# Patient Record
Sex: FEMALE | Race: WHITE | Employment: FULL TIME | URBAN - METROPOLITAN AREA
[De-identification: names, ages, dates, MRNs, and addresses within clinical notes are randomized per-mention and may not be internally consistent; named-entity substitution may affect disease eponyms.]

---

## 2017-01-01 ENCOUNTER — TRANSFERRED RECORDS (OUTPATIENT)
Dept: HEALTH INFORMATION MANAGEMENT | Facility: CLINIC | Age: 57
End: 2017-01-01

## 2017-01-09 ENCOUNTER — OFFICE VISIT (OUTPATIENT)
Dept: FAMILY MEDICINE | Facility: CLINIC | Age: 57
End: 2017-01-09
Payer: OTHER GOVERNMENT

## 2017-01-09 VITALS
TEMPERATURE: 98.2 F | HEART RATE: 86 BPM | DIASTOLIC BLOOD PRESSURE: 76 MMHG | WEIGHT: 204 LBS | SYSTOLIC BLOOD PRESSURE: 110 MMHG | OXYGEN SATURATION: 95 % | HEIGHT: 68 IN | BODY MASS INDEX: 30.92 KG/M2

## 2017-01-09 DIAGNOSIS — R52 BODY ACHES: ICD-10-CM

## 2017-01-09 DIAGNOSIS — Z00.00 ROUTINE GENERAL MEDICAL EXAMINATION AT A HEALTH CARE FACILITY: ICD-10-CM

## 2017-01-09 DIAGNOSIS — Z13.6 CARDIOVASCULAR SCREENING; LDL GOAL LESS THAN 100: ICD-10-CM

## 2017-01-09 DIAGNOSIS — M25.50 MULTIPLE JOINT PAIN: ICD-10-CM

## 2017-01-09 DIAGNOSIS — Z82.49 FAMILY HISTORY OF ISCHEMIC HEART DISEASE: ICD-10-CM

## 2017-01-09 DIAGNOSIS — Z85.528 HISTORY OF KIDNEY CANCER: Primary | ICD-10-CM

## 2017-01-09 DIAGNOSIS — K90.41 GLUTEN INTOLERANCE: ICD-10-CM

## 2017-01-09 LAB
BASOPHILS # BLD AUTO: 0.1 10E9/L (ref 0–0.2)
BASOPHILS NFR BLD AUTO: 0.8 %
CRP SERPL-MCNC: <2.9 MG/L (ref 0–8)
DIFFERENTIAL METHOD BLD: NORMAL
EOSINOPHIL # BLD AUTO: 0.2 10E9/L (ref 0–0.7)
EOSINOPHIL NFR BLD AUTO: 2.5 %
ERYTHROCYTE [DISTWIDTH] IN BLOOD BY AUTOMATED COUNT: 12.7 % (ref 10–15)
ERYTHROCYTE [SEDIMENTATION RATE] IN BLOOD BY WESTERGREN METHOD: 16 MM/H (ref 0–30)
HCT VFR BLD AUTO: 40 % (ref 35–47)
HGB BLD-MCNC: 13.5 G/DL (ref 11.7–15.7)
LYMPHOCYTES # BLD AUTO: 2.1 10E9/L (ref 0.8–5.3)
LYMPHOCYTES NFR BLD AUTO: 32.3 %
MCH RBC QN AUTO: 30.4 PG (ref 26.5–33)
MCHC RBC AUTO-ENTMCNC: 33.8 G/DL (ref 31.5–36.5)
MCV RBC AUTO: 90 FL (ref 78–100)
MONOCYTES # BLD AUTO: 0.5 10E9/L (ref 0–1.3)
MONOCYTES NFR BLD AUTO: 8.2 %
NEUTROPHILS # BLD AUTO: 3.6 10E9/L (ref 1.6–8.3)
NEUTROPHILS NFR BLD AUTO: 56.2 %
PLATELET # BLD AUTO: 258 10E9/L (ref 150–450)
RBC # BLD AUTO: 4.44 10E12/L (ref 3.8–5.2)
TSH SERPL DL<=0.005 MIU/L-ACNC: 3.79 MU/L (ref 0.4–4)
WBC # BLD AUTO: 6.5 10E9/L (ref 4–11)

## 2017-01-09 PROCEDURE — 99000 SPECIMEN HANDLING OFFICE-LAB: CPT | Performed by: NURSE PRACTITIONER

## 2017-01-09 PROCEDURE — 86038 ANTINUCLEAR ANTIBODIES: CPT | Mod: 90 | Performed by: NURSE PRACTITIONER

## 2017-01-09 PROCEDURE — 84443 ASSAY THYROID STIM HORMONE: CPT | Mod: 90 | Performed by: NURSE PRACTITIONER

## 2017-01-09 PROCEDURE — 85025 COMPLETE CBC W/AUTO DIFF WBC: CPT | Performed by: NURSE PRACTITIONER

## 2017-01-09 PROCEDURE — 99213 OFFICE O/P EST LOW 20 MIN: CPT | Mod: 25 | Performed by: NURSE PRACTITIONER

## 2017-01-09 PROCEDURE — 36415 COLL VENOUS BLD VENIPUNCTURE: CPT | Performed by: NURSE PRACTITIONER

## 2017-01-09 PROCEDURE — 99396 PREV VISIT EST AGE 40-64: CPT | Performed by: NURSE PRACTITIONER

## 2017-01-09 PROCEDURE — 85652 RBC SED RATE AUTOMATED: CPT | Performed by: NURSE PRACTITIONER

## 2017-01-09 PROCEDURE — 86431 RHEUMATOID FACTOR QUANT: CPT | Mod: 90 | Performed by: NURSE PRACTITIONER

## 2017-01-09 PROCEDURE — 86140 C-REACTIVE PROTEIN: CPT | Mod: 90 | Performed by: NURSE PRACTITIONER

## 2017-01-09 NOTE — NURSING NOTE
"Initial /76 mmHg  Pulse 86  Temp(Src) 98.2  F (36.8  C) (Tympanic)  Ht 5' 7.5\" (1.715 m)  Wt 204 lb (92.534 kg)  BMI 31.46 kg/m2  SpO2 95% Estimated body mass index is 31.46 kg/(m^2) as calculated from the following:    Height as of this encounter: 5' 7.5\" (1.715 m).    Weight as of this encounter: 204 lb (92.534 kg). .    Ava Henderson    "

## 2017-01-09 NOTE — MR AVS SNAPSHOT
After Visit Summary   1/9/2017    Teresa Fernandez    MRN: [id number]           Patient Information     Date Of Birth          1960        Visit Information        Provider Department      1/9/2017 4:20 PM Nuzhat Burt APRN [facility]        Today's Diagnoses     History of kidney cancer    -  1     Family history of ischemic heart disease         CARDIOVASCULAR SCREENING; LDL GOAL LESS THAN 100         Body aches         Multiple joint pain         Gluten intolerance           Care Instructions      Preventive Health Recommendations  Female Ages 50 - 64    Yearly exam: See your health care provider every year in order to  o Review health changes.   o Discuss preventive care.    o Review your medicines if your doctor has prescribed any.      Get a Pap test every three years (unless you have an abnormal result and your provider advises testing more often).    If you get Pap tests with HPV test, you only need to test every 5 years, unless you have an abnormal result.     You do not need a Pap test if your uterus was removed (hysterectomy) and you have not had cancer.    You should be tested each year for STDs (sexually transmitted diseases) if you're at risk.     Have a mammogram every 1 to 2 years.    Have a colonoscopy at age 50, or have a yearly FIT test (stool test). These exams screen for colon cancer.      Have a cholesterol test every 5 years, or more often if advised.    Have a diabetes test (fasting glucose) every three years. If you are at risk for diabetes, you should have this test more often.     If you are at risk for osteoporosis (brittle bone disease), think about having a bone density scan (DEXA).    Shots: Get a flu shot each year. Get a tetanus shot every 10 years.    Nutrition:     Eat at least 5 servings of fruits and vegetables each day.    Eat whole-grain bread, whole-wheat pasta and brown rice instead of white grains and rice.    Talk to your provider  about Calcium and Vitamin D.     Lifestyle    Exercise at least 150 minutes a week (30 minutes a day, 5 days a week). This will help you control your weight and prevent disease.    Limit alcohol to one drink per day.    No smoking.     Wear sunscreen to prevent skin cancer.     See your dentist every six months for an exam and cleaning.    See your eye doctor every 1 to 2 years.                Follow-ups after your visit        Additional Services     GASTROENTEROLOGY ADULT REFERRAL +/- PROCEDURE       Your provider has referred you to Gastroenterology Services.    English    Procedure/Referral: REFERRAL ONLY - [facility]: [facility] [phone]   [email or web address]  [facility]: [facility] Clinic [facility] [phone]   [email or web address]  N: [place] [facility] [phone]   [email or web address]    Please be aware that coverage of these services is subject to the terms and limitations of your health insurance plan.  Call member services at your health plan with any benefit or coverage questions.  Any procedures must be performed at a [place] facility OR coordinated by your clinic's referral office.    Please bring the following with you to your appointment:    (1) Any X-Rays, CTs or MRIs which have been performed.  Contact the facility where they were done to arrange for  prior to your scheduled appointment.    (2) List of current medications   (3) This referral request   (4) Any documents/labs given to you for this referral            RHEUMATOLOGY REFERRAL       Your provider has referred you to: [facility]: [facility] [phone]   [email or web address]    Please be aware that coverage of these services is subject to the terms and limitations of your health insurance plan.  Call member services at your health plan with any benefit or  coverage questions.      Please bring the following with you to your appointment:    (1) Any X-Rays, CTs or MRIs which have been performed.  Contact the facility where they were done to arrange for  prior to your scheduled appointment.    (2) List of current medications   (3) This referral request   (4) Any documents/labs given to you for this referral                  Your next 10 appointments already scheduled     Jan 09, 2017  4:20 PM   PHYSICAL with HERIBERTO Delgado CNP   [facility]    [address] [address] [phone]              Future tests that were ordered for you today     Open Future Orders        Priority Expected Expires Ordered    Lipid panel reflex to direct LDL Routine 6/8/2017 10/10/2017 1/9/2017            Who to contact     If you have questions or need follow up information about today's clinic visit or your schedule please contact [facility] directly at [phone].  Normal or non-critical lab and imaging results will be communicated to you by [employer], letter or phone within 4 business days after the clinic has received the results. If you do not hear from us within 7 days, please contact the clinic through [email or web address] or phone. If you have a critical or abnormal lab result, we will notify you by phone as soon as possible.  Submit refill requests through [employer] or call your pharmacy and they will forward the refill request to us. Please allow 3 business days for your refill to be completed.          Additional Information About Your Visit        Mult[employer] Information     [employer] gives you secure access to your electronic health record. If you see a primary care provider, you can also send messages to your care team and make appointments. If you have questions, please call your primary care clinic.  If you do not have a primary care provider, please call [phone] and they will assist you.        Care  "EveryWhere ID     This is your Care EveryWhere ID. This could be used by other organizations to access your [place] medical records  [id number]        Your Vitals Were     Pulse Temperature Height BMI (Body Mass Index) Pulse Oximetry       86 98.2  F (36.8  C) (Tympanic) 5' 7.5\" (1.715 m) 31.46 kg/m2 95%        Blood Pressure from Last 3 Encounters:   01/09/17 110/76   12/12/16 134/69   12/12/16 124/82    Weight from Last 3 Encounters:   01/09/17 204 lb (92.534 kg)   12/12/16 206 lb 9.6 oz (93.713 kg)   12/12/16 203 lb 6.4 oz (92.262 kg)              We Performed the Following     Antinuclear antibody screen by EIA     CBC with platelets differential     CRP inflammation     Cyclic Citrullinated Peptide IgG (Quest)     Erythrocyte sedimentation rate auto     GASTROENTEROLOGY ADULT REFERRAL +/- PROCEDURE     Rheumatoid factor     RHEUMATOLOGY REFERRAL        Primary Care Provider Office Phone # Fax #    Jeanne HERIBERTO Ivan [facility] [phone]       [facility] [address] [address]        Thank you!     Thank you for choosing [facility]  for your care. Our goal is always to provide you with excellent care. Hearing back from our patients is one way we can continue to improve our services. Please take a few minutes to complete the written survey that you may receive in the mail after your visit with us. Thank you!             Your Updated Medication List - Protect others around you: Learn how to safely use, store and throw away your medicines at www.disposemymeds.org.          This list is accurate as of: 1/9/17  4:07 PM.  Always use your most recent med list.                   Brand Name Dispense Instructions for use    * albuterol (2.5 MG/3ML) 0.083% neb solution     25 vial    Take 1 vial (2.5 mg) by nebulization every 6 hours as needed for shortness of breath / dyspnea or wheezing       * albuterol 108 (90 BASE) MCG/ACT Inhaler    PROAIR HFA/PROVENTIL " HFA/VENTOLIN HFA    1 Inhaler    Inhale 2 puffs into the lungs every 6 hours as needed for shortness of breath / dyspnea       ALEVE 220 MG capsule   Generic drug:  naproxen sodium      220 mg. Prn.       ASPIRIN PO      Take 325 mg by mouth       fexofenadine 180 MG tablet    ALLEGRA    30 tablet    Take 1 tablet (180 mg) by mouth daily       * fluticasone-salmeterol 250-50 MCG/DOSE diskus inhaler    ADVAIR    1 Inhaler    Inhale 1 puff into the lungs 2 times daily       * fluticasone-salmeterol 500-50 MCG/DOSE diskus inhaler    ADVAIR    1 Inhaler    Inhale 1 puff into the lungs 2 times daily       fluticasone-vilanterol 200-25 MCG/INH oral inhaler    BREO ELLIPTA    1 Inhaler    Inhale 1 puff into the lungs daily       levothyroxine 125 MCG tablet    SYNTHROID/LEVOTHROID    90 tablet    Take 1 tablet (125 mcg) by mouth daily       MULTIVITAMIN ADULT PO          order for DME     1 Units    Equipment being ordered: Nebulizer       OVER-THE-COUNTER     1 Bottle    Place 1 drop into both eyes 3 times daily. Systane Ultra, Systane Balance, Blink Tears or Refresh Optive Artificial Tear       PARoxetine 20 MG tablet    PAXIL    90 tablet    Take 1 tablet (20 mg) by mouth daily       ranitidine 150 MG tablet    ZANTAC    180 tablet    Take 1 tablet (150 mg) by mouth 2 times daily       VITAMIN D3 PO      Take 2,000 Units by mouth daily       * Notice:  This list has 4 medication(s) that are the same as other medications prescribed for you. Read the directions carefully, and ask your doctor or other care provider to review them with you.

## 2017-01-09 NOTE — PROGRESS NOTES
Quick Note:    Please let the patient know that all labs are normal.hold for remaining labs  ______

## 2017-01-09 NOTE — PROGRESS NOTES
"  Answers for HPI/ROS submitted by the patient on 1/9/2017   Annual Exam:  Getting at least 3 servings of Calcium per day:: Yes  Bi-annual eye exam:: Yes  Dental care twice a year:: Yes  Sleep apnea or symptoms of sleep apnea:: Daytime drowsiness  Diet:: Gluten-free/reduced  Frequency of exercise:: 4-5 days/week  Duration of exercise:: 15-30 minutes  Taking medications regularly:: Yes  Medication side effects:: None  Additional concerns today:: YES  PHQ-2 Depressed: Not at all, Several days  PHQ-2 Score: 1       SUBJECTIVE:     CC: Teresa Fernandez is an 56 year old woman who presents for preventive health visit.     Healthy Habits:    Do you get at least three servings of calcium containing foods daily (dairy, green leafy vegetables, etc.)? yes    Amount of exercise or daily activities, outside of work: 2-3 day(s) per week    Problems taking medications regularly No    Medication side effects: No    Have you had an eye exam in the past two years? yes    Do you see a dentist twice per year? yes    Do you have sleep apnea, excessive snoring or daytime drowsiness?yes, daytime drowsiness        C/O body aching off/on for months- muscles/ shoulders/ joint pain. \"aching all over\". Ibuprofen helps but does not entirely relieve pain. Tylenol does not help. No joint swelling or redness.    Worse two weeks ago after eating gluten, follows a gluten free diet.    Unable to find colonoscopy in patient's chart- patient unsure when/where she had last colonoscopy     History of renal cell cancer in 1996- left nephrectomy- no longer followed by oncology     Today's PHQ-2 Score:   PHQ-2 ( 1999 [employer]) 1/9/2017 8/25/2016   Q1: Little interest or pleasure in doing things - 0   Q2: Feeling down, depressed or hopeless - 0   PHQ-2 Score - 0   Little interest or pleasure in doing things Not at all -   Feeling down, depressed or hopeless Several days -   PHQ-2 Score 1 -       Abuse: Current or Past(Physical, Sexual or Emotional)- No  Do you " feel safe in your environment - Yes    Social History   Substance Use Topics     Smoking status: Former Smoker     Quit date: 03/26/1996     Smokeless tobacco: Never Used     Alcohol Use: Yes      Comment: occ     The patient does not drink >3 drinks per day nor >7 drinks per week.    Recent Labs   Lab Test  04/29/16   0737  01/29/15   0656  09/08/09   1006   CHOL  220*  217*  222*   HDL  58  50*  55   LDL  138*  141*  137*   TRIG  122  131  147   CHOLHDLRATIO   --   4.3  4.0   NHDL  162*   --    --        Reviewed orders with patient.  Reviewed health maintenance and updated orders accordingly - Yes    Mammo Decision Support:  Patient over age 50, mutual decision to screen reflected in health maintenance.    Pertinent mammograms are reviewed under the imaging tab.  History of abnormal Pap smear: Status post benign hysterectomy. Health Maintenance and Surgical History updated.  All Histories reviewed and updated in Epic.      ROS:  C: NEGATIVE for fever, chills, change in weight  I: NEGATIVE for worrisome rashes, moles or lesions  E: NEGATIVE for vision changes or irritation  ENT: NEGATIVE for ear, mouth and throat problems  R: NEGATIVE for significant cough or SOB  B: NEGATIVE for masses, tenderness or discharge  CV: NEGATIVE for chest pain, palpitations or peripheral edema  GI: NEGATIVE for nausea, abdominal pain, heartburn, or change in bowel habits  : NEGATIVE for unusual urinary or vaginal symptoms. No vaginal bleeding.  M: + Muscle and joint pain in upper extremities/hands- bilateral  N: NEGATIVE for weakness, dizziness or paresthesias  P: NEGATIVE for changes in mood or affect     Problem list, Medication list, Allergies, and Medical/Social/Surgical histories reviewed in [facility] and updated as appropriate.  OBJECTIVE:     There were no vitals taken for this visit.  EXAM:  GENERAL: healthy, alert and no distress  EYES: Eyes grossly normal to inspection, PERRL and conjunctivae and sclerae normal  HENT: ear  canals and TM's normal, nose and mouth without ulcers or lesions  NECK: no adenopathy, no asymmetry, masses, or scars and thyroid normal to palpation  RESP: lungs clear to auscultation - no rales, rhonchi or wheezes  CV: regular rate and rhythm, normal S1 S2, no S3 or S4, no murmur, click or rub, no peripheral edema and peripheral pulses strong  ABDOMEN: soft, nontender, no hepatosplenomegaly, no masses and bowel sounds normal  MS: no gross musculoskeletal defects noted, no edema  SKIN: no suspicious lesions or rashes  NEURO: Normal strength and tone, mentation intact and speech normal  PSYCH: mentation appears normal, affect normal/bright    ASSESSMENT/PLAN:     1. Routine general medical examination at a health care facility      2. History of kidney cancer  Post left nephrectomy in 1996- no longer followed by oncology    3. Family history of ischemic heart disease    - Lipid panel reflex to direct LDL; Future    4. CARDIOVASCULAR SCREENING; LDL GOAL LESS THAN 100  Not currently on statin  - Lipid panel reflex to direct LDL; Future    5. Body aches  ? OA vs RHEUMATOID ARTHRITIS vs fibromyalgia (considering she has a lot of shoulder discomfort)  - Antinuclear antibody screen by EIA  - Rheumatoid factor  - Erythrocyte sedimentation rate auto  - CRP inflammation  - CBC with platelets differential  - Cyclic Citrullinated Peptide IgG ([facility])  - RHEUMATOLOGY REFERRAL    6. Multiple joint pain  OA vs RHEUMATOID ARTHRITIS   - Antinuclear antibody screen by EIA  - Rheumatoid factor  - Erythrocyte sedimentation rate auto  - CRP inflammation  - CBC with platelets differential  - Cyclic Citrullinated Peptide IgG (Quest)  - RHEUMATOLOGY REFERRAL  - TSH with free T4 reflex    7. Gluten intolerance    - GASTROENTEROLOGY ADULT REFERRAL +/- PROCEDURE    COUNSELING:   Reviewed preventive health counseling, as reflected in patient instructions       Regular exercise       Healthy diet/nutrition       Colon cancer screening          "reports that she quit smoking about 20 years ago. She has never used smokeless tobacco.    Estimated body mass index is 31.19 kg/(m^2) as calculated from the following:    Height as of 12/12/16: 5' 7.72\" (1.72 m).    Weight as of 12/12/16: 203 lb 6.4 oz (92.262 kg).   Weight management plan: encouraged to continue healthy eating and start exercising     Counseling Resources:  ATP IV Guidelines  Pooled Cohorts Equation Calculator  Breast Cancer Risk Calculator  FRAX Risk Assessment  ICSI Preventive Guidelines  Dietary Guidelines for Americans, 2010  USDA's MyPlate  ASA Prophylaxis  Lung CA Screening    HERIBERTO Delgado [facility]  "

## 2017-01-09 NOTE — PROGRESS NOTES
Answers for HPI/ROS submitted by the patient on 1/9/2017   Annual Exam:  Getting at least 3 servings of Calcium per day:: Yes  Bi-annual eye exam:: Yes  Dental care twice a year:: Yes  Sleep apnea or symptoms of sleep apnea:: Daytime drowsiness  Diet:: Gluten-free/reduced  Frequency of exercise:: 4-5 days/week  Duration of exercise:: 15-30 minutes  Taking medications regularly:: Yes  Medication side effects:: None  Additional concerns today:: YES  PHQ-2 Depressed: Not at all, Several days  PHQ-2 Score: 1

## 2017-01-09 NOTE — PATIENT INSTRUCTIONS

## 2017-01-10 LAB
ANA SER QL IA: NORMAL
RHEUMATOID FACT SER NEPH-ACNC: <20 IU/ML (ref 0–20)

## 2017-01-16 ENCOUNTER — TELEPHONE (OUTPATIENT)
Dept: FAMILY MEDICINE | Facility: CLINIC | Age: 57
End: 2017-01-16

## 2017-01-16 NOTE — TELEPHONE ENCOUNTER
Patient would like to schedule for an gastroscopy and colonoscopy.  Please call *[phone] Jane with orders    OV notes on 1/9/17:    7. Gluten intolerance    - GASTROENTEROLOGY ADULT REFERRAL +/- PROCEDURE    Did you want patient to have a consult with GI or do you want patient to have procedures upper GI and/or lower GI?      Thank you  Mag PIEDRA RN

## 2017-01-17 NOTE — TELEPHONE ENCOUNTER
Pt informed.  She will do a consultation with GI first and do any recommended testing afterward.  Pt will arrange.    Rhiannon Bowen RN

## 2017-02-05 ENCOUNTER — HOSPITAL ENCOUNTER (EMERGENCY)
Facility: CLINIC | Age: 57
Discharge: HOME | End: 2017-02-05
Attending: PHYSICIAN ASSISTANT | Admitting: PHYSICIAN ASSISTANT
Payer: OTHER GOVERNMENT

## 2017-02-05 VITALS
TEMPERATURE: 98 F | OXYGEN SATURATION: 99 % | DIASTOLIC BLOOD PRESSURE: 74 MMHG | SYSTOLIC BLOOD PRESSURE: 111 MMHG | RESPIRATION RATE: 16 BRPM

## 2017-02-05 DIAGNOSIS — J01.00 ACUTE NON-RECURRENT MAXILLARY SINUSITIS: ICD-10-CM

## 2017-02-05 PROCEDURE — 99213 OFFICE O/P EST LOW 20 MIN: CPT | Performed by: PHYSICIAN ASSISTANT

## 2017-02-05 PROCEDURE — 99213 OFFICE O/P EST LOW 20 MIN: CPT

## 2017-02-05 RX ORDER — DOXYCYCLINE 100 MG/1
100 CAPSULE ORAL 2 TIMES DAILY
Qty: 20 CAPSULE | Refills: 0 | Status: SHIPPED | OUTPATIENT
Start: 2017-02-05 | End: 2017-02-15

## 2017-02-05 NOTE — DISCHARGE INSTRUCTIONS
Acute Sinusitis    Acute sinusitis is inflammation (irritation and swelling) of the sinuses. It is usually from a bacterial infection that follows an upper respiratory viral infection. Your doctor can help you find relief. Read on to learn more.  What is acute sinusitis?  Sinuses are air-filled spaces in the skull behind the face. They are kept moist and clean by a lining of mucosa. Things such as pollen, smoke, and chemical fumes can irritate the mucosa. It can then become inflamed (swell up). As a response to irritation, the mucosa makes more mucus and other fluids. Tiny hairlike cilia cover the mucosa. Cilia help transport mucus toward the opening of the sinus. Too much mucus may cause the cilia to stop working. This blocks the sinus opening. A buildup of fluid in the sinuses then leads to symptoms such as pain and pressure. It can also encourage growth of bacteria in the sinuses.  Common symptoms of acute sinusitis  You may have:    Facial soreness pain    Headache    Fever    Postnasal drip (drainage in the back of the throat)    Congestion    Drainage that is thick and colored, instead of clear    Cough  Diagnosis of acute sinusitis  The doctor will ask about your symptoms and medical history. He or she will examine your ear, nose, and throat. X-rays are usually not needed. If your sinusitis recurs, you may have a culture to check for bacteria or imaging tests.     An evaluation will be done. A culture (sample of mucus) is sometimes taken to check for bacteria. If you have multiple bouts of sinusitis, imaging (X-rays or CAT scans) may be done to check for an anatomic cause of the infection.  Treatment of acute sinusitis  Treatment is designed to unblock the sinus opening and help the cilia work again. Antihistamine and decongestant medications may be prescribed. These can reduce inflammation and decrease fluid production. If a bacterial infection is present, it is treated with antibiotic medication for 10 to  14 days. This medication should be taken until it is gone, even if you feel better.    1179-9234 [employer]. [address] [place] [address], [place] [address]. All rights reserved. This information is not intended as a substitute for professional medical care. Always follow your healthcare professional's instructions.

## 2017-02-05 NOTE — ED PROVIDER NOTES
History     Chief Complaint   Patient presents with     Cough     Pt has had cough, chills, just not feeling well for the past week.  Pt got flu shot and has history of asthma.     HPI  Teresa Fernandez is a 56 year old female  presenting with a chief complaint of cough for the last week.  She additionally complains of nasal congestion, sinus pressure, upper dental pain and shortness of breath. She denies any fever, chills, myalgias, sore throat, wheezing or abdominal complaints.  She states that she does have a history of frequent sinus infections after breaking her nose several years ago.  She did have ENT evaluation, however did does not remember having imaging done and did not require surgery.  She does also have a history of hypothyroidism, allergies/chronic rhinitis, generalized anxiety disorder, renal cancer, GERD, low back pain, fatigue chronic history of melanoma, eczema, moderate persistent asthma, degenerative disc disease of the lumbar spine, hyperlipidemia.  She has increased use of her albuterol inhaler up to 1-2 times daily without relief.      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      Grave's disease     Malignant neoplasm of renal pelvis (H) 1995     Renal cell carcinoma     Bronchitis, not specified as acute or chronic      Depressive disorder, not elsewhere classified      Other specified acquired hypothyroidism 4/12/2005     CHR FRONTAL SINUSITIS 4/12/2005     ALLERGIC RHINITIS NOS 4/12/2005     CHR MAXILLARY SINUSITIS 4/12/2005     ALLERGY, UNSPECIFIED 4/19/2005     Unspecified asthma(493.90)      Allergies      Eczema      Skin cancer 06/2010     MIS of the chest     Anxiety      Arthritis      herniated disc     Hypertension goal BP (blood pressure) < 140/90 11/16/2011     Eczema 10/17/2012     Chronic sinusitis 2005     Malignant melanoma (H)         No current facility-administered medications on file prior to  encounter.  Current Outpatient Prescriptions on File Prior to Encounter:  albuterol (PROAIR HFA/PROVENTIL HFA/VENTOLIN HFA) 108 (90 BASE) MCG/ACT Inhaler Inhale 2 puffs into the lungs every 6 hours as needed for shortness of breath / dyspnea   PARoxetine (PAXIL) 20 MG tablet Take 1 tablet (20 mg) by mouth daily   ranitidine (ZANTAC) 150 MG tablet Take 1 tablet (150 mg) by mouth 2 times daily   levothyroxine (SYNTHROID/LEVOTHROID) 125 MCG tablet Take 1 tablet (125 mcg) by mouth daily   fluticasone-salmeterol (ADVAIR) 500-50 MCG/DOSE diskus inhaler Inhale 1 puff into the lungs 2 times daily   fluticasone - vilanterol (BREO ELLIPTA) 200-25 MCG/INH oral inhaler Inhale 1 puff into the lungs daily   fluticasone-salmeterol (ADVAIR) 250-50 MCG/DOSE diskus inhaler Inhale 1 puff into the lungs 2 times daily   order for DME Equipment being ordered: Nebulizer   albuterol (2.5 MG/3ML) 0.083% nebulizer solution Take 1 vial (2.5 mg) by nebulization every 6 hours as needed for shortness of breath / dyspnea or wheezing   Multiple Vitamins-Minerals (MULTIVITAMIN ADULT PO)    ASPIRIN PO Take 325 mg by mouth   Cholecalciferol (VITAMIN D3 PO) Take 2,000 Units by mouth daily   fexofenadine (ALLEGRA) 180 MG tablet Take 1 tablet (180 mg) by mouth daily   OVER-THE-COUNTER Place 1 drop into both eyes 3 times daily. Systane Ultra, Systane Balance, Blink Tears or Refresh Optive Artificial Tear   Naproxen Sodium (ALEVE) 220 MG capsule 220 mg. Prn.       I have reviewed the Medications, Allergies, Past Medical and Surgical History, and Social History in the Epic system.    Review of Systems  CONSTITUTIONAL:NEGATIVE for fever, chills, change in weight  INTEGUMENTARY/SKIN: NEGATIVE for worrisome rashes, moles or lesions  EYES: NEGATIVE for vision changes or irritation  ENT/MOUTH: POSITIVE for nasal congestion, sinus pressure and NEGATIVE for sore throat, ear pain   RESP:POSITIVE for cough, shortness of breath, chest tightness and NEGATIVE for  wheezing  GI: NEGATIVE for abdominal pain, diarrhea, nausea and vomiting   Physical Exam   /74 mmHg  Temp(Src) 98  F (36.7  C) (Oral)  Resp 16  SpO2 99%  Physical Exam  GENERAL APPEARANCE: healthy, alert and no distress  EYES: EOMI,  PERRL, conjunctiva clear  HENT: ear canals and TM's normal.  Nasal mucosa edematous, tenderness to palpation of bilateral maxillary sinuses.  Posterior pharynx is nonerythematous without exudate, scant amount of postnasal drainage is present  NECK: supple, nontender, no lymphadenopathy  RESP: lungs clear to auscultation - no rales, rhonchi or wheezes  CV: regular rates and rhythm, normal S1 S2, no murmur noted  SKIN: no suspicious lesions or rashes  ED Course   Procedures        Critical Care time:  none            Labs Ordered and Resulted from Time of ED Arrival Up to the Time of Departure from the ED - No data to display    Assessments & Plan (with Medical Decision Making)     I have reviewed the nursing notes.    I have reviewed the findings, diagnosis, plan and need for follow up with the patient.  Discharge Medication List as of 2/5/2017 12:28 PM      START taking these medications    Details   doxycycline Monohydrate 100 MG CAPS Take 1 capsule (100 mg) by mouth 2 times daily for 10 days, Disp-20 capsule, R-0, E-Prescribe           Final diagnoses:   Acute non-recurrent maxillary sinusitis     Physical-year-old female presents to urgent care with concerns over one week history of nasal congestion, sinus pressure and cough.  Patient had stable vital signs upon arrival.  Physical exam findings as described above.  Symptoms are consistent with sinusitis.  I discussed with patient that generally sinusitis, viral illnesses and do not require antibiotics, however given duration of her symptoms I did agree to prescribe doxycycline at this time.  Differential for his symptoms also include viral URI, allergic rhinitis, bronchitis.  I do not suspect pneumonia and will defer further  evaluation at this time.  Patient was discharged home stable with instructions for continued OTC symptomatic treatment as needed.      Disclaimer: This note consists of symbols derived from keyboarding, dictation, and/or voice recognition software. As a result, there may be errors in the script that have gone undetected.  Please consider this when interpreting information found in the chart.    2/5/2017   [facility] EMERGENCY DEPARTMENT      Dalila Aguilar PA-C  02/10/17 1052

## 2017-02-05 NOTE — ED AVS SNAPSHOT
[facility] Emergency Department    [address] [address]    Phone:  [phone]    Fax:  [phone]                                       Teresa Fernandez   MRN: [id number]    Department:  [facility]   Date of Visit:  2/5/2017           Patient Information     Date Of Birth          1960        Your diagnoses for this visit were:     Acute non-recurrent maxillary sinusitis        You were seen by Dalila Aguilar PA-C.      Follow-up Information     Follow up with Nuzhat Burt APRN CNP In 1 week.    Specialty:  Nurse Practitioner    Why:  As needed, If symptoms worsen    Contact information:    [facility]  [address] [id number] [phone]          Discharge Instructions         Acute Sinusitis    Acute sinusitis is inflammation (irritation and swelling) of the sinuses. It is usually from a bacterial infection that follows an upper respiratory viral infection. Your doctor can help you find relief. Read on to learn more.  What is acute sinusitis?  Sinuses are air-filled spaces in the skull behind the face. They are kept moist and clean by a lining of mucosa. Things such as pollen, smoke, and chemical fumes can irritate the mucosa. It can then become inflamed (swell up). As a response to irritation, the mucosa makes more mucus and other fluids. Tiny hairlike cilia cover the mucosa. Cilia help transport mucus toward the opening of the sinus. Too much mucus may cause the cilia to stop working. This blocks the sinus opening. A buildup of fluid in the sinuses then leads to symptoms such as pain and pressure. It can also encourage growth of bacteria in the sinuses.  Common symptoms of acute sinusitis  You may have:    Facial soreness pain    Headache    Fever    Postnasal drip (drainage in the back of the throat)    Congestion    Drainage that is thick and colored, instead of clear    Cough  Diagnosis of acute sinusitis  The doctor  will ask about your symptoms and medical history. He or she will examine your ear, nose, and throat. X-rays are usually not needed. If your sinusitis recurs, you may have a culture to check for bacteria or imaging tests.     An evaluation will be done. A culture (sample of mucus) is sometimes taken to check for bacteria. If you have multiple bouts of sinusitis, imaging (X-rays or CAT scans) may be done to check for an anatomic cause of the infection.  Treatment of acute sinusitis  Treatment is designed to unblock the sinus opening and help the cilia work again. Antihistamine and decongestant medications may be prescribed. These can reduce inflammation and decrease fluid production. If a bacterial infection is present, it is treated with antibiotic medication for 10 to 14 days. This medication should be taken until it is gone, even if you feel better.    1991-3261 [employer]. [address] [place] [address]. All rights reserved. This information is not intended as a substitute for professional medical care. Always follow your healthcare professional's instructions.          24 Hour Appointment Hotline       To make an appointment at any [facility], call [phone] ([phone]). If you don't have a family doctor or clinic, we will help you find one. [facility] clinics are conveniently located to serve the needs of you and your family.             Review of your medicines      START taking        Dose / Directions Last dose taken    doxycycline Monohydrate 100 MG Caps   Dose:  100 mg   Quantity:  20 capsule        Take 1 capsule (100 mg) by mouth 2 times daily for 10 days   Refills:  0          Our records show that you are taking the medicines listed below. If these are incorrect, please call your family doctor or clinic.        Dose / Directions Last dose taken    * albuterol (2.5 MG/3ML) 0.083% neb solution   Dose:  1 vial   Quantity:  25 vial        Take 1 vial (2.5 mg) by  nebulization every 6 hours as needed for shortness of breath / dyspnea or wheezing   Refills:  0        * albuterol 108 (90 BASE) MCG/ACT Inhaler   Commonly known as:  PROAIR HFA/PROVENTIL HFA/VENTOLIN HFA   Dose:  2 puff   Quantity:  1 Inhaler        Inhale 2 puffs into the lungs every 6 hours as needed for shortness of breath / dyspnea   Refills:  5        ALEVE 220 MG capsule   Dose:  220 mg   Generic drug:  naproxen sodium        220 mg. Prn.   Refills:  0        ASPIRIN PO   Dose:  325 mg        Take 325 mg by mouth   Refills:  0        fexofenadine 180 MG tablet   Commonly known as:  ALLEGRA   Dose:  180 mg   Quantity:  30 tablet        Take 1 tablet (180 mg) by mouth daily   Refills:  1        * fluticasone-salmeterol 250-50 MCG/DOSE diskus inhaler   Commonly known as:  ADVAIR   Dose:  1 puff   Quantity:  1 Inhaler        Inhale 1 puff into the lungs 2 times daily   Refills:  1        * fluticasone-salmeterol 500-50 MCG/DOSE diskus inhaler   Commonly known as:  ADVAIR   Dose:  1 puff   Quantity:  1 Inhaler        Inhale 1 puff into the lungs 2 times daily   Refills:  3        fluticasone-vilanterol 200-25 MCG/INH oral inhaler   Commonly known as:  BREO ELLIPTA   Dose:  1 puff   Quantity:  1 Inhaler        Inhale 1 puff into the lungs daily   Refills:  1        levothyroxine 125 MCG tablet   Commonly known as:  SYNTHROID/LEVOTHROID   Dose:  125 mcg   Quantity:  90 tablet        Take 1 tablet (125 mcg) by mouth daily   Refills:  3        MULTIVITAMIN ADULT PO        Refills:  0        order for DME   Quantity:  1 Units        Equipment being ordered: Nebulizer   Refills:  0        OVER-THE-COUNTER   Dose:  1 drop   Quantity:  1 Bottle        Place 1 drop into both eyes 3 times daily. Systane Ultra, Systane Balance, Blink Tears or Refresh Optive Artificial Tear   Refills:  0        PARoxetine 20 MG tablet   Commonly known as:  PAXIL   Dose:  20 mg   Quantity:  90 tablet        Take 1 tablet (20 mg) by mouth  daily   Refills:  3        ranitidine 150 MG tablet   Commonly known as:  ZANTAC   Dose:  150 mg   Quantity:  180 tablet        Take 1 tablet (150 mg) by mouth 2 times daily   Refills:  2        VITAMIN D3 PO   Dose:  2000 Units        Take 2,000 Units by mouth daily   Refills:  0        * Notice:  This list has 4 medication(s) that are the same as other medications prescribed for you. Read the directions carefully, and ask your doctor or other care provider to review them with you.            Prescriptions were sent or printed at these locations (1 Prescription)                   [facility] [place], [place] - [address]   [address] [address]    Telephone:  [phone]   Fax:  [phone]   Hours:                  E-Prescribed (1 of 1)         doxycycline Monohydrate 100 MG CAPS                Orders Needing Specimen Collection     None      Pending Results     No orders found from 2/4/2017 to 2/6/2017.            Pending Culture Results     No orders found from 2/4/2017 to 2/6/2017.             Test Results from your hospital stay            Thank you for choosing [facility]       Thank you for choosing [facility] for your care. Our goal is always to provide you with excellent care. Hearing back from our patients is one way we can continue to improve our services. Please take a few minutes to complete the written survey that you may receive in the mail after you visit with us. Thank you!        [employer]Ah[employer] Information     [employer] gives you secure access to your electronic health record. If you see a primary care provider, you can also send messages to your care team and make appointments. If you have questions, please call your primary care clinic.  If you do not have a primary care provider, please call [phone] and they will assist you.        Care EveryWhere ID     This is your Care EveryWhere ID. This could be used by other organizations to access your [facility]  records  [id number]        After Visit Summary       This is your record. Keep this with you and show to your community pharmacist(s) and doctor(s) at your next visit.

## 2017-02-05 NOTE — ED NOTES
Pt has had cough, chills, just not feeling well for the past week.  Pt got flu shot and has history of asthma.

## 2017-02-05 NOTE — ED AVS SNAPSHOT
[facility] Emergency Department    [address] [address]    Phone:  [phone]    Fax:  [phone]                                       Teresa Fernandez   MRN: [id number]    Department:  [facility] Emergency Department   Date of Visit:  2/5/2017           After Visit Summary Signature Page     I have received my discharge instructions, and my questions have been answered. I have discussed any challenges I see with this plan with the nurse or doctor.    ..........................................................................................................................................  Patient/Patient Representative Signature      ..........................................................................................................................................  Patient Representative Print Name and Relationship to Patient    ..................................................               ................................................  Date                                            Time    ..........................................................................................................................................  Reviewed by Signature/Title    ...................................................              ..............................................  Date                                                            Time

## 2017-03-09 ENCOUNTER — MYC MEDICAL ADVICE (OUTPATIENT)
Dept: FAMILY MEDICINE | Facility: CLINIC | Age: 57
End: 2017-03-09

## 2017-03-09 NOTE — LETTER
March 9, 2017      Teresa Fernandez  [address] [address]              To Whom This May Concern:    Please excuse Teresa Fernandez today from work.    Sincerely,      Nuzhat Burt CNP  [facility]  [phone]

## 2017-03-09 NOTE — TELEPHONE ENCOUNTER
[facility] [place]  Admission .  Primary/Specialty Care Clinics

## 2017-03-09 NOTE — TELEPHONE ENCOUNTER
Fax number is [phone].  Patient sent another my chart message with the fax number. Tanvi ROMERO RN

## 2017-03-09 NOTE — TELEPHONE ENCOUNTER
Clare,    Patient had colonoscopy done in [place] yesterday with Dr. Parada and had to do a double prep.  The double prep is because she is not a regular BM person.  Patient feels weak today and would like a note from us as it is closer for her. I have started for your approval. Tanvi ROMERO RN

## 2017-05-01 ENCOUNTER — OFFICE VISIT (OUTPATIENT)
Dept: RHEUMATOLOGY | Facility: CLINIC | Age: 57
End: 2017-05-01
Payer: OTHER GOVERNMENT

## 2017-05-01 VITALS
WEIGHT: 208 LBS | SYSTOLIC BLOOD PRESSURE: 125 MMHG | BODY MASS INDEX: 32.1 KG/M2 | RESPIRATION RATE: 16 BRPM | TEMPERATURE: 97.9 F | DIASTOLIC BLOOD PRESSURE: 66 MMHG | HEART RATE: 84 BPM

## 2017-05-01 DIAGNOSIS — M15.0 PRIMARY OSTEOARTHRITIS INVOLVING MULTIPLE JOINTS: Primary | ICD-10-CM

## 2017-05-01 PROCEDURE — 99243 OFF/OP CNSLTJ NEW/EST LOW 30: CPT | Performed by: INTERNAL MEDICINE

## 2017-05-01 NOTE — MR AVS SNAPSHOT
After Visit Summary   5/1/2017    Teresa Fernandez    MRN: [id number]           Patient Information     Date Of Birth          1960        Visit Information        Provider Department      5/1/2017 3:45 PM Shivam Walter MD [facility]        Today's Diagnoses     Primary osteoarthritis involving multiple joints    -  1       Follow-ups after your visit        Who to contact     If you have questions or need follow up information about today's clinic visit or your schedule please contact [facility] directly at [phone].  Normal or non-critical lab and imaging results will be communicated to you by Gucashhart, letter or phone within 4 business days after the clinic has received the results. If you do not hear from us within 7 days, please contact the clinic through Vysrt or phone. If you have a critical or abnormal lab result, we will notify you by phone as soon as possible.  Submit refill requests through Sticher or call your pharmacy and they will forward the refill request to us. Please allow 3 business days for your refill to be completed.          Additional Information About Your Visit        MyChart Information     [employer] gives you secure access to your electronic health record. If you see a primary care provider, you can also send messages to your care team and make appointments. If you have questions, please call your primary care clinic.  If you do not have a primary care provider, please call [phone] and they will assist you.        Care EveryWhere ID     This is your Care EveryWhere ID. This could be used by other organizations to access your [facility] medical records  [id number]        Your Vitals Were     Pulse Temperature Respirations BMI (Body Mass Index)          84 97.9  F (36.6  C) (Oral) 16 32.1 kg/m2         Blood Pressure from Last 3 Encounters:   05/01/17 125/66   02/05/17 111/74   01/09/17 110/76    Weight from Last 3 Encounters:    05/01/17 208 lb (94.3 kg)   01/09/17 204 lb (92.5 kg)   12/12/16 206 lb 9.6 oz (93.7 kg)              Today, you had the following     No orders found for display         Today's Medication Changes          These changes are accurate as of: 5/1/17 11:59 PM.  If you have any questions, ask your nurse or doctor.               These medicines have changed or have updated prescriptions.        Dose/Directions    fluticasone-salmeterol 250-50 MCG/DOSE diskus inhaler   Commonly known as:  ADVAIR   This may have changed:  Another medication with the same name was removed. Continue taking this medication, and follow the directions you see here.   Used for:  Chest tightness, Intermittent asthma, uncomplicated   Changed by:  Grant Cowart,         Dose:  1 puff   Inhale 1 puff into the lungs 2 times daily   Quantity:  1 Inhaler   Refills:  1                Primary Care Provider Office Phone # Fax #    Nuzhat HERIBERTO Larsen [facility] [phone]       [facility] [address] [address]        Thank you!     Thank you for choosing [facility]  for your care. Our goal is always to provide you with excellent care. Hearing back from our patients is one way we can continue to improve our services. Please take a few minutes to complete the written survey that you may receive in the mail after your visit with us. Thank you!             Your Updated Medication List - Protect others around you: Learn how to safely use, store and throw away your medicines at www.disposemymeds.org.          This list is accurate as of: 5/1/17 11:59 PM.  Always use your most recent med list.                   Brand Name Dispense Instructions for use    * albuterol (2.5 MG/3ML) 0.083% neb solution     25 vial    Take 1 vial (2.5 mg) by nebulization every 6 hours as needed for shortness of breath / dyspnea or wheezing       * albuterol 108 (90 BASE) MCG/ACT Inhaler    PROAIR HFA/PROVENTIL HFA/VENTOLIN HFA     1 Inhaler    Inhale 2 puffs into the lungs every 6 hours as needed for shortness of breath / dyspnea       ALEVE 220 MG capsule   Generic drug:  naproxen sodium      220 mg. Prn.       ASPIRIN PO      Take 325 mg by mouth       fexofenadine 180 MG tablet    ALLEGRA    30 tablet    Take 1 tablet (180 mg) by mouth daily       fluticasone-salmeterol 250-50 MCG/DOSE diskus inhaler    ADVAIR    1 Inhaler    Inhale 1 puff into the lungs 2 times daily       fluticasone-vilanterol 200-25 MCG/INH oral inhaler    BREO ELLIPTA    1 Inhaler    Inhale 1 puff into the lungs daily       levothyroxine 125 MCG tablet    SYNTHROID/LEVOTHROID    90 tablet    Take 1 tablet (125 mcg) by mouth daily       MULTIVITAMIN ADULT PO          order for DME     1 Units    Equipment being ordered: Nebulizer       OVER-THE-COUNTER     1 Bottle    Place 1 drop into both eyes 3 times daily. Systane Ultra, Systane Balance, Blink Tears or Refresh Optive Artificial Tear       PARoxetine 20 MG tablet    PAXIL    90 tablet    Take 1 tablet (20 mg) by mouth daily       ranitidine 150 MG tablet    ZANTAC    180 tablet    Take 1 tablet (150 mg) by mouth 2 times daily       VITAMIN D3 PO      Take 2,000 Units by mouth daily       * Notice:  This list has 2 medication(s) that are the same as other medications prescribed for you. Read the directions carefully, and ask your doctor or other care provider to review them with you.

## 2017-05-01 NOTE — NURSING NOTE
"Chief Complaint   Patient presents with     Consult     pain all over ref Dr. Burt       Initial /66 (BP Location: Left arm, Patient Position: Chair, Cuff Size: Adult Large)  Pulse 84  Temp 97.9  F (36.6  C) (Oral)  Resp 16  Wt 208 lb (94.3 kg)  BMI 32.1 kg/m2 Estimated body mass index is 32.1 kg/(m^2) as calculated from the following:    Height as of 1/9/17: 5' 7.5\" (1.715 m).    Weight as of this encounter: 208 lb (94.3 kg).  Medication Reconciliation: complete   Hiral Martin LPN    "

## 2017-05-02 NOTE — PROGRESS NOTES
REFERRING PHYSICIAN:  Nuzhat Burt NP.      CHIEF COMPLAINT:  Joint pain.      HISTORY:  Teresa Fernandez is a 57-year-old female who presents for evaluation of joint pain.  She is especially complaining of pain in the base of her left thumb greater than her right thumb BUT also episodically in her shoulders and her feet.  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  She does tend to  things with her left hand and thumb.  Because of these worsening symptoms which she has actually had for years, but became worse over the last year she was seen and evaluated back in 01/2017 and found to have a normal sed rate and CRP, negative rheumatoid factor, negative DIEGO.      She does take ibuprofen which provides some relief, but she is cautious about taking NSAIDs in general because she only has 1 kidney after 1 was removed because of renal cell carcinoma.      She does have a rash that is nonspecific in nature and has been seen by Dermatology and evaluated; however, she does not carry a diagnosis psoriasis.  She does not have a photosensitive rash or malar rash.  There is no history of stomatitis.  She does not have chronic diarrhea or diagnosis of inflammatory bowel disease.  She has never had serositis, nephritis or hepatitis.      PAST MEDICAL HISTORY:  Allergic rhinitis, hypothyroidism, generalized anxiety disorder, leukoplakia, gastroesophageal reflux disease, history of renal cancer, chronic fatigue, osteopenia, moderate persistent asthma, degenerative disk disease of the lumbar spine, status post surgery, hyperlipidemia.      MEDICATIONS:  Albuterol, Paroxetine 20 mg daily, Ranitidine 150 mg b.i.d., Levothyroxine 125 mg daily, Flonase, Advair, Albuterol, aspirin.      DRUG ALLERGIES:  Gluten, iodine, penicillin, Omnipaque.      SOCIAL HISTORY:  Not a smoker, drinks occasionally.      FAMILY HISTORY:  There is nobody in her family with rheumatoid  arthritis, psoriasis, lupus, Crohn's disease.      REVIEW OF SYSTEMS:  A 10-point review of systems is otherwise negative.      PHYSICAL EXAMINATION:   VITAL SIGNS:  Blood pressure is 125/66, pulse 84, temperature 97.9, and weight 208.   HEENT:  She is normocephalic and atraumatic.  Sclerae are clear and moist.  Oropharynx without lesions.   NECK:  Supple, without lymphadenopathy.   LUNGS:  Clear to auscultation bilaterally without wheeze or rales.   HEART:  Regular rate and rhythm without murmur or rub.   ABDOMEN:  Nontender with normal bowel sounds.   JOINTS:  Joint exam there is no synovitis of the wrists, MCPs, or PIPs.  There is bony hypertrophy consistent with osteoarthritis of the left greater than right first CMC joint with tenderness associated with it.  There is no synovitis of the elbows, shoulders, knees, ankles, MTP joints.  She does, however, have bilateral pes planus, significant bunion deformities developing in the bilateral first and fifth MTP joints with deformities developing of the toes in a valgus pattern.   SKIN:  There is nothing to suggest she has psoriasis.   NEUROLOGICAL:  Strength is 5/5 proximally and distally in upper and lower extremities.  DTRs 2+ symmetrically, knees, ankles, biceps.      IMPRESSION:   1.  Generalized osteoarthritis.   2.  The patient does not have any convincing evidence of an autoimmune or inflammatory arthritis, nor does she have any obvious signs of fibromyalgia.      RECOMMENDATIONS:   1.  Discussed the etiology, pathogenesis, treatment options of osteoarthritis.  Discussed that unfortunately there is no preventative or remittive treatment.  Treatment is aimed at mitigating symptoms through the use of things like Tylenol, Advil and analgesics.   2.  I think she could safely use an NSAID episodically although I would recommend regular monitoring of renal function.     3.  Also discussed the somewhat dubious benefits to things like Glucosamine, chondroitin sulfate,  MSM, or hyaluronic acid, etc.  Also, can use topical NSAIDs such as Aspercreme or Voltaren gel.     4.  She could also consider getting injections in her thumbs if things worsen at her discretion.         ERIN MCKEON MD             D: 2017 17:04   T: 2017 05:55   MT:       Name:     RILEY CANDELARIO   MRN:      [id number]        Account:      [id number]   :      1960           Visit Date:   2017      Document: [id number]       cc: Nuzhat Burt NP

## 2017-06-23 ENCOUNTER — TRANSFERRED RECORDS (OUTPATIENT)
Dept: HEALTH INFORMATION MANAGEMENT | Facility: CLINIC | Age: 57
End: 2017-06-23

## 2017-06-25 PROCEDURE — 84150 ASSAY OF PROSTAGLANDIN: CPT | Mod: 90 | Performed by: ALLERGY & IMMUNOLOGY

## 2017-06-25 PROCEDURE — 99000 SPECIMEN HANDLING OFFICE-LAB: CPT | Performed by: ALLERGY & IMMUNOLOGY

## 2017-06-25 PROCEDURE — 82542 COL CHROMOTOGRAPHY QUAL/QUAN: CPT | Mod: 90 | Performed by: ALLERGY & IMMUNOLOGY

## 2017-06-26 DIAGNOSIS — L50.3 DERMOGRAPHISM: Primary | ICD-10-CM

## 2017-06-26 DIAGNOSIS — R53.83 FATIGUE: ICD-10-CM

## 2017-06-27 ENCOUNTER — OFFICE VISIT (OUTPATIENT)
Dept: FAMILY MEDICINE | Facility: CLINIC | Age: 57
End: 2017-06-27
Payer: OTHER GOVERNMENT

## 2017-06-27 ENCOUNTER — RADIANT APPOINTMENT (OUTPATIENT)
Dept: GENERAL RADIOLOGY | Facility: CLINIC | Age: 57
End: 2017-06-27
Attending: NURSE PRACTITIONER
Payer: OTHER GOVERNMENT

## 2017-06-27 VITALS
TEMPERATURE: 97.8 F | SYSTOLIC BLOOD PRESSURE: 127 MMHG | DIASTOLIC BLOOD PRESSURE: 56 MMHG | WEIGHT: 212 LBS | HEART RATE: 75 BPM | BODY MASS INDEX: 32.71 KG/M2

## 2017-06-27 DIAGNOSIS — M25.552 HIP PAIN, LEFT: ICD-10-CM

## 2017-06-27 DIAGNOSIS — M25.552 HIP PAIN, LEFT: Primary | ICD-10-CM

## 2017-06-27 PROCEDURE — 72170 X-RAY EXAM OF PELVIS: CPT

## 2017-06-27 PROCEDURE — 99213 OFFICE O/P EST LOW 20 MIN: CPT | Performed by: NURSE PRACTITIONER

## 2017-06-27 ASSESSMENT — ANXIETY QUESTIONNAIRES
3. WORRYING TOO MUCH ABOUT DIFFERENT THINGS: NOT AT ALL
IF YOU CHECKED OFF ANY PROBLEMS ON THIS QUESTIONNAIRE, HOW DIFFICULT HAVE THESE PROBLEMS MADE IT FOR YOU TO DO YOUR WORK, TAKE CARE OF THINGS AT HOME, OR GET ALONG WITH OTHER PEOPLE: NOT DIFFICULT AT ALL
GAD7 TOTAL SCORE: 0
1. FEELING NERVOUS, ANXIOUS, OR ON EDGE: NOT AT ALL
6. BECOMING EASILY ANNOYED OR IRRITABLE: NOT AT ALL
7. FEELING AFRAID AS IF SOMETHING AWFUL MIGHT HAPPEN: NOT AT ALL
5. BEING SO RESTLESS THAT IT IS HARD TO SIT STILL: NOT AT ALL
2. NOT BEING ABLE TO STOP OR CONTROL WORRYING: NOT AT ALL

## 2017-06-27 ASSESSMENT — PATIENT HEALTH QUESTIONNAIRE - PHQ9: 5. POOR APPETITE OR OVEREATING: NOT AT ALL

## 2017-06-27 NOTE — NURSING NOTE
"Initial /56 (BP Location: Left arm, Patient Position: Chair, Cuff Size: Adult Large)  Pulse 75  Temp 97.8  F (36.6  C) (Tympanic)  Wt 212 lb (96.2 kg)  BMI 32.71 kg/m2 Estimated body mass index is 32.71 kg/(m^2) as calculated from the following:    Height as of 1/9/17: 5' 7.5\" (1.715 m).    Weight as of this encounter: 212 lb (96.2 kg). .    Ava Henderson    "

## 2017-06-27 NOTE — PATIENT INSTRUCTIONS
1. X-ray today  2. Schedule appointment with sports medicine clinic          Thank you for choosing [facility].  You may be receiving a survey in the mail from Sharon Barragan regarding your visit today.  Please take a few minutes to complete and return the survey to let us know how we are doing.      If you have questions or concerns, please contact us via [employer] or you can contact your care team at [phone].    Our Clinic hours are:  Monday 6:40 am  to 7:00 pm  Tuesday -Friday 6:40 am to 5:00 pm    The [place] outpatient lab hours are:  Monday - Friday 6:10 am to 4:45 pm  Saturdays 7:00 am to 11:00 am  Appointments are required, call [phone]    If you have clinical questions after hours or would like to schedule an appointment,  call the clinic at [phone].

## 2017-06-27 NOTE — PROGRESS NOTES
SUBJECTIVE:                                                    Teresa Fernandez is a 57 year old female who presents to clinic today for the following health issues:      Joint Pain    Onset: one month    Description: -   Location: left hip- radiating into left groin.   Sitting down she will get a sharp pain. Intensity increasing.   History of back surgery 2 yrs ago- no numbness or weakness in legs. Pain does not radiate.   History of OA.   No history of osteoporosis.   No injury.   Character: Sharp    Intensity: moderate    Progression of Symptoms: intermittent    Accompanying Signs & Symptoms:  Other symptoms: starting to radiate down her leg    History:   Previous similar pain: no       Precipitating factors:   Trauma or overuse: no     Alleviating factors:  Improved by: heat    Therapies Tried and outcome: heat, some comfort        -------------------------------------    Problem list and histories reviewed & adjusted, as indicated.  Additional history: as documented    Patient Active Problem List   Diagnosis     Acquired hypothyroidism     Allergic state     Chronic rhinitis     Allergic rhinitis due to pollen     Sinusitis, chronic     History of skin cancer     ERIC (generalized anxiety disorder)     Leukoplakia of oral mucosa, including tongue     Renal cancer (H)     GERD (gastroesophageal reflux disease)     Chronic low back pain     Dry eye syndrome     Chronic fatigue     Osteopenia     History of melanoma in situ     Eczema     Moderate persistent asthma without complication     History of kidney cancer     DDD (degenerative disc disease), lumbar     Hyperlipidemia LDL goal <160     Chest tightness or pressure     Past Surgical History:   Procedure Laterality Date     CHOLECYSTECTOMY      gall bladder     COLONOSCOPY      2007-normal     ENT SURGERY  2-15-11    Left cheek bx- Mucositis.     FUSION LUMBAR ANTERIOR TWO LEVELS  4/13/2015     GI SURGERY      kidney removal -left     GYN SURGERY       hysterectomy     HYSTERECTOMY, PAP NO LONGER INDICATED       HYSTERECTOMY, PAP NO LONGER INDICATED       SOFT TISSUE SURGERY      chest-melonoma     SURGICAL HISTORY OF -       Tubal ligation     SURGICAL HISTORY OF -   3/1996    Left nephrectomy-renal cell CA     SURGICAL HISTORY OF -   4/8/1999    [facility]       Social History   Substance Use Topics     Smoking status: Former Smoker     Quit date: 3/26/1996     Smokeless tobacco: Never Used     Alcohol use Yes      Comment: occ     Family History   Problem Relation Age of Onset     DIABETES Mother      Cardiovascular Mother      Lipids Mother      Depression Mother      Hypertension Father      Lipids Father      Obesity Father      Macular Degeneration Father      CANCER Maternal Grandmother      kind unknown had sores on legs     Eczema Maternal Grandmother      Eczema Maternal Grandfather      Breast Cancer Paternal Grandmother      CANCER Paternal Grandmother      breast     Hypertension Paternal Grandfather      Cardiovascular Paternal Grandfather      heart attack     Hypertension Brother      Thyroid Disease Sister      Hypertension Sister      Depression Sister      Allergies Sister      Allergies Son      Eczema Son      Lipids Sister      Thyroid Disease Sister      Neurologic Disorder Sister      Depression Sister      Respiratory Son      Glaucoma No family hx of      CEREBROVASCULAR DISEASE No family hx of            Reviewed and updated as needed this visit by clinical staff       Reviewed and updated as needed this visit by Provider         ROS:  Constitutional, HEENT, cardiovascular, pulmonary, GI, , musculoskeletal, neuro, skin, endocrine and psych systems are negative, except as otherwise noted.    OBJECTIVE:     /56 (BP Location: Left arm, Patient Position: Chair, Cuff Size: Adult Large)  Pulse 75  Temp 97.8  F (36.6  C) (Tympanic)  Wt 212 lb (96.2 kg)  BMI 32.71 kg/m2  Body mass index is 32.71 kg/(m^2).  GENERAL APPEARANCE: healthy,  alert and no distress  RESP: Unlabored  ORTHO: Hip Exam: Palpation: Tender Left hip:   {Non-tender:  left greater trochanter  Range of Motion:  Full ROM, both hips  Strength:  full strength  Special tests:  no crepitation/snapping over central inguinal region      Diagnostic Test Results:  none     ASSESSMENT/PLAN:       1. Hip pain, left  Unknown etiology- ? OA vs lumbar spine  - XR Pelvis 1/2 Views; Future  - ORTHO  REFERRAL  - use ICE and NSAIDS prn      HERIBERTO Delgado CNP  [facility]

## 2017-06-27 NOTE — MR AVS SNAPSHOT
After Visit Summary   6/27/2017    Teresa Fernandez    MRN: [id number]           Patient Information     Date Of Birth          1960        Visit Information        Provider Department      6/27/2017 3:00 PM Nuzhat Burt APRN CNP [facility]        Today's Diagnoses     Hip pain, left    -  1      Care Instructions    1. X-ray today  2. Schedule appointment with sports medicine clinic          Thank you for choosing [facility].  You may be receiving a survey in the mail from Sharon Barragan regarding your visit today.  Please take a few minutes to complete and return the survey to let us know how we are doing.      If you have questions or concerns, please contact us via [employer] or you can contact your care team at [phone].    Our Clinic hours are:  Monday 6:40 am  to 7:00 pm  Tuesday -Friday 6:40 am to 5:00 pm    The [place] outpatient lab hours are:  Monday - Friday 6:10 am to 4:45 pm  Saturdays 7:00 am to 11:00 am  Appointments are required, call [phone]    If you have clinical questions after hours or would like to schedule an appointment,  call the clinic at [phone].          Follow-ups after your visit        Additional Services     ORTHO  REFERRAL       [facility] is referring you to the Orthopedic  Services at [facility].       The  Representative will assist you in the coordination of your Orthopedic and Musculoskeletal Care as prescribed by your physician.    The  Representative will call you within 1 business day to help schedule your appointment, or you may contact the  Representative at:    All areas ~ [phone]     Type of Referral : Non Surgical       Timeframe requested: Routine    Coverage of these services is subject to the terms and limitations of your health insurance plan.  Please call member services at your health plan with any benefit or coverage  questions.      If X-rays, CT or MRI's have been performed, please contact the facility where they were done to arrange for , prior to your scheduled appointment.  Please bring this referral request to your appointment and present it to your specialist.                  Future tests that were ordered for you today     Open Future Orders        Priority Expected Expires Ordered    XR Pelvis 1/2 Views Routine 6/27/2017 6/27/2018 6/27/2017            Who to contact     If you have questions or need follow up information about today's clinic visit or your schedule please contact [facility] directly at [phone].  Normal or non-critical lab and imaging results will be communicated to you by [employer], letter or phone within 4 business days after the clinic has received the results. If you do not hear from us within 7 days, please contact the clinic through [employer] or phone. If you have a critical or abnormal lab result, we will notify you by phone as soon as possible.  Submit refill requests through [employer] or call your pharmacy and they will forward the refill request to us. Please allow 3 business days for your refill to be completed.          Additional Information About Your Visit        MyChart Information     [employer] gives you secure access to your electronic health record. If you see a primary care provider, you can also send messages to your care team and make appointments. If you have questions, please call your primary care clinic.  If you do not have a primary care provider, please call [phone] and they will assist you.        Care EveryWhere ID     This is your Care EveryWhere ID. This could be used by other organizations to access your [facility] medical records  [id number]        Your Vitals Were     Pulse Temperature BMI (Body Mass Index)             75 97.8  F (36.6  C) (Tympanic) 32.71 kg/m2          Blood Pressure from Last 3 Encounters:   06/27/17 127/56   05/01/17 125/66    02/05/17 111/74    Weight from Last 3 Encounters:   06/27/17 212 lb (96.2 kg)   05/01/17 208 lb (94.3 kg)   01/09/17 204 lb (92.5 kg)              We Performed the Following     ORTHO  REFERRAL          Today's Medication Changes          These changes are accurate as of: 6/27/17  3:20 PM.  If you have any questions, ask your nurse or doctor.               Stop taking these medicines if you haven't already. Please contact your care team if you have questions.     fluticasone-vilanterol 200-25 MCG/INH oral inhaler   Commonly known as:  BREO ELLIPTA                    Primary Care Provider Office Phone # Fax #    Nuzhat Burt APREYAD [facility] [phone]       [facility] [address] [address]        Equal Access to Services     MERRILL PRESTON : Hadii aad ku hadasho Soayanna, waaxda luqadaha, qaybta kaalmada alexa, lillian stratton . So [facility] [phone].    ATENCIÓN: Si habla español, tiene a anderson disposición servicios gratuitos de asistencia lingüística. Stas al [phone].    We comply with applicable federal civil rights laws and [place] laws. We do not discriminate on the basis of race, color, national origin, age, disability sex, sexual orientation or gender identity.            Thank you!     Thank you for choosing [facility]  for your care. Our goal is always to provide you with excellent care. Hearing back from our patients is one way we can continue to improve our services. Please take a few minutes to complete the written survey that you may receive in the mail after your visit with us. Thank you!             Your Updated Medication List - Protect others around you: Learn how to safely use, store and throw away your medicines at www.disposemymeds.org.          This list is accurate as of: 6/27/17  3:20 PM.  Always use your most recent med list.                   Brand Name Dispense Instructions for use Diagnosis    *  albuterol (2.5 MG/3ML) 0.083% neb solution     25 vial    Take 1 vial (2.5 mg) by nebulization every 6 hours as needed for shortness of breath / dyspnea or wheezing    Asthma exacerbation       * albuterol 108 (90 BASE) MCG/ACT Inhaler    PROAIR HFA/PROVENTIL HFA/VENTOLIN HFA    1 Inhaler    Inhale 2 puffs into the lungs every 6 hours as needed for shortness of breath / dyspnea    Moderate persistent asthma without complication       ALEVE 220 MG capsule   Generic drug:  naproxen sodium      220 mg. Prn.        ASPIRIN PO      Take 325 mg by mouth        fexofenadine 180 MG tablet    ALLEGRA    30 tablet    Take 1 tablet (180 mg) by mouth daily    Seasonal allergic rhinitis       fluticasone-salmeterol 250-50 MCG/DOSE diskus inhaler    ADVAIR    1 Inhaler    Inhale 1 puff into the lungs 2 times daily    Chest tightness, Intermittent asthma, uncomplicated       levothyroxine 125 MCG tablet    SYNTHROID/LEVOTHROID    90 tablet    Take 1 tablet (125 mcg) by mouth daily    Acquired hypothyroidism       MULTIVITAMIN ADULT PO           order for DME     1 Units    Equipment being ordered: Nebulizer    Asthma exacerbation       OVER-THE-COUNTER     1 Bottle    Place 1 drop into both eyes 3 times daily. Systane Ultra, Systane Balance, Blink Tears or Refresh Optive Artificial Tear    Dry eye syndrome       PARoxetine 20 MG tablet    PAXIL    90 tablet    Take 1 tablet (20 mg) by mouth daily    ERIC (generalized anxiety disorder)       ranitidine 150 MG tablet    ZANTAC    180 tablet    Take 1 tablet (150 mg) by mouth 2 times daily    Gastroesophageal reflux disease, esophagitis presence not specified       VITAMIN D3 PO      Take 2,000 Units by mouth daily        * Notice:  This list has 2 medication(s) that are the same as other medications prescribed for you. Read the directions carefully, and ask your doctor or other care provider to review them with you.

## 2017-06-28 ASSESSMENT — PATIENT HEALTH QUESTIONNAIRE - PHQ9: SUM OF ALL RESPONSES TO PHQ QUESTIONS 1-9: 0

## 2017-06-28 ASSESSMENT — ASTHMA QUESTIONNAIRES: ACT_TOTALSCORE: 25

## 2017-06-28 ASSESSMENT — ANXIETY QUESTIONNAIRES: GAD7 TOTAL SCORE: 0

## 2017-06-30 DIAGNOSIS — Z13.6 CARDIOVASCULAR SCREENING; LDL GOAL LESS THAN 100: ICD-10-CM

## 2017-06-30 DIAGNOSIS — Z82.49 FAMILY HISTORY OF ISCHEMIC HEART DISEASE: ICD-10-CM

## 2017-06-30 LAB
CHOLEST SERPL-MCNC: 221 MG/DL
HDLC SERPL-MCNC: 56 MG/DL
LDLC SERPL CALC-MCNC: 143 MG/DL
NONHDLC SERPL-MCNC: 165 MG/DL
TRIGL SERPL-MCNC: 111 MG/DL

## 2017-06-30 PROCEDURE — 80061 LIPID PANEL: CPT | Performed by: NURSE PRACTITIONER

## 2017-06-30 PROCEDURE — 36415 COLL VENOUS BLD VENIPUNCTURE: CPT | Performed by: NURSE PRACTITIONER

## 2017-07-01 LAB
COLLECT DURATION TIME UR: 24 H
CREAT UR-MCNC: 34 MG/DL
ME-HISTAMINE/CREAT 24H UR: 143
SPECIMEN VOL 24H UR: 2950 L

## 2017-07-03 LAB — 2,3 11B PROSTAGLANDIN F2A UR: 1611

## 2017-07-06 ENCOUNTER — TRANSFERRED RECORDS (OUTPATIENT)
Dept: HEALTH INFORMATION MANAGEMENT | Facility: CLINIC | Age: 57
End: 2017-07-06

## 2017-07-18 ENCOUNTER — TRANSFERRED RECORDS (OUTPATIENT)
Dept: HEALTH INFORMATION MANAGEMENT | Facility: CLINIC | Age: 57
End: 2017-07-18

## 2017-08-16 ENCOUNTER — APPOINTMENT (OUTPATIENT)
Dept: GENERAL RADIOLOGY | Facility: CLINIC | Age: 57
End: 2017-08-16
Attending: PHYSICIAN ASSISTANT
Payer: OTHER GOVERNMENT

## 2017-08-16 ENCOUNTER — HOSPITAL ENCOUNTER (EMERGENCY)
Facility: CLINIC | Age: 57
Discharge: HOME | End: 2017-08-16
Attending: PHYSICIAN ASSISTANT | Admitting: PHYSICIAN ASSISTANT
Payer: OTHER GOVERNMENT

## 2017-08-16 VITALS
RESPIRATION RATE: 18 BRPM | HEART RATE: 72 BPM | TEMPERATURE: 97.7 F | DIASTOLIC BLOOD PRESSURE: 80 MMHG | SYSTOLIC BLOOD PRESSURE: 128 MMHG | OXYGEN SATURATION: 98 %

## 2017-08-16 DIAGNOSIS — M79.674 PAIN OF TOE OF RIGHT FOOT: ICD-10-CM

## 2017-08-16 PROCEDURE — 99213 OFFICE O/P EST LOW 20 MIN: CPT

## 2017-08-16 PROCEDURE — 73630 X-RAY EXAM OF FOOT: CPT | Mod: LT

## 2017-08-16 PROCEDURE — 99213 OFFICE O/P EST LOW 20 MIN: CPT | Performed by: PHYSICIAN ASSISTANT

## 2017-08-16 NOTE — ED AVS SNAPSHOT
[facility] Emergency Department    [address] [facility] [address]    Phone:  [phone]    Fax:  [phone]                                       Teresa Fernandez   MRN: [id number]    Department:  [facility] Emergency Department   Date of Visit:  8/16/2017           Patient Information     Date Of Birth          1960        Your diagnoses for this visit were:     Pain of toe of right foot        You were seen by Cecilio Rendon PA-C.        Discharge Instructions         Acute Pain, Uncertain Cause  Pain can be caused by many conditions that range from very minor to very serious. In some cases, though, pain comes and goes with no apparent cause.  We were not able to find the exact cause for your pain. At this time there is no sign of any serious illness causing your pain. More tests may be needed to determine the cause. In many cases, pain like this goes away by itself.  Home care  Take any medicines as prescribed. If another medicine was not prescribed for pain, you can take an over-the-counter pain medicine such as ibuprofen or acetaminophen. Use these as directed on the label.    Follow-up care  Follow up with your healthcare provider or our staff as directed.  When to seek medical advice  Call your healthcare provider for any of the following:    Pain changes in pattern    Pain doesn't lessen or gets worse    New symptoms appear    Fever of 100.4 F (38 C) or higher, or as directed by your healthcare provider  Date Last Reviewed: 7/26/2015 2000-2017 [employer]. [address] [place] [address]. All rights reserved. This information is not intended as a substitute for professional medical care. Always follow your healthcare professional's instructions.          24 Hour Appointment Hotline       To make an appointment at any [place] clinic, call [phone] ([phone]). If you don't have a family doctor or clinic, we will help you find one.  [facility] are conveniently located to serve the needs of you and your family.             Review of your medicines      Our records show that you are taking the medicines listed below. If these are incorrect, please call your family doctor or clinic.        Dose / Directions Last dose taken    * albuterol (2.5 MG/3ML) 0.083% neb solution   Dose:  1 vial   Quantity:  25 vial        Take 1 vial (2.5 mg) by nebulization every 6 hours as needed for shortness of breath / dyspnea or wheezing   Refills:  0        * albuterol 108 (90 BASE) MCG/ACT Inhaler   Commonly known as:  PROAIR HFA/PROVENTIL HFA/VENTOLIN HFA   Dose:  2 puff   Quantity:  1 Inhaler        Inhale 2 puffs into the lungs every 6 hours as needed for shortness of breath / dyspnea   Refills:  5        ALEVE 220 MG capsule   Dose:  220 mg   Generic drug:  naproxen sodium        220 mg. Prn.   Refills:  0        ASPIRIN PO   Dose:  325 mg        Take 325 mg by mouth   Refills:  0        fexofenadine 180 MG tablet   Commonly known as:  ALLEGRA   Dose:  180 mg   Quantity:  30 tablet        Take 1 tablet (180 mg) by mouth daily   Refills:  1        fluticasone-salmeterol 250-50 MCG/DOSE diskus inhaler   Commonly known as:  ADVAIR   Dose:  1 puff   Quantity:  1 Inhaler        Inhale 1 puff into the lungs 2 times daily   Refills:  1        levothyroxine 125 MCG tablet   Commonly known as:  SYNTHROID/LEVOTHROID   Dose:  125 mcg   Quantity:  90 tablet        Take 1 tablet (125 mcg) by mouth daily   Refills:  3        MULTIVITAMIN ADULT PO        Refills:  0        order for DME   Quantity:  1 Units        Equipment being ordered: Nebulizer   Refills:  0        OVER-THE-COUNTER   Dose:  1 drop   Quantity:  1 Bottle        Place 1 drop into both eyes 3 times daily. Systane Ultra, Systane Balance, Blink Tears or Refresh Optive Artificial Tear   Refills:  0        PARoxetine 20 MG tablet   Commonly known as:  PAXIL   Dose:  20 mg   Quantity:  90 tablet        Take  1 tablet (20 mg) by mouth daily   Refills:  3        ranitidine 150 MG tablet   Commonly known as:  ZANTAC   Dose:  150 mg   Quantity:  180 tablet        Take 1 tablet (150 mg) by mouth 2 times daily   Refills:  2        VITAMIN D3 PO   Dose:  2000 Units        Take 2,000 Units by mouth daily   Refills:  0        * Notice:  This list has 2 medication(s) that are the same as other medications prescribed for you. Read the directions carefully, and ask your doctor or other care provider to review them with you.            Procedures and tests performed during your visit     Foot XR, G/E 3 views, left      Orders Needing Specimen Collection     None      Pending Results     Date and Time Order Name Status Description    8/16/2017 1513 Foot XR, G/E 3 views, left Preliminary             Pending Culture Results     No orders found from 8/14/2017 to 8/17/2017.            Pending Results Instructions     If you had any lab results that were not finalized at the time of your Discharge, you can call the ED Lab Result RN at [phone]. You will be contacted by this team for any positive Lab results or changes in treatment. The nurses are available 7 days a week from 10A to 6:30P.  You can leave a message 24 hours per day and they will return your call.        Test Results From Your Hospital Stay        8/16/2017  4:05 PM      Narrative     LEFT FOOT THREE OR MORE VIEWS 8/16/2017 3:49 PM     COMPARISON: None.    HISTORY: Pain at 2nd digit MTP joint for 3 weeks after walking 2  miles.    FINDINGS: The visualized bones and joint spaces are within normal  limits.        Impression     IMPRESSION: No evidence for fracture, dislocation or significant  degenerative change of the left foot. Specifically, no evidence for  abnormality of the left second ray.                Thank you for choosing [facility]       Thank you for choosing [facility] for your care. Our goal is always to provide you with excellent care. Hearing back from our  patients is one way we can continue to improve our services. Please take a few minutes to complete the written survey that you may receive in the mail after you visit with us. Thank you!        [employer] Information     [employer] gives you secure access to your electronic health record. If you see a primary care provider, you can also send messages to your care team and make appointments. If you have questions, please call your primary care clinic.  If you do not have a primary care provider, please call [phone] and they will assist you.        Care EveryWhere ID     This is your Care EveryWhere ID. This could be used by other organizations to access your [place] medical records  [id number]        Equal Access to Services     MERRILL PRESTON : Nic Leung, gume rae, kriss liu, lillian reese. So [facility] [phone].    ATENCIÓN: Si habla español, tiene a anderson disposición servicios gratuitos de asistencia lingüística. Llame al [phone].    We comply with applicable federal civil rights laws and [place] laws. We do not discriminate on the basis of race, color, national origin, age, disability sex, sexual orientation or gender identity.            After Visit Summary       This is your record. Keep this with you and show to your community pharmacist(s) and doctor(s) at your next visit.

## 2017-08-16 NOTE — ED PROVIDER NOTES
HPI: Teresa Fernandez is an 57 year old female who presents for evaluation and treatment of L foot pain.  This started roughly 3 weeks ago after walking 2 miles in flat sandals.  The pain is on the plantar side of the 2nd digit MTP joint area.  Pain is worse with weight bearing.  She has been icing it and elevating it.  No numbness or tingling in the foot.      ROS:  10 point review of systems was completed and is negative unless noted in the HPI above.        Surgical History:  Past Surgical History:   Procedure Laterality Date     CHOLECYSTECTOMY      gall bladder     COLONOSCOPY      2007-normal     ENT SURGERY  2-15-11    Left cheek bx- Mucositis.     FUSION LUMBAR ANTERIOR TWO LEVELS  4/13/2015     GI SURGERY      kidney removal -left     GYN SURGERY      hysterectomy     HYSTERECTOMY, PAP NO LONGER INDICATED       HYSTERECTOMY, PAP NO LONGER INDICATED       SOFT TISSUE SURGERY      chest-melonoma     SURGICAL HISTORY OF -       Tubal ligation     SURGICAL HISTORY OF -   3/1996    Left nephrectomy-renal cell CA     SURGICAL HISTORY OF -   4/8/1999    [facility]        Problem List:  Patient Active Problem List   Diagnosis     Acquired hypothyroidism     Allergic state     Chronic rhinitis     Allergic rhinitis due to pollen     Sinusitis, chronic     History of skin cancer     ERIC (generalized anxiety disorder)     Leukoplakia of oral mucosa, including tongue     Renal cancer (H)     GERD (gastroesophageal reflux disease)     Chronic low back pain     Dry eye syndrome     Chronic fatigue     Osteopenia     History of melanoma in situ     Eczema     Moderate persistent asthma without complication     History of kidney cancer     DDD (degenerative disc disease), lumbar     Hyperlipidemia LDL goal <160     Chest tightness or pressure        Allergies:  Allergies   Allergen Reactions     Omnipaque [Iohexol] Swelling and Difficulty breathing     Immediate throat swelling following around 1-2cc of omnipaque 300 for spine  procedure     Gluten      Iodine      Welts from CT contrast, surgical scrub is ok.     Penicillins Hives        Current Meds:  No current facility-administered medications for this encounter.     Current Outpatient Prescriptions:      albuterol (PROAIR HFA/PROVENTIL HFA/VENTOLIN HFA) 108 (90 BASE) MCG/ACT Inhaler, Inhale 2 puffs into the lungs every 6 hours as needed for shortness of breath / dyspnea (Patient not taking: Reported on 6/27/2017), Disp: 1 Inhaler, Rfl: 5     PARoxetine (PAXIL) 20 MG tablet, Take 1 tablet (20 mg) by mouth daily, Disp: 90 tablet, Rfl: 3     ranitidine (ZANTAC) 150 MG tablet, Take 1 tablet (150 mg) by mouth 2 times daily, Disp: 180 tablet, Rfl: 2     levothyroxine (SYNTHROID/LEVOTHROID) 125 MCG tablet, Take 1 tablet (125 mcg) by mouth daily, Disp: 90 tablet, Rfl: 3     fluticasone-salmeterol (ADVAIR) 250-50 MCG/DOSE diskus inhaler, Inhale 1 puff into the lungs 2 times daily, Disp: 1 Inhaler, Rfl: 1     order for DME, Equipment being ordered: Nebulizer (Patient not taking: Reported on 6/27/2017), Disp: 1 Units, Rfl: 0     albuterol (2.5 MG/3ML) 0.083% nebulizer solution, Take 1 vial (2.5 mg) by nebulization every 6 hours as needed for shortness of breath / dyspnea or wheezing, Disp: 25 vial, Rfl: 0     Multiple Vitamins-Minerals (MULTIVITAMIN ADULT PO), , Disp: , Rfl:      ASPIRIN PO, Take 325 mg by mouth, Disp: , Rfl:      Cholecalciferol (VITAMIN D3 PO), Take 2,000 Units by mouth daily, Disp: , Rfl:      fexofenadine (ALLEGRA) 180 MG tablet, Take 1 tablet (180 mg) by mouth daily, Disp: 30 tablet, Rfl: 1     OVER-THE-COUNTER, Place 1 drop into both eyes 3 times daily. Systane Ultra, Systane Balance, Blink Tears or Refresh Optive Artificial Tear (Patient not taking: Reported on 6/27/2017), Disp: 1 Bottle, Rfl:      Naproxen Sodium (ALEVE) 220 MG capsule, 220 mg. Prn. , Disp: , Rfl:      PHYSICAL EXAM:     Vital signs noted and reviewed by Cecilio Rendon  /80  Pulse 72  Temp 97.7   F (36.5  C) (Oral)  Resp 18  SpO2 98%     PEFR:  General appearance: healthy, alert and no distress  Lungs: normal  Heart: S1, S2 normal, no murmur, click, rub or gallop, regular rate and rhythm  Extremities: L foot - No bruising, swelling, or deformity.  Full range of motion of all digits.  Digits are neurologically intact.  Cap refill less than 2 seconds.    Results for orders placed or performed during the hospital encounter of 08/16/17   Foot XR, G/E 3 views, left    Narrative    LEFT FOOT THREE OR MORE VIEWS 8/16/2017 3:49 PM     COMPARISON: None.    HISTORY: Pain at 2nd digit MTP joint for 3 weeks after walking 2  miles.    FINDINGS: The visualized bones and joint spaces are within normal  limits.      Impression    IMPRESSION: No evidence for fracture, dislocation or significant  degenerative change of the left foot. Specifically, no evidence for  abnormality of the left second ray.         ASSESSMENT:     1. Pain of toe of right foot           PLAN:     Continue to ice the foot.  Ibuprofen for comfort.  Insoles may help.  I did advise her that I cannot see very small fractures and could not rule this out.  I would like her to follow up with podiatry in 2 weeks if symptoms are not improving.  Patient verbalized understanding and argeed with this plan.     Cecilio Rendon  8/16/2017, 3:05 PM       Cecilio Rendon PA-C  08/16/17 0278

## 2017-08-16 NOTE — ED AVS SNAPSHOT
[facility] Emergency Department    [address] [address]    Phone:  [phone]    Fax:  [phone]                                       Teresa Fernandez   MRN: [id number]    Department:  [facility] Emergency Department   Date of Visit:  8/16/2017           After Visit Summary Signature Page     I have received my discharge instructions, and my questions have been answered. I have discussed any challenges I see with this plan with the nurse or doctor.    ..........................................................................................................................................  Patient/Patient Representative Signature      ..........................................................................................................................................  Patient Representative Print Name and Relationship to Patient    ..................................................               ................................................  Date                                            Time    ..........................................................................................................................................  Reviewed by Signature/Title    ...................................................              ..............................................  Date                                                            Time

## 2017-08-16 NOTE — DISCHARGE INSTRUCTIONS
Acute Pain, Uncertain Cause  Pain can be caused by many conditions that range from very minor to very serious. In some cases, though, pain comes and goes with no apparent cause.  We were not able to find the exact cause for your pain. At this time there is no sign of any serious illness causing your pain. More tests may be needed to determine the cause. In many cases, pain like this goes away by itself.  Home care  Take any medicines as prescribed. If another medicine was not prescribed for pain, you can take an over-the-counter pain medicine such as ibuprofen or acetaminophen. Use these as directed on the label.    Follow-up care  Follow up with your healthcare provider or our staff as directed.  When to seek medical advice  Call your healthcare provider for any of the following:    Pain changes in pattern    Pain doesn't lessen or gets worse    New symptoms appear    Fever of 100.4 F (38 C) or higher, or as directed by your healthcare provider  Date Last Reviewed: 7/26/2015 2000-2017 [employer]. [address] [place] [address], [place] [address]. All rights reserved. This information is not intended as a substitute for professional medical care. Always follow your healthcare professional's instructions.

## 2017-08-23 ENCOUNTER — TELEPHONE (OUTPATIENT)
Dept: FAMILY MEDICINE | Facility: CLINIC | Age: 57
End: 2017-08-23

## 2017-08-29 ENCOUNTER — TRANSFERRED RECORDS (OUTPATIENT)
Dept: HEALTH INFORMATION MANAGEMENT | Facility: CLINIC | Age: 57
End: 2017-08-29

## 2017-09-08 ENCOUNTER — OFFICE VISIT (OUTPATIENT)
Dept: FAMILY MEDICINE | Facility: CLINIC | Age: 57
End: 2017-09-08
Payer: OTHER GOVERNMENT

## 2017-09-08 VITALS
HEIGHT: 68 IN | HEART RATE: 71 BPM | SYSTOLIC BLOOD PRESSURE: 119 MMHG | DIASTOLIC BLOOD PRESSURE: 84 MMHG | TEMPERATURE: 97.7 F | WEIGHT: 202 LBS | BODY MASS INDEX: 30.62 KG/M2

## 2017-09-08 DIAGNOSIS — R74.8 ELEVATED CK: ICD-10-CM

## 2017-09-08 DIAGNOSIS — M79.10 GENERALIZED MUSCLE ACHE: Primary | ICD-10-CM

## 2017-09-08 LAB
ALBUMIN SERPL-MCNC: 4 G/DL (ref 3.4–5)
ALP SERPL-CCNC: 78 U/L (ref 40–150)
ALT SERPL W P-5'-P-CCNC: 57 U/L (ref 0–50)
ANION GAP SERPL CALCULATED.3IONS-SCNC: 6 MMOL/L (ref 3–14)
AST SERPL W P-5'-P-CCNC: 45 U/L (ref 0–45)
BILIRUB SERPL-MCNC: 0.5 MG/DL (ref 0.2–1.3)
BUN SERPL-MCNC: 14 MG/DL (ref 7–30)
CALCIUM SERPL-MCNC: 9.4 MG/DL (ref 8.5–10.1)
CHLORIDE SERPL-SCNC: 105 MMOL/L (ref 94–109)
CK SERPL-CCNC: 650 U/L (ref 30–225)
CO2 SERPL-SCNC: 27 MMOL/L (ref 20–32)
CREAT SERPL-MCNC: 0.94 MG/DL (ref 0.52–1.04)
CRP SERPL-MCNC: <2.9 MG/L (ref 0–8)
ERYTHROCYTE [DISTWIDTH] IN BLOOD BY AUTOMATED COUNT: 12.6 % (ref 10–15)
ERYTHROCYTE [SEDIMENTATION RATE] IN BLOOD BY WESTERGREN METHOD: 8 MM/H (ref 0–30)
GFR SERPL CREATININE-BSD FRML MDRD: 62 ML/MIN/1.7M2
GLUCOSE SERPL-MCNC: 75 MG/DL (ref 70–99)
HCT VFR BLD AUTO: 40.4 % (ref 35–47)
HGB BLD-MCNC: 13.4 G/DL (ref 11.7–15.7)
MCH RBC QN AUTO: 30.5 PG (ref 26.5–33)
MCHC RBC AUTO-ENTMCNC: 33.2 G/DL (ref 31.5–36.5)
MCV RBC AUTO: 92 FL (ref 78–100)
PLATELET # BLD AUTO: 216 10E9/L (ref 150–450)
POTASSIUM SERPL-SCNC: 4.2 MMOL/L (ref 3.4–5.3)
PROT SERPL-MCNC: 7 G/DL (ref 6.8–8.8)
RBC # BLD AUTO: 4.4 10E12/L (ref 3.8–5.2)
SODIUM SERPL-SCNC: 138 MMOL/L (ref 133–144)
TSH SERPL DL<=0.005 MIU/L-ACNC: 0.68 MU/L (ref 0.4–4)
WBC # BLD AUTO: 5.6 10E9/L (ref 4–11)

## 2017-09-08 PROCEDURE — 86140 C-REACTIVE PROTEIN: CPT | Performed by: INTERNAL MEDICINE

## 2017-09-08 PROCEDURE — 86753 PROTOZOA ANTIBODY NOS: CPT | Mod: 90 | Performed by: INTERNAL MEDICINE

## 2017-09-08 PROCEDURE — 86618 LYME DISEASE ANTIBODY: CPT | Performed by: INTERNAL MEDICINE

## 2017-09-08 PROCEDURE — 36415 COLL VENOUS BLD VENIPUNCTURE: CPT | Performed by: INTERNAL MEDICINE

## 2017-09-08 PROCEDURE — 80053 COMPREHEN METABOLIC PANEL: CPT | Performed by: INTERNAL MEDICINE

## 2017-09-08 PROCEDURE — 86666 EHRLICHIA ANTIBODY: CPT | Mod: 90 | Performed by: INTERNAL MEDICINE

## 2017-09-08 PROCEDURE — 84443 ASSAY THYROID STIM HORMONE: CPT | Performed by: INTERNAL MEDICINE

## 2017-09-08 PROCEDURE — 85027 COMPLETE CBC AUTOMATED: CPT | Performed by: INTERNAL MEDICINE

## 2017-09-08 PROCEDURE — 99000 SPECIMEN HANDLING OFFICE-LAB: CPT | Performed by: INTERNAL MEDICINE

## 2017-09-08 PROCEDURE — 99214 OFFICE O/P EST MOD 30 MIN: CPT | Performed by: INTERNAL MEDICINE

## 2017-09-08 PROCEDURE — 85652 RBC SED RATE AUTOMATED: CPT | Performed by: INTERNAL MEDICINE

## 2017-09-08 PROCEDURE — 82550 ASSAY OF CK (CPK): CPT | Performed by: INTERNAL MEDICINE

## 2017-09-08 NOTE — PATIENT INSTRUCTIONS
Thank you for choosing [facility].  You may be receiving a survey in the mail from Sharon Barragan regarding your visit today.  Please take a few minutes to complete and return the survey to let us know how we are doing.      If you have questions or concerns, please contact us via [employer] or you can contact your care team at [phone].    Our Clinic hours are:  Monday 6:40 am  to 7:00 pm  Tuesday -Friday 6:40 am to 5:00 pm    The [place] outpatient lab hours are:  Monday - Friday 6:10 am to 4:45 pm  Saturdays 7:00 am to 11:00 am  Appointments are required, call [phone]    If you have clinical questions after hours or would like to schedule an appointment,  call the clinic at [phone].

## 2017-09-08 NOTE — MR AVS SNAPSHOT
After Visit Summary   9/8/2017    Teresa Fernandez    MRN: [id number]           Patient Information     Date Of Birth          1960        Visit Information        Provider Department      9/8/2017 3:40 PM Aftab Kelly MD [facility]        Today's Diagnoses     Generalized muscle ache    -  1      Care Instructions          Thank you for choosing [facility]  You may be receiving a survey in the mail from [facility] regarding your visit today.  Please take a few minutes to complete and return the survey to let us know how we are doing.      If you have questions or concerns, please contact us via [employer] or you can contact your care team at [phone].    Our Clinic hours are:  Monday 6:40 am  to 7:00 pm  Tuesday -Friday 6:40 am to 5:00 pm    The [place] outpatient lab hours are:  Monday - Friday 6:10 am to 4:45 pm  Saturdays 7:00 am to 11:00 am  Appointments are required, call [phone]    If you have clinical questions after hours or would like to schedule an appointment,  call the clinic at [phone].            Follow-ups after your visit        Who to contact     If you have questions or need follow up information about today's clinic visit or your schedule please contact [facility] directly at [phone].  Normal or non-critical lab and imaging results will be communicated to you by [employer], letter or phone within 4 business days after the clinic has received the results. If you do not hear from us within 7 days, please contact the clinic through [employer] or phone. If you have a critical or abnormal lab result, we will notify you by phone as soon as possible.  Submit refill requests through [employer] or call your pharmacy and they will forward the refill request to us. Please allow 3 business days for your refill to be completed.          Additional Information About Your Visit        [employer] Information     [employer] gives you secure access to your  "electronic health record. If you see a primary care provider, you can also send messages to your care team and make appointments. If you have questions, please call your primary care clinic.  If you do not have a primary care provider, please call [phone] and they will assist you.        Care EveryWhere ID     This is your Care EveryWhere ID. This could be used by other organizations to access your [place] medical records  [id number]        Your Vitals Were     Pulse Temperature Height BMI (Body Mass Index)          71 97.7  F (36.5  C) (Tympanic) 5' 7.5\" (1.715 m) 31.17 kg/m2         Blood Pressure from Last 3 Encounters:   09/08/17 119/84   08/16/17 128/80   06/27/17 127/56    Weight from Last 3 Encounters:   09/08/17 202 lb (91.6 kg)   06/27/17 212 lb (96.2 kg)   05/01/17 208 lb (94.3 kg)              We Performed the Following     Anaplasma phagocytoph antibody IgG IgM     Babesia antibody IgG IgM     CBC with platelets     CK total     Comprehensive metabolic panel     Erythrocyte sedimentation rate auto     Lyme Disease Yulia with reflex to WB Serum     TSH with free T4 reflex        Primary Care Provider Office Phone # Fax #    Nuzhat Burt, APRN [facility] [phone] [address] [facility] [address]        Equal Access to Services     MERRILL PRESTON : Hadii aad ku hadasho Soomaali, waaxda luqadaha, qaybta kaalmada adeegyada, lillian reese. So [facility] [phone].    ATENCIÓN: Si habla español, tiene a anderson disposición servicios gratuitos de asistencia lingüística. Stas al [phone].    We comply with applicable federal civil rights laws and [place] laws. We do not discriminate on the basis of race, color, national origin, age, disability sex, sexual orientation or gender identity.            Thank you!     Thank you for choosing [facility]  for your care. Our goal is always to provide you with excellent care. Hearing back from our patients " is one way we can continue to improve our services. Please take a few minutes to complete the written survey that you may receive in the mail after your visit with us. Thank you!             Your Updated Medication List - Protect others around you: Learn how to safely use, store and throw away your medicines at www.disposemymeds.org.          This list is accurate as of: 9/8/17  3:53 PM.  Always use your most recent med list.                   Brand Name Dispense Instructions for use Diagnosis    * albuterol (2.5 MG/3ML) 0.083% neb solution     25 vial    Take 1 vial (2.5 mg) by nebulization every 6 hours as needed for shortness of breath / dyspnea or wheezing    Asthma exacerbation       * albuterol 108 (90 BASE) MCG/ACT Inhaler    PROAIR HFA/PROVENTIL HFA/VENTOLIN HFA    1 Inhaler    Inhale 2 puffs into the lungs every 6 hours as needed for shortness of breath / dyspnea    Moderate persistent asthma without complication       ALEVE 220 MG capsule   Generic drug:  naproxen sodium      220 mg. Prn.        ASPIRIN PO      Take 325 mg by mouth        fexofenadine 180 MG tablet    ALLEGRA    30 tablet    Take 1 tablet (180 mg) by mouth daily    Seasonal allergic rhinitis       fluticasone-salmeterol 250-50 MCG/DOSE diskus inhaler    ADVAIR    1 Inhaler    Inhale 1 puff into the lungs 2 times daily    Chest tightness, Intermittent asthma, uncomplicated       levothyroxine 125 MCG tablet    SYNTHROID/LEVOTHROID    90 tablet    Take 1 tablet (125 mcg) by mouth daily    Acquired hypothyroidism       MULTIVITAMIN ADULT PO           order for DME     1 Units    Equipment being ordered: Nebulizer    Asthma exacerbation       OVER-THE-COUNTER     1 Bottle    Place 1 drop into both eyes 3 times daily. Systane Ultra, Systane Balance, Blink Tears or Refresh Optive Artificial Tear    Dry eye syndrome       PARoxetine 20 MG tablet    PAXIL    90 tablet    Take 1 tablet (20 mg) by mouth daily    ERIC (generalized anxiety disorder)        ranitidine 150 MG tablet    ZANTAC    180 tablet    Take 1 tablet (150 mg) by mouth 2 times daily    Gastroesophageal reflux disease, esophagitis presence not specified       VITAMIN D3 PO      Take 2,000 Units by mouth daily        * Notice:  This list has 2 medication(s) that are the same as other medications prescribed for you. Read the directions carefully, and ask your doctor or other care provider to review them with you.

## 2017-09-08 NOTE — NURSING NOTE
"Chief Complaint   Patient presents with     Generalized Body Aches     For one week.       Initial /84  Pulse 71  Temp 97.7  F (36.5  C) (Tympanic)  Ht 5' 7.5\" (1.715 m)  Wt 202 lb (91.6 kg)  BMI 31.17 kg/m2 Estimated body mass index is 31.17 kg/(m^2) as calculated from the following:    Height as of this encounter: 5' 7.5\" (1.715 m).    Weight as of this encounter: 202 lb (91.6 kg).  Medication Reconciliation: complete  "

## 2017-09-08 NOTE — PROGRESS NOTES
SUBJECTIVE:   Teresa Fernandez is a 57 year old female who presents to clinic today for the following health issues:      BODY ACHES      Duration: One week.    Description (location/character/radiation): Body aches and feeling very fatigue.  States her muscles all over.  She feels that consuming sugars has made her symptoms worse.    She is not sure if her symptoms are related to her thyroid.    Intensity:  Mild-moderate    Accompanying signs and symptoms: Chills today.  No fever.    History (similar episodes/previous evaluation): None    Precipitating or alleviating factors: Consuming sugar.    Therapies tried and outcome: Tylenol and Ibuprofen that will take the edge off of her symptoms.     Teresa presents with one week of malaise, chills, diffuse muscle aches (), leg cramps, nausea, loose stools.  She has not had any fevers; joint pain, swelling, or redness; SOB; palpitations; rash; numbness; tingling.  She has had an intentional weight loss of 10 pounds over the past 4 months; she has been doing the [employer] diet.  She has not had any recent medication changes.  Has not had any known tick bites.     Problem list and histories reviewed & adjusted, as indicated.  Additional history: as documented    Current Outpatient Prescriptions   Medication Sig Dispense Refill     albuterol (PROAIR HFA/PROVENTIL HFA/VENTOLIN HFA) 108 (90 BASE) MCG/ACT Inhaler Inhale 2 puffs into the lungs every 6 hours as needed for shortness of breath / dyspnea 1 Inhaler 5     PARoxetine (PAXIL) 20 MG tablet Take 1 tablet (20 mg) by mouth daily 90 tablet 3     ranitidine (ZANTAC) 150 MG tablet Take 1 tablet (150 mg) by mouth 2 times daily 180 tablet 2     levothyroxine (SYNTHROID/LEVOTHROID) 125 MCG tablet Take 1 tablet (125 mcg) by mouth daily 90 tablet 3     fluticasone-salmeterol (ADVAIR) 250-50 MCG/DOSE diskus inhaler Inhale 1 puff into the lungs 2 times daily 1 Inhaler 1     order for DME Equipment being ordered: Nebulizer 1 Units 0      "albuterol (2.5 MG/3ML) 0.083% nebulizer solution Take 1 vial (2.5 mg) by nebulization every 6 hours as needed for shortness of breath / dyspnea or wheezing 25 vial 0     Multiple Vitamins-Minerals (MULTIVITAMIN ADULT PO)        Cholecalciferol (VITAMIN D3 PO) Take 2,000 Units by mouth daily       fexofenadine (ALLEGRA) 180 MG tablet Take 1 tablet (180 mg) by mouth daily 30 tablet 1     OVER-THE-COUNTER Place 1 drop into both eyes 3 times daily. Systane Ultra, Systane Balance, Blink Tears or Refresh Optive Artificial Tear 1 Bottle      Naproxen Sodium (ALEVE) 220 MG capsule 220 mg. Prn.        ASPIRIN PO Take 325 mg by mouth       Allergies   Allergen Reactions     Omnipaque [Iohexol] Swelling and Difficulty breathing     Immediate throat swelling following around 1-2cc of omnipaque 300 for spine procedure     Gluten      Iodine      Welts from CT contrast, surgical scrub is ok.     Penicillins Hives       Reviewed and updated as needed this visit by clinical staffTobacco  Allergies  Med Hx  Surg Hx  Fam Hx  Soc Hx      Reviewed and updated as needed this visit by Provider         ROS:  Constitutional, cardiovascular, pulmonary, gi systems are negative, except as otherwise noted.      OBJECTIVE:   /84  Pulse 71  Temp 97.7  F (36.5  C) (Tympanic)  Ht 5' 7.5\" (1.715 m)  Wt 202 lb (91.6 kg)  BMI 31.17 kg/m2  Body mass index is 31.17 kg/(m^2).  GENERAL: healthy, alert and no distress  HENT:  nose and mouth without ulcers or lesions  NECK: no adenopathy, no asymmetry, masses, or scars and thyroid normal to palpation  RESP: lungs clear to auscultation - no rales, rhonchi or wheezes  CV: regular rate and rhythm, normal S1 S2, no S3 or S4, no murmur, click or rub, no peripheral edema   ABDOMEN: soft, nontender, no hepatosplenomegaly, no masses and bowel sounds normal  SKIN: no suspicious lesions or rashes    Diagnostic Test Results:  Results for orders placed or performed in visit on 09/08/17 (from the past " 24 hour(s))   CBC with platelets   Result Value Ref Range    WBC 5.6 4.0 - 11.0 10e9/L    RBC Count 4.40 3.8 - 5.2 10e12/L    Hemoglobin 13.4 11.7 - 15.7 g/dL    Hematocrit 40.4 35.0 - 47.0 %    MCV 92 78 - 100 fl    MCH 30.5 26.5 - 33.0 pg    MCHC 33.2 31.5 - 36.5 g/dL    RDW 12.6 10.0 - 15.0 %    Platelet Count 216 150 - 450 10e9/L   Erythrocyte sedimentation rate auto   Result Value Ref Range    Sed Rate 8 0 - 30 mm/h       ASSESSMENT/PLAN:       1. Generalized muscle ache    Teresa presents with malaise, muscle aches, chills, nausea, and loose stools.  Etiology is unclear.  CBC and ESR are normal.  She is concerned about her thyroid function; she is on thyroid medication.  We will check this as well as CMP and CK and tick borne illness.  She has not had any known tick bites, but is outdoors a lot, so may be at risk of an undetected bite.  She has had work up for joint pain in the past with normal ESR, CRP, RF, DIEGO.  If current work-up also normal and symptoms continue to persist, may consider diagnosis of fibromyalgia.      - Comprehensive metabolic panel  - CBC with platelets  - Erythrocyte sedimentation rate auto  - CK total  - TSH with free T4 reflex  - Anaplasma phagocytoph antibody IgG IgM  - Babesia antibody IgG IgM  - Lyme Disease Yulia with reflex to WB Serum  - CRP inflammation    ADDENDUM 9/11:  Labs results came back significant for elevated CK of 650.  Will plan to recheck this in one week and if still high, consider muscle biopsy.  Recommended good hydration for the next week.      Aftab Kelly MD  [facility]

## 2017-09-10 LAB
A PHAGOCYTOPH IGG TITR SER IF: NORMAL {TITER}
A PHAGOCYTOPH IGM TITR SER IF: NORMAL {TITER}
B MICROTI IGG TITR SER: NORMAL {TITER}
B MICROTI IGM TITR SER: NORMAL {TITER}

## 2017-09-11 ENCOUNTER — MYC MEDICAL ADVICE (OUTPATIENT)
Dept: FAMILY MEDICINE | Facility: CLINIC | Age: 57
End: 2017-09-11
Payer: OTHER GOVERNMENT

## 2017-09-11 LAB — B BURGDOR IGG+IGM SER QL: 0.14 (ref 0–0.89)

## 2017-09-13 DIAGNOSIS — K21.9 GASTROESOPHAGEAL REFLUX DISEASE, ESOPHAGITIS PRESENCE NOT SPECIFIED: ICD-10-CM

## 2017-09-13 NOTE — TELEPHONE ENCOUNTER
Ranitidine      Last Written Prescription Date: 12/16/16  Last Fill Quantity: 180,  # refills: 2   Last Office Visit with G, [facility] or [facility] prescribing provider: 09/08/17

## 2017-09-14 NOTE — TELEPHONE ENCOUNTER
Patient states it was done in [place], we sent her there and was done 2017. She states she is good for 5 years.

## 2017-09-14 NOTE — TELEPHONE ENCOUNTER
Called patient to collect some information regarding the colon cancer screening she had earlier this ear at an outside facility.    When was it completed?  Where was the location?  How long did they tell her to wait before the next colonoscopy?    By answering these questions, we will be able to update her chart.    Thank you!  Angelica Ellis CMA

## 2017-09-15 DIAGNOSIS — R74.8 ELEVATED CK: ICD-10-CM

## 2017-09-15 DIAGNOSIS — M79.10 GENERALIZED MUSCLE ACHE: Primary | ICD-10-CM

## 2017-09-15 LAB
ALBUMIN SERPL-MCNC: 3.8 G/DL (ref 3.4–5)
ALP SERPL-CCNC: 79 U/L (ref 40–150)
ALT SERPL W P-5'-P-CCNC: 48 U/L (ref 0–50)
ANION GAP SERPL CALCULATED.3IONS-SCNC: 6 MMOL/L (ref 3–14)
AST SERPL W P-5'-P-CCNC: 32 U/L (ref 0–45)
BILIRUB SERPL-MCNC: 0.3 MG/DL (ref 0.2–1.3)
BUN SERPL-MCNC: 16 MG/DL (ref 7–30)
CALCIUM SERPL-MCNC: 9.7 MG/DL (ref 8.5–10.1)
CHLORIDE SERPL-SCNC: 104 MMOL/L (ref 94–109)
CK SERPL-CCNC: 516 U/L (ref 30–225)
CO2 SERPL-SCNC: 29 MMOL/L (ref 20–32)
CREAT SERPL-MCNC: 0.84 MG/DL (ref 0.52–1.04)
GFR SERPL CREATININE-BSD FRML MDRD: 70 ML/MIN/1.7M2
GLUCOSE SERPL-MCNC: 85 MG/DL (ref 70–99)
POTASSIUM SERPL-SCNC: 4 MMOL/L (ref 3.4–5.3)
PROT SERPL-MCNC: 6.7 G/DL (ref 6.8–8.8)
SODIUM SERPL-SCNC: 139 MMOL/L (ref 133–144)

## 2017-09-15 PROCEDURE — 36415 COLL VENOUS BLD VENIPUNCTURE: CPT | Performed by: INTERNAL MEDICINE

## 2017-09-15 PROCEDURE — 80053 COMPREHEN METABOLIC PANEL: CPT | Performed by: INTERNAL MEDICINE

## 2017-09-15 PROCEDURE — 82550 ASSAY OF CK (CPK): CPT | Performed by: INTERNAL MEDICINE

## 2017-09-18 ENCOUNTER — TELEPHONE (OUTPATIENT)
Dept: FAMILY MEDICINE | Facility: CLINIC | Age: 57
End: 2017-09-18

## 2017-09-18 NOTE — TELEPHONE ENCOUNTER
Patient is returning our call and I gave her the message and transferred her to Diagnostic appointments.  Salena Onofre  [facility] [place] [facility]

## 2017-09-19 ENCOUNTER — MYC MEDICAL ADVICE (OUTPATIENT)
Dept: FAMILY MEDICINE | Facility: CLINIC | Age: 57
End: 2017-09-19

## 2017-09-19 NOTE — TELEPHONE ENCOUNTER
Routed to provider.  Pt is asking that her biopsy be moved sooner but 9/27/17 is the soonest available here at [facility].   Are you concerned that we need to be more aggressive about doing this biopsy sooner?  She is not experiencing new or worsening symptoms at this time.  Rhiannon Bowen RN

## 2017-09-20 ASSESSMENT — ENCOUNTER SYMPTOMS
BOWEL INCONTINENCE: 0
BACK PAIN: 1
NAUSEA: 1
MEMORY LOSS: 1
DIZZINESS: 1
FLANK PAIN: 0
SEIZURES: 0
PARALYSIS: 0
RECTAL BLEEDING: 0
ABDOMINAL PAIN: 1
SKIN CHANGES: 0
DISTURBANCES IN COORDINATION: 0
WHEEZING: 0
POLYPHAGIA: 1
DEPRESSION: 1
VOMITING: 0
NUMBNESS: 0
TINGLING: 0
LOSS OF CONSCIOUSNESS: 0
NIGHT SWEATS: 0
COUGH: 0
HEMATURIA: 0
PANIC: 1
TREMORS: 0
JOINT SWELLING: 1
DYSPNEA ON EXERTION: 1
DIARRHEA: 0
SHORTNESS OF BREATH: 1
MUSCLE CRAMPS: 1
NAIL CHANGES: 0
INSOMNIA: 0
RESPIRATORY PAIN: 0
CONSTIPATION: 1
CHILLS: 1
SPEECH CHANGE: 0
DIFFICULTY URINATING: 0
SPUTUM PRODUCTION: 0
POOR WOUND HEALING: 0
SNORES LOUDLY: 1
DYSURIA: 0
RECTAL PAIN: 0
DECREASED CONCENTRATION: 1
DECREASED APPETITE: 1
FATIGUE: 1
BLOOD IN STOOL: 0
HEMOPTYSIS: 0
JAUNDICE: 0
HEARTBURN: 1
WEIGHT GAIN: 1
FEVER: 0
HALLUCINATIONS: 0
WEAKNESS: 1
ARTHRALGIAS: 1
STIFFNESS: 1
NERVOUS/ANXIOUS: 1
MYALGIAS: 1
INCREASED ENERGY: 1
MUSCLE WEAKNESS: 1
BLOATING: 1
WEIGHT LOSS: 0
POLYDIPSIA: 0
POSTURAL DYSPNEA: 0
NECK PAIN: 1
COUGH DISTURBING SLEEP: 0
HEADACHES: 1
ALTERED TEMPERATURE REGULATION: 1

## 2017-09-20 NOTE — TELEPHONE ENCOUNTER
No, I think 9/27 should be fine.  Her symptoms seems like they have been stable for awhile.  Thanks.    Aftab Kelly MD

## 2017-09-25 ENCOUNTER — OFFICE VISIT (OUTPATIENT)
Dept: INTERVENTIONAL RADIOLOGY/VASCULAR | Facility: CLINIC | Age: 57
End: 2017-09-25

## 2017-09-25 ENCOUNTER — CARE COORDINATION (OUTPATIENT)
Dept: SURGERY | Facility: CLINIC | Age: 57
End: 2017-09-25

## 2017-09-25 VITALS
HEART RATE: 67 BPM | SYSTOLIC BLOOD PRESSURE: 120 MMHG | BODY MASS INDEX: 31.76 KG/M2 | DIASTOLIC BLOOD PRESSURE: 75 MMHG | OXYGEN SATURATION: 96 % | WEIGHT: 205.8 LBS

## 2017-09-25 DIAGNOSIS — R53.82 CHRONIC FATIGUE: Primary | ICD-10-CM

## 2017-09-25 NOTE — MR AVS SNAPSHOT
After Visit Summary   9/25/2017    Teresa Fernandez    MRN: [id number]           Patient Information     Date Of Birth          1960        Visit Information        Provider Department      9/25/2017 8:00 AM Sophia Galaviz APRN [facility] Interventional Radiology        Today's Diagnoses     Chronic fatigue    -  1       Follow-ups after your visit        Your next 10 appointments already scheduled     Oct 04, 2017 11:00 AM CDT   (Arrive by 10:45 AM)   New Patient Visit with Kelly Cabrera MD   [facility] ([facility])    [address] [place] [id number] [phone]              Who to contact     Please call your clinic at [phone] to:    Ask questions about your health    Make or cancel appointments    Discuss your medicines    Learn about your test results    Speak to your doctor   If you have compliments or concerns about an experience at your clinic, or if you wish to file a complaint, please contact [facility] Patient Relations at [phone] or email us at [email or web address]         Additional Information About Your Visit        MyChart Information     [employer] gives you secure access to your electronic health record. If you see a primary care provider, you can also send messages to your care team and make appointments. If you have questions, please call your primary care clinic.  If you do not have a primary care provider, please call [phone] and they will assist you.      [employer] is an electronic gateway that provides easy, online access to your medical records. With [employer], you can request a clinic appointment, read your test results, renew a prescription or communicate with your care team.     To access your existing account, please contact your [facility] or call [phone] for assistance.        Care EveryWhere ID     This is  your Care EveryWhere ID. This could be used by other organizations to access your [facility] medical records  [id number]        Your Vitals Were     Pulse Pulse Oximetry BMI (Body Mass Index)             67 96% 31.76 kg/m2          Blood Pressure from Last 3 Encounters:   09/25/17 120/75   09/08/17 119/84   08/16/17 128/80    Weight from Last 3 Encounters:   09/25/17 93.4 kg (205 lb 12.8 oz)   09/08/17 91.6 kg (202 lb)   06/27/17 96.2 kg (212 lb)              Today, you had the following     No orders found for display       Primary Care Provider Office Phone # Fax #    HERIBERTO Delgado [facility] [phone] [address] [facility] [address]        Equal Access to Services     MERRILL PRESTON : Hadii aad ku hadasho Soayanna, waaxda luqadaha, qaybta kaalmada adeheavenyabarak, lillian stratton . So [facility] [phone].    ATENCIÓN: Si habla español, tiene a anderson disposición servicios gratuitos de asistencia lingüística. Stas al [phone].    We comply with applicable federal civil rights laws and [place] laws. We do not discriminate on the basis of race, color, national origin, age, disability sex, sexual orientation or gender identity.            Thank you!     Thank you for choosing [facility] INTERVENTIONAL RADIOLOGY  for your care. Our goal is always to provide you with excellent care. Hearing back from our patients is one way we can continue to improve our services. Please take a few minutes to complete the written survey that you may receive in the mail after your visit with us. Thank you!             Your Updated Medication List - Protect others around you: Learn how to safely use, store and throw away your medicines at www.disposemymeds.org.          This list is accurate as of: 9/25/17 11:59 PM.  Always use your most recent med list.                   Brand Name Dispense Instructions for use Diagnosis    * albuterol (2.5 MG/3ML) 0.083% neb solution     25 vial    Take 1  vial (2.5 mg) by nebulization every 6 hours as needed for shortness of breath / dyspnea or wheezing    Asthma exacerbation       * albuterol 108 (90 BASE) MCG/ACT Inhaler    PROAIR HFA/PROVENTIL HFA/VENTOLIN HFA    1 Inhaler    Inhale 2 puffs into the lungs every 6 hours as needed for shortness of breath / dyspnea    Moderate persistent asthma without complication       ALEVE 220 MG capsule   Generic drug:  naproxen sodium      220 mg. Prn.        ASPIRIN PO      Take 325 mg by mouth        fexofenadine 180 MG tablet    ALLEGRA    30 tablet    Take 1 tablet (180 mg) by mouth daily    Seasonal allergic rhinitis       fluticasone-salmeterol 250-50 MCG/DOSE diskus inhaler    ADVAIR    1 Inhaler    Inhale 1 puff into the lungs 2 times daily    Chest tightness, Intermittent asthma, uncomplicated       levothyroxine 125 MCG tablet    SYNTHROID/LEVOTHROID    90 tablet    Take 1 tablet (125 mcg) by mouth daily    Acquired hypothyroidism       MULTIVITAMIN ADULT PO           order for DME     1 Units    Equipment being ordered: Nebulizer    Asthma exacerbation       OVER-THE-COUNTER     1 Bottle    Place 1 drop into both eyes 3 times daily. Systane Ultra, Systane Balance, Blink Tears or Refresh Optive Artificial Tear    Dry eye syndrome       PARoxetine 20 MG tablet    PAXIL    90 tablet    Take 1 tablet (20 mg) by mouth daily    ERIC (generalized anxiety disorder)       ranitidine 150 MG tablet    ZANTAC    180 tablet    TAKE 1 TABLET TWICE A DAY    Gastroesophageal reflux disease, esophagitis presence not specified       VITAMIN D3 PO      Take 2,000 Units by mouth daily        * Notice:  This list has 2 medication(s) that are the same as other medications prescribed for you. Read the directions carefully, and ask your doctor or other care provider to review them with you.

## 2017-09-25 NOTE — PROGRESS NOTES
Received a call from IR stating patient was incorrectly scheduled for a muscle biopsy and she should have been referred to General Surgery. Called and left a message for patient asking if she would like to be seen October 4 at 11 am for a consult with Dr. Cabrera. Asked her to call back to confirm. GS number provided.    Of note- pt on . Does she need a referral to be seen? Will insurance cover visit without one? Please discuss with patient.

## 2017-09-25 NOTE — LETTER
9/25/2017       RE: Teresa Fernandez  [address] [address] [address]     Dear Colleague,    Thank you for referring your patient, Teresa Fernandez, to the [facility] INTER[facility]IONAL RADIOLOGY at [facility]. Please see a copy of my visit note below.    Teresa is a 57 year who has been experiencing fatigue and has been undergoing workup by her PCP.  She has been found to have an elevated CK level and was referred for a muscle biopsy.  Interventional Radiology does not perform the muscle biopsies.  These are performed by general surgery.  I have called the surgery clinic and left a message on the nurse line who will call and set up an appointment with the pt.  Sophia Galaviz MS, APRN, CNS  Clinical Nurse Specialist  Interventional Radiology  [phone] (voice mail)  [phone] (pager)

## 2017-09-26 NOTE — PROGRESS NOTES
Spoke with patient and she states that a referral is not needed for consult/procedure with Dr. Cabrera (muscle bx)

## 2017-09-26 NOTE — PROGRESS NOTES
Teresa is a 57 year who has been experiencing fatigue and has been undergoing workup by her PCP.  She has been found to have an elevated CK level and was referred for a muscle biopsy.  Interventional Radiology does not perform the muscle biopsies.  These are performed by general surgery.  I have called the surgery clinic and left a message on the nurse line who will call and set up an appointment with the pt.  Sophia Galaviz MS, APRN, CNS  Clinical Nurse Specialist  Interventional Radiology  [phone] (voice mail)  [phone] (pager)

## 2017-09-28 ENCOUNTER — MYC MEDICAL ADVICE (OUTPATIENT)
Dept: FAMILY MEDICINE | Facility: CLINIC | Age: 57
End: 2017-09-28

## 2017-09-28 DIAGNOSIS — R10.9 RIGHT FLANK PAIN: Primary | ICD-10-CM

## 2017-09-28 DIAGNOSIS — R56.9 SEIZURE-LIKE ACTIVITY (H): ICD-10-CM

## 2017-10-01 ASSESSMENT — ENCOUNTER SYMPTOMS
HYPERTENSION: 0
NECK PAIN: 1
POLYPHAGIA: 1
DIFFICULTY URINATING: 0
DYSPNEA ON EXERTION: 1
RECTAL PAIN: 0
RESPIRATORY PAIN: 0
WEIGHT LOSS: 1
BLOOD IN STOOL: 0
FLANK PAIN: 1
DISTURBANCES IN COORDINATION: 1
PALPITATIONS: 0
HEMATURIA: 0
MYALGIAS: 1
DECREASED CONCENTRATION: 1
NUMBNESS: 1
CHILLS: 1
DYSURIA: 0
TACHYCARDIA: 0
SEIZURES: 0
SKIN CHANGES: 0
SPUTUM PRODUCTION: 0
ORTHOPNEA: 1
JOINT SWELLING: 1
FATIGUE: 1
POLYDIPSIA: 0
MEMORY LOSS: 1
INSOMNIA: 1
HYPOTENSION: 1
POOR WOUND HEALING: 0
SYNCOPE: 0
TREMORS: 1
BACK PAIN: 1
WEIGHT GAIN: 0
NERVOUS/ANXIOUS: 1
CONSTIPATION: 1
DECREASED APPETITE: 1
DIZZINESS: 1
STIFFNESS: 1
HEADACHES: 1
PANIC: 1
JAUNDICE: 0
ABDOMINAL PAIN: 1
ALTERED TEMPERATURE REGULATION: 1
NAIL CHANGES: 0
WHEEZING: 0
ARTHRALGIAS: 1
HEMOPTYSIS: 0
HALLUCINATIONS: 0
COUGH: 0
EXERCISE INTOLERANCE: 1
HEARTBURN: 1
SPEECH CHANGE: 0
LIGHT-HEADEDNESS: 1
BLOATING: 1
CLAUDICATION: 1
TINGLING: 1
LEG PAIN: 1
MUSCLE WEAKNESS: 1
LEG SWELLING: 1
WEAKNESS: 1
INCREASED ENERGY: 1
MUSCLE CRAMPS: 1
SHORTNESS OF BREATH: 1
NIGHT SWEATS: 0
NAUSEA: 1
FEVER: 0
RECTAL BLEEDING: 0
COUGH DISTURBING SLEEP: 0
DEPRESSION: 1
DIARRHEA: 0
SLEEP DISTURBANCES DUE TO BREATHING: 0
LOSS OF CONSCIOUSNESS: 0
POSTURAL DYSPNEA: 1
PARALYSIS: 0
BOWEL INCONTINENCE: 0
SNORES LOUDLY: 0
VOMITING: 0

## 2017-10-02 ENCOUNTER — TELEPHONE (OUTPATIENT)
Dept: FAMILY MEDICINE | Facility: CLINIC | Age: 57
End: 2017-10-02

## 2017-10-02 ENCOUNTER — HOSPITAL ENCOUNTER (OUTPATIENT)
Dept: ULTRASOUND IMAGING | Facility: CLINIC | Age: 57
Discharge: HOME | End: 2017-10-02
Attending: INTERNAL MEDICINE | Admitting: INTERNAL MEDICINE
Payer: OTHER GOVERNMENT

## 2017-10-02 DIAGNOSIS — R10.9 RIGHT FLANK PAIN: ICD-10-CM

## 2017-10-02 PROCEDURE — 76770 US EXAM ABDO BACK WALL COMP: CPT

## 2017-10-02 NOTE — LETTER
[facility]  [address] [address]  Phone: [phone]      October 2, 2017      RE: Teresa Fernandez  [address] [address] [facility] [address]        To whom it may concern:    Please excuse Teresa Fernandez from work this morning for medical reasons.    Sincerely,    Nara Kelly MD

## 2017-10-02 NOTE — TELEPHONE ENCOUNTER
Patient requesting a work excuse note for her ultrasound appointment this morning.  Last OV 9/8/17 with Dr. Kelly.  Letter pended.    Madhuri Smith RN

## 2017-10-02 NOTE — TELEPHONE ENCOUNTER
Reason for Call:  Other call back    Detailed comments: patient is calling and stating she needs a work note for today, when she had her ultrasound. Please fax it to [phone] [employer] is [employer] Attn: HR    Phone Number Patient can be reached at: Cell number on file:    Telephone Information:   Mobile [phone]       Best Time: any    Can we leave a detailed message on this number? YES   Salena Onofre  [facility]      Call taken on 10/2/2017 at 9:29 AM by Salena Onofre

## 2017-10-02 NOTE — TELEPHONE ENCOUNTER
Patient returned our call and it was just the morning for the work note.  Salena Onofre  [facility] [place] [facility]

## 2017-10-02 NOTE — TELEPHONE ENCOUNTER
Left message for patient to return call to clinic.  Does patient need the letter for work just in the morning or for the whole day?    Madhuri Smith RN

## 2017-10-03 ENCOUNTER — CARE COORDINATION (OUTPATIENT)
Dept: SURGERY | Facility: CLINIC | Age: 57
End: 2017-10-03

## 2017-10-03 ENCOUNTER — MYC MEDICAL ADVICE (OUTPATIENT)
Dept: FAMILY MEDICINE | Facility: CLINIC | Age: 57
End: 2017-10-03

## 2017-10-03 NOTE — PROGRESS NOTES
Pre Visit Call and Assessment    Date of call:  10/03/2017    Phone numbers:  Home number on file [phone] (home)    Reached patient/confirmed appointment:  No - left message:   on [employer]  Patient care team/Primary provider:  Nuzhat Burt  Preferred outpatient pharmacy:    [place], [place] - [address]  [address] [address]  Phone: [phone] Fax: [phone] Alternate Fax: [phone], [phone]    [facility] - [place] [address]  Phone: [phone] Fax: [phone]    [facility] - [place] [address]  Phone: [phone] Fax: [phone]    Referred to:  Dr. MERLY Cabrera    Reason for visit:  consult

## 2017-10-04 ENCOUNTER — HOSPITAL ENCOUNTER (OUTPATIENT)
Dept: MRI IMAGING | Facility: CLINIC | Age: 57
Discharge: HOME | End: 2017-10-04
Attending: INTERNAL MEDICINE | Admitting: INTERNAL MEDICINE
Payer: OTHER GOVERNMENT

## 2017-10-04 ENCOUNTER — OFFICE VISIT (OUTPATIENT)
Dept: SURGERY | Facility: CLINIC | Age: 57
End: 2017-10-04

## 2017-10-04 VITALS
BODY MASS INDEX: 30.21 KG/M2 | HEIGHT: 69 IN | OXYGEN SATURATION: 96 % | WEIGHT: 204 LBS | DIASTOLIC BLOOD PRESSURE: 65 MMHG | SYSTOLIC BLOOD PRESSURE: 123 MMHG | TEMPERATURE: 98.1 F | HEART RATE: 88 BPM

## 2017-10-04 DIAGNOSIS — R53.83 FATIGUE, UNSPECIFIED TYPE: Primary | ICD-10-CM

## 2017-10-04 DIAGNOSIS — R10.9 RIGHT FLANK PAIN: ICD-10-CM

## 2017-10-04 PROCEDURE — 27210995 ZZH RX 272: Performed by: INTERNAL MEDICINE

## 2017-10-04 PROCEDURE — A9585 GADOBUTROL INJECTION: HCPCS | Performed by: INTERNAL MEDICINE

## 2017-10-04 PROCEDURE — 74183 MRI ABD W/O CNTR FLWD CNTR: CPT

## 2017-10-04 PROCEDURE — 25000128 H RX IP 250 OP 636: Performed by: INTERNAL MEDICINE

## 2017-10-04 RX ORDER — ACYCLOVIR 200 MG/1
60 CAPSULE ORAL ONCE
Status: COMPLETED | OUTPATIENT
Start: 2017-10-04 | End: 2017-10-04

## 2017-10-04 RX ORDER — GADOBUTROL 604.72 MG/ML
10 INJECTION INTRAVENOUS ONCE
Status: COMPLETED | OUTPATIENT
Start: 2017-10-04 | End: 2017-10-04

## 2017-10-04 RX ADMIN — GADOBUTROL 10 ML: 604.72 INJECTION INTRAVENOUS at 16:02

## 2017-10-04 RX ADMIN — SODIUM CHLORIDE 60 ML: 9 INJECTION, SOLUTION INTRAMUSCULAR; INTRAVENOUS; SUBCUTANEOUS at 16:02

## 2017-10-04 ASSESSMENT — PAIN SCALES - GENERAL: PAINLEVEL: SEVERE PAIN (6)

## 2017-10-04 NOTE — NURSING NOTE
"Chief Complaint   Patient presents with     Consult     consult       Vitals:    10/04/17 1018   BP: 123/65   BP Location: Right arm   Patient Position: Chair   Cuff Size: Adult Regular   Pulse: 88   Temp: 98.1  F (36.7  C)   SpO2: 96%   Weight: 204 lb   Height: 5' 9\"       Body mass index is 30.13 kg/(m^2).      Loreta Henderson MA                          "

## 2017-10-04 NOTE — MR AVS SNAPSHOT
After Visit Summary   10/4/2017    Teresa Fernandez    MRN: [id number]           Patient Information     Date Of Birth          1960        Visit Information        Provider Department      10/4/2017 11:00 AM Kelly Cabrera MD [facility] Surgery        Today's Diagnoses     Fatigue, unspecified type    -  1      Care Instructions    Muscle Biopsy Teaching Sheet      1.  Wound care:  Remove the gauze dressing 24 hours after procedure but leave the medical tape in place in place.  The tape should stay on for 5-7 days.  You may remove the Steri-Strips after one week.   Please wash your hands before touching your incisions or removing dressings    2.  You may resume all of your home medications after surgery.  Please do not start Aspirin or blood thinners until the day after surgery.    3.  You may shower 24 hours after surgery.  Do not submerge yourself in water for 7 days (bath, whirlpool, hot tub, pool, lake, etc).      4.  Restrictions:  None    5.  Pain management:  Apply ice pack to incision area for 24 hours protecting the skin with a cloth.  Take prescribed pain medication as directed and only as needed (if applicable).  Please do not take any additional Acetaminophen or Tylenol products while taking narcotic pain medications.  We encourage the use of Ibuprofen, Advil, or Motrin after surgery except if you have an allergy, ulcer, kidney problems, or it is contraindicated by a provider.    6.  Our office will call you 2-4 days after your procedure to review post-op teaching, answer questions, and help arrange after surgery care.    7.  Watch for signs of infection:  Redness of the wound, drainage, increasing pain, and/or fever/chills (greater than 101 degrees F).    9.  Constipation:  Please take prescribed stool softener as directed.  You may stop taking it when you are no longer taking narcotic pain medications and your bowels have returned to normal.  If you become constipated it  is safe to take an over-the-counter laxative as directed on the bottle.    10.  Driving:  You may drive  when you are no longer taking prescription pain medications  and you feel comfortable operating a vehicle.       11.  Diet:  You may resume your regular diet after surgery.      12.  Pathology:  Pathology results are generally available after 5-7 business days.    If you have questions or concerns please contact our office Monday through Friday during regular office hours at [phone].  If you are calling during nonbusiness hours, the weekend, or holiday please call the hospital  at [phone] and ask for the on-call General Surgeon.              Follow-ups after your visit        Your next 10 appointments already scheduled     Oct 04, 2017  3:15 PM CDT   MR ABDOMEN W/O CONTRAST with [facility]   [facility] ([facility])    [address] [address] [phone]           Take your medicines as usual, unless your doctor tells you not to. Bring a list of your current medicines to your exam (including vitamins, minerals and over-the-counter drugs). Also bring the results of similar scans you may have had.  Please remove any body piercings and hair extensions before you arrive.  Follow your doctor s orders. If you do not, we may have to postpone your exam.  For liver, gallbladder, or pancreas exams: No food or drink for 6 hours before the exam. Otherwise, no food or drink restrictions.  The MRI machine uses a strong magnet. Please wear clothes without metal (snaps, zippers). A sweatsuit works well, or we may give you a hospital gown.   **IMPORTANT** THE INSTRUCTIONS BELOW ARE ONLY FOR THOSE PATIENTS WHO HAVE BEEN TOLD THEY WILL RECEIVE SEDATION OR GENERAL ANESTHESIA DURING THEIR MRI PROCEDURE:  IF YOU WILL RECEIVE SEDATION (take medicine to help you relax during your exam):   You must get the medicine from your doctor before you arrive. Bring the medicine to the  exam. Do not take it at home.   Arrive one hour early. Bring someone who can take you home after the test. Your medicine will make you sleepy. After the exam, you may not drive, take a bus or take a taxi by yourself.   No eating 8 hours before your exam. You may have clear liquids up until 4 hours before your exam. (Clear liquids include water, clear tea, black coffee and fruit juice without pulp.)  IF YOU WILL RECEIVE ANESTHESIA (be asleep for your exam):   Arrive 1 1/2 hours early. Bring someone who can take you home after the test. You may not drive, take a bus or take a taxi by yourself.   No eating 8 hours before your exam. You may have clear liquids up until 4 hours before your exam. (Clear liquids include water, clear tea, black coffee and fruit juice without pulp.)   You will spend four to five hours in the recovery room.  Please call the Imaging Department at your exam site with any questions.            Oct 06, 2017  3:30 PM CDT   (Arrive by 3:15 PM)   PAC EVALUATION with  Pac Oswald 2   [facility] ([facility])    [address] [place] [id number] [phone]            Oct 06, 2017  4:30 PM CDT   (Arrive by 4:15 PM)   PAC Anesthesia Consult with Cathleen Pac Anesthesiologist   [facility] [place] ([facility])    [address] [place] [id number] [phone]            Oct 06, 2017  5:00 PM CDT   (Arrive by 4:45 PM)   PAC RN ASSESSMENT with Cathleen Pac Rn   [facility] [place] ([facility])    [address] [place] [id number] [phone]              Who to contact     Please call your clinic at [phone] to:    Ask questions about your health    Make or cancel appointments    Discuss your medicines    Learn about your test results    Speak to your doctor   If you have compliments or  "concerns about an experience at your clinic, or if you wish to file a complaint, please contact [facility] Patient Relations at [phone] or email us at [email or web address]         Additional Information About Your Visit        MyChart Information     [employer] gives you secure access to your electronic health record. If you see a primary care provider, you can also send messages to your care team and make appointments. If you have questions, please call your primary care clinic.  If you do not have a primary care provider, please call [phone] and they will assist you.      [employer] is an electronic gateway that provides easy, online access to your medical records. With [employer], you can request a clinic appointment, read your test results, renew a prescription or communicate with your care team.     To access your existing account, please contact your [facility] or call [phone] for assistance.        Care EveryWhere ID     This is your Care EveryWhere ID. This could be used by other organizations to access your [facility] medical records  [id number]        Your Vitals Were     Pulse Temperature Height Pulse Oximetry BMI (Body Mass Index)       88 98.1  F (36.7  C) 1.753 m (5' 9\") 96% 30.13 kg/m2        Blood Pressure from Last 3 Encounters:   10/04/17 123/65   09/25/17 120/75   09/08/17 119/84    Weight from Last 3 Encounters:   10/04/17 92.5 kg (204 lb)   09/25/17 93.4 kg (205 lb 12.8 oz)   09/08/17 91.6 kg (202 lb)              Today, you had the following     No orders found for display       Primary Care Provider Office Phone # Fax #    Nuzhat HERIBERTO Larsen [facility] [phone] [address] [facility] [address]        Equal Access to Services     MERRILL PRESTON : Nic Leung, wawanda rae, qaybta kaalmada alexa, lillian reese. So [facility] [place] [phone].    ATENCIÓN: Si " sal alfonso, tiene a anderson disposición servicios gratuitos de asistencia lingüística. Stas lebron [phone].    We comply with applicable federal civil rights laws and [place] laws. We do not discriminate on the basis of race, color, national origin, age, disability, sex, sexual orientation, or gender identity.            Thank you!     Thank you for choosing [facility]  for your care. Our goal is always to provide you with excellent care. Hearing back from our patients is one way we can continue to improve our services. Please take a few minutes to complete the written survey that you may receive in the mail after your visit with us. Thank you!             Your Updated Medication List - Protect others around you: Learn how to safely use, store and throw away your medicines at www.disposemymeds.org.          This list is accurate as of: 10/4/17 11:08 AM.  Always use your most recent med list.                   Brand Name Dispense Instructions for use Diagnosis    * albuterol (2.5 MG/3ML) 0.083% neb solution     25 vial    Take 1 vial (2.5 mg) by nebulization every 6 hours as needed for shortness of breath / dyspnea or wheezing    Asthma exacerbation       * albuterol 108 (90 BASE) MCG/ACT Inhaler    PROAIR HFA/PROVENTIL HFA/VENTOLIN HFA    1 Inhaler    Inhale 2 puffs into the lungs every 6 hours as needed for shortness of breath / dyspnea    Moderate persistent asthma without complication       ALEVE 220 MG capsule   Generic drug:  naproxen sodium      220 mg. Prn.        ASPIRIN PO      Take 325 mg by mouth        fexofenadine 180 MG tablet    ALLEGRA    30 tablet    Take 1 tablet (180 mg) by mouth daily    Seasonal allergic rhinitis       fluticasone-salmeterol 250-50 MCG/DOSE diskus inhaler    ADVAIR    1 Inhaler    Inhale 1 puff into the lungs 2 times daily    Chest tightness, Intermittent asthma, uncomplicated       levothyroxine 125 MCG tablet    SYNTHROID/LEVOTHROID    90 tablet    Take 1  tablet (125 mcg) by mouth daily    Acquired hypothyroidism       MULTIVITAMIN ADULT PO           order for DME     1 Units    Equipment being ordered: Nebulizer    Asthma exacerbation       OVER-THE-COUNTER     1 Bottle    Place 1 drop into both eyes 3 times daily. Systane Ultra, Systane Balance, Blink Tears or Refresh Optive Artificial Tear    Dry eye syndrome       PARoxetine 20 MG tablet    PAXIL    90 tablet    Take 1 tablet (20 mg) by mouth daily    ERIC (generalized anxiety disorder)       ranitidine 150 MG tablet    ZANTAC    180 tablet    TAKE 1 TABLET TWICE A DAY    Gastroesophageal reflux disease, esophagitis presence not specified       VITAMIN D3 PO      Take 2,000 Units by mouth daily        * Notice:  This list has 2 medication(s) that are the same as other medications prescribed for you. Read the directions carefully, and ask your doctor or other care provider to review them with you.

## 2017-10-04 NOTE — PROGRESS NOTES
"History and Physical - General Surgery Attending    NAME: Teresa Fernandez,  57 year old female    PCP: Nuzhat Burt  MRN:  [id number]       #: [phone]    Date: October 4, 2017    History of Present Illness: Teresa Fernandez 57 year old female presenting for preoperative evaluation in preparation for muscle biopsy.  Pt. Has had multiple symptoms for past several months of fatigue, nausea, HAs & intermitttent episodes of \"Shaking\" uncontrollably.  The shaking episodes have all resolved spontaneously, but are becoming more frequent and longer.      Her PCP has tested for Lyme Disease and a number of other diseases without a definitive diagnosis being found.  Her CK levels are somewhat elevated and this has led to the request for a muscle bx.      Past Medical History:  Past Medical History:   Diagnosis Date     ALLERGIC RHINITIS NOS 4/12/2005     Allergies      ALLERGY, UNSPECIFIED 4/19/2005     Anxiety      Arthritis     herniated disc     Bronchitis, not specified as acute or chronic      CHR FRONTAL SINUSITIS 4/12/2005     CHR MAXILLARY SINUSITIS 4/12/2005     Chronic sinusitis 2005     Depressive disorder, not elsewhere classified      Eczema      Eczema 10/17/2012     Hypertension goal BP (blood pressure) < 140/90 11/16/2011     Malignant melanoma (H)      Malignant neoplasm of renal pelvis (H) 1995    Renal cell carcinoma     Other specified acquired hypothyroidism 4/12/2005     Skin cancer 06/2010    MIS of the chest     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     FUSION LUMBAR ANTERIOR TWO LEVELS  4/13/2015     GI SURGERY      kidney removal -left     GYN SURGERY      hysterectomy     HYSTERECTOMY, PAP NO LONGER INDICATED  "      HYSTERECTOMY, PAP NO LONGER INDICATED       SOFT TISSUE SURGERY      chest-melonoma     SURGICAL HISTORY OF -       Tubal ligation     SURGICAL HISTORY OF -   3/1996    Left nephrectomy-renal cell CA     SURGICAL HISTORY OF -   4/8/1999    [facility]     Family History:  Family History   Problem Relation Age of Onset     DIABETES Mother      Cardiovascular Mother      Lipids Mother      Depression Mother      Hypertension Father      Lipids Father      Obesity Father      Macular Degeneration Father      CANCER Maternal Grandmother      kind unknown had sores on legs     Eczema Maternal Grandmother      Eczema Maternal Grandfather      Breast Cancer Paternal Grandmother      CANCER Paternal Grandmother      breast     Hypertension Paternal Grandfather      Cardiovascular Paternal Grandfather      heart attack     Hypertension Brother      Thyroid Disease Sister      Hypertension Sister      Depression Sister      Allergies Sister      Allergies Son      Eczema Son      Lipids Sister      Thyroid Disease Sister      Neurologic Disorder Sister      Depression Sister      Respiratory Son      Glaucoma No family hx of      CEREBROVASCULAR DISEASE No family hx of      Social History:  Social History     Social History     Marital status:      Spouse name: N/A     Number of children: N/A     Years of education: N/A     Occupational History      [employer]      Self     Social History Main Topics     Smoking status: Former Smoker     Quit date: 3/26/1996     Smokeless tobacco: Never Used     Alcohol use Yes      Comment: occ     Drug use: No     Sexual activity: Yes     Partners: Male     Other Topics Concern     Parent/Sibling W/ Cabg, Mi Or Angioplasty Before 65f 55m? No     Social History Narrative     Allergies:  Allergies   Allergen Reactions     Omnipaque [Iohexol] Swelling and Difficulty breathing     Immediate throat swelling following around 1-2cc of omnipaque 300 for spine procedure     Gluten      Iodine       Welts from CT contrast, surgical scrub is ok.     Penicillins Hives     Outpatient Medications:    Current Outpatient Prescriptions on File Prior to Visit:  ranitidine (ZANTAC) 150 MG tablet TAKE 1 TABLET TWICE A DAY   albuterol (PROAIR HFA/PROVENTIL HFA/VENTOLIN HFA) 108 (90 BASE) MCG/ACT Inhaler Inhale 2 puffs into the lungs every 6 hours as needed for shortness of breath / dyspnea   PARoxetine (PAXIL) 20 MG tablet Take 1 tablet (20 mg) by mouth daily   levothyroxine (SYNTHROID/LEVOTHROID) 125 MCG tablet Take 1 tablet (125 mcg) by mouth daily   fluticasone-salmeterol (ADVAIR) 250-50 MCG/DOSE diskus inhaler Inhale 1 puff into the lungs 2 times daily   order for DME Equipment being ordered: Nebulizer   albuterol (2.5 MG/3ML) 0.083% nebulizer solution Take 1 vial (2.5 mg) by nebulization every 6 hours as needed for shortness of breath / dyspnea or wheezing   Multiple Vitamins-Minerals (MULTIVITAMIN ADULT PO)    ASPIRIN PO Take 325 mg by mouth   Cholecalciferol (VITAMIN D3 PO) Take 2,000 Units by mouth daily   fexofenadine (ALLEGRA) 180 MG tablet Take 1 tablet (180 mg) by mouth daily   OVER-THE-COUNTER Place 1 drop into both eyes 3 times daily. Systane Ultra, Systane Balance, Blink Tears or Refresh Optive Artificial Tear   Naproxen Sodium (ALEVE) 220 MG capsule 220 mg. Prn.      No current facility-administered medications on file prior to visit.   Current Outpatient Prescriptions   Medication Sig Dispense Refill     ranitidine (ZANTAC) 150 MG tablet TAKE 1 TABLET TWICE A  tablet 1     albuterol (PROAIR HFA/PROVENTIL HFA/VENTOLIN HFA) 108 (90 BASE) MCG/ACT Inhaler Inhale 2 puffs into the lungs every 6 hours as needed for shortness of breath / dyspnea 1 Inhaler 5     PARoxetine (PAXIL) 20 MG tablet Take 1 tablet (20 mg) by mouth daily 90 tablet 3     levothyroxine (SYNTHROID/LEVOTHROID) 125 MCG tablet Take 1 tablet (125 mcg) by mouth daily 90 tablet 3     fluticasone-salmeterol (ADVAIR) 250-50 MCG/DOSE  "diskus inhaler Inhale 1 puff into the lungs 2 times daily 1 Inhaler 1     order for DME Equipment being ordered: Nebulizer 1 Units 0     albuterol (2.5 MG/3ML) 0.083% nebulizer solution Take 1 vial (2.5 mg) by nebulization every 6 hours as needed for shortness of breath / dyspnea or wheezing 25 vial 0     Multiple Vitamins-Minerals (MULTIVITAMIN ADULT PO)        ASPIRIN PO Take 325 mg by mouth       Cholecalciferol (VITAMIN D3 PO) Take 2,000 Units by mouth daily       fexofenadine (ALLEGRA) 180 MG tablet Take 1 tablet (180 mg) by mouth daily 30 tablet 1     OVER-THE-COUNTER Place 1 drop into both eyes 3 times daily. Systane Ultra, Systane Balance, Blink Tears or Refresh Optive Artificial Tear 1 Bottle      Naproxen Sodium (ALEVE) 220 MG capsule 220 mg. Prn.        Review of Systems (14 point review):  Pos for significant fatigue and generalized muscle aches, short term memory loss, HAs    Physical Exam:  Vitals: T: 98.1, P: 88, 123/65, R: Data Unavailable  Height: 5' 9\" Weight: 204 lbs 0 oz   Body mass index is 30.13 kg/(m^2).    General: Middle-aged, uncomfortable appearing, NAD    HEENT: <Head> NC/AT  <Eyes> Sclera anicteric, PERRLA, EOMI  <Ears> Pinna symmetric, nontender auditory canal, TMs intact b/l  <Nose> Atraumatic, no nasal discharge, WNL  <Throat> Oropharynx WNL, mucous membranes moist and pink, no petechiae, edema or exudates.  Uvula and tongue midline, good dentition    Neck:  Supple, no neck masses, no cervical or posterior lymphadenopathy   Thyroid midline, no thyromegaly or palpable nodules, no acanthosis nigricans    Cardiac: S1 S2 auscultated, no MRG.  No cardiac bruits appreciated.    Pulmonary: Clear to auscultation b/l, no WRR.  Tactile fremitus WNL,  No dullness to percussion.    Chest/Back: Nontender chest, symmetrical rib cage, atraumatic.  No CVA tenderness.    Breast/Axilla: Symmetrical breast tissue, no breast lumps, no nipple discharge.        Axilla:  No supraclavicular or axillary " lymphadenopathy.    Abdominal:    Mildly obese, S/NT/ND   Bowel sounds: normal     Pelvic/Groin: No masses or hernias.  No femoral lymphadenopathy.    Genitourinary: External genitalia WNL for age.      Rectal:  Deferred    Extremity: Warm, well-perfused, no CCE    Vascular: +2 femoral, popliteal, PT/DP pulses b/l    Neurologic: Alert and oriented x3.  GCS 15.  Cranial Nerves II-XII grossly intact.    No focal deficits.  Gait WNL.    Psych: Appropriate affect     Imaging Data (I have personally reviewed the following):  None    Impression/Recommendations  56 yo F with hx of RCC s/p Left nephrectomy presenting for evaluation of muscle bx.    1. D/w pt.'s PCP re: SLE & possible szs  2. Schedule for PACS   3. Schedule for muscle bx after PACS    Details of this case discussed in detail with:  __Pt & husband__    Total time spent counselling patient and/or family >50% of visit time. __20 mins__    Total visit time: 30 mins    H. Eugenia Cabrera MD  Pager - [phone]  Office - [phone]  Critical Care & Acute Care Surgery

## 2017-10-04 NOTE — LETTER
Verification of Appointment  October 4, 2017     Seen today: yes    Patient: Teresaanita TURNER OdFernandez  Physician: JUAN RODGERS was seen in the Acute Care Surgery Clinic Date: 10/04/17.      Please contact us if there are any further questions      Electronically signed by Juan Rodgers MD

## 2017-10-04 NOTE — LETTER
"10/4/2017       RE: Teresa Fernandez  [id number] [facility] [address]     Dear Colleague,    Thank you for referring your patient, Teresa Fernandez, to the [facility] GENERAL SURGERY at [facility]. Please see a copy of my visit note below.    History and Physical - General Surgery Attending    NAME: Teresa Fernandez,  57 year old female    PCP: Nuzhat Burt  MRN:  [id number]       #: [id number]    Date: October 4, 2017    History of Present Illness: Teresa Fernandez 57 year old female presenting for preoperative evaluation in preparation for muscle biopsy.  Pt. Has had multiple symptoms for past several months of fatigue, nausea, HAs & intermitttent episodes of \"Shaking\" uncontrollably.  The shaking episodes have all resolved spontaneously, but are becoming more frequent and longer.      Her PCP has tested for Lyme Disease and a number of other diseases without a definitive diagnosis being found.  Her CK levels are somewhat elevated and this has led to the request for a muscle bx.      Past Medical History:  Past Medical History:   Diagnosis Date     ALLERGIC RHINITIS NOS 4/12/2005     Allergies      ALLERGY, UNSPECIFIED 4/19/2005     Anxiety      Arthritis     herniated disc     Bronchitis, not specified as acute or chronic      CHR FRONTAL SINUSITIS 4/12/2005     CHR MAXILLARY SINUSITIS 4/12/2005     Chronic sinusitis 2005     Depressive disorder, not elsewhere classified      Eczema      Eczema 10/17/2012     Hypertension goal BP (blood pressure) < 140/90 11/16/2011     Malignant melanoma (H)      Malignant neoplasm of renal pelvis (H) 1995    Renal cell carcinoma     Other specified acquired hypothyroidism 4/12/2005     Skin cancer 06/2010    MIS of the chest     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     FUSION LUMBAR ANTERIOR TWO LEVELS  4/13/2015     GI SURGERY      kidney removal -left     GYN SURGERY      hysterectomy     HYSTERECTOMY, PAP NO LONGER INDICATED       HYSTERECTOMY, PAP NO LONGER INDICATED       SOFT TISSUE SURGERY      chest-melonoma     SURGICAL HISTORY OF -       Tubal ligation     SURGICAL HISTORY OF -   3/1996    Left nephrectomy-renal cell CA     SURGICAL HISTORY OF -   4/8/1999    [facility]     Family History:  Family History   Problem Relation Age of Onset     DIABETES Mother      Cardiovascular Mother      Lipids Mother      Depression Mother      Hypertension Father      Lipids Father      Obesity Father      Macular Degeneration Father      CANCER Maternal Grandmother      kind unknown had sores on legs     Eczema Maternal Grandmother      Eczema Maternal Grandfather      Breast Cancer Paternal Grandmother      CANCER Paternal Grandmother      breast     Hypertension Paternal Grandfather      Cardiovascular Paternal Grandfather      heart attack     Hypertension Brother      Thyroid Disease Sister      Hypertension Sister      Depression Sister      Allergies Sister      Allergies Son      Eczema Son      Lipids Sister      Thyroid Disease Sister      Neurologic Disorder Sister      Depression Sister      Respiratory Son      Glaucoma No family hx of      CEREBROVASCULAR DISEASE No family hx of      Social History:  Social History     Social History     Marital status:      Spouse name: N/A     Number of children: N/A     Years of education: N/A     Occupational History      [employer]      Self     Social History Main Topics     Smoking status: Former Smoker     Quit date: 3/26/1996     Smokeless tobacco: Never Used     Alcohol use Yes      Comment: occ     Drug use: No     Sexual activity: Yes     Partners: Male     Other Topics Concern     Parent/Sibling W/  Cabg, Mi Or Angioplasty Before 65f 55m? No     Social History Narrative     Allergies:  Allergies   Allergen Reactions     Omnipaque [Iohexol] Swelling and Difficulty breathing     Immediate throat swelling following around 1-2cc of omnipaque 300 for spine procedure     Gluten      Iodine      Welts from CT contrast, surgical scrub is ok.     Penicillins Hives     Outpatient Medications:    Current Outpatient Prescriptions on File Prior to Visit:  ranitidine (ZANTAC) 150 MG tablet TAKE 1 TABLET TWICE A DAY   albuterol (PROAIR HFA/PROVENTIL HFA/VENTOLIN HFA) 108 (90 BASE) MCG/ACT Inhaler Inhale 2 puffs into the lungs every 6 hours as needed for shortness of breath / dyspnea   PARoxetine (PAXIL) 20 MG tablet Take 1 tablet (20 mg) by mouth daily   levothyroxine (SYNTHROID/LEVOTHROID) 125 MCG tablet Take 1 tablet (125 mcg) by mouth daily   fluticasone-salmeterol (ADVAIR) 250-50 MCG/DOSE diskus inhaler Inhale 1 puff into the lungs 2 times daily   order for DME Equipment being ordered: Nebulizer   albuterol (2.5 MG/3ML) 0.083% nebulizer solution Take 1 vial (2.5 mg) by nebulization every 6 hours as needed for shortness of breath / dyspnea or wheezing   Multiple Vitamins-Minerals (MULTIVITAMIN ADULT PO)    ASPIRIN PO Take 325 mg by mouth   Cholecalciferol (VITAMIN D3 PO) Take 2,000 Units by mouth daily   fexofenadine (ALLEGRA) 180 MG tablet Take 1 tablet (180 mg) by mouth daily   OVER-THE-COUNTER Place 1 drop into both eyes 3 times daily. Systane Ultra, Systane Balance, Blink Tears or Refresh Optive Artificial Tear   Naproxen Sodium (ALEVE) 220 MG capsule 220 mg. Prn.      No current facility-administered medications on file prior to visit.   Current Outpatient Prescriptions   Medication Sig Dispense Refill     ranitidine (ZANTAC) 150 MG tablet TAKE 1 TABLET TWICE A  tablet 1     albuterol (PROAIR HFA/PROVENTIL HFA/VENTOLIN HFA) 108 (90 BASE) MCG/ACT Inhaler Inhale 2 puffs into the lungs every 6 hours as needed for  "shortness of breath / dyspnea 1 Inhaler 5     PARoxetine (PAXIL) 20 MG tablet Take 1 tablet (20 mg) by mouth daily 90 tablet 3     levothyroxine (SYNTHROID/LEVOTHROID) 125 MCG tablet Take 1 tablet (125 mcg) by mouth daily 90 tablet 3     fluticasone-salmeterol (ADVAIR) 250-50 MCG/DOSE diskus inhaler Inhale 1 puff into the lungs 2 times daily 1 Inhaler 1     order for DME Equipment being ordered: Nebulizer 1 Units 0     albuterol (2.5 MG/3ML) 0.083% nebulizer solution Take 1 vial (2.5 mg) by nebulization every 6 hours as needed for shortness of breath / dyspnea or wheezing 25 vial 0     Multiple Vitamins-Minerals (MULTIVITAMIN ADULT PO)        ASPIRIN PO Take 325 mg by mouth       Cholecalciferol (VITAMIN D3 PO) Take 2,000 Units by mouth daily       fexofenadine (ALLEGRA) 180 MG tablet Take 1 tablet (180 mg) by mouth daily 30 tablet 1     OVER-THE-COUNTER Place 1 drop into both eyes 3 times daily. Systane Ultra, Systane Balance, Blink Tears or Refresh Optive Artificial Tear 1 Bottle      Naproxen Sodium (ALEVE) 220 MG capsule 220 mg. Prn.        Review of Systems (14 point review):  Pos for significant fatigue and generalized muscle aches, short term memory loss, HAs    Physical Exam:  Vitals: T: 98.1, P: 88, 123/65, R: Data Unavailable  Height: 5' 9\" Weight: 204 lbs 0 oz   Body mass index is 30.13 kg/(m^2).    General: Middle-aged, uncomfortable appearing, NAD    HEENT: <Head> NC/AT  <Eyes> Sclera anicteric, PERRLA, EOMI  <Ears> Pinna symmetric, nontender auditory canal, TMs intact b/l  <Nose> Atraumatic, no nasal discharge, WNL  <Throat> Oropharynx WNL, mucous membranes moist and pink, no petechiae, edema or exudates.  Uvula and tongue midline, good dentition    Neck:  Supple, no neck masses, no cervical or posterior lymphadenopathy   Thyroid midline, no thyromegaly or palpable nodules, no acanthosis nigricans    Cardiac: S1 S2 auscultated, no MRG.  No cardiac bruits appreciated.    Pulmonary: Clear to auscultation " b/l, no WRR.  Tactile fremitus WNL,  No dullness to percussion.    Chest/Back: Nontender chest, symmetrical rib cage, atraumatic.  No CVA tenderness.    Breast/Axilla: Symmetrical breast tissue, no breast lumps, no nipple discharge.        Axilla:  No supraclavicular or axillary lymphadenopathy.    Abdominal:    Mildly obese, S/NT/ND   Bowel sounds: normal     Pelvic/Groin: No masses or hernias.  No femoral lymphadenopathy.    Genitourinary: External genitalia WNL for age.      Rectal:  Deferred    Extremity: Warm, well-perfused, no CCE    Vascular: +2 femoral, popliteal, PT/DP pulses b/l    Neurologic: Alert and oriented x3.  GCS 15.  Cranial Nerves II-XII grossly intact.    No focal deficits.  Gait WNL.    Psych: Appropriate affect     Imaging Data (I have personally reviewed the following):  None    Impression/Recommendations  58 yo F with hx of RCC s/p Left nephrectomy presenting for evaluation of muscle bx.    1. D/w pt.'s PCP re: SLE & possible szs  2. Schedule for PACS   3. Schedule for muscle bx after PACS    Details of this case discussed in detail with:  __Pt & husband__    Total time spent counselling patient and/or family >50% of visit time. __20 mins__    Total visit time: 30 mins    H. Eugenia Cabrera MD  Pager - [phone]  Office - [phone]  Critical Care & Acute Care Surgery

## 2017-10-04 NOTE — PATIENT INSTRUCTIONS
Muscle Biopsy Teaching Sheet      1.  Wound care:  Remove the gauze dressing 24 hours after procedure but leave the medical tape in place in place.  The tape should stay on for 5-7 days.  You may remove the Steri-Strips after one week.   Please wash your hands before touching your incisions or removing dressings    2.  You may resume all of your home medications after surgery.  Please do not start Aspirin or blood thinners until the day after surgery.    3.  You may shower 24 hours after surgery.  Do not submerge yourself in water for 7 days (bath, whirlpool, hot tub, pool, lake, etc).      4.  Restrictions:  None    5.  Pain management:  Apply ice pack to incision area for 24 hours protecting the skin with a cloth.  Take prescribed pain medication as directed and only as needed (if applicable).  Please do not take any additional Acetaminophen or Tylenol products while taking narcotic pain medications.  We encourage the use of Ibuprofen, Advil, or Motrin after surgery except if you have an allergy, ulcer, kidney problems, or it is contraindicated by a provider.    6.  Our office will call you 2-4 days after your procedure to review post-op teaching, answer questions, and help arrange after surgery care.    7.  Watch for signs of infection:  Redness of the wound, drainage, increasing pain, and/or fever/chills (greater than 101 degrees F).    9.  Constipation:  Please take prescribed stool softener as directed.  You may stop taking it when you are no longer taking narcotic pain medications and your bowels have returned to normal.  If you become constipated it is safe to take an over-the-counter laxative as directed on the bottle.    10.  Driving:  You may drive  when you are no longer taking prescription pain medications  and you feel comfortable operating a vehicle.       11.  Diet:  You may resume your regular diet after surgery.      12.  Pathology:  Pathology results are generally available after 5-7 business  days.    If you have questions or concerns please contact our office Monday through Friday during regular office hours at [phone].  If you are calling during nonbusiness hours, the weekend, or holiday please call the hospital  at [phone] and ask for the on-call General Surgeon.

## 2017-10-05 ENCOUNTER — ANESTHESIA EVENT (OUTPATIENT)
Dept: SURGERY | Facility: CLINIC | Age: 57
End: 2017-10-05

## 2017-10-06 ENCOUNTER — OFFICE VISIT (OUTPATIENT)
Dept: SURGERY | Facility: CLINIC | Age: 57
End: 2017-10-06

## 2017-10-06 ENCOUNTER — ALLIED HEALTH/NURSE VISIT (OUTPATIENT)
Dept: SURGERY | Facility: CLINIC | Age: 57
End: 2017-10-06

## 2017-10-06 VITALS
SYSTOLIC BLOOD PRESSURE: 120 MMHG | TEMPERATURE: 97.6 F | HEART RATE: 74 BPM | OXYGEN SATURATION: 97 % | BODY MASS INDEX: 31.45 KG/M2 | RESPIRATION RATE: 18 BRPM | HEIGHT: 68 IN | WEIGHT: 207.5 LBS | DIASTOLIC BLOOD PRESSURE: 77 MMHG

## 2017-10-06 DIAGNOSIS — Z01.818 PRE-OP EVALUATION: ICD-10-CM

## 2017-10-06 DIAGNOSIS — R22.31: Primary | ICD-10-CM

## 2017-10-06 RX ORDER — ACETAMINOPHEN 325 MG/1
325-650 TABLET ORAL EVERY 6 HOURS PRN
COMMUNITY
End: 2023-12-07

## 2017-10-06 ASSESSMENT — LIFESTYLE VARIABLES: TOBACCO_USE: 0

## 2017-10-06 NOTE — ANESTHESIA PREPROCEDURE EVALUATION
Anesthesia Evaluation     . Pt has had prior anesthetic. Type: General and MAC    No history of anesthetic complications          ROS/MED HX    ENT/Pulmonary:     (+)GAYE risk factors snores loudly, daytime somnolence, observed stopped breathing allergic rhinitis, Mild Persistent asthma Last exacerbation: 2016,Treatment: Nebulizer prn, Inhaler prn and Inhaled steroids,  , . .   (-) tobacco use   Neurologic:  - neg neurologic ROS   (+)other neuro Very forgetful    Cardiovascular:     (+) Dyslipidemia, ----. : . . . :. . Previous cardiac testing date:results:date: results:ECG reviewed date:10/2016 results:NSR. date: results:         (-) taking anticoagulants/antiplatelets   METS/Exercise Tolerance: Comment: < 1 block.  Very fatigued.  1 - Eating, dressing   Hematologic:  - neg hematologic  ROS       Musculoskeletal:   (+) arthritis, , , other musculoskeletal- muscle pain      GI/Hepatic:     (+) GERD Asymptomatic on medication, Other GI/Hepatic + nausea      Renal/Genitourinary:     (+) Other Renal/ Genitourinary, hx of nephrectomy      Endo:     (+) thyroid problem hypothyroidism, .      Psychiatric:     (+) psychiatric history depression      Infectious Disease:  - neg infectious disease ROS       Malignancy:   (+) Malignancy History of Skin and Other  Skin CA Remission status post Surgery, Other CA Renal cell cancer s/p left nephrectomy status post Surgery         Other: Comment: Eczema.  Dry eyes.                     Physical Exam  Normal systems: dental    Airway   TM distance: >3 FB  Neck ROM: full    Dental     Cardiovascular   Rhythm and rate: regular and normal      Pulmonary    breath sounds clear to auscultation    Other findings:     **CK Total: 650 (H)**  CRP Inflammation: <2.9  TSH: 0.68  Glucose: 75    WBC: 5.6  Hemoglobin: 13.4  Hematocrit: 40.4  Platelet Count: 216  RBC Count: 4.40  MCV: 92    Sed Rate: 8  A Phagocytophil IgG: <1:80  A Phagocytophil IgM: < 1:16  Babesia IgG: < 1:16  Babesia IgM:  "<1:20  Lyme Disease Antibodies Serum: 0.14    9/15/2017 15:04  Sodium: 139  Potassium: 4.0  Chloride: 104  Carbon Dioxide: 29  Urea Nitrogen: 16  Creatinine: 0.84  GFR Estimate: 70  GFR Estimate If Black: 84  Calcium: 9.7  Anion Gap: 6  Albumin: 3.8  Protein Total: 6.7 (L)  Bilirubin Total: 0.3  Alkaline Phosphatase: 79  ALT: 48  AST: 32  CK Total: 516 (H)  Glucose: 85           PAC Discussion and Assessment    ASA Classification: 3  Case is suitable for: [place], [place] and ASC  Anesthetic techniques and relevant risks discussed: GA  Invasive monitoring and risk discussed: No  Types:   Possibility and Risk of blood transfusion discussed: No  NPO instructions given:   Additional anesthetic preparation and risks discussed:   Needs early admission to pre-op area:   Other:     PAC Resident/NP Anesthesia Assessment:  Scheduled for possible muscle biopsy on TBD by Dr. Cabrera in evaluation of elevated CK levels, fatigue, myalgias x 6 months.  PAC referral for risk assessment and optimization for anesthesia with comorbid conditions of:    Pre-operative considerations:  1.  Cardiac:  Functional status limited by fatigue.  Used to be more active. 0.4%  risk of major adverse cardiac event.   No further cardiac testing recommended.   2.  Pulm:  Airway feasible.  GAYE risk: high.  Recommended OP sleep study.   Mild asthma:  On advair.  Rare use of albuterol.    3.  GI:  Risk of PONV score = 3.  If 3 or > anti-emetic intervention recommended (with 2 or more meds).   GERD, controlled on H2 blocker.    4.  Right supraclavicular pulsatile mass.  US (prelim) normal.   5.  Neuro:  \"becoming forgetful\".    6.  Psych:  + depression  7.  M/S:  Elevated CK's 2-3 x normal.  + myalgias of ? Etiology.  8.  Gluten intol  9.  :  History of left nephrectomy for renal cell ca. Normal Cr.       Patient is optimized and is acceptable candidate for the proposed procedure.  No further diagnostic evaluation is needed.     Patient also " evaluated by Dr. Agarwal. See recommendations below.           Reviewed and Signed by PAC Mid-Level Provider/Resident  Mid-Level Provider/Resident: Aide Fuller PA-C  Date: 10/6/17  Time: 1644    Attending Anesthesiologist Anesthesia Assessment:        Anesthesiologist:   Date:   Time:   Pass/Fail:   Disposition:     PAC Pharmacist Assessment:        Pharmacist:   Date:   Time:                           .

## 2017-10-06 NOTE — PATIENT INSTRUCTIONS
Preparing for Your Surgery  Name:  Teresa Fernandez   MRN:  [id number]   :  1960   Today's Date:  10/6/2017     Arriving for surgery:  Surgery date:  TBD - A nurse will call you to confirm surgery date, time, and location.  Surgery time:  TBD  Arrival time:  TBD    Please come to:     TBD    What can I eat or drink?  -  You may have solid food or milk products until 8 hours prior to your surgery.  -  You may have clear liquids such as water, gatorade, tea, black coffee (no cream), apple juice or 7up/Sprite until 2 hours prior to your surgery.    Which medicines can I take?        -  Do not bring your own medications to the hospital, except for inhalers and eye drops.        -  Please avoid vitamins/supplements for 7 days before surgery.    -  Please take these medications the day of surgery:  Regular morning medications (Inhalers,  Zantac,  Paxil,  Levothyroxine,  Allegra).    How do I prepare myself?  -  Take two showers: one the night before surgery; and one the morning of surgery.         Use Scrubcare or Hibiclens to wash from neck down.  You may use your own shampoo and conditioner. No other hair products.   -  Do NOT use lotion, powder, deodorant, or antiperspirant the day of your surgery.  -  Do NOT wear any makeup, fingernail polish or jewelry.    -  Bring your ID and insurance card.    Questions or Concerns:  If you have questions or concerns, please call the  Preoperative Assessment Center, Monday-Friday 7AM-7PM:  [phone].

## 2017-10-06 NOTE — MR AVS SNAPSHOT
After Visit Summary   10/6/2017    Teresa Fernandez    MRN: [id number]           Patient Information     Date Of Birth          1960        Visit Information        Provider Department      10/6/2017 5:00 PM Rn, Cathleen [facility]        Care Instructions    Preparing for Your Surgery  Name:  Teresa Fernandez   MRN:  [id number]   :  1960   Today's Date:  10/6/2017     Arriving for surgery:  Surgery date:  TBD - A nurse will call you to confirm surgery date, time, and location.  Surgery time:  TBD  Arrival time:  TBD    Please come to:     TBD    What can I eat or drink?  -  You may have solid food or milk products until 8 hours prior to your surgery.  -  You may have clear liquids such as water, gatorade, tea, black coffee (no cream), apple juice or 7up/Sprite until 2 hours prior to your surgery.    Which medicines can I take?        -  Do not bring your own medications to the hospital, except for inhalers and eye drops.        -  Please avoid vitamins/supplements for 7 days before surgery.    -  Please take these medications the day of surgery:  Regular morning medications (Inhalers,  Zantac,  Paxil,  Levothyroxine,  Allegra).    How do I prepare myself?  -  Take two showers: one the night before surgery; and one the morning of surgery.         Use Scrubcare or Hibiclens to wash from neck down.  You may use your own shampoo and conditioner. No other hair products.   -  Do NOT use lotion, powder, deodorant, or antiperspirant the day of your surgery.  -  Do NOT wear any makeup, fingernail polish or jewelry.    -  Bring your ID and insurance card.    Questions or Concerns:  If you have questions or concerns, please call the  Preoperative Assessment Center, Monday-Friday 7AM-7PM:  [phone].            Follow-ups after your visit        Your next 10 appointments already scheduled     Oct 06, 2017  4:30 PM CDT   (Arrive by 4:15 PM)   PAC Anesthesia Consult with Cathleen  Pac Anesthesiologist   [facility] ([facility])    [address]  [address] [facility] [id number] [phone]            Oct 06, 2017  5:00 PM CDT   (Arrive by 4:45 PM)   PAC RN ASSESSMENT with Cathleen Pac Rn   [facility] ([facility])    [address]  [address] [facility] [id number] [phone]            Oct 06, 2017  5:30 PM CDT   LAB with CATHLEEN LAB   [facility] ([facility])    [address]  [address] [facility] [id number] [phone]           Patient must bring picture ID. Patient should be prepared to give a urine specimen  Please do not eat 10-12 hours before your appointment if you are coming in fasting for labs on lipids, cholesterol, or glucose (sugar). Pregnant women should follow their Care Team instructions. Water with medications is okay. Do not drink coffee or other fluids. If you have concerns about taking  your medications, please ask at office or if scheduling via [employer], send a message by clicking on Secure Messaging, Message Your Care Team.            Oct 20, 2017  4:00 PM CDT   New Visit with Bianca Segal MD   [facility] ([facility])    [address] [place] [id number] [phone]              Who to contact     Please call your clinic at [phone] to:    Ask questions about your health    Make or cancel appointments    Discuss your medicines    Learn about your test results    Speak to your doctor   If you have compliments or concerns about an experience at your clinic, or if you wish to file a complaint, please contact [facility] Patient Relations at [phone] or email us at [email or web address]         Additional Information About Your Visit        [employer] Information     [employer] gives you secure access to your  electronic health record. If you see a primary care provider, you can also send messages to your care team and make appointments. If you have questions, please call your primary care clinic.  If you do not have a primary care provider, please call [phone] and they will assist you.      [employer] is an electronic gateway that provides easy, online access to your medical records. With [employer], you can request a clinic appointment, read your test results, renew a prescription or communicate with your care team.     To access your existing account, please contact your [facility] or call [phone] for assistance.        Care EveryWhere ID     This is your Care EveryWhere ID. This could be used by other organizations to access your [facility] medical records  [id number]         Blood Pressure from Last 3 Encounters:   10/06/17 120/77   10/04/17 123/65   09/25/17 120/75    Weight from Last 3 Encounters:   10/06/17 94.1 kg (207 lb 8 oz)   10/04/17 92.5 kg (204 lb)   09/25/17 93.4 kg (205 lb 12.8 oz)              Today, you had the following     No orders found for display         Today's Medication Changes          These changes are accurate as of: 10/6/17  4:18 PM.  If you have any questions, ask your nurse or doctor.               These medicines have changed or have updated prescriptions.        Dose/Directions    fexofenadine 180 MG tablet   Commonly known as:  ALLEGRA   This may have changed:  when to take this   Used for:  Seasonal allergic rhinitis        Dose:  180 mg   Take 1 tablet (180 mg) by mouth daily   Quantity:  30 tablet   Refills:  1       PARoxetine 20 MG tablet   Commonly known as:  PAXIL   This may have changed:  when to take this   Used for:  ERIC (generalized anxiety disorder)        Dose:  20 mg   Take 1 tablet (20 mg) by mouth daily   Quantity:  90 tablet   Refills:  3                Primary Care Provider Office Phone # Fax #    HERIBERTO Delgado CNP  [phone]       [address] [facility] [address]        Equal Access to Services     MERRILL PRESTON : Hadlarry aad ku hadvianca Leung, waanikada butch, bamta kajacinto liu, lillian franz laBrianwagner reese. So [facility] [phone].    ATENCIÓN: Si habla español, tiene a anderson disposición servicios gratuitos de asistencia lingüística. Llame al [phone].    We comply with applicable federal civil rights laws and [place] laws. We do not discriminate on the basis of race, color, national origin, age, disability, sex, sexual orientation, or gender identity.            Thank you!     Thank you for choosing [facility]  for your care. Our goal is always to provide you with excellent care. Hearing back from our patients is one way we can continue to improve our services. Please take a few minutes to complete the written survey that you may receive in the mail after your visit with us. Thank you!             Your Updated Medication List - Protect others around you: Learn how to safely use, store and throw away your medicines at www.disposemymeds.org.          This list is accurate as of: 10/6/17  4:18 PM.  Always use your most recent med list.                   Brand Name Dispense Instructions for use Diagnosis    * albuterol (2.5 MG/3ML) 0.083% neb solution     25 vial    Take 1 vial (2.5 mg) by nebulization every 6 hours as needed for shortness of breath / dyspnea or wheezing    Asthma exacerbation       * albuterol 108 (90 BASE) MCG/ACT Inhaler    PROAIR HFA/PROVENTIL HFA/VENTOLIN HFA    1 Inhaler    Inhale 2 puffs into the lungs every 6 hours as needed for shortness of breath / dyspnea    Moderate persistent asthma without complication       fexofenadine 180 MG tablet    ALLEGRA    30 tablet    Take 1 tablet (180 mg) by mouth daily    Seasonal allergic rhinitis       fluticasone-salmeterol 250-50 MCG/DOSE diskus inhaler    ADVAIR    1 Inhaler    Inhale 1 puff  into the lungs 2 times daily    Chest tightness, Intermittent asthma, uncomplicated       levothyroxine 125 MCG tablet    SYNTHROID/LEVOTHROID    90 tablet    Take 1 tablet (125 mcg) by mouth daily    Acquired hypothyroidism       MULTIVITAMIN ADULT PO      Take by mouth every morning        order for DME     1 Units    Equipment being ordered: Nebulizer    Asthma exacerbation       OVER-THE-COUNTER     1 Bottle    Place 1 drop into both eyes 3 times daily. Systane Ultra, Systane Balance, Blink Tears or Refresh Optive Artificial Tear    Dry eye syndrome       PARoxetine 20 MG tablet    PAXIL    90 tablet    Take 1 tablet (20 mg) by mouth daily    ERIC (generalized anxiety disorder)       ranitidine 150 MG tablet    ZANTAC    180 tablet    TAKE 1 TABLET TWICE A DAY    Gastroesophageal reflux disease, esophagitis presence not specified       TYLENOL 325 MG tablet   Generic drug:  acetaminophen      Take 325-650 mg by mouth every 6 hours as needed for mild pain        VITAMIN D3 PO      Take 2,000 Units by mouth 2 times daily        * Notice:  This list has 2 medication(s) that are the same as other medications prescribed for you. Read the directions carefully, and ask your doctor or other care provider to review them with you.

## 2017-10-06 NOTE — H&P
Pre-Operative H & P     CC:  Preoperative exam to assess for increased cardiopulmonary risk while undergoing surgery and anesthesia.    Date of Encounter: 10/6/2017  Primary Care Physician:  Nuzhat Burt  Teresa Fernandez is a 57 year old female who presents for pre-operative H & P in preparation for muscle biopsy with Dr. Cabrera on TBD at [facility].     For the past 6 months, she has had progressive muscle aches and fatigue.  She feels exhaused after work and has to take naps.  Shoulder and legs hurt the worse.  Baths help.  4 times getting OOB she started to shake (like rigors) but no teeth chattering.  Episodes last briefly.  + nausea and headaches.  No rashes.  No visual changes.  No nuchal rigidity.  No infections.  No tick bites.  No LOC.  No post-ictal state.  No urinary symptoms.  No rashes.  No chest pain or shortness of breath.     Seen by primary care and rheumatology.  Work up unrevealing except elevated CK.  Sent to Dr. Cabrera for muscle biopsy.        History is obtained from the patient,  and electronic health record.     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      FUSION LUMBAR ANTERIOR TWO LEVELS  4/13/2015     GYN SURGERY  04/08/1999    hysterectomy.  LA[facility]     HYSTERECTOMY, PAP NO LONGER INDICATED       SOFT TISSUE SURGERY      chest-melonoma     SURGICAL HISTORY OF -   3/1996    Left nephrectomy-renal cell CA       Hx of Blood transfusions/reactions: no     Hx of abnormal bleeding or anti-platelet use: no    Menstrual history: No LMP recorded. Patient has had a hysterectomy.:     Steroid use in the last year: no    Personal or FH with difficulty with Anesthesia:  no    Prior to Admission Medications  Current Outpatient Prescriptions   Medication Sig Dispense Refill     acetaminophen (TYLENOL) 325 MG tablet Take 325-650 mg by mouth every 6 hours as needed for mild pain       ranitidine (ZANTAC) 150 MG tablet TAKE 1 TABLET TWICE A  tablet 1     PARoxetine (PAXIL) 20 MG tablet Take 1 tablet (20 mg) by mouth daily (Patient taking differently: Take 20 mg by mouth every morning ) 90 tablet 3     levothyroxine (SYNTHROID/LEVOTHROID) 125 MCG tablet Take 1 tablet (125 mcg) by mouth daily 90 tablet 3     fluticasone-salmeterol (ADVAIR) 250-50 MCG/DOSE diskus inhaler Inhale 1 puff into the lungs 2 times daily 1 Inhaler 1     Multiple Vitamins-Minerals (MULTIVITAMIN ADULT PO) Take by mouth every morning        Cholecalciferol (VITAMIN D3 PO) Take 2,000 Units by mouth 2 times daily        fexofenadine (ALLEGRA) 180 MG tablet Take 1 tablet (180 mg) by mouth daily (Patient taking differently: Take 180 mg by mouth every morning ) 30 tablet 1     OVER-THE-COUNTER Place 1 drop into both eyes 3 times daily. Systane Ultra, Systane Balance, Blink Tears or Refresh Optive Artificial Tear 1 Bottle      albuterol (PROAIR HFA/PROVENTIL HFA/VENTOLIN HFA) 108 (90 BASE) MCG/ACT Inhaler Inhale 2 puffs into the lungs every 6 hours as needed for shortness of breath / dyspnea 1 Inhaler 5     order for DME Equipment being ordered: Nebulizer 1 Units 0     albuterol (2.5 MG/3ML) 0.083% nebulizer  solution Take 1 vial (2.5 mg) by nebulization every 6 hours as needed for shortness of breath / dyspnea or wheezing 25 vial 0       Allergies  Allergies   Allergen Reactions     Omnipaque [Iohexol] Swelling and Difficulty breathing     Immediate throat swelling following around 1-2cc of omnipaque 300 for spine procedure     Gluten      Iodine      Welts from CT contrast, surgical scrub is ok.     Penicillins Hives       Social History  Social History     Social History     Marital status:      Spouse name: N/A     Number of children: N/A     Years of education: N/A     Occupational History      [employer]      Self     Social History Main Topics     Smoking status: Former Smoker     Packs/day: 2.00     Years: 15.00     Quit date: 3/26/1996     Smokeless tobacco: Never Used     Alcohol use No      Comment: None now.  Rare in past.      Drug use: No     Sexual activity: Yes     Partners: Male     Other Topics Concern     Parent/Sibling W/ Cabg, Mi Or Angioplasty Before 65f 55m? No     Social History Narrative    .  Lives with .  Makes medical supplies.     4 children - healthy    4 siblings - + FH depression, hypertension, chol, diabetes mellitus    No family of muscle problems.        Family History  Family History   Problem Relation Age of Onset     DIABETES Mother      Cardiovascular Mother      Lipids Mother      Depression Mother      Hypertension Father      Lipids Father      Obesity Father      Macular Degeneration Father      CANCER Maternal Grandmother      kind unknown had sores on legs     Eczema Maternal Grandmother      Eczema Maternal Grandfather      Breast Cancer Paternal Grandmother      CANCER Paternal Grandmother      breast     Hypertension Paternal Grandfather      Cardiovascular Paternal Grandfather      heart attack     Hypertension Brother      Thyroid Disease Sister      Hypertension Sister      Depression Sister      Allergies Sister      Allergies Son      Eczema Son   "    Lipids Sister      Thyroid Disease Sister      Neurologic Disorder Sister      Depression Sister      Respiratory Son      Glaucoma No family hx of      CEREBROVASCULAR DISEASE No family hx of        ROS/MED HX  The complete review of systems is negative other than noted in the HPI or here.     ENT/Pulmonary:     (+)GAYE risk factors snores loudly, daytime somnolence, observed stopped breathing allergic rhinitis, Mild Persistent asthma Last exacerbation: 2016,Treatment: Nebulizer prn, Inhaler prn and Inhaled steroids,  , . .   (-) tobacco use   Neurologic:  - neg neurologic ROS   (+)other neuro Very forgetful    Cardiovascular:     (+) Dyslipidemia, ----. : . . . :. . Previous cardiac testing date:results:date: results:ECG reviewed date:10/2016 results:NSR. date: results:         (-) taking anticoagulants/antiplatelets   METS/Exercise Tolerance: Comment: < 1 block.  Very fatigued.  1 - Eating, dressing   Hematologic:  - neg hematologic  ROS       Musculoskeletal:   (+) arthritis, , , other musculoskeletal- muscle pain      GI/Hepatic:     (+) GERD Asymptomatic on medication, Other GI/Hepatic + nausea      Renal/Genitourinary:     (+) Other Renal/ Genitourinary, hx of nephrectomy      Endo:     (+) thyroid problem hypothyroidism, .      Psychiatric:     (+) psychiatric history depression      Infectious Disease:  - neg infectious disease ROS       Malignancy:   (+) Malignancy History of Skin and Other  Skin CA Remission status post Surgery, Other CA Renal cell cancer s/p left nephrectomy status post Surgery         Other: Comment: Eczema.  Dry eyes.         Temp: 97.6  F (36.4  C) Temp src: Oral BP: 120/77 Pulse: 74   Resp: 18 SpO2: 97 %       207 lbs 8 oz  5' 8\"   Body mass index is 31.55 kg/(m^2).       Physical Exam  Constitutional: Awake, alert, cooperative, no apparent distress, and appears stated age.  Bronze skin (uses bronzing products)  Eyes: Pupils equal, round and reactive to light, extra ocular muscles " intact, sclera clear, conjunctiva normal.  HENT: Normocephalic, oral pharynx with moist mucus membranes, good dentition. No goiter appreciated.   Respiratory: Clear to auscultation bilaterally, no crackles or wheezing.  Cardiovascular: Regular rate and rhythm, normal S1 and S2, and no murmur noted.  Carotids +2, no bruits. Right supraclavicular dime size pulsatile mass.  No edema. Palpable pulses to  DP and PT arteries.   GI: Normal bowel sounds, soft, non-distended, non-tender, no masses palpated, no hepatosplenomegaly.  Surgical scars: well healed.   Lymph/Hematologic: No cervical lymphadenopathy and no supraclavicular lymphadenopathy.  Genitourinary:  NA  Skin: Warm and dry.    Musculoskeletal: Full ROM of neck. There is no redness, warmth, or swelling of the joints. Gross motor strength is normal.    Neurologic: Awake, alert, oriented to name, place and time. Cranial nerves II-XII are grossly intact. Gait is normal.   Neuropsychiatric: Calm, cooperative. Normal affect.     Labs: (personally reviewed)    **CK Total: 650 (H)**  CRP Inflammation: <2.9  TSH: 0.68  Glucose: 75    WBC: 5.6  Hemoglobin: 13.4  Hematocrit: 40.4  Platelet Count: 216  RBC Count: 4.40  MCV: 92    Sed Rate: 8  A Phagocytophil IgG: <1:80  A Phagocytophil IgM: < 1:16  Babesia IgG: < 1:16  Babesia IgM: <1:20  Lyme Disease Antibodies Serum: 0.14    9/15/2017 15:04  Sodium: 139  Potassium: 4.0  Chloride: 104  Carbon Dioxide: 29  Urea Nitrogen: 16  Creatinine: 0.84  GFR Estimate: 70  GFR Estimate If Black: 84  Calcium: 9.7  Anion Gap: 6  Albumin: 3.8  Protein Total: 6.7 (L)  Bilirubin Total: 0.3  Alkaline Phosphatase: 79  ALT: 48  AST: 32  CK Total: 516 (H)  Glucose: 85  EKG: Personally reviewed NSR 10/2016.     Right neck vascuclar US:  IMPRESSION: No mass or abnormal echogenicity in the right lower neck  was demonstrated on this examination. Patent Doppler examination of  the right lower neck vasculature.     Outside records reviewed from:  "NA    ASSESSMENT and PLAN  Teresa Fernandez is a 57 year old female scheduled to undergo  possible muscle biopsy on TBD by Dr. Cabrera in evaluation of elevated CK levels, fatigue, myalgias x 6 months.  PAC referral for risk assessment and optimization for anesthesia with comorbid conditions of:    Pre-operative considerations:  1.  Cardiac:  Functional status limited by fatigue.  Used to be more active. 0.4%  risk of major adverse cardiac event.   No further cardiac testing recommended.   2.  Pulm:  Airway feasible.  GAYE risk: high.  Recommended OP sleep study.   Mild asthma:  On advair.  Rare use of albuterol.    3.  GI:  Risk of PONV score = 3.  If 3 or > anti-emetic intervention recommended (with 2 or more meds).   GERD, controlled on H2 blocker.    4.  Right supraclavicular pulsatile mass.  US ordered.  Prelim normal.  Patient called with results.   5.  Neuro:  \"becoming forgetful\".    6.  Psych:  + depression  7.  M/S:  Elevated CK's 2-3 x normal.  + myalgias of ? Etiology.  Doubt seizures.   Already followed by Rheumatology.    8.  Gluten intol  9.  :  History of left nephrectomy for renal cell ca. Normal Cr.     Risk of MACE < 1%. METS>4.  No further cardiac evaluation needed per 2014 ACC/AHA guidelines for non-cardiac surgery.  Patient is optimized and is acceptable candidate for the proposed procedure.  No further diagnostic evaluation is needed.   Patient was discussed with Dr Agrawal.    Aide Fuller PA-C  Preoperative Assessment Center  [facility]  Clinic and Surgery Center  Phone: [phone]  Fax: [phone]  "

## 2017-10-10 ENCOUNTER — TELEPHONE (OUTPATIENT)
Dept: FAMILY MEDICINE | Facility: CLINIC | Age: 57
End: 2017-10-10

## 2017-10-10 NOTE — TELEPHONE ENCOUNTER
"S-(situation): Pt states she is frustrated because [facility] and they sent her to the wrong place. Pt has suffered from muscle fatigue for weeks and \"just wants to find out what is wrong with her.\" Pt requesting nurse to give her a call.    B-(background):Dr Cabrera is the surgeon doing the muscle biopsy. The pt has given me the number of [phone] for the physicians nurse. The pt reports that she has gone through the H&P, but still does not have a date for the procedure. She is expressing frustration with all of the delays.     A-(assessment): Pt is calling today to report that she has been called by Dr Cabrera's office and informed they are requiring further testing before biopsy can be done. It looks like they were requesting a PAC visit to evaluate possible seizures. The pt reports that this was discuss further and determined that she does not suffer from seizures and does not need the PAC visit.     R-(recommendations): I reviewing the notes, and was able to see that Nuzhat Burt was contacted by Dr Cabrera. I will route this message to her to see if she can help the pt understand why she is still waiting for this procedure to be scheduled. I did try to call Dr Cabrera's office with the number provided by the pt, but they were closed for the day.     Please review and advise.  Thank you,  Erica Conrad RN     "

## 2017-10-10 NOTE — TELEPHONE ENCOUNTER
"Reason for Call:  Other call back    Detailed comments: Pt states she is frustrated because she went to  for biopsy and they sent her to the wrong place. Pt has suffered from muscle fatigue for weeks and \"just wants to find out what is wrong with her.\" Pt requesting nurse to give her a call.    Phone Number Patient can be reached at: Home number on file [phone] (home)    Best Time: any    Can we leave a detailed message on this number? YES    Call taken on 10/10/2017 at 2:55 PM by Tana Lind      "

## 2017-10-11 ENCOUNTER — CARE COORDINATION (OUTPATIENT)
Dept: SURGERY | Facility: CLINIC | Age: 57
End: 2017-10-11

## 2017-10-11 NOTE — TELEPHONE ENCOUNTER
I last saw patient on 6/2017- and not for this problem. Patient saw Dr. Soheila DIA For this problem on 9/2017 this. I never had contact with Dr. Cabrera's office so I am not sure what patient is requesting or what Dr. Cabrera's office is wanting as I was not seeing patient for this issues

## 2017-10-11 NOTE — TELEPHONE ENCOUNTER
Patient calls and she is told of the plan being EEG, neurology consult and she does have an appt to see a neurologist.  We understand it takes time to see providers and get into schedules.  We need to practice good medicine and rule out everything and I apologized for it taking time to do this.  She will follow up with Dr. Cabrera if having further needs. Tanvi ROMERO RN

## 2017-10-11 NOTE — PROGRESS NOTES
Patient calling the office about her neurology appointment. She states she is currently scheduled in [place] on 10/20 but is hoping to be seen sooner. Sent a message to Neurology asking if they could help facilitate an appointment. Discussed with patient that she would be called by Neurology if they are able to help schedule her to be seen.

## 2017-10-11 NOTE — TELEPHONE ENCOUNTER
Dr. Kelly,    Any suggestions.?  I have attempted to call the patient and left a message to call us back.  Looks like she needs to discuss this with Dr. Cabrera not us.  Tanvi ROMERO RN

## 2017-10-11 NOTE — TELEPHONE ENCOUNTER
Yes, I would agree she discuss this with Dr. Cabrera.  Dr. Cabrera did call me about this and did recommend seizure work-up (and I did order EEG and neurology consult) and PAC visit in her phone call with me, so as far as I know, that is still the plan.      Dr. Kelly

## 2017-10-11 NOTE — TELEPHONE ENCOUNTER
Attempted to call the patient back.  Home number has no voicemail and the other number has a rapid busy signal. Tanvi ROMERO RN

## 2017-10-17 ENCOUNTER — ALLIED HEALTH/NURSE VISIT (OUTPATIENT)
Dept: NEUROLOGY | Facility: CLINIC | Age: 57
End: 2017-10-17

## 2017-10-17 DIAGNOSIS — R56.9 OBSERVED SEIZURE-LIKE ACTIVITY (H): Primary | ICD-10-CM

## 2017-10-17 NOTE — PROGRESS NOTES
[facility] [id number]  OP/3hr Video EEG  [facility]  Dr. Rush yin

## 2017-10-17 NOTE — MR AVS SNAPSHOT
After Visit Summary   10/17/2017    Teresa Fernandez    MRN: [id number]           Patient Information     Date Of Birth          1960        Visit Information        Provider Department      10/17/2017 8:00 AM [facility]         Follow-ups after your visit        Your next 10 appointments already scheduled     Oct 20, 2017  8:00 AM CDT   New Patient Visit with Yisel Beverly MD   [facility])    [address], [address]  [facility] [place] [id number] [phone]              Who to contact     Please call your clinic at [phone] to:    Ask questions about your health    Make or cancel appointments    Discuss your medicines    Learn about your test results    Speak to your doctor   If you have compliments or concerns about an experience at your clinic, or if you wish to file a complaint, please contact [facility] Patient Relations at [phone] or email us at [email or web address]         Additional Information About Your Visit        MyChart Information     [email or web address] gives you secure access to your electronic health record. If you see a primary care provider, you can also send messages to your care team and make appointments. If you have questions, please call your primary care clinic.  If you do not have a primary care provider, please call [phone] and they will assist you.      [email or web address] is an electronic gateway that provides easy, online access to your medical records. With [email or web address], you can request a clinic appointment, read your test results, renew a prescription or communicate with your care team.     To access your existing account, please contact your [facility] or call [phone] for assistance.        Care EveryWhere ID     This is your Care EveryWhere ID. This could be used by other organizations to access your [place] medical records  [id number]          Blood Pressure from Last 3 Encounters:   10/06/17 120/77   10/04/17 123/65   09/25/17 120/75    Weight from Last 3 Encounters:   10/06/17 207 lb 8 oz (94.1 kg)   10/04/17 204 lb (92.5 kg)   09/25/17 205 lb 12.8 oz (93.4 kg)              Today, you had the following     No orders found for display         Today's Medication Changes          These changes are accurate as of: 10/17/17 11:10 AM.  If you have any questions, ask your nurse or doctor.               These medicines have changed or have updated prescriptions.        Dose/Directions    fexofenadine 180 MG tablet   Commonly known as:  ALLEGRA   This may have changed:  when to take this   Used for:  Seasonal allergic rhinitis        Dose:  180 mg   Take 1 tablet (180 mg) by mouth daily   Quantity:  30 tablet   Refills:  1       PARoxetine 20 MG tablet   Commonly known as:  PAXIL   This may have changed:  when to take this   Used for:  ERIC (generalized anxiety disorder)        Dose:  20 mg   Take 1 tablet (20 mg) by mouth daily   Quantity:  90 tablet   Refills:  3                Primary Care Provider Office Phone # Fax #    NuzhatHERIBERTO [facility] [phone] [address] [address]        Equal Access to Services     MERRILL PRESTON AH: Nic westono Soayanna, waaxda luqadaha, qaybta kaalmada adeegyada, lillian reese. So [facility] [phone].    ATENCIÓN: Si habla español, tiene a anderson disposición servicios gratuitos de asistencia lingüística. Llame al [phone].    We comply with applicable federal civil rights laws and [place] laws. We do not discriminate on the basis of race, color, national origin, age, disability, sex, sexual orientation, or gender identity.            Thank you!     Thank you for choosing [facility]  for your care. Our goal is always to provide you with excellent care. Hearing back from our patients is one way we can continue to improve our services. Please  take a few minutes to complete the written survey that you may receive in the mail after your visit with us. Thank you!             Your Updated Medication List - Protect others around you: Learn how to safely use, store and throw away your medicines at www.disposemymeds.org.          This list is accurate as of: 10/17/17 11:10 AM.  Always use your most recent med list.                   Brand Name Dispense Instructions for use Diagnosis    * albuterol (2.5 MG/3ML) 0.083% neb solution     25 vial    Take 1 vial (2.5 mg) by nebulization every 6 hours as needed for shortness of breath / dyspnea or wheezing    Asthma exacerbation       * albuterol 108 (90 BASE) MCG/ACT Inhaler    PROAIR HFA/PROVENTIL HFA/VENTOLIN HFA    1 Inhaler    Inhale 2 puffs into the lungs every 6 hours as needed for shortness of breath / dyspnea    Moderate persistent asthma without complication       fexofenadine 180 MG tablet    ALLEGRA    30 tablet    Take 1 tablet (180 mg) by mouth daily    Seasonal allergic rhinitis       fluticasone-salmeterol 250-50 MCG/DOSE diskus inhaler    ADVAIR    1 Inhaler    Inhale 1 puff into the lungs 2 times daily    Chest tightness, Intermittent asthma, uncomplicated       levothyroxine 125 MCG tablet    SYNTHROID/LEVOTHROID    90 tablet    Take 1 tablet (125 mcg) by mouth daily    Acquired hypothyroidism       MULTIVITAMIN ADULT PO      Take by mouth every morning        order for DME     1 Units    Equipment being ordered: Nebulizer    Asthma exacerbation       OVER-THE-COUNTER     1 Bottle    Place 1 drop into both eyes 3 times daily. Systane Ultra, Systane Balance, Blink Tears or Refresh Optive Artificial Tear    Dry eye syndrome       PARoxetine 20 MG tablet    PAXIL    90 tablet    Take 1 tablet (20 mg) by mouth daily    ERIC (generalized anxiety disorder)       ranitidine 150 MG tablet    ZANTAC    180 tablet    TAKE 1 TABLET TWICE A DAY    Gastroesophageal reflux disease, esophagitis presence not  specified       TYLENOL 325 MG tablet   Generic drug:  acetaminophen      Take 325-650 mg by mouth every 6 hours as needed for mild pain        VITAMIN D3 PO      Take 2,000 Units by mouth 2 times daily        * Notice:  This list has 2 medication(s) that are the same as other medications prescribed for you. Read the directions carefully, and ask your doctor or other care provider to review them with you.

## 2017-10-20 ENCOUNTER — OFFICE VISIT (OUTPATIENT)
Dept: NEUROLOGY | Facility: CLINIC | Age: 57
End: 2017-10-20

## 2017-10-20 VITALS
HEIGHT: 69 IN | SYSTOLIC BLOOD PRESSURE: 113 MMHG | HEART RATE: 74 BPM | DIASTOLIC BLOOD PRESSURE: 71 MMHG | WEIGHT: 209 LBS | BODY MASS INDEX: 30.96 KG/M2

## 2017-10-20 DIAGNOSIS — M79.10 MUSCLE PAIN: ICD-10-CM

## 2017-10-20 NOTE — PATIENT INSTRUCTIONS
"DEWAYNE CASAREZ. Neuromusclar doctor.     Please seek out mental health care   Your shaking spells are not seizures based on EEG data. If they continue to worsen or reoccur we can order 2 day ambulatory EEG.  It would be helpful to see a talk therapist. Your spells may represent non-epileptic spells, they are not seizure and do not require treatment with antiepileptic drug. It would be helpful for you to focus on better mental health care with a psychiatrist and psychologist in your community.  Consider seeing a talk therapist 2-4 times per month. More so, you and your therapist may consider working through these books together. Patient Book: Taking control of your seizures by Eduardo. Per online book description \"The primary aim of Taking Control of Your Seizures: Workbook is to improve the lives of patients with seizures. Both epileptic seizures and nonepileptic seizures (ALBERTO) are prevalent and potentially disabling. The Workbook is designed to be used by a patient with seizures in conjunction with his or her counselor. The Workbook contains step-by-step guidelines that enable patients to take control of their seizures and their lives. The  Treating Nonepileptic Seizures: Therapist Guide enhances effectiveness by providing [id number] instructions for counselors who use the Workbook with patients with ALBERTO. The authors developed this treatment approach based on extensive clinical experience and research with epilepsy and ALBERTO. Many patients who have completed the Taking Control process experience fewer seizures, reduced symptoms, and a greater sense of well-being.\" Use this book in conjunction with the Therapist Book: Treating Nonepileptic Seizures. Therapist Guide. By PAULINO Akhtar Jr. and Demetris Hardy.         Yisel Beverly MD      "
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
## 2017-10-20 NOTE — LETTER
10/20/2017       RE: Teresa Fernandez  : 1960   MRN: [id number]      To Whom It May Concern,       Mrs. Fernandez had medical appointments on 10/17/2017 and 10/20/2017. Kindly, excuse her from work on these days.       Sincerely,         Yisel Beverly MD

## 2017-10-20 NOTE — MR AVS SNAPSHOT
"              After Visit Summary   10/20/2017    Teresa Fernandez    MRN: [id number]           Patient Information     Date Of Birth          1960        Visit Information        Provider Department      10/20/2017 8:00 AM [facility]        Today's Diagnoses     Muscle pain          Care Instructions    DEWAYNE CASAREZ. Neuromusclar doctor.     Please seek out mental health care   Your shaking spells are not seizures based on EEG data. If they continue to worsen or reoccur we can order 2 day ambulatory EEG.  It would be helpful to see a talk therapist. Your spells may represent non-epileptic spells, they are not seizure and do not require treatment with antiepileptic drug. It would be helpful for you to focus on better mental health care with a psychiatrist and psychologist in your community.  Consider seeing a talk therapist 2-4 times per month. More so, you and your therapist may consider working through these books together. Patient Book: Taking control of your seizures by Eduardo. Per online book description \"The primary aim of Taking Control of Your Seizures: Workbook is to improve the lives of patients with seizures. Both epileptic seizures and nonepileptic seizures (ALBERTO) are prevalent and potentially disabling. The Workbook is designed to be used by a patient with seizures in conjunction with his or her counselor. The Workbook contains step-by-step guidelines that enable patients to take control of their seizures and their lives. The  Treating Nonepileptic Seizures: Therapist Guide enhances effectiveness by providing [id number] instructions for counselors who use the Workbook with patients with ALBERTO. The authors developed this treatment approach based on extensive clinical experience and research with epilepsy and ALBERTO. Many patients who have completed the Taking Control process experience fewer seizures, reduced symptoms, and a greater " "sense of well-being.\" Use this book in conjunction with the Therapist Book: Treating Nonepileptic Seizures. Therapist Guide. By PAULINO Akhtar Jr. and Demetris Hardy.         Yisel Beverly MD              Follow-ups after your visit        Additional Services     NEUROLOGY ADULT REFERRAL       Your provider has referred you for the following:   Consult at [facility]: Neurology Clinic - [place] [phone]   [email or web address]  Neuromuscular. [place]     Please be aware that coverage of these services is subject to the terms and limitations of your health insurance plan.  Call member services at your health plan with any benefit or coverage questions.      Please bring the following with you to your appointment:    (1) Any X-Rays, CTs or MRIs which have been performed.  Contact the facility where they were done to arrange for  prior to your scheduled appointment.    (2) List of current medications  (3) This referral request   (4) Any documents/labs given to you for this referral                  Follow-up notes from your care team     Return in about 6 months (around 4/20/2018).      Your next 10 appointments already scheduled     Apr 20, 2018  3:00 PM CDT   Return Visit with Yisel Beverly MD   [facility] ([facility])    [address], [address]  [place] [address] [phone]              Who to contact     Please call your clinic at [phone] to:    Ask questions about your health    Make or cancel appointments    Discuss your medicines    Learn about your test results    Speak to your doctor   If you have compliments or concerns about an experience at your clinic, or if you wish to file a complaint, please contact [facility] Patient Relations at [phone] or email us at [email or web address]         Additional Information About Your Visit        MyChart Information  " "   [employer] gives you secure access to your electronic health record. If you see a primary care provider, you can also send messages to your care team and make appointments. If you have questions, please call your primary care clinic.  If you do not have a primary care provider, please call [phone] and they will assist you.      [employer] is an electronic gateway that provides easy, online access to your medical records. With [employer], you can request a clinic appointment, read your test results, renew a prescription or communicate with your care team.     To access your existing account, please contact your [facility] or call [phone] for assistance.        Care EveryWhere ID     This is your Care EveryWhere ID. This could be used by other organizations to access your [facility] medical records  [id number]        Your Vitals Were     Pulse Height BMI (Body Mass Index)             74 5' 9\" (175.3 cm) 30.86 kg/m2          Blood Pressure from Last 3 Encounters:   10/20/17 113/71   10/06/17 120/77   10/04/17 123/65    Weight from Last 3 Encounters:   10/20/17 209 lb (94.8 kg)   10/06/17 207 lb 8 oz (94.1 kg)   10/04/17 204 lb (92.5 kg)              We Performed the Following     NEUROLOGY ADULT REFERRAL          Today's Medication Changes          These changes are accurate as of: 10/20/17  9:27 AM.  If you have any questions, ask your nurse or doctor.               These medicines have changed or have updated prescriptions.        Dose/Directions    fexofenadine 180 MG tablet   Commonly known as:  ALLEGRA   This may have changed:  when to take this   Used for:  Seasonal allergic rhinitis        Dose:  180 mg   Take 1 tablet (180 mg) by mouth daily   Quantity:  30 tablet   Refills:  1       PARoxetine 20 MG tablet   Commonly known as:  PAXIL   This may have changed:  when to take this   Used for:  ERIC (generalized anxiety disorder)        Dose:  20 mg   Take 1 tablet (20 mg) by mouth " daily   Quantity:  90 tablet   Refills:  3                Primary Care Provider Office Phone # Fax #    HERIBERTO Delgado [facility] [phone] [address] [address]        Equal Access to Services     MERRILL PRESTON : Hadii gimla ku hadwillo Soomaali, waaxda luqadaha, qaybta kaalmada adeegyada, waxmary beth idiin hayrhonan shamarheaven franz chayito . So [facility] [phone].    ATENCIÓN: Si habla español, tiene a anderson disposición servicios gratuitos de asistencia lingüística. Llame al [phone].    We comply with applicable federal civil rights laws and [place] laws. We do not discriminate on the basis of race, color, national origin, age, disability, sex, sexual orientation, or gender identity.            Thank you!     Thank you for choosing [facility]  for your care. Our goal is always to provide you with excellent care. Hearing back from our patients is one way we can continue to improve our services. Please take a few minutes to complete the written survey that you may receive in the mail after your visit with us. Thank you!             Your Updated Medication List - Protect others around you: Learn how to safely use, store and throw away your medicines at www.disposemymeds.org.          This list is accurate as of: 10/20/17  9:27 AM.  Always use your most recent med list.                   Brand Name Dispense Instructions for use Diagnosis    * albuterol (2.5 MG/3ML) 0.083% neb solution     25 vial    Take 1 vial (2.5 mg) by nebulization every 6 hours as needed for shortness of breath / dyspnea or wheezing    Asthma exacerbation       * albuterol 108 (90 BASE) MCG/ACT Inhaler    PROAIR HFA/PROVENTIL HFA/VENTOLIN HFA    1 Inhaler    Inhale 2 puffs into the lungs every 6 hours as needed for shortness of breath / dyspnea    Moderate persistent asthma without complication       fexofenadine 180 MG tablet    ALLEGRA    30 tablet    Take 1 tablet (180 mg) by mouth daily    Seasonal allergic  rhinitis       fluticasone-salmeterol 250-50 MCG/DOSE diskus inhaler    ADVAIR    1 Inhaler    Inhale 1 puff into the lungs 2 times daily    Chest tightness, Intermittent asthma, uncomplicated       levothyroxine 125 MCG tablet    SYNTHROID/LEVOTHROID    90 tablet    Take 1 tablet (125 mcg) by mouth daily    Acquired hypothyroidism       MULTIVITAMIN ADULT PO      Take by mouth every morning        order for DME     1 Units    Equipment being ordered: Nebulizer    Asthma exacerbation       OVER-THE-COUNTER     1 Bottle    Place 1 drop into both eyes 3 times daily. Systane Ultra, Systane Balance, Blink Tears or Refresh Optive Artificial Tear    Dry eye syndrome       PARoxetine 20 MG tablet    PAXIL    90 tablet    Take 1 tablet (20 mg) by mouth daily    ERIC (generalized anxiety disorder)       ranitidine 150 MG tablet    ZANTAC    180 tablet    TAKE 1 TABLET TWICE A DAY    Gastroesophageal reflux disease, esophagitis presence not specified       TYLENOL 325 MG tablet   Generic drug:  acetaminophen      Take 325-650 mg by mouth every 6 hours as needed for mild pain        VITAMIN D3 PO      Take 2,000 Units by mouth 2 times daily        * Notice:  This list has 2 medication(s) that are the same as other medications prescribed for you. Read the directions carefully, and ask your doctor or other care provider to review them with you.
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
## 2017-10-24 ENCOUNTER — TELEPHONE (OUTPATIENT)
Dept: NEUROLOGY | Facility: CLINIC | Age: 57
End: 2017-10-24

## 2017-10-24 DIAGNOSIS — G90.529 CRPS (COMPLEX REGIONAL PAIN SYNDROME), LOWER LIMB: Primary | ICD-10-CM

## 2017-10-24 NOTE — TELEPHONE ENCOUNTER
Nurse recieved In-Basket message as follows:      ----- Message -----      From: Yisel Beverly MD      Sent: 10/24/2017   1:17 PM        To: Renee Tatum Rn [place]   Subject: FW: Please order EMG if not done                 Please call patient and check if she get an EMG for her clinic visit.   If so, we can order it. EMG order. Thanks. Yisel     Nurse called patient to inquire if she had gotten an EMG, and patient indicated she had not.  Told patient it would be ordered and that someone would be calling to schedule.

## 2017-11-06 NOTE — PROCEDURES
VIDEO EEG #:  [id number]     A 3 hour video EEG on 10/17/2017.        SOURCE FILE DURATION:  2 hours 56 minutes      PATIENT INFORMATION:  A 57-year-old female who presents with intermittent episodes of involuntary shaking.  Video EEG is being done to evaluate for seizures.      MEDICATIONS:  Paxil, albuterol, Advair, Allegra.     TECHNICAL SUMMARY: This continuous video- EEG monitoring procedure was performed with 23 scalp electrodes in 10-20 electrode system placements, and additional scalp, precordial and other surface electrodes used for electrical referencing and artifact detection.  Video monitoring was utilized and periodically reviewed by EEG technologists and the physician for electroclinical correlations.     BACKGROUND:  The patient has a well-formed parietooccipital rhythm of 8-9 Hz, which is symmetric and reactive, well modulated in the awake state.  Frontal derivations of symmetric beta activity.  Throughout the recording, the patient does have roving eye movements, even when she is awake.  She does fall asleep in this record, and she does have some sleep architecture identified.  In drowsiness, she has intermittent left and right temporal theta activity noted.  She does fall asleep, and she has well-formed sleep architecture consisting of sleep spindles and vertex waves and mature delta-theta slowing.      ACTIVATION: Photic stimulation and hyperventilation were done, and no significant abnormalities were seen.  The patient had good effort.     EPILEPTIFORM DISCHARGES: None     EVENT: The patient had an event at 10:32, during which she was falling asleep, and on the video EEG, there was sleep architecture.  She awoke from stage 2 sleep.  She took a deep breath and stretched and then reported that she had her target shaking.  Technologists asked her if this was her typical spell, and she stated yes.  On the video, there was no visible involuntary motor movement; however, the patient felt a sensation of  trembling.  On the video, she essentially just took a deep breath and stretched.  On the video EEG, there was no EEG correlate to suggest this was a seizure activity.  This appears to be an arousal from stage 2 sleep.        IMPRESSION:  A 3 hour awake and sleep video EEG is normal.  The patient had one spell out of stage 2 sleep in which she had a shaking sensation, which was her target spell of concern.  There was no EEG correlate to suggest this was a seizure.  This appears to be an arousal from stage 2 sleep.  No electrographic seizures or epileptiform discharges were seen.         TOMAS HUSSEIN MD             D: 2017 10:03   T: 2017 12:27   MT: viri      Name:     RILEY CANDELARIO   MRN:      [id number]        Account:        [id number]   :      1960           Procedure Date: 10/17/2017      Document: [id number]

## 2017-11-07 ENCOUNTER — OFFICE VISIT (OUTPATIENT)
Dept: NEUROLOGY | Facility: CLINIC | Age: 57
End: 2017-11-07

## 2017-11-07 VITALS
SYSTOLIC BLOOD PRESSURE: 145 MMHG | BODY MASS INDEX: 32.04 KG/M2 | HEART RATE: 75 BPM | HEIGHT: 68 IN | WEIGHT: 211.4 LBS | DIASTOLIC BLOOD PRESSURE: 79 MMHG

## 2017-11-07 DIAGNOSIS — R74.8 HYPERCKEMIA: Primary | ICD-10-CM

## 2017-11-07 DIAGNOSIS — R74.8 HYPERCKEMIA: ICD-10-CM

## 2017-11-07 DIAGNOSIS — M79.10 MYALGIA: ICD-10-CM

## 2017-11-07 DIAGNOSIS — M54.17 L-S RADICULOPATHY: Primary | ICD-10-CM

## 2017-11-07 LAB
CK SERPL-CCNC: 254 U/L (ref 30–225)
PTH-INTACT SERPL-MCNC: 72 PG/ML (ref 12–72)

## 2017-11-07 NOTE — MR AVS SNAPSHOT
After Visit Summary   11/7/2017    Teresa Fernandez    MRN: [id number]           Patient Information     Date Of Birth          1960        Visit Information        Provider Department      11/7/2017 1:00 PM Demetris Zavala MD [facility] NEUROSPECIALTIES        Today's Diagnoses     HyperCKemia    -  1    Myalgia           Follow-ups after your visit        Follow-up notes from your care team     Return in about 2 months (around 1/7/2018).      Your next 10 appointments already scheduled     Jan 09, 2018  1:30 PM CST   Return Neuropathy Visit with Demetris Zavala MD   [facility] NEUROSPECIALTIES ([facility])    [address]  [address]  [facility] [id number] [phone]            Jan 29, 2018  8:20 AM CST   (Arrive by 8:05 AM)   New Muscular Dystrophy with Miki Ramirez MD   [facility] Neurology ([facility])    [address] [place] [address]  3rd Floor  [facility] [address] [phone]            Apr 20, 2018  3:00 PM CDT   Return Visit with Yisel Beverly MD   [facility] Epilepsy Care ([facility])    [address], [address]  [facility] [address] [phone]              Future tests that were ordered for you today     Open Future Orders        Priority Expected Expires Ordered    MR Cervical Spine w/o & w Contrast Routine  11/7/2018 11/7/2017            Who to contact     Please call your clinic at [phone] to:    Ask questions about your health    Make or cancel appointments    Discuss your medicines    Learn about your test results    Speak to your doctor   If you have compliments or concerns about an experience at your clinic, or if you wish to file a complaint, please contact [facility] Physicians Patient Relations at [phone] or email us at [email or web address]         Additional Information About Your Visit        MyChart Information     PayDragonhart gives you secure access to your  "electronic health record. If you see a primary care provider, you can also send messages to your care team and make appointments. If you have questions, please call your primary care clinic.  If you do not have a primary care provider, please call [phone] and they will assist you.      PassportParking is an electronic gateway that provides easy, online access to your medical records. With PassportParking, you can request a clinic appointment, read your test results, renew a prescription or communicate with your care team.     To access your existing account, please contact your [facility] or call [phone] for assistance.        Care EveryWhere ID     This is your Care EveryWhere ID. This could be used by other organizations to access your [place] medical records  [id number]        Your Vitals Were     Pulse Height BMI (Body Mass Index)             75 5' 8\" (172.7 cm) 32.14 kg/m2          Blood Pressure from Last 3 Encounters:   11/07/17 145/79   10/20/17 113/71   10/06/17 120/77    Weight from Last 3 Encounters:   11/07/17 211 lb 6.4 oz (95.9 kg)   10/20/17 209 lb (94.8 kg)   10/06/17 207 lb 8 oz (94.1 kg)              We Performed the Following     Aldolase     CK total     HMGCR antibody: Laboratory Miscellaneous Order     [id number] antibody: Laboratory Miscellaneous Order     Parathyroid Hormone Intact     Polymyositis and Dermatomyositis Panel          Today's Medication Changes          These changes are accurate as of: 11/7/17  2:11 PM.  If you have any questions, ask your nurse or doctor.               These medicines have changed or have updated prescriptions.        Dose/Directions    fexofenadine 180 MG tablet   Commonly known as:  ALLEGRA   This may have changed:  when to take this   Used for:  Seasonal allergic rhinitis        Dose:  180 mg   Take 1 tablet (180 mg) by mouth daily   Quantity:  30 tablet   Refills:  1       PARoxetine 20 MG tablet   Commonly known as:  PAXIL   This " may have changed:  when to take this   Used for:  ERIC (generalized anxiety disorder)        Dose:  20 mg   Take 1 tablet (20 mg) by mouth daily   Quantity:  90 tablet   Refills:  3                Primary Care Provider Office Phone # Fax #    HERIBERTO Delgado PRIYANKA [phone] [address] [facility] [address]        Equal Access to Services     IMTIAZ PRESTON : Hadii aad ku hadasho Soomaali, waaxda luqadaha, qaybta kaalmada adeegyada, waxay idiin hayaan adeeg khdenissesh lageraldo . So [facility] [phone].    ATENCIÓN: Si habla español, tiene a anderson disposición servicios gratuitos de asistencia lingüística. Karlame al [phone].    We comply with applicable federal civil rights laws and [place] laws. We do not discriminate on the basis of race, color, national origin, age, disability, sex, sexual orientation, or gender identity.            Thank you!     Thank you for choosing [facility] NEUROSPECIALTIES  for your care. Our goal is always to provide you with excellent care. Hearing back from our patients is one way we can continue to improve our services. Please take a few minutes to complete the written survey that you may receive in the mail after your visit with us. Thank you!             Your Updated Medication List - Protect others around you: Learn how to safely use, store and throw away your medicines at www.disposemymeds.org.          This list is accurate as of: 11/7/17  2:11 PM.  Always use your most recent med list.                   Brand Name Dispense Instructions for use Diagnosis    * albuterol (2.5 MG/3ML) 0.083% neb solution     25 vial    Take 1 vial (2.5 mg) by nebulization every 6 hours as needed for shortness of breath / dyspnea or wheezing    Asthma exacerbation       * albuterol 108 (90 BASE) MCG/ACT Inhaler    PROAIR HFA/PROVENTIL HFA/VENTOLIN HFA    1 Inhaler    Inhale 2 puffs into the lungs every 6 hours as needed for shortness of breath / dyspnea    Moderate persistent asthma  without complication       fexofenadine 180 MG tablet    ALLEGRA    30 tablet    Take 1 tablet (180 mg) by mouth daily    Seasonal allergic rhinitis       fluticasone-salmeterol 250-50 MCG/DOSE diskus inhaler    ADVAIR    1 Inhaler    Inhale 1 puff into the lungs 2 times daily    Chest tightness, Intermittent asthma, uncomplicated       levothyroxine 125 MCG tablet    SYNTHROID/LEVOTHROID    90 tablet    Take 1 tablet (125 mcg) by mouth daily    Acquired hypothyroidism       MULTIVITAMIN ADULT PO      Take by mouth every morning        order for DME     1 Units    Equipment being ordered: Nebulizer    Asthma exacerbation       OVER-THE-COUNTER     1 Bottle    Place 1 drop into both eyes 3 times daily. Systane Ultra, Systane Balance, Blink Tears or Refresh Optive Artificial Tear    Dry eye syndrome       PARoxetine 20 MG tablet    PAXIL    90 tablet    Take 1 tablet (20 mg) by mouth daily    ERIC (generalized anxiety disorder)       ranitidine 150 MG tablet    ZANTAC    180 tablet    TAKE 1 TABLET TWICE A DAY    Gastroesophageal reflux disease, esophagitis presence not specified       TYLENOL 325 MG tablet   Generic drug:  acetaminophen      Take 325-650 mg by mouth every 6 hours as needed for mild pain        VITAMIN D3 PO      Take 2,000 Units by mouth 2 times daily        * Notice:  This list has 2 medication(s) that are the same as other medications prescribed for you. Read the directions carefully, and ask your doctor or other care provider to review them with you.

## 2017-11-07 NOTE — PROGRESS NOTES
[facility]  Electrodiagnostic Laboratory    Nerve Conduction & EMG Report          Patient:       Teresa Fernandez  Patient ID:    [id number]  Gender:        Female  YOB: 1960  Age:           57 Years 8 Months        History & Examination:  57 year old woman with neck and back pain, h/o low back surgery. Also reports muscle aching and has mildly elevated CK. Eval for myopathic or neuropathic process in upper and lower limbs.     Techniques: Motor and sensory conduction studies were done with surface recording electrodes. EMG was done with a concentric needle electrode.      Results:  Nerve conduction studies:  1. Left sural, median-D2, ulnar-D5, and radial sensory responses were normal.   2. Left peroneal-EDB, tibial-AH, median-APB and ulnar-ADM motor responses were normal.     Needle EM. Fibrillation potentials and positive sharp waves were seen in the left gastrocnemius muscle.   2. Large amplitude and/or long duration motor unit potentials (MUP) were seen in the left TA, gastrocnemius, PL, and glut max muscles.   3. Recruitment patterns were normal.     Interpretation:  This is an abnormal study. There is electrophysiologic evidence of a chronic/remote left-sided lumbosacral radiculopathy affecting the S1 and probably L5 nerve roots. There is no evidence of a generalized myopathy or of a generalized disorder of lower motor neurons on the basis of this study.     Demetris Zavala MD  Department of Neurology         Sensory NCS      Nerve / Sites Rec. Site Onset Peak NP Amp Ref. PP Amp Dist Marco A Ref. Temp     ms ms  V  V  V cm m/s m/s  C   L MEDIAN - Dig II Anti      Wrist Dig II 2.60 3.65 32.3 10.0 55.2 14 53.8 48.0 32.7   L ULNAR - Dig V Anti      Wrist Dig V 2.81 3.80 22.6 8.0 44.3 14 49.8 48.0 32.7   L RADIAL - Snuff      Forearm Snuff 1.56 2.14 28.3 15.0 34.4 10 64.0 48.0 32.9   L SURAL - Lat Mall      Calf Ankle 2.40 3.28 10.8 8.0 4.2 11 45.9 38.0 31.2       Motor NCS      Nerve  / Sites Rec. Site Lat Ref. Amp Ref. Rel Amp Dist Marco A Ref. Dur. Area Temp.     ms ms mV mV % cm m/s m/s ms %  C   L MEDIAN - APB      Wrist APB 2.97 4.40 6.5 5.0 100 8   7.14 100 32.2      Elbow APB 7.08  6.3  98.2 25 60.8 48.0 7.19 97.6 32.4   L ULNAR - ADM      Wrist ADM 3.18 3.50 7.6 5.0 100 8   9.22 100 32.6      B.Elbow ADM 6.25  7.4  97.2 18 58.6 48.0 8.70 103 32.6      A.Elbow ADM 7.92  6.4  84.3 10 60.0 48.0 9.01 91.8 32.6   L DEEP PERONEAL - EDB      Ankle EDB 4.11 6.00 3.5 2.5 100 8   6.30 100 30.6      FibHead EDB 11.04  3.3  94.3 31.5 45.5 38.0 6.51 88.9 30.6      Pop Fos EDB 13.13  3.0  84 10 48.0 38.0 6.56 81.6 30.6   L TIBIAL - AH      Ankle AH 3.02 6.00 8.4 4.0 100 8   7.14 100 30.4      Pop Fos AH 12.29  6.5  77.7 41 44.2 38.0 7.55 97 30.3       F  Wave      EMG Summary Table     Spontaneous MUAP Recruitment    IA Fib PSW Fasc H.F. Amp Dur. PPP Pattern   L. DELTOID N None None None None N N N N   L. BICEPS N None None None None N N N N   L. TRICEPS N None None None None N N N N   L. PRON TERES N None None None None N N N N   L. FIRST D INTEROSS N None None None None N N N N   L. EXT INDICIS N None None None None N N N N   L. TIB ANTERIOR N None None None None 2+ 1+ N N   L. GASTROCN (MED) Increased 2+ 2+ None None 2+ 1+ N N   L. PERON LONGUS N None None None None 1+ 1+ N N   L. GLUTEUS MAX N None None None None N 2+ N N

## 2017-11-07 NOTE — LETTER
2017       RE: Teresa Fernandez  [address] [facility] [address]     Dear Colleague,    Thank you for referring your patient, Teresa Fernandez, to the [facility]. Please see a copy of my visit note below.        [facility]  Electrodiagnostic Laboratory    Nerve Conduction & EMG Report          Patient:       Teresa Fernandez  Patient ID:    [id number]  Gender:        Female  YOB: 1960  Age:           57 Years 8 Months        History & Examination:  57 year old woman with neck and back pain, h/o low back surgery. Also reports muscle aching and has mildly elevated CK. Eval for myopathic or neuropathic process in upper and lower limbs.     Techniques: Motor and sensory conduction studies were done with surface recording electrodes. EMG was done with a concentric needle electrode.      Results:  Nerve conduction studies:  1. Left sural, median-D2, ulnar-D5, and radial sensory responses were normal.   2. Left peroneal-EDB, tibial-AH, median-APB and ulnar-ADM motor responses were normal.     Needle EM. Fibrillation potentials and positive sharp waves were seen in the left gastrocnemius muscle.   2. Large amplitude and/or long duration motor unit potentials (MUP) were seen in the left TA, gastrocnemius, PL, and glut max muscles.   3. Recruitment patterns were normal.     Interpretation:  This is an abnormal study. There is electrophysiologic evidence of a chronic/remote left-sided lumbosacral radiculopathy affecting the S1 and probably L5 nerve roots. There is no evidence of a generalized myopathy or of a generalized disorder of lower motor neurons on the basis of this study.     Demetris Zavala MD  Department of Neurology         Sensory NCS      Nerve / Sites Rec. Site Onset Peak NP Amp Ref. PP Amp Dist Marco A Ref. Temp     ms ms  V  V  V cm m/s m/s  C   L MEDIAN - Dig II Anti      Wrist Dig II 2.60 3.65 32.3 10.0 55.2 14  53.8 48.0 32.7   L ULNAR - Dig V Anti      Wrist Dig V 2.81 3.80 22.6 8.0 44.3 14 49.8 48.0 32.7   L RADIAL - Snuff      Forearm Snuff 1.56 2.14 28.3 15.0 34.4 10 64.0 48.0 32.9   L SURAL - Lat Mall      Calf Ankle 2.40 3.28 10.8 8.0 4.2 11 45.9 38.0 31.2       Motor NCS      Nerve / Sites Rec. Site Lat Ref. Amp Ref. Rel Amp Dist Marco A Ref. Dur. Area Temp.     ms ms mV mV % cm m/s m/s ms %  C   L MEDIAN - APB      Wrist APB 2.97 4.40 6.5 5.0 100 8   7.14 100 32.2      Elbow APB 7.08  6.3  98.2 25 60.8 48.0 7.19 97.6 32.4   L ULNAR - ADM      Wrist ADM 3.18 3.50 7.6 5.0 100 8   9.22 100 32.6      B.Elbow ADM 6.25  7.4  97.2 18 58.6 48.0 8.70 103 32.6      A.Elbow ADM 7.92  6.4  84.3 10 60.0 48.0 9.01 91.8 32.6   L DEEP PERONEAL - EDB      Ankle EDB 4.11 6.00 3.5 2.5 100 8   6.30 100 30.6      FibHead EDB 11.04  3.3  94.3 31.5 45.5 38.0 6.51 88.9 30.6      Pop Fos EDB 13.13  3.0  84 10 48.0 38.0 6.56 81.6 30.6   L TIBIAL - AH      Ankle AH 3.02 6.00 8.4 4.0 100 8   7.14 100 30.4      Pop Fos AH 12.29  6.5  77.7 41 44.2 38.0 7.55 97 30.3       F  Wave      EMG Summary Table     Spontaneous MUAP Recruitment    IA Fib PSW Fasc H.F. Amp Dur. PPP Pattern   L. DELTOID N None None None None N N N N   L. BICEPS N None None None None N N N N   L. TRICEPS N None None None None N N N N   L. PRON TERES N None None None None N N N N   L. FIRST D INTEROSS N None None None None N N N N   L. EXT INDICIS N None None None None N N N N   L. TIB ANTERIOR N None None None None 2+ 1+ N N   L. GASTROCN (MED) Increased 2+ 2+ None None 2+ 1+ N N   L. PERON LONGUS N None None None None 1+ 1+ N N   L. GLUTEUS MAX N None None None None N 2+ N N

## 2017-11-07 NOTE — MR AVS SNAPSHOT
After Visit Summary   11/7/2017    Teresa Fernandez    MRN: [id number]           Patient Information     Date Of Birth          1960        Visit Information        Provider Department      11/7/2017 11:30 AM Demetris Zavala MD [facility] NEUROSPECIALTIES         Follow-ups after your visit        Your next 10 appointments already scheduled     Jan 09, 2018  1:30 PM CST   Return Neuropathy Visit with Demetris Zavala MD   [facility] NEUROSPECIALTIES ([facility])    [address]  [address]  [facility] [address] [phone]            Jan 29, 2018  8:20 AM CST   (Arrive by 8:05 AM)   New Muscular Dystrophy with Miki Ramirez MD   [facility] Neurology ([facility])    [address] [place] [address]  3rd [facility] [id number] [phone]            Apr 20, 2018  3:00 PM CDT   Return Visit with Yisel Beverly MD   [facility] Epilepsy [facility] ([facility])    [address], [address]  [facility] [id number] [phone]              Future tests that were ordered for you today     Open Future Orders        Priority Expected Expires Ordered    MR Cervical Spine w/o & w Contrast Routine  11/7/2018 11/7/2017            Who to contact     Please call your clinic at [phone] to:    Ask questions about your health    Make or cancel appointments    Discuss your medicines    Learn about your test results    Speak to your doctor   If you have compliments or concerns about an experience at your clinic, or if you wish to file a complaint, please contact [facility] Physicians Patient Relations at [phone] or email us at [email or web address]         Additional Information About Your Visit        MyChart Information     GoAlberthart gives you secure access to your electronic health record. If you see a primary care provider, you can also send messages to your care team and make appointments. If you have questions,  please call your primary care clinic.  If you do not have a primary care provider, please call [phone] and they will assist you.      Likewise Software is an electronic gateway that provides easy, online access to your medical records. With Likewise Software, you can request a clinic appointment, read your test results, renew a prescription or communicate with your care team.     To access your existing account, please contact your [facility] or call [phone] for assistance.        Care EveryWhere ID     This is your Care EveryWhere ID. This could be used by other organizations to access your [place] medical records  [id number]         Blood Pressure from Last 3 Encounters:   11/07/17 145/79   10/20/17 113/71   10/06/17 120/77    Weight from Last 3 Encounters:   11/07/17 211 lb 6.4 oz (95.9 kg)   10/20/17 209 lb (94.8 kg)   10/06/17 207 lb 8 oz (94.1 kg)              Today, you had the following     No orders found for display         Today's Medication Changes          These changes are accurate as of: 11/7/17  2:17 PM.  If you have any questions, ask your nurse or doctor.               These medicines have changed or have updated prescriptions.        Dose/Directions    fexofenadine 180 MG tablet   Commonly known as:  ALLEGRA   This may have changed:  when to take this   Used for:  Seasonal allergic rhinitis        Dose:  180 mg   Take 1 tablet (180 mg) by mouth daily   Quantity:  30 tablet   Refills:  1       PARoxetine 20 MG tablet   Commonly known as:  PAXIL   This may have changed:  when to take this   Used for:  ERIC (generalized anxiety disorder)        Dose:  20 mg   Take 1 tablet (20 mg) by mouth daily   Quantity:  90 tablet   Refills:  3                Primary Care Provider Office Phone # Fax #    Nuzhat HERIBERTO Larsen [facility] [phone] [address] [id number]        Equal Access to Services     MERRILL PRESTON AH: gume Mcfarlane  butch uriandrewishmael thorlillian danielson ah. So [facility] [phone].    ATENCIÓN: Si sal alfonso, tiene a anderson disposición servicios gratuitos de asistencia lingüística. Stas al [phone].    We comply with applicable federal civil rights laws and [place] laws. We do not discriminate on the basis of race, color, national origin, age, disability, sex, sexual orientation, or gender identity.            Thank you!     Thank you for choosing [facility] NEUROSPECIALTIES  for your care. Our goal is always to provide you with excellent care. Hearing back from our patients is one way we can continue to improve our services. Please take a few minutes to complete the written survey that you may receive in the mail after your visit with us. Thank you!             Your Updated Medication List - Protect others around you: Learn how to safely use, store and throw away your medicines at www.disposemymeds.org.          This list is accurate as of: 11/7/17  2:17 PM.  Always use your most recent med list.                   Brand Name Dispense Instructions for use Diagnosis    * albuterol (2.5 MG/3ML) 0.083% neb solution     25 vial    Take 1 vial (2.5 mg) by nebulization every 6 hours as needed for shortness of breath / dyspnea or wheezing    Asthma exacerbation       * albuterol 108 (90 BASE) MCG/ACT Inhaler    PROAIR HFA/PROVENTIL HFA/VENTOLIN HFA    1 Inhaler    Inhale 2 puffs into the lungs every 6 hours as needed for shortness of breath / dyspnea    Moderate persistent asthma without complication       fexofenadine 180 MG tablet    ALLEGRA    30 tablet    Take 1 tablet (180 mg) by mouth daily    Seasonal allergic rhinitis       fluticasone-salmeterol 250-50 MCG/DOSE diskus inhaler    ADVAIR    1 Inhaler    Inhale 1 puff into the lungs 2 times daily    Chest tightness, Intermittent asthma, uncomplicated       levothyroxine 125 MCG tablet    SYNTHROID/LEVOTHROID    90 tablet    Take 1 tablet (125  mcg) by mouth daily    Acquired hypothyroidism       MULTIVITAMIN ADULT PO      Take by mouth every morning        order for DME     1 Units    Equipment being ordered: Nebulizer    Asthma exacerbation       OVER-THE-COUNTER     1 Bottle    Place 1 drop into both eyes 3 times daily. Systane Ultra, Systane Balance, Blink Tears or Refresh Optive Artificial Tear    Dry eye syndrome       PARoxetine 20 MG tablet    PAXIL    90 tablet    Take 1 tablet (20 mg) by mouth daily    ERIC (generalized anxiety disorder)       ranitidine 150 MG tablet    ZANTAC    180 tablet    TAKE 1 TABLET TWICE A DAY    Gastroesophageal reflux disease, esophagitis presence not specified       TYLENOL 325 MG tablet   Generic drug:  acetaminophen      Take 325-650 mg by mouth every 6 hours as needed for mild pain        VITAMIN D3 PO      Take 2,000 Units by mouth 2 times daily        * Notice:  This list has 2 medication(s) that are the same as other medications prescribed for you. Read the directions carefully, and ask your doctor or other care provider to review them with you.

## 2017-11-07 NOTE — LETTER
11/7/2017       RE: Teresa Fernandez  [address] [facility] [address]     Dear Colleague,    Thank you for referring your patient, Teresa Fernandez, to the [facility] at [facility]. Please see a copy of my visit note below.    Chief Complaint: Myalgias and hyperCKemia    History of Present Illness:    Teresa Fernandez is a 57 year old woman who I am seeing at the request of Dr. Beverly for evaluation of myalgias and hyperCKemia. 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 She reports achiness and soreness in her limbs. Onset was early 2017. The most affected area is her upper and mid back, posterior upper arm (triceps area) on the right more than the left, and posterior thighs. She also experiences sharp pain in her lower back that radiates down her legs and sharp pain in her neck that radiates down her arms, but this pain is different than the achy pain. Sharp pain is provoked by movements, but unable to characterize exactly what positions are provocative. Excessive activity probably worsens the achy pain. She experiences cramps in her feet and calves a couple times each month. She has noticed fasciculations in her hands and arms but not her legs. She feels generally weak but not focal weakness or atrophy. Gait is stable. She walks unassisted. No dysarthria, dysphagia, or diplopia. No shortness of breath. She has not experienced myoglobinuria. Appetite is good and weight is relatively stable. No rashes.     She has never taken any cholesterol lowering medications.     She has a longer history of low back pain with prior imaging showing mild central stenosis L1-L2 and L3-L4 and moderate central stenosis at L4-L5, right central L5-S1 disc protrusion/extrusion with impingement on right S1 nerve root, and multilevel mild foraminal narrowing. She is s/p lumbar surgery in 2015. She has never had surgery on her  neck.     As part of her work up CK was recently found to be around 500. She recalls that 6 or 7 months ago her CK was also checked and found to be perhaps a bit higher than that. I do not have that blood result available.     Prior pertinent laboratory work-up:  : . Normal/negative Lyme, CRP, TSH, ALT, AST  : Normal CRP, ESR, RF, DIEGO  : Normal Hba1c    Prior muscle biopsy:  None    Prior electrophysiologic work-up:  None    Prior imagin: MRI LS spine showed mild central stenosis L1-L2 and L3-L4 and moderate central stenosis at L4-L5, right central L5-S1 disc protrusion/extrusion with impingement on right S1 nerve root,  multilevel mild foraminal narrowing    Past Medical History:   Past Medical History:   Diagnosis Date     ALLERGIC RHINITIS NOS 2005     Anxiety      Arthritis     herniated disc     Bronchitis, not specified as acute or chronic      CHR MAXILLARY SINUSITIS 2005     Depressive disorder, not elsewhere classified      Eczema 10/17/2012     Environmental and seasonal allergies      GERD (gastroesophageal reflux disease)      Gluten intolerance      Malignant neoplasm of renal pelvis (H)     Renal cell carcinoma     Melanoma (H) 2010     Other specified acquired hypothyroidism 2005     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     FUSION LUMBAR ANTERIOR TWO LEVELS  2015     GYN SURGERY  1999    hysterectomy.  [facility]     HYSTERECTOMY, PAP NO LONGER INDICATED       SOFT TISSUE SURGERY      chest-melonoma     SURGICAL HISTORY OF -   3/1996    Left nephrectomy-renal cell CA     Family history:    There is no known family history of myopathy or other neuromuscular disorders. She has 4  "healthy sons.     Social History:    Rare alcohol. She denies current tobacco or illicit drug use. There is no known exposure to toxins or heavy metals.     Medical Allergies:     Allergies   Allergen Reactions     Omnipaque [Iohexol] Swelling and Difficulty breathing     Immediate throat swelling following around 1-2cc of omnipaque 300 for spine procedure     Gluten      Iodine      Welts from CT contrast, surgical scrub is ok.     Penicillins Hives     Current Medications:   Current Outpatient Prescriptions   Medication     acetaminophen (TYLENOL) 325 MG tablet     ranitidine (ZANTAC) 150 MG tablet     albuterol (PROAIR HFA/PROVENTIL HFA/VENTOLIN HFA) 108 (90 BASE) MCG/ACT Inhaler     PARoxetine (PAXIL) 20 MG tablet     levothyroxine (SYNTHROID/LEVOTHROID) 125 MCG tablet     fluticasone-salmeterol (ADVAIR) 250-50 MCG/DOSE diskus inhaler     order for DME     albuterol (2.5 MG/3ML) 0.083% nebulizer solution     Multiple Vitamins-Minerals (MULTIVITAMIN ADULT PO)     Cholecalciferol (VITAMIN D3 PO)     fexofenadine (ALLEGRA) 180 MG tablet     OVER-THE-COUNTER     No current facility-administered medications for this visit.      Review of Systems: A complete review of systems was obtained and was negative except for what was noted above.    Physical examination:    /79 (BP Location: Right arm, Patient Position: Chair, Cuff Size: Adult Regular)  Pulse 75  Ht 5' 8\" (172.7 cm)  Wt 211 lb 6.4 oz (95.9 kg)  BMI 32.14 kg/m2    There are no carotid bruits.       General Appearance: NAD    Skin: There are no rashes or other skin lesions.    Musculoskeletal:  Pes cavus present. There is no scoliosis, lordosis, kyphosis.    Neurologic examination:    Mental status:  Patient is alert, attentive, and oriented x 3.  Language is coherent and fluent without dysarthria or aphasia.  Memory, comprehension and ability to follow commands were intact.       Cranial nerves:  Pupils were round and reacted to light.  Extraocular " movements were full. There was no face, jaw, palate or tongue weakness or atrophy. Hearing was grossly intact.  Shoulder shrug was normal.       Motor exam: No atrophy or fasciculations.  No action or percussion myotonia or paramyotonia.  Manual muscle testing revealed the following MRC grade muscle power:   Right Left   Neck flexion 5    Neck extension: 5    Shoulder ext rotation 4 4   Shoulder abduction:  5 5   Elbow extension: 5 5   Elbow flexion:  5 5   Wrist flexion:  5 5   Wrist extension:  5 5   Finger flexion 5 5   Finger extension 5 4+   FDI 5 4   APB 5 4   Hip flexion 5-* 5-*   Hip extension 5 5   Knee flexion 5 5   Knee extension 5 5   Dorsiflexion 5 5   Plantar flexion 5 5   *Rises from seated position independently but with difficulty    Complex motor skills: Mild postural tremor. No ataxia.     Sensory exam: Normal pin, vibration and LT in hands and feet.     Gait was slightly antalgic but stable     Deep tendon reflexes:   Right Left   Triceps 2 2   Biceps 2 2   Brachioradialis 2 2   Knee jerk 3 3   Ankle jerk 1 0   Plantar responses were flexor bilaterally.  Jaw jerk normal.      Assessment:    Teresa Fernandez is a 57 year old woman with pain and mild hyperCKemia. Somewhat challenging to differentiate myopathic pain from musculoskeletal pain in her upper and lower back. There are clinical features of both. There are also clinical features of a left-sided S1 radiculopathy, which is compatible with her prior history of known lumbosacral spondylosis and surgery. At this time the clinical significance of the relatively mild CK elevation is unknown. There is no definite myopathic weakness on examination other than possible mild lower limb hip girdle weakness. Hard to know if this is myopathic in origin. Further, the degree of CK elevation is one that can be seen in neuropathic conditions as well as muscle disease. There are no definite features of an evolving disorder of motor neurons at this time but the  relatively active knee reflexes and the left segmental (C8-T1) pattern of upper limb weakness are notable features that will need to be monitored. At this point the approach will be to obtain NCS/EMG to look for myopathic or neuropathic features, evaluate her cervical spine with MRI to see if there is evidence of C8/T1 nerve root impingement, and to screen for acquired myopathic disorders as below. At this point I am not in favor of proceeding with muscle biopsy. The yield of muscle biopsy is very low when CK is less than 5x upper limit of normal (about 1000) unless there is some other evidence of muscle disease (weakness on exam, needle EMG abnormalities, MRI). I discussed the plan as outlined below with her.     Plan:      1. Labs: CK, Aldolase, NT5C1A, HMGCR, myositis antibodies, parathyroid  2. Nerve conduction studies/EMG: Evaluate for myopathy vs neuropathy  3. MRI C spine: eval for left C8 nerve root injury  4. Defer muscle biopsy for now. If new weakness emerges, EMG is abnormal, or CK rises further then will reconsider.   5. Follow up in 2 months. Sooner if needed.   ---  ADDENDUM:  The NCS/EMG showed only evidence of a left S1 and perhaps L5 radiculopathy. There were no features of a generalized neuropathic or myopathic process. Will proceed as above with labs, C spine imaging and clinical surveillance.     Sincerely,    Demetris Zavala MD

## 2017-11-07 NOTE — LETTER
11/7/2017       RE: Teresa Fernandez  [address] [facility] [address]          To whom it may concern,     Mrs. Fernandez was here in clinic for an appointment. Please excuse her for the day. For any questions or concerns please call [phone].      Thank You!

## 2017-11-07 NOTE — PROGRESS NOTES
Chief Complaint: Myalgias and hyperCKemia    History of Present Illness:    Teresa Fernandez is a 57 year old woman who I am seeing at the request of Dr. Beverly for evaluation of myalgias and hyperCKemia. 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 She reports achiness and soreness in her limbs. Onset was early 2017. The most affected area is her upper and mid back, posterior upper arm (triceps area) on the right more than the left, and posterior thighs. She also experiences sharp pain in her lower back that radiates down her legs and sharp pain in her neck that radiates down her arms, but this pain is different than the achy pain. Sharp pain is provoked by movements, but unable to characterize exactly what positions are provocative. Excessive activity probably worsens the achy pain. She experiences cramps in her feet and calves a couple times each month. She has noticed fasciculations in her hands and arms but not her legs. She feels generally weak but not focal weakness or atrophy. Gait is stable. She walks unassisted. No dysarthria, dysphagia, or diplopia. No shortness of breath. She has not experienced myoglobinuria. Appetite is good and weight is relatively stable. No rashes.     She has never taken any cholesterol lowering medications.     She has a longer history of low back pain with prior imaging showing mild central stenosis L1-L2 and L3-L4 and moderate central stenosis at L4-L5, right central L5-S1 disc protrusion/extrusion with impingement on right S1 nerve root, and multilevel mild foraminal narrowing. She is s/p lumbar surgery in 2015. She has never had surgery on her neck.     As part of her work up CK was recently found to be around 500. She recalls that 6 or 7 months ago her CK was also checked and found to be perhaps a bit higher than that. I do not have that blood result available.     Prior pertinent laboratory work-up:  9/17: .  Normal/negative Lyme, CRP, TSH, ALT, AST  : Normal CRP, ESR, RF, DIEGO  : Normal Hba1c    Prior muscle biopsy:  None    Prior electrophysiologic work-up:  None    Prior imagin: MRI LS spine showed mild central stenosis L1-L2 and L3-L4 and moderate central stenosis at L4-L5, right central L5-S1 disc protrusion/extrusion with impingement on right S1 nerve root,  multilevel mild foraminal narrowing    Past Medical History:   Past Medical History:   Diagnosis Date     ALLERGIC RHINITIS NOS 2005     Anxiety      Arthritis     herniated disc     Bronchitis, not specified as acute or chronic      CHR MAXILLARY SINUSITIS 2005     Depressive disorder, not elsewhere classified      Eczema 10/17/2012     Environmental and seasonal allergies      GERD (gastroesophageal reflux disease)      Gluten intolerance      Malignant neoplasm of renal pelvis (H)     Renal cell carcinoma     Melanoma (H) 2010     Other specified acquired hypothyroidism 2005     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     FUSION LUMBAR ANTERIOR TWO LEVELS  2015     GYN SURGERY  1999    hysterectomy.  [facility]     HYSTERECTOMY, PAP NO LONGER INDICATED       SOFT TISSUE SURGERY      chest-melonoma     SURGICAL HISTORY OF -   3/1996    Left nephrectomy-renal cell CA     Family history:    There is no known family history of myopathy or other neuromuscular disorders. She has 4 healthy sons.     Social History:    Rare alcohol. She denies current tobacco or illicit drug use. There is no known exposure to toxins or heavy metals.     Medical Allergies:     Allergies   Allergen Reactions     Omnipaque [Iohexol] Swelling and Difficulty breathing     Immediate  "throat swelling following around 1-2cc of omnipaque 300 for spine procedure     Gluten      Iodine      Welts from CT contrast, surgical scrub is ok.     Penicillins Hives     Current Medications:   Current Outpatient Prescriptions   Medication     acetaminophen (TYLENOL) 325 MG tablet     ranitidine (ZANTAC) 150 MG tablet     albuterol (PROAIR HFA/PROVENTIL HFA/VENTOLIN HFA) 108 (90 BASE) MCG/ACT Inhaler     PARoxetine (PAXIL) 20 MG tablet     levothyroxine (SYNTHROID/LEVOTHROID) 125 MCG tablet     fluticasone-salmeterol (ADVAIR) 250-50 MCG/DOSE diskus inhaler     order for DME     albuterol (2.5 MG/3ML) 0.083% nebulizer solution     Multiple Vitamins-Minerals (MULTIVITAMIN ADULT PO)     Cholecalciferol (VITAMIN D3 PO)     fexofenadine (ALLEGRA) 180 MG tablet     OVER-THE-COUNTER     No current facility-administered medications for this visit.      Review of Systems: A complete review of systems was obtained and was negative except for what was noted above.    Physical examination:    /79 (BP Location: Right arm, Patient Position: Chair, Cuff Size: Adult Regular)  Pulse 75  Ht 5' 8\" (172.7 cm)  Wt 211 lb 6.4 oz (95.9 kg)  BMI 32.14 kg/m2    There are no carotid bruits.       General Appearance: NAD    Skin: There are no rashes or other skin lesions.    Musculoskeletal:  Pes cavus present. There is no scoliosis, lordosis, kyphosis.    Neurologic examination:    Mental status:  Patient is alert, attentive, and oriented x 3.  Language is coherent and fluent without dysarthria or aphasia.  Memory, comprehension and ability to follow commands were intact.       Cranial nerves:  Pupils were round and reacted to light.  Extraocular movements were full. There was no face, jaw, palate or tongue weakness or atrophy. Hearing was grossly intact.  Shoulder shrug was normal.       Motor exam: No atrophy or fasciculations.  No action or percussion myotonia or paramyotonia.  Manual muscle testing revealed the following " MRC grade muscle power:   Right Left   Neck flexion 5    Neck extension: 5    Shoulder ext rotation 4 4   Shoulder abduction:  5 5   Elbow extension: 5 5   Elbow flexion:  5 5   Wrist flexion:  5 5   Wrist extension:  5 5   Finger flexion 5 5   Finger extension 5 4+   FDI 5 4   APB 5 4   Hip flexion 5-* 5-*   Hip extension 5 5   Knee flexion 5 5   Knee extension 5 5   Dorsiflexion 5 5   Plantar flexion 5 5   *Rises from seated position independently but with difficulty    Complex motor skills: Mild postural tremor. No ataxia.     Sensory exam: Normal pin, vibration and LT in hands and feet.     Gait was slightly antalgic but stable     Deep tendon reflexes:   Right Left   Triceps 2 2   Biceps 2 2   Brachioradialis 2 2   Knee jerk 3 3   Ankle jerk 1 0   Plantar responses were flexor bilaterally.  Jaw jerk normal.      Assessment:    Teresa Fernandez is a 57 year old woman with pain and mild hyperCKemia. Somewhat challenging to differentiate myopathic pain from musculoskeletal pain in her upper and lower back. There are clinical features of both. There are also clinical features of a left-sided S1 radiculopathy, which is compatible with her prior history of known lumbosacral spondylosis and surgery. At this time the clinical significance of the relatively mild CK elevation is unknown. There is no definite myopathic weakness on examination other than possible mild lower limb hip girdle weakness. Hard to know if this is myopathic in origin. Further, the degree of CK elevation is one that can be seen in neuropathic conditions as well as muscle disease. There are no definite features of an evolving disorder of motor neurons at this time but the relatively active knee reflexes and the left segmental (C8-T1) pattern of upper limb weakness are notable features that will need to be monitored. At this point the approach will be to obtain NCS/EMG to look for myopathic or neuropathic features, evaluate her cervical spine with MRI  to see if there is evidence of C8/T1 nerve root impingement, and to screen for acquired myopathic disorders as below. At this point I am not in favor of proceeding with muscle biopsy. The yield of muscle biopsy is very low when CK is less than 5x upper limit of normal (about 1000) unless there is some other evidence of muscle disease (weakness on exam, needle EMG abnormalities, MRI). I discussed the plan as outlined below with her.     Plan:      1. Labs: CK, Aldolase, NT5C1A, HMGCR, myositis antibodies, parathyroid  2. Nerve conduction studies/EMG: Evaluate for myopathy vs neuropathy  3. MRI C spine: eval for left C8 nerve root injury  4. Defer muscle biopsy for now. If new weakness emerges, EMG is abnormal, or CK rises further then will reconsider.   5. Follow up in 2 months. Sooner if needed.   ---  ADDENDUM:  The NCS/EMG showed only evidence of a left S1 and perhaps L5 radiculopathy. There were no features of a generalized neuropathic or myopathic process. Will proceed as above with labs, C spine imaging and clinical surveillance.     ADDENDUM:  I spoke with her about the MRI. It shows advanced degenerative changes, particularly at the right C8 level. I offered referral to neurosurgery for a surgical opinion. She declines now but will keep it in mind. If she develops worsening pain or new numbness or weakness she will let me know. Otherwise will re-evaluate when I see her in January and consider [facility] referral at that time.

## 2017-11-09 LAB — ALDOLASE SERPL-CCNC: 3.3 U/L (ref 1.5–8.1)

## 2017-11-11 ENCOUNTER — TRANSFERRED RECORDS (OUTPATIENT)
Dept: HEALTH INFORMATION MANAGEMENT | Facility: CLINIC | Age: 57
End: 2017-11-11

## 2017-11-16 ENCOUNTER — MYC MEDICAL ADVICE (OUTPATIENT)
Dept: NEUROLOGY | Facility: CLINIC | Age: 57
End: 2017-11-16

## 2017-11-17 NOTE — TELEPHONE ENCOUNTER
Sent [facility] message to patient with lab results and questions re: MRI.    Nadiya Xavier, RN Care Coordinator

## 2017-11-20 LAB
ANNOTATION COMMENT IMP: NORMAL
EJ AB SER QL: NEGATIVE
ENA JO1 AB TITR SER: 0 AU/ML (ref 0–40)
MDA5 (CADM 140) ABY: NEGATIVE
MI2 AB SER QL: NEGATIVE
NXP-2 (NUCLEAR MATRIX PROTEIN 2) ABY: NEGATIVE
OJ AB SER QL: NEGATIVE
P155/140 (TIF1-GAMMA) ANTIBODY: NEGATIVE
PL12 AB SER QL: NEGATIVE
PL7 AB SER QL: NEGATIVE
SAE1 (SUMO ACTIVATING ENZYME) ABY: NEGATIVE
SRP AB SERPL QL: NEGATIVE
TIF-1 GAMMA ANTIBODY: NEGATIVE

## 2017-11-21 NOTE — TELEPHONE ENCOUNTER
Call placed to [facility] and requested that MRI images and report be sent to us. Called film room to give them a heads up on incoming records.

## 2017-11-27 ENCOUNTER — MYC MEDICAL ADVICE (OUTPATIENT)
Dept: NEUROLOGY | Facility: CLINIC | Age: 57
End: 2017-11-27

## 2017-11-28 NOTE — TELEPHONE ENCOUNTER
Dr. Zavala, please see Teresa's [employer] message. She would like her MRI results.     Thank you,  Nadiya Xavier, RN Care Coordinator

## 2017-12-08 DIAGNOSIS — F41.1 GAD (GENERALIZED ANXIETY DISORDER): ICD-10-CM

## 2017-12-12 RX ORDER — PAROXETINE 20 MG/1
TABLET, FILM COATED ORAL
Qty: 90 TABLET | Refills: 1 | Status: SHIPPED | OUTPATIENT
Start: 2017-12-12 | End: 2018-01-15

## 2017-12-20 NOTE — TELEPHONE ENCOUNTER
Please abstract colonoscopy into the patients chart. She reported having it completed in 2017 in [place] with the results of repeating in 5 years. Thank You!    Angelica Ellis CMA              Panel Management Review      Patient has the following on her problem list: None      Composite cancer screening  Chart review shows that this patient is due/due soon for the following Colonoscopy  Summary:    Patient is due/failing the following:   None. Pt states that she completed her colonoscopy this year and it now needs to be abstracted.    Action needed:   Routed to the abstract care team.    Type of outreach:    Phone, spoke to patient.  [place] took the patients return call.    Questions for provider review:    None                                                                                                                                    Angelica Ellis CMA       Chart routed to Care Team .

## 2018-01-09 ENCOUNTER — OFFICE VISIT (OUTPATIENT)
Dept: NEUROLOGY | Facility: CLINIC | Age: 58
End: 2018-01-09
Payer: OTHER GOVERNMENT

## 2018-01-09 VITALS
WEIGHT: 214 LBS | SYSTOLIC BLOOD PRESSURE: 162 MMHG | DIASTOLIC BLOOD PRESSURE: 74 MMHG | HEART RATE: 90 BPM | RESPIRATION RATE: 16 BRPM | BODY MASS INDEX: 32.43 KG/M2 | HEIGHT: 68 IN

## 2018-01-09 DIAGNOSIS — R74.8 HYPERCKEMIA: Primary | ICD-10-CM

## 2018-01-09 ASSESSMENT — PAIN SCALES - GENERAL: PAINLEVEL: NO PAIN (0)

## 2018-01-09 NOTE — PROGRESS NOTES
Myopathy history:    Teresa Fernandez is a 57 year old woman with myalgias and hyperCKemia. She has had achiness in her limbs since early 2017. The most affected area is her upper and mid back, posterior upper arm (triceps area) on the right more than the left, and posterior thighs. Work up in 2017 revealed a CK of around 500. She has never taken any cholesterol lowering medications. She also has a longer history of low back pain with prior imaging showing mild central stenosis L1-L2 and L3-L4 and moderate central stenosis at L4-L5, right central L5-S1 disc protrusion/extrusion with impingement on right S1 nerve root, and multilevel mild foraminal narrowing. She is s/p lumbar surgery in 2015. She has never had surgery on her neck.     Interval history:  I last saw her 11/7/2017. Since then NCS/EMG were performed and showed only evidence of a left S1 and perhaps L5 radiculopathy. There were no features of a generalized neuropathic or myopathic process. MRI C spine showed advanced degenerative changes, particularly at the right C8 level. I offered referral to neurosurgery for a surgical opinion. She declined but will keep it in mind. We obtained some blood tests. CK is now only marginally elevated. Myositis antibodies were negative. Her symptoms are largely the same, perhaps a bit better. She reports pain throughout her body. She feels achy. Her wrists her the most, but most of her body is affected. Pain fluctuates. She has stopped using alcohol, and this has helped her pain.     Prior pertinent laboratory work-up:  11/17: . Negative/normal: Jo1, PL12, PL7, EJ, OJ, SRP, Mi2, P155, TIF, SAE1, MDA5, NXP2, aldolase  9/17: . Normal/negative Lyme, CRP, TSH, ALT, AST  1/17: Normal CRP, ESR, RF, DIEGO  11/14: Normal Hba1c    Prior muscle biopsy:  None    Prior electrophysiologic work-up:  11/17: NCS/EMG showed a chronic/remote left-sided lumbosacral radiculopathy affecting the S1 and probably L5 nerve roots. There is  no evidence of a generalized myopathy or of a generalized disorder of lower motor neurons on the basis of this study.     Prior imagin: MRI LS spine showed mild central stenosis L1-L2 and L3-L4 and moderate central stenosis at L4-L5, right central L5-S1 disc protrusion/extrusion with impingement on right S1 nerve root,  multilevel mild foraminal narrowing  : MRI C spine showed advanced degenerative changes, particularly at the right C8 level.    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     FUSION LUMBAR ANTERIOR TWO LEVELS  2015     GYN SURGERY  1999    hysterectomy.  LAVH     HYSTERECTOMY, PAP NO LONGER INDICATED       SOFT TISSUE SURGERY      chest-melonoma     SURGICAL HISTORY OF -   3/1996    Left nephrectomy-renal cell CA     Family history:    There is no known family history of myopathy or other neuromuscular disorders. She has 4 healthy sons.     Social History:    Rare alcohol. She denies current tobacco or illicit drug use. There is no known exposure to toxins or heavy metals.     Medical Allergies:     Allergies   Allergen Reactions     Omnipaque [Iohexol]  "Swelling and Difficulty breathing     Immediate throat swelling following around 1-2cc of omnipaque 300 for spine procedure     Gluten      Iodine      Welts from CT contrast, surgical scrub is ok.     Penicillins Hives     Current Medications:   Current Outpatient Prescriptions   Medication     GLUCOSAMINE SULFATE PO     MAGNESIUM OXIDE PO     Cyanocobalamin (VITAMIN B 12 PO)     PARoxetine (PAXIL) 20 MG tablet     acetaminophen (TYLENOL) 325 MG tablet     ranitidine (ZANTAC) 150 MG tablet     albuterol (PROAIR HFA/PROVENTIL HFA/VENTOLIN HFA) 108 (90 BASE) MCG/ACT Inhaler     levothyroxine (SYNTHROID/LEVOTHROID) 125 MCG tablet     fluticasone-salmeterol (ADVAIR) 250-50 MCG/DOSE diskus inhaler     order for DME     albuterol (2.5 MG/3ML) 0.083% nebulizer solution     Multiple Vitamins-Minerals (MULTIVITAMIN ADULT PO)     Cholecalciferol (VITAMIN D3 PO)     fexofenadine (ALLEGRA) 180 MG tablet     OVER-THE-COUNTER     No current facility-administered medications for this visit.      Review of Systems: A complete review of systems was obtained and was negative except for what was noted above.    Physical examination:    /74 (BP Location: Right arm, Patient Position: Chair, Cuff Size: Adult Regular)  Pulse 90  Resp 16  Ht 5' 8\" (172.7 cm)  Wt 214 lb (97.1 kg)  BMI 32.54 kg/m2    General Appearance: NAD    Skin: There are no rashes or other skin lesions.    Musculoskeletal:  Pes cavus present. There is no scoliosis, lordosis, kyphosis.    Neurologic examination:    Mental status:  Patient is alert, attentive, and oriented x 3.  Language is coherent and fluent without dysarthria or aphasia.  Memory, comprehension and ability to follow commands were intact.       Cranial nerves:  Pupils were round and reacted to light.  Extraocular movements were full. There was no face, jaw, palate or tongue weakness or atrophy. Hearing was grossly intact.  Shoulder shrug was normal.       Motor exam: No atrophy or " fasciculations.  No action or percussion myotonia or paramyotonia.  Manual muscle testing revealed the following MRC grade muscle power:   Right Left   Neck flexion 5    Neck extension: 5    Shoulder ext rotation 5 5   Shoulder abduction:  5 5   Elbow extension: 5 5   Elbow flexion:  5 5   Wrist flexion:  5 5   Wrist extension:  5 5   Finger flexion 5 5   Finger extension 5 4+   FDI 4+ 4   APB 4+ 4   Hip flexion 5* 5*   Hip extension 5 5   Knee flexion 5 5   Knee extension 5 5   Dorsiflexion 5 5   Plantar flexion 5 5   *Rises from seated position independently with no diffiuclty    Complex motor skills: Mild postural tremor. No ataxia.     Sensory exam: Normal pin and vibration in hands and feet.     Gait was slightly antalgic but stable     Deep tendon reflexes:   Right Left   Triceps 2 2   Biceps 2 2   Brachioradialis 2 2   Knee jerk 3 3   Ankle jerk 1 0   Plantar responses were flexor bilaterally.  Jaw jerk normal.      Assessment:    Teresa Fernandez is a 57 year old woman with pain and very mild hyperCKemia. Examination shows some weakness, but not in a myopathic pattern. EMG showed no evidence for myopathy. It is unlikely that the very mild hyperCKemia is indicative of an evolving or generalized myopathy, but hard to exclude with certainty. Neurogenic disorders also can cause mild CK elevations. The EMG showed only neurogenic changes in L5/S1 myotomes likely attributable to LS degenerative changes. I recommended clinical surveillance for the time being as the yield of muscle biopsy will be low. Her symptoms may fall in the fibromyalgia spectrum, and I asked her to discuss this with her PCP. She does have some musculoskeletal pain in her upper and lower back secondary to known lumbosacral and cervical spondylosis.  I offered referral to neurosurgery for a surgical opinion. She declines for now but will keep it in mind. If she develops worsening pain or new numbness or weakness she will let me know.    Plan:       1. Mild hyperCKemia: I will see her back in 6 months. Sooner if needed. At follow up will repeat CK. I advised her on symptoms that would indicate sooner eval (e.g., weakness, atrophy, fasciculations, etc).   2. Query fibromyalgia: Recommended f/u with PCP  3. Cervical spondylosis:  I offered referral to neurosurgery for a surgical opinion. She declines now but will keep it in mind. If she develops worsening pain or new numbness or weakness she will let me know. Otherwise will re-evaluate when I see her in January and consider [facility] referral at that time.   4. Non epileptic spells: F/u Dr. Beverly  --

## 2018-01-09 NOTE — LETTER
1/9/2018       RE: Teresa Fernandez  [address] [facility] [address]     Dear Colleague,    Thank you for referring your patient, Teresa Fernandez, to the [facility]UR[facility] at [facility]. Please see a copy of my visit note below.    Myopathy history:    Teresa Fernandez is a 57 year old woman with myalgias and hyperCKemia. She has had achiness in her limbs since early 2017. The most affected area is her upper and mid back, posterior upper arm (triceps area) on the right more than the left, and posterior thighs. Work up in 2017 revealed a CK of around 500. She has never taken any cholesterol lowering medications. She also has a longer history of low back pain with prior imaging showing mild central stenosis L1-L2 and L3-L4 and moderate central stenosis at L4-L5, right central L5-S1 disc protrusion/extrusion with impingement on right S1 nerve root, and multilevel mild foraminal narrowing. She is s/p lumbar surgery in 2015. She has never had surgery on her neck.     Interval history:  I last saw her 11/7/2017. Since then NCS/EMG were performed and showed only evidence of a left S1 and perhaps L5 radiculopathy. There were no features of a generalized neuropathic or myopathic process. MRI C spine showed advanced degenerative changes, particularly at the right C8 level. I offered referral to neurosurgery for a surgical opinion. She declined but will keep it in mind. We obtained some blood tests. CK is now only marginally elevated. Myositis antibodies were negative. Her symptoms are largely the same, perhaps a bit better. She reports pain throughout her body. She feels achy. Her wrists her the most, but most of her body is affected. Pain fluctuates. She has stopped using alcohol, and this has helped her pain.     Prior pertinent laboratory work-up:  11/17: . Negative/normal: Jo1, PL12, PL7, EJ, OJ, SRP, Mi2, P155, TIF, SAE1, MDA5, NXP2, aldolase  9/17: .  Normal/negative Lyme, CRP, TSH, ALT, AST  : Normal CRP, ESR, RF, DIEGO  : Normal Hba1c    Prior muscle biopsy:  None    Prior electrophysiologic work-up:  : NCS/EMG showed a chronic/remote left-sided lumbosacral radiculopathy affecting the S1 and probably L5 nerve roots. There is no evidence of a generalized myopathy or of a generalized disorder of lower motor neurons on the basis of this study.     Prior imagin: MRI LS spine showed mild central stenosis L1-L2 and L3-L4 and moderate central stenosis at L4-L5, right central L5-S1 disc protrusion/extrusion with impingement on right S1 nerve root,  multilevel mild foraminal narrowing  : MRI C spine showed advanced degenerative changes, particularly at the right C8 level.    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     FUSION LUMBAR ANTERIOR TWO LEVELS  2015     GYN SURGERY  1999    hysterectomy.  LAVH     HYSTERECTOMY, PAP NO LONGER INDICATED       SOFT TISSUE SURGERY      chest-melonoma     SURGICAL HISTORY OF -   3/1996    Left nephrectomy-renal cell CA     Family history:    There is no known family history  "of myopathy or other neuromuscular disorders. She has 4 healthy sons.     Social History:    Rare alcohol. She denies current tobacco or illicit drug use. There is no known exposure to toxins or heavy metals.     Medical Allergies:     Allergies   Allergen Reactions     Omnipaque [Iohexol] Swelling and Difficulty breathing     Immediate throat swelling following around 1-2cc of omnipaque 300 for spine procedure     Gluten      Iodine      Welts from CT contrast, surgical scrub is ok.     Penicillins Hives     Current Medications:   Current Outpatient Prescriptions   Medication     GLUCOSAMINE SULFATE PO     MAGNESIUM OXIDE PO     Cyanocobalamin (VITAMIN B 12 PO)     PARoxetine (PAXIL) 20 MG tablet     acetaminophen (TYLENOL) 325 MG tablet     ranitidine (ZANTAC) 150 MG tablet     albuterol (PROAIR HFA/PROVENTIL HFA/VENTOLIN HFA) 108 (90 BASE) MCG/ACT Inhaler     levothyroxine (SYNTHROID/LEVOTHROID) 125 MCG tablet     fluticasone-salmeterol (ADVAIR) 250-50 MCG/DOSE diskus inhaler     order for DME     albuterol (2.5 MG/3ML) 0.083% nebulizer solution     Multiple Vitamins-Minerals (MULTIVITAMIN ADULT PO)     Cholecalciferol (VITAMIN D3 PO)     fexofenadine (ALLEGRA) 180 MG tablet     OVER-THE-COUNTER     No current facility-administered medications for this visit.      Review of Systems: A complete review of systems was obtained and was negative except for what was noted above.    Physical examination:    /74 (BP Location: Right arm, Patient Position: Chair, Cuff Size: Adult Regular)  Pulse 90  Resp 16  Ht 5' 8\" (172.7 cm)  Wt 214 lb (97.1 kg)  BMI 32.54 kg/m2    General Appearance: NAD    Skin: There are no rashes or other skin lesions.    Musculoskeletal:  Pes cavus present. There is no scoliosis, lordosis, kyphosis.    Neurologic examination:    Mental status:  Patient is alert, attentive, and oriented x 3.  Language is coherent and fluent without dysarthria or aphasia.  Memory, comprehension and ability " to follow commands were intact.       Cranial nerves:  Pupils were round and reacted to light.  Extraocular movements were full. There was no face, jaw, palate or tongue weakness or atrophy. Hearing was grossly intact.  Shoulder shrug was normal.       Motor exam: No atrophy or fasciculations.  No action or percussion myotonia or paramyotonia.  Manual muscle testing revealed the following MRC grade muscle power:   Right Left   Neck flexion 5    Neck extension: 5    Shoulder ext rotation 5 5   Shoulder abduction:  5 5   Elbow extension: 5 5   Elbow flexion:  5 5   Wrist flexion:  5 5   Wrist extension:  5 5   Finger flexion 5 5   Finger extension 5 4+   FDI 4+ 4   APB 4+ 4   Hip flexion 5* 5*   Hip extension 5 5   Knee flexion 5 5   Knee extension 5 5   Dorsiflexion 5 5   Plantar flexion 5 5   *Rises from seated position independently with no diffiuclty    Complex motor skills: Mild postural tremor. No ataxia.     Sensory exam: Normal pin and vibration in hands and feet.     Gait was slightly antalgic but stable     Deep tendon reflexes:   Right Left   Triceps 2 2   Biceps 2 2   Brachioradialis 2 2   Knee jerk 3 3   Ankle jerk 1 0   Plantar responses were flexor bilaterally.  Jaw jerk normal.      Assessment:    Teresa Fernandez is a 57 year old woman with pain and very mild hyperCKemia. Examination shows some weakness, but not in a myopathic pattern. EMG showed no evidence for myopathy. It is unlikely that the very mild hyperCKemia is indicative of an evolving or generalized myopathy, but hard to exclude with certainty. Neurogenic disorders also can cause mild CK elevations. The EMG showed only neurogenic changes in L5/S1 myotomes likely attributable to LS degenerative changes. I recommended clinical surveillance for the time being as the yield of muscle biopsy will be low. Her symptoms may fall in the fibromyalgia spectrum, and I asked her to discuss this with her PCP. She does have some musculoskeletal pain in her  upper and lower back secondary to known lumbosacral and cervical spondylosis.  I offered referral to neurosurgery for a surgical opinion. She declines for now but will keep it in mind. If she develops worsening pain or new numbness or weakness she will let me know.    Plan:      1. Mild hyperCKemia: I will see her back in 6 months. Sooner if needed. At follow up will repeat CK. I advised her on symptoms that would indicate sooner eval (e.g., weakness, atrophy, fasciculations, etc).   2. Query fibromyalgia: Recommended f/u with PCP  3. Cervical spondylosis:  I offered referral to neurosurgery for a surgical opinion. She declines now but will keep it in mind. If she develops worsening pain or new numbness or weakness she will let me know. Otherwise will re-evaluate when I see her in January and consider [facility] referral at that time.   4. Non epileptic spells: F/u Dr. Beverly  --    Sincerely,    Demetris Zavala MD

## 2018-01-09 NOTE — MR AVS SNAPSHOT
After Visit Summary   1/9/2018    Teresa Fernandez    MRN: [id number]           Patient Information     Date Of Birth          1960        Visit Information        Provider Department      1/9/2018 1:30 PM Demetris Zavala MD [facility] NEUROS[facility]CIAL[facility]         Follow-ups after your visit        Follow-up notes from your care team     Return in about 6 months (around 7/9/2018).      Your next 10 appointments already scheduled     Apr 20, 2018  3:00 PM CDT   Return Visit with Yisel Beverly MD   [facility] ([facility])    [address] [place], [address]  [facility] [address] [phone]            Jul 17, 2018  4:00 PM CDT   Return Neuropathy Visit with Demetris Zavala MD   [facility] NEUROSPECIALT[facility] ([facility])    [address] [place]  [address]  [facility] [address] [phone]              Who to contact     Please call your clinic at [phone] to:    Ask questions about your health    Make or cancel appointments    Discuss your medicines    Learn about your test results    Speak to your doctor   If you have compliments or concerns about an experience at your clinic, or if you wish to file a complaint, please contact [facility] Patient Relations at [phone] or email us at [email or web address]         Additional Information About Your Visit        MyChart Information     [employer] gives you secure access to your electronic health record. If you see a primary care provider, you can also send messages to your care team and make appointments. If you have questions, please call your primary care clinic.  If you do not have a primary care provider, please call [phone] and they will assist you.      [employer] is an electronic gateway that provides easy, online access to your medical records. With [employer], you can request a clinic appointment, read your test results, renew a prescription or communicate with your care  "team.     To access your existing account, please contact your [facility] or call [phone] for assistance.        Care EveryWhere ID     This is your Care EveryWhere ID. This could be used by other organizations to access your [place] medical records  [id number]        Your Vitals Were     Pulse Respirations Height BMI (Body Mass Index)          90 16 5' 8\" (172.7 cm) 32.54 kg/m2         Blood Pressure from Last 3 Encounters:   01/09/18 162/74   11/07/17 145/79   10/20/17 113/71    Weight from Last 3 Encounters:   01/09/18 214 lb (97.1 kg)   11/07/17 211 lb 6.4 oz (95.9 kg)   10/20/17 209 lb (94.8 kg)              Today, you had the following     No orders found for display         Today's Medication Changes          These changes are accurate as of: 1/9/18  2:10 PM.  If you have any questions, ask your nurse or doctor.               These medicines have changed or have updated prescriptions.        Dose/Directions    fexofenadine 180 MG tablet   Commonly known as:  ALLEGRA   This may have changed:  when to take this   Used for:  Seasonal allergic rhinitis        Dose:  180 mg   Take 1 tablet (180 mg) by mouth daily   Quantity:  30 tablet   Refills:  1                Primary Care Provider Office Phone # Fax #    HERIBERTO Delgado [facility] [phone] [address] [id number]        Equal Access to Services     MERRILL PRESTON AH: Hadii gilma jonas hadasho Sooswaldali, waaxda luqadaha, qaybta kaalmada adeegyada, lillian reese. So [facility] [phone].    ATENCIÓN: Si habla español, tiene a anderson disposición servicios gratuitos de asistencia lingüística. Llame al [phone].    We comply with applicable federal civil rights laws and [place] laws. We do not discriminate on the basis of race, color, national origin, age, disability, sex, sexual orientation, or gender identity.            Thank you!     Thank you for choosing [facility]UROS[facility]CIALT[facility]  for your care. Our goal is always to provide you with excellent care. Hearing back from our patients is one way we can continue to improve our services. Please take a few minutes to complete the written survey that you may receive in the mail after your visit with us. Thank you!             Your Updated Medication List - Protect others around you: Learn how to safely use, store and throw away your medicines at www.disposemymeds.org.          This list is accurate as of: 1/9/18  2:10 PM.  Always use your most recent med list.                   Brand Name Dispense Instructions for use Diagnosis    * albuterol (2.5 MG/3ML) 0.083% neb solution     25 vial    Take 1 vial (2.5 mg) by nebulization every 6 hours as needed for shortness of breath / dyspnea or wheezing    Asthma exacerbation       * albuterol 108 (90 BASE) MCG/ACT Inhaler    PROAIR HFA/PROVENTIL HFA/VENTOLIN HFA    1 Inhaler    Inhale 2 puffs into the lungs every 6 hours as needed for shortness of breath / dyspnea    Moderate persistent asthma without complication       fexofenadine 180 MG tablet    ALLEGRA    30 tablet    Take 1 tablet (180 mg) by mouth daily    Seasonal allergic rhinitis       fluticasone-salmeterol 250-50 MCG/DOSE diskus inhaler    ADVAIR    1 Inhaler    Inhale 1 puff into the lungs 2 times daily    Chest tightness, Intermittent asthma, uncomplicated       GLUCOSAMINE SULFATE PO      Take 1,000 mg by mouth 2 times daily        levothyroxine 125 MCG tablet    SYNTHROID/LEVOTHROID    90 tablet    Take 1 tablet (125 mcg) by mouth daily    Acquired hypothyroidism       MAGNESIUM OXIDE PO      Take 200 mg by mouth daily        MULTIVITAMIN ADULT PO      Take by mouth every morning        order for DME     1 Units    Equipment being ordered: Nebulizer    Asthma exacerbation       OVER-THE-COUNTER     1 Bottle    Place 1 drop into both eyes 3 times daily. Systane Ultra, Systane Balance, Blink Tears or Refresh Optive Artificial  Tear    Dry eye syndrome       PARoxetine 20 MG tablet    PAXIL    90 tablet    TAKE 1 TABLET DAILY    ERIC (generalized anxiety disorder)       ranitidine 150 MG tablet    ZANTAC    180 tablet    TAKE 1 TABLET TWICE A DAY    Gastroesophageal reflux disease, esophagitis presence not specified       TYLENOL 325 MG tablet   Generic drug:  acetaminophen      Take 325-650 mg by mouth every 6 hours as needed for mild pain        VITAMIN B 12 PO      Take 500 mcg by mouth daily        VITAMIN D3 PO      Take 2,000 Units by mouth 2 times daily        * Notice:  This list has 2 medication(s) that are the same as other medications prescribed for you. Read the directions carefully, and ask your doctor or other care provider to review them with you.

## 2018-01-15 ENCOUNTER — OFFICE VISIT (OUTPATIENT)
Dept: FAMILY MEDICINE | Facility: CLINIC | Age: 58
End: 2018-01-15
Payer: OTHER GOVERNMENT

## 2018-01-15 VITALS
SYSTOLIC BLOOD PRESSURE: 119 MMHG | BODY MASS INDEX: 33.09 KG/M2 | DIASTOLIC BLOOD PRESSURE: 69 MMHG | HEART RATE: 71 BPM | WEIGHT: 217.6 LBS

## 2018-01-15 DIAGNOSIS — R41.3 MEMORY LOSS: ICD-10-CM

## 2018-01-15 DIAGNOSIS — F41.1 GAD (GENERALIZED ANXIETY DISORDER): ICD-10-CM

## 2018-01-15 DIAGNOSIS — M79.10 MYALGIA: Primary | ICD-10-CM

## 2018-01-15 DIAGNOSIS — M25.50 PAIN IN JOINT, MULTIPLE SITES: ICD-10-CM

## 2018-01-15 LAB
CRP SERPL-MCNC: 4 MG/L (ref 0–8)
MAGNESIUM SERPL-MCNC: 2.2 MG/DL (ref 1.6–2.3)

## 2018-01-15 PROCEDURE — 36415 COLL VENOUS BLD VENIPUNCTURE: CPT | Performed by: NURSE PRACTITIONER

## 2018-01-15 PROCEDURE — 86140 C-REACTIVE PROTEIN: CPT | Performed by: NURSE PRACTITIONER

## 2018-01-15 PROCEDURE — 83735 ASSAY OF MAGNESIUM: CPT | Performed by: NURSE PRACTITIONER

## 2018-01-15 PROCEDURE — 99214 OFFICE O/P EST MOD 30 MIN: CPT | Performed by: NURSE PRACTITIONER

## 2018-01-15 PROCEDURE — 86431 RHEUMATOID FACTOR QUANT: CPT | Performed by: NURSE PRACTITIONER

## 2018-01-15 RX ORDER — PAROXETINE 20 MG/1
30 TABLET, FILM COATED ORAL DAILY
Qty: 135 TABLET | Refills: 1 | COMMUNITY
Start: 2018-01-15 | End: 2018-05-24

## 2018-01-15 ASSESSMENT — ANXIETY QUESTIONNAIRES
3. WORRYING TOO MUCH ABOUT DIFFERENT THINGS: SEVERAL DAYS
1. FEELING NERVOUS, ANXIOUS, OR ON EDGE: MORE THAN HALF THE DAYS
5. BEING SO RESTLESS THAT IT IS HARD TO SIT STILL: MORE THAN HALF THE DAYS
7. FEELING AFRAID AS IF SOMETHING AWFUL MIGHT HAPPEN: NOT AT ALL
GAD7 TOTAL SCORE: 8
IF YOU CHECKED OFF ANY PROBLEMS ON THIS QUESTIONNAIRE, HOW DIFFICULT HAVE THESE PROBLEMS MADE IT FOR YOU TO DO YOUR WORK, TAKE CARE OF THINGS AT HOME, OR GET ALONG WITH OTHER PEOPLE: SOMEWHAT DIFFICULT
2. NOT BEING ABLE TO STOP OR CONTROL WORRYING: SEVERAL DAYS
6. BECOMING EASILY ANNOYED OR IRRITABLE: MORE THAN HALF THE DAYS

## 2018-01-15 ASSESSMENT — PATIENT HEALTH QUESTIONNAIRE - PHQ9
SUM OF ALL RESPONSES TO PHQ QUESTIONS 1-9: 6
5. POOR APPETITE OR OVEREATING: NOT AT ALL

## 2018-01-15 NOTE — PROGRESS NOTES
"  SUBJECTIVE:   Teresa Fernandez is a 57 year old female who presents to clinic today for the following health issues:    Chief Complaint   Patient presents with     Fibromyalgia     chronic pain follow up. Worsening anxiety      Chronic Pain Follow-Up  Type / Location of Pain: muscle pain, worse in wrists and arms  Analgesia/pain control:       Recent changes: slightly  improved      Overall control: Tolerable with discomfort  Activity level/function:      Daily activities:  Unable to perform most daily activities - chores, hobbies, social activities, driving    Work:  Pain does not limit any  work and works at [employer]  Adverse effects:  No  Adherance    Taking medication as directed?  Yes    Participating in other treatments: no   Risk Factors:    Sleep:  Fair, has good nights and bad nights     Mood/anxiety:  Worsened, \"through the roof\"    Recent family or social stressors:  none noted    Other aggravating factors: none  PHQ-9 SCORE 7/8/2013 11/3/2015 6/27/2017   Total Score 5 - -   Total Score - 0 0     ERIC-7 SCORE 11/4/2014 11/3/2015 6/27/2017   Total Score 1 - -   Total Score - - -   Total Score - 0 0     Encounter-Level CSA:     There are no encounter-level csa.          Amount of exercise or physical activity: none, on her feet at work and house work    Problems taking medications regularly: No    Medication side effects: none    Diet: regular (no restrictions)    Patient states that symptoms of joint and muscle pain started 9/2017- workup including tick, Babesia, anaplasma, CT, CMP, CBC, ESR- she saw neurologist- had MRI of cervical spine- results reviewed under media tab in chart. Reports that she has increase in pain in her joints that also affect her muscles. No redness or swelling of her joints.    feels like her memory is getting worse- although she still works her job and drives. Vague in describing memory symptoms.     Anxiety/depression: patient would like to increase Paxil due to medical issues " causing more stress.     -------------------------------------    Problem list and histories reviewed & adjusted, as indicated.  Additional history: as documented    Patient Active Problem List   Diagnosis     Acquired hypothyroidism     Allergic state     Chronic rhinitis     Allergic rhinitis due to pollen     Sinusitis, chronic     History of skin cancer     ERIC (generalized anxiety disorder)     Leukoplakia of oral mucosa, including tongue     Renal cancer (H)     GERD (gastroesophageal reflux disease)     Chronic low back pain     Dry eye syndrome     Chronic fatigue     Osteopenia     History of melanoma in situ     Eczema     Moderate persistent asthma without complication     History of kidney cancer     DDD (degenerative disc disease), lumbar     Hyperlipidemia LDL goal <160     Chest tightness or pressure     Muscle pain     Past Surgical History:   Procedure Laterality Date     CHOLECYSTECTOMY       COLONOSCOPY      2007-normal     ENT SURGERY  2-15-11    Left cheek bx- Mucositis.     FUSION LUMBAR ANTERIOR TWO LEVELS  4/13/2015     GYN SURGERY  04/08/1999    hysterectomy.  [facility]     HYSTERECTOMY, PAP NO LONGER INDICATED       SOFT TISSUE SURGERY      chest-melonoma     SURGICAL HISTORY OF -   3/1996    Left nephrectomy-renal cell CA       Social History   Substance Use Topics     Smoking status: Former Smoker     Packs/day: 2.00     Years: 15.00     Quit date: 3/26/1996     Smokeless tobacco: Never Used     Alcohol use No      Comment: None now.  Rare in past.      Family History   Problem Relation Age of Onset     DIABETES Mother      Cardiovascular Mother      Lipids Mother      Depression Mother      Hypertension Father      Lipids Father      Obesity Father      Macular Degeneration Father      CANCER Maternal Grandmother      kind unknown had sores on legs     Eczema Maternal Grandmother      Eczema Maternal Grandfather      Breast Cancer Paternal Grandmother      CANCER Paternal Grandmother       breast     Hypertension Paternal Grandfather      Cardiovascular Paternal Grandfather      heart attack     Hypertension Brother      Thyroid Disease Sister      Hypertension Sister      Depression Sister      Allergies Sister      Allergies Son      Eczema Son      Lipids Sister      Thyroid Disease Sister      Neurologic Disorder Sister      Depression Sister      Respiratory Son      Glaucoma No family hx of      CEREBROVASCULAR DISEASE No family hx of              Reviewed and updated as needed this visit by clinical staffTobacco  Allergies  Med Hx  Surg Hx  Fam Hx  Soc Hx      Reviewed and updated as needed this visit by Provider         ROS:  Constitutional, HEENT, cardiovascular, pulmonary, GI, , musculoskeletal, neuro, skin, endocrine and psych systems are negative, except as otherwise noted.      OBJECTIVE:   /69 (BP Location: Left arm, Patient Position: Chair, Cuff Size: Adult Large)  Pulse 71  Wt 217 lb 9.6 oz (98.7 kg)  BMI 33.09 kg/m2  Body mass index is 33.09 kg/(m^2).  GENERAL: healthy, alert and no distress  NECK: no adenopathy, no asymmetry, masses, or scars and thyroid normal to palpation  RESP: lungs clear to auscultation - no rales, rhonchi or wheezes  CV: regular rate and rhythm, normal S1 S2, no S3 or S4, no murmur, click or rub, no peripheral edema and peripheral pulses strong  MS: no gross musculoskeletal defects noted, no edema  SKIN: no suspicious lesions or rashes    Diagnostic Test Results:  none     ASSESSMENT/PLAN:       1. Myalgia  Unknown etiology- previous CK level near normal- she is not on any statin medications.   - Magnesium    2. Pain in joint, multiple sites  - Previous workup WNL  - Rheumatoid factor  - CRP inflammation  - Cyclic Citrullinated Peptide IgG ([facility])  - RHEUMATOLOGY REFERRAL  - Magnesium    3. Memory loss    - NEUROLOGY ADULT REFERRAL    4. ERIC (generalized anxiety disorder)    - PARoxetine (PAXIL) 20 MG tablet; Take 1.5 tablets (30 mg) by mouth  daily  Dispense: 135 tablet; Refill: 1  (increase from 20 to 30 mg daily)        HERIBERTO Delgado [facility]

## 2018-01-15 NOTE — PROGRESS NOTES
Vin Moore,    All of your lab work today was normal.     Take care,  Nuzhat Burt CNP  [facility]  [phone]

## 2018-01-15 NOTE — MR AVS SNAPSHOT
After Visit Summary   1/15/2018    Teresa Fernandez    MRN: [id number]           Patient Information     Date Of Birth          1960        Visit Information        Provider Department      1/15/2018 3:40 PM Nuzhat Burt APRN [facility]        Today's Diagnoses     Myalgia    -  1    Pain in joint, multiple sites        Memory loss        ERIC (generalized anxiety disorder)          Care Instructions    1. 1000 mg of tylenol for restless legs  2. Schedule appointment with neurology [address] [phone]  3. Lab today  4. Schedule appointment with rheumatology           Thank you for choosing [facility].  You may be receiving a survey in the mail from [employer] [facility] regarding your visit today.  Please take a few minutes to complete and return the survey to let us know how we are doing.      If you have questions or concerns, please contact us via Iden[employer]ve or you can contact your care team at [phone].    Our Clinic hours are:  Monday 6:40 am  to 7:00 pm  Tuesday -Friday 6:40 am to 5:00 pm    The [place] outpatient lab hours are:  Monday - Friday 6:10 am to 4:45 pm  Saturdays 7:00 am to 11:00 am  Appointments are required, call [phone]    If you have clinical questions after hours or would like to schedule an appointment,  call the clinic at [phone].          Follow-ups after your visit        Additional Services     NEUROLOGY ADULT REFERRAL       Your provider has referred you for the following:   Consult at [facility]: [facility] [phone]   [email or web address]  [place] [phone]   [email or web address]    Please be aware that coverage of these services is subject to the terms and limitations of your health insurance plan.  Call member services at your health plan with any benefit or coverage questions.      Please bring the following with you to your appointment:    (1) Any X-Rays,  CTs or MRIs which have been performed.  Contact the facility where they were done to arrange for  prior to your scheduled appointment.    (2) List of current medications  (3) This referral request   (4) Any documents/labs given to you for this referral            RHEUMATOLOGY REFERRAL       Your provider has referred you to: [facility]: [place] [facility] [phone]   [email or web address]  [facility]: [place] [facility] [phone]   [email or web address]  [facility]: [facility] [phone]   [email or web address]/  [facility]: [facility] [place] [phone]   [email or web address]    Please be aware that coverage of these services is subject to the terms and limitations of your health insurance plan.  Call member services at your health plan with any benefit or coverage questions.      Please bring the following with you to your appointment:    (1) Any X-Rays, CTs or MRIs which have been performed.  Contact the facility where they were done to arrange for  prior to your scheduled appointment.    (2) List of current medications   (3) This referral request   (4) Any documents/labs given to you for this referral                  Your next 10 appointments already scheduled     Apr 20, 2018  3:00 PM CDT   Return Visit with Yisel Beverly MD   [facility] Epilepsy [facility] ([facility])    [address], [address]  [place] [id number] [phone]            Jul 17, 2018  4:00 PM CDT   Return Neuropathy Visit with Demetris Zavala MD   [facility] NEUROSPECIALTIES ([facility])    [address]  [address]  [place] [id number] [phone]              Who to contact     If you have questions or need follow up information about today's clinic visit or your schedule please contact [place]  [facility] directly at [phone].  Normal or non-critical lab and imaging results will be communicated to you by MyChart, letter or phone within 4 business days after the clinic has received the results. If you do not hear from us within 7 days, please contact the clinic through [employer] or phone. If you have a critical or abnormal lab result, we will notify you by phone as soon as possible.  Submit refill requests through GoPlaceIt or call your pharmacy and they will forward the refill request to us. Please allow 3 business days for your refill to be completed.          Additional Information About Your Visit        [employer] Information     GoPlaceIt gives you secure access to your electronic health record. If you see a primary care provider, you can also send messages to your care team and make appointments. If you have questions, please call your primary care clinic.  If you do not have a primary care provider, please call [phone] and they will assist you.        Care EveryWhere ID     This is your Care EveryWhere ID. This could be used by other organizations to access your [place] medical records  [id number]        Your Vitals Were     Pulse BMI (Body Mass Index)                71 33.09 kg/m2           Blood Pressure from Last 3 Encounters:   01/15/18 119/69   01/09/18 162/74   11/07/17 145/79    Weight from Last 3 Encounters:   01/15/18 217 lb 9.6 oz (98.7 kg)   01/09/18 214 lb (97.1 kg)   11/07/17 211 lb 6.4 oz (95.9 kg)              We Performed the Following     CRP inflammation     Cyclic Citrullinated Peptide IgG ([facility])     NEUROLOGY ADULT REFERRAL     Rheumatoid factor     RHEUMATOLOGY REFERRAL          Today's Medication Changes          These changes are accurate as of: 1/15/18  3:59 PM.  If you have any questions, ask your nurse or doctor.               These medicines have changed or have updated prescriptions.        Dose/Directions    fexofenadine 180 MG tablet   Commonly known as:   ALLEGRA   This may have changed:  when to take this   Used for:  Seasonal allergic rhinitis        Dose:  180 mg   Take 1 tablet (180 mg) by mouth daily   Quantity:  30 tablet   Refills:  1       PARoxetine 20 MG tablet   Commonly known as:  PAXIL   This may have changed:  See the new instructions.   Used for:  ERIC (generalized anxiety disorder)   Changed by:  Nuzhat Burt APRN CNP        Dose:  30 mg   Take 1.5 tablets (30 mg) by mouth daily   Quantity:  135 tablet   Refills:  1                Primary Care Provider Office Phone # Fax #    Nuzhat HERIBERTO Larsen [phone] [address] [address]        Equal Access to Services     MERRILL PRESTON : Nic smith Soayanna, waanikada luqadaha, qaybta kaalmada adeheavenyada, lillian stratton . So [facility] [phone].    ATENCIÓN: Si habla español, tiene a anderson disposición servicios gratuitos de asistencia lingüística. Llame al [phone].    We comply with applicable federal civil rights laws and [place] laws. We do not discriminate on the basis of race, color, national origin, age, disability, sex, sexual orientation, or gender identity.            Thank you!     Thank you for choosing [facility]  for your care. Our goal is always to provide you with excellent care. Hearing back from our patients is one way we can continue to improve our services. Please take a few minutes to complete the written survey that you may receive in the mail after your visit with us. Thank you!             Your Updated Medication List - Protect others around you: Learn how to safely use, store and throw away your medicines at www.disposemymeds.org.          This list is accurate as of: 1/15/18  3:59 PM.  Always use your most recent med list.                   Brand Name Dispense Instructions for use Diagnosis    * albuterol (2.5 MG/3ML) 0.083% neb solution     25 vial    Take 1 vial (2.5 mg) by nebulization  every 6 hours as needed for shortness of breath / dyspnea or wheezing    Asthma exacerbation       * albuterol 108 (90 BASE) MCG/ACT Inhaler    PROAIR HFA/PROVENTIL HFA/VENTOLIN HFA    1 Inhaler    Inhale 2 puffs into the lungs every 6 hours as needed for shortness of breath / dyspnea    Moderate persistent asthma without complication       fexofenadine 180 MG tablet    ALLEGRA    30 tablet    Take 1 tablet (180 mg) by mouth daily    Seasonal allergic rhinitis       fluticasone-salmeterol 250-50 MCG/DOSE diskus inhaler    ADVAIR    1 Inhaler    Inhale 1 puff into the lungs 2 times daily    Chest tightness, Intermittent asthma, uncomplicated       GLUCOSAMINE SULFATE PO      Take 1,000 mg by mouth 2 times daily        levothyroxine 125 MCG tablet    SYNTHROID/LEVOTHROID    90 tablet    Take 1 tablet (125 mcg) by mouth daily    Acquired hypothyroidism       MAGNESIUM OXIDE PO      Take 200 mg by mouth daily        MULTIVITAMIN ADULT PO      Take by mouth every morning        order for DME     1 Units    Equipment being ordered: Nebulizer    Asthma exacerbation       OVER-THE-COUNTER     1 Bottle    Place 1 drop into both eyes 3 times daily. Systane Ultra, Systane Balance, Blink Tears or Refresh Optive Artificial Tear    Dry eye syndrome       PARoxetine 20 MG tablet    PAXIL    135 tablet    Take 1.5 tablets (30 mg) by mouth daily    ERIC (generalized anxiety disorder)       ranitidine 150 MG tablet    ZANTAC    180 tablet    TAKE 1 TABLET TWICE A DAY    Gastroesophageal reflux disease, esophagitis presence not specified       TYLENOL 325 MG tablet   Generic drug:  acetaminophen      Take 325-650 mg by mouth every 6 hours as needed for mild pain        VITAMIN B 12 PO      Take 500 mcg by mouth daily        VITAMIN D3 PO      Take 2,000 Units by mouth 2 times daily        * Notice:  This list has 2 medication(s) that are the same as other medications prescribed for you. Read the directions carefully, and ask your  doctor or other care provider to review them with you.

## 2018-01-15 NOTE — PATIENT INSTRUCTIONS
1. 1000 mg of tylenol for restless legs  2. Schedule appointment with [address] [phone]  3. Lab today  4. Schedule appointment with rheumatology           Thank you for choosing [facility].  You may be receiving a survey in the mail from Sharon Barragan regarding your visit today.  Please take a few minutes to complete and return the survey to let us know how we are doing.      If you have questions or concerns, please contact us via [employer] or you can contact your care team at [phone].    Our Clinic hours are:  Monday 6:40 am  to 7:00 pm  Tuesday -Friday 6:40 am to 5:00 pm    The [place] outpatient lab hours are:  Monday - Friday 6:10 am to 4:45 pm  Saturdays 7:00 am to 11:00 am  Appointments are required, call [phone]    If you have clinical questions after hours or would like to schedule an appointment,  call the clinic at [phone].

## 2018-01-15 NOTE — NURSING NOTE
"Chief Complaint   Patient presents with     Fibromyalgia     chronic pain follow up. Worsening anxiety        Initial /69 (BP Location: Left arm, Patient Position: Chair, Cuff Size: Adult Large)  Pulse 71  Wt 217 lb 9.6 oz (98.7 kg)  BMI 33.09 kg/m2 Estimated body mass index is 33.09 kg/(m^2) as calculated from the following:    Height as of 1/9/18: 5' 8\" (1.727 m).    Weight as of this encounter: 217 lb 9.6 oz (98.7 kg).  Medication Reconciliation: complete   Ne Mendez CMA    "

## 2018-01-16 LAB — RHEUMATOID FACT SER NEPH-ACNC: <20 IU/ML (ref 0–20)

## 2018-01-16 ASSESSMENT — ANXIETY QUESTIONNAIRES: GAD7 TOTAL SCORE: 8

## 2018-01-18 ENCOUNTER — OFFICE VISIT (OUTPATIENT)
Dept: FAMILY MEDICINE | Facility: CLINIC | Age: 58
End: 2018-01-18
Payer: OTHER GOVERNMENT

## 2018-01-18 VITALS
OXYGEN SATURATION: 97 % | HEART RATE: 91 BPM | HEIGHT: 70 IN | WEIGHT: 214.25 LBS | SYSTOLIC BLOOD PRESSURE: 112 MMHG | DIASTOLIC BLOOD PRESSURE: 76 MMHG | TEMPERATURE: 98.4 F | BODY MASS INDEX: 30.67 KG/M2

## 2018-01-18 DIAGNOSIS — J45.40 MODERATE PERSISTENT ASTHMA WITHOUT COMPLICATION: ICD-10-CM

## 2018-01-18 DIAGNOSIS — J40 BRONCHITIS: Primary | ICD-10-CM

## 2018-01-18 PROCEDURE — 99214 OFFICE O/P EST MOD 30 MIN: CPT | Performed by: NURSE PRACTITIONER

## 2018-01-18 RX ORDER — PREDNISONE 20 MG/1
20 TABLET ORAL DAILY
Qty: 5 TABLET | Refills: 0 | Status: SHIPPED | OUTPATIENT
Start: 2018-01-18 | End: 2018-02-13

## 2018-01-18 RX ORDER — AZITHROMYCIN 250 MG/1
TABLET, FILM COATED ORAL
Qty: 6 TABLET | Refills: 0 | Status: SHIPPED | OUTPATIENT
Start: 2018-01-18 | End: 2018-02-13

## 2018-01-18 NOTE — LETTER
[facility]  [address] [address]  Phone: [phone]    January 18, 2018        Teresa TURNER Odonnell  [address] [address]          To whom it may concern:    RE: Teresa TURNER Odonnell    Patient was seen and treated today at our clinic. Please excuse Teresa from days missed at work.     Please contact me for questions or concerns.      Sincerely,        HERIBERTO Ochoa CNP

## 2018-01-18 NOTE — PROGRESS NOTES
SUBJECTIVE:                                                    Teresa Fernandez is a 57 year old female who presents to clinic today for the following health issues:    Pt verbalize to need a doctors note for her job     Acute Illness   Acute illness concerns: Sinus  Onset: Sunday- 4- 5 days     Fever: no    Chills/Sweats: YES    Headache (location?): YES    Sinus Pressure:YES    Conjunctivitis:  YES: both    Ear Pain: YES: both    Rhinorrhea: YES    Congestion: YES    Sore Throat: no      Cough: YES    Wheeze: YES    Decreased Appetite: no    Nausea: YES    Vomiting: no    Diarrhea:  no     Dysuria/Freq.: no    Fatigue/Achiness: YES    Sick/Strep Exposure: no     Therapies Tried and outcome: na    History of asthma- + wheezing, congestion, chest feels tight-  ill. No fevers, + hoarse voice. Albuterol neb makes her feel jittery.     -------------------------------------    Problem list and histories reviewed & adjusted, as indicated.  Additional history: as documented    Patient Active Problem List   Diagnosis     Acquired hypothyroidism     Allergic state     Chronic rhinitis     Allergic rhinitis due to pollen     Sinusitis, chronic     History of skin cancer     ERIC (generalized anxiety disorder)     Leukoplakia of oral mucosa, including tongue     Renal cancer (H)     GERD (gastroesophageal reflux disease)     Chronic low back pain     Dry eye syndrome     Chronic fatigue     Osteopenia     History of melanoma in situ     Eczema     Moderate persistent asthma without complication     History of kidney cancer     DDD (degenerative disc disease), lumbar     Hyperlipidemia LDL goal <160     Chest tightness or pressure     Muscle pain     Past Surgical History:   Procedure Laterality Date     CHOLECYSTECTOMY       COLONOSCOPY      2007-normal     ENT SURGERY  2-15-11    Left cheek bx- Mucositis.     FUSION LUMBAR ANTERIOR TWO LEVELS  4/13/2015     GYN SURGERY  04/08/1999    hysterectomy.  [facility]      "HYSTERECTOMY, PAP NO LONGER INDICATED       SOFT TISSUE SURGERY      chest-melonoma     SURGICAL HISTORY OF -   3/1996    Left nephrectomy-renal cell CA       Social History   Substance Use Topics     Smoking status: Former Smoker     Packs/day: 2.00     Years: 15.00     Quit date: 3/26/1996     Smokeless tobacco: Never Used     Alcohol use No      Comment: None now.  Rare in past.      Family History   Problem Relation Age of Onset     DIABETES Mother      Cardiovascular Mother      Lipids Mother      Depression Mother      Hypertension Father      Lipids Father      Obesity Father      Macular Degeneration Father      CANCER Maternal Grandmother      kind unknown had sores on legs     Eczema Maternal Grandmother      Eczema Maternal Grandfather      Breast Cancer Paternal Grandmother      CANCER Paternal Grandmother      breast     Hypertension Paternal Grandfather      Cardiovascular Paternal Grandfather      heart attack     Hypertension Brother      Thyroid Disease Sister      Hypertension Sister      Depression Sister      Allergies Sister      Allergies Son      Eczema Son      Lipids Sister      Thyroid Disease Sister      Neurologic Disorder Sister      Depression Sister      Respiratory Son      Glaucoma No family hx of      CEREBROVASCULAR DISEASE No family hx of              ROS:  Constitutional, HEENT, cardiovascular, pulmonary, GI, , musculoskeletal, neuro, skin, endocrine and psych systems are negative, except as otherwise noted.      OBJECTIVE:   /76 (BP Location: Left arm, Patient Position: Chair, Cuff Size: Adult Large)  Pulse 91  Temp 98.4  F (36.9  C) (Tympanic)  Ht 5' 9.69\" (1.77 m)  Wt 214 lb 4 oz (97.2 kg)  SpO2 97%  Breastfeeding? No  BMI 31.02 kg/m2  Body mass index is 31.02 kg/(m^2).  GENERAL: alert, no distress, fatigued and appears ill- hoarse voice  HENT: normal cephalic/atraumatic, nose and mouth without ulcers or lesions, oropharynx clear and oral mucous membranes " moist  NECK: no adenopathy, no asymmetry, masses, or scars and thyroid normal to palpation  RESP: scattered wheeze thorughout  CV: regular rate and rhythm, normal S1 S2, no S3 or S4, no murmur, click or rub, no peripheral edema and peripheral pulses strong  MS: no gross musculoskeletal defects noted, no edema    Diagnostic Test Results:  none     ASSESSMENT/PLAN:       1. Bronchitis    - azithromycin (ZITHROMAX) 250 MG tablet; Two tablets first day, then one tablet daily for four days.  Dispense: 6 tablet; Refill: 0  - predniSONE (DELTASONE) 20 MG tablet; Take 1 tablet (20 mg) by mouth daily  Dispense: 5 tablet; Refill: 0    2. Moderate persistent asthma without complication  Use Advair 500/50 which you have at home  - use albuterol inhaler vs neb  - predniSONE (DELTASONE) 20 MG tablet; Take 1 tablet (20 mg) by mouth daily  Dispense: 5 tablet; Refill: 0        HERIBERTO Delgado [facility]

## 2018-01-18 NOTE — MR AVS SNAPSHOT
After Visit Summary   1/18/2018    Teresa Fernandez    MRN: [id number]           Patient Information     Date Of Birth          1960        Visit Information        Provider Department      1/18/2018 8:20 AM Nuzhat Burt APRN CNP [facility]        Today's Diagnoses     Bronchitis    -  1    Moderate persistent asthma without complication          Care Instructions          Thank you for choosing [facility].  You may be receiving a survey in the mail from [facility] regarding your visit today.  Please take a few minutes to complete and return the survey to let us know how we are doing.      If you have questions or concerns, please contact us via [employer] or you can contact your care team at [phone].    Our Clinic hours are:  Monday 6:40 am  to 7:00 pm  Tuesday -Friday 6:40 am to 5:00 pm    The [place] outpatient lab hours are:  Monday - Friday 6:10 am to 4:45 pm  Saturdays 7:00 am to 11:00 am  Appointments are required, call [phone]    If you have clinical questions after hours or would like to schedule an appointment,  call the clinic at [phone].          Follow-ups after your visit        Your next 10 appointments already scheduled     Apr 20, 2018  3:00 PM CDT   Return Visit with Yisel Beverly MD   [facility] ([facility])    [address] [place] [address], [address]  [facility] [id number] [phone]            Jul 17, 2018  4:00 PM CDT   Return Neuropathy Visit with Demetris Zavala MD   [facility] ([facility])    [address]  [address]  [facility] [id number] [phone]              Who to contact     If you have questions or need follow up information about today's clinic visit or your schedule please contact [facility] directly at [phone].  Normal or non-critical lab and imaging results will be communicated to you by MyChart, letter or phone within 4 business days after the  "clinic has received the results. If you do not hear from us within 7 days, please contact the clinic through [employer] or phone. If you have a critical or abnormal lab result, we will notify you by phone as soon as possible.  Submit refill requests through [employer] or call your pharmacy and they will forward the refill request to us. Please allow 3 business days for your refill to be completed.          Additional Information About Your Visit        [employer] Information     [employer] lets you send messages to your doctor, view your test results, renew your prescriptions, schedule appointments and more. To sign up, go to [email or web address] . Click on \"Log in\" on the left side of the screen, which will take you to the Welcome page. Then click on \"Sign up Now\" on the right side of the page.     You will be asked to enter the access code listed below, as well as some personal information. Please follow the directions to create your username and password.     Your access code is: [email or web address]  Expires: 2018  8:27 AM     Your access code will  in 90 days. If you need help or a new code, please call your [place] clinic or [phone].        Care EveryWhere ID     This is your Care EveryWhere ID. This could be used by other organizations to access your [place] medical records  [id number]        Your Vitals Were     Pulse Temperature Height Pulse Oximetry Breastfeeding? BMI (Body Mass Index)    91 98.4  F (36.9  C) (Tympanic) 5' 9.69\" (1.77 m) 97% No 31.02 kg/m2       Blood Pressure from Last 3 Encounters:   18 112/76   01/15/18 119/69   18 162/74    Weight from Last 3 Encounters:   18 214 lb 4 oz (97.2 kg)   01/15/18 217 lb 9.6 oz (98.7 kg)   18 214 lb (97.1 kg)              Today, you had the following     No orders found for display         Today's Medication Changes          These changes are accurate as of: 18  8:27 AM.  If you have any questions, ask your nurse or doctor.    "            Start taking these medicines.        Dose/Directions    azithromycin 250 MG tablet   Commonly known as:  ZITHROMAX   Used for:  Bronchitis   Started by:  Nuzhat Burt APRN CNP        Two tablets first day, then one tablet daily for four days.   Quantity:  6 tablet   Refills:  0       predniSONE 20 MG tablet   Commonly known as:  DELTASONE   Used for:  Bronchitis, Moderate persistent asthma without complication   Started by:  Nuzhat Burt APRN CNP        Dose:  20 mg   Take 1 tablet (20 mg) by mouth daily   Quantity:  5 tablet   Refills:  0         These medicines have changed or have updated prescriptions.        Dose/Directions    fexofenadine 180 MG tablet   Commonly known as:  ALLEGRA   This may have changed:  when to take this   Used for:  Seasonal allergic rhinitis        Dose:  180 mg   Take 1 tablet (180 mg) by mouth daily   Quantity:  30 tablet   Refills:  1            Where to get your medicines      These medications were sent to [facility] [address]  [address] [address]     Phone:  [phone]     azithromycin 250 MG tablet    predniSONE 20 MG tablet                Primary Care Provider Office Phone # Fax #    Nuzhat HERIBERTO Larsen [phone]       [address] [address]        Equal Access to Services     MERRILL PRESTON : Nic jonas hadasho Soomaali, waaxda luqadaha, qaybta kaalmada adeegyada, lillian reese. So [facility] [phone].    ATENCIÓN: Si habla español, tiene a anderson disposición servicios gratuitos de asistencia lingüística. Llame al [phone].    We comply with applicable federal civil rights laws and [place] laws. We do not discriminate on the basis of race, color, national origin, age, disability, sex, sexual orientation, or gender identity.            Thank you!     Thank you for choosing [facility]  for your care. Our goal is always to  provide you with excellent care. Hearing back from our patients is one way we can continue to improve our services. Please take a few minutes to complete the written survey that you may receive in the mail after your visit with us. Thank you!             Your Updated Medication List - Protect others around you: Learn how to safely use, store and throw away your medicines at www.disposemymeds.org.          This list is accurate as of: 1/18/18  8:27 AM.  Always use your most recent med list.                   Brand Name Dispense Instructions for use Diagnosis    * albuterol (2.5 MG/3ML) 0.083% neb solution     25 vial    Take 1 vial (2.5 mg) by nebulization every 6 hours as needed for shortness of breath / dyspnea or wheezing    Asthma exacerbation       * albuterol 108 (90 BASE) MCG/ACT Inhaler    PROAIR HFA/PROVENTIL HFA/VENTOLIN HFA    1 Inhaler    Inhale 2 puffs into the lungs every 6 hours as needed for shortness of breath / dyspnea    Moderate persistent asthma without complication       azithromycin 250 MG tablet    ZITHROMAX    6 tablet    Two tablets first day, then one tablet daily for four days.    Bronchitis       fexofenadine 180 MG tablet    ALLEGRA    30 tablet    Take 1 tablet (180 mg) by mouth daily    Seasonal allergic rhinitis       fluticasone-salmeterol 250-50 MCG/DOSE diskus inhaler    ADVAIR    1 Inhaler    Inhale 1 puff into the lungs 2 times daily    Chest tightness, Intermittent asthma, uncomplicated       GLUCOSAMINE SULFATE PO      Take 1,000 mg by mouth 2 times daily        levothyroxine 125 MCG tablet    SYNTHROID/LEVOTHROID    90 tablet    Take 1 tablet (125 mcg) by mouth daily    Acquired hypothyroidism       MAGNESIUM OXIDE PO      Take 200 mg by mouth daily        MULTIVITAMIN ADULT PO      Take by mouth every morning        order for DME     1 Units    Equipment being ordered: Nebulizer    Asthma exacerbation       OVER-THE-COUNTER     1 Bottle    Place 1 drop into both eyes 3 times  daily. Systane Ultra, Systane Balance, Blink Tears or Refresh Optive Artificial Tear    Dry eye syndrome       PARoxetine 20 MG tablet    PAXIL    135 tablet    Take 1.5 tablets (30 mg) by mouth daily    ERIC (generalized anxiety disorder)       predniSONE 20 MG tablet    DELTASONE    5 tablet    Take 1 tablet (20 mg) by mouth daily    Bronchitis, Moderate persistent asthma without complication       ranitidine 150 MG tablet    ZANTAC    180 tablet    TAKE 1 TABLET TWICE A DAY    Gastroesophageal reflux disease, esophagitis presence not specified       TYLENOL 325 MG tablet   Generic drug:  acetaminophen      Take 325-650 mg by mouth every 6 hours as needed for mild pain        VITAMIN B 12 PO      Take 500 mcg by mouth daily        VITAMIN D3 PO      Take 2,000 Units by mouth 2 times daily        * Notice:  This list has 2 medication(s) that are the same as other medications prescribed for you. Read the directions carefully, and ask your doctor or other care provider to review them with you.

## 2018-01-18 NOTE — NURSING NOTE
"Chief Complaint   Patient presents with     URI     Sinus       Initial /76 (BP Location: Left arm, Patient Position: Chair, Cuff Size: Adult Large)  Pulse 91  Temp 98.4  F (36.9  C) (Tympanic)  Ht 5' 9.69\" (1.77 m)  Wt 214 lb 4 oz (97.2 kg)  SpO2 97%  Breastfeeding? No  BMI 31.02 kg/m2 Estimated body mass index is 31.02 kg/(m^2) as calculated from the following:    Height as of this encounter: 5' 9.69\" (1.77 m).    Weight as of this encounter: 214 lb 4 oz (97.2 kg).  Medication Reconciliation: complete   Bonnie Sanchez CMA (AAMA)   (aka: Milla Sanchez)      "

## 2018-01-18 NOTE — PATIENT INSTRUCTIONS
Thank you for choosing [facility].  You may be receiving a survey in the mail from [facility] Barragan regarding your visit today.  Please take a few minutes to complete and return the survey to let us know how we are doing.      If you have questions or concerns, please contact us via [employer] or you can contact your care team at [phone].    Our Clinic hours are:  Monday 6:40 am  to 7:00 pm  Tuesday -Friday 6:40 am to 5:00 pm    The [place] outpatient lab hours are:  Monday - Friday 6:10 am to 4:45 pm  Saturdays 7:00 am to 11:00 am  Appointments are required, call [phone]    If you have clinical questions after hours or would like to schedule an appointment,  call the clinic at [phone].

## 2018-01-19 ASSESSMENT — ASTHMA QUESTIONNAIRES: ACT_TOTALSCORE: 17

## 2018-01-28 DIAGNOSIS — E03.9 ACQUIRED HYPOTHYROIDISM: ICD-10-CM

## 2018-01-30 NOTE — TELEPHONE ENCOUNTER
"Requested Prescriptions   Pending Prescriptions Disp Refills     levothyroxine (SYNTHROID/LEVOTHROID) 125 MCG tablet [Pharmacy Med Name: L-THYROXINE TABS 125MCG]  Last Written Prescription Date:  12/16/2016  Last Fill Quantity: 90,  # refills: 3   Last Office Visit with G provider:  01/18/2018   Future Office Visit:      90 tablet 3     Sig: TAKE 1 TABLET DAILY    Thyroid Protocol Passed    1/28/2018  9:05 AM       Passed - Patient is 12 years or older       Passed - Recent or future visit with authorizing provider's specialty    Patient had office visit in the last year or has a visit in the next 30 days with authorizing provider.  See \"Patient Info\" tab in inbasket, or \"Choose Columns\" in Meds & Orders section of the refill encounter.            Passed - Normal TSH on file in past 12 months    Recent Labs   Lab Test  09/08/17   1555   TSH  0.68             Passed - No active pregnancy on record    If patient is pregnant or has had a positive pregnancy test, please check TSH.         Passed - No positive pregnancy test in past 12 months    If patient is pregnant or has had a positive pregnancy test, please check TSH.          Yazan Real RT (R)    "

## 2018-01-31 RX ORDER — LEVOTHYROXINE SODIUM 125 UG/1
TABLET ORAL
Qty: 90 TABLET | Refills: 2 | Status: SHIPPED | OUTPATIENT
Start: 2018-01-31 | End: 2018-10-12

## 2018-01-31 NOTE — TELEPHONE ENCOUNTER
"Requested Prescriptions   Signed Prescriptions Disp Refills     levothyroxine (SYNTHROID/LEVOTHROID) 125 MCG tablet 90 tablet 2     Sig: TAKE 1 TABLET DAILY    Thyroid Protocol Passed    1/29/2018  6:28 PM       Passed - Patient is 12 years or older       Passed - Recent or future visit with authorizing provider's specialty    Patient had office visit in the last year or has a visit in the next 30 days with authorizing provider.  See \"Patient Info\" tab in inbasket, or \"Choose Columns\" in Meds & Orders section of the refill encounter.            Passed - Normal TSH on file in past 12 months    Recent Labs   Lab Test  09/08/17   1555   TSH  0.68             Passed - No active pregnancy on record    If patient is pregnant or has had a positive pregnancy test, please check TSH.         Passed - No positive pregnancy test in past 12 months    If patient is pregnant or has had a positive pregnancy test, please check TSH.          Erinn DIA Rn    "

## 2018-02-11 ENCOUNTER — HOSPITAL ENCOUNTER (EMERGENCY)
Facility: CLINIC | Age: 58
Discharge: HOME | End: 2018-02-11
Attending: PHYSICIAN ASSISTANT | Admitting: PHYSICIAN ASSISTANT
Payer: OTHER GOVERNMENT

## 2018-02-11 ENCOUNTER — VIRTUAL VISIT (OUTPATIENT)
Dept: FAMILY MEDICINE | Facility: OTHER | Age: 58
End: 2018-02-11

## 2018-02-11 VITALS
DIASTOLIC BLOOD PRESSURE: 95 MMHG | WEIGHT: 214 LBS | RESPIRATION RATE: 18 BRPM | HEART RATE: 95 BPM | SYSTOLIC BLOOD PRESSURE: 150 MMHG | TEMPERATURE: 97.6 F | OXYGEN SATURATION: 100 % | BODY MASS INDEX: 30.98 KG/M2

## 2018-02-11 DIAGNOSIS — J06.9 VIRAL UPPER RESPIRATORY TRACT INFECTION WITH COUGH: ICD-10-CM

## 2018-02-11 DIAGNOSIS — J01.00 ACUTE MAXILLARY SINUSITIS, RECURRENCE NOT SPECIFIED: ICD-10-CM

## 2018-02-11 LAB
INTERNAL QC OK POCT: YES
S PYO AG THROAT QL IA.RAPID: NEGATIVE

## 2018-02-11 PROCEDURE — 87880 STREP A ASSAY W/OPTIC: CPT | Performed by: PHYSICIAN ASSISTANT

## 2018-02-11 PROCEDURE — 87081 CULTURE SCREEN ONLY: CPT | Performed by: PHYSICIAN ASSISTANT

## 2018-02-11 PROCEDURE — 99213 OFFICE O/P EST LOW 20 MIN: CPT | Performed by: PHYSICIAN ASSISTANT

## 2018-02-11 PROCEDURE — G0463 HOSPITAL OUTPT CLINIC VISIT: HCPCS

## 2018-02-11 NOTE — ED PROVIDER NOTES
Chief Complaint:     Chief Complaint   Patient presents with     Cough     Lymphadenopathy     Cold Symptoms       HPI: Teresa Fernandez is an 57 year old female who presents with a cough.   It began  5 day(s) ago and has unchanged.  Cough is dry There is no shortness of breath, wheezing and chest pain.  Patient does not recall any sick contacts.  She did get a flu shot this year.  She has been taking Alkaceltzer cold and flu.    Associated symptoms: sore throat, congestion, sinus pressure, and achiness.    Recent travel?  no.    No abdominal pain, or diarrhea,  No vomiting.  No headache or neck pain.    ROS: Further problem focused system review was otherwise negative.     Respiratory History  occasional episodes of bronchitis     Problem history  Patient Active Problem List   Diagnosis     Acquired hypothyroidism     Allergic state     Chronic rhinitis     Allergic rhinitis due to pollen     Sinusitis, chronic     History of skin cancer     ERIC (generalized anxiety disorder)     Leukoplakia of oral mucosa, including tongue     Renal cancer (H)     GERD (gastroesophageal reflux disease)     Chronic low back pain     Dry eye syndrome     Chronic fatigue     Osteopenia     History of melanoma in situ     Eczema     Moderate persistent asthma without complication     History of kidney cancer     DDD (degenerative disc disease), lumbar     Hyperlipidemia LDL goal <160     Chest tightness or pressure     Muscle pain        Allergies  Allergies   Allergen Reactions     Omnipaque [Iohexol] Swelling and Difficulty breathing     Immediate throat swelling following around 1-2cc of omnipaque 300 for spine procedure     Gluten      Iodine      Welts from CT contrast, surgical scrub is ok.     Penicillins Hives        Smoking History  History   Smoking Status     Former Smoker     Packs/day: 2.00     Years: 15.00     Quit date: 3/26/1996   Smokeless Tobacco     Never Used        Current Meds  No current facility-administered  medications for this encounter.     Current Outpatient Prescriptions:      levothyroxine (SYNTHROID/LEVOTHROID) 125 MCG tablet, TAKE 1 TABLET DAILY, Disp: 90 tablet, Rfl: 2     azithromycin (ZITHROMAX) 250 MG tablet, Two tablets first day, then one tablet daily for four days., Disp: 6 tablet, Rfl: 0     predniSONE (DELTASONE) 20 MG tablet, Take 1 tablet (20 mg) by mouth daily, Disp: 5 tablet, Rfl: 0     PARoxetine (PAXIL) 20 MG tablet, Take 1.5 tablets (30 mg) by mouth daily, Disp: 135 tablet, Rfl: 1     GLUCOSAMINE SULFATE PO, Take 1,000 mg by mouth 2 times daily, Disp: , Rfl:      MAGNESIUM OXIDE PO, Take 200 mg by mouth daily, Disp: , Rfl:      Cyanocobalamin (VITAMIN B 12 PO), Take 500 mcg by mouth daily, Disp: , Rfl:      acetaminophen (TYLENOL) 325 MG tablet, Take 325-650 mg by mouth every 6 hours as needed for mild pain, Disp: , Rfl:      ranitidine (ZANTAC) 150 MG tablet, TAKE 1 TABLET TWICE A DAY, Disp: 180 tablet, Rfl: 1     albuterol (PROAIR HFA/PROVENTIL HFA/VENTOLIN HFA) 108 (90 BASE) MCG/ACT Inhaler, Inhale 2 puffs into the lungs every 6 hours as needed for shortness of breath / dyspnea, Disp: 1 Inhaler, Rfl: 5     fluticasone-salmeterol (ADVAIR) 250-50 MCG/DOSE diskus inhaler, Inhale 1 puff into the lungs 2 times daily, Disp: 1 Inhaler, Rfl: 1     order for DME, Equipment being ordered: Nebulizer, Disp: 1 Units, Rfl: 0     albuterol (2.5 MG/3ML) 0.083% nebulizer solution, Take 1 vial (2.5 mg) by nebulization every 6 hours as needed for shortness of breath / dyspnea or wheezing, Disp: 25 vial, Rfl: 0     Multiple Vitamins-Minerals (MULTIVITAMIN ADULT PO), Take by mouth every morning , Disp: , Rfl:      Cholecalciferol (VITAMIN D3 PO), Take 2,000 Units by mouth 2 times daily , Disp: , Rfl:      fexofenadine (ALLEGRA) 180 MG tablet, Take 1 tablet (180 mg) by mouth daily (Patient taking differently: Take 180 mg by mouth every morning ), Disp: 30 tablet, Rfl: 1     OVER-THE-COUNTER, Place 1 drop into both  eyes 3 times daily. Systane Ultra, Systane Balance, Blink Tears or Refresh Optive Artificial Tear, Disp: 1 Bottle, Rfl:         OBJECTIVE     Vital signs reviewed by Cecilio Rendon  BP (!) 150/95  Pulse 95  Temp 97.6  F (36.4  C) (Oral)  Resp 18  Wt 97.1 kg (214 lb)  SpO2 100%  BMI 30.98 kg/m2     PEFR:  General appearance: healthy, alert and no distress  Ears: R TM - normal: no effusions, no erythema, and normal landmarks, L TM - normal: no effusions, no erythema, and normal landmarks  Eyes: R normal, L normal  Nose: boggy and clear discharge  Oropharynx: moderate erythema  Neck: supple and no adenopathy  Lungs: normal and clear to auscultation  Heart: S1, S2 normal, no murmur, click, rub or gallop, regular rate and rhythm        Labs:     Results for orders placed or performed during the hospital encounter of 02/11/18   Rapid strep group A screen POCT   Result Value Ref Range    Rapid Strep A Screen Negative neg    Internal QC OK Yes         ASSESSMENT    1. Viral upper respiratory tract infection with cough    2. Acute maxillary sinusitis, recurrence not specified        PLAN  RST was negative and communicated to the patient.  We will call with culture results if positive.  Rest, Push fluids, vaporizer, elevation of head of bed.  Ibuprofen and or Tylenol for any fever or body aches.  Over the counter cough suppressant- PRN- as discussed.   If symptoms worsen, recheck immediately otherwise follow up with your PCP PRN.  Patient verbalized understanding and agreed with this plan.      Cecilio Rendon  2/11/2018, 12:10 PM       Cecilio Rendon PA-C  02/11/18 1243

## 2018-02-11 NOTE — ED NOTES
Patient here for URI/sinus/sore throat, symptoms started 7 days ago.  Patient presents ambualtory to the urgent care.  Rapid strep ordered per protocol.

## 2018-02-11 NOTE — ED AVS SNAPSHOT
[facility] Emergency Department    [address] [facility] [address]    Phone:  [phone]    Fax:  [phone]                                       Teresa Fernandez   MRN: [id number]    Department:  [facility] Emergency Department   Date of Visit:  2/11/2018           Patient Information     Date Of Birth          1960        Your diagnoses for this visit were:     Viral upper respiratory tract infection with cough     Acute maxillary sinusitis, recurrence not specified        You were seen by Cecilio Rendon PA-C.      Discharge References/Attachments     SINUSITIS (NO ANTIBIOTICS) (ENGLISH)    URI, VIRAL, NO ABX (ADULT) (ENGLISH)      Future Appointments        Provider Department Dept Phone Center    4/20/2018 3:00 PM Yisel Beverly MD [facility] [phone] [facility] [[email or web address] Owned    7/17/2018 4:00 PM Demetris Zavala MD [facility] [[email or web address]] NEUROSPECIALTIES [phone] [facility] [[email or web address]] Owned      24 Hour Appointment Hotline       To make an appointment at any [facility], call [phone] ([phone]). If you don't have a family doctor or clinic, we will help you find one. [place] clinics are conveniently located to serve the needs of you and your family.             Review of your medicines      Our records show that you are taking the medicines listed below. If these are incorrect, please call your family doctor or clinic.        Dose / Directions Last dose taken    * albuterol (2.5 MG/3ML) 0.083% neb solution   Dose:  1 vial   Quantity:  25 vial        Take 1 vial (2.5 mg) by nebulization every 6 hours as needed for shortness of breath / dyspnea or wheezing   Refills:  0        * albuterol 108 (90 BASE) MCG/ACT Inhaler   Commonly known as:  PROAIR HFA/PROVENTIL HFA/VENTOLIN HFA   Dose:  2 puff   Quantity:  1 Inhaler        Inhale 2 puffs into the lungs every 6 hours as needed for shortness of breath / dyspnea   Refills:  5        azithromycin 250 MG tablet   Commonly known as:  ZITHROMAX    Quantity:  6 tablet        Two tablets first day, then one tablet daily for four days.   Refills:  0        fexofenadine 180 MG tablet   Commonly known as:  ALLEGRA   Dose:  180 mg   Quantity:  30 tablet        Take 1 tablet (180 mg) by mouth daily   Refills:  1        fluticasone-salmeterol 250-50 MCG/DOSE diskus inhaler   Commonly known as:  ADVAIR   Dose:  1 puff   Quantity:  1 Inhaler        Inhale 1 puff into the lungs 2 times daily   Refills:  1        GLUCOSAMINE SULFATE PO   Dose:  1000 mg        Take 1,000 mg by mouth 2 times daily   Refills:  0        levothyroxine 125 MCG tablet   Commonly known as:  SYNTHROID/LEVOTHROID   Quantity:  90 tablet        TAKE 1 TABLET DAILY   Refills:  2        MAGNESIUM OXIDE PO   Dose:  200 mg        Take 200 mg by mouth daily   Refills:  0        MULTIVITAMIN ADULT PO        Take by mouth every morning   Refills:  0        order for DME   Quantity:  1 Units        Equipment being ordered: Nebulizer   Refills:  0        OVER-THE-COUNTER   Dose:  1 drop   Quantity:  1 Bottle        Place 1 drop into both eyes 3 times daily. Systane Ultra, Systane Balance, Blink Tears or Refresh Optive Artificial Tear   Refills:  0        PARoxetine 20 MG tablet   Commonly known as:  PAXIL   Dose:  30 mg   Quantity:  135 tablet        Take 1.5 tablets (30 mg) by mouth daily   Refills:  1        predniSONE 20 MG tablet   Commonly known as:  DELTASONE   Dose:  20 mg   Quantity:  5 tablet        Take 1 tablet (20 mg) by mouth daily   Refills:  0        ranitidine 150 MG tablet   Commonly known as:  ZANTAC   Quantity:  180 tablet        TAKE 1 TABLET TWICE A DAY   Refills:  1        TYLENOL 325 MG tablet   Dose:  325-650 mg   Generic drug:  acetaminophen        Take 325-650 mg by mouth every 6 hours as needed for mild pain   Refills:  0        VITAMIN B 12 PO   Dose:  500 mcg        Take 500 mcg by mouth daily   Refills:  0        VITAMIN D3 PO   Dose:  2000 Units        Take 2,000 Units by  mouth 2 times daily   Refills:  0        * Notice:  This list has 2 medication(s) that are the same as other medications prescribed for you. Read the directions carefully, and ask your doctor or other care provider to review them with you.            Procedures and tests performed during your visit     Beta strep group A culture    Rapid strep group A screen POCT      Orders Needing Specimen Collection     None      Pending Results     Date and Time Order Name Status Description    2/11/2018 1243 Beta strep group A culture In process             Pending Culture Results     Date and Time Order Name Status Description    2/11/2018 1243 Beta strep group A culture In process             Pending Results Instructions     If you had any lab results that were not finalized at the time of your Discharge, you can call the ED Lab Result RN at [phone]. You will be contacted by this team for any positive Lab results or changes in treatment. The nurses are available 7 days a week from 10A to 6:30P.  You can leave a message 24 hours per day and they will return your call.        Test Results From Your Hospital Stay        2/11/2018 12:43 PM      Component Results     Component Value Ref Range & Units Status    Rapid Strep A Screen Negative neg Final    Internal QC OK Yes  Final         2/11/2018 12:44 PM                Thank you for choosing [facility]       Thank you for choosing [facility] for your care. Our goal is always to provide you with excellent care. Hearing back from our patients is one way we can continue to improve our services. Please take a few minutes to complete the written survey that you may receive in the mail after you visit with us. Thank you!        [facility] Information     [facility] gives you secure access to your electronic health record. If you see a primary care provider, you can also send messages to your care team and make appointments. If you have questions, please call your primary care clinic.  If you  do not have a primary care provider, please call [phone] and they will assist you.        Care EveryWhere ID     This is your Care EveryWhere ID. This could be used by other organizations to access your [facility] medical records  [id number]        Equal Access to Services     MERRILL PRESTON : Nic Leung, gume rae, kriss liu, lillian reese. So [facility] [phone].    ATENCIÓN: Si habla español, tiene a anderson disposición servicios gratuitos de asistencia lingüística. Llame al [phone].    We comply with applicable federal civil rights laws and [place] laws. We do not discriminate on the basis of race, color, national origin, age, disability, sex, sexual orientation, or gender identity.            After Visit Summary       This is your record. Keep this with you and show to your community pharmacist(s) and doctor(s) at your next visit.

## 2018-02-11 NOTE — PROGRESS NOTES
"Date: [id number]  Clinician: Kayla Hernández  Clinician NPI: [id number]  Patient: RILEY CANDELARIO  Patient : 1960  Patient Address: [address] [place] [address]  Patient Phone: [phone]  Visit Protocol: URI  Patient Summary:  RILEY is a 57 year old ( : 1960 ) female who initiated a Visit for cold, sinus infection, or influenza. When asked the question \"Please sign me up to receive news, health information and promotions. \", Patient responded \"Yes\".    RILEY states her symptoms started gradually 3-6 days ago. After her symptoms started, they improved and then got worse again.   Her symptoms consist of myalgia, rhinitis, malaise, enlarged lymph nodes, a sore throat, a headache, chills, nasal congestion, a cough, ear pain, and facial pain or pressure. RILEY also feels feverish but was unable to measure her temperature.   Symptom details     Nasal secretions: The color of her mucus is green and yellow.    Cough: RILEY coughs every 5-10 minutes and her cough is more bothersome at night. Phlegm comes into her throat when she coughs. She believes the phlegm causes the cough. The color of the phlegm is yellow, white, and clear.     Sore throat: RILEY reports having moderate throat pain (between 4-6 on a 10 point pain scale), does not have exudate on her tonsils, and is able to swallow liquids. The lymph nodes in her neck are enlarged. She states that rashes have not appeared on the skin since the sore throat started.     Facial pain or pressure: The facial pain or pressure feels worse when bending over or leaning forward.     Headache: She states the headache is moderate (between 4-6 on a 10 point pain scale).      RILEY denies having teeth pain, dyspnea, and wheezing. She also denies having recent facial or sinus surgery in the past 60 days and taking antibiotic medication for the symptoms.   Within the past week, RILEY has not been exposed to someone with strep throat. She has recently been " exposed to someone with influenza. RILEY has been in close contact with the following high risk individuals: children under the age of 5.   RILEY had 2 sinus infections within the past year.   Weight: 214 lbs   RILEY does not smoke or use smokeless tobacco.   She denies pregnancy and denies breastfeeding. She does not menstruate.   Additional information as reported by the patient (free text): My last z pack was not strong enough I had Bronchitis,    MEDICATIONS:  Paroxetine (Paxil, Pexeva), saline nose drops/spray (SeaMist, Ocean Nasal Spray), fexofenadine (Allegra), Advair inhaler, guaifenesin (Robitussin, Mucinex), thyroid, acetaminophen (Tylenol), antacid, and dextromethorphan (Raj's)   Patient free text response: Ranitidine   , ALLERGIES:   penicillin/amoxicillin/augmentin    Clinician Response:  Dear RILEY,  I am sorry you are not feeling well. To determine the most appropriate care for you, I would like you to be seen in person to further discuss your health history and symptoms.  You will not be charged for this Visit. Thank you for trusting us with your care.   Diagnosis: Refer for additional evaluation  Diagnosis ICD: R69  Diagnosis ICD: 462.0

## 2018-02-11 NOTE — ED AVS SNAPSHOT
[facility] Emergency Department    [address] [facility] [address]    Phone:  [phone]    Fax:  [phone]                                       Teresa Fernandez   MRN: [id number]    Department:  [facility] Emergency Department   Date of Visit:  2/11/2018           After Visit Summary Signature Page     I have received my discharge instructions, and my questions have been answered. I have discussed any challenges I see with this plan with the nurse or doctor.    ..........................................................................................................................................  Patient/Patient Representative Signature      ..........................................................................................................................................  Patient Representative Print Name and Relationship to Patient    ..................................................               ................................................  Date                                            Time    ..........................................................................................................................................  Reviewed by Signature/Title    ...................................................              ..............................................  Date                                                            Time

## 2018-02-13 ENCOUNTER — OFFICE VISIT (OUTPATIENT)
Dept: FAMILY MEDICINE | Facility: CLINIC | Age: 58
End: 2018-02-13
Payer: OTHER GOVERNMENT

## 2018-02-13 VITALS
DIASTOLIC BLOOD PRESSURE: 74 MMHG | HEART RATE: 90 BPM | WEIGHT: 221 LBS | BODY MASS INDEX: 32 KG/M2 | SYSTOLIC BLOOD PRESSURE: 129 MMHG | TEMPERATURE: 98.2 F | OXYGEN SATURATION: 98 %

## 2018-02-13 DIAGNOSIS — J20.9 ACUTE BRONCHITIS, UNSPECIFIED ORGANISM: Primary | ICD-10-CM

## 2018-02-13 DIAGNOSIS — J45.40 MODERATE PERSISTENT ASTHMA WITHOUT COMPLICATION: ICD-10-CM

## 2018-02-13 LAB
BACTERIA SPEC CULT: NORMAL
Lab: NORMAL
SPECIMEN SOURCE: NORMAL

## 2018-02-13 PROCEDURE — 99214 OFFICE O/P EST MOD 30 MIN: CPT | Performed by: NURSE PRACTITIONER

## 2018-02-13 RX ORDER — CODEINE PHOSPHATE AND GUAIFENESIN 10; 100 MG/5ML; MG/5ML
1 SOLUTION ORAL EVERY 4 HOURS PRN
Qty: 120 ML | Refills: 0 | Status: SHIPPED | OUTPATIENT
Start: 2018-02-13 | End: 2018-08-02

## 2018-02-13 RX ORDER — DOXYCYCLINE 100 MG/1
100 CAPSULE ORAL 2 TIMES DAILY
Qty: 14 CAPSULE | Refills: 0 | Status: SHIPPED | OUTPATIENT
Start: 2018-02-13 | End: 2018-08-02

## 2018-02-13 RX ORDER — BENZONATATE 200 MG/1
200 CAPSULE ORAL 3 TIMES DAILY PRN
Qty: 21 CAPSULE | Refills: 0 | Status: SHIPPED | OUTPATIENT
Start: 2018-02-13 | End: 2018-09-10

## 2018-02-13 NOTE — PATIENT INSTRUCTIONS
Bronchitis, Antibiotic Treatment (Adult)    Bronchitis is an infection of the air passages (bronchial tubes) in your lungs. It often occurs when you have a cold. This illness is contagious during the first few days and is spread through the air by coughing and sneezing, or by direct contact (touching the sick person and then touching your own eyes, nose, or mouth).  Symptoms of bronchitis include cough with mucus (phlegm) and low-grade fever. Bronchitis usually lasts 7 to 14 days. Mild cases can be treated with simple home remedies. More severe infection is treated with an antibiotic.  Home care  Follow these guidelines when caring for yourself at home:    If your symptoms are severe, rest at home for the first 2 to 3 days. When you go back to your usual activities, don't let yourself get too tired.    Do not smoke. Also avoid being exposed to secondhand smoke.    You may use over-the-counter medicines to control fever or pain, unless another medicine was prescribed. (Note: If you have chronic liver or kidney disease or have ever had a stomach ulcer or gastrointestinal bleeding, talk with your healthcare provider before using these medicines. Also talk to your provider if you are taking medicine to prevent blood clots.) Aspirin should never be given to anyone younger than 18 years of age who is ill with a viral infection or fever. It may cause severe liver or brain damage.    Your appetite may be poor, so a light diet is fine. Avoid dehydration by drinking 6 to 8 glasses of fluids per day (such as water, soft drinks, sports drinks, juices, tea, or soup). Extra fluids will help loosen secretions in the nose and lungs.    Over-the-counter cough, cold, and sore-throat medicines will not shorten the length of the illness, but they may be helpful to reduce symptoms. (Note: Do not use decongestants if you have high blood pressure.)    Finish all antibiotic medicine. Do this even if you are feeling better after only a  few days.  Follow-up care  Follow up with your healthcare provider, or as advised. If you had an X-ray or ECG (electrocardiogram), a specialist will review it. You will be notified of any new findings that may affect your care.  Note: If you are age 65 or older, or if you have a chronic lung disease or condition that affects your immune system, or you smoke, talk to your healthcare provider about having pneumococcal vaccinations and a yearly influenza vaccination (flu shot).  When to seek medical advice  Call your healthcare provider right away if any of these occur:    Fever of 100.4 F (38 C) or higher    Coughing up increased amounts of colored sputum    Weakness, drowsiness, headache, facial pain, ear pain, or a stiff neck  Call 911, or get immediate medical care  Contact emergency services right away if any of these occur.    Coughing up blood    Worsening weakness, drowsiness, headache, or stiff neck    Trouble breathing, wheezing, or pain with breathing  Date Last Reviewed: 9/13/2015 2000-2017 [employer] [address] [place] [address], [place] [address]. All rights reserved. This information is not intended as a substitute for professional medical care. Always follow your healthcare professional's instructions.

## 2018-02-13 NOTE — LETTER
[facility]  [address] [facility] [address]  Phone: [phone]    February 13, 2018        Teresa TURNER Rebecca  [address] [facility] [address]          To whom it may concern:    RE: Teresa A Rebecca    Patient was seen and treated today at our clinic and missed work.    Please contact me for questions or concerns.      Sincerely,        HERIBERTO Ochoa CNP

## 2018-02-13 NOTE — PROGRESS NOTES
SUBJECTIVE:   Teresa Fernandez is a 57 year old female who presents to clinic today for the following health issues:    Acute Illness   Acute illness concerns: cough, fever, body aches  Onset: x1 week    Fever: YES    Chills/Sweats: YES    Headache (location?): YES    Sinus Pressure:YES    Conjunctivitis:  no    Ear Pain: no    Rhinorrhea: no    Congestion: YES    Sore Throat: YES     Cough: YES-productive of yellow sputum, productive of green sputum    Wheeze: YES    Decreased Appetite: YES    Nausea: YES    Vomiting: no    Diarrhea:  YES    Dysuria/Freq.: no    Fatigue/Achiness: YES    Sick/Strep Exposure: no     Therapies Tried and outcome: dayquil, nebs, tylenol    Patient with history of chronic sinusitis, asthma, history of renal cancer, Allergies. Patient was seen in ER 2 days ago - patient diagnosed with viral URI and sinusitis- symptomatic treatment prescribed. Patient states that symptoms of coughing/wheezing gotten worse since she was seen in the ER. RST- negative.     -------------------------------------    Problem list and histories reviewed & adjusted, as indicated.  Additional history: as documented    Patient Active Problem List   Diagnosis     Acquired hypothyroidism     Allergic state     Chronic rhinitis     Allergic rhinitis due to pollen     Sinusitis, chronic     History of skin cancer     ERIC (generalized anxiety disorder)     Leukoplakia of oral mucosa, including tongue     Renal cancer (H)     GERD (gastroesophageal reflux disease)     Chronic low back pain     Dry eye syndrome     Chronic fatigue     Osteopenia     History of melanoma in situ     Eczema     Moderate persistent asthma without complication     History of kidney cancer     DDD (degenerative disc disease), lumbar     Hyperlipidemia LDL goal <160     Chest tightness or pressure     Muscle pain     Past Surgical History:   Procedure Laterality Date     CHOLECYSTECTOMY       COLONOSCOPY      2007-normal     ENT SURGERY  2-15-11     Left cheek bx- Mucositis.     FUSION LUMBAR ANTERIOR TWO LEVELS  4/13/2015     GYN SURGERY  04/08/1999    hysterectomy.  [facility]     HYSTERECTOMY, PAP NO LONGER INDICATED       SOFT TISSUE SURGERY      chest-melonoma     SURGICAL HISTORY OF -   3/1996    Left nephrectomy-renal cell CA       Social History   Substance Use Topics     Smoking status: Former Smoker     Packs/day: 2.00     Years: 15.00     Quit date: 3/26/1996     Smokeless tobacco: Never Used     Alcohol use No      Comment: None now.  Rare in past.      Family History   Problem Relation Age of Onset     DIABETES Mother      Cardiovascular Mother      Lipids Mother      Depression Mother      Hypertension Father      Lipids Father      Obesity Father      Macular Degeneration Father      CANCER Maternal Grandmother      kind unknown had sores on legs     Eczema Maternal Grandmother      Eczema Maternal Grandfather      Breast Cancer Paternal Grandmother      CANCER Paternal Grandmother      breast     Hypertension Paternal Grandfather      Cardiovascular Paternal Grandfather      heart attack     Hypertension Brother      Thyroid Disease Sister      Hypertension Sister      Depression Sister      Allergies Sister      Allergies Son      Eczema Son      Lipids Sister      Thyroid Disease Sister      Neurologic Disorder Sister      Depression Sister      Respiratory Son      Glaucoma No family hx of      CEREBROVASCULAR DISEASE No family hx of            Reviewed and updated as needed this visit by clinical staff  Tobacco  Allergies  Med Hx  Surg Hx  Fam Hx  Soc Hx      Reviewed and updated as needed this visit by Provider         ROS:  Constitutional, HEENT, cardiovascular, pulmonary, GI, , musculoskeletal, neuro, skin, endocrine and psych systems are negative, except as otherwise noted.    OBJECTIVE:     /74 (BP Location: Left arm, Patient Position: Sitting, Cuff Size: Adult Large)  Pulse 90  Temp 98.2  F (36.8  C) (Tympanic)  Wt 221 lb  (100.2 kg)  SpO2 98%  BMI 32 kg/m2  Body mass index is 32 kg/(m^2).  GENERAL: alert, no distress, fatigued and congested cough  HENT: normal cephalic/atraumatic, ear canals and TM's normal, nose and mouth without ulcers or lesions, oropharynx clear, oral mucous membranes moist and sinuses: not tender  NECK: no adenopathy, no asymmetry, masses, or scars and thyroid normal to palpation  RESP: rhonchi scattered throughout  CV: regular rate and rhythm, normal S1 S2, no S3 or S4, no murmur, click or rub  MS: no gross musculoskeletal defects noted, no edema        ASSESSMENT/PLAN:       1. Acute bronchitis, unspecified organism  - patient with history of asthma- worsening symptoms over the past couple of days-   - doxycycline (VIBRAMYCIN) 100 MG capsule; Take 1 capsule (100 mg) by mouth 2 times daily  Dispense: 14 capsule; Refill: 0  - guaiFENesin-codeine (ROBITUSSIN AC) 100-10 MG/5ML SOLN solution; Take 5 mLs by mouth every 4 hours as needed for cough  Dispense: 120 mL; Refill: 0  - benzonatate (TESSALON) 200 MG capsule; Take 1 capsule (200 mg) by mouth 3 times daily as needed for cough  Dispense: 21 capsule; Refill: 0  - continue to use nebulizer prn  - patient did receive her influenza vaccine  - do not feel that patient needs prednisone at this time    2. Moderate persistent asthma without complication  - controled except when getting above symptoms   - continue to use Advair daily and albuterol prn        HERIBERTO Delgado CNP  [facility]

## 2018-02-13 NOTE — NURSING NOTE
"Chief Complaint   Patient presents with     URI     cough, body aches, chills, x1 week       Initial /74 (BP Location: Left arm, Patient Position: Sitting, Cuff Size: Adult Large)  Pulse 90  Temp 98.2  F (36.8  C) (Tympanic)  Wt 221 lb (100.2 kg)  SpO2 98%  BMI 32 kg/m2 Estimated body mass index is 32 kg/(m^2) as calculated from the following:    Height as of 1/18/18: 5' 9.69\" (1.77 m).    Weight as of this encounter: 221 lb (100.2 kg).  Medication Reconciliation: complete    "

## 2018-02-13 NOTE — MR AVS SNAPSHOT
After Visit Summary   2/13/2018    Teresa Fernandez    MRN: [id number]           Patient Information     Date Of Birth          1960        Visit Information        Provider Department      2/13/2018 7:20 AM Nuzhat Burt APRN [facility]        Today's Diagnoses     Acute bronchitis, unspecified organism    -  1    Moderate persistent asthma without complication          Care Instructions      Bronchitis, Antibiotic Treatment (Adult)    Bronchitis is an infection of the air passages (bronchial tubes) in your lungs. It often occurs when you have a cold. This illness is contagious during the first few days and is spread through the air by coughing and sneezing, or by direct contact (touching the sick person and then touching your own eyes, nose, or mouth).  Symptoms of bronchitis include cough with mucus (phlegm) and low-grade fever. Bronchitis usually lasts 7 to 14 days. Mild cases can be treated with simple home remedies. More severe infection is treated with an antibiotic.  Home care  Follow these guidelines when caring for yourself at home:    If your symptoms are severe, rest at home for the first 2 to 3 days. When you go back to your usual activities, don't let yourself get too tired.    Do not smoke. Also avoid being exposed to secondhand smoke.    You may use over-the-counter medicines to control fever or pain, unless another medicine was prescribed. (Note: If you have chronic liver or kidney disease or have ever had a stomach ulcer or gastrointestinal bleeding, talk with your healthcare provider before using these medicines. Also talk to your provider if you are taking medicine to prevent blood clots.) Aspirin should never be given to anyone younger than 18 years of age who is ill with a viral infection or fever. It may cause severe liver or brain damage.    Your appetite may be poor, so a light diet is fine. Avoid dehydration by drinking 6 to 8 glasses of fluids per  day (such as water, soft drinks, sports drinks, juices, tea, or soup). Extra fluids will help loosen secretions in the nose and lungs.    Over-the-counter cough, cold, and sore-throat medicines will not shorten the length of the illness, but they may be helpful to reduce symptoms. (Note: Do not use decongestants if you have high blood pressure.)    Finish all antibiotic medicine. Do this even if you are feeling better after only a few days.  Follow-up care  Follow up with your healthcare provider, or as advised. If you had an X-ray or ECG (electrocardiogram), a specialist will review it. You will be notified of any new findings that may affect your care.  Note: If you are age 65 or older, or if you have a chronic lung disease or condition that affects your immune system, or you smoke, talk to your healthcare provider about having pneumococcal vaccinations and a yearly influenza vaccination (flu shot).  When to seek medical advice  Call your healthcare provider right away if any of these occur:    Fever of 100.4 F (38 C) or higher    Coughing up increased amounts of colored sputum    Weakness, drowsiness, headache, facial pain, ear pain, or a stiff neck  Call 911, or get immediate medical care  Contact emergency services right away if any of these occur.    Coughing up blood    Worsening weakness, drowsiness, headache, or stiff neck    Trouble breathing, wheezing, or pain with breathing  Date Last Reviewed: 9/13/2015 2000-2017 [employer]. [address] [place] [address]. All rights reserved. This information is not intended as a substitute for professional medical care. Always follow your healthcare professional's instructions.                Follow-ups after your visit        Your next 10 appointments already scheduled     Apr 20, 2018  3:00 PM CDT   Return Visit with Yisel Beverly MD   [facility] ([facility])    [address], [address]  [place]  [id number] [phone]            Jul 17, 2018  4:00 PM CDT   Return Neuropathy Visit with Demetris Zavala MD   [facility] ([facility])    [address]  [place] [id number] [phone]              Who to contact     If you have questions or need follow up information about today's clinic visit or your schedule please contact [facility] directly at [phone].  Normal or non-critical lab and imaging results will be communicated to you by Site9[employer], letter or phone within 4 business days after the clinic has received the results. If you do not hear from us within 7 days, please contact the clinic through [employer] or phone. If you have a critical or abnormal lab result, we will notify you by phone as soon as possible.  Submit refill requests through [employer] or call your pharmacy and they will forward the refill request to us. Please allow 3 business days for your refill to be completed.          Additional Information About Your Visit        Site9hart Information     [employer] gives you secure access to your electronic health record. If you see a primary care provider, you can also send messages to your care team and make appointments. If you have questions, please call your primary care clinic.  If you do not have a primary care provider, please call [phone] and they will assist you.        Care EveryWhere ID     This is your Care EveryWhere ID. This could be used by other organizations to access your [facility] records  [id number]        Your Vitals Were     Pulse Temperature Pulse Oximetry BMI (Body Mass Index)          90 98.2  F (36.8  C) (Tympanic) 98% 32 kg/m2         Blood Pressure from Last 3 Encounters:   02/13/18 129/74   02/11/18 (!) 150/95   01/18/18 112/76    Weight from Last 3 Encounters:   02/13/18 221 lb (100.2 kg)   02/11/18 214 lb (97.1 kg)   01/18/18 214 lb 4 oz (97.2 kg)              Today, you had the following     No  orders found for display         Today's Medication Changes          These changes are accurate as of 2/13/18  7:40 AM.  If you have any questions, ask your nurse or doctor.               Start taking these medicines.        Dose/Directions    benzonatate 200 MG capsule   Commonly known as:  TESSALON   Used for:  Acute bronchitis, unspecified organism   Started by:  Nuzhat Burt APRN CNP        Dose:  200 mg   Take 1 capsule (200 mg) by mouth 3 times daily as needed for cough   Quantity:  21 capsule   Refills:  0       doxycycline 100 MG capsule   Commonly known as:  VIBRAMYCIN   Used for:  Acute bronchitis, unspecified organism   Started by:  Nuzhat Burt APRN CNP        Dose:  100 mg   Take 1 capsule (100 mg) by mouth 2 times daily   Quantity:  14 capsule   Refills:  0       guaiFENesin-codeine 100-10 MG/5ML Soln solution   Commonly known as:  ROBITUSSIN AC   Used for:  Acute bronchitis, unspecified organism   Started by:  Nuzhat Burt APRN CNP        Dose:  1 tsp.   Take 5 mLs by mouth every 4 hours as needed for cough   Quantity:  120 mL   Refills:  0         These medicines have changed or have updated prescriptions.        Dose/Directions    fexofenadine 180 MG tablet   Commonly known as:  ALLEGRA   This may have changed:  when to take this   Used for:  Seasonal allergic rhinitis        Dose:  180 mg   Take 1 tablet (180 mg) by mouth daily   Quantity:  30 tablet   Refills:  1         Stop taking these medicines if you haven't already. Please contact your care team if you have questions.     azithromycin 250 MG tablet   Commonly known as:  ZITHROMAX   Stopped by:  Nuzhat Burt APRN CNP           predniSONE 20 MG tablet   Commonly known as:  DELTASONE   Stopped by:  Nuzhat Burt APRN CNP                Where to get your medicines      These medications were sent to [facility] [place], [place] - [address]  [address] [address]     Phone:   [phone]     benzonatate 200 MG capsule    doxycycline 100 MG capsule         Some of these will need a paper prescription and others can be bought over the counter.  Ask your nurse if you have questions.     Bring a paper prescription for each of these medications     guaiFENesin-codeine 100-10 MG/5ML Soln solution                Primary Care Provider Office Phone # Fax #    HERIBERTO Delgado [facility] [phone] [address] [address]        Equal Access to Services     MERRILL PRESTON : Hadii aad ku hadasho Soomaali, waaxda luqadaha, qaybta kaalmada adeegyada, waxay idiin hayaan sami khkatina stratton . So [facility] [phone].    ATENCIÓN: Si habla español, tiene a anderson disposición servicios gratuitos de asistencia lingüística. Llame al [phone].    We comply with applicable federal civil rights laws and [place] laws. We do not discriminate on the basis of race, color, national origin, age, disability, sex, sexual orientation, or gender identity.            Thank you!     Thank you for choosing [facility]  for your care. Our goal is always to provide you with excellent care. Hearing back from our patients is one way we can continue to improve our services. Please take a few minutes to complete the written survey that you may receive in the mail after your visit with us. Thank you!             Your Updated Medication List - Protect others around you: Learn how to safely use, store and throw away your medicines at www.disposemymeds.org.          This list is accurate as of 2/13/18  7:40 AM.  Always use your most recent med list.                   Brand Name Dispense Instructions for use Diagnosis    * albuterol (2.5 MG/3ML) 0.083% neb solution     25 vial    Take 1 vial (2.5 mg) by nebulization every 6 hours as needed for shortness of breath / dyspnea or wheezing    Asthma exacerbation       * albuterol 108 (90 BASE) MCG/ACT Inhaler    PROAIR HFA/PROVENTIL  HFA/VENTOLIN HFA    1 Inhaler    Inhale 2 puffs into the lungs every 6 hours as needed for shortness of breath / dyspnea    Moderate persistent asthma without complication       benzonatate 200 MG capsule    TESSALON    21 capsule    Take 1 capsule (200 mg) by mouth 3 times daily as needed for cough    Acute bronchitis, unspecified organism       doxycycline 100 MG capsule    VIBRAMYCIN    14 capsule    Take 1 capsule (100 mg) by mouth 2 times daily    Acute bronchitis, unspecified organism       fexofenadine 180 MG tablet    ALLEGRA    30 tablet    Take 1 tablet (180 mg) by mouth daily    Seasonal allergic rhinitis       fluticasone-salmeterol 250-50 MCG/DOSE diskus inhaler    ADVAIR    1 Inhaler    Inhale 1 puff into the lungs 2 times daily    Chest tightness, Intermittent asthma, uncomplicated       GLUCOSAMINE SULFATE PO      Take 1,000 mg by mouth 2 times daily        guaiFENesin-codeine 100-10 MG/5ML Soln solution    ROBITUSSIN AC    120 mL    Take 5 mLs by mouth every 4 hours as needed for cough    Acute bronchitis, unspecified organism       levothyroxine 125 MCG tablet    SYNTHROID/LEVOTHROID    90 tablet    TAKE 1 TABLET DAILY    Acquired hypothyroidism       MAGNESIUM OXIDE PO      Take 200 mg by mouth daily        MULTIVITAMIN ADULT PO      Take by mouth every morning        order for DME     1 Units    Equipment being ordered: Nebulizer    Asthma exacerbation       OVER-THE-COUNTER     1 Bottle    Place 1 drop into both eyes 3 times daily. Systane Ultra, Systane Balance, Blink Tears or Refresh Optive Artificial Tear    Dry eye syndrome       PARoxetine 20 MG tablet    PAXIL    135 tablet    Take 1.5 tablets (30 mg) by mouth daily    ERIC (generalized anxiety disorder)       ranitidine 150 MG tablet    ZANTAC    180 tablet    TAKE 1 TABLET TWICE A DAY    Gastroesophageal reflux disease, esophagitis presence not specified       TYLENOL 325 MG tablet   Generic drug:  acetaminophen      Take 325-650 mg by  mouth every 6 hours as needed for mild pain        VITAMIN B 12 PO      Take 500 mcg by mouth daily        VITAMIN D3 PO      Take 2,000 Units by mouth 2 times daily        * Notice:  This list has 2 medication(s) that are the same as other medications prescribed for you. Read the directions carefully, and ask your doctor or other care provider to review them with you.

## 2018-02-21 ENCOUNTER — TRANSFERRED RECORDS (OUTPATIENT)
Dept: HEALTH INFORMATION MANAGEMENT | Facility: CLINIC | Age: 58
End: 2018-02-21

## 2018-02-21 DIAGNOSIS — R07.89 CHEST TIGHTNESS: ICD-10-CM

## 2018-02-21 DIAGNOSIS — J45.20 INTERMITTENT ASTHMA, UNCOMPLICATED: ICD-10-CM

## 2018-02-21 NOTE — TELEPHONE ENCOUNTER
Pending Prescriptions:                       Disp   Refills    fluticasone-salmeterol (ADVAIR) 250-50 MC*3 Inha*0            Sig: Inhale 1 puff into the lungs 2 times daily    Routing refill request to provider for review/approval because:  Patient needs to be seen because it has been more than 1 year since last office visit.  Pt's last office visit was 12/12/16 and was asked to follow up in 4 weeks.  Recent ACT w/ PCP was 17 on 1/18/18.      Left message for patient to schedule follow up appointment, but haven't received call back.  Forwarding to provider to address refill, thank you.    Becca Cha RN

## 2018-03-01 ENCOUNTER — TRANSFERRED RECORDS (OUTPATIENT)
Dept: HEALTH INFORMATION MANAGEMENT | Facility: CLINIC | Age: 58
End: 2018-03-01

## 2018-03-18 DIAGNOSIS — K21.9 GASTROESOPHAGEAL REFLUX DISEASE, ESOPHAGITIS PRESENCE NOT SPECIFIED: ICD-10-CM

## 2018-03-19 NOTE — TELEPHONE ENCOUNTER
"Requested Prescriptions   Pending Prescriptions Disp Refills     ranitidine (ZANTAC) 150 MG tablet [Pharmacy Med Name: RANITIDINE TABS 150MG]  Last Written Prescription Date:  09/19/2017  Last Fill Quantity: 180,  # refills: 1   Last office visit: 2/13/2018 with prescribing provider:  Javed   Future Office Visit:     180 tablet 1     Sig: TAKE 1 TABLET TWICE A DAY    H2 Blockers Protocol Passed    3/18/2018  5:09 AM       Passed - Patient is age 12 or older       Passed - Recent (12 mo) or future (30 days) visit within the authorizing provider's specialty    Patient had office visit in the last 12 months or has a visit in the next 30 days with authorizing provider or within the authorizing provider's specialty.  See \"Patient Info\" tab in inbasket, or \"Choose Columns\" in Meds & Orders section of the refill encounter.            Yazan Real RT (R)    "

## 2018-04-16 ENCOUNTER — TRANSFERRED RECORDS (OUTPATIENT)
Dept: HEALTH INFORMATION MANAGEMENT | Facility: CLINIC | Age: 58
End: 2018-04-16

## 2018-05-24 DIAGNOSIS — F41.1 GAD (GENERALIZED ANXIETY DISORDER): ICD-10-CM

## 2018-05-24 RX ORDER — PAROXETINE 20 MG/1
30 TABLET, FILM COATED ORAL DAILY
Qty: 135 TABLET | Refills: 1 | Status: SHIPPED | OUTPATIENT
Start: 2018-05-24 | End: 2018-08-02

## 2018-05-24 NOTE — TELEPHONE ENCOUNTER
"Requested Prescriptions   Pending Prescriptions Disp Refills     PARoxetine (PAXIL) 20 MG tablet  Last Written Prescription Date:  01/15/18  Last Fill Quantity: 135,  # refills: 1   Last office visit: 2/13/2018 with prescribing provider:  02/13/18   Future Office Visit:   Next 5 appointments (look out 90 days)     Jul 09, 2018  4:00 PM CDT   Return Visit with Carter Tierney   [facility]    [address] [address]   [phone]                  135 tablet 1     Sig: Take 1.5 tablets (30 mg) by mouth daily    SSRIs Protocol Passed    5/24/2018 12:08 PM       Passed - Recent (12 mo) or future (30 days) visit within the authorizing provider's specialty    Patient had office visit in the last 12 months or has a visit in the next 30 days with authorizing provider or within the authorizing provider's specialty.  See \"Patient Info\" tab in inbasket, or \"Choose Columns\" in Meds & Orders section of the refill encounter.           Passed - Patient is age 18 or older       Passed - No active pregnancy on record       Passed - No positive pregnancy test in last 12 months          "

## 2018-05-24 NOTE — TELEPHONE ENCOUNTER
Routing refill request to provider for review/approval because:  Medication is reported/historical    Cece Moscoso RN

## 2018-05-28 ENCOUNTER — HOSPITAL ENCOUNTER (EMERGENCY)
Facility: CLINIC | Age: 58
Discharge: HOME | End: 2018-05-28
Attending: PHYSICIAN ASSISTANT | Admitting: PHYSICIAN ASSISTANT
Payer: OTHER GOVERNMENT

## 2018-05-28 ENCOUNTER — NURSE TRIAGE (OUTPATIENT)
Dept: NURSING | Facility: CLINIC | Age: 58
End: 2018-05-28

## 2018-05-28 VITALS
WEIGHT: 208 LBS | SYSTOLIC BLOOD PRESSURE: 126 MMHG | BODY MASS INDEX: 30.12 KG/M2 | TEMPERATURE: 98.1 F | DIASTOLIC BLOOD PRESSURE: 80 MMHG | HEART RATE: 75 BPM | OXYGEN SATURATION: 99 %

## 2018-05-28 DIAGNOSIS — L23.7 CONTACT DERMATITIS DUE TO POISON IVY: ICD-10-CM

## 2018-05-28 PROCEDURE — G0463 HOSPITAL OUTPT CLINIC VISIT: HCPCS

## 2018-05-28 PROCEDURE — 99213 OFFICE O/P EST LOW 20 MIN: CPT | Performed by: PHYSICIAN ASSISTANT

## 2018-05-28 RX ORDER — PREDNISONE 10 MG/1
TABLET ORAL
Qty: 32 TABLET | Refills: 0 | Status: SHIPPED | OUTPATIENT
Start: 2018-05-28 | End: 2018-06-07

## 2018-05-28 NOTE — ED AVS SNAPSHOT
[facility] Emergency Department    [address] [address]    Phone:  [phone]    Fax:  [phone]                                       Teresa Fernandez   MRN: [id number]    Department:  [facility] Emergency Department   Date of Visit:  5/28/2018           After Visit Summary Signature Page     I have received my discharge instructions, and my questions have been answered. I have discussed any challenges I see with this plan with the nurse or doctor.    ..........................................................................................................................................  Patient/Patient Representative Signature      ..........................................................................................................................................  Patient Representative Print Name and Relationship to Patient    ..................................................               ................................................  Date                                            Time    ..........................................................................................................................................  Reviewed by Signature/Title    ...................................................              ..............................................  Date                                                            Time

## 2018-05-28 NOTE — TELEPHONE ENCOUNTER
Caller states she came in contact last Wednesday with poison ivy and her wrist and now has spread to her back, and thighs, very itchy, weeping, and open sores on her elbows now and not improving with home care remedies. Reviewed guidelines below and recommend she be seen within 4 hours and she agreed and will go to [place] [facility].       Reason for Disposition    [1] Increasing redness around poison ivy AND [2] larger than 2 inches (5 cm)    Additional Information    Negative: Doesn't match the SYMPTOMS of poison ivy, oak, or sumac    Negative: [1] Difficulty breathing or severe coughing AND [2] followed exposure to burning weeds    Negative: [1] Fever AND [2] bright red area or streak (from open poison ivy sores)    Protocols used: POISON IVY - OAK - SUMAC-ADULT-    Dalila Coulter RN, BSN  [facility]

## 2018-05-28 NOTE — ED PROVIDER NOTES
History     Chief Complaint   Patient presents with     Rash     HPI  Teresa Fernandez is a 58 year old female who is in urgent care with concern of a possible contact dermatitis due to plants.  40s prior to arrival patient was gardening.  She developed pruritic rash initially on her forearms bilaterally however is spread to legs, back, torso.  She has a history of poison ivy previously and states this feels identical.  She has not had any other focal complaints.  No fevers, chills, myalgias, cough, dyspnea, wheezing, nausea, vomiting or abdominal pain.  She denies any other new exposures.  No recent changes in soaps, detergents, lotions, foods, medications or insect bites or stings.  She did attempt to treat with OTC poison ivy medication yesterday without relief.      Problem List:    Patient Active Problem List    Diagnosis Date Noted     Muscle pain 10/20/2017     Priority: Medium     Increased CK       Chest tightness or pressure 10/03/2016     Priority: Medium     Hyperlipidemia LDL goal <160 01/29/2015     Priority: Medium     DDD (degenerative disc disease), lumbar 06/24/2014     Priority: Medium     [facility]       Moderate persistent asthma without complication 07/08/2013     Priority: Medium     History of kidney cancer 07/08/2013     Priority: Medium     Left kidney was removed i n1996       History of melanoma in situ 10/17/2012     Priority: Medium     Eczema 10/17/2012     Priority: Medium     Osteopenia 06/15/2012     Priority: Medium     Chronic fatigue 10/04/2011     Priority: Medium     Dry eye syndrome 08/24/2011     Priority: Medium     Chronic low back pain 07/07/2011     Priority: Medium     GERD (gastroesophageal reflux disease) 05/11/2011     Priority: Medium     Renal cancer (H) 05/05/2011     Priority: Medium     Leukoplakia of oral mucosa, including tongue 04/18/2011     Priority: Medium     ERIC (generalized anxiety disorder)      Priority: Medium     History of skin  cancer 11/09/2010     Priority: Medium     Chronic rhinitis 05/03/2005     Priority: Medium     Allergic rhinitis due to pollen 05/03/2005     Priority: Medium     Sinusitis, chronic 05/03/2005     Priority: Medium     Problem list name updated by automated process. Provider to review       Allergic state 04/19/2005     Priority: Medium     Problem list name updated by automated process. Provider to review       Acquired hypothyroidism 04/12/2005     Priority: Medium     Problem list name updated by automated process. 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     FUSION LUMBAR ANTERIOR TWO LEVELS  4/13/2015     GYN SURGERY  04/08/1999    hysterectomy.  LA[facility]     HYSTERECTOMY, PAP NO LONGER INDICATED       SOFT TISSUE SURGERY      chest-melonoma     SURGICAL HISTORY OF -   3/1996    Left nephrectomy-renal cell CA     Family History:    Family History   Problem Relation Age of Onset     DIABETES Mother      Cardiovascular Mother      Lipids Mother      Depression Mother      Hypertension Father      Lipids Father      Obesity Father      Macular Degeneration Father      CANCER Maternal Grandmother      kind unknown had sores on  legs     Eczema Maternal Grandmother      Eczema Maternal Grandfather      Breast Cancer Paternal Grandmother      CANCER Paternal Grandmother      breast     Hypertension Paternal Grandfather      Cardiovascular Paternal Grandfather      heart attack     Hypertension Brother      Thyroid Disease Sister      Hypertension Sister      Depression Sister      Allergies Sister      Allergies Son      Eczema Son      Lipids Sister      Thyroid Disease Sister      Neurologic Disorder Sister      Depression Sister      Respiratory Son      Glaucoma No family hx of      CEREBROVASCULAR DISEASE No family hx of      Social History:  Marital Status:   [2]  Social History   Substance Use Topics     Smoking status: Former Smoker     Packs/day: 2.00     Years: 15.00     Quit date: 3/26/1996     Smokeless tobacco: Never Used     Alcohol use No      Comment: None now.  Rare in past.       Medications:      predniSONE (DELTASONE) 10 MG tablet   acetaminophen (TYLENOL) 325 MG tablet   albuterol (2.5 MG/3ML) 0.083% nebulizer solution   albuterol (PROAIR HFA/PROVENTIL HFA/VENTOLIN HFA) 108 (90 BASE) MCG/ACT Inhaler   benzonatate (TESSALON) 200 MG capsule   Cholecalciferol (VITAMIN D3 PO)   Cyanocobalamin (VITAMIN B 12 PO)   doxycycline (VIBRAMYCIN) 100 MG capsule   fexofenadine (ALLEGRA) 180 MG tablet   fluticasone-salmeterol (ADVAIR) 250-50 MCG/DOSE diskus inhaler   GLUCOSAMINE SULFATE PO   guaiFENesin-codeine (ROBITUSSIN AC) 100-10 MG/5ML SOLN solution   levothyroxine (SYNTHROID/LEVOTHROID) 125 MCG tablet   MAGNESIUM OXIDE PO   Multiple Vitamins-Minerals (MULTIVITAMIN ADULT PO)   order for DME   OVER-THE-COUNTER   PARoxetine (PAXIL) 20 MG tablet   ranitidine (ZANTAC) 150 MG tablet     Review of Systems  CONSTITUTIONAL:NEGATIVE for fever, chills, change in weight  INTEGUMENTARY/SKIN: POSITIVE for generalized pruritic rash   EYES: NEGATIVE for vision changes or irritation  ENT/MOUTH: NEGATIVE for ear, mouth and throat  problems  RESP:NEGATIVE for significant cough or SOB  GI: NEGATIVE for nausea, vomiting, diarrhea, or abdominal pain  Physical Exam   BP: 126/80  Pulse: 74  Temp: 98.1  F (36.7  C)  Weight: 94.3 kg (208 lb)  SpO2: 99 %  Physical Exam  GENERAL APPEARANCE: healthy, alert and no distress  EYES: EOMI,  PERRL, conjunctiva clear  HENT: ear canals and TM's normal.  Nose and mouth without ulcers, erythema or lesions  NECK: supple, nontender, no lymphadenopathy  RESP: lungs clear to auscultation - no rales, rhonchi or wheezes  CV: regular rates and rhythm, normal S1 S2, no murmur noted  SKIN: numerous vesicular plaques on upper lower extremities and torso bilaterally   ED Course     ED Course     Procedures        Critical Care time:  none            No results found for this or any previous visit (from the past 24 hour(s)).    Medications - No data to display    Assessments & Plan (with Medical Decision Making)     I have reviewed the nursing notes.    I have reviewed the findings, diagnosis, plan and need for follow up with the patient.       New Prescriptions    PREDNISONE (DELTASONE) 10 MG TABLET    Take 4 tablets daily for 5 days,  take 2 tablets daily for 3 days, take 1 tablet daily for 3 days, take half a tablet for 3 days.     Final diagnoses:   Contact dermatitis due to poison ivy     58 year old female presents to  with concern over suspected poison ivy after she developed generalized rash after being outside.  She had stable vital signs upon arrival.  Physical exam findings as described above were consistent with contact dermatitis.  Patient was instructed to use OTC symptomatic treatment with oral antihistamine.  Prescription for oral prednisone given.  Follow up with PCP if no improvement in 3-4 days.  Worrisome reasons to return to ER/UC sooner discussed.      Disclaimer: This note consists of symbols derived from keyboarding, dictation, and/or voice recognition software. As a result, there may be errors in  the script that have gone undetected.  Please consider this when interpreting information found in the chart.    5/28/2018   [facility] EMERGENCY DEPARTMENT     Dalila Aguilar PA-C  05/30/18 2011

## 2018-05-28 NOTE — ED AVS SNAPSHOT
" [facility] Emergency Department    [address] [address]    Phone:  [phone]    Fax:  [phone]                                       Teresa Fernandez   MRN: [id number]    Department:  [facility] Emergency Department   Date of Visit:  5/28/2018           Patient Information     Date Of Birth          1960        Your diagnoses for this visit were:     Contact dermatitis due to poison ivy        You were seen by Dalila Aguilar PA-C.      Follow-up Information     Follow up with Nuzhat Burt APRN CNP In 3 days.    Specialty:  Nurse Practitioner    Why:  As needed, If symptoms worsen    Contact information:    [address] [facility] [address] [facility] [address]  [phone]          Discharge Instructions         Contact Dermatitis  Contact dermatitis is a skin rash caused by something that touches the skin and makes it irritated and inflamed. Your skin may be red, swollen, dry, and may be cracked. Blisters may form and ooze. The rash will itch.  Contact dermatitis can form on the face and neck, backs of hands, forearms, genitals, and lower legs.  People can get contact dermatitis from lots of sources. These include:    Plants such as poison ivy, oak, or sumac    Chemicals in hair dyes and rinses, soaps, solvents, waxes, fingernail polish, and deodorants     Jewelry or watchbands made of nickel  Contact dermatitis is not passed from person to person.  Talk with your healthcare provider about what may have caused the rash. A type of allergy testing called \"patch testing\" may be used to discover what you are allergic to. You will need to avoid the source of your rash in the future to prevent it from coming back.  Treatment is done to relieve itching and prevent the rash from coming back. The rash should go away in a few days to a few weeks.  Home care  Your healthcare provider may prescribe medicine to relieve swelling and itching. Follow all instructions when using these " medicines.  General care:    Avoid anything that heats up your skin, such as hot showers or baths, or direct sunlight. This can make itching worse.    Apply cold compresses to soothe your sores to help relieve your symptoms. Do this for 30 minutes 3 to 4 times a day. You can make a cold compress by soaking a cloth in cold water. Squeeze out excess water. You can add colloidal oatmeal to the water to help reduce itching. For severe itching in a small area, apply an ice pack wrapped in a thin towel. Do this for 20 minutes 3 to 4 times a day.    You can also try wet dressings. One way to do this is to wear a wet piece of clothing under a dry one. Wear a damp shirt under a dry shirt if your upper body is affected. This can relieve itching and prevent you from scratching the affected area.    You can also help relieve large areas of itching by taking a lukewarm bath with colloidal oatmeal added to the water.    Use hydrocortisone cream for redness and irritation, unless another medicine was prescribed. You can also use benzocaine anesthetic cream or spray. Calamine lotion can also relieve mild symptoms.    Use oral diphenhydramine to help reduce itching. You can buy this antihistamine at drug and grocery stores. It can make you sleepy, so use lower doses during the daytime. Or you can use loratadine. This is an antihistamine that will not make you sleepy. Do not use diphenhydramine if you have glaucoma or have trouble urinating due to an enlarged prostate.    If a plant causes your rash, make sure to wash your skin and the clothes you were wearing when you came into contact with the plant. This is to wash away the plant oils that gave you the rash and prevent more or worse symptoms.    Stay away from the substance or object that causes your symptoms. If you can t avoid it, wear gloves or some other type of protection.  Follow-up care  Follow up with your healthcare provider, or as advised.  When to seek medical  advice  Call your healthcare provider right away if any of these occur:    Spreading of the rash to other parts of your body    Severe swelling of your face, eyelids, mouth, throat or tongue    Trouble urinating due to swelling in the genital area    Fever of 100.4 F (38 C) or higher    Redness or swelling that gets worse    Pain that gets worse    Foul-smelling fluid leaking from the skin    Yellow-brown crusts on the open blisters  Date Last Reviewed: 9/1/2016 2000-2017 [employer]. [address] [place] [address]. All rights reserved. This information is not intended as a substitute for professional medical care. Always follow your healthcare professional's instructions.          Your next 10 appointments already scheduled     Jun 25, 2018  2:00 PM CDT   (Arrive by 1:30 PM)   New Visit with Carter Tierney   [facility] ([facility])    [address] [facility] [facility] [address]   [phone]            Jul 09, 2018  4:00 PM CDT   [facility]    [address] [facility] [address]   [phone]            Jul 17, 2018  4:00 PM CDT   Return Neuropathy Visit with Demetris Zavala MD   [facility] ([facility])    [address] [address] [phone]              24 Hour Appointment Hotline       To make an appointment at any [facility], call [phone] ([phone]). If you don't have a family doctor or clinic, we will help you find one. [facility] clinics are conveniently located to serve the needs of you and your family.             Review of your medicines      START taking        Dose / Directions Last dose taken    predniSONE 10 MG tablet   Commonly known as:  DELTASONE   Quantity:  32 tablet        Take 4 tablets daily for 5 days,  take 2 tablets daily for 3 days, take 1 tablet daily for 3 days, take  half a tablet for 3 days.   Refills:  0          Our records show that you are taking the medicines listed below. If these are incorrect, please call your family doctor or clinic.        Dose / Directions Last dose taken    * albuterol (2.5 MG/3ML) 0.083% neb solution   Dose:  1 vial   Quantity:  25 vial        Take 1 vial (2.5 mg) by nebulization every 6 hours as needed for shortness of breath / dyspnea or wheezing   Refills:  0        * albuterol 108 (90 Base) MCG/ACT Inhaler   Commonly known as:  PROAIR HFA/PROVENTIL HFA/VENTOLIN HFA   Dose:  2 puff   Quantity:  1 Inhaler        Inhale 2 puffs into the lungs every 6 hours as needed for shortness of breath / dyspnea   Refills:  5        benzonatate 200 MG capsule   Commonly known as:  TESSALON   Dose:  200 mg   Quantity:  21 capsule        Take 1 capsule (200 mg) by mouth 3 times daily as needed for cough   Refills:  0        doxycycline 100 MG capsule   Commonly known as:  VIBRAMYCIN   Dose:  100 mg   Quantity:  14 capsule        Take 1 capsule (100 mg) by mouth 2 times daily   Refills:  0        fexofenadine 180 MG tablet   Commonly known as:  ALLEGRA   Dose:  180 mg   Quantity:  30 tablet        Take 1 tablet (180 mg) by mouth daily   Refills:  1        fluticasone-salmeterol 250-50 MCG/DOSE diskus inhaler   Commonly known as:  ADVAIR   Dose:  1 puff   Quantity:  3 Inhaler        Inhale 1 puff into the lungs 2 times daily   Refills:  0        GLUCOSAMINE SULFATE PO   Dose:  1000 mg        Take 1,000 mg by mouth 2 times daily   Refills:  0        guaiFENesin-codeine 100-10 MG/5ML Soln solution   Commonly known as:  ROBITUSSIN AC   Dose:  1 tsp.   Quantity:  120 mL        Take 5 mLs by mouth every 4 hours as needed for cough   Refills:  0        levothyroxine 125 MCG tablet   Commonly known as:  SYNTHROID/LEVOTHROID   Quantity:  90 tablet        TAKE 1 TABLET DAILY   Refills:  2        MAGNESIUM OXIDE PO   Dose:  200 mg        Take 200 mg by mouth daily    Refills:  0        MULTIVITAMIN ADULT PO        Take by mouth every morning   Refills:  0        order for DME   Quantity:  1 Units        Equipment being ordered: Nebulizer   Refills:  0        OVER-THE-COUNTER   Dose:  1 drop   Quantity:  1 Bottle        Place 1 drop into both eyes 3 times daily. Systane Ultra, Systane Balance, Blink Tears or Refresh Optive Artificial Tear   Refills:  0        PARoxetine 20 MG tablet   Commonly known as:  PAXIL   Dose:  30 mg   Quantity:  135 tablet        Take 1.5 tablets (30 mg) by mouth daily   Refills:  1        ranitidine 150 MG tablet   Commonly known as:  ZANTAC   Quantity:  180 tablet        TAKE 1 TABLET TWICE A DAY   Refills:  1        TYLENOL 325 MG tablet   Dose:  325-650 mg   Generic drug:  acetaminophen        Take 325-650 mg by mouth every 6 hours as needed for mild pain   Refills:  0        VITAMIN B 12 PO   Dose:  500 mcg        Take 500 mcg by mouth daily   Refills:  0        VITAMIN D3 PO   Dose:  2000 Units        Take 2,000 Units by mouth 2 times daily   Refills:  0        * Notice:  This list has 2 medication(s) that are the same as other medications prescribed for you. Read the directions carefully, and ask your doctor or other care provider to review them with you.            Prescriptions were sent or printed at these locations (1 Prescription)                   [facility] [address] [place] [address]   [address] [facility] [address]    Telephone:  [phone]   Fax:  [phone]   Hours:                  E-Prescribed (1 of 1)         predniSONE (DELTASONE) 10 MG tablet                Orders Needing Specimen Collection     None      Pending Results     No orders found from 5/26/2018 to 5/29/2018.            Pending Culture Results     No orders found from 5/26/2018 to 5/29/2018.            Pending Results Instructions     If you had any lab results that were not finalized at the time of your Discharge, you can call the ED Lab  Result RN at [phone]. You will be contacted by this team for any positive Lab results or changes in treatment. The nurses are available 7 days a week from 10A to 6:30P.  You can leave a message 24 hours per day and they will return your call.        Test Results From Your Hospital Stay               Thank you for choosing [facility]       Thank you for choosing [facility] for your care. Our goal is always to provide you with excellent care. Hearing back from our patients is one way we can continue to improve our services. Please take a few minutes to complete the written survey that you may receive in the mail after you visit with us. Thank you!        [facility] Information     [facility] gives you secure access to your electronic health record. If you see a primary care provider, you can also send messages to your care team and make appointments. If you have questions, please call your primary care clinic.  If you do not have a primary care provider, please call [phone] and they will assist you.        Care EveryWhere ID     This is your Care EveryWhere ID. This could be used by other organizations to access your [facility] medical records  [id number]        Equal Access to Services     MERRILL PRESTON : Hadlarry Leung, gume rae, lillian monaco. So [facility] [phone].    ATENCIÓN: Si habla español, tiene a anderson disposición servicios gratuitos de asistencia lingüística. Llame al [phone].    We comply with applicable federal civil rights laws and [place] laws. We do not discriminate on the basis of race, color, national origin, age, disability, sex, sexual orientation, or gender identity.            After Visit Summary       This is your record. Keep this with you and show to your community pharmacist(s) and doctor(s) at your next visit.

## 2018-05-28 NOTE — DISCHARGE INSTRUCTIONS
"  Contact Dermatitis  Contact dermatitis is a skin rash caused by something that touches the skin and makes it irritated and inflamed. Your skin may be red, swollen, dry, and may be cracked. Blisters may form and ooze. The rash will itch.  Contact dermatitis can form on the face and neck, backs of hands, forearms, genitals, and lower legs.  People can get contact dermatitis from lots of sources. These include:    Plants such as poison ivy, oak, or sumac    Chemicals in hair dyes and rinses, soaps, solvents, waxes, fingernail polish, and deodorants     Jewelry or watchbands made of nickel  Contact dermatitis is not passed from person to person.  Talk with your healthcare provider about what may have caused the rash. A type of allergy testing called \"patch testing\" may be used to discover what you are allergic to. You will need to avoid the source of your rash in the future to prevent it from coming back.  Treatment is done to relieve itching and prevent the rash from coming back. The rash should go away in a few days to a few weeks.  Home care  Your healthcare provider may prescribe medicine to relieve swelling and itching. Follow all instructions when using these medicines.  General care:    Avoid anything that heats up your skin, such as hot showers or baths, or direct sunlight. This can make itching worse.    Apply cold compresses to soothe your sores to help relieve your symptoms. Do this for 30 minutes 3 to 4 times a day. You can make a cold compress by soaking a cloth in cold water. Squeeze out excess water. You can add colloidal oatmeal to the water to help reduce itching. For severe itching in a small area, apply an ice pack wrapped in a thin towel. Do this for 20 minutes 3 to 4 times a day.    You can also try wet dressings. One way to do this is to wear a wet piece of clothing under a dry one. Wear a damp shirt under a dry shirt if your upper body is affected. This can relieve itching and prevent you from " scratching the affected area.    You can also help relieve large areas of itching by taking a lukewarm bath with colloidal oatmeal added to the water.    Use hydrocortisone cream for redness and irritation, unless another medicine was prescribed. You can also use benzocaine anesthetic cream or spray. Calamine lotion can also relieve mild symptoms.    Use oral diphenhydramine to help reduce itching. You can buy this antihistamine at drug and grocery stores. It can make you sleepy, so use lower doses during the daytime. Or you can use loratadine. This is an antihistamine that will not make you sleepy. Do not use diphenhydramine if you have glaucoma or have trouble urinating due to an enlarged prostate.    If a plant causes your rash, make sure to wash your skin and the clothes you were wearing when you came into contact with the plant. This is to wash away the plant oils that gave you the rash and prevent more or worse symptoms.    Stay away from the substance or object that causes your symptoms. If you can t avoid it, wear gloves or some other type of protection.  Follow-up care  Follow up with your healthcare provider, or as advised.  When to seek medical advice  Call your healthcare provider right away if any of these occur:    Spreading of the rash to other parts of your body    Severe swelling of your face, eyelids, mouth, throat or tongue    Trouble urinating due to swelling in the genital area    Fever of 100.4 F (38 C) or higher    Redness or swelling that gets worse    Pain that gets worse    Foul-smelling fluid leaking from the skin    Yellow-brown crusts on the open blisters  Date Last Reviewed: 9/1/2016 2000-2017 [employer]. [address] [place] [address]. All rights reserved. This information is not intended as a substitute for professional medical care. Always follow your healthcare professional's instructions.

## 2018-05-30 DIAGNOSIS — R07.89 CHEST TIGHTNESS: ICD-10-CM

## 2018-05-30 DIAGNOSIS — J45.20 INTERMITTENT ASTHMA, UNCOMPLICATED: ICD-10-CM

## 2018-05-30 NOTE — TELEPHONE ENCOUNTER
Signed Prescriptions:                        Disp   Refills    fluticasone-salmeterol (ADVAIR) 250-50 MCG*1 Inha*0        Sig: Inhale 1 puff into the lungs 2 times daily Follow up           for further refills  Authorizing Provider: LETTY YOUNG  Ordering User: ROCIO JADE RN refilled medication (casey refill) per [facility] Refill Protocol. Patient must follow up for further refills.    Rocio Jade RN

## 2018-06-25 ENCOUNTER — OFFICE VISIT (OUTPATIENT)
Dept: PSYCHOLOGY | Facility: CLINIC | Age: 58
End: 2018-06-25
Payer: OTHER GOVERNMENT

## 2018-06-25 DIAGNOSIS — F33.0 MAJOR DEPRESSIVE DISORDER, RECURRENT, MILD (H): Primary | ICD-10-CM

## 2018-06-25 PROCEDURE — 90834 PSYTX W PT 45 MINUTES: CPT | Performed by: PSYCHOLOGIST

## 2018-06-25 NOTE — MR AVS SNAPSHOT
MRN:[id number]                      After Visit Summary   6/25/2018    Teresa Fernandez    MRN: [id number]           Visit Information        Provider Department      6/25/2018 2:00 PM Carter Tierney [facility] Generic      Your next 10 appointments already scheduled     Jul 09, 2018  4:00 PM CDT   Return Visit with Carter Tierney   [facility] ([facility])    [address] [address]   [phone]            Jul 17, 2018  4:00 PM CDT   Return Neuropathy Visit with Demetris Zavala MD   [facility] NEUROSPECIALTIES ([facility])    [address] [address] [phone]            Jul 23, 2018  4:00 PM CDT   Return Visit with Carter Tierney   [facility] ([facility])    [address] [address]   [phone]              MyChart Information     Number [id number] gives you secure access to your electronic health record. If you see a primary care provider, you can also send messages to your care team and make appointments. If you have questions, please call your primary care clinic.  If you do not have a primary care provider, please call [phone] and they will assist you.        Care EveryWhere ID     This is your Care EveryWhere ID. This could be used by other organizations to access your [place] medical records  [id number]        Equal Access to Services     MERRILL PRESTON AH: Hadii gilma Leung, waaxda luqadaha, qaybta kaalmada alexa, lillian reese. So [facility] [phone].    ATENCIÓN: Si habla español, tiene a anderson disposición servicios gratuitos de asistencia lingüística. Llame al [phone].    We comply with applicable federal civil rights laws and [place] laws. We do not discriminate on the basis of race, color, national origin, age, disability, sex, sexual orientation, or gender  identity.

## 2018-06-27 ASSESSMENT — ANXIETY QUESTIONNAIRES
6. BECOMING EASILY ANNOYED OR IRRITABLE: SEVERAL DAYS
2. NOT BEING ABLE TO STOP OR CONTROL WORRYING: NOT AT ALL
4. TROUBLE RELAXING: NOT AT ALL
3. WORRYING TOO MUCH ABOUT DIFFERENT THINGS: NOT AT ALL
7. FEELING AFRAID AS IF SOMETHING AWFUL MIGHT HAPPEN: NOT AT ALL
5. BEING SO RESTLESS THAT IT IS HARD TO SIT STILL: NEARLY EVERY DAY
GAD7 TOTAL SCORE: 5
1. FEELING NERVOUS, ANXIOUS, OR ON EDGE: SEVERAL DAYS

## 2018-06-27 NOTE — PROGRESS NOTES
Progress Note - Initial Session  Disclaimer: This note consists of symbols derived from keyboarding, dictation and/or voice recognition software. As a result, there may be errors in the script that have gone undetected. Please consider this when interpreting information found in this chart.    Client Name:  Teresa Fernandez Date: 6/25/2018         Service Type: Individual      Session Start Time: 2:00 PM  Session End Time: 2:45 PM      Session Length: 38 - 52      Session #: 1     Attendees: Client attended alone         Client presents with history of depression and chronic pain.  Depression began with the birth of her oldest son.  Most recently chronic pain flare began last Labor Day.  History of mental health issues in the family, mother had depression and alcohol problems.  Other chronic medical conditions including essential tremor, RLS.  She is followed by [facility] neurology.      Mental Status Assessment:  Appearance:   Appropriate   Eye Contact:   Good   Psychomotor Behavior: Normal   Attitude:   Cooperative   Orientation:   All  Speech   Rate / Production: Normal    Volume:  Normal   Mood:    Normal Sad   Affect:    Appropriate   Thought Content:  Clear   Thought Form:  Coherent  Logical   Insight:    Good       Safety Issues and Plan for Safety and Risk Management:  Client denies current fears or concerns for personal safety.  Client denies current or recent suicidal ideation or behaviors.  Client denies current or recent homicidal ideation or behaviors.  Client denies current or recent self injurious behavior or ideation.  Client denies other safety concerns.  A safety and risk management plan has not been developed at this time, however client was given the after-hours number / 911 should there be a change in any of these risk factors.  Client reports there are no firearms in the house.      Diagnostic Criteria:  A) Recurrent episode(s) - symptoms have been present during the same 2-week  period and represent a change from previous functioning 5 or more symptoms (required for diagnosis)   - Depressed mood. Note: In children and adolescents, can be irritable mood.     - Diminished interest or pleasure in all, or almost all, activities.    - Psychomotor activity agitation.    - Fatigue or loss of energy.    - Diminished ability to think or concentrate, or indecisiveness.   B) The symptoms cause clinically significant distress or impairment in social, occupational, or other important areas of functioning  C) The episode is not attributable to the physiological effects of a substance or to another medical condition  D) The occurence of major depressive episode is not better explained by other thought / psychotic disorders  E) There has never been a manic episode or hypomanic episode        DSM5 Diagnoses: (Sustained by DSM5 Criteria Listed Above)  Diagnoses: 296.31 (F33.0) Major Depressive Disorder, Recurrent Episode, Mild _ and With anxious distress  Psychosocial & Contextual Factors: Multiple medical issues  WHODAS 2.0 (12 item)            This questionnaire asks about difficulties due to health conditions. Health conditions  include  disease or illnesses, other health problems that may be short or long lasting,  injuries, mental health or emotional problems, and problems with alcohol or drugs.                     Think back over the past 30 days and answer these questions, thinking about how much  difficulty you had doing the following activities. For each question, please [place] only  one response.    S1 Standing for long periods such as 30 minutes? Mild =           2   S2 Taking care of household responsibilities? None =         1   S3 Learning a new task, for example, learning how to get to a new place? None =         1   S4 How much of a problem do you have joining community activities (for example, festivals, Yazdanism or other activities) in the same way as anyone else can? None =         1   S5  How much have you been emotionally affected by your health problems? Moderate =   3     In the past 30 days, how much difficulty did you have in:   S6 Concentrating on doing something for ten minutes? Mild =           2   S7 Walking a long distance such as a kilometer (or equivalent)? None =         1   S8 Washing your whole body? None =         1   S9 Getting dressed? None =         1   S10 Dealing with people you do not know? None =         1   S11 Maintaining a friendship? None =         1   S12 Your day to day work? None =         1     H1 Overall, in the past 30 days, how many days were these difficulties present? Record number of days 3   H2 In the past 30 days, for how many days were you totally unable to carry out your usual activities or work because of any health condition? Record number of days  0   H3 In the past 30 days, not counting the days that you were totally unable, for how many days did you cut back or reduce your usual activities or work because of any health condition? Record number of days 0       Collateral Reports Completed:  Not Applicable      PLAN: (Homework, other):  Client stated that she may follow up for ongoing services with [facility].        Carter Tierney

## 2018-06-28 ASSESSMENT — ANXIETY QUESTIONNAIRES: GAD7 TOTAL SCORE: 5

## 2018-06-28 ASSESSMENT — PATIENT HEALTH QUESTIONNAIRE - PHQ9: SUM OF ALL RESPONSES TO PHQ QUESTIONS 1-9: 3

## 2018-07-09 ENCOUNTER — OFFICE VISIT (OUTPATIENT)
Dept: PSYCHOLOGY | Facility: CLINIC | Age: 58
End: 2018-07-09
Payer: OTHER GOVERNMENT

## 2018-07-09 DIAGNOSIS — F33.0 MAJOR DEPRESSIVE DISORDER, RECURRENT, MILD (H): Primary | ICD-10-CM

## 2018-07-09 PROCEDURE — 90791 PSYCH DIAGNOSTIC EVALUATION: CPT | Performed by: PSYCHOLOGIST

## 2018-07-09 NOTE — Clinical Note
Nice lady, I think we will be able to do some good work together.  She has a great support system and spiritual life which make for greater foundation.

## 2018-07-09 NOTE — MR AVS SNAPSHOT
MRN:[id number]                      After Visit Summary   7/9/2018    Teresa Fernandez    MRN: [id number]           Visit Information        Provider Department      7/9/2018 4:00 PM Carter Tierney [facility] Generic      Your next 10 appointments already scheduled     Jul 17, 2018  4:00 PM CDT   Return Neuropathy Visit with Demetris Zavala MD   [facility] NEUROSPECIALTIES ([facility])    [address]  [facility] [address]   [phone]            Jul 23, 2018  4:00 PM CDT   Return Visit with Carter Tierney   [facility] ([facility])    [address] [address] [phone]            Aug 06, 2018  5:00 PM CDT   Return Visit with Carter Tierney   [facility] ([facility])    [address] [address] [phone]              MyChart Information     Dig[employer]oo gives you secure access to your electronic health record. If you see a primary care provider, you can also send messages to your care team and make appointments. If you have questions, please call your primary care clinic.  If you do not have a primary care provider, please call [phone] and they will assist you.        Care EveryWhere ID     This is your Care EveryWhere ID. This could be used by other organizations to access your [facility] medical records  [id number]        Equal Access to Services     MERRILL PRESTON AH: Hadii gilma Leung, waaxda luqadaha, qaybta kaalmada alexa, lillian reese. So [facility] [phone].    ATENCIÓN: Si habla español, tiene a anderson disposición servicios gratuitos de asistencia lingüística. Llame al [phone].    We comply with applicable federal civil rights laws and [place] laws. We do not discriminate on the basis of race, color, national origin, age, disability, sex, sexual orientation, or gender  identity.

## 2018-07-12 ASSESSMENT — ANXIETY QUESTIONNAIRES
7. FEELING AFRAID AS IF SOMETHING AWFUL MIGHT HAPPEN: NOT AT ALL
5. BEING SO RESTLESS THAT IT IS HARD TO SIT STILL: NEARLY EVERY DAY
3. WORRYING TOO MUCH ABOUT DIFFERENT THINGS: NOT AT ALL
6. BECOMING EASILY ANNOYED OR IRRITABLE: SEVERAL DAYS
4. TROUBLE RELAXING: NOT AT ALL
2. NOT BEING ABLE TO STOP OR CONTROL WORRYING: NOT AT ALL
1. FEELING NERVOUS, ANXIOUS, OR ON EDGE: SEVERAL DAYS
GAD7 TOTAL SCORE: 5
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
## 2018-07-13 ASSESSMENT — ANXIETY QUESTIONNAIRES: GAD7 TOTAL SCORE: 5

## 2018-07-13 ASSESSMENT — PATIENT HEALTH QUESTIONNAIRE - PHQ9: SUM OF ALL RESPONSES TO PHQ QUESTIONS 1-9: 3

## 2018-07-17 ENCOUNTER — OFFICE VISIT (OUTPATIENT)
Dept: NEUROLOGY | Facility: CLINIC | Age: 58
End: 2018-07-17
Payer: OTHER GOVERNMENT

## 2018-07-17 VITALS
DIASTOLIC BLOOD PRESSURE: 66 MMHG | BODY MASS INDEX: 30.06 KG/M2 | HEART RATE: 72 BPM | SYSTOLIC BLOOD PRESSURE: 119 MMHG | TEMPERATURE: 96.9 F | WEIGHT: 207.6 LBS

## 2018-07-17 DIAGNOSIS — M54.17 L-S RADICULOPATHY: Primary | ICD-10-CM

## 2018-07-17 ASSESSMENT — PAIN SCALES - GENERAL: PAINLEVEL: NO PAIN (0)

## 2018-07-17 NOTE — MR AVS SNAPSHOT
After Visit Summary   7/17/2018    Teresa Fernandez    MRN: [id number]           Patient Information     Date Of Birth          1960        Visit Information        Provider Department      7/17/2018 4:00 PM Demetris Zavala MD [facility]        Today's Diagnoses     L-S radiculopathy    -  1       Follow-ups after your visit        Follow-up notes from your care team     Return if symptoms worsen or fail to improve.      Your next 10 appointments already scheduled     Jul 23, 2018  4:00 PM CDT   Return Visit with Carter Tierney   [facility] ([facility]    [address] [address]   [phone]            Aug 06, 2018  5:00 PM CDT   Return Visit with Carter Tierney   [facility] ([facility]    [address] [address]   [phone]              Who to contact     Please call your clinic at [phone] to:    Ask questions about your health    Make or cancel appointments    Discuss your medicines    Learn about your test results    Speak to your doctor            Additional Information About Your Visit        [employer] Information     [employer] gives you secure access to your electronic health record. If you see a primary care provider, you can also send messages to your care team and make appointments. If you have questions, please call your primary care clinic.  If you do not have a primary care provider, please call [phone] and they will assist you.      [employer] is an electronic gateway that provides easy, online access to your medical records. With [employer], you can request a clinic appointment, read your test results, renew a prescription or communicate with your care team.     To access your existing account, please contact your [facility] or call [phone] for assistance.        Care EveryWhere ID     This is your Care EveryWhere ID. This could be  used by other organizations to access your [place] medical records  [id number]        Your Vitals Were     Pulse Temperature BMI (Body Mass Index)             72 96.9  F (36.1  C) 30.06 kg/m2          Blood Pressure from Last 3 Encounters:   07/17/18 119/66   05/28/18 126/80   02/13/18 129/74    Weight from Last 3 Encounters:   07/17/18 94.2 kg (207 lb 9.6 oz)   05/28/18 94.3 kg (208 lb)   02/13/18 100.2 kg (221 lb)              Today, you had the following     No orders found for display         Today's Medication Changes          These changes are accurate as of 7/17/18  4:29 PM.  If you have any questions, ask your nurse or doctor.               These medicines have changed or have updated prescriptions.        Dose/Directions    fexofenadine 180 MG tablet   Commonly known as:  ALLEGRA   This may have changed:  when to take this   Used for:  Seasonal allergic rhinitis        Dose:  180 mg   Take 1 tablet (180 mg) by mouth daily   Quantity:  30 tablet   Refills:  1                Primary Care Provider Office Phone # Fax #    Nuzhat Burt, APRN [facility] [phone] [address] [address]        Equal Access to Services     MERRILL PRESTON AH: Nic westono Soayanna, waaxda luqadaha, qaybta kaalmada adeegyada, lillian reese. So [facility] [phone].    ATENCIÓN: Si habla español, tiene a anderson disposición servicios gratuitos de asistencia lingüística. Llame al [phone].    We comply with applicable federal civil rights laws and [place] laws. We do not discriminate on the basis of race, color, national origin, age, disability, sex, sexual orientation, or gender identity.            Thank you!     Thank you for choosing [facility]  for your care. Our goal is always to provide you with excellent care. Hearing back from our patients is one way we can continue to improve our services. Please take a few minutes to complete the written survey that  you may receive in the mail after your visit with us. Thank you!             Your Updated Medication List - Protect others around you: Learn how to safely use, store and throw away your medicines at www.disposemymeds.org.          This list is accurate as of 7/17/18  4:29 PM.  Always use your most recent med list.                   Brand Name Dispense Instructions for use Diagnosis    * albuterol (2.5 MG/3ML) 0.083% neb solution     25 vial    Take 1 vial (2.5 mg) by nebulization every 6 hours as needed for shortness of breath / dyspnea or wheezing    Asthma exacerbation       * albuterol 108 (90 Base) MCG/ACT Inhaler    PROAIR HFA/PROVENTIL HFA/VENTOLIN HFA    1 Inhaler    Inhale 2 puffs into the lungs every 6 hours as needed for shortness of breath / dyspnea    Moderate persistent asthma without complication       benzonatate 200 MG capsule    TESSALON    21 capsule    Take 1 capsule (200 mg) by mouth 3 times daily as needed for cough    Acute bronchitis, unspecified organism       doxycycline 100 MG capsule    VIBRAMYCIN    14 capsule    Take 1 capsule (100 mg) by mouth 2 times daily    Acute bronchitis, unspecified organism       fexofenadine 180 MG tablet    ALLEGRA    30 tablet    Take 1 tablet (180 mg) by mouth daily    Seasonal allergic rhinitis       fluticasone-salmeterol 250-50 MCG/DOSE diskus inhaler    ADVAIR    1 Inhaler    Inhale 1 puff into the lungs 2 times daily Follow up for further refills    Chest tightness, Intermittent asthma, uncomplicated       GLUCOSAMINE SULFATE PO      Take 1,000 mg by mouth 2 times daily        guaiFENesin-codeine 100-10 MG/5ML Soln solution    ROBITUSSIN AC    120 mL    Take 5 mLs by mouth every 4 hours as needed for cough    Acute bronchitis, unspecified organism       levothyroxine 125 MCG tablet    SYNTHROID/LEVOTHROID    90 tablet    TAKE 1 TABLET DAILY    Acquired hypothyroidism       MAGNESIUM OXIDE PO      Take 200 mg by mouth daily        MULTIVITAMIN ADULT PO       Take by mouth every morning        order for DME     1 Units    Equipment being ordered: Nebulizer    Asthma exacerbation       OVER-THE-COUNTER     1 Bottle    Place 1 drop into both eyes 3 times daily. Systane Ultra, Systane Balance, Blink Tears or Refresh Optive Artificial Tear    Dry eye syndrome       PARoxetine 20 MG tablet    PAXIL    135 tablet    Take 1.5 tablets (30 mg) by mouth daily    ERIC (generalized anxiety disorder)       ranitidine 150 MG tablet    ZANTAC    180 tablet    TAKE 1 TABLET TWICE A DAY    Gastroesophageal reflux disease, esophagitis presence not specified       TYLENOL 325 MG tablet   Generic drug:  acetaminophen      Take 325-650 mg by mouth every 6 hours as needed for mild pain        VITAMIN B 12 PO      Take 500 mcg by mouth daily        VITAMIN D3 PO      Take 2,000 Units by mouth 2 times daily        * Notice:  This list has 2 medication(s) that are the same as other medications prescribed for you. Read the directions carefully, and ask your doctor or other care provider to review them with you.

## 2018-07-17 NOTE — LETTER
7/17/2018       RE: Teresa Fernandez  [id number] [facility] [address]     Dear Colleague,    Thank you for referring your patient, Teresa Fernandez, to the [facility] at [facility]. Please see a copy of my visit note below.    Myopathy history:    Teresa Fernandez is a 58 year old woman with myalgias and hyperCKemia. She has had achiness in her limbs since early 2017. The most affected area is her upper and mid back, posterior upper arm (triceps area) on the right more than the left, and posterior thighs. Work up in 2017 revealed a CK of around 500. She has never taken any cholesterol lowering medications. She also has a longer history of low back pain with prior imaging showing mild central stenosis L1-L2 and L3-L4 and moderate central stenosis at L4-L5, right central L5-S1 disc protrusion/extrusion with impingement on right S1 nerve root, and multilevel mild foraminal narrowing. She is s/p lumbar surgery in 2015. She has never had surgery on her neck.     Interval history:  I last saw her 1/9/2018. She has a number of concerns today. (1) She reports pain and achiness in both wrists for at least 6 months. Pain is present at rest and not really provoked by activity. She wears a splint on the left wrist, which helps. She has not noticed numbness or tingling in her fingers. (2) she describes sharp pain in her posterior arms. Both sides are affected. It fluctuated for a while and recently resolved. (3) She had an episode of fluttering of her left eye with dizziness. It only lasted a couple seconds. She then felt unsteady for a few minutes. (4) She has trouble laying on her back. She feels that it causes tremulousness in her head. (5) She has pain in her shoulders and knees. (6) She had an episode of feeling dizzy and unsteady. She sat down on her couch and felt exhausted. She thinks she has well hydrated of late.     She denies pain in her neck or low back. No chest  pain or syncope.     Prior pertinent laboratory work-up:  : . Negative/normal: Jo1, PL12, PL7, EJ, OJ, SRP, Mi2, P155, TIF, SAE1, MDA5, NXP2, aldolase  : . Normal/negative Lyme, CRP, TSH, ALT, AST  : Normal CRP, ESR, RF, DIEGO  : Normal Hba1c    Prior muscle biopsy:  None    Prior electrophysiologic work-up:  : NCS/EMG showed a chronic/remote left-sided lumbosacral radiculopathy affecting the S1 and probably L5 nerve roots. There is no evidence of a generalized myopathy or of a generalized disorder of lower motor neurons on the basis of this study.     Prior imagin: MRI LS spine showed mild central stenosis L1-L2 and L3-L4 and moderate central stenosis at L4-L5, right central L5-S1 disc protrusion/extrusion with impingement on right S1 nerve root,  multilevel mild foraminal narrowing  : MRI C spine showed advanced degenerative changes, particularly at the right C8 level.    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     FUSION LUMBAR ANTERIOR TWO LEVELS  2015     GYN SURGERY  1999    hysterectomy.  LAVH     HYSTERECTOMY, PAP NO LONGER  INDICATED       SOFT TISSUE SURGERY      chest-melonoma     SURGICAL HISTORY OF -   3/1996    Left nephrectomy-renal cell CA     Family history:    There is no known family history of myopathy or other neuromuscular disorders. She has 4 healthy sons.     Social History:    Rare alcohol. She denies current tobacco or illicit drug use. There is no known exposure to toxins or heavy metals.     Medical Allergies:     Allergies   Allergen Reactions     Omnipaque [Iohexol] Swelling and Difficulty breathing     Immediate throat swelling following around 1-2cc of omnipaque 300 for spine procedure     Gluten      Iodine      Welts from CT contrast, surgical scrub is ok.     Penicillins Hives     Current Medications:   Current Outpatient Prescriptions   Medication     acetaminophen (TYLENOL) 325 MG tablet     albuterol (2.5 MG/3ML) 0.083% nebulizer solution     albuterol (PROAIR HFA/PROVENTIL HFA/VENTOLIN HFA) 108 (90 BASE) MCG/ACT Inhaler     Cholecalciferol (VITAMIN D3 PO)     Cyanocobalamin (VITAMIN B 12 PO)     fexofenadine (ALLEGRA) 180 MG tablet     fluticasone-salmeterol (ADVAIR) 250-50 MCG/DOSE diskus inhaler     GLUCOSAMINE SULFATE PO     levothyroxine (SYNTHROID/LEVOTHROID) 125 MCG tablet     MAGNESIUM OXIDE PO     Multiple Vitamins-Minerals (MULTIVITAMIN ADULT PO)     order for DME     OVER-THE-COUNTER     PARoxetine (PAXIL) 20 MG tablet     ranitidine (ZANTAC) 150 MG tablet     benzonatate (TESSALON) 200 MG capsule     doxycycline (VIBRAMYCIN) 100 MG capsule     guaiFENesin-codeine (ROBITUSSIN AC) 100-10 MG/5ML SOLN solution     No current facility-administered medications for this visit.      Review of Systems: A review of systems was obtained and was negative except for what was noted above.    Physical examination:    /66 (BP Location: Left arm, Patient Position: Chair, Cuff Size: Adult Regular)  Pulse 72  Temp 96.9  F (36.1  C)  Wt 94.2 kg (207 lb 9.6 oz)  BMI 30.06 kg/m2    General Appearance:  NAD    Skin: There are no rashes or other skin lesions.    Musculoskeletal:  Pes cavus present. There is no scoliosis, lordosis, kyphosis.    Neurologic examination:    Mental status:  Patient is alert, attentive, and oriented x 3.  Language is coherent and fluent without dysarthria or aphasia.  Memory, comprehension and ability to follow commands were intact.       Cranial nerves:  Pupils were round and reacted to light.  Extraocular movements were full. There was no face, jaw, palate or tongue weakness or atrophy. Hearing was grossly intact.  Shoulder shrug was normal.       Motor exam: No atrophy or fasciculations. Manual muscle testing again showed mild 4+/5 FDI weakness but otherwise full strength in the proximal and distal areas of the upper and lower limbs. She can rise from an seated position independently with no diffiuclty    Complex motor skills: Mild postural tremor. No ataxia.     Sensory exam: Normal pin and vibration in hands and feet. No pin loss in fingers.     Gait was slightly antalgic but stable     Deep tendon reflexes:   Right Left   Triceps 2 2   Biceps 2 2   Brachioradialis 2 2   Knee jerk 3 3   Ankle jerk trace 0     Tinel and Phalen sign absent ot both right and left wrist     Assessment and plan:    Teresa Fernandez is a 58 year old woman with chronic multifocal pain. May be related to arthritis or perhaps fall on the fibromyalgia spectrum.  I advised her to discuss the more with her PCP. We also discussed symptomatic management of orthostatic dizziness. I asked her to discuss the dizzy episodes with her PCP as well. We discussed lumbosacral and cervical spondylosis (suppoerted by EMG and MRI findings). I offered referral to neurosurgery for a surgical opinion. She declines for now, but may reconsider if back pain worsens. Finally, we again discussed her history of mild hyperCKemia. At this point I am not worried about her very mild hyperCKemia. No further work up for this issue is needed. I  advised her to follow up with her PCP. I am happy to see her back if needed.   --      Again, thank you for allowing me to participate in the care of your patient.      Sincerely,    Demetris Zavala MD

## 2018-07-23 ENCOUNTER — OFFICE VISIT (OUTPATIENT)
Dept: PSYCHOLOGY | Facility: CLINIC | Age: 58
End: 2018-07-23
Payer: OTHER GOVERNMENT

## 2018-07-23 DIAGNOSIS — F33.0 MAJOR DEPRESSIVE DISORDER, RECURRENT, MILD (H): Primary | ICD-10-CM

## 2018-07-23 PROCEDURE — 90834 PSYTX W PT 45 MINUTES: CPT | Performed by: PSYCHOLOGIST

## 2018-07-23 NOTE — MR AVS SNAPSHOT
MRN:[id number]                      After Visit Summary   7/23/2018    Teresa Fernandez    MRN: [id number]           Visit Information        Provider Department      7/23/2018 4:00 PM Carter Tierney [facility] Generic      Your next 10 appointments already scheduled     Aug 06, 2018  5:00 PM CDT   Return Visit with Carter Tierney   [facility] ([facility])    [address] [address]   [phone]            Aug 20, 2018  4:00 PM CDT   Return Visit with Carter Tierney   [facility] ([facility])    [address] [address]   [phone]              MyChart Information     [facility] gives you secure access to your electronic health record. If you see a primary care provider, you can also send messages to your care team and make appointments. If you have questions, please call your primary care clinic.  If you do not have a primary care provider, please call [phone] and they will assist you.        Care EveryWhere ID     This is your Care EveryWhere ID. This could be used by other organizations to access your [facility] medical records  [id number]        Equal Access to Services     MERRILL PRESTON : Nic Leung, gume rae, kriss liu, lillian reese. [facility] [phone].    ATENCIÓN: Si habla español, tiene a anderson disposición servicios gratuitos de asistencia lingüística. Llame al [phone].    We comply with applicable federal civil rights laws and [place] laws. We do not discriminate on the basis of race, color, national origin, age, disability, sex, sexual orientation, or gender identity.

## 2018-07-24 NOTE — PROGRESS NOTES
Progress Note  Disclaimer: This note consists of symbols derived from keyboarding, dictation and/or voice recognition software. As a result, there may be errors in the script that have gone undetected. Please consider this when interpreting information found in this chart.    Client Name: Teresa Fernandez  Date: 7/23/2018         Service Type: Individual      Session Start Time: 4:00 PM  Session End Time: 4:45 PM      Session Length: 45     Session #: 3     Attendees: Client attended alone    Treatment Plan Last Reviewed: 7/23/2018  PHQ-9 / ERIC-7 : See flowsheet's     DATA   Client reports ongoing anxiety, racing thoughts.  Some frustration with medical community, bouncing around between specialists.  It helps talking to her daughter-in-law who has anxiety as well.  She also has recently done some camping with her family.   Progress Since Last Session (Related to Symptoms / Goals / Homework):   Symptoms: Stable    Homework: Achieved / completed to satisfaction      Episode of Care Goals: Satisfactory progress - ACTION (Actively working towards change); Intervened by reinforcing change plan / affirming steps taken     Current / Ongoing Stressors and Concerns:   Multiple medical issues, feeling invalidated by medical community     Treatment Objective(s) Addressed in This Session:   use at least 2 coping skills for anxiety management in the next 2 weeks       Intervention:   CBT: Behavioral activation and CBT for anxiety and panic.        ASSESSMENT: Current Emotional / Mental Status (status of significant symptoms):   Risk status (Self / Other harm or suicidal ideation)   Client denies current fears or concerns for personal safety.   Client denies current or recent suicidal ideation or behaviors.   Client denies current or recent homicidal ideation or behaviors.   Client denies current or recent self injurious behavior or ideation.   Client denies other safety  concerns.   Client Client reports there has been no change in risk factors since their last session.     Client Client reports there has been no change in protective factors since their last session.     A safety and risk management plan has not been developed at this time, however client was given the after-hours number / 911 should there be a change in any of these risk factors.     Appearance:   Appropriate    Eye Contact:   Good    Psychomotor Behavior: Normal    Attitude:   Cooperative    Orientation:   All   Speech    Rate / Production: Normal     Volume:  Normal    Mood:    Normal   Affect:    Appropriate    Thought Content:  Clear    Thought Form:  Coherent  Logical    Insight:    Good      Medication Review:   No changes to current psychiatric medication(s)     Medication Compliance:   Yes     Changes in Health Issues:   None reported     Chemical Use Review:   Substance Use: Chemical use reviewed, no active concerns identified      Tobacco Use: No current tobacco use.       Collateral Reports Completed:   Not Applicable    PLAN: (Client Tasks / Therapist Tasks / Other)  Client to consult with primary care physician regarding whether her Paxil is still working after 22 years, consider alterations, or additions.        Carter Tierney                                                         ________________________________________________________________________    Treatment Plan    Client's Name: Teresa Fernandez  YOB: 1960    Date: 7/23/2018    DSM5 Diagnoses: (Sustained by DSM5 Criteria Listed Above)  Diagnoses:            296.31 (F33.0) Major Depressive Disorder, Recurrent Episode, Mild _ and With anxious distress  Psychosocial & Contextual Factors: Multiple medical issues  WHODAS 2.0 (12 item)                          This questionnaire asks about difficulties due to health conditions. Health conditions                   include                        disease or illnesses, other health  problems that may be short or long lasting,                    injuries, mental health or emotional problems, and problems with alcohol or drugs.                              Think back over the past 30 days and answer these questions, thinking about how much              difficulty you had doing the following activities. For each question, please [place] only                   one response.      S1 Standing for long periods such as 30 minutes? Mild =           2   S2 Taking care of household responsibilities? None =         1   S3 Learning a new task, for example, learning how to get to a new place? None =         1   S4 How much of a problem do you have joining community activities (for example, festivals, [employer]oration[employer] or other activities) in the same way as anyone else can? None =         1   S5 How much have you been emotionally affected by your health problems? Moderate =   3               In the past 30 days, how much difficulty did you have in:   S6 Concentrating on doing something for ten minutes? Mild =           2   S7 Walking a long distance such as a kilometer (or equivalent)? None =         1   S8 Washing your whole body? None =         1   S9 Getting dressed? None =         1   S10 Dealing with people you do not know? None =         1   S11 Maintaining a friendship? None =         1   S12 Your day to day work? None =         1       H1 Overall, in the past 30 days, how many days were these difficulties present? Record number of days 3   H2 In the past 30 days, for how many days were you totally unable to carry out your usual activities or work because of any health condition? Record number of days  0   H3 In the past 30 days, not counting the days that you were totally unable, for how many days did you cut back or reduce your usual activities or work because of any health condition? Record number of days 0           Referral / Collaboration:  Referral to another professional/service is not indicated at  this time..    Anticipated number of session or this episode of care: 15  Anxiety Treatment plan:  1. Education- the Biopsychosocial model of anxiety  a. Client will be able to describe how anxiety is effecting them physically, emotionally and socially  2. Distraction  a. Client will learn 2 techniques to distract themselves when becoming anxious  3. Diaphragmatic breathing  a. Client will learn to breath using their diaphragm  b. Client will learn 2 breathing patterns to use to reduce anxiety  4. Visualization  a. Client will learn to establish a safe, calm place in their mind utilizing all their senses  b. Client will practice using visualization at times when they are not anxious so they will be able to use it when anxiety occurs  5. Progressive muscle relaxation  a. Client will learn progressive muscle relaxation techniques and practice them 4-5 times per week.  b. Client will learn to use mental images of relaxation to relax muscle groups and do this on a daily basis  6. Self-care  a. Client will identify 3 things they can do just for themselves  b. Client will take time for quiet, reflection, meditation 3 times per week  c. Client will Learn to set boundaries when appropriate  d. Client will Identify 3 individuals they can call on for support, distraction  7. Assessment of progress  a. Client will engage in assessment of their progress on a regular basis      Client has reviewed and agreed to the above plan.      Carter Tierney  July 24, 2018

## 2018-07-25 ASSESSMENT — PATIENT HEALTH QUESTIONNAIRE - PHQ9: SUM OF ALL RESPONSES TO PHQ QUESTIONS 1-9: 3

## 2018-08-02 ENCOUNTER — OFFICE VISIT (OUTPATIENT)
Dept: FAMILY MEDICINE | Facility: CLINIC | Age: 58
End: 2018-08-02
Payer: OTHER GOVERNMENT

## 2018-08-02 VITALS
BODY MASS INDEX: 29.89 KG/M2 | HEIGHT: 70 IN | DIASTOLIC BLOOD PRESSURE: 70 MMHG | TEMPERATURE: 98.1 F | SYSTOLIC BLOOD PRESSURE: 116 MMHG | HEART RATE: 94 BPM | WEIGHT: 208.8 LBS | OXYGEN SATURATION: 97 %

## 2018-08-02 DIAGNOSIS — F41.1 GAD (GENERALIZED ANXIETY DISORDER): Primary | ICD-10-CM

## 2018-08-02 DIAGNOSIS — M79.10 MYALGIA: ICD-10-CM

## 2018-08-02 LAB
ANION GAP SERPL CALCULATED.3IONS-SCNC: 5 MMOL/L (ref 3–14)
BUN SERPL-MCNC: 14 MG/DL (ref 7–30)
CALCIUM SERPL-MCNC: 9.6 MG/DL (ref 8.5–10.1)
CHLORIDE SERPL-SCNC: 105 MMOL/L (ref 94–109)
CK SERPL-CCNC: 530 U/L (ref 30–225)
CO2 SERPL-SCNC: 30 MMOL/L (ref 20–32)
CREAT SERPL-MCNC: 0.85 MG/DL (ref 0.52–1.04)
GFR SERPL CREATININE-BSD FRML MDRD: 69 ML/MIN/1.7M2
GLUCOSE SERPL-MCNC: 86 MG/DL (ref 70–99)
PHOSPHATE SERPL-MCNC: 3 MG/DL (ref 2.5–4.5)
POTASSIUM SERPL-SCNC: 4.1 MMOL/L (ref 3.4–5.3)
SODIUM SERPL-SCNC: 140 MMOL/L (ref 133–144)
T4 FREE SERPL-MCNC: 1.23 NG/DL (ref 0.76–1.46)
TSH SERPL DL<=0.005 MIU/L-ACNC: 0.33 MU/L (ref 0.4–4)

## 2018-08-02 PROCEDURE — 84100 ASSAY OF PHOSPHORUS: CPT | Performed by: NURSE PRACTITIONER

## 2018-08-02 PROCEDURE — 82550 ASSAY OF CK (CPK): CPT | Performed by: NURSE PRACTITIONER

## 2018-08-02 PROCEDURE — 99214 OFFICE O/P EST MOD 30 MIN: CPT | Performed by: NURSE PRACTITIONER

## 2018-08-02 PROCEDURE — 80048 BASIC METABOLIC PNL TOTAL CA: CPT | Performed by: NURSE PRACTITIONER

## 2018-08-02 PROCEDURE — 84443 ASSAY THYROID STIM HORMONE: CPT | Performed by: NURSE PRACTITIONER

## 2018-08-02 PROCEDURE — 36415 COLL VENOUS BLD VENIPUNCTURE: CPT | Performed by: NURSE PRACTITIONER

## 2018-08-02 PROCEDURE — 84439 ASSAY OF FREE THYROXINE: CPT | Performed by: NURSE PRACTITIONER

## 2018-08-02 RX ORDER — DULOXETIN HYDROCHLORIDE 60 MG/1
60 CAPSULE, DELAYED RELEASE ORAL EVERY MORNING
Qty: 30 CAPSULE | Refills: 5 | Status: SHIPPED | OUTPATIENT
Start: 2018-08-02 | End: 2018-10-19

## 2018-08-02 ASSESSMENT — ANXIETY QUESTIONNAIRES
2. NOT BEING ABLE TO STOP OR CONTROL WORRYING: SEVERAL DAYS
5. BEING SO RESTLESS THAT IT IS HARD TO SIT STILL: NOT AT ALL
6. BECOMING EASILY ANNOYED OR IRRITABLE: SEVERAL DAYS
GAD7 TOTAL SCORE: 6
7. FEELING AFRAID AS IF SOMETHING AWFUL MIGHT HAPPEN: NOT AT ALL
1. FEELING NERVOUS, ANXIOUS, OR ON EDGE: MORE THAN HALF THE DAYS
4. TROUBLE RELAXING: SEVERAL DAYS
IF YOU CHECKED OFF ANY PROBLEMS ON THIS QUESTIONNAIRE, HOW DIFFICULT HAVE THESE PROBLEMS MADE IT FOR YOU TO DO YOUR WORK, TAKE CARE OF THINGS AT HOME, OR GET ALONG WITH OTHER PEOPLE: SOMEWHAT DIFFICULT
3. WORRYING TOO MUCH ABOUT DIFFERENT THINGS: SEVERAL DAYS

## 2018-08-02 NOTE — PATIENT INSTRUCTIONS
Labs: thyroid, CK, electrolytes, kidney function    Try Cymbalta 60 mg daily, this can help to decrease chronic pain as well will help anxiety and depression    Follow up if no improvement in about 6 weeks

## 2018-08-02 NOTE — MR AVS SNAPSHOT
After Visit Summary   8/2/2018    Teresa Fernandez    MRN: [id number]           Patient Information     Date Of Birth          1960        Visit Information        Provider Department      8/2/2018 3:20 PM Jazzmine Davis APRN CNP [facility]        Today's Diagnoses     ERIC (generalized anxiety disorder)    -  1    Myalgia          Care Instructions    Labs: thyroid, CK, electrolytes, kidney function    Try Cymbalta 60 mg daily, this can help to decrease chronic pain as well will help anxiety and depression    Follow up if no improvement in about 6 weeks                 Follow-ups after your visit        Your next 10 appointments already scheduled     Aug 06, 2018  5:00 PM CDT   Return Visit with [facility] ([facility])    [address] [address]   [phone]            Aug 20, 2018  4:00 PM CDT   Return Visit with [facility] ([facility])    [address] [address]   [phone]              Who to contact     If you have questions or need follow up information about today's clinic visit or your schedule please contact [facility] directly at [phone].  Normal or non-critical lab and imaging results will be communicated to you by ""hart, letter or phone within 4 business days after the clinic has received the results. If you do not hear from us within 7 days, please contact the clinic through ""hart or phone. If you have a critical or abnormal lab result, we will notify you by phone as soon as possible.  Submit refill requests through Poken or call your pharmacy and they will forward the refill request to us. Please allow 3 business days for your refill to be completed.          Additional Information About Your Visit        ""harScreen Information     Poken gives you secure access to your electronic health record. If you  "see a primary care provider, you can also send messages to your care team and make appointments. If you have questions, please call your primary care clinic.  If you do not have a primary care provider, please call [phone] and they will assist you.        Care EveryWhere ID     This is your Care EveryWhere ID. This could be used by other organizations to access your [facility] medical records  [id number]        Your Vitals Were     Pulse Temperature Height Pulse Oximetry BMI (Body Mass Index)       94 98.1  F (36.7  C) (Tympanic) 5' 9.69\" (1.77 m) 97% 30.23 kg/m2        Blood Pressure from Last 3 Encounters:   08/02/18 116/70   07/17/18 119/66   05/28/18 126/80    Weight from Last 3 Encounters:   08/02/18 208 lb 12.8 oz (94.7 kg)   07/17/18 207 lb 9.6 oz (94.2 kg)   05/28/18 208 lb (94.3 kg)              We Performed the Following     Basic metabolic panel     CK total     Phosphorus     TSH with free T4 reflex          Today's Medication Changes          These changes are accurate as of 8/2/18  3:45 PM.  If you have any questions, ask your nurse or doctor.               Start taking these medicines.        Dose/Directions    DULoxetine 60 MG EC capsule   Commonly known as:  CYMBALTA   Used for:  ERIC (generalized anxiety disorder), Myalgia   Started by:  Jazzmine Davis APRN CNP        Dose:  60 mg   Take 1 capsule (60 mg) by mouth every morning   Quantity:  30 capsule   Refills:  5         Stop taking these medicines if you haven't already. Please contact your care team if you have questions.     PARoxetine 20 MG tablet   Commonly known as:  PAXIL   Stopped by:  Jazzmine Davis APRN CNP                Where to get your medicines      These medications were sent to [facility] [place], [place] - [address]  [address] [address]     Phone:  [phone]     DULoxetine 60 MG EC capsule                Primary Care Provider Office Phone # Fax #    Nuzhat Uma Burt, " APRN -[phone]       [address] [address]        Equal Access to Services     MERRILL PRESTON : Hadii aad ku hadvianca Leung, waanikada luqadaha, qaandrewta kaalmada alexa, lillian dunawaystephan reese. So [facility] [phone].    ATENCIÓN: Si habla español, tiene a anderson disposición servicios gratuitos de asistencia lingüística. Llame al [phone].    We comply with applicable federal civil rights laws and [place] laws. We do not discriminate on the basis of race, color, national origin, age, disability, sex, sexual orientation, or gender identity.            Thank you!     Thank you for choosing [facility]  for your care. Our goal is always to provide you with excellent care. Hearing back from our patients is one way we can continue to improve our services. Please take a few minutes to complete the written survey that you may receive in the mail after your visit with us. Thank you!             Your Updated Medication List - Protect others around you: Learn how to safely use, store and throw away your medicines at www.disposemymeds.org.          This list is accurate as of 8/2/18  3:45 PM.  Always use your most recent med list.                   Brand Name Dispense Instructions for use Diagnosis    * albuterol (2.5 MG/3ML) 0.083% neb solution     25 vial    Take 1 vial (2.5 mg) by nebulization every 6 hours as needed for shortness of breath / dyspnea or wheezing    Asthma exacerbation       * albuterol 108 (90 Base) MCG/ACT Inhaler    PROAIR HFA/PROVENTIL HFA/VENTOLIN HFA    1 Inhaler    Inhale 2 puffs into the lungs every 6 hours as needed for shortness of breath / dyspnea    Moderate persistent asthma without complication       benzonatate 200 MG capsule    TESSALON    21 capsule    Take 1 capsule (200 mg) by mouth 3 times daily as needed for cough    Acute bronchitis, unspecified organism       DULoxetine 60 MG EC capsule    CYMBALTA    30 capsule    Take  1 capsule (60 mg) by mouth every morning    ERIC (generalized anxiety disorder), Myalgia       fexofenadine 180 MG tablet    ALLEGRA    30 tablet    Take 1 tablet (180 mg) by mouth daily    Seasonal allergic rhinitis       fluticasone-salmeterol 250-50 MCG/DOSE diskus inhaler    ADVAIR    1 Inhaler    Inhale 1 puff into the lungs 2 times daily Follow up for further refills    Chest tightness, Intermittent asthma, uncomplicated       GLUCOSAMINE SULFATE PO      Take 1,000 mg by mouth 2 times daily        levothyroxine 125 MCG tablet    SYNTHROID/LEVOTHROID    90 tablet    TAKE 1 TABLET DAILY    Acquired hypothyroidism       MAGNESIUM OXIDE PO      Take 200 mg by mouth daily        MULTIVITAMIN ADULT PO      Take by mouth every morning        order for DME     1 Units    Equipment being ordered: Nebulizer    Asthma exacerbation       OVER-THE-COUNTER     1 Bottle    Place 1 drop into both eyes 3 times daily. Systane Ultra, Systane Balance, Blink Tears or Refresh Optive Artificial Tear    Dry eye syndrome       ranitidine 150 MG tablet    ZANTAC    180 tablet    TAKE 1 TABLET TWICE A DAY    Gastroesophageal reflux disease, esophagitis presence not specified       TYLENOL 325 MG tablet   Generic drug:  acetaminophen      Take 325-650 mg by mouth every 6 hours as needed for mild pain        VITAMIN B 12 PO      Take 500 mcg by mouth daily        VITAMIN D3 PO      Take 2,000 Units by mouth 2 times daily        * Notice:  This list has 2 medication(s) that are the same as other medications prescribed for you. Read the directions carefully, and ask your doctor or other care provider to review them with you.

## 2018-08-02 NOTE — PROGRESS NOTES
SUBJECTIVE:   Teresa Fernandez is a 58 year old female who presents to clinic today for the following health issues:worsening anxiety due to concerns about personal health. The patient states she has history of chronic myalgia and elevated CK levels, on and off tremors, occasional burning pain all over her body, mostly affecting her upper extremities. Reports she saw neurologist and rheumatologist, she had extensive lab work done without final diagnostic conclusion of why she is having there symptoms. She was recommended to have muscle biopsy, but she never had it done because CK levels improved.   She is following therapist for anxiety. She reports taking Paxil 30 mg since 1996.     Chief Complaint   Patient presents with     Anxiety     Theraspist wanted her to follow up and get on something different for her anxiety, has been on Paxil since 1996 had a panick attack last week      Anxiety Follow-Up    Status since last visit: stable, did have a panick attack last week     Other associated symptoms:None    Complicating factors:   Significant life event: Yes-  Just her health    Current substance abuse: None  Depression symptoms: No  ERIC-7 SCORE 1/15/2018 6/27/2018 7/12/2018   Total Score - - -   Total Score - - -   Total Score 8 5 5     ERIC-7    Amount of exercise or physical activity: Walks on her lunch break at work     Problems taking medications regularly: No    Medication side effects: none    Diet: regular (no restrictions)    Problem list and histories reviewed & adjusted, as indicated.  Additional history: as documented    Labs reviewed in EPIC    Reviewed and updated as needed this visit by clinical staff  Tobacco  Allergies  Med Hx  Surg Hx  Fam Hx  Soc Hx      Reviewed and updated as needed this visit by Provider         ROS:  Constitutional, HEENT, cardiovascular, pulmonary, gi and gu systems are negative, except as otherwise noted.    OBJECTIVE:     /70  Pulse 94  Temp 98.1  F (36.7  C)  "(Tympanic)  Ht 5' 9.69\" (1.77 m)  Wt 208 lb 12.8 oz (94.7 kg)  SpO2 97%  BMI 30.23 kg/m2  Body mass index is 30.23 kg/(m^2).  GENERAL: healthy, alert and no distress  MS: no gross musculoskeletal defects noted, no edema  NEURO: Normal strength and tone, mentation intact and speech normal  PSYCH: mentation appears normal, affect normal/bright    Diagnostic Test Results:  Pending     ASSESSMENT/PLAN:     1. ERIC (generalized anxiety disorder)  -uncontrolled  -recommended to change Paxil to Cymbalta to help with chronic pain   - DULoxetine (CYMBALTA) 60 MG EC capsule; Take 1 capsule (60 mg) by mouth every morning  Dispense: 30 capsule; Refill: 5    2. Myalgia  -unclear etiology, possible fibromyalgia. She had extensive workup including tick, Babesia, anaplasma, CT, CMP, CBC, ESR, aldolase, myositis antibodies, parathyroid function all was normal.   -will recheck CK level, if rising will consider muscle biopsy.  -will try Cymbalta for chronic pain  - CK total  - TSH with free T4 reflex  - Basic metabolic panel  - Phosphorus  - DULoxetine (CYMBALTA) 60 MG EC capsule; Take 1 capsule (60 mg) by mouth every morning  Dispense: 30 capsule; Refill: 5    See Patient Instructions    HERIBERTO Cormier [facility]  "

## 2018-08-03 ASSESSMENT — PATIENT HEALTH QUESTIONNAIRE - PHQ9: SUM OF ALL RESPONSES TO PHQ QUESTIONS 1-9: 4

## 2018-08-03 ASSESSMENT — ANXIETY QUESTIONNAIRES: GAD7 TOTAL SCORE: 6

## 2018-08-03 ASSESSMENT — ASTHMA QUESTIONNAIRES: ACT_TOTALSCORE: 23

## 2018-08-20 ENCOUNTER — OFFICE VISIT (OUTPATIENT)
Dept: PSYCHOLOGY | Facility: CLINIC | Age: 58
End: 2018-08-20
Payer: OTHER GOVERNMENT

## 2018-08-20 ENCOUNTER — MYC MEDICAL ADVICE (OUTPATIENT)
Dept: FAMILY MEDICINE | Facility: CLINIC | Age: 58
End: 2018-08-20

## 2018-08-20 DIAGNOSIS — F33.0 MAJOR DEPRESSIVE DISORDER, RECURRENT, MILD (H): Primary | ICD-10-CM

## 2018-08-20 PROCEDURE — 90834 PSYTX W PT 45 MINUTES: CPT | Performed by: PSYCHOLOGIST

## 2018-08-20 NOTE — MR AVS SNAPSHOT
MRN:[id number]                      After Visit Summary   8/20/2018    Teresa Fernandez    MRN: [id number]           Visit Information        Provider Department      8/20/2018 4:00 PM Carter Tierney [facility]      Your next 10 appointments already scheduled     Sep 17, 2018  4:00 PM CDT   Return Visit with Carter Tierney   [facility] ([facility])    [address] [address]   [phone]            Oct 01, 2018  4:00 PM CDT   Return Visit with Carter Tierney   [facility] ([facility])    [address] [address]   [phone]              MyChart Information     OOTUhart gives you secure access to your electronic health record. If you see a primary care provider, you can also send messages to your care team and make appointments. If you have questions, please call your primary care clinic.  If you do not have a primary care provider, please call [phone] and they will assist you.        Care EveryWhere ID     This is your Care EveryWhere ID. This could be used by other organizations to access your [place] [facility] medical records  [id number]        Equal Access to Services     MERRILL PRESTON : Nic Leung, gume rae, qayuli kajacinto liu, lillian reese. So [facility] [phone].    ATENCIÓN: Si habla español, tiene a anderson disposición servicios gratuitos de asistencia lingüística. Llame al [phone].    We comply with applicable federal civil rights laws and [place] laws. We do not discriminate on the basis of race, color, national origin, age, disability, sex, sexual orientation, or gender identity.

## 2018-08-20 NOTE — TELEPHONE ENCOUNTER
Patient is updating on Cymbalta 60 mg   Having symptoms of bad headaches  Please advise    Routing to provider.    Erinn DIA Rn

## 2018-08-22 ASSESSMENT — ANXIETY QUESTIONNAIRES
6. BECOMING EASILY ANNOYED OR IRRITABLE: SEVERAL DAYS
2. NOT BEING ABLE TO STOP OR CONTROL WORRYING: SEVERAL DAYS
3. WORRYING TOO MUCH ABOUT DIFFERENT THINGS: SEVERAL DAYS
4. TROUBLE RELAXING: NOT AT ALL
GAD7 TOTAL SCORE: 5
5. BEING SO RESTLESS THAT IT IS HARD TO SIT STILL: NOT AT ALL
1. FEELING NERVOUS, ANXIOUS, OR ON EDGE: SEVERAL DAYS
7. FEELING AFRAID AS IF SOMETHING AWFUL MIGHT HAPPEN: SEVERAL DAYS
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
## 2018-08-24 ENCOUNTER — MYC MEDICAL ADVICE (OUTPATIENT)
Dept: FAMILY MEDICINE | Facility: CLINIC | Age: 58
End: 2018-08-24

## 2018-08-24 ASSESSMENT — ANXIETY QUESTIONNAIRES: GAD7 TOTAL SCORE: 5

## 2018-08-24 ASSESSMENT — PATIENT HEALTH QUESTIONNAIRE - PHQ9: SUM OF ALL RESPONSES TO PHQ QUESTIONS 1-9: 3

## 2018-09-10 ENCOUNTER — OFFICE VISIT (OUTPATIENT)
Dept: FAMILY MEDICINE | Facility: CLINIC | Age: 58
End: 2018-09-10
Payer: OTHER GOVERNMENT

## 2018-09-10 VITALS
SYSTOLIC BLOOD PRESSURE: 104 MMHG | BODY MASS INDEX: 29.94 KG/M2 | TEMPERATURE: 98.5 F | HEIGHT: 70 IN | WEIGHT: 209.1 LBS | DIASTOLIC BLOOD PRESSURE: 68 MMHG | OXYGEN SATURATION: 97 % | HEART RATE: 93 BPM

## 2018-09-10 DIAGNOSIS — M77.8 WRIST TENDONITIS: Primary | ICD-10-CM

## 2018-09-10 DIAGNOSIS — R74.8 ELEVATED CK: ICD-10-CM

## 2018-09-10 DIAGNOSIS — Z23 NEED FOR PROPHYLACTIC VACCINATION AND INOCULATION AGAINST INFLUENZA: ICD-10-CM

## 2018-09-10 DIAGNOSIS — F41.9 ANXIETY: ICD-10-CM

## 2018-09-10 DIAGNOSIS — G72.9 MYOPATHY: ICD-10-CM

## 2018-09-10 LAB
CK SERPL-CCNC: 366 U/L (ref 30–225)
T4 FREE SERPL-MCNC: 1.26 NG/DL (ref 0.76–1.46)
TSH SERPL DL<=0.005 MIU/L-ACNC: 0.29 MU/L (ref 0.4–4)

## 2018-09-10 PROCEDURE — 82550 ASSAY OF CK (CPK): CPT | Performed by: NURSE PRACTITIONER

## 2018-09-10 PROCEDURE — 84443 ASSAY THYROID STIM HORMONE: CPT | Performed by: NURSE PRACTITIONER

## 2018-09-10 PROCEDURE — 36415 COLL VENOUS BLD VENIPUNCTURE: CPT | Performed by: NURSE PRACTITIONER

## 2018-09-10 PROCEDURE — 99214 OFFICE O/P EST MOD 30 MIN: CPT | Mod: 25 | Performed by: NURSE PRACTITIONER

## 2018-09-10 PROCEDURE — 90686 IIV4 VACC NO PRSV 0.5 ML IM: CPT | Performed by: NURSE PRACTITIONER

## 2018-09-10 PROCEDURE — 90471 IMMUNIZATION ADMIN: CPT | Performed by: NURSE PRACTITIONER

## 2018-09-10 PROCEDURE — 84439 ASSAY OF FREE THYROXINE: CPT | Performed by: NURSE PRACTITIONER

## 2018-09-10 RX ORDER — CELECOXIB 100 MG/1
100 CAPSULE ORAL 2 TIMES DAILY PRN
Qty: 60 CAPSULE | Refills: 3 | Status: SHIPPED | OUTPATIENT
Start: 2018-09-10 | End: 2021-07-19

## 2018-09-10 NOTE — PATIENT INSTRUCTIONS
Try Celebrex 100 mg twice daily as needed   ACE wraps, or braces  Ice  Topicals: Capsaicin, Lidocaine cream   Labs: recheck CK level    Schedule with rheumatologist

## 2018-09-10 NOTE — MR AVS SNAPSHOT
After Visit Summary   9/10/2018    Teresa Fernandez    MRN: [id number]           Patient Information     Date Of Birth          1960        Visit Information        Provider Department      9/10/2018 3:20 PM Jazzmine Davis APRN [facility]        Today's Diagnoses     Need for prophylactic vaccination and inoculation against influenza    -  1    Elevated CK        Myopathy        Wrist tendonitis          Care Instructions    Try Celebrex 100 mg twice daily as needed   ACE wraps, or braces  Ice  Topicals: Capsaicin, Lidocaine cream   Labs: recheck CK level    Schedule with rheumatologist                 Follow-ups after your visit        Additional Services     RHEUMATOLOGY REFERRAL       Your provider has referred you to: [facility]: Critical access hospital [facility] [phone]   [email or web address]    Please be aware that coverage of these services is subject to the terms and limitations of your health insurance plan.  Call member services at your health plan with any benefit or coverage questions.      Please bring the following with you to your appointment:    (1) Any X-Rays, CTs or MRIs which have been performed.  Contact the facility where they were done to arrange for  prior to your scheduled appointment.    (2) List of current medications   (3) This referral request   (4) Any documents/labs given to you for this referral                  Your next 10 appointments already scheduled     Sep 17, 2018  4:00 PM CDT   Return Visit with [facility] DANO [facility] ([facility])    [address] [address]   [phone]            Oct 01, 2018  4:00 PM CDT   Return Visit with Carter MATSON [facility] ([facility])    [address] [address]   [phone]              Who to contact     If you have questions or need follow up information about today's  "clinic visit or your schedule please contact [facility] directly at [phone].  Normal or non-critical lab and imaging results will be communicated to you by MyChart, letter or phone within 4 business days after the clinic has received the results. If you do not hear from us within 7 days, please contact the clinic through [employer] or phone. If you have a critical or abnormal lab result, we will notify you by phone as soon as possible.  Submit refill requests through [employer] or call your pharmacy and they will forward the refill request to us. Please allow 3 business days for your refill to be completed.          Additional Information About Your Visit        [employer] Information     [employer] gives you secure access to your electronic health record. If you see a primary care provider, you can also send messages to your care team and make appointments. If you have questions, please call your primary care clinic.  If you do not have a primary care provider, please call [phone] and they will assist you.        Care EveryWhere ID     This is your Care EveryWhere ID. This could be used by other organizations to access your [place] medical records  [id number]        Your Vitals Were     Pulse Temperature Height Pulse Oximetry BMI (Body Mass Index)       93 98.5  F (36.9  C) (Tympanic) 5' 9.69\" (1.77 m) 97% 30.27 kg/m2        Blood Pressure from Last 3 Encounters:   09/10/18 104/68   08/02/18 116/70   07/17/18 119/66    Weight from Last 3 Encounters:   09/10/18 209 lb 1.6 oz (94.8 kg)   08/02/18 208 lb 12.8 oz (94.7 kg)   07/17/18 207 lb 9.6 oz (94.2 kg)              We Performed the Following     CK total     FLU VACCINE, SPLIT VIRUS, IM (QUADRIVALENT) [67693]- >3 YRS     RHEUMATOLOGY REFERRAL     Vaccine Administration, Initial [98666]          Today's Medication Changes          These changes are accurate as of 9/10/18  3:27 PM.  If you have any questions, ask your nurse or doctor.             "   Start taking these medicines.        Dose/Directions    celecoxib 100 MG capsule   Commonly known as:  celeBREX   Used for:  Wrist tendonitis        Dose:  100 mg   Take 1 capsule (100 mg) by mouth 2 times daily as needed for moderate pain   Quantity:  60 capsule   Refills:  3            Where to get your medicines      These medications were sent to [facility] - [place], [place] - [address]  [address] [address]     Phone:  [phone]     celecoxib 100 MG capsule                Primary Care Provider Office Phone # Fax #    HERIBERTO Delgado [phone]       [address] [place] [address]        Equal Access to Services     MERRILL PRESTON : Nic Leung, waanikada butch, qaybta kaalmada alexa, lillian reese. So [facility] [phone].    ATENCIÓN: Si habla español, tiene a anderson disposición servicios gratuitos de asistencia lingüística. Llame al [phone].    We comply with applicable federal civil rights laws and [place] laws. We do not discriminate on the basis of race, color, national origin, age, disability, sex, sexual orientation, or gender identity.            Thank you!     Thank you for choosing [facility]  for your care. Our goal is always to provide you with excellent care. Hearing back from our patients is one way we can continue to improve our services. Please take a few minutes to complete the written survey that you may receive in the mail after your visit with us. Thank you!             Your Updated Medication List - Protect others around you: Learn how to safely use, store and throw away your medicines at www.disposemymeds.org.          This list is accurate as of 9/10/18  3:27 PM.  Always use your most recent med list.                   Brand Name Dispense Instructions for use Diagnosis    * albuterol (2.5 MG/3ML) 0.083% neb solution     25 vial    Take 1 vial (2.5 mg) by  nebulization every 6 hours as needed for shortness of breath / dyspnea or wheezing    Asthma exacerbation       * albuterol 108 (90 Base) MCG/ACT inhaler    PROAIR HFA/PROVENTIL HFA/VENTOLIN HFA    1 Inhaler    Inhale 2 puffs into the lungs every 6 hours as needed for shortness of breath / dyspnea    Moderate persistent asthma without complication       celecoxib 100 MG capsule    celeBREX    60 capsule    Take 1 capsule (100 mg) by mouth 2 times daily as needed for moderate pain    Wrist tendonitis       DULoxetine 60 MG EC capsule    CYMBALTA    30 capsule    Take 1 capsule (60 mg) by mouth every morning    ERIC (generalized anxiety disorder), Myalgia       fexofenadine 180 MG tablet    ALLEGRA    30 tablet    Take 1 tablet (180 mg) by mouth daily    Seasonal allergic rhinitis       fluticasone-salmeterol 250-50 MCG/DOSE diskus inhaler    ADVAIR    1 Inhaler    Inhale 1 puff into the lungs 2 times daily Follow up for further refills    Chest tightness, Intermittent asthma, uncomplicated       GLUCOSAMINE SULFATE PO      Take 1,000 mg by mouth 2 times daily        levothyroxine 125 MCG tablet    SYNTHROID/LEVOTHROID    90 tablet    TAKE 1 TABLET DAILY    Acquired hypothyroidism       MAGNESIUM OXIDE PO      Take 200 mg by mouth daily        MULTIVITAMIN ADULT PO      Take by mouth every morning        order for DME     1 Units    Equipment being ordered: Nebulizer    Asthma exacerbation       OVER-THE-COUNTER     1 Bottle    Place 1 drop into both eyes 3 times daily. Systane Ultra, Systane Balance, Blink Tears or Refresh Optive Artificial Tear    Dry eye syndrome       ranitidine 150 MG tablet    ZANTAC    180 tablet    TAKE 1 TABLET TWICE A DAY    Gastroesophageal reflux disease, esophagitis presence not specified       TYLENOL 325 MG tablet   Generic drug:  acetaminophen      Take 325-650 mg by mouth every 6 hours as needed for mild pain        VITAMIN B 12 PO      Take 500 mcg by mouth daily        VITAMIN D3 PO       Take 2,000 Units by mouth 2 times daily        * Notice:  This list has 2 medication(s) that are the same as other medications prescribed for you. Read the directions carefully, and ask your doctor or other care provider to review them with you.

## 2018-09-10 NOTE — PROGRESS NOTES
"  SUBJECTIVE:   Teresa Fernandez is a 58 year old female who presents to clinic today for the following health issues:    Anxiety Follow-Up    Status since last visit: Improved     Other associated symptoms:None    Complicating factors:   Significant life event: No   Current substance abuse: None  Depression symptoms: No  ERIC-7 SCORE 7/12/2018 8/2/2018 8/22/2018   Total Score - - -   Total Score - - -   Total Score 5 6 5     ERIC-7    Amount of exercise or physical activity: 2-3 days/week for an average of 15-30 minutes    Problems taking medications regularly: No    Medication side effects: none    Diet: regular (no restrictions)        Joint Pain    Onset: Has been going on for awhile, works as , constant repetitive movements  Involving hands, wrists, arms.     Description:   Location: both wrists   Character: Dull ache, left wrist is more of a sharp pain     Intensity: moderate    Progression of Symptoms: worse, left wrist has gotten worse     Accompanying Signs & Symptoms:  Other symptoms: none    History:   Previous similar pain: no       Precipitating factors:   Trauma or overuse: no     Alleviating factors:  Improved by: nothing    Therapies Tried and outcome: ace wrap- only helps when she is wearing it     Problem list and histories reviewed & adjusted, as indicated.  Additional history: as documented    Labs reviewed in EPIC    Reviewed and updated as needed this visit by clinical staff  Allergies       Reviewed and updated as needed this visit by Provider         ROS:  Constitutional, HEENT, cardiovascular, pulmonary, gi and gu systems are negative, except as otherwise noted.    OBJECTIVE:     /68  Pulse 93  Temp 98.5  F (36.9  C) (Tympanic)  Ht 5' 9.69\" (1.77 m)  Wt 209 lb 1.6 oz (94.8 kg)  SpO2 97%  BMI 30.27 kg/m2  Body mass index is 30.27 kg/(m^2).  GENERAL: healthy, alert and no distress  MS: no gross musculoskeletal defects noted, no edema  PSYCH: mentation appears normal, " affect normal/bright    Diagnostic Test Results:  Pending     ASSESSMENT/PLAN:     1. Wrist tendonitis  -ACE wraps  -ice packs as needed   -try to limit or better avoid constant repetitive movements   - celecoxib (CELEBREX) 100 MG capsule; Take 1 capsule (100 mg) by mouth 2 times daily as needed for moderate pain  Dispense: 60 capsule; Refill: 3    2. Need for prophylactic vaccination and inoculation against influenza    - FLU VACCINE, SPLIT VIRUS, IM (QUADRIVALENT) [12588]- >3 YRS  - Vaccine Administration, Initial [64921]    3. Elevated CK  -unclear etiology of slightly elevated CK levels and myopathy, recommended rheumatology consult   - RHEUMATOLOGY REFERRAL  - CK total    4. Myopathy  - RHEUMATOLOGY REFERRAL  - TSH with free T4 reflex    5. Anxiety  -well controlled and improved on Cymbalta  -continue Cymbalta 60 mg daily       See Patient Instructions    HERIBERTO Cormier [facility]    Injectable Influenza Immunization Documentation    1.  Is the person to be vaccinated sick today?   No    2. Does the person to be vaccinated have an allergy to a component   of the vaccine?   No  Egg Allergy Algorithm Link    3. Has the person to be vaccinated ever had a serious reaction   to influenza vaccine in the past?   No    4. Has the person to be vaccinated ever had Guillain-Barré syndrome?   No    Form completed by Sherri Dorantes

## 2018-09-11 ENCOUNTER — HOSPITAL ENCOUNTER (OUTPATIENT)
Dept: MAMMOGRAPHY | Facility: CLINIC | Age: 58
Discharge: HOME | End: 2018-09-11
Attending: NURSE PRACTITIONER | Admitting: NURSE PRACTITIONER
Payer: OTHER GOVERNMENT

## 2018-09-11 DIAGNOSIS — Z12.31 VISIT FOR SCREENING MAMMOGRAM: ICD-10-CM

## 2018-09-11 PROCEDURE — 77067 SCR MAMMO BI INCL CAD: CPT

## 2018-09-12 ENCOUNTER — MYC MEDICAL ADVICE (OUTPATIENT)
Dept: FAMILY MEDICINE | Facility: CLINIC | Age: 58
End: 2018-09-12

## 2018-09-14 DIAGNOSIS — K21.9 GASTROESOPHAGEAL REFLUX DISEASE, ESOPHAGITIS PRESENCE NOT SPECIFIED: ICD-10-CM

## 2018-09-14 NOTE — TELEPHONE ENCOUNTER
"Requested Prescriptions   Pending Prescriptions Disp Refills     ranitidine (ZANTAC) 150 MG tablet [Pharmacy Med Name: RANITIDINE TABS 150MG] 180 tablet 1     Sig: TAKE 1 TABLET TWICE A DAY    H2 Blockers Protocol Passed    9/14/2018  2:21 AM       Passed - Patient is age 12 or older       Passed - Recent (12 mo) or future (30 days) visit within the authorizing provider's specialty    Patient had office visit in the last 12 months or has a visit in the next 30 days with authorizing provider or within the authorizing provider's specialty.  See \"Patient Info\" tab in inbasket, or \"Choose Columns\" in Meds & Orders section of the refill encounter.            Last Written Prescription Date:  3/21/18  Last Fill Quantity: 180,  # refills: 1   Last office visit: 9/10/2018 with prescribing provider:  TERELL   Future Office Visit:   Next 5 appointments (look out 90 days)     Sep 17, 2018  4:00 PM CDT   Return Visit with Carter DANO [facility] ([facility])    [address] [address]   [phone]            Oct 01, 2018  4:00 PM CDT   Return Visit with [facility] ([facility])    [address] [address]   [phone]                   "

## 2018-09-17 ENCOUNTER — OFFICE VISIT (OUTPATIENT)
Dept: PSYCHOLOGY | Facility: CLINIC | Age: 58
End: 2018-09-17
Payer: OTHER GOVERNMENT

## 2018-09-17 DIAGNOSIS — F33.0 MAJOR DEPRESSIVE DISORDER, RECURRENT, MILD (H): Primary | ICD-10-CM

## 2018-09-17 PROCEDURE — 90834 PSYTX W PT 45 MINUTES: CPT | Performed by: PSYCHOLOGIST

## 2018-09-17 NOTE — MR AVS SNAPSHOT
MRN:[id number]                      After Visit Summary   9/17/2018    Teresa Fernandez    MRN: [id number]           Visit Information        Provider Department      9/17/2018 4:00 PM Niall Carter MATSON [facility] Generic      Your next 10 appointments already scheduled     Sep 24, 2018  3:20 PM CDT   SHORT with HERIBERTO Florian CNP   [facility] ([facility]    [address] [address]   [phone]            Oct 01, 2018  4:00 PM CDT   Return Visit with Carter Tierney   [facility] ([facility])    [address] [address]   [phone]            Oct 15, 2018  5:00 PM CDT   Return Visit with Carter Tierney   [facility] ([facility])    [address] [address]   [phone]              MyChart Information     [employer] gives you secure access to your electronic health record. If you see a primary care provider, you can also send messages to your care team and make appointments. If you have questions, please call your primary care clinic.  If you do not have a primary care provider, please call [phone] and they will assist you.        Care EveryWhere ID     This is your Care EveryWhere ID. This could be used by other organizations to access your [place] medical records  [id number]        Equal Access to Services     MERRILL PRESTON AH: Hadii gilma smith Soayanna, waaxda luqadaha, qaybta kaalmada samiyada, lillian sue adeheaven reese. So [facility] [phone].    ATENCIÓN: Si habla español, tiene a anderson disposición servicios gratuitos de asistencia lingüística. Llame al [phone].    We comply with applicable federal civil rights laws and [place] laws. We do not discriminate on the basis of race, color, national origin, age, disability, sex, sexual orientation, or gender identity.

## 2018-09-18 DIAGNOSIS — R07.89 CHEST TIGHTNESS: ICD-10-CM

## 2018-09-18 DIAGNOSIS — J45.40 MODERATE PERSISTENT ASTHMA WITHOUT COMPLICATION: Primary | ICD-10-CM

## 2018-09-18 NOTE — TELEPHONE ENCOUNTER
Sent one refill.  Call patient needs appt to recheck asthma.  Covering for provider  Steve Butler MD  [facility]

## 2018-09-18 NOTE — TELEPHONE ENCOUNTER
Patient contacted and told of the 1 time fill and the need for an office visit for further refills. Tanvi ROMERO RN

## 2018-09-18 NOTE — TELEPHONE ENCOUNTER
"Requested Prescriptions   Pending Prescriptions Disp Refills     fluticasone-salmeterol (ADVAIR) 250-50 MCG/DOSE diskus inhaler 1 Inhaler 0     Sig: Inhale 1 puff into the lungs 2 times daily Follow up for further refills    Inhaled Steroids Protocol Passed    9/18/2018  8:55 AM       Passed - Patient is age 12 or older       Passed - Asthma control assessment score within normal limits in last 6 months    Please review ACT score.          Passed - Recent (6 mo) or future (30 days) visit within the authorizing provider's specialty    Patient had office visit in the last 6 months or has a visit in the next 30 days with authorizing provider or within the authorizing provider's specialty.  See \"Patient Info\" tab in inbasket, or \"Choose Columns\" in Meds & Orders section of the refill encounter.            Last Written Prescription Date:  5/30/18  Last Fill Quantity: 1 inhaler,  # refills: 0   Last office visit: 9/10/2018 with prescribing provider:  Ryan   Future Office Visit:   Next 5 appointments (look out 90 days)     Oct 01, 2018  4:00 PM CDT   Return Visit with [facility] DANO [facility] ([facility])    [address] [address]   [phone]            Oct 15, 2018  5:00 PM CDT   Return Visit with [facility] ([facility])    [address] [address]   [phone]                   "

## 2018-09-18 NOTE — TELEPHONE ENCOUNTER
Routing refill request to provider for review/approval because:  Prescribe by another provider.    Thank you  Mag PIEDRA RN

## 2018-09-19 NOTE — PROGRESS NOTES
Progress Note  Disclaimer: This note consists of symbols derived from keyboarding, dictation and/or voice recognition software. As a result, there may be errors in the script that have gone undetected. Please consider this when interpreting information found in this chart.    Client Name: Teresa Fernandez  Date: 9/17/2018         Service Type: Individual      Session Start Time: 4:00 PM  Session End Time: 4:45 PM      Session Length: 45     Session #: 5     Attendees: Client attended alone    Treatment Plan Last Reviewed: 7/23/2018  PHQ-9 / ERIC-7 : See flowsheet's     DATA   Client reports having a tough time right now.  Her youngest son is doing okay and recently completed 1 month of sobriety  .  Her  was diagnosed with cancer, lymphoma in his neck and they will receive blood work and CT results tomorrow.  He has had cancer previously.  Reports no panic attacks recently Progress Since Last Session (Related to Symptoms / Goals / Homework):   Symptoms: Stable    Homework: Achieved / completed to satisfaction      Episode of Care Goals: Satisfactory progress - ACTION (Actively working towards change); Intervened by reinforcing change plan / affirming steps taken     Current / Ongoing Stressors and Concerns:   Multiple medical issues, feeling invalidated by medical community     Treatment Objective(s) Addressed in This Session:   use at least 2 coping skills for anxiety management in the next 2 weeks       Intervention:   CBT: Behavioral activation and CBT for anxiety and panic.   12 step facilitation for family and parents     ASSESSMENT: Current Emotional / Mental Status (status of significant symptoms):   Risk status (Self / Other harm or suicidal ideation)   Client denies current fears or concerns for personal safety.   Client denies current or recent suicidal ideation or behaviors.   Client denies current or recent homicidal ideation or behaviors.   Client  denies current or recent self injurious behavior or ideation.   Client denies other safety concerns.   Client Client reports there has been no change in risk factors since their last session.     Client Client reports there has been no change in protective factors since their last session.     A safety and risk management plan has not been developed at this time, however client was given the after-hours number / 911 should there be a change in any of these risk factors.     Appearance:   Appropriate    Eye Contact:   Good    Psychomotor Behavior: Normal    Attitude:   Cooperative    Orientation:   All   Speech    Rate / Production: Normal     Volume:  Normal    Mood:    Normal   Affect:    Appropriate    Thought Content:  Clear    Thought Form:  Coherent  Logical    Insight:    Good      Medication Review:   No changes to current psychiatric medication(s)     Medication Compliance:   Yes     Changes in Health Issues:   None reported     Chemical Use Review:   Substance Use: Chemical use reviewed, no active concerns identified      Tobacco Use: No current tobacco use.       Collateral Reports Completed:   Not Applicable    PLAN: (Client Tasks / Therapist Tasks / Other)  Client to consult with primary care physician regarding response to new medications.  Client to continue to maintain healthy boundaries with her alcoholic child.        Carter Tierney                                                         ________________________________________________________________________    Treatment Plan    Client's Name: Teresa Fernandez  YOB: 1960    Date: 7/23/2018    DSM5 Diagnoses: (Sustained by DSM5 Criteria Listed Above)  Diagnoses:            296.31 (F33.0) Major Depressive Disorder, Recurrent Episode, Mild _ and With anxious distress  Psychosocial & Contextual Factors: Multiple medical issues  WHODAS 2.0 (12 item)                          This questionnaire asks about difficulties due to health  conditions. Health conditions                   include                        disease or illnesses, other health problems that may be short or long lasting,                    injuries, mental health or emotional problems, and problems with alcohol or drugs.                              Think back over the past 30 days and answer these questions, thinking about how much              difficulty you had doing the following activities. For each question, please [place] only                   one response.      S1 Standing for long periods such as 30 minutes? Mild =           2   S2 Taking care of household responsibilities? None =         1   S3 Learning a new task, for example, learning how to get to a new place? None =         1   S4 How much of a problem do you have joining community activities (for example, festivals, Baptism or other activities) in the same way as anyone else can? None =         1   S5 How much have you been emotionally affected by your health problems? Moderate =   3               In the past 30 days, how much difficulty did you have in:   S6 Concentrating on doing something for ten minutes? Mild =           2   S7 Walking a long distance such as a kilometer (or equivalent)? None =         1   S8 Washing your whole body? None =         1   S9 Getting dressed? None =         1   S10 Dealing with people you do not know? None =         1   S11 Maintaining a friendship? None =         1   S12 Your day to day work? None =         1       H1 Overall, in the past 30 days, how many days were these difficulties present? Record number of days 3   H2 In the past 30 days, for how many days were you totally unable to carry out your usual activities or work because of any health condition? Record number of days  0   H3 In the past 30 days, not counting the days that you were totally unable, for how many days did you cut back or reduce your usual activities or work because of any health condition? Record  number of days 0           Referral / Collaboration:  Referral to another professional/service is not indicated at this time..    Anticipated number of session or this episode of care: 15  Anxiety Treatment plan:  1. Education- the Biopsychosocial model of anxiety  a. Client will be able to describe how anxiety is effecting them physically, emotionally and socially  2. Distraction  a. Client will learn 2 techniques to distract themselves when becoming anxious  3. Diaphragmatic breathing  a. Client will learn to breath using their diaphragm  b. Client will learn 2 breathing patterns to use to reduce anxiety  4. Visualization  a. Client will learn to establish a safe, calm place in their mind utilizing all their senses  b. Client will practice using visualization at times when they are not anxious so they will be able to use it when anxiety occurs  5. Progressive muscle relaxation  a. Client will learn progressive muscle relaxation techniques and practice them 4-5 times per week.  b. Client will learn to use mental images of relaxation to relax muscle groups and do this on a daily basis  6. Self-care  a. Client will identify 3 things they can do just for themselves  b. Client will take time for quiet, reflection, meditation 3 times per week  c. Client will Learn to set boundaries when appropriate  d. Client will Identify 3 individuals they can call on for support, distraction  7. Assessment of progress  a. Client will engage in assessment of their progress on a regular basis      Client has reviewed and agreed to the above plan.      Carter Tierney  July 24, 2018

## 2018-09-20 ASSESSMENT — PATIENT HEALTH QUESTIONNAIRE - PHQ9: SUM OF ALL RESPONSES TO PHQ QUESTIONS 1-9: 3

## 2018-09-24 ENCOUNTER — OFFICE VISIT (OUTPATIENT)
Dept: FAMILY MEDICINE | Facility: CLINIC | Age: 58
End: 2018-09-24
Payer: OTHER GOVERNMENT

## 2018-09-24 VITALS
OXYGEN SATURATION: 98 % | DIASTOLIC BLOOD PRESSURE: 74 MMHG | BODY MASS INDEX: 29.92 KG/M2 | SYSTOLIC BLOOD PRESSURE: 108 MMHG | WEIGHT: 209 LBS | HEIGHT: 70 IN | RESPIRATION RATE: 16 BRPM | TEMPERATURE: 97.6 F | HEART RATE: 75 BPM

## 2018-09-24 DIAGNOSIS — J45.40 MODERATE PERSISTENT ASTHMA WITHOUT COMPLICATION: Primary | ICD-10-CM

## 2018-09-24 DIAGNOSIS — G89.4 CHRONIC PAIN SYNDROME: ICD-10-CM

## 2018-09-24 PROCEDURE — 99213 OFFICE O/P EST LOW 20 MIN: CPT | Performed by: NURSE PRACTITIONER

## 2018-09-24 NOTE — MR AVS SNAPSHOT
After Visit Summary   9/24/2018    Teresa Fernandez    MRN: [id number]           Patient Information     Date Of Birth          1960        Visit Information        Provider Department      9/24/2018 3:20 PM Jazzmine Davis APRN CNP [facility]        Today's Diagnoses     Chest tightness        Moderate persistent asthma without complication          Care Instructions    Continue Celebrex    If not effective can consider Mobic (different antiinflammatory pain medication)    Refilled Advair                Follow-ups after your visit        Your next 10 appointments already scheduled     Oct 01, 2018  4:00 PM CDT   [facility]    [address] [facility] [address]   [phone]            Oct 15, 2018  5:00 PM CDT   Return Visit with [facility] ([facility])    [address] [address]   [phone]              Who to contact     If you have questions or need follow up information about today's clinic visit or your schedule please contact [facility] directly at [phone].  Normal or non-critical lab and imaging results will be communicated to you by MyChart, letter or phone within 4 business days after the clinic has received the results. If you do not hear from us within 7 days, please contact the clinic through [employer] or phone. If you have a critical or abnormal lab result, we will notify you by phone as soon as possible.  Submit refill requests through [employer] or call your pharmacy and they will forward the refill request to us. Please allow 3 business days for your refill to be completed.          Additional Information About Your Visit        MyChart Information     [employer] gives you secure access to your electronic health record. If you see a primary care provider, you can also send messages to your care  "team and make appointments. If you have questions, please call your primary care clinic.  If you do not have a primary care provider, please call [phone] and they will assist you.        Care EveryWhere ID     This is your Care EveryWhere ID. This could be used by other organizations to access your [facility] medical records  [id number]        Your Vitals Were     Pulse Temperature Respirations Height Pulse Oximetry BMI (Body Mass Index)    75 97.6  F (36.4  C) (Tympanic) 16 5' 9.69\" (1.77 m) 98% 30.26 kg/m2       Blood Pressure from Last 3 Encounters:   09/24/18 108/74   09/10/18 104/68   08/02/18 116/70    Weight from Last 3 Encounters:   09/24/18 209 lb (94.8 kg)   09/10/18 209 lb 1.6 oz (94.8 kg)   08/02/18 208 lb 12.8 oz (94.7 kg)              Today, you had the following     No orders found for display         Where to get your medicines      These medications were sent to [facility] [address] [address]     Phone:  [phone]     fluticasone-salmeterol 250-50 MCG/DOSE diskus inhaler          Primary Care Provider Office Phone # Fax #    HERIBERTO Delgado [facility] [phone] [address] [facility] [address]        Equal Access to Services     MERRILL PRESTON : Hadii aad ku hadasho Sooswaldali, waaxda luqadaha, qaybta kaalmada alexa, lillian reese. So [facility] [phone].    ATENCIÓN: Si habla español, tiene a anderson disposición servicios gratuitos de asistencia lingüística. Stas al [phone].    We comply with applicable federal civil rights laws and [place] laws. We do not discriminate on the basis of race, color, national origin, age, disability, sex, sexual orientation, or gender identity.            Thank you!     Thank you for choosing [facility]  for your care. Our goal is always to provide you with excellent care. Hearing back from our patients is one " way we can continue to improve our services. Please take a few minutes to complete the written survey that you may receive in the mail after your visit with us. Thank you!             Your Updated Medication List - Protect others around you: Learn how to safely use, store and throw away your medicines at www.disposemymeds.org.          This list is accurate as of 9/24/18  3:56 PM.  Always use your most recent med list.                   Brand Name Dispense Instructions for use Diagnosis    * albuterol (2.5 MG/3ML) 0.083% neb solution     25 vial    Take 1 vial (2.5 mg) by nebulization every 6 hours as needed for shortness of breath / dyspnea or wheezing    Asthma exacerbation       * albuterol 108 (90 Base) MCG/ACT inhaler    PROAIR HFA/PROVENTIL HFA/VENTOLIN HFA    1 Inhaler    Inhale 2 puffs into the lungs every 6 hours as needed for shortness of breath / dyspnea    Moderate persistent asthma without complication       celecoxib 100 MG capsule    celeBREX    60 capsule    Take 1 capsule (100 mg) by mouth 2 times daily as needed for moderate pain    Wrist tendonitis       DULoxetine 60 MG EC capsule    CYMBALTA    30 capsule    Take 1 capsule (60 mg) by mouth every morning    ERIC (generalized anxiety disorder), Myalgia       fexofenadine 180 MG tablet    ALLEGRA    30 tablet    Take 1 tablet (180 mg) by mouth daily    Seasonal allergic rhinitis       fluticasone-salmeterol 250-50 MCG/DOSE diskus inhaler    ADVAIR    3 Inhaler    Inhale 1 puff into the lungs 2 times daily Follow up for further refills    Chest tightness, Moderate persistent asthma without complication       GLUCOSAMINE SULFATE PO      Take 1,000 mg by mouth 2 times daily        levothyroxine 125 MCG tablet    SYNTHROID/LEVOTHROID    90 tablet    TAKE 1 TABLET DAILY    Acquired hypothyroidism       MAGNESIUM OXIDE PO      Take 200 mg by mouth daily        MULTIVITAMIN ADULT PO      Take by mouth every morning        order for DME     1 Units     Equipment being ordered: Nebulizer    Asthma exacerbation       OVER-THE-COUNTER     1 Bottle    Place 1 drop into both eyes 3 times daily. Systane Ultra, Systane Balance, Blink Tears or Refresh Optive Artificial Tear    Dry eye syndrome       ranitidine 150 MG tablet    ZANTAC    180 tablet    TAKE 1 TABLET TWICE A DAY    Gastroesophageal reflux disease, esophagitis presence not specified       TYLENOL 325 MG tablet   Generic drug:  acetaminophen      Take 325-650 mg by mouth every 6 hours as needed for mild pain        VITAMIN B 12 PO      Take 500 mcg by mouth daily        VITAMIN D3 PO      Take 2,000 Units by mouth 2 times daily        * Notice:  This list has 2 medication(s) that are the same as other medications prescribed for you. Read the directions carefully, and ask your doctor or other care provider to review them with you.

## 2018-09-24 NOTE — PROGRESS NOTES
"  SUBJECTIVE:   Teresa Fernandez is a 58 year old female who presents to clinic today for the following health issues: asthma follow up. Advair refill.     Chief Complaint   Patient presents with     Refill Request     Advair Diskus inhaler      Asthma Follow-Up    Was ACT completed today?    Yes    ACT Total Scores 8/2/2018   ACT TOTAL SCORE -   ASTHMA ER VISITS -   ASTHMA HOSPITALIZATIONS -   ACT TOTAL SCORE (Goal Greater than or Equal to 20) 23   In the past 12 months, how many times did you visit the emergency room for your asthma without being admitted to the hospital? 0   In the past 12 months, how many times were you hospitalized overnight because of your asthma? 0       Recent asthma triggers that patient is dealing with: None      Amount of exercise or physical activity: None    Problems taking medications regularly: No    Medication side effects: none    Diet: regular (no restrictions)    Problem list and histories reviewed & adjusted, as indicated.  Additional history: as documented    Labs reviewed in EPIC    Reviewed and updated as needed this visit by clinical staff  Tobacco  Allergies  Meds  Problems  Med Hx  Surg Hx  Fam Hx  Soc Hx        Reviewed and updated as needed this visit by Provider  Allergies  Meds  Problems         ROS:  Constitutional, HEENT, cardiovascular, pulmonary, gi and gu systems are negative, except as otherwise noted.    OBJECTIVE:     /74  Pulse 75  Temp 97.6  F (36.4  C) (Tympanic)  Resp 16  Ht 5' 9.69\" (1.77 m)  Wt 209 lb (94.8 kg)  SpO2 98%  BMI 30.26 kg/m2  Body mass index is 30.26 kg/(m^2).  GENERAL: healthy, alert and no distress  RESP: lungs clear to auscultation - no rales, rhonchi or wheezes  CV: regular rate and rhythm, normal S1 S2, no S3 or S4, no murmur, click or rub, no peripheral edema and peripheral pulses strong  MS: no gross musculoskeletal defects noted, no edema  PSYCH: mentation appears normal, affect normal/bright    Diagnostic Test " Results:  none     ASSESSMENT/PLAN:     1. Moderate persistent asthma without complication  -well controlled  -refill provided  - fluticasone-salmeterol (ADVAIR) 250-50 MCG/DOSE diskus inhaler; Inhale 1 puff into the lungs 2 times daily Follow up for further refills  Dispense: 3 Inhaler; Refill: 3    2. Chronic pain syndrome  -slightly elevated CK levels, possibly due to thyroid disease vs fibromyalgia, thyroid levels stable, continue Synthroid 125 mcg daily  -no other specific causes of slightly elevated CK levels were found, all labs normal, she is also planning to see rheumatologist  -for pain patient is on Cymbalta and Celebrex and states it helps somewhat.      See Patient Instructions    HERIBERTO Cormier [facility]

## 2018-09-24 NOTE — PATIENT INSTRUCTIONS
Continue Celebrex    If not effective can consider Mobic (different antiinflammatory pain medication)    Refilled Advair

## 2018-10-01 ENCOUNTER — OFFICE VISIT (OUTPATIENT)
Dept: PSYCHOLOGY | Facility: CLINIC | Age: 58
End: 2018-10-01
Payer: OTHER GOVERNMENT

## 2018-10-01 DIAGNOSIS — F33.0 MAJOR DEPRESSIVE DISORDER, RECURRENT, MILD (H): Primary | ICD-10-CM

## 2018-10-01 PROCEDURE — 90834 PSYTX W PT 45 MINUTES: CPT | Performed by: PSYCHOLOGIST

## 2018-10-01 NOTE — MR AVS SNAPSHOT
MRN:[id number]                      After Visit Summary   10/1/2018    Teresa Fernandez    MRN: [id number]           Visit Information        Provider Department      10/1/2018 4:00 PM Carter Tierney [facility]      Your next 10 appointments already scheduled     Oct 15, 2018  5:00 PM CDT   Return Visit with Carter Tierney   [facility] ([facility])    [address] [facility] [address]   [phone]            Oct 29, 2018  4:00 PM CDT   Return Visit with Carter Tierney   [facility] ([facility])    [address] [place]  [facility] [address]   [phone]            Feb 27, 2019  3:00 PM CST   [facility]    [address] [place] [id number] [phone]              MyChart Information     [employer] gives you secure access to your electronic health record. If you see a primary care provider, you can also send messages to your care team and make appointments. If you have questions, please call your primary care clinic.  If you do not have a primary care provider, please call [phone] and they will assist you.        Care EveryWhere ID     This is your Care EveryWhere ID. This could be used by other organizations to access your [facility] medical records  [id number]        Equal Access to Services     MERRILL PRESTON AH: Hadii gilma westono Sooswaldali, waaxda luqadaha, qaybta kaalmada adeegyada, lillian sue adeheaven reese. So [facility] [phone].    ATENCIÓN: Si habla español, tiene a anderson disposición servicios gratuitos de asistencia lingüística. Llame al [phone].    We comply with applicable federal civil rights laws and [place] laws. We do not discriminate on the basis of race, color, national origin, age, disability, sex, sexual orientation, or gender identity.

## 2018-10-04 ASSESSMENT — ANXIETY QUESTIONNAIRES
5. BEING SO RESTLESS THAT IT IS HARD TO SIT STILL: NOT AT ALL
7. FEELING AFRAID AS IF SOMETHING AWFUL MIGHT HAPPEN: NOT AT ALL
GAD7 TOTAL SCORE: 2
3. WORRYING TOO MUCH ABOUT DIFFERENT THINGS: SEVERAL DAYS
4. TROUBLE RELAXING: NOT AT ALL
1. FEELING NERVOUS, ANXIOUS, OR ON EDGE: NOT AT ALL
2. NOT BEING ABLE TO STOP OR CONTROL WORRYING: NOT AT ALL
6. BECOMING EASILY ANNOYED OR IRRITABLE: SEVERAL DAYS
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
## 2018-10-05 ASSESSMENT — ANXIETY QUESTIONNAIRES: GAD7 TOTAL SCORE: 2

## 2018-10-05 ASSESSMENT — PATIENT HEALTH QUESTIONNAIRE - PHQ9: SUM OF ALL RESPONSES TO PHQ QUESTIONS 1-9: 4

## 2018-10-12 DIAGNOSIS — E03.9 ACQUIRED HYPOTHYROIDISM: ICD-10-CM

## 2018-10-12 RX ORDER — LEVOTHYROXINE SODIUM 125 UG/1
TABLET ORAL
Qty: 90 TABLET | Refills: 1 | Status: SHIPPED | OUTPATIENT
Start: 2018-10-12 | End: 2019-03-12 | Stop reason: DRUGHIGH

## 2018-10-12 NOTE — TELEPHONE ENCOUNTER
"Requested Prescriptions   Pending Prescriptions Disp Refills     levothyroxine (SYNTHROID/LEVOTHROID) 125 MCG tablet [Pharmacy Med Name: L-THYROXINE TABS 125MCG]  Last Written Prescription Date:  01/31/18  Last Fill Quantity: 90,  # refills: 2   Last office visit: 9/24/2018 with prescribing provider:  09/24/18   Future Office Visit:   Next 5 appointments (look out 90 days)     Oct 15, 2018  5:00 PM CDT   Return Visit with [facility]    [address] [address]   [phone]            Oct 29, 2018  4:00 PM CDT   Return Visit with [facility]    [address] [address]   [phone]                  90 tablet 2     Sig: TAKE 1 TABLET DAILY    Thyroid Protocol Failed    10/12/2018  2:00 AM       Failed - Normal TSH on file in past 12 months    Recent Labs   Lab Test  09/10/18   1528   TSH  0.29*          Passed - Patient is 12 years or older       Passed - Recent (12 mo) or future (30 days) visit within the authorizing provider's specialty    Patient had office visit in the last 12 months or has a visit in the next 30 days with authorizing provider or within the authorizing provider's specialty.  See \"Patient Info\" tab in inbasket, or \"Choose Columns\" in Meds & Orders section of the refill encounter.         Passed - No active pregnancy on record    If patient is pregnant or has had a positive pregnancy test, please check TSH       Passed - No positive pregnancy test in past 12 months    If patient is pregnant or has had a positive pregnancy test, please check TSH.            "

## 2018-10-12 NOTE — TELEPHONE ENCOUNTER
Prescription approved per [facility] Refill Protocol.  Lab results 9-10-18:  Entered by Jazzmine Davis APRN CNP at 9/12/2018  8:26 AM   Read by Teresa Fernandez at 9/12/2018  1:27 PM   Teresa Toure     Thyroid function stable, continue Synthroid 125 mcg daily   CK level improved     HERIBERTO Cormier CNP RN

## 2018-10-19 ENCOUNTER — TELEPHONE (OUTPATIENT)
Dept: FAMILY MEDICINE | Facility: CLINIC | Age: 58
End: 2018-10-19

## 2018-10-19 DIAGNOSIS — F41.1 GAD (GENERALIZED ANXIETY DISORDER): ICD-10-CM

## 2018-10-19 DIAGNOSIS — M79.10 MYALGIA: ICD-10-CM

## 2018-10-19 RX ORDER — DULOXETIN HYDROCHLORIDE 60 MG/1
60 CAPSULE, DELAYED RELEASE ORAL EVERY MORNING
Qty: 90 CAPSULE | Refills: 0 | Status: SHIPPED | OUTPATIENT
Start: 2018-10-19 | End: 2018-12-30

## 2018-11-10 ENCOUNTER — HOSPITAL ENCOUNTER (EMERGENCY)
Facility: CLINIC | Age: 58
Discharge: HOME | End: 2018-11-10
Attending: NURSE PRACTITIONER | Admitting: NURSE PRACTITIONER
Payer: OTHER GOVERNMENT

## 2018-11-10 VITALS
TEMPERATURE: 97.2 F | HEART RATE: 86 BPM | WEIGHT: 210 LBS | BODY MASS INDEX: 30.4 KG/M2 | SYSTOLIC BLOOD PRESSURE: 147 MMHG | OXYGEN SATURATION: 100 % | DIASTOLIC BLOOD PRESSURE: 75 MMHG | RESPIRATION RATE: 16 BRPM

## 2018-11-10 DIAGNOSIS — J45.41 MODERATE PERSISTENT ASTHMA WITH EXACERBATION: ICD-10-CM

## 2018-11-10 DIAGNOSIS — J06.9 VIRAL URI WITH COUGH: ICD-10-CM

## 2018-11-10 PROCEDURE — G0463 HOSPITAL OUTPT CLINIC VISIT: HCPCS | Performed by: NURSE PRACTITIONER

## 2018-11-10 PROCEDURE — 99214 OFFICE O/P EST MOD 30 MIN: CPT | Mod: Z6 | Performed by: NURSE PRACTITIONER

## 2018-11-10 RX ORDER — PREDNISONE 20 MG/1
60 TABLET ORAL DAILY
Qty: 15 TABLET | Refills: 0 | Status: SHIPPED | OUTPATIENT
Start: 2018-11-10 | End: 2018-12-06

## 2018-11-10 NOTE — ED AVS SNAPSHOT
[facility]    [address] [address]    Phone:  [phone]    Fax:  [phone]                                       Teresa Fernandez   MRN: [id number]    Department:  [facility]   Date of Visit:  11/10/2018           After Visit Summary Signature Page     I have received my discharge instructions, and my questions have been answered. I have discussed any challenges I see with this plan with the nurse or doctor.    ..........................................................................................................................................  Patient/Patient Representative Signature      ..........................................................................................................................................  Patient Representative Print Name and Relationship to Patient    ..................................................               ................................................  Date                                   Time    ..........................................................................................................................................  Reviewed by Signature/Title    ...................................................              ..............................................  Date                                               Time          22EPIC Rev 08/18

## 2018-11-10 NOTE — ED TRIAGE NOTES
Patient presents today with cough. Symptoms started last sunday . Arrived to urgent care ambulatory .

## 2018-11-10 NOTE — ED AVS SNAPSHOT
[facility]    [address] [address]    Phone:  [phone]    Fax:  [phone]                                       Teresa Fernandez   MRN: [id number]    Department:  [facility]   Date of Visit:  11/10/2018           Patient Information     Date Of Birth          1960        Your diagnoses for this visit were:     Moderate persistent asthma with exacerbation     Viral URI with cough        You were seen by Nuzhat Espinal APRN CNP.      Follow-up Information     Follow up with Nuzhat Burt APRN CNP In 3 days.    Specialty:  Nurse Practitioner    Contact information:    [address] [address]  [phone]        Your next 10 appointments already scheduled     Feb 27, 2019  3:00 PM CST   New Visit with Gaurav Chung MD   [facility]    [address] [place] [address] [phone]              24 Hour Appointment Hotline       To make an appointment at any [facility], call [phone] ([phone]). If you don't have a family doctor or clinic, we will help you find one. [facility] are conveniently located to serve the needs of you and your family.             Review of your medicines      START taking        Dose / Directions Last dose taken    predniSONE 20 MG tablet   Commonly known as:  DELTASONE   Dose:  60 mg   Quantity:  15 tablet        Take 3 tablets (60 mg) by mouth daily   Refills:  0          Our records show that you are taking the medicines listed below. If these are incorrect, please call your family doctor or clinic.        Dose / Directions Last dose taken    * albuterol (2.5 MG/3ML) 0.083% neb solution   Dose:  1 vial   Quantity:  25 vial        Take 1 vial (2.5 mg) by nebulization every 6 hours as needed for shortness of breath / dyspnea or wheezing   Refills:  0        * albuterol 108 (90 Base) MCG/ACT inhaler   Commonly  known as:  PROAIR HFA/PROVENTIL HFA/VENTOLIN HFA   Dose:  2 puff   Quantity:  1 Inhaler        Inhale 2 puffs into the lungs every 6 hours as needed for shortness of breath / dyspnea   Refills:  5        celecoxib 100 MG capsule   Commonly known as:  celeBREX   Dose:  100 mg   Quantity:  60 capsule        Take 1 capsule (100 mg) by mouth 2 times daily as needed for moderate pain   Refills:  3        DULoxetine 60 MG EC capsule   Commonly known as:  CYMBALTA   Dose:  60 mg   Quantity:  90 capsule        Take 1 capsule (60 mg) by mouth every morning   Refills:  0        fexofenadine 180 MG tablet   Commonly known as:  ALLEGRA   Dose:  180 mg   Quantity:  30 tablet        Take 1 tablet (180 mg) by mouth daily   Refills:  1        fluticasone-salmeterol 250-50 MCG/DOSE diskus inhaler   Commonly known as:  ADVAIR   Dose:  1 puff   Quantity:  3 Inhaler        Inhale 1 puff into the lungs 2 times daily   Refills:  3        GLUCOSAMINE SULFATE PO   Dose:  1000 mg        Take 1,000 mg by mouth 2 times daily   Refills:  0        levothyroxine 125 MCG tablet   Commonly known as:  SYNTHROID/LEVOTHROID   Quantity:  90 tablet        TAKE 1 TABLET DAILY   Refills:  1        MAGNESIUM OXIDE PO   Dose:  200 mg        Take 200 mg by mouth daily   Refills:  0        MULTIVITAMIN ADULT PO        Take by mouth every morning   Refills:  0        order for DME   Quantity:  1 Units        Equipment being ordered: Nebulizer   Refills:  0        OVER-THE-COUNTER   Dose:  1 drop   Quantity:  1 Bottle        Place 1 drop into both eyes 3 times daily. Systane Ultra, Systane Balance, Blink Tears or Refresh Optive Artificial Tear   Refills:  0        ranitidine 150 MG tablet   Commonly known as:  ZANTAC   Quantity:  180 tablet        TAKE 1 TABLET TWICE A DAY   Refills:  3        TYLENOL 325 MG tablet   Dose:  325-650 mg   Generic drug:  acetaminophen        Take 325-650 mg by mouth every 6 hours as needed for mild pain   Refills:  0         VITAMIN B 12 PO   Dose:  500 mcg        Take 500 mcg by mouth daily   Refills:  0        VITAMIN D3 PO   Dose:  2000 Units        Take 2,000 Units by mouth 2 times daily   Refills:  0        * Notice:  This list has 2 medication(s) that are the same as other medications prescribed for you. Read the directions carefully, and ask your doctor or other care provider to review them with you.            Prescriptions were sent or printed at these locations (1 Prescription)                   [facility] [place], [place] - [address]   [address] [address]    Telephone:  [phone]   Fax:  [phone]   Hours:                  E-Prescribed (1 of 1)         predniSONE (DELTASONE) 20 MG tablet                Orders Needing Specimen Collection     None      Pending Results     No orders found from 11/8/2018 to 11/11/2018.            Pending Culture Results     No orders found from 11/8/2018 to 11/11/2018.            Pending Results Instructions     If you had any lab results that were not finalized at the time of your Discharge, you can call the ED Lab Result RN at [phone]. You will be contacted by this team for any positive Lab results or changes in treatment. The nurses are available 7 days a week from 10A to 6:30P.  You can leave a message 24 hours per day and they will return your call.        Test Results From Your Hospital Stay               Thank you for choosing [facility]       Thank you for choosing [facility] for your care. Our goal is always to provide you with excellent care. Hearing back from our patients is one way we can continue to improve our services. Please take a few minutes to complete the written survey that you may receive in the mail after you visit with us. Thank you!        [facility] Information     [employer] [facility] [employer] gives you secure access to your electronic health record. If you see a primary care provider, you can also send messages to your care team and make  appointments. If you have questions, please call your primary care clinic.  If you do not have a primary care provider, please call [phone] and they will assist you.        Care EveryWhere ID     This is your Care EveryWhere ID. This could be used by other organizations to access your [facility] medical records  [id number]        Equal Access to Services     MERRILL PRESTON : Nic Leung, gume rae, lillian monaco. So [facility] [phone].    ATENCIÓN: Si habla español, tiene a anderson disposición servicios gratuitos de asistencia lingüística. Karlame al [phone].    We comply with applicable federal civil rights laws and [place] laws. We do not discriminate on the basis of race, color, national origin, age, disability, sex, sexual orientation, or gender identity.            After Visit Summary       This is your record. Keep this with you and show to your community pharmacist(s) and doctor(s) at your next visit.

## 2018-11-10 NOTE — ED PROVIDER NOTES
History     Chief Complaint   Patient presents with     Cough     HPI    SUBJECTIVE: Teresa Fernandez  is here today because of:Cough  The patient has had symptoms of cough, nasal congestion/runny nose, myalgias and chills, chest tightness, ears feels like they are humming.   Onset of symptoms was 7 days ago. Course of illness is same.  Patient denies exposure to illness at work.  Pt has moderate persistent asthma and has tried a nebulizer yesterday and it did not seem like it was that helpful.  Pt also reports imilar symptoms yearly and always treated for bronchitis.  Pt states received flu shot.  Patient denies earache, sore throat, nausea, vomiting, diarrhea, facial pressure, sinus pain, sneezing, decreased appetite, decreased activity and difficulty urinating, dysuria, seizures, mental status changes, abdominal pain, vision changes.  Treatment measures tried include Nyquil, Dayquil.  Patient is not a smoker    Problem List:    Patient Active Problem List    Diagnosis Date Noted     Muscle pain 10/20/2017     Priority: Medium     Increased CK       Chest tightness or pressure 10/03/2016     Priority: Medium     Hyperlipidemia LDL goal <160 01/29/2015     Priority: Medium     DDD (degenerative disc disease), lumbar 06/24/2014     Priority: Medium     [facility] Spine Following       Moderate persistent asthma without complication 07/08/2013     Priority: Medium     History of kidney cancer 07/08/2013     Priority: Medium     Left kidney was removed i n1996       History of melanoma in situ 10/17/2012     Priority: Medium     Eczema 10/17/2012     Priority: Medium     Osteopenia 06/15/2012     Priority: Medium     Chronic fatigue 10/04/2011     Priority: Medium     Dry eye syndrome 08/24/2011     Priority: Medium     Chronic low back pain 07/07/2011     Priority: Medium     GERD (gastroesophageal reflux disease) 05/11/2011     Priority: Medium     Renal cancer (H) 05/05/2011     Priority: Medium     Leukoplakia  of oral mucosa, including tongue 04/18/2011     Priority: Medium     ERIC (generalized anxiety disorder)      Priority: Medium     History of skin cancer 11/09/2010     Priority: Medium     Chronic rhinitis 05/03/2005     Priority: Medium     Allergic rhinitis due to pollen 05/03/2005     Priority: Medium     Sinusitis, chronic 05/03/2005     Priority: Medium     Problem list name updated by automated process. Provider to review       Allergic state 04/19/2005     Priority: Medium     Problem list name updated by automated process. Provider to review       Acquired hypothyroidism 04/12/2005     Priority: Medium     Problem list name updated by automated process. 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     FUSION LUMBAR ANTERIOR TWO LEVELS  4/13/2015     GYN SURGERY  04/08/1999    hysterectomy.  [facility]     HYSTERECTOMY, PAP NO LONGER INDICATED       SOFT TISSUE SURGERY      chest-melonoma     SURGICAL HISTORY OF -   3/1996    Left nephrectomy-renal cell CA       Family History:    Family History   Problem Relation Age of Onset     Diabetes Mother      Cardiovascular Mother      Lipids Mother      Depression Mother       Hypertension Father      Lipids Father      Obesity Father      Macular Degeneration Father      Cancer Maternal Grandmother      kind unknown had sores on legs     Eczema Maternal Grandmother      Eczema Maternal Grandfather      Breast Cancer Paternal Grandmother      Cancer Paternal Grandmother      breast     Hypertension Paternal Grandfather      Cardiovascular Paternal Grandfather      heart attack     Hypertension Brother      Thyroid Disease Sister      Hypertension Sister      Depression Sister      Allergies Sister      Allergies Son      Eczema Son      Lipids Sister      Thyroid Disease Sister      Neurologic Disorder Sister      Depression Sister      Respiratory Son      Glaucoma No family hx of      Cerebrovascular Disease No family hx of        Social History:  Marital Status:   [2]  Social History   Substance Use Topics     Smoking status: Former Smoker     Packs/day: 2.00     Years: 15.00     Quit date: 3/26/1996     Smokeless tobacco: Never Used     Alcohol use No      Comment: None now.  Rare in past.         Medications:      predniSONE (DELTASONE) 20 MG tablet   acetaminophen (TYLENOL) 325 MG tablet   albuterol (2.5 MG/3ML) 0.083% nebulizer solution   albuterol (PROAIR HFA/PROVENTIL HFA/VENTOLIN HFA) 108 (90 BASE) MCG/ACT Inhaler   celecoxib (CELEBREX) 100 MG capsule   Cholecalciferol (VITAMIN D3 PO)   Cyanocobalamin (VITAMIN B 12 PO)   DULoxetine (CYMBALTA) 60 MG EC capsule   fexofenadine (ALLEGRA) 180 MG tablet   fluticasone-salmeterol (ADVAIR) 250-50 MCG/DOSE diskus inhaler   GLUCOSAMINE SULFATE PO   levothyroxine (SYNTHROID/LEVOTHROID) 125 MCG tablet   MAGNESIUM OXIDE PO   Multiple Vitamins-Minerals (MULTIVITAMIN ADULT PO)   order for DME   OVER-THE-COUNTER   ranitidine (ZANTAC) 150 MG tablet     Review of Systems  As mentioned above in the history present illness. All other systems were reviewed and are negative.    Physical Exam   BP: 147/75  Pulse: 86  Temp: 97.2  F (36.2   C)  Resp: 16  Weight: 95.3 kg (210 lb)  SpO2: 100 %      Physical Exam  GENERAL: alert, no acute distress and no respiratory distress  EYES:  Right conjunctiva is not injected and without discharge.  Left conjunctiva is not injected and without discharge.  EARS: Right TM is normal: no effusions, no erythema, and normal landmarks.  Left TM is normal: no effusions, no erythema, and normal landmarks.  NOSE: Nasal mucosa is discharge clear and inflamed.  Sinus not tender.  THROAT: normal.  NECK: supple with shotty nodes  CARDIAC:NORMAL - regular rate and rhythm without murmur.  RESP: ABNORMAL - scattered wheezing mild  SKIN: normal      ED Course     ED Course     Procedures    No results found for this or any previous visit (from the past 24 hour(s)).    Medications - No data to display    Assessments & Plan (with Medical Decision Making)     I have reviewed the nursing notes.    I have reviewed the findings, diagnosis, plan and need for follow up with the patient.  Medical Decision Making:  CXR is not indicated.  Rapid Strep test is not indicated.     Assessment:  1) Asthma exacerbation secondary to viral infection.    PLAN:  Prednisone 60 mg daily for 5 days  Use mucinex prn, increase fluids and rest.   Follow up with any questions or problems    Discharge Medication List as of 11/10/2018  3:36 PM      START taking these medications    Details   predniSONE (DELTASONE) 20 MG tablet Take 3 tablets (60 mg) by mouth daily, Disp-15 tablet, R-0, E-Prescribe             Final diagnoses:   Moderate persistent asthma with exacerbation   Viral URI with cough       11/10/2018   [facility] EMERGENCY DEPARTMENT     Nuzhat Espinal, HERIBERTO CNP  11/10/18 0426

## 2018-11-15 ENCOUNTER — TELEPHONE (OUTPATIENT)
Dept: FAMILY MEDICINE | Facility: CLINIC | Age: 58
End: 2018-11-15

## 2018-11-15 NOTE — TELEPHONE ENCOUNTER
Symptoms started 12 days ago. She has a history of multiple sinus infections every winter. She has broken her nose previously. Pain in sinus area especially with nose blowing. She is using Nyquil, Dayquil ,and saline . Today is her last day on prednisone and she is having headaches. Provider at er told her to call us if symptoms didn't resolve. No ear pain no fever. Requests a antibiotic be called in. Advised her she may be asked to complete a evisit. Enma Whiteside RN

## 2018-11-15 NOTE — TELEPHONE ENCOUNTER
Reason for Call:  Sinus issues    Detailed comments: patient is calling and stating that she was seen on 11/10 in ER for her sinus issues, but is still blowing green snot from her nose. Should she be seen again, or get an antibiotic? Please advise.    Phone Number Patient can be reached at: Cell number on file:    Telephone Information:   Mobile [phone]       Best Time: any    Can we leave a detailed message on this number? YES   Salena Onofre  [facility] [place] [facility]      Call taken on 11/15/2018 at 9:08 AM by Salena Onofre

## 2018-11-19 NOTE — TELEPHONE ENCOUNTER
Patient returned call to clinic  She reports she is still having symptoms of sinus infection, but mostly a cough that will not go away  Advised patient to make an appt with provider to be evaluated  She verbalized understanding   Scheduled appt 11/20/18    Erinn DIA Rn

## 2018-12-06 ENCOUNTER — OFFICE VISIT (OUTPATIENT)
Dept: FAMILY MEDICINE | Facility: CLINIC | Age: 58
End: 2018-12-06
Payer: OTHER GOVERNMENT

## 2018-12-06 VITALS
HEART RATE: 78 BPM | HEIGHT: 69 IN | TEMPERATURE: 98 F | BODY MASS INDEX: 32.14 KG/M2 | DIASTOLIC BLOOD PRESSURE: 88 MMHG | OXYGEN SATURATION: 97 % | SYSTOLIC BLOOD PRESSURE: 128 MMHG | WEIGHT: 217 LBS | RESPIRATION RATE: 14 BRPM

## 2018-12-06 DIAGNOSIS — J32.8 OTHER CHRONIC SINUSITIS: Primary | ICD-10-CM

## 2018-12-06 DIAGNOSIS — J01.90 ACUTE SINUSITIS WITH SYMPTOMS > 10 DAYS: ICD-10-CM

## 2018-12-06 DIAGNOSIS — J45.40 MODERATE PERSISTENT ASTHMA WITHOUT COMPLICATION: ICD-10-CM

## 2018-12-06 PROCEDURE — 99213 OFFICE O/P EST LOW 20 MIN: CPT | Performed by: NURSE PRACTITIONER

## 2018-12-06 RX ORDER — DOXYCYCLINE 100 MG/1
100 CAPSULE ORAL 2 TIMES DAILY
Qty: 20 CAPSULE | Refills: 0 | Status: SHIPPED | OUTPATIENT
Start: 2018-12-06 | End: 2018-12-16

## 2018-12-06 NOTE — NURSING NOTE
"Initial /88  Pulse 78  Temp 98  F (36.7  C) (Tympanic)  Resp 14  Ht 5' 9\" (1.753 m)  Wt 217 lb (98.4 kg)  SpO2 97%  Breastfeeding? No  BMI 32.05 kg/m2 Estimated body mass index is 32.05 kg/(m^2) as calculated from the following:    Height as of this encounter: 5' 9\" (1.753 m).    Weight as of this encounter: 217 lb (98.4 kg). .    Anne Moralez, ARABELLA ([facility])  "

## 2018-12-06 NOTE — MR AVS SNAPSHOT
After Visit Summary   12/6/2018    Teresa Fernandez    MRN: [id number]           Patient Information     Date Of Birth          1960        Visit Information        Provider Department      12/6/2018 3:20 PM Tamika Fernandes NP [facility]        Today's Diagnoses     Other chronic sinusitis    -  1    Acute sinusitis with symptoms > 10 days        Moderate persistent asthma without complication          Care Instructions    Prescription written for antibiotics to be taken twice daily for 10 days.  Advised to take entire course of antibiotic despite improvement in symptoms.    For cough, dextromethorphan/guaifenesin combinations help loosen secretions and suppress cough safely without significant risk of sedation. Often 2 puffs four times daily of an albuterol inhaler will help with bronchitis.  This is a prescription medicine.    For nasal congestion and sinus pressure, pseudoephedrine (Sudafed) or phenylephrine is often helpful but it can cause elevations in blood pressure and insomnia.  Short courses of a nasal decongestant spray (Afrin or Neosinephrine) can be appropriate but their use should be restricted to 3 days due to the high risk of nasal addiction.  May use Coricidin as decongestant if history of hypertension.    For pain and fevers, acetaminophen (Tylenol) is most appropriate.  Ibuprofen (Advil) or naproxen (Aleve) are useful too and last longer but they can cause elevation of blood pressure or stomach problems.    Sometime the cause of your sinusitis is from allergies.  You may want to try taking a daily antihistamines such as Claritin, Zyrtec, or Allegra-all over the counter medications.    Return to the clinic if symptoms not improved or worsened after treatment complete.    SUNG Ferreira                Follow-ups after your visit        Your next 10 appointments already scheduled     Feb 27, 2019  3:00 PM CST   New Visit with Gaurav Chung MD   [place]  "[facility] ([facility])    [address]  [place] [id number] [phone]              Who to contact     If you have questions or need follow up information about today's clinic visit or your schedule please contact [facility] directly at [phone].  Normal or non-critical lab and imaging results will be communicated to you by MyChart, letter or phone within 4 business days after the clinic has received the results. If you do not hear from us within 7 days, please contact the clinic through [facility] or phone. If you have a critical or abnormal lab result, we will notify you by phone as soon as possible.  Submit refill requests through [facility] or call your pharmacy and they will forward the refill request to us. Please allow 3 business days for your refill to be completed.          Additional Information About Your Visit        [facility] Information     [facility] gives you secure access to your electronic health record. If you see a primary care provider, you can also send messages to your care team and make appointments. If you have questions, please call your primary care clinic.  If you do not have a primary care provider, please call [phone] and they will assist you.        Care EveryWhere ID     This is your Care EveryWhere ID. This could be used by other organizations to access your [facility] medical records  [id number]        Your Vitals Were     Pulse Temperature Respirations Height Pulse Oximetry Breastfeeding?    78 98  F (36.7  C) (Tympanic) 14 5' 9\" (1.753 m) 97% No    BMI (Body Mass Index)                   32.05 kg/m2            Blood Pressure from Last 3 Encounters:   12/06/18 128/88   11/10/18 147/75   09/24/18 108/74    Weight from Last 3 Encounters:   12/06/18 217 lb (98.4 kg)   11/10/18 210 lb (95.3 kg)   09/24/18 209 lb (94.8 kg)              Today, you had the following     No orders found for display         Today's Medication Changes        "   These changes are accurate as of 12/6/18  3:56 PM.  If you have any questions, ask your nurse or doctor.               Start taking these medicines.        Dose/Directions    doxycycline hyclate 100 MG capsule   Commonly known as:  VIBRAMYCIN   Used for:  Other chronic sinusitis, Acute sinusitis with symptoms > 10 days   Started by:  Tamika Fernandes NP        Dose:  100 mg   Take 1 capsule (100 mg) by mouth 2 times daily for 10 days   Quantity:  20 capsule   Refills:  0            Where to get your medicines      These medications were sent to [facility] [place], [place] - [address]  [address] [address]     Phone:  [phone]     doxycycline hyclate 100 MG capsule                Primary Care Provider Office Phone # Fax #    Nuzhat Burt APREYAD [phone] [address] [address]        Equal Access to Services     MERRILL PRESTON : Hadii aad ku hadasho Soayanna, waaxda luqadaha, qaybta kaalmada adeegyada, lillian stratton . So [facility] [phone].    ATENCIÓN: Si habla español, tiene a anderson disposición servicios gratuitos de asistencia lingüística. Karltodd al [phone].    We comply with applicable federal civil rights laws and [place] laws. We do not discriminate on the basis of race, color, national origin, age, disability, sex, sexual orientation, or gender identity.            Thank you!     Thank you for choosing [facility]  for your care. Our goal is always to provide you with excellent care. Hearing back from our patients is one way we can continue to improve our services. Please take a few minutes to complete the written survey that you may receive in the mail after your visit with us. Thank you!             Your Updated Medication List - Protect others around you: Learn how to safely use, store and throw away your medicines at www.disposemymeds.org.          This list is accurate as of  12/6/18  3:56 PM.  Always use your most recent med list.                   Brand Name Dispense Instructions for use Diagnosis    * albuterol (2.5 MG/3ML) 0.083% neb solution    PROVENTIL    25 vial    Take 1 vial (2.5 mg) by nebulization every 6 hours as needed for shortness of breath / dyspnea or wheezing    Asthma exacerbation       * albuterol 108 (90 Base) MCG/ACT inhaler    PROAIR HFA/PROVENTIL HFA/VENTOLIN HFA    1 Inhaler    Inhale 2 puffs into the lungs every 6 hours as needed for shortness of breath / dyspnea    Moderate persistent asthma without complication       celecoxib 100 MG capsule    celeBREX    60 capsule    Take 1 capsule (100 mg) by mouth 2 times daily as needed for moderate pain    Wrist tendonitis       doxycycline hyclate 100 MG capsule    VIBRAMYCIN    20 capsule    Take 1 capsule (100 mg) by mouth 2 times daily for 10 days    Other chronic sinusitis, Acute sinusitis with symptoms > 10 days       DULoxetine 60 MG capsule    CYMBALTA    90 capsule    Take 1 capsule (60 mg) by mouth every morning    ERIC (generalized anxiety disorder), Myalgia       fexofenadine 180 MG tablet    ALLEGRA    30 tablet    Take 1 tablet (180 mg) by mouth daily    Seasonal allergic rhinitis       fluticasone-salmeterol 250-50 MCG/DOSE inhaler    ADVAIR    3 Inhaler    Inhale 1 puff into the lungs 2 times daily    Moderate persistent asthma without complication       GLUCOSAMINE SULFATE PO      Take 1,000 mg by mouth 2 times daily        levothyroxine 125 MCG tablet    SYNTHROID/LEVOTHROID    90 tablet    TAKE 1 TABLET DAILY    Acquired hypothyroidism       MAGNESIUM OXIDE PO      Take 200 mg by mouth daily        MULTIVITAMIN ADULT PO      Take by mouth every morning        order for DME     1 Units    Equipment being ordered: Nebulizer    Asthma exacerbation       OVER-THE-COUNTER     1 Bottle    Place 1 drop into both eyes 3 times daily. Systane Ultra, Systane Balance, Blink Tears or Refresh Optive Artificial Tear     Dry eye syndrome       ranitidine 150 MG tablet    ZANTAC    180 tablet    TAKE 1 TABLET TWICE A DAY    Gastroesophageal reflux disease, esophagitis presence not specified       TYLENOL 325 MG tablet   Generic drug:  acetaminophen      Take 325-650 mg by mouth every 6 hours as needed for mild pain        VITAMIN B 12 PO      Take 500 mcg by mouth daily        VITAMIN D3 PO      Take 2,000 Units by mouth 2 times daily        * Notice:  This list has 2 medication(s) that are the same as other medications prescribed for you. Read the directions carefully, and ask your doctor or other care provider to review them with you.

## 2018-12-06 NOTE — PROGRESS NOTES
SUBJECTIVE:   Teresa Fernandez is a 58 year old female who presents to clinic today for the following health issues:    ENT Symptoms             Symptoms: cc Present Absent Comment   Fever/Chills   x Chills    Fatigue  x     Muscle Aches   x    Eye Irritation   x    Sneezing   x    Nasal Placido/Drg  x     Sinus Pressure/Pain  x     Loss of smell   x    Dental pain   x    Sore Throat   x    Swollen Glands   x    Ear Pain/Fullness   x    Cough x x  Productive    Wheeze   x    Chest Pain   x    Shortness of breath   x    Rash   x    Other   x      Symptom duration:  1-2 days    Symptom severity:  moderate    Treatments tried:  otc cough syrup    Contacts:  none      Came into [facility] 11/10 and diagnosed with viral upper respiratory infection and asthma exacerbation.  Given Prednisone - cough improved mildly.    Sinus symptoms never improved - and have now worsened.  Cough over the weekend has worsened.    Using over the counter Mucinex and Ibuprofen for pain and cough - helps some.    No fevers - reports chills recently.    Problem list and histories reviewed & adjusted, as indicated.  Additional history: as documented    Patient Active Problem List   Diagnosis     Acquired hypothyroidism     Allergic state     Chronic rhinitis     Allergic rhinitis due to pollen     Sinusitis, chronic     History of skin cancer     ERIC (generalized anxiety disorder)     Leukoplakia of oral mucosa, including tongue     Renal cancer (H)     GERD (gastroesophageal reflux disease)     Chronic low back pain     Dry eye syndrome     Chronic fatigue     Osteopenia     History of melanoma in situ     Eczema     Moderate persistent asthma without complication     History of kidney cancer     DDD (degenerative disc disease), lumbar     Hyperlipidemia LDL goal <160     Chest tightness or pressure     Muscle pain     Past Surgical History:   Procedure Laterality Date     CHOLECYSTECTOMY       COLONOSCOPY      2007-normal     ENT SURGERY  2-15-11     Left cheek bx- Mucositis.     FUSION LUMBAR ANTERIOR TWO LEVELS  4/13/2015     GYN SURGERY  04/08/1999    hysterectomy.  [facility]     HYSTERECTOMY, PAP NO LONGER INDICATED       SOFT TISSUE SURGERY      chest-melonoma     SURGICAL HISTORY OF -   3/1996    Left nephrectomy-renal cell CA       Social History   Substance Use Topics     Smoking status: Former Smoker     Packs/day: 2.00     Years: 15.00     Quit date: 3/26/1996     Smokeless tobacco: Never Used     Alcohol use No      Comment: None now.  Rare in past.      Family History   Problem Relation Age of Onset     Diabetes Mother      Cardiovascular Mother      Lipids Mother      Depression Mother      Hypertension Father      Lipids Father      Obesity Father      Macular Degeneration Father      Cancer Maternal Grandmother      kind unknown had sores on legs     Eczema Maternal Grandmother      Eczema Maternal Grandfather      Breast Cancer Paternal Grandmother      Cancer Paternal Grandmother      breast     Hypertension Paternal Grandfather      Cardiovascular Paternal Grandfather      heart attack     Hypertension Brother      Thyroid Disease Sister      Hypertension Sister      Depression Sister      Allergies Sister      Allergies Son      Eczema Son      Lipids Sister      Thyroid Disease Sister      Neurologic Disorder Sister      Depression Sister      Respiratory Son      Glaucoma No family hx of      Cerebrovascular Disease No family hx of          Current Outpatient Prescriptions   Medication Sig Dispense Refill     acetaminophen (TYLENOL) 325 MG tablet Take 325-650 mg by mouth every 6 hours as needed for mild pain       albuterol (2.5 MG/3ML) 0.083% nebulizer solution Take 1 vial (2.5 mg) by nebulization every 6 hours as needed for shortness of breath / dyspnea or wheezing 25 vial 0     albuterol (PROAIR HFA/PROVENTIL HFA/VENTOLIN HFA) 108 (90 BASE) MCG/ACT Inhaler Inhale 2 puffs into the lungs every 6 hours as needed for shortness of breath / dyspnea  1 Inhaler 5     celecoxib (CELEBREX) 100 MG capsule Take 1 capsule (100 mg) by mouth 2 times daily as needed for moderate pain 60 capsule 3     Cholecalciferol (VITAMIN D3 PO) Take 2,000 Units by mouth 2 times daily        Cyanocobalamin (VITAMIN B 12 PO) Take 500 mcg by mouth daily       DULoxetine (CYMBALTA) 60 MG EC capsule Take 1 capsule (60 mg) by mouth every morning 90 capsule 0     fexofenadine (ALLEGRA) 180 MG tablet Take 1 tablet (180 mg) by mouth daily 30 tablet 1     fluticasone-salmeterol (ADVAIR) 250-50 MCG/DOSE diskus inhaler Inhale 1 puff into the lungs 2 times daily 3 Inhaler 3     GLUCOSAMINE SULFATE PO Take 1,000 mg by mouth 2 times daily       levothyroxine (SYNTHROID/LEVOTHROID) 125 MCG tablet TAKE 1 TABLET DAILY 90 tablet 1     MAGNESIUM OXIDE PO Take 200 mg by mouth daily       Multiple Vitamins-Minerals (MULTIVITAMIN ADULT PO) Take by mouth every morning        order for DME Equipment being ordered: Nebulizer 1 Units 0     OVER-THE-COUNTER Place 1 drop into both eyes 3 times daily. Systane Ultra, Systane Balance, Blink Tears or Refresh Optive Artificial Tear 1 Bottle      ranitidine (ZANTAC) 150 MG tablet TAKE 1 TABLET TWICE A  tablet 3     Allergies   Allergen Reactions     Omnipaque [Iohexol] Swelling and Difficulty breathing     Immediate throat swelling following around 1-2cc of omnipaque 300 for spine procedure     Gluten      Iodine      Welts from CT contrast, surgical scrub is ok.     Penicillins Hives     Recent Labs   Lab Test  09/10/18   1528  08/02/18   1547  09/15/17   1504  09/08/17   1555  06/30/17   0608   05/02/16   1601  04/29/16   0737   01/29/15   0656  11/14/14   0911   A1C   --    --    --    --    --    --    --    --    --    --   5.3   LDL   --    --    --    --   143*   --    --   138*   --   141*   --    HDL   --    --    --    --   56   --    --   58   --   50*   --    TRIG   --    --    --    --   111   --    --   122   --   131   --    ALT   --    --   48   "57*   --    --   42   --    < >   --    --    CR   --   0.85  0.84  0.94   --    --   0.76   --    < >  1.02   --    GFRESTIMATED   --   69  70  62   --    --   80   --    < >  56*   --    GFRESTBLACK   --   84  84  75   --    --   >90   GFR Calc     --    < >  68   --    POTASSIUM   --   4.1  4.0  4.2   --    --   4.0   --    < >   --    --    TSH  0.29*  0.33*   --   0.68   --    < >   --    --    < >  3.42  13.20*    < > = values in this interval not displayed.      BP Readings from Last 3 Encounters:   12/06/18 128/88   11/10/18 147/75   09/24/18 108/74    Wt Readings from Last 3 Encounters:   12/06/18 217 lb (98.4 kg)   11/10/18 210 lb (95.3 kg)   09/24/18 209 lb (94.8 kg)                  Labs reviewed in EPIC    Reviewed and updated as needed this visit by clinical staff       Reviewed and updated as needed this visit by Provider         ROS:  Constitutional, HEENT, cardiovascular, pulmonary, GI, , musculoskeletal, neuro, skin, endocrine and psych systems are negative, except as otherwise noted.    OBJECTIVE:     /88  Pulse 78  Temp 98  F (36.7  C) (Tympanic)  Resp 14  Ht 5' 9\" (1.753 m)  Wt 217 lb (98.4 kg)  SpO2 97%  Breastfeeding? No  BMI 32.05 kg/m2  Body mass index is 32.05 kg/(m^2).  GENERAL: healthy, alert and no distress  HENT: normal cephalic/atraumatic, right ear: clear effusion, left ear: clear effusion, nose and mouth without ulcers or lesions, nasal mucosa edematous , oropharynx clear, oral mucous membranes moist and sinuses: maxillary, frontal tenderness on bilateral  NECK: no adenopathy, no asymmetry, masses, or scars and thyroid normal to palpation  RESP: lungs clear to auscultation - no rales, rhonchi or wheezes; productive cough, no dyspnea  CV: regular rate and rhythm, normal S1 S2, no S3 or S4, no murmur, click or rub, no peripheral edema and peripheral pulses strong  ABDOMEN: soft, nontender, no hepatosplenomegaly, no masses and bowel sounds normal  MS: no " gross musculoskeletal defects noted, no edema    Diagnostic Test Results:  No results found for this or any previous visit (from the past 24 hour(s)).    ASSESSMENT/PLAN:     1. Other chronic sinusitis   The risks, benefits and treatment options of prescribed medications or other treatments have been discussed with the patient. The patient verbalized their understanding and should call or follow up if no improvement or if they develop further problems.        - doxycycline hyclate (VIBRAMYCIN) 100 MG capsule; Take 1 capsule (100 mg) by mouth 2 times daily for 10 days  Dispense: 20 capsule; Refill: 0    2. Acute sinusitis with symptoms > 10 days     - doxycycline hyclate (VIBRAMYCIN) 100 MG capsule; Take 1 capsule (100 mg) by mouth 2 times daily for 10 days  Dispense: 20 capsule; Refill: 0    3. Moderate persistent asthma without complication  Stable - Albuterol at home if needed.      See Patient Instructions    Tamika Fernandes NP  [facility]

## 2018-12-06 NOTE — PATIENT INSTRUCTIONS
Prescription written for antibiotics to be taken twice daily for 10 days.  Advised to take entire course of antibiotic despite improvement in symptoms.    For cough, dextromethorphan/guaifenesin combinations help loosen secretions and suppress cough safely without significant risk of sedation. Often 2 puffs four times daily of an albuterol inhaler will help with bronchitis.  This is a prescription medicine.    For nasal congestion and sinus pressure, pseudoephedrine (Sudafed) or phenylephrine is often helpful but it can cause elevations in blood pressure and insomnia.  Short courses of a nasal decongestant spray (Afrin or Neosinephrine) can be appropriate but their use should be restricted to 3 days due to the high risk of nasal addiction.  May use Coricidin as decongestant if history of hypertension.    For pain and fevers, acetaminophen (Tylenol) is most appropriate.  Ibuprofen (Advil) or naproxen (Aleve) are useful too and last longer but they can cause elevation of blood pressure or stomach problems.    Sometime the cause of your sinusitis is from allergies.  You may want to try taking a daily antihistamines such as Claritin, Zyrtec, or Allegra-all over the counter medications.    Return to the clinic if symptoms not improved or worsened after treatment complete.    SUNG Ferreira

## 2018-12-26 ENCOUNTER — OFFICE VISIT (OUTPATIENT)
Dept: FAMILY MEDICINE | Facility: CLINIC | Age: 58
End: 2018-12-26
Payer: OTHER GOVERNMENT

## 2018-12-26 ENCOUNTER — TELEPHONE (OUTPATIENT)
Dept: FAMILY MEDICINE | Facility: CLINIC | Age: 58
End: 2018-12-26

## 2018-12-26 VITALS
BODY MASS INDEX: 32.34 KG/M2 | SYSTOLIC BLOOD PRESSURE: 122 MMHG | RESPIRATION RATE: 12 BRPM | DIASTOLIC BLOOD PRESSURE: 74 MMHG | WEIGHT: 219 LBS | OXYGEN SATURATION: 95 % | TEMPERATURE: 98.3 F | HEART RATE: 88 BPM

## 2018-12-26 DIAGNOSIS — G89.29 CHRONIC SCAPULAR PAIN: Primary | ICD-10-CM

## 2018-12-26 DIAGNOSIS — M89.8X1 PAIN IN SCAPULA: Primary | ICD-10-CM

## 2018-12-26 DIAGNOSIS — M89.8X1 CHRONIC SCAPULAR PAIN: Primary | ICD-10-CM

## 2018-12-26 PROCEDURE — 99214 OFFICE O/P EST MOD 30 MIN: CPT | Performed by: NURSE PRACTITIONER

## 2018-12-26 NOTE — PROGRESS NOTES
"  SUBJECTIVE:   Teresa Fernandez is a 58 year old female who presents to clinic today for the following health issues:    Chief Complaint   Patient presents with     Shoulder Pain     x 1 year, left shoulder occasional sharp/shooting pain. Had MRI 11/2017       Joint Pain    Onset: x 1 year, had MRI at [facility] 11/2017    Description:   Location: left shoulder  Character: Sharp w/ specific movement and Dull ache - constantly     Intensity: 4/10, 10/10 at worse w/ movement or lifting     Progression of Symptoms: worse    Accompanying Signs & Symptoms:  Other symptoms: radiation of pain to left arm and weakness of arm     History:   Previous similar pain: YES      Precipitating factors:   Trauma or overuse: YES    Alleviating factors:  Improved by: rest/inactivity    Therapies Tried and outcome: heat did not help, Tylenol helps a bit    Bilateral scapular pain- left > right. Occasionally pain radiates down arm- no weakness in arm- feels a \"catching sensation/feels stuck on scapular area\".     Patient previously evaluated by Neurology in 7/2018 for elevated CK level and myalgia in upper and mid back- refer to notes for complete details.    11/17: MRI C spine showed advanced degenerative changes, particularly at the right C8 level.      -------------------------------------    Problem list and histories reviewed & adjusted, as indicated.  Additional history: as documented    Patient Active Problem List   Diagnosis     Acquired hypothyroidism     Allergic state     Chronic rhinitis     Allergic rhinitis due to pollen     Sinusitis, chronic     History of skin cancer     ERIC (generalized anxiety disorder)     Leukoplakia of oral mucosa, including tongue     Renal cancer (H)     GERD (gastroesophageal reflux disease)     Chronic low back pain     Dry eye syndrome     Chronic fatigue     Osteopenia     History of melanoma in situ     Eczema     Moderate persistent asthma without complication     History of kidney cancer     " DDD (degenerative disc disease), lumbar     Hyperlipidemia LDL goal <160     Chest tightness or pressure     Muscle pain     Past Surgical History:   Procedure Laterality Date     CHOLECYSTECTOMY       COLONOSCOPY      -normal     ENT SURGERY  2-15-11    Left cheek bx- Mucositis.     FUSION LUMBAR ANTERIOR TWO LEVELS  2015     GYN SURGERY  1999    hysterectomy.  [facility]     HYSTERECTOMY, PAP NO LONGER INDICATED       SOFT TISSUE SURGERY      chest-melonoma     SURGICAL HISTORY OF -   3/1996    Left nephrectomy-renal cell CA       Social History     Tobacco Use     Smoking status: Former Smoker     Packs/day: 2.00     Years: 15.00     Pack years: 30.00     Last attempt to quit: 3/26/1996     Years since quittin.7     Smokeless tobacco: Never Used   Substance Use Topics     Alcohol use: No     Comment: None now.  Rare in past.      Family History   Problem Relation Age of Onset     Diabetes Mother      Cardiovascular Mother      Lipids Mother      Depression Mother      Hypertension Father      Lipids Father      Obesity Father      Macular Degeneration Father      Cancer Maternal Grandmother         kind unknown had sores on legs     Eczema Maternal Grandmother      Eczema Maternal Grandfather      Breast Cancer Paternal Grandmother      Cancer Paternal Grandmother         breast     Hypertension Paternal Grandfather      Cardiovascular Paternal Grandfather         heart attack     Hypertension Brother      Thyroid Disease Sister      Hypertension Sister      Depression Sister      Allergies Sister      Allergies Son      Eczema Son      Lipids Sister      Thyroid Disease Sister      Neurologic Disorder Sister      Depression Sister      Respiratory Son      Glaucoma No family hx of      Cerebrovascular Disease No family hx of            Reviewed and updated as needed this visit by clinical staff  Tobacco  Allergies  Meds  Med Hx  Surg Hx  Fam Hx  Soc Hx      Reviewed and updated as needed this  visit by Provider         ROS:  Constitutional, HEENT, cardiovascular, pulmonary, GI, , musculoskeletal, neuro, skin, endocrine and psych systems are negative, except as otherwise noted.    OBJECTIVE:     /74 (BP Location: Left arm, Patient Position: Chair, Cuff Size: Adult Large)   Pulse 88   Temp 98.3  F (36.8  C) (Tympanic)   Resp 12   Wt 99.3 kg (219 lb)   SpO2 95%   BMI 32.34 kg/m    Body mass index is 32.34 kg/m .  GENERAL APPEARANCE: healthy, alert and no distress  RESP: lungs clear to auscultation - no rales, rhonchi or wheezes  CV: regular rates and rhythm, normal S1 S2, no S3 or S4 and no murmur, click or rub  ORTHO:   SHOULDER Exam-Left   Inspection: no swelling, no bruising, no discoloration, no obvious deformity, no asymmetry, no glenohumeral joint anterior bulge, no distal clavicle elevation, no muscle atrophy, no scapular winging   Tenderness of: SC joint- no , clavicle(prox-mid)- no , clavicle-(mid-distal)- no , AC joint- no , acromion- no , anterior capsule- no , prox bicep tendon- no , greater tuberosity- no , prox humerus- no , supraspinatous- no , infraspinatous- no , superior trapezious- YES, rhomboids- YES   Range of Motion: Active- forward flexion- normal, abduction- normal, external rotation- normal, internal rotation- normal  Range of Motion: Passive- limited due to pain.   Strength:              Diagnostic Test Results:  none     ASSESSMENT/PLAN:         1. Pain in scapula  Patient with history of ongoing myalgia- previously saw neurology and had MRI cervical spine- ? Nerve Impingement   - [facility]  - SPORTS MEDICINE REFERRAL    > 25 min spent in direct face to face time with this patient, greater than 50% in counseling and coordination of care discussing scapular pain, MRI review, neurology review.      HERIBERTO Delgado [facility]

## 2018-12-26 NOTE — TELEPHONE ENCOUNTER
Reason for Call: Request for an order or referral:    Order or referral being requested: the referall for the pain clinic was placed for [facility] and pt would like to come to [facility] for this, the referall needs to be changed pleasew    Date needed: as soon as possible    Has the patient been seen by the PCP for this problem? YES    Additional comments:     Phone number Patient can be reached at:  Home number on file [phone] (home)    Best Time:  any    Can we leave a detailed message on this number?  YES    Call taken on 12/26/2018 at 11:01 AM by Daria Cheatham

## 2018-12-26 NOTE — PATIENT INSTRUCTIONS
Thank you for choosing [facility] [place].  You may be receiving a survey in the mail from Sharon Barragan regarding your visit today.  Please take a few minutes to complete and return the survey to let us know how we are doing.      If you have questions or concerns, please contact us via Fineline or you can contact your care team at [phone].    Our Clinic hours are:  Monday 6:40 am  to 7:00 pm  Tuesday -Friday 6:40 am to 5:00 pm    The [place] outpatient lab hours are:  Monday - Friday 6:10 am to 4:45 pm  Saturdays 7:00 am to 11:00 am  Appointments are required, call [phone]    If you have clinical questions after hours or would like to schedule an appointment,  call the clinic at [phone].

## 2018-12-26 NOTE — TELEPHONE ENCOUNTER
Patient requests pain management referral for [facility], referral pended for review and signature.     JACQUELINE MunozN, RN

## 2018-12-27 ENCOUNTER — TELEPHONE (OUTPATIENT)
Dept: PALLIATIVE MEDICINE | Facility: CLINIC | Age: 58
End: 2018-12-27

## 2018-12-27 NOTE — TELEPHONE ENCOUNTER
Order received from Nuzhat Burt for a TBD Injection --     Reason for Referral: Procedure Order TBD by pain provider - Injection to be determined by Pain Management Specialist      What is your diagnosis for the patient's pain? Scapular pain        Please review and advise for scheduling        Milena Aceves    [facility]

## 2018-12-28 ENCOUNTER — OFFICE VISIT (OUTPATIENT)
Dept: PALLIATIVE MEDICINE | Facility: CLINIC | Age: 58
End: 2018-12-28
Payer: OTHER GOVERNMENT

## 2018-12-28 DIAGNOSIS — M79.18 MYOFASCIAL MUSCLE PAIN: Primary | ICD-10-CM

## 2018-12-28 PROCEDURE — 99204 OFFICE O/P NEW MOD 45 MIN: CPT | Performed by: PAIN MEDICINE

## 2018-12-28 NOTE — TELEPHONE ENCOUNTER
Routing to  to contact patient for scheduling per below.     Scheduling Instructions     Interventional Evaluation:    Interventional Injection Only - Type of Injection: TPI Radiology? Need US     Tamika Britt  BSN, RN Care Coordinator  [facility]

## 2018-12-28 NOTE — PROGRESS NOTES
[facility] Consultation    Date of visit: 12/28/2018    Reason for consultation:    Primary Care Provider is Nuzhat Burt.  Pain medications are being prescribed by n/a.    Please see the [facility] health questionnaire which the patient completed and reviewed with me in detail.    Chief Complaint:    Chief Complaint   Patient presents with     Eval/Assessment     MME prescribed prior to seeing patient:  Current MME:    Pain history:  Teresa Fernandez is a 58 year old female who first started having problems with pain approx 1 yr ago. The pt denies any specific inciting of the pain is specifically anterior to her left scapula.  The pain is sharp.  The pain is like being stabbed.  The pain is like a severe toothache.  The pain is  intermittent.  The pain is worse with lifting.  The pain is worse with bringing her arms forward and backwards.  She denies any radiation to her upper extremity.  She denies any numbness burning tingling.  The pain is worse with cold.  The pain is slightly better with heat.  The pain is slightly better with Biofreeze.  She denies any overt progressive weakness.  The pain greatly affects her ADLs.    Currently the patient takes acetaminophen approx 6 tabs a day of 500mg, cymbalta  She is taking Celebrex in the past with minimal benefit.  Of note patient has one kidney.  Additionally patient has a history to iodine.    Pain rating: intensity  Averages 6/10 on a 0-10 scale.      Current treatments include:  Prn acetaminophen, celebrex  Previous medication treatments included:  n/a  Other treatments have included:  Teresa Fernandez has not been seen at a pain clinic in the past.    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     Depressive disorder, not elsewhere classified      Eczema 10/17/2012     Environmental and seasonal allergies      GERD (gastroesophageal reflux disease)      Gluten intolerance      Malignant neoplasm of renal pelvis (H) 1995    Renal cell carcinoma     Melanoma (H) 06/2010     Other specified acquired hypothyroidism 4/12/2005     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     FUSION LUMBAR ANTERIOR TWO LEVELS  4/13/2015     GYN SURGERY  04/08/1999    hysterectomy.  [facility]     HYSTERECTOMY, PAP NO LONGER INDICATED       SOFT TISSUE SURGERY      chest-melonoma     SURGICAL HISTORY OF -   3/1996    Left nephrectomy-renal cell CA     Medications:  Current Outpatient Medications   Medication Sig Dispense Refill     acetaminophen (TYLENOL) 325 MG tablet Take 325-650 mg by mouth every 6 hours as needed for mild pain       albuterol (2.5 MG/3ML) 0.083% nebulizer solution Take 1 vial (2.5 mg) by nebulization every 6 hours as needed for shortness of breath / dyspnea or wheezing 25 vial 0     albuterol (PROAIR HFA/PROVENTIL HFA/VENTOLIN HFA) 108 (90 BASE) MCG/ACT Inhaler Inhale 2 puffs into the lungs every 6 hours as needed for shortness of breath / dyspnea 1 Inhaler 5     celecoxib (CELEBREX) 100 MG capsule Take 1 capsule (100 mg) by mouth 2 times daily as needed for moderate pain 60 capsule 3     Cholecalciferol (VITAMIN D3 PO) Take 2,000 Units by mouth 2 times daily        Cyanocobalamin (VITAMIN B 12 PO) Take 500 mcg by mouth daily       DULoxetine (CYMBALTA) 60 MG EC capsule Take 1 capsule (60 mg) by mouth every morning 90 capsule 0     fexofenadine (ALLEGRA) 180 MG tablet Take 1 tablet (180 mg) by mouth daily 30 tablet 1      fluticasone-salmeterol (ADVAIR) 250-50 MCG/DOSE diskus inhaler Inhale 1 puff into the lungs 2 times daily 3 Inhaler 3     GLUCOSAMINE SULFATE PO Take 1,000 mg by mouth 2 times daily       levothyroxine (SYNTHROID/LEVOTHROID) 125 MCG tablet TAKE 1 TABLET DAILY 90 tablet 1     MAGNESIUM OXIDE PO Take 200 mg by mouth daily       Multiple Vitamins-Minerals (MULTIVITAMIN ADULT PO) Take by mouth every morning        order for DME Equipment being ordered: Nebulizer 1 Units 0     OVER-THE-COUNTER Place 1 drop into both eyes 3 times daily. Systane Ultra, Systane Balance, Blink Tears or Refresh Optive Artificial Tear 1 Bottle      ranitidine (ZANTAC) 150 MG tablet TAKE 1 TABLET TWICE A  tablet 3     Allergies:     Allergies   Allergen Reactions     Omnipaque [Iohexol] Swelling and Difficulty breathing     Immediate throat swelling following around 1-2cc of omnipaque 300 for spine procedure     Gluten      Iodine      Welts from CT contrast, surgical scrub is ok.     Penicillins Hives     Social History:  Home situation:   Tobacco use: no  Alcohol use: no  Drug use: no  History of chemical dependency treatment: no    Family history:  Family History   Problem Relation Age of Onset     Diabetes Mother      Cardiovascular Mother      Lipids Mother      Depression Mother      Hypertension Father      Lipids Father      Obesity Father      Macular Degeneration Father      Cancer Maternal Grandmother         kind unknown had sores on legs     Eczema Maternal Grandmother      Eczema Maternal Grandfather      Breast Cancer Paternal Grandmother      Cancer Paternal Grandmother         breast     Hypertension Paternal Grandfather      Cardiovascular Paternal Grandfather         heart attack     Hypertension Brother      Thyroid Disease Sister      Hypertension Sister      Depression Sister      Allergies Sister      Allergies Son      Eczema Son      Lipids Sister      Thyroid Disease Sister      Neurologic Disorder Sister       Depression Sister      Respiratory Son      Glaucoma No family hx of      Cerebrovascular Disease No family hx of      Family history of headaches: no    Review of Systems:  Skin: negative  Eyes: negative  Ears/Nose/Throat: negative  Respiratory: No shortness of breath, dyspnea on exertion, cough, or hemoptysis  Cardiovascular: negative  Gastrointestinal: negative  Genitourinary: negative  Musculoskeletal: negative  Neurologic: negative  Psychiatric: negative  Hematologic/Lymphatic/Immunologic: negative  Endocrine: negative    Physical Exam:  There were no vitals filed for this visit.  Exam:  Constitutional: healthy, alert and no distress  Head: normocephalic. Atraumatic.   Eyes: no redness or jaundice noted   ENT: oropharnx normal.  MMM.  Neck supple.    Cardiovascular: Negative JVD  Respiratory: Speaking in full sentences no accessory muscles use   gastrointestinal: soft, non-tender,  Skin: no suspicious lesions or rashes  Psychiatric: mentation appears normal and affect normal/bright    Musculoskeletal exam:  Gait/Station/Posture: wnl  Cervical spine: ROMwnl       Thoracic spine: Positive TTP left scapula reproduces significant portion of her pain.  Additionally felt muscle spasm.  No scapular winging, good range of motion.  Lumbar spine:     ROM: wnl       Neurologic exam:  CN:  Cranial nerves 2-12 are normal  Motor:  5/5 UE   Reflexes:     Biceps:     +2   Brachioradialis   +2     Triceps:  +2       Sensory:  (upper and lower extremities):   Light touch: normal    Allodynia: absent    Dysethesia: absent    Hyperalgesia: absent     Diagnostic tests:      Assessment/Plan:  Teresa Fernandez is a 58 year old female who presents with the complaints of left thoracic pain anterior to her scapula.   Teresa was seen today for eval/assessment.    Diagnoses and all orders for this visit:    Myofascial muscle pain  -     PAIN INJECTION EVAL/TREAT/FOLLOW UP         - Further procedures recommended:   - Will order Left  thoracic trigger point injection with ultrasound   - Medication Management: will differ to PCP   - - would trial zanaflex 2mg twice a day as needed. Start with Pm dose for 3 days before adding am dose as tolerated    - would continue as needed tylenol   - Follow up: For procedure               Total time spent was 30 minutes, and more than 50% of face to face time was spent counseling and/or coordination of care regarding principles of multidisciplinary care and medication management left thoracic plane anterior to her scapula    Valente Kidd MD  [facility]

## 2018-12-28 NOTE — TELEPHONE ENCOUNTER
" Routing to interventional pool for recommendation.   Interventional eval vs sports med referral or is there and injection that could be beneficial?    Nursing to call to discuss any recommendations and possible referral for eval and management as per chart review patient has ongoing multifocal pain.     Per 12/26/18 referring provider note:     Shoulder Pain        x 1 year, left shoulder occasional sharp/shooting pain. Had MRI 11/2017   Other symptoms: radiation of pain to left arm and weakness of arm     Bilateral scapular pain- left > right. Occasionally pain radiates down arm- no weakness in arm- feels a \"catching sensation/feels stuck on scapular area\".      11/17: MRI C spine showed advanced degenerative changes, particularly at the right C8 level.    Patient previously evaluated by Neurology in 7/2018 for elevated CK level and myalgia in upper and mid back- refer to notes for complete details    Tenderness of: SC joint- no , clavicle(prox-mid)- no , clavicle-(mid-distal)- no , AC joint- no , acromion- no , anterior capsule- no , prox bicep tendon- no , greater tuberosity- no , prox humerus- no , supraspinatous- no , infraspinatous- no , superior trapezious- YES, rhomboids- YES     1. Pain in scapula  Patient with history of ongoing myalgia- previously saw neurology and had MRI cervical spine- ? Nerve Impingement vs  - [facility]  - SPORTS MEDICINE REFERRAL       Per neurology note 07/17/18:  Teresa Fernandez is a 58 year old woman with chronic multifocal pain. May be related to arthritis or perhaps fall on the fibromyalgia spectrum.  I advised her to discuss the more with her PCP.   "

## 2018-12-28 NOTE — TELEPHONE ENCOUNTER
I just saw the patient in clinic.  Scheduled for thoracic TPI.  Would hold off on further evaluation pending results of procedure.

## 2018-12-28 NOTE — PATIENT INSTRUCTIONS
- Further procedures recommended:   - Will order Left thoracic trigger point injection with ultrasound   - Medication Management: will differ to PCP   - - would trial zanaflex 2mg twice a day as needed. Start with Pm dose for 3 days before adding am dose as tolerated    - would continue as needed tylenol   - Follow up: For procedure     ----------------------------------------------------------------  Clinic Number:  [phone]   Call this number with any questions about your care and for scheduling assistance. Calls are returned Monday through Friday between 8 AM and 4:30 PM. We usually get back to you within 2 business days depending on the issue/request.       Medication refills:    For non-narcotic medications, call your pharmacy directly to request a refill. The pharmacy will contact the [facility] Management Center for authorization. Please allow 3-4 days for these refills to be processed.     For narcotic refills, call the clinic number or send a [employer] message. Please contact us 7-10 days before your refill is due. The message MUST include the name of the specific medication(s) requested and how you would like to receive the prescription(s). The options are as follows:    Pain Clinic staff can mail the prescription to your pharmacy. Please tell us the name of the pharmacy.    You may pick the prescription up at the Pain Clinic (tell us the location) or during a clinic visit with your pain provider    Pain Clinic staff can deliver the prescription to the [place] pharmacy in the clinic building. Please tell us the location.      We believe regular attendance is key to your success in our program.    Any time you are unable to keep your appointment we ask that you call us at least 24 hours in advance to let us know. This will allow us to offer the appointment time to another patient.

## 2018-12-30 DIAGNOSIS — F41.1 GAD (GENERALIZED ANXIETY DISORDER): ICD-10-CM

## 2018-12-30 DIAGNOSIS — M79.10 MYALGIA: ICD-10-CM

## 2018-12-31 NOTE — TELEPHONE ENCOUNTER
"Requested Prescriptions   Pending Prescriptions Disp Refills     DULoxetine (CYMBALTA) 60 MG capsule [Pharmacy Med Name: DULOXETINE HCL DR CAPS 60MG]  Last Written Prescription Date:  10/19/2018  Last Fill Quantity: 90,  # refills: 0   Last office visit: 12/26/2018 with prescribing provider:  Javed  Future Office Visit:     90 capsule 0     Sig: TAKE 1 CAPSULE EVERY MORNING    Serotonin-Norepinephrine Reuptake Inhibitors  Passed - 12/30/2018  5:16 AM       Passed - Blood pressure under 140/90 in past 12 months    BP Readings from Last 3 Encounters:   12/26/18 122/74   12/06/18 128/88   11/10/18 147/75                Passed - Recent (12 mo) or future (30 days) visit within the authorizing provider's specialty    Patient had office visit in the last 12 months or has a visit in the next 30 days with authorizing provider or within the authorizing provider's specialty.  See \"Patient Info\" tab in inbasket, or \"Choose Columns\" in Meds & Orders section of the refill encounter.             Passed - Patient is age 18 or older       Passed - No active pregnancy on record       Passed - No positive pregnancy test in past 12 months          "

## 2019-01-02 RX ORDER — DULOXETIN HYDROCHLORIDE 60 MG/1
CAPSULE, DELAYED RELEASE ORAL
Qty: 90 CAPSULE | Refills: 0 | Status: SHIPPED | OUTPATIENT
Start: 2019-01-02 | End: 2019-04-02

## 2019-01-02 NOTE — TELEPHONE ENCOUNTER
Routing refill request to provider for review/approval because:  Patient recently seen in clinic.    Cece Moscoso RN

## 2019-01-03 ENCOUNTER — OFFICE VISIT (OUTPATIENT)
Dept: PALLIATIVE MEDICINE | Facility: CLINIC | Age: 59
End: 2019-01-03
Attending: PAIN MEDICINE
Payer: OTHER GOVERNMENT

## 2019-01-03 VITALS — SYSTOLIC BLOOD PRESSURE: 122 MMHG | DIASTOLIC BLOOD PRESSURE: 77 MMHG | HEART RATE: 89 BPM

## 2019-01-03 DIAGNOSIS — M79.18 MYOFASCIAL MUSCLE PAIN: Primary | ICD-10-CM

## 2019-01-03 PROCEDURE — 20552 NJX 1/MLT TRIGGER POINT 1/2: CPT | Performed by: PAIN MEDICINE

## 2019-01-03 ASSESSMENT — PAIN SCALES - GENERAL: PAINLEVEL: SEVERE PAIN (7)

## 2019-01-03 NOTE — TELEPHONE ENCOUNTER
Pt arrived at appt. Closing    Tamika PHILLIPSN, RN Care Coordinator  [facility]

## 2019-01-03 NOTE — PATIENT INSTRUCTIONS
[facility]   Post Procedure Instructions    Today you had:  trigger point injections      Medications used:  lidocaine   bupivicaine   kenolog   dexamethasone          Go to the emergency room if you develop any shortness of breath    Monitor the injection sites for signs and symptoms of infection-fever, chills, redness, swelling, warmth, or drainage to areas.    You may have soreness at injection sites for up to 24 hours.    You may apply ice to the painful areas to help minimize the discomfort of the needle pokes.    Do not apply heat to sites for at least 12 hours.    You may use anti-inflammatory medications or Tylenol for pain control if necessary    Pain Clinic phone number during work hours Monday-Friday:  [phone]    After hours provider line: [phone]

## 2019-01-03 NOTE — PROGRESS NOTES
Teresa was seen today for pain.    Diagnoses and all orders for this visit:    Myofascial muscle pain        Trigger points were identified by patient, and marked when appropriate.  The area was prepped with Chloroprep.    Using clean technique, injections were completed using a 25G, 3.5 inch needle.  After negative aspiration, injection was completed.  A total of 5 locations were injected.  When possible, tissue was retracted from the chest wall to avoid lung injury.    Muscle groups injected:rhomboids, ext/internal intercostal     Injection solution contained:  5ml of 1% lidocaine, 5ml of 0.5% bupivacaine, and 40mg of Depomedrol.    Breath sounds were normal.         Valente Kidd MD  [facility]

## 2019-01-07 ENCOUNTER — MYC MEDICAL ADVICE (OUTPATIENT)
Dept: FAMILY MEDICINE | Facility: CLINIC | Age: 59
End: 2019-01-07

## 2019-01-07 NOTE — TELEPHONE ENCOUNTER
Clare,    Please see patient's MyChart. She was seen 12/28/18 by Dr. Kidd who recommends:    - Medication Management: will differ to PCP              - - would trial zanaflex 2mg twice a day as needed. Start with Pm dose for 3 days before adding      JACQUELINE MunozN, RN

## 2019-01-08 DIAGNOSIS — M79.18 MYOFASCIAL MUSCLE PAIN: Primary | ICD-10-CM

## 2019-01-08 RX ORDER — TIZANIDINE 2 MG/1
TABLET ORAL
Qty: 60 TABLET | Refills: 1 | Status: SHIPPED | OUTPATIENT
Start: 2019-01-08 | End: 2020-03-06

## 2019-01-14 ENCOUNTER — OFFICE VISIT (OUTPATIENT)
Dept: PSYCHOLOGY | Facility: CLINIC | Age: 59
End: 2019-01-14
Payer: OTHER GOVERNMENT

## 2019-01-14 DIAGNOSIS — F33.0 MAJOR DEPRESSIVE DISORDER, RECURRENT, MILD (H): Primary | ICD-10-CM

## 2019-01-14 PROCEDURE — 90834 PSYTX W PT 45 MINUTES: CPT | Performed by: PSYCHOLOGIST

## 2019-01-15 ENCOUNTER — OFFICE VISIT (OUTPATIENT)
Dept: ORTHOPEDICS | Facility: CLINIC | Age: 59
End: 2019-01-15
Payer: OTHER GOVERNMENT

## 2019-01-15 ENCOUNTER — ANCILLARY PROCEDURE (OUTPATIENT)
Dept: GENERAL RADIOLOGY | Facility: CLINIC | Age: 59
End: 2019-01-15
Payer: OTHER GOVERNMENT

## 2019-01-15 VITALS
BODY MASS INDEX: 32.44 KG/M2 | HEIGHT: 69 IN | DIASTOLIC BLOOD PRESSURE: 76 MMHG | SYSTOLIC BLOOD PRESSURE: 119 MMHG | WEIGHT: 219 LBS

## 2019-01-15 DIAGNOSIS — G89.29 CHRONIC PERISCAPULAR PAIN ON LEFT SIDE: ICD-10-CM

## 2019-01-15 DIAGNOSIS — M50.30 DDD (DEGENERATIVE DISC DISEASE), CERVICAL: ICD-10-CM

## 2019-01-15 DIAGNOSIS — G56.80 SCAPULOTHORACIC SYNDROME: ICD-10-CM

## 2019-01-15 DIAGNOSIS — M25.512 CHRONIC PERISCAPULAR PAIN ON LEFT SIDE: ICD-10-CM

## 2019-01-15 DIAGNOSIS — G54.0 THORACIC OUTLET SYNDROME: Primary | ICD-10-CM

## 2019-01-15 DIAGNOSIS — R29.898 LEFT ARM WEAKNESS: ICD-10-CM

## 2019-01-15 PROCEDURE — 99243 OFF/OP CNSLTJ NEW/EST LOW 30: CPT | Performed by: FAMILY MEDICINE

## 2019-01-15 PROCEDURE — 73030 X-RAY EXAM OF SHOULDER: CPT | Mod: LT

## 2019-01-15 RX ORDER — PREDNISONE 20 MG/1
40 TABLET ORAL DAILY
Qty: 10 TABLET | Refills: 0 | Status: SHIPPED | OUTPATIENT
Start: 2019-01-15 | End: 2019-02-21

## 2019-01-15 ASSESSMENT — MIFFLIN-ST. JEOR: SCORE: 1642.76

## 2019-01-15 NOTE — PATIENT INSTRUCTIONS
Assessment:  1. Thoracic outlet syndrome    2. Chronic periscapular pain on left side    3. Left arm weakness    4. Scapulothoracic syndrome    5. DDD (degenerative disc disease), cervical        Plan:  Discussed the assessment with the patient.  Prescription Medication as directed: Prednisone  Rehab: [phone], Manny Barahona or Becky Lu  The [facility] representative will assist you in the coordination of your orthopedic and musculoskeletal care as prescribed by your physician.   EMG Left upper extremity  Follow up: Dr Hooker will contact you with results of EMG

## 2019-01-15 NOTE — LETTER
1/15/2019         RE: Teresa Fernandez  [address] [address] [id number]        Dear Colleague,    Thank you for referring your patient, Teresa Fernandez, to the [facility]. Please see a copy of my visit note below.    Teresa Fernandez  :  1960  DOS: 1/15/2019  MRN: [id number]    Sports Medicine Clinic Visit    PCP: Nuzhat Burt    Teresa Fernandez is a 58 year old Right hand dominant female who is seen in consultation at the request of  Nuzhat Burt C.N.P. presenting with chronic left periscapular pain.    Injury: Insidious onset of left periscapular pain over the last ~ 1+ years.  Pain located over left posterior shoulder, periscapular region, radiating to left axillary, medial upper arm.  Reports intermittent radiating, pain to left axillary and upper arm.  Additional Features:  Positive: intense burning pain.  Symptoms are better with Tylenol, Ibuprofen and Rest.  Symptoms are worse with: overhead motions, reaching with left shoulder.  Other evaluation and/or treatments so far consists of: Tylenol, Ibuprofen, Rest and Pain Mgmt procedure/trigger point injections.  Recent imaging completed: MRI of c-spine was completed at [facility] in 2017, no other imaging.  Prior History of related problems: H/o chronic pain.      Patient reports minimal relief following trigger point injections from Dr Kidd on 1/3/19.    Social History: works as       Review of Systems  Musculoskeletal: as above  Remainder of review of systems is negative including constitutional, CV, pulmonary, GI, Skin and Neurologic except as noted in HPI or medical history.    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     FUSION LUMBAR ANTERIOR TWO LEVELS  4/13/2015     GYN SURGERY  04/08/1999    hysterectomy.  [facility]     HYSTERECTOMY, PAP NO LONGER INDICATED       SOFT TISSUE SURGERY      chest-melonoma     SURGICAL HISTORY OF -   3/1996    Left nephrectomy-renal cell CA       Objective  /76   Ht 1.753 m (5' 9\")   Wt 99.3 kg (219 lb)   BMI 32.34 kg/m       General: healthy, alert and in no distress    HEENT: no scleral icterus or conjunctival erythema   Skin: no suspicious lesions or rash. No jaundice.   CV: regular rhythm by palpation, 2+ distal pulses, no pedal edema    Resp: normal respiratory effort without conversational dyspnea   Psych: normal mood and affect    Gait: nonantalgic, appropriate coordination and balance   Neuro: normal light touch sensory exam of the extremities. Motor strength as noted below     Cervical spine Exam  Inspection: normal cervical lordosis  Tender:  medial border of scapula, superior angle of scapula, left paracervical muscles, right paracervical muscles, left trapezius muscles, right trapezius muscles  Non-tender:  spinous processes  Range of Motion:  Full ROM of cervical spine, pain with most movements  Strength: Full strength of all neck muscles  Special tests:  Spurling's - negative - left, Spurling's - negative - right, neg adson's    Left Shoulder exam    ROM:        Full active and passive ROM with flexion, extension, abduction, internal and external rotation.   "     Asymmetric scapular motion on L       Painful terminal flexion and abduction, mildly in ER    Tender:        subacromial space       Lateral deltoid    Non Tender:       remainder of shoulder       sternoclavicular joint       acromioclavicular joint       posterior shoulder       periscapular region    Strength:        abduction 3/5       internal rotation 4/5       external rotation 3/5       adduction 5/5    Impingement testing:        positive (+) Neer       positive (+) Chavez       positive (+) empty can       neg (-) crossover       neg (-) O'lisa       neg (-) crank    Stability testing:       neg (-) relocation       neg (-) anterior glide       neg (-) sulcus sign    Skin:       no visible deformities       well perfused       capillary refill brisk    Sensation:        normal sensation over shoulder and upper extremity       Radiology  Recent Results (from the past 744 hour(s))   XR Shoulder Left G/E 3 Views    Narrative    LEFT SHOULDER TWO VIEWS 1/15/2019 4:19 PM     HISTORY: Chronic periscapular pain on left side.    COMPARISON: None.    FINDINGS: There is no significant degenerative change.  The  acromioclavicular and coracoclavicular distances appear within normal  limits.  The subacromial space is maintained.  There is no acute  fracture or demonstrated dislocation.  There are no worrisome bony  lesions.       Impression    IMPRESSION:  No acute osseous abnormality demonstrated.    LOREN BARNEY MD         Assessment:  1. Thoracic outlet syndrome    2. Chronic periscapular pain on left side    3. Left arm weakness    4. Scapulothoracic syndrome    5. DDD (degenerative disc disease), cervical        Plan:  Discussed the assessment with the patient.  Follow up: 4-6 weeks based on clinical progress  Complicated hx with multiple issues that appear to overlap, including scapulothoracic dysfunction, weakness, neck pain and possible brachial plexopathy  Ordered EMG to try to better delineate nerve  conduction issues, she will consider after trying some PT  Could consider TPI in the future, has not been helpful in the past  Trial of burst of prednisone today to help with acute inflammation  XR and prior MR images independently visualized and reviewed with patient today in clinic  We discussed modified progressive pain-free activity as tolerated  Home handouts provided and supportive care reviewed  All questions were answered today  Contact us with additional questions or concerns  Signs and sx of concern reviewed    Thanks very much for sending this nice lady to us, I will keep you updated with her progress      Miguel Angel Hooker DO, CANIC  Primary Care Sports Medicine  [facility]             Disclaimer: This note consists of symbols derived from keyboarding, dictation and/or voice recognition software. As a result, there may be errors in the script that have gone undetected. Please consider this when interpreting information found in this chart.    Again, thank you for allowing me to participate in the care of your patient.        Sincerely,        Miguel Angel Hooker DO

## 2019-01-15 NOTE — PROGRESS NOTES
Teresa Fernandez  :  1960  DOS: 1/15/2019  MRN: [id number]    Sports Medicine Clinic Visit    PCP: Nuzhat Burt    Teresa Fernandez is a 58 year old Right hand dominant female who is seen in consultation at the request of  Nuzhat Burt C.N.P. presenting with chronic left periscapular pain.    Injury: Insidious onset of left periscapular pain over the last ~ 1+ years.  Pain located over left posterior shoulder, periscapular region, radiating to left axillary, medial upper arm.  Reports intermittent radiating, pain to left axillary and upper arm.  Additional Features:  Positive: intense burning pain.  Symptoms are better with Tylenol, Ibuprofen and Rest.  Symptoms are worse with: overhead motions, reaching with left shoulder.  Other evaluation and/or treatments so far consists of: Tylenol, Ibuprofen, Rest and Pain Mgmt procedure/trigger point injections.  Recent imaging completed: MRI of c-spine was completed at [facility] in 2017, no other imaging.  Prior History of related problems: H/o chronic pain.      Patient reports minimal relief following trigger point injections from Dr Kidd on 1/3/19.    Social History: works as medical assembly      Review of Systems  Musculoskeletal: as above  Remainder of review of systems is negative including constitutional, CV, pulmonary, GI, Skin and Neurologic except as noted in HPI or medical history.    Past Medical History:   Diagnosis Date     ALLERGIC RHINITIS NOS 2005     Anxiety      Arthritis     herniated disc     Bronchitis, not specified as acute or chronic      CHR MAXILLARY SINUSITIS 2005     Depressive disorder, not elsewhere classified      Eczema 10/17/2012     Environmental and seasonal allergies      GERD (gastroesophageal reflux disease)      Gluten intolerance      Malignant neoplasm of renal pelvis (H)     Renal cell carcinoma     Melanoma (H) 2010     Other specified acquired hypothyroidism 2005     Toxic diffuse  "goiter without mention of thyrotoxic crisis or storm     Grave's disease     Unspecified asthma(493.90)      Past Surgical History:   Procedure Laterality Date     CHOLECYSTECTOMY       COLONOSCOPY      2007-normal     ENT SURGERY  2-15-11    Left cheek bx- Mucositis.     FUSION LUMBAR ANTERIOR TWO LEVELS  4/13/2015     GYN SURGERY  04/08/1999    hysterectomy.  [facility]     HYSTERECTOMY, PAP NO LONGER INDICATED       SOFT TISSUE SURGERY      chest-melonoma     SURGICAL HISTORY OF -   3/1996    Left nephrectomy-renal cell CA       Objective  /76   Ht 1.753 m (5' 9\")   Wt 99.3 kg (219 lb)   BMI 32.34 kg/m      General: healthy, alert and in no distress    HEENT: no scleral icterus or conjunctival erythema   Skin: no suspicious lesions or rash. No jaundice.   CV: regular rhythm by palpation, 2+ distal pulses, no pedal edema    Resp: normal respiratory effort without conversational dyspnea   Psych: normal mood and affect    Gait: nonantalgic, appropriate coordination and balance   Neuro: normal light touch sensory exam of the extremities. Motor strength as noted below     Cervical spine Exam  Inspection: normal cervical lordosis  Tender:  medial border of scapula, superior angle of scapula, left paracervical muscles, right paracervical muscles, left trapezius muscles, right trapezius muscles  Non-tender:  spinous processes  Range of Motion:  Full ROM of cervical spine, pain with most movements  Strength: Full strength of all neck muscles  Special tests:  Spurling's - negative - left, Spurling's - negative - right, neg adson's    Left Shoulder exam    ROM:        Full active and passive ROM with flexion, extension, abduction, internal and external rotation.       Asymmetric scapular motion on L       Painful terminal flexion and abduction, mildly in ER    Tender:        subacromial space       Lateral deltoid    Non Tender:       remainder of shoulder       sternoclavicular joint       acromioclavicular joint       " posterior shoulder       periscapular region    Strength:        abduction 3/5       internal rotation 4/5       external rotation 3/5       adduction 5/5    Impingement testing:        positive (+) Neer       positive (+) Chavez       positive (+) empty can       neg (-) crossover       neg (-) O'lisa       neg (-) crank    Stability testing:       neg (-) relocation       neg (-) anterior glide       neg (-) sulcus sign    Skin:       no visible deformities       well perfused       capillary refill brisk    Sensation:        normal sensation over shoulder and upper extremity       Radiology  Recent Results (from the past 744 hour(s))   XR Shoulder Left G/E 3 Views    Narrative    LEFT SHOULDER TWO VIEWS 1/15/2019 4:19 PM     HISTORY: Chronic periscapular pain on left side.    COMPARISON: None.    FINDINGS: There is no significant degenerative change.  The  acromioclavicular and coracoclavicular distances appear within normal  limits.  The subacromial space is maintained.  There is no acute  fracture or demonstrated dislocation.  There are no worrisome bony  lesions.       Impression    IMPRESSION:  No acute osseous abnormality demonstrated.    LOREN BARNEY MD         Assessment:  1. Thoracic outlet syndrome    2. Chronic periscapular pain on left side    3. Left arm weakness    4. Scapulothoracic syndrome    5. DDD (degenerative disc disease), cervical        Plan:  Discussed the assessment with the patient.  Follow up: 4-6 weeks based on clinical progress  Complicated hx with multiple issues that appear to overlap, including scapulothoracic dysfunction, weakness, neck pain and possible brachial plexopathy  Ordered EMG to try to better delineate nerve conduction issues, she will consider after trying some PT  Could consider TPI in the future, has not been helpful in the past  Trial of burst of prednisone today to help with acute inflammation  XR and prior MR images independently visualized and reviewed with  patient today in clinic  We discussed modified progressive pain-free activity as tolerated  Home handouts provided and supportive care reviewed  All questions were answered today  Contact us with additional questions or concerns  Signs and sx of concern reviewed    Thanks very much for sending this nice lady to us, I will keep you updated with her progress      Miguel Angel Hooker DO, CAQ  Primary Care Sports Medicine  [facility]             Disclaimer: This note consists of symbols derived from keyboarding, dictation and/or voice recognition software. As a result, there may be errors in the script that have gone undetected. Please consider this when interpreting information found in this chart.

## 2019-01-18 ASSESSMENT — ANXIETY QUESTIONNAIRES
5. BEING SO RESTLESS THAT IT IS HARD TO SIT STILL: NOT AT ALL
2. NOT BEING ABLE TO STOP OR CONTROL WORRYING: SEVERAL DAYS
3. WORRYING TOO MUCH ABOUT DIFFERENT THINGS: SEVERAL DAYS
1. FEELING NERVOUS, ANXIOUS, OR ON EDGE: SEVERAL DAYS
7. FEELING AFRAID AS IF SOMETHING AWFUL MIGHT HAPPEN: NOT AT ALL
GAD7 TOTAL SCORE: 4
4. TROUBLE RELAXING: NOT AT ALL
6. BECOMING EASILY ANNOYED OR IRRITABLE: SEVERAL DAYS

## 2019-01-18 ASSESSMENT — PATIENT HEALTH QUESTIONNAIRE - PHQ9: SUM OF ALL RESPONSES TO PHQ QUESTIONS 1-9: 5

## 2019-01-18 NOTE — PROGRESS NOTES
Progress Note  Disclaimer: This note consists of symbols derived from keyboarding, dictation and/or voice recognition software. As a result, there may be errors in the script that have gone undetected. Please consider this when interpreting information found in this chart.    Client Name: Teresa Fernandez  Date: 1/14/2019         Service Type: Individual      Session Start Time: 5:00 PM  session End Time: 5:45 PM      Session Length: 45     Session #: 7     Attendees: Client attended alone    Treatment Plan Last Reviewed: 7/23/2018  PHQ-9 / ERIC-7 : See flowsheet's     DATA   Client reports her  had surgery and everything went very well.  I have not seen her much recently due to her son experiencing a treatment relapse.  This has resulted in significant increase in anxiety and loss of sleep, client's part.  She and her  have started going to [employer] and we reviewed what the goal of such programs are.      Progress Since Last Session (Related to Symptoms / Goals / Homework):   Symptoms: Stable    Homework: Achieved / completed to satisfaction      Episode of Care Goals: Satisfactory progress - ACTION (Actively working towards change); Intervened by reinforcing change plan / affirming steps taken     Current / Ongoing Stressors and Concerns:   Multiple medical issues, feeling invalidated by medical community     Treatment Objective(s) Addressed in This Session:   use at least 2 coping skills for anxiety management in the next 2 weeks       Intervention:   CBT: Behavioral activation and CBT for anxiety and panic.   12 step facilitation for family and parents     ASSESSMENT: Current Emotional / Mental Status (status of significant symptoms):   Risk status (Self / Other harm or suicidal ideation)   Client denies current fears or concerns for personal safety.   Client denies current or recent suicidal ideation or behaviors.   Client denies current or recent  homicidal ideation or behaviors.   Client denies current or recent self injurious behavior or ideation.   Client denies other safety concerns.   Client Client reports there has been no change in risk factors since their last session.     Client Client reports there has been no change in protective factors since their last session.     A safety and risk management plan has not been developed at this time, however client was given the after-hours number / 911 should there be a change in any of these risk factors.     Appearance:   Appropriate    Eye Contact:   Good    Psychomotor Behavior: Normal    Attitude:   Cooperative    Orientation:   All   Speech    Rate / Production: Normal     Volume:  Normal    Mood:    Normal   Affect:    Appropriate    Thought Content:  Clear    Thought Form:  Coherent  Logical    Insight:    Good      Medication Review:   No changes to current psychiatric medication(s)     Medication Compliance:   Yes     Changes in Health Issues:   None reported     Chemical Use Review:   Substance Use: Chemical use reviewed, no active concerns identified      Tobacco Use: No current tobacco use.       Collateral Reports Completed:   Not Applicable    PLAN: (Client Tasks / Therapist Tasks / Other)  Client to consult with primary care physician regarding response to new medications.  Client to continue to maintain healthy boundaries with her alcoholic child. I am available by phone should she wish to contact me regarding her .        Carter Tierney                                                         ________________________________________________________________________    Treatment Plan    Client's Name: Teresa Fernandez  YOB: 1960    Date: 7/23/2018    DSM5 Diagnoses: (Sustained by DSM5 Criteria Listed Above)  Diagnoses:            296.31 (F33.0) Major Depressive Disorder, Recurrent Episode, Mild _ and With anxious distress  Psychosocial & Contextual Factors: Multiple medical  issues  WHODAS 2.0 (12 item)                          This questionnaire asks about difficulties due to health conditions. Health conditions                   include                        disease or illnesses, other health problems that may be short or long lasting,                    injuries, mental health or emotional problems, and problems with alcohol or drugs.                              Think back over the past 30 days and answer these questions, thinking about how much              difficulty you had doing the following activities. For each question, please Elem only                   one response.      S1 Standing for long periods such as 30 minutes? Mild =           2   S2 Taking care of household responsibilities? None =         1   S3 Learning a new task, for example, learning how to get to a new place? None =         1   S4 How much of a problem do you have joining community activities (for example, festivals, [employer] or other activities) in the same way as anyone else can? None =         1   S5 How much have you been emotionally affected by your health problems? Moderate =   3               In the past 30 days, how much difficulty did you have in:   S6 Concentrating on doing something for ten minutes? Mild =           2   S7 Walking a long distance such as a kilometer (or equivalent)? None =         1   S8 Washing your whole body? None =         1   S9 Getting dressed? None =         1   S10 Dealing with people you do not know? None =         1   S11 Maintaining a friendship? None =         1   S12 Your day to day work? None =         1       H1 Overall, in the past 30 days, how many days were these difficulties present? Record number of days 3   H2 In the past 30 days, for how many days were you totally unable to carry out your usual activities or work because of any health condition? Record number of days  0   H3 In the past 30 days, not counting the days that you were totally unable, for how  many days did you cut back or reduce your usual activities or work because of any health condition? Record number of days 0           Referral / Collaboration:  Referral to another professional/service is not indicated at this time..    Anticipated number of session or this episode of care: 15  Anxiety Treatment plan:  1. Education- the Biopsychosocial model of anxiety  a. Client will be able to describe how anxiety is effecting them physically, emotionally and socially  2. Distraction  a. Client will learn 2 techniques to distract themselves when becoming anxious  3. Diaphragmatic breathing  a. Client will learn to breath using their diaphragm  b. Client will learn 2 breathing patterns to use to reduce anxiety  4. Visualization  a. Client will learn to establish a safe, calm place in their mind utilizing all their senses  b. Client will practice using visualization at times when they are not anxious so they will be able to use it when anxiety occurs  5. Progressive muscle relaxation  a. Client will learn progressive muscle relaxation techniques and practice them 4-5 times per week.  b. Client will learn to use mental images of relaxation to relax muscle groups and do this on a daily basis  6. Self-care  a. Client will identify 3 things they can do just for themselves  b. Client will take time for quiet, reflection, meditation 3 times per week  c. Client will Learn to set boundaries when appropriate  d. Client will Identify 3 individuals they can call on for support, distraction  7. Assessment of progress  a. Client will engage in assessment of their progress on a regular basis      Client has reviewed and agreed to the above plan.      Carter Tierney  July 24, 2018

## 2019-01-19 ASSESSMENT — ANXIETY QUESTIONNAIRES: GAD7 TOTAL SCORE: 4

## 2019-01-22 ENCOUNTER — HOSPITAL ENCOUNTER (OUTPATIENT)
Dept: PHYSICAL THERAPY | Facility: CLINIC | Age: 59
Setting detail: THERAPIES SERIES
End: 2019-01-22
Attending: FAMILY MEDICINE
Payer: OTHER GOVERNMENT

## 2019-01-22 DIAGNOSIS — G89.29 CHRONIC PERISCAPULAR PAIN ON LEFT SIDE: ICD-10-CM

## 2019-01-22 DIAGNOSIS — G56.80 SCAPULOTHORACIC SYNDROME: ICD-10-CM

## 2019-01-22 DIAGNOSIS — M50.30 DDD (DEGENERATIVE DISC DISEASE), CERVICAL: ICD-10-CM

## 2019-01-22 DIAGNOSIS — R29.898 LEFT ARM WEAKNESS: ICD-10-CM

## 2019-01-22 DIAGNOSIS — G54.0 THORACIC OUTLET SYNDROME: ICD-10-CM

## 2019-01-22 DIAGNOSIS — M25.512 CHRONIC PERISCAPULAR PAIN ON LEFT SIDE: ICD-10-CM

## 2019-01-22 PROCEDURE — 97110 THERAPEUTIC EXERCISES: CPT | Mod: GP | Performed by: PHYSICAL THERAPIST

## 2019-01-22 PROCEDURE — 97162 PT EVAL MOD COMPLEX 30 MIN: CPT | Mod: GP | Performed by: PHYSICAL THERAPIST

## 2019-01-23 NOTE — PROGRESS NOTES
01/22/19 1600   General Information   Type of Visit Initial OP Ortho PT Evaluation   Start of Care Date 01/22/19   Referring Physician Miguel Angel Hooker, DO   Patient/Family Goals Statement to get out of pain   Orders Evaluate and Treat   Date of Order 01/15/19   Insurance Type ([employer])   Medical Diagnosis cervical DDD/ TOS/ scapulothoracic syndrome   Body Part(s)   Body Part(s) Cervical Spine   Presentation and Etiology   Pertinent history of current problem (include personal factors and/or comorbidities that impact the POC) Pt presents w/ L scapular pain X 1 year.   No specific injury.  Pt has had  3 injections to that area--no benefit.  Pt recently took steriod x 1 week--noted she slept better--however now that she is done w/ meds she is waking again.   Pain is intermittent w/ OTC meds,  8-9/10.   Pt notes pain will radiate into upper arm to medial aspect of elbow.  No neck pain.  Pt notes intermittent tingling in L hand.   MRI in chart 2017  cervical: deg changes,  spondy,  stenosis.  No UE weakness.  Pt states she is to have an EMG.  Meds;  ibuprofen and tylenol.  Pt is R dominant.   PMHX   asthma,  kidney removed  --cancer,  LB surgery spacer and shabnam,  depression,  fibromyalgia,   arthritis.  Moderate : comorbidities.   Impairments A. Pain;E. Decreased flexibility;F. Decreased strength and endurance;K. Numbness;L. Tingling;M. Locking or catching   Pain quality A. Sharp;B. Dull;C. Aching;F. 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  By end of the day--w/ rest.  Wakes 2X/night needs to take tylenol.  L sidelying is limited    Pain/symptoms eased by I. OTC medication(s)   Progression of symptoms since onset: Worsened  (more frequent/ intense pain)   Prior Level of Function   Functional Level Prior Comment treadmill couple times/ week currently or walks in building.     Current Level of Function   Patient role/employment history A. Employed  "  Employment Comments --sitting at a work station.  Working regular job reg duty.     Fall Risk Screen   Fall screen completed by PT   Have you fallen 2 or more times in the past year? No   Have you fallen and had an injury in the past year? No   Is patient a fall risk? No   Cervical Spine   Posture mild FH   Cervical Flexion ROM 80% w/ increased symptoms reported   Cervical Extension ROM WNL but bothered more than w/ flexion   Cervical Right Side Bending ROM WFL tightness reported   Cervical Left Side Bending ROM WFL tightness reported   Cervical Right Rotation ROM WFL   Cervical Left Rotation ROM WFL   Shoulder AROM Screen B shoulder WFL no complaints   Shoulder Shrug (C2-C4) Strength B 5/5   Shoulder Abd (C5) Strength L 5/5   Shoulder ER (C5, C6) Strength L 5/5   Shoulder IR (C5, C6) Strength L 5/5   Elbow Flexion (C5, C6) Strength L 5/5   Elbow Extension (C7) Strength L 5/5   Wrist Extension (C6) Strength L 5/5   Wrist Flexion (C7) Strength L 5/5   Shoulder/Wrist/Hand Strength Comments Lower trap strength 3+/5 B    Pectoralis Minor Flexibility mild tightness   Alar Ligament Test neg   Spurling Test neg B    Cervical Distraction Test decreased scapular pain;  manual traction supine also decreased scap pain.     Cervical/Shoulder Special Tests Comments Brady negative,  Adsons test neg   Segmental Mobility-Cervical Neg ERS/FRS.  Good mobility w/ sideglides   Segmental Mobility-Thoracic Neg ERS/ FRS thoracic spine.  Hypomobile w/ PA 's    Palpation Increased mm tone B scalenes/ UT--no pain complaints.  T6 L post rib and pt notes the \"toothache\" pain   UE Neural Tension UE Neural Tension Tests  (ulnar, median and radial neg)   Planned Therapy Interventions   Planned Therapy Interventions manual therapy;stretching;strengthening  (posture)   Planned Modality Interventions   Planned Modality Interventions TENS  (pt has home unit)   Clinical Impression   Criteria for Skilled Therapeutic Interventions Met " yes, treatment indicated   PT Diagnosis L scapular pain appears to have cervical component   Influenced by the following impairments pain, stiffness, decreased strength, numbness, tingling   Functional limitations due to impairments dressing, working, sleeping   Clinical Presentation Evolving/Changing   Clinical Presentation Rationale pt notes symptoms are worsening   Clinical Decision Making (Complexity) Moderate complexity   Therapy Frequency 1 time/week   Predicted Duration of Therapy Intervention (days/wks) 8 weeks   Risk & Benefits of therapy have been explained Yes   Patient, Family & other staff in agreement with plan of care Yes   Education Assessment   Barriers to Learning No barriers   Ortho Goal 1   Goal Description 1.  Pt will be able to dress in the morning w/ pain no > 4/10   Target Date 03/24/19   Ortho Goal 2   Goal Description 2.  Pt will tolerate working 3 hours w/ increased awareness of posture and decreased pain to 4/10   Target Date 03/24/19   Ortho Goal 3   Goal Description 3.  Pt will wake no > 5X/week due to scapular complaints   Target Date 03/24/19   Ortho Goal 4   Goal Description 4.  Pt will be indpendent and consistent w/  HEP and have increased awareness of posture   Target Date 03/24/19   Total Evaluation Time   PT Reggie Moderate Complexity Minutes ([id number]) 30     Thank you for this referral,    Renée Bhat, PT,  CEAS   [address][id number]  [facility]  [phone]

## 2019-01-24 ENCOUNTER — OFFICE VISIT (OUTPATIENT)
Dept: PSYCHOLOGY | Facility: CLINIC | Age: 59
End: 2019-01-24
Payer: OTHER GOVERNMENT

## 2019-01-24 DIAGNOSIS — F33.0 MAJOR DEPRESSIVE DISORDER, RECURRENT, MILD (H): Primary | ICD-10-CM

## 2019-01-24 PROCEDURE — 90834 PSYTX W PT 45 MINUTES: CPT | Performed by: PSYCHOLOGIST

## 2019-01-25 ASSESSMENT — ANXIETY QUESTIONNAIRES
5. BEING SO RESTLESS THAT IT IS HARD TO SIT STILL: NOT AT ALL
3. WORRYING TOO MUCH ABOUT DIFFERENT THINGS: SEVERAL DAYS
GAD7 TOTAL SCORE: 5
6. BECOMING EASILY ANNOYED OR IRRITABLE: NEARLY EVERY DAY
7. FEELING AFRAID AS IF SOMETHING AWFUL MIGHT HAPPEN: NOT AT ALL
4. TROUBLE RELAXING: NOT AT ALL
1. FEELING NERVOUS, ANXIOUS, OR ON EDGE: SEVERAL DAYS
2. NOT BEING ABLE TO STOP OR CONTROL WORRYING: NOT AT ALL

## 2019-01-25 ASSESSMENT — PATIENT HEALTH QUESTIONNAIRE - PHQ9: SUM OF ALL RESPONSES TO PHQ QUESTIONS 1-9: 1

## 2019-01-25 NOTE — PROGRESS NOTES
Progress Note  Disclaimer: This note consists of symbols derived from keyboarding, dictation and/or voice recognition software. As a result, there may be errors in the script that have gone undetected. Please consider this when interpreting information found in this chart.    Client Name: Teresa Fernandez  Date: 1/24/2019         Service Type: Individual      Session Start Time: 4:00 PM  session End Time: 4:45 PM      Session Length: 45     Session #: 8     Attendees: Client attended alone    Treatment Plan Last Reviewed: 7/23/2018  PHQ-9 / ERIC-7 : See flowsheet's     DATA   Client reports she started physical therapy for her shoulder.  Felt really anxious this morning for some unknown reason.  Her son recently completed a rule 25 assessment and she is still doing Al-Anon with her .  Spent most of the session discussing the nature of the codependence and the ways that alcoholism shows up in the family as well as in the identified addict or alcoholic.      Progress Since Last Session (Related to Symptoms / Goals / Homework):   Symptoms: Stable    Homework: Achieved / completed to satisfaction      Episode of Care Goals: Satisfactory progress - ACTION (Actively working towards change); Intervened by reinforcing change plan / affirming steps taken     Current / Ongoing Stressors and Concerns:   Multiple medical issues, feeling invalidated by medical community     Treatment Objective(s) Addressed in This Session:   use at least 2 coping skills for anxiety management in the next 2 weeks       Intervention:   CBT: Behavioral activation and CBT for anxiety and panic.   12 step facilitation for family and parents     ASSESSMENT: Current Emotional / Mental Status (status of significant symptoms):   Risk status (Self / Other harm or suicidal ideation)   Client denies current fears or concerns for personal safety.   Client denies current or recent suicidal ideation or  behaviors.   Client denies current or recent homicidal ideation or behaviors.   Client denies current or recent self injurious behavior or ideation.   Client denies other safety concerns.   Client Client reports there has been no change in risk factors since their last session.     Client Client reports there has been no change in protective factors since their last session.     A safety and risk management plan has not been developed at this time, however client was given the after-hours number / 911 should there be a change in any of these risk factors.     Appearance:   Appropriate    Eye Contact:   Good    Psychomotor Behavior: Normal    Attitude:   Cooperative    Orientation:   All   Speech    Rate / Production: Normal     Volume:  Normal    Mood:    Normal   Affect:    Appropriate    Thought Content:  Clear    Thought Form:  Coherent  Logical    Insight:    Good      Medication Review:   No changes to current psychiatric medication(s)     Medication Compliance:   Yes     Changes in Health Issues:   None reported     Chemical Use Review:   Substance Use: Chemical use reviewed, no active concerns identified      Tobacco Use: No current tobacco use.       Collateral Reports Completed:   Not Applicable    PLAN: (Client Tasks / Therapist Tasks / Other)   Client to continue to maintain healthy boundaries with her alcoholic child.  Continue attending 12-step family support group and contact other members if she needs to.        Carter Tierney                                                         ________________________________________________________________________    Treatment Plan    Client's Name: Teresa Fernandez  YOB: 1960    Date: 7/23/2018    DSM5 Diagnoses: (Sustained by DSM5 Criteria Listed Above)  Diagnoses:            296.31 (F33.0) Major Depressive Disorder, Recurrent Episode, Mild _ and With anxious distress  Psychosocial & Contextual Factors: Multiple medical issues  WHODAS 2.0 (12  item)                          This questionnaire asks about difficulties due to health conditions. Health conditions                   include                        disease or illnesses, other health problems that may be short or long lasting,                    injuries, mental health or emotional problems, and problems with alcohol or drugs.                              Think back over the past 30 days and answer these questions, thinking about how much              difficulty you had doing the following activities. For each question, please [place] [employer] only                   one response.      S1 Standing for long periods such as 30 minutes? Mild =           2   S2 Taking care of household responsibilities? None =         1   S3 Learning a new task, for example, learning how to get to a new place? None =         1   S4 How much of a problem do you have joining community activities (for example, festivals, [employer] or other activities) in the same way as anyone else can? None =         1   S5 How much have you been emotionally affected by your health problems? Moderate =   3               In the past 30 days, how much difficulty did you have in:   S6 Concentrating on doing something for ten minutes? Mild =           2   S7 Walking a long distance such as a kilometer (or equivalent)? None =         1   S8 Washing your whole body? None =         1   S9 Getting dressed? None =         1   S10 Dealing with people you do not know? None =         1   S11 Maintaining a friendship? None =         1   S12 Your day to day work? None =         1       H1 Overall, in the past 30 days, how many days were these difficulties present? Record number of days 3   H2 In the past 30 days, for how many days were you totally unable to carry out your usual activities or work because of any health condition? Record number of days  0   H3 In the past 30 days, not counting the days that you were totally unable, for how many days did you cut  back or reduce your usual activities or work because of any health condition? Record number of days 0           Referral / Collaboration:  Referral to another professional/service is not indicated at this time..    Anticipated number of session or this episode of care: 15  Anxiety Treatment plan:  1. Education- the Biopsychosocial model of anxiety  a. Client will be able to describe how anxiety is effecting them physically, emotionally and socially  2. Distraction  a. Client will learn 2 techniques to distract themselves when becoming anxious  3. Diaphragmatic breathing  a. Client will learn to breath using their diaphragm  b. Client will learn 2 breathing patterns to use to reduce anxiety  4. Visualization  a. Client will learn to establish a safe, calm place in their mind utilizing all their senses  b. Client will practice using visualization at times when they are not anxious so they will be able to use it when anxiety occurs  5. Progressive muscle relaxation  a. Client will learn progressive muscle relaxation techniques and practice them 4-5 times per week.  b. Client will learn to use mental images of relaxation to relax muscle groups and do this on a daily basis  6. Self-care  a. Client will identify 3 things they can do just for themselves  b. Client will take time for quiet, reflection, meditation 3 times per week  c. Client will Learn to set boundaries when appropriate  d. Client will Identify 3 individuals they can call on for support, distraction  7. Assessment of progress  a. Client will engage in assessment of their progress on a regular basis      Client has reviewed and agreed to the above plan.      Carter Tierney  July 24, 2018

## 2019-01-26 ASSESSMENT — ANXIETY QUESTIONNAIRES: GAD7 TOTAL SCORE: 5

## 2019-01-29 ENCOUNTER — HOSPITAL ENCOUNTER (OUTPATIENT)
Dept: PHYSICAL THERAPY | Facility: CLINIC | Age: 59
Setting detail: THERAPIES SERIES
End: 2019-01-29
Attending: FAMILY MEDICINE
Payer: OTHER GOVERNMENT

## 2019-01-29 PROCEDURE — 97140 MANUAL THERAPY 1/> REGIONS: CPT | Mod: GP | Performed by: PHYSICAL THERAPIST

## 2019-01-29 PROCEDURE — 97110 THERAPEUTIC EXERCISES: CPT | Mod: GP | Performed by: PHYSICAL THERAPIST

## 2019-02-05 ENCOUNTER — HOSPITAL ENCOUNTER (OUTPATIENT)
Dept: PHYSICAL THERAPY | Facility: CLINIC | Age: 59
Setting detail: THERAPIES SERIES
End: 2019-02-05
Attending: FAMILY MEDICINE
Payer: OTHER GOVERNMENT

## 2019-02-05 PROCEDURE — 97140 MANUAL THERAPY 1/> REGIONS: CPT | Mod: GP | Performed by: PHYSICAL THERAPIST

## 2019-02-05 PROCEDURE — 97110 THERAPEUTIC EXERCISES: CPT | Mod: GP | Performed by: PHYSICAL THERAPIST

## 2019-02-06 ENCOUNTER — TRANSFERRED RECORDS (OUTPATIENT)
Dept: HEALTH INFORMATION MANAGEMENT | Facility: CLINIC | Age: 59
End: 2019-02-06

## 2019-02-13 ENCOUNTER — OFFICE VISIT (OUTPATIENT)
Dept: PSYCHOLOGY | Facility: CLINIC | Age: 59
End: 2019-02-13
Payer: OTHER GOVERNMENT

## 2019-02-13 DIAGNOSIS — F33.0 MAJOR DEPRESSIVE DISORDER, RECURRENT, MILD (H): Primary | ICD-10-CM

## 2019-02-13 PROCEDURE — 90834 PSYTX W PT 45 MINUTES: CPT | Performed by: PSYCHOLOGIST

## 2019-02-15 NOTE — PROGRESS NOTES
Progress Note  Disclaimer: This note consists of symbols derived from keyboarding, dictation and/or voice recognition software. As a result, there may be errors in the script that have gone undetected. Please consider this when interpreting information found in this chart.    Client Name: Teresa Fernandez  Date: 2/13/2019         Service Type: Individual      Session Start Time: 4:00 PM  session End Time: 4:45 PM      Session Length: 45     Session #: 9     Attendees: Client attended alone    Treatment Plan Last Reviewed: 7/23/2018  PHQ-9 / ERIC-7 : See flowsheet's     DATA   Client reports she is still doing physical therapy for her shoulder and it is helping some.  Reports her son has not been drinking recently but has a court date on the 27th.  He is also trying to get his license back which will ease the strain on her not having to give him rides.  Her company just got sold and her job, though not stated in jeopardy, is going to change.      Progress Since Last Session (Related to Symptoms / Goals / Homework):   Symptoms: Stable    Homework: Achieved / completed to satisfaction      Episode of Care Goals: Satisfactory progress - ACTION (Actively working towards change); Intervened by reinforcing change plan / affirming steps taken     Current / Ongoing Stressors and Concerns:   Some with alcohol and legal problems, multiple medical problems, work stress.     Treatment Objective(s) Addressed in This Session:   use at least 2 coping skills for anxiety management in the next 2 weeks       Intervention:   CBT: Behavioral activation and CBT for anxiety and panic.   12 step facilitation for family and parents     ASSESSMENT: Current Emotional / Mental Status (status of significant symptoms):   Risk status (Self / Other harm or suicidal ideation)   Client denies current fears or concerns for personal safety.   Client denies current or recent suicidal ideation or  behaviors.   Client denies current or recent homicidal ideation or behaviors.   Client denies current or recent self injurious behavior or ideation.   Client denies other safety concerns.   Client Client reports there has been no change in risk factors since their last session.     Client Client reports there has been no change in protective factors since their last session.     A safety and risk management plan has not been developed at this time, however client was given the after-hours number / 911 should there be a change in any of these risk factors.     Appearance:   Appropriate    Eye Contact:   Good    Psychomotor Behavior: Normal    Attitude:   Cooperative    Orientation:   All   Speech    Rate / Production: Normal     Volume:  Normal    Mood:    Normal   Affect:    Appropriate    Thought Content:  Clear    Thought Form:  Coherent  Logical    Insight:    Good      Medication Review:   No changes to current psychiatric medication(s)     Medication Compliance:   Yes     Changes in Health Issues:   None reported     Chemical Use Review:   Substance Use: Chemical use reviewed, no active concerns identified      Tobacco Use: No current tobacco use.       Collateral Reports Completed:   Not Applicable    PLAN: (Client Tasks / Therapist Tasks / Other)   Client to continue to maintain healthy boundaries with her alcoholic child.  Continue attending 12-step family support group and contact other members if she needs to.        Carter Tierney                                                         ________________________________________________________________________    Treatment Plan    Client's Name: Teresa Fernandez  YOB: 1960    Date: 7/23/2018    DSM5 Diagnoses: (Sustained by DSM5 Criteria Listed Above)  Diagnoses:            296.31 (F33.0) Major Depressive Disorder, Recurrent Episode, Mild _ and With anxious distress  Psychosocial & Contextual Factors: Multiple medical issues  WHODAS 2.0 (12  item)                          This questionnaire asks about difficulties due to health conditions. Health conditions                   include                        disease or illnesses, other health problems that may be short or long lasting,                    injuries, mental health or emotional problems, and problems with alcohol or drugs.                              Think back over the past 30 days and answer these questions, thinking about how much              difficulty you had doing the following activities. For each question, please [place] [employer] only                   one response.      S1 Standing for long periods such as 30 minutes? Mild =           2   S2 Taking care of household responsibilities? None =         1   S3 Learning a new task, for example, learning how to get to a new place? None =         1   S4 How much of a problem do you have joining community activities (for example, festivals, Confucianist or other activities) in the same way as anyone else can? None =         1   S5 How much have you been emotionally affected by your health problems? Moderate =   3               In the past 30 days, how much difficulty did you have in:   S6 Concentrating on doing something for ten minutes? Mild =           2   S7 Walking a long distance such as a kilometer (or equivalent)? None =         1   S8 Washing your whole body? None =         1   S9 Getting dressed? None =         1   S10 Dealing with people you do not know? None =         1   S11 Maintaining a friendship? None =         1   S12 Your day to day work? None =         1       H1 Overall, in the past 30 days, how many days were these difficulties present? Record number of days 3   H2 In the past 30 days, for how many days were you totally unable to carry out your usual activities or work because of any health condition? Record number of days  0   H3 In the past 30 days, not counting the days that you were totally unable, for how many days did you cut  back or reduce your usual activities or work because of any health condition? Record number of days 0           Referral / Collaboration:  Referral to another professional/service is not indicated at this time..    Anticipated number of session or this episode of care: 15  Anxiety Treatment plan:  1. Education- the Biopsychosocial model of anxiety  a. Client will be able to describe how anxiety is effecting them physically, emotionally and socially  2. Distraction  a. Client will learn 2 techniques to distract themselves when becoming anxious  3. Diaphragmatic breathing  a. Client will learn to breath using their diaphragm  b. Client will learn 2 breathing patterns to use to reduce anxiety  4. Visualization  a. Client will learn to establish a safe, calm place in their mind utilizing all their senses  b. Client will practice using visualization at times when they are not anxious so they will be able to use it when anxiety occurs  5. Progressive muscle relaxation  a. Client will learn progressive muscle relaxation techniques and practice them 4-5 times per week.  b. Client will learn to use mental images of relaxation to relax muscle groups and do this on a daily basis  6. Self-care  a. Client will identify 3 things they can do just for themselves  b. Client will take time for quiet, reflection, meditation 3 times per week  c. Client will Learn to set boundaries when appropriate  d. Client will Identify 3 individuals they can call on for support, distraction  7. Assessment of progress  a. Client will engage in assessment of their progress on a regular basis      Client has reviewed and agreed to the above plan.      Carter Tierney  July 24, 2018

## 2019-02-18 ENCOUNTER — HOSPITAL ENCOUNTER (OUTPATIENT)
Dept: PHYSICAL THERAPY | Facility: CLINIC | Age: 59
Setting detail: THERAPIES SERIES
End: 2019-02-18
Attending: FAMILY MEDICINE
Payer: OTHER GOVERNMENT

## 2019-02-18 PROCEDURE — 97110 THERAPEUTIC EXERCISES: CPT | Mod: GP | Performed by: PHYSICAL THERAPIST

## 2019-02-18 PROCEDURE — 97140 MANUAL THERAPY 1/> REGIONS: CPT | Mod: GP | Performed by: PHYSICAL THERAPIST

## 2019-02-21 ENCOUNTER — MYC MEDICAL ADVICE (OUTPATIENT)
Dept: FAMILY MEDICINE | Facility: CLINIC | Age: 59
End: 2019-02-21

## 2019-02-21 ENCOUNTER — OFFICE VISIT (OUTPATIENT)
Dept: ORTHOPEDICS | Facility: CLINIC | Age: 59
End: 2019-02-21
Payer: OTHER GOVERNMENT

## 2019-02-21 VITALS
DIASTOLIC BLOOD PRESSURE: 75 MMHG | HEIGHT: 69 IN | SYSTOLIC BLOOD PRESSURE: 130 MMHG | BODY MASS INDEX: 32.29 KG/M2 | WEIGHT: 218 LBS

## 2019-02-21 DIAGNOSIS — M50.30 DDD (DEGENERATIVE DISC DISEASE), CERVICAL: ICD-10-CM

## 2019-02-21 DIAGNOSIS — G89.29 CHRONIC PERISCAPULAR PAIN ON LEFT SIDE: ICD-10-CM

## 2019-02-21 DIAGNOSIS — M79.10 MYALGIA: Primary | ICD-10-CM

## 2019-02-21 DIAGNOSIS — G56.80 SCAPULOTHORACIC SYNDROME: ICD-10-CM

## 2019-02-21 DIAGNOSIS — M25.512 CHRONIC PERISCAPULAR PAIN ON LEFT SIDE: ICD-10-CM

## 2019-02-21 PROCEDURE — 20553 NJX 1/MLT TRIGGER POINTS 3/>: CPT | Performed by: FAMILY MEDICINE

## 2019-02-21 PROCEDURE — 99213 OFFICE O/P EST LOW 20 MIN: CPT | Mod: 25 | Performed by: FAMILY MEDICINE

## 2019-02-21 ASSESSMENT — MIFFLIN-ST. JEOR: SCORE: 1633.22

## 2019-02-21 NOTE — LETTER
February 22, 2019      Teresa Fernandez  [address] [id number]        To Whom It May Concern:    Teresa is a patient of mine. Please excuse her from using the [employer] press machine for medical reasons. Call my office with any questions, [phone].      Sincerely,        HERIBERTO Delgado CNP

## 2019-02-21 NOTE — LETTER
2019         RE: Teresa Fernandez  [address] [facility] [address]        Dear Colleague,    Thank you for referring your patient, Teresa Fernandez, to the [facility]. Please see a copy of my visit note below.    Teresa Fernandez  :  1960  DOS:19  MRN: [id number]    Sports Medicine Clinic Visit    PCP: Nuzhat Burt    Teresa Fernandez is a 58 year old Right hand dominant female who is seen in consultation at the request of  Nuzhat Burt C.N.P. presenting with chronic left periscapular pain.    Injury: Insidious onset of left periscapular pain over the last ~ 1+ years.  Pain located over left posterior shoulder, periscapular region, radiating to left axillary, medial upper arm.  Reports intermittent radiating, pain to left axillary and upper arm.  Additional Features:  Positive: intense burning pain.  Symptoms are better with Tylenol, Ibuprofen and Rest.  Symptoms are worse with: overhead motions, reaching with left shoulder.  Other evaluation and/or treatments so far consists of: Tylenol, Ibuprofen, Rest and Pain Mgmt procedure/trigger point injections.  Recent imaging completed: MRI of c-spine was completed at [facility] in 2017, no other imaging.  Prior History of related problems: H/o chronic pain.      Patient reports minimal relief following trigger point injections from Dr Kidd on 1/3/19.    Social History: works as medical assembly      Interim History - 2019  Since last visit on 1/15/2019 patient has minimal improvement in left shoulder, periscapular pain. Notes that physical therapy provides only modest relief.  Prednisone provided no relief.  Here to review results of EMG today.  No interim injury.       Review of Systems  Musculoskeletal: as above  Remainder of review of systems is negative including constitutional, CV, pulmonary, GI, Skin and Neurologic except as noted in HPI or medical history.    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     FUSION LUMBAR ANTERIOR TWO LEVELS  4/13/2015     GYN SURGERY  04/08/1999    hysterectomy.  [facility]     HYSTERECTOMY, PAP NO LONGER INDICATED       SOFT TISSUE SURGERY      chest-melonoma     SURGICAL HISTORY OF -   3/1996    Left nephrectomy-renal cell CA       Objective  /75   Ht 1.753 m (5' 9\")   Wt 98.9 kg (218 lb)   BMI 32.19 kg/m       General: healthy, alert and in no distress    HEENT: no scleral icterus or conjunctival erythema   Skin: no suspicious lesions or rash. No jaundice.   CV: regular rhythm by palpation, 2+ distal pulses, no pedal edema    Resp: normal respiratory effort without conversational dyspnea   Psych: normal mood and affect    Gait: nonantalgic, appropriate coordination and balance   Neuro: normal light touch sensory exam of the extremities. Motor strength as noted below     Cervical spine Exam  Inspection: normal cervical lordosis  Tender:  medial border of scapula, superior angle of scapula, left paracervical muscles,left upper trapezius muscles, left infraspinatus, left levator, all with TART changes  Non-tender:  spinous processes  Range of Motion:  Full ROM of cervical " spine, pain with most movements  Strength: Full strength of all neck muscles  Special tests:  Spurling's - negative - left, Spurling's - negative - right, neg adson's    Left Shoulder exam    ROM:        Full active and passive ROM with flexion, extension, abduction, internal and external rotation.       Asymmetric scapular motion on L       Painful terminal flexion and abduction, mildly in ER    Non Tender:       remainder of shoulder       sternoclavicular joint       acromioclavicular joint       posterior shoulder       periscapular region    Strength:        abduction 3/5       internal rotation 4/5       external rotation 3/5       adduction 5/5    Stability testing:       neg (-) relocation       neg (-) anterior glide       neg (-) sulcus sign    Skin:       no visible deformities       well perfused       capillary refill brisk    Sensation:        normal sensation over shoulder and upper extremity       Radiology  Recent Results (from the past 744 hour(s))   XR Shoulder Left G/E 3 Views    Narrative    LEFT SHOULDER TWO VIEWS 1/15/2019 4:19 PM     HISTORY: Chronic periscapular pain on left side.    COMPARISON: None.    FINDINGS: There is no significant degenerative change.  The  acromioclavicular and coracoclavicular distances appear within normal  limits.  The subacromial space is maintained.  There is no acute  fracture or demonstrated dislocation.  There are no worrisome bony  lesions.       Impression    IMPRESSION:  No acute osseous abnormality demonstrated.    LOREN BARNEY MD     Procedure  After risks, benefits and complications of trigger point injection were discussed with the patient, a consent form was signed and the patient elected to proceed.  Using sterile technique, the area was first prepped with betadyne and an alcohol swab.  A 25 gauge  needle was used to inject a mixture of 0.5 ml of 0.5% marcaine and 0.5 ml of 1% lidocaine into 8 separate trigger point in the left upper trapezius,  levator, infraspinatus and medial rhomboids. The patient tolerated the procedure well without complications.    Assessment:  1. Myalgia    2. Chronic periscapular pain on left side    3. Scapulothoracic syndrome    4. DDD (degenerative disc disease), cervical        Plan:  Discussed the assessment with the patient.  Follow up: prn based on clinical progress, will provide MyChart update  Complicated hx with multiple issues that appear to overlap, including scapulothoracic dysfunction, weakness, neck pain and possible brachial plexopathy, does seem more narrowed today to periscapular pain primarily  Reviewed diagnostic limitations of EMG, but that results are reassuring as there were no obvious issues  TPI today, good initial relief, can be repeated based on quality and duration of benefit  XR and prior MR images independently visualized and reviewed with patient again today in clinic  We discussed modified progressive pain-free activity as tolerated  Home handouts provided and supportive care reviewed  All questions were answered today  Contact us with additional questions or concerns  Signs and sx of concern reviewed      Miguel Angel Hooker DO, CAQ  Primary Care Sports Medicine  [facility]             Disclaimer: This note consists of symbols derived from keyboarding, dictation and/or voice recognition software. As a result, there may be errors in the script that have gone undetected. Please consider this when interpreting information found in this chart.    Again, thank you for allowing me to participate in the care of your patient.        Sincerely,        Miguel Angel Hooker DO

## 2019-02-21 NOTE — PROGRESS NOTES
Teresa Fernandez  :  1960  DOS:19  MRN: [id number]    Sports Medicine Clinic Visit    PCP: Nuzhat Burt    Teresa Fernandez is a 58 year old Right hand dominant female who is seen in consultation at the request of  Nuzhat Burt C.N.P. presenting with chronic left periscapular pain.    Injury: Insidious onset of left periscapular pain over the last ~ 1+ years.  Pain located over left posterior shoulder, periscapular region, radiating to left axillary, medial upper arm.  Reports intermittent radiating, pain to left axillary and upper arm.  Additional Features:  Positive: intense burning pain.  Symptoms are better with Tylenol, Ibuprofen and Rest.  Symptoms are worse with: overhead motions, reaching with left shoulder.  Other evaluation and/or treatments so far consists of: Tylenol, Ibuprofen, Rest and Pain Mgmt procedure/trigger point injections.  Recent imaging completed: MRI of c-spine was completed at [facility] in 2017, no other imaging.  Prior History of related problems: H/o chronic pain.      Patient reports minimal relief following trigger point injections from Dr Kidd on 1/3/19.    Social History: works as medical assembly      Interim History - 2019  Since last visit on 1/15/2019 patient has minimal improvement in left shoulder, periscapular pain. Notes that physical therapy provides only modest relief.  Prednisone provided no relief.  Here to review results of EMG today.  No interim injury.       Review of Systems  Musculoskeletal: as above  Remainder of review of systems is negative including constitutional, CV, pulmonary, GI, Skin and Neurologic except as noted in HPI or medical history.    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     FUSION LUMBAR ANTERIOR TWO LEVELS  4/13/2015     GYN SURGERY  04/08/1999    hysterectomy.  [facility]     HYSTERECTOMY, PAP NO LONGER INDICATED       SOFT TISSUE SURGERY      chest-melonoma     SURGICAL HISTORY OF -   3/1996    Left nephrectomy-renal cell CA       Objective  /75   Ht 1.753 m (5' 9\")   Wt 98.9 kg (218 lb)   BMI 32.19 kg/m      General: healthy, alert and in no distress    HEENT: no scleral icterus or conjunctival erythema   Skin: no suspicious lesions or rash. No jaundice.   CV: regular rhythm by palpation, 2+ distal pulses, no pedal edema    Resp: normal respiratory effort without conversational dyspnea   Psych: normal mood and affect    Gait: nonantalgic, appropriate coordination and balance   Neuro: normal light touch sensory exam of the extremities. Motor strength as noted below     Cervical spine Exam  Inspection: normal cervical lordosis  Tender:  medial border of scapula, superior angle of scapula, left paracervical muscles,left upper trapezius muscles, left infraspinatus, left levator, all with TART changes  Non-tender:  spinous processes  Range of Motion:  Full ROM of cervical spine, pain with most movements  Strength: Full strength of all neck muscles  Special tests:  Spurling's - negative - left, Spurling's - negative - right, neg adson's    Left Shoulder exam    ROM:        Full active and passive ROM with flexion, extension, " abduction, internal and external rotation.       Asymmetric scapular motion on L       Painful terminal flexion and abduction, mildly in ER    Non Tender:       remainder of shoulder       sternoclavicular joint       acromioclavicular joint       posterior shoulder       periscapular region    Strength:        abduction 3/5       internal rotation 4/5       external rotation 3/5       adduction 5/5    Stability testing:       neg (-) relocation       neg (-) anterior glide       neg (-) sulcus sign    Skin:       no visible deformities       well perfused       capillary refill brisk    Sensation:        normal sensation over shoulder and upper extremity       Radiology  Recent Results (from the past 744 hour(s))   XR Shoulder Left G/E 3 Views    Narrative    LEFT SHOULDER TWO VIEWS 1/15/2019 4:19 PM     HISTORY: Chronic periscapular pain on left side.    COMPARISON: None.    FINDINGS: There is no significant degenerative change.  The  acromioclavicular and coracoclavicular distances appear within normal  limits.  The subacromial space is maintained.  There is no acute  fracture or demonstrated dislocation.  There are no worrisome bony  lesions.       Impression    IMPRESSION:  No acute osseous abnormality demonstrated.    LOREN BARNEY MD     Procedure  After risks, benefits and complications of trigger point injection were discussed with the patient, a consent form was signed and the patient elected to proceed.  Using sterile technique, the area was first prepped with betadyne and an alcohol swab.  A 25 gauge  needle was used to inject a mixture of 0.5 ml of 0.5% marcaine and 0.5 ml of 1% lidocaine into 8 separate trigger point in the left upper trapezius, levator, infraspinatus and medial rhomboids. The patient tolerated the procedure well without complications.    Assessment:  1. Myalgia    2. Chronic periscapular pain on left side    3. Scapulothoracic syndrome    4. DDD (degenerative disc disease), cervical         Plan:  Discussed the assessment with the patient.  Follow up: prn based on clinical progress, will provide MyChart update  Complicated hx with multiple issues that appear to overlap, including scapulothoracic dysfunction, weakness, neck pain and possible brachial plexopathy, does seem more narrowed today to periscapular pain primarily  Reviewed diagnostic limitations of EMG, but that results are reassuring as there were no obvious issues  TPI today, good initial relief, can be repeated based on quality and duration of benefit  XR and prior MR images independently visualized and reviewed with patient again today in clinic  We discussed modified progressive pain-free activity as tolerated  Home handouts provided and supportive care reviewed  All questions were answered today  Contact us with additional questions or concerns  Signs and sx of concern reviewed      Miguel Angel Hooker DO, CANIC  Primary Care Sports Medicine  [facility]             Disclaimer: This note consists of symbols derived from keyboarding, dictation and/or voice recognition software. As a result, there may be errors in the script that have gone undetected. Please consider this when interpreting information found in this chart.

## 2019-02-22 NOTE — TELEPHONE ENCOUNTER
Clare,    Please see patient's mychart note:    Not really a update  I have a request a few years ago I  had rods and a spacer put in my back  At work they have me using a kick press it hurts my back so bad wondering if you could fax  a not  explaining   This to them  I would rather not use this machine.       Att: JEREMI Moreno  Fax [phone]   Thank you so much Teresa [place]      Patient had OV on 12/26/2018 however it appears back pain was not addressed. Office visit? Or if you approve letter pended.      Thanks,  Estrella NAJERA RN

## 2019-02-25 ENCOUNTER — HOSPITAL ENCOUNTER (OUTPATIENT)
Dept: PHYSICAL THERAPY | Facility: CLINIC | Age: 59
Setting detail: THERAPIES SERIES
End: 2019-02-25
Attending: FAMILY MEDICINE
Payer: OTHER GOVERNMENT

## 2019-02-25 PROCEDURE — 97140 MANUAL THERAPY 1/> REGIONS: CPT | Mod: GP | Performed by: PHYSICAL THERAPIST

## 2019-02-28 ENCOUNTER — OFFICE VISIT (OUTPATIENT)
Dept: PSYCHOLOGY | Facility: CLINIC | Age: 59
End: 2019-02-28
Payer: OTHER GOVERNMENT

## 2019-02-28 DIAGNOSIS — F33.0 MAJOR DEPRESSIVE DISORDER, RECURRENT, MILD (H): Primary | ICD-10-CM

## 2019-02-28 PROCEDURE — 90834 PSYTX W PT 45 MINUTES: CPT | Performed by: PSYCHOLOGIST

## 2019-02-28 ASSESSMENT — ANXIETY QUESTIONNAIRES
2. NOT BEING ABLE TO STOP OR CONTROL WORRYING: NOT AT ALL
7. FEELING AFRAID AS IF SOMETHING AWFUL MIGHT HAPPEN: NOT AT ALL
4. TROUBLE RELAXING: NOT AT ALL
3. WORRYING TOO MUCH ABOUT DIFFERENT THINGS: NOT AT ALL
1. FEELING NERVOUS, ANXIOUS, OR ON EDGE: SEVERAL DAYS
6. BECOMING EASILY ANNOYED OR IRRITABLE: NOT AT ALL
5. BEING SO RESTLESS THAT IT IS HARD TO SIT STILL: NOT AT ALL
GAD7 TOTAL SCORE: 1

## 2019-02-28 ASSESSMENT — PATIENT HEALTH QUESTIONNAIRE - PHQ9: SUM OF ALL RESPONSES TO PHQ QUESTIONS 1-9: 2

## 2019-02-28 NOTE — PROGRESS NOTES
Progress Note  Disclaimer: This note consists of symbols derived from keyboarding, dictation and/or voice recognition software. As a result, there may be errors in the script that have gone undetected. Please consider this when interpreting information found in this chart.    Client Name: Teresa Fernandez  Date: 2/28/2019         Service Type: Individual      Session Start Time: 4:00 PM  session End Time: 4:45 PM      Session Length: 45     Session #: 10     Attendees: Client attended alone    Treatment Plan Last Reviewed: 7/23/2018  PHQ-9 / ERIC-7 : See flowsheet's     DATA   Client reports she has not gotten any new answers on her back.  The EMG came back with no difficulties noted.  She noted that her son is due to move home this weekend.  She feels okay with him doing that.  She has been to court for his DWI and has been mandated to AA as well as safety and her locks on his vehicle and requalified for his 's license.  She is relieved that he will be able to do this as it relieves her of the need to be his .      Progress Since Last Session (Related to Symptoms / Goals / Homework):   Symptoms: Stable    Homework: Achieved / completed to satisfaction      Episode of Care Goals: Satisfactory progress - ACTION (Actively working towards change); Intervened by reinforcing change plan / affirming steps taken     Current / Ongoing Stressors and Concerns:   Some with alcohol and legal problems, multiple medical problems, work stress.     Treatment Objective(s) Addressed in This Session:   use at least 2 coping skills for anxiety management in the next 2 weeks       Intervention:   CBT: Behavioral activation and CBT for anxiety and panic.   12 step facilitation for family and parents     ASSESSMENT: Current Emotional / Mental Status (status of significant symptoms):   Risk status (Self / Other harm or suicidal ideation)   Client denies current fears or concerns for  personal safety.   Client denies current or recent suicidal ideation or behaviors.   Client denies current or recent homicidal ideation or behaviors.   Client denies current or recent self injurious behavior or ideation.   Client denies other safety concerns.   Client Client reports there has been no change in risk factors since their last session.     Client Client reports there has been no change in protective factors since their last session.     A safety and risk management plan has not been developed at this time, however client was given the after-hours number / 911 should there be a change in any of these risk factors.     Appearance:   Appropriate    Eye Contact:   Good    Psychomotor Behavior: Normal    Attitude:   Cooperative    Orientation:   All   Speech    Rate / Production: Normal     Volume:  Normal    Mood:    Normal   Affect:    Appropriate    Thought Content:  Clear    Thought Form:  Coherent  Logical    Insight:    Good      Medication Review:   No changes to current psychiatric medication(s)     Medication Compliance:   Yes     Changes in Health Issues:   None reported     Chemical Use Review:   Substance Use: Chemical use reviewed, no active concerns identified      Tobacco Use: No current tobacco use.       Collateral Reports Completed:   Not Applicable    PLAN: (Client Tasks / Therapist Tasks / Other)   Client to continue to maintain healthy boundaries with her alcoholic child.  Continue attending 12-step family support group and contact other members if she needs to.        Carter Tierney                                                         ________________________________________________________________________    Treatment Plan    Client's Name: Teresa Fernandez  YOB: 1960    Date: 7/23/2018    DSM5 Diagnoses: (Sustained by DSM5 Criteria Listed Above)  Diagnoses:            296.31 (F33.0) Major Depressive Disorder, Recurrent Episode, Mild _ and With anxious  distress  Psychosocial & Contextual Factors: Multiple medical issues  WHODAS 2.0 (12 item)                          This questionnaire asks about difficulties due to health conditions. Health conditions                   include                        disease or illnesses, other health problems that may be short or long lasting,                    injuries, mental health or emotional problems, and problems with alcohol or drugs.                              Think back over the past 30 days and answer these questions, thinking about how much              difficulty you had doing the following activities. For each question, please Jamul only                   one response.      S1 Standing for long periods such as 30 minutes? Mild =           2   S2 Taking care of household responsibilities? None =         1   S3 Learning a new task, for example, learning how to get to a new place? None =         1   S4 How much of a problem do you have joining community activities (for example, festivals, Baptism or other activities) in the same way as anyone else can? None =         1   S5 How much have you been emotionally affected by your health problems? Moderate =   3               In the past 30 days, how much difficulty did you have in:   S6 Concentrating on doing something for ten minutes? Mild =           2   S7 Walking a long distance such as a kilometer (or equivalent)? None =         1   S8 Washing your whole body? None =         1   S9 Getting dressed? None =         1   S10 Dealing with people you do not know? None =         1   S11 Maintaining a friendship? None =         1   S12 Your day to day work? None =         1       H1 Overall, in the past 30 days, how many days were these difficulties present? Record number of days 3   H2 In the past 30 days, for how many days were you totally unable to carry out your usual activities or work because of any health condition? Record number of days  0   H3 In the past 30  days, not counting the days that you were totally unable, for how many days did you cut back or reduce your usual activities or work because of any health condition? Record number of days 0           Referral / Collaboration:  Referral to another professional/service is not indicated at this time..    Anticipated number of session or this episode of care: 15  Anxiety Treatment plan:  1. Education- the Biopsychosocial model of anxiety  a. Client will be able to describe how anxiety is effecting them physically, emotionally and socially  2. Distraction  a. Client will learn 2 techniques to distract themselves when becoming anxious  3. Diaphragmatic breathing  a. Client will learn to breath using their diaphragm  b. Client will learn 2 breathing patterns to use to reduce anxiety  4. Visualization  a. Client will learn to establish a safe, calm place in their mind utilizing all their senses  b. Client will practice using visualization at times when they are not anxious so they will be able to use it when anxiety occurs  5. Progressive muscle relaxation  a. Client will learn progressive muscle relaxation techniques and practice them 4-5 times per week.  b. Client will learn to use mental images of relaxation to relax muscle groups and do this on a daily basis  6. Self-care  a. Client will identify 3 things they can do just for themselves  b. Client will take time for quiet, reflection, meditation 3 times per week  c. Client will Learn to set boundaries when appropriate  d. Client will Identify 3 individuals they can call on for support, distraction  7. Assessment of progress  a. Client will engage in assessment of their progress on a regular basis      Client has reviewed and agreed to the above plan.      Carter Tierney  July 24, 2018

## 2019-03-01 ASSESSMENT — ANXIETY QUESTIONNAIRES: GAD7 TOTAL SCORE: 1

## 2019-03-06 ENCOUNTER — OFFICE VISIT (OUTPATIENT)
Dept: RHEUMATOLOGY | Facility: CLINIC | Age: 59
End: 2019-03-06
Payer: OTHER GOVERNMENT

## 2019-03-06 VITALS
HEART RATE: 96 BPM | OXYGEN SATURATION: 99 % | SYSTOLIC BLOOD PRESSURE: 140 MMHG | WEIGHT: 213.4 LBS | HEIGHT: 69 IN | DIASTOLIC BLOOD PRESSURE: 72 MMHG | BODY MASS INDEX: 31.61 KG/M2

## 2019-03-06 DIAGNOSIS — M79.7 FIBROMYALGIA: ICD-10-CM

## 2019-03-06 DIAGNOSIS — Z85.528 HX OF RENAL CELL CARCINOMA: ICD-10-CM

## 2019-03-06 DIAGNOSIS — R74.8 ELEVATED CK: Primary | ICD-10-CM

## 2019-03-06 LAB
ALBUMIN SERPL-MCNC: 4.1 G/DL (ref 3.4–5)
ALP SERPL-CCNC: 79 U/L (ref 40–150)
ALT SERPL W P-5'-P-CCNC: 49 U/L (ref 0–50)
AST SERPL W P-5'-P-CCNC: 33 U/L (ref 0–45)
BASOPHILS # BLD AUTO: 0 10E9/L (ref 0–0.2)
BASOPHILS NFR BLD AUTO: 0.4 %
BILIRUB DIRECT SERPL-MCNC: 0.1 MG/DL (ref 0–0.2)
BILIRUB SERPL-MCNC: 0.5 MG/DL (ref 0.2–1.3)
CK SERPL-CCNC: 312 U/L (ref 30–225)
CREAT SERPL-MCNC: 0.79 MG/DL (ref 0.52–1.04)
CRP SERPL-MCNC: <2.9 MG/L (ref 0–8)
DIFFERENTIAL METHOD BLD: NORMAL
EOSINOPHIL # BLD AUTO: 0.2 10E9/L (ref 0–0.7)
EOSINOPHIL NFR BLD AUTO: 3.4 %
ERYTHROCYTE [DISTWIDTH] IN BLOOD BY AUTOMATED COUNT: 12.5 % (ref 10–15)
ERYTHROCYTE [SEDIMENTATION RATE] IN BLOOD BY WESTERGREN METHOD: 9 MM/H (ref 0–30)
GFR SERPL CREATININE-BSD FRML MDRD: 83 ML/MIN/{1.73_M2}
HCT VFR BLD AUTO: 41 % (ref 35–47)
HGB BLD-MCNC: 13.8 G/DL (ref 11.7–15.7)
LYMPHOCYTES # BLD AUTO: 1.7 10E9/L (ref 0.8–5.3)
LYMPHOCYTES NFR BLD AUTO: 32.3 %
MCH RBC QN AUTO: 30.7 PG (ref 26.5–33)
MCHC RBC AUTO-ENTMCNC: 33.7 G/DL (ref 31.5–36.5)
MCV RBC AUTO: 91 FL (ref 78–100)
MONOCYTES # BLD AUTO: 0.5 10E9/L (ref 0–1.3)
MONOCYTES NFR BLD AUTO: 9.9 %
NEUTROPHILS # BLD AUTO: 2.9 10E9/L (ref 1.6–8.3)
NEUTROPHILS NFR BLD AUTO: 54 %
PLATELET # BLD AUTO: 239 10E9/L (ref 150–450)
PROT SERPL-MCNC: 7 G/DL (ref 6.8–8.8)
RBC # BLD AUTO: 4.5 10E12/L (ref 3.8–5.2)
T4 FREE SERPL-MCNC: 1.3 NG/DL (ref 0.76–1.46)
TSH SERPL DL<=0.005 MIU/L-ACNC: 0.21 MU/L (ref 0.4–4)
WBC # BLD AUTO: 5.4 10E9/L (ref 4–11)

## 2019-03-06 PROCEDURE — 82565 ASSAY OF CREATININE: CPT | Performed by: INTERNAL MEDICINE

## 2019-03-06 PROCEDURE — 85025 COMPLETE CBC W/AUTO DIFF WBC: CPT | Performed by: INTERNAL MEDICINE

## 2019-03-06 PROCEDURE — 99000 SPECIMEN HANDLING OFFICE-LAB: CPT | Performed by: INTERNAL MEDICINE

## 2019-03-06 PROCEDURE — 80076 HEPATIC FUNCTION PANEL: CPT | Performed by: INTERNAL MEDICINE

## 2019-03-06 PROCEDURE — 85652 RBC SED RATE AUTOMATED: CPT | Performed by: INTERNAL MEDICINE

## 2019-03-06 PROCEDURE — 99244 OFF/OP CNSLTJ NEW/EST MOD 40: CPT | Performed by: INTERNAL MEDICINE

## 2019-03-06 PROCEDURE — 84443 ASSAY THYROID STIM HORMONE: CPT | Performed by: INTERNAL MEDICINE

## 2019-03-06 PROCEDURE — 86140 C-REACTIVE PROTEIN: CPT | Performed by: INTERNAL MEDICINE

## 2019-03-06 PROCEDURE — 84439 ASSAY OF FREE THYROXINE: CPT | Performed by: INTERNAL MEDICINE

## 2019-03-06 PROCEDURE — 82085 ASSAY OF ALDOLASE: CPT | Mod: 90 | Performed by: INTERNAL MEDICINE

## 2019-03-06 PROCEDURE — 36415 COLL VENOUS BLD VENIPUNCTURE: CPT | Performed by: INTERNAL MEDICINE

## 2019-03-06 PROCEDURE — 82550 ASSAY OF CK (CPK): CPT | Performed by: INTERNAL MEDICINE

## 2019-03-06 ASSESSMENT — MIFFLIN-ST. JEOR: SCORE: 1612.36

## 2019-03-06 NOTE — NURSING NOTE
RAPID3 (0-30) Cumulative Score  13.3          RAPID3 Weighted Score (divide #4 by 3 and that is the weighted score)  4.43         Temi Tripp [facility]  3/6/2019 11:12 AM

## 2019-03-06 NOTE — Clinical Note
Please fax my clinic note dated 3/6/2019 to Ms. Fernandez[facility] you,Gaurav Chung MD3/7/2019 6:33 AM

## 2019-03-06 NOTE — PROGRESS NOTES
Rheumatology Clinic Visit      Teresa Fernandez MRN# [id number]   YOB: 1960 Age: 59 year old      Date of visit: 3/06/19   Referring provider: Jazzmine Davis  PCP: Nuzhat Burt    Chief Complaint   Patient presents with:  Consult: Patient has pain all over that moves around. Today the pain is in her upper back between shoulders    Assessment and Plan     1. HyperCKemia: Elevated CK, with several measurements as follows: 254, 530, and 366.  She does not have weakness.  No skin changes to suggest dermatomyositis.  Previous workup including a NCS/EMG did not suggest myopathy.  She does have hypothyroidism that is well controlled.  Her CK is approximately 1.5 times the upper limit of normal for a white female (325).  She has not done increase physical activity in the last 7 days so we will recheck a CK today.  Check other labs as noted below.  I explained to her that at this time the elevated CK is at a level that could be her baseline and she should be monitored clinically for any worsening symptoms such as weakness.  If CK rises significantly or weakness emerges then will consider repeat NCS/EMG and will reconsider muscle biopsy at that time.  I do not see evidence at this point of this being a myositis.  We also discussed that it is possible that CK elevation, typically associated with a myositis though, could be related to malignancy.  She has a history of one melanoma being removed in the distant past, and a history of nephrectomy for renal cell carcinoma.  She was following with Dr. Pa at [facility] previously and I have asked that she return to discuss surveillance with him.  Note that she has a severe contrast allergy so it would be helpful to have Dr. Pa's assistance with surveillance of the renal cell carcinoma to determine the best screening test in this case.   - CBC, creatinine, hepatic panel, ESR, CRP, TSH, CK, aldolase  - Heme/onc referral (consent to send Dr. Pa this  note)    2. Fibromyalgia: Currently her symptoms are most consistent with fibromyalgia.  She says that this is already being managed in the pain clinic    3. Elevated blood pressure:  Teresa to follow up with Primary Care provider regarding elevated blood pressure.     Ms. Fernandez verbalized agreement with and understanding of the rational for the diagnosis and treatment plan.  All questions were answered to best of my ability and the patient's satisfaction. Ms. Fernandez was advised to contact the clinic with any questions that may arise after the clinic visit.      Thank you for involving me in the care of the patient    Return to clinic: TBD      HPI   Teresa JOHNNY Fernandez is a 59 year old female with a past medical history significant for hypothyroidism, allergic rhinitis, chronic sinusitis, anxiety, GERD, dry eyes, chronic low back pain / degenerative disc disease of the lumbar spine, osteopenia, history of melanoma, asthma, and history of renal cell carcinoma who is seen in consultation at the request of Jazzmine Davis for evaluation of myopathy and elevated CK.    Today, Ms. Fernandez reports that about 2 years ago she had flulike symptoms and was evaluated by her PCP where an elevated CK was found.  She reports that her main issue is diffuse body pain.  She has 2 rods and spacers in her back that was placed about 6 years ago.  She has hypothyroidism that was diagnosed in 1995 and is on levothyroxine.  He says that the hypothyroidism was diagnosed the same year that she was diagnosed with renal cell carcinoma that was treated by unilateral nephrectomy.  Renal cell carcinoma was monitored by her oncologist with CT scans for a while with no recurrence and therefore no more CT scans were being done.  She has gluten sensitivity and therefore avoids gluten; she ate gluten once and felt absolutely miserable so she avoids it completely.  She was tested once in 2011 for gliadin antibody IgA that was negative but she says that  she had been on a gluten-free diet for a while at that point.  Restless leg syndrome and is taking iron twice daily.  He is a Celebrex as needed for her pain that is effective.  Mildly dry eyes that she does not do anything for.  No dry mouth.  No joint swelling.  Denies fevers, chills, nausea, or vomiting.  Occasional constipation or diarrhea. No abdominal pain. No chest pain/pressure, palpitations, or shortness of breath. No LE swelling.  No oral or nasal sores.  No rash.  No photosensitivity or photophobia. No eye pain or redness. No history of inflammatory eye disease.  No history of inflammatory bowel disease.  No history of DVT, pulmonary embolism, or miscarriage.   No history of serositis.  No history of Raynaud's Phenomenon.  Nonrestorative sleep; waking up several times at night; tired throughout the day.  No weakness.  No difficulty doing her daily activities.    Previous workup includes:    Myositis panel:    Niharika-1 (histidyl-tRNA synthetase) Ab, IgG    PL-12 (alanyl-tRNA synthetase) Antibody     PL-7 (threonyl-tRNA synthetase) Antibody     EJ (glycyl-tRNA synthetase) Antibody    OJ (isoleucyl-tRNA synthetase) Antibody     SRP (Signal Recognition Particle) Ab     Mi-2 (nuclear helicase protein) Antibody     P155/140 Antibody    TIF-1 gamma (155 kDA) Antibody    SAE1 (SUMO activating enzyme) Antibody    MDA5 (CADM-140) Antibody     NXP-2 (Nuclear matrix proten-2) Ab    Labs for creatinine kinase with a maximum of 530    Normal CRP, ESR, RF, DIEGO    TSH mildly suppressed    11/2017 NCS/EMG showed chronic left-sided lumbosacral radiculopathy affecting the S1 and probably L5 nerve roots with no evidence of generalized myopathy or generalized disorder the lower motor neurons    MRI of the lumbar spine showing degenerative changes    MRI of the cervical spine showing degenerative changes    No muscle biopsy hx    Previously was evaluated by Dr. Zavala in the neuromuscular clinic and his note was reviewed.  Mild  elevation of the CK.  Chronic multifocal pain possibly fibromyalgia.    Tobacco: Former smoker; quit in 1995  EtOH: None  Drugs: None    ROS   GEN: No fevers, chills, night sweats, or weight change  SKIN: No itching, rashes, sores  HEENT: No epistaxis. No oral or nasal ulcers.  CV: No chest pain, pressure, palpitations, or dyspnea on exertion.  PULM: No SOB, wheeze, cough.  GI: See HPI  : No blood in urine.  MSK: See HPI.  NEURO: No numbness, tingling, or weakness.  ENDO: No heat/cold intolerance.  EXT: No LE swelling  PSYCH: Negative    Active Problem List     Patient Active Problem List   Diagnosis     Acquired hypothyroidism     Allergic state     Chronic rhinitis     Allergic rhinitis due to pollen     Sinusitis, chronic     History of skin cancer     ERIC (generalized anxiety disorder)     Leukoplakia of oral mucosa, including tongue     Renal cancer (H)     GERD (gastroesophageal reflux disease)     Chronic low back pain     Dry eye syndrome     Chronic fatigue     Osteopenia     History of melanoma in situ     Eczema     Moderate persistent asthma without complication     History of kidney cancer     DDD (degenerative disc disease), lumbar     Hyperlipidemia LDL goal <160     Chest tightness or pressure     Muscle pain     Past Medical History     Past Medical History:   Diagnosis Date     ALLERGIC RHINITIS NOS 4/12/2005     Anxiety      Arthritis     herniated disc     Bronchitis, not specified as acute or chronic      CHR MAXILLARY SINUSITIS 4/12/2005     Depressive disorder, not elsewhere classified      Eczema 10/17/2012     Environmental and seasonal allergies      GERD (gastroesophageal reflux disease)      Gluten intolerance      Malignant neoplasm of renal pelvis (H) 1995    Renal cell carcinoma     Melanoma (H) 06/2010     Other specified acquired hypothyroidism 4/12/2005     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     FUSION LUMBAR ANTERIOR TWO LEVELS  4/13/2015     GYN SURGERY  04/08/1999    hysterectomy.  [facility]     HYSTERECTOMY, PAP NO LONGER INDICATED       SOFT TISSUE SURGERY      chest-melonoma     SURGICAL HISTORY OF -   3/1996    Left nephrectomy-renal cell CA     Allergy     Allergies   Allergen Reactions     Omnipaque [Iohexol] Swelling and Difficulty breathing     Immediate throat swelling following around 1-2cc of omnipaque 300 for spine procedure     Gluten      Iodine      Welts from CT contrast, surgical scrub is ok.     Penicillins Hives     Current Medication List     Current Outpatient Medications   Medication Sig     acetaminophen (TYLENOL) 325 MG tablet Take 325-650 mg by mouth every 6 hours as needed for mild pain     albuterol (2.5 MG/3ML) 0.083% nebulizer solution Take 1 vial (2.5 mg) by nebulization every 6 hours as needed for shortness of breath / dyspnea or wheezing     albuterol (PROAIR HFA/PROVENTIL HFA/VENTOLIN HFA) 108 (90 BASE) MCG/ACT Inhaler Inhale 2 puffs into the lungs every 6 hours as needed for shortness of breath / dyspnea     celecoxib (CELEBREX) 100 MG capsule Take 1 capsule (100 mg) by mouth 2 times daily as needed for moderate pain     Cholecalciferol (VITAMIN D3 PO) Take 2,000 Units by mouth 2 times daily      Cyanocobalamin (VITAMIN B 12 PO) Take 500 mcg by mouth daily     DULoxetine (CYMBALTA) 60 MG capsule TAKE 1 CAPSULE EVERY MORNING     fexofenadine (ALLEGRA) 180 MG tablet Take 1 tablet (180 mg) by mouth daily     fluticasone-salmeterol (ADVAIR) 250-50 MCG/DOSE diskus inhaler Inhale 1 puff into the lungs 2 times daily     GLUCOSAMINE SULFATE PO Take 1,000 mg by mouth 2 times daily     levothyroxine (SYNTHROID/LEVOTHROID) 125 MCG tablet TAKE 1 TABLET DAILY     MAGNESIUM OXIDE PO Take 200 mg by  mouth daily     Multiple Vitamins-Minerals (MULTIVITAMIN ADULT PO) Take by mouth every morning      OVER-THE-COUNTER Place 1 drop into both eyes 3 times daily. Systane Ultra, Systane Balance, Blink Tears or Refresh Optive Artificial Tear     ranitidine (ZANTAC) 150 MG tablet TAKE 1 TABLET TWICE A DAY     tiZANidine (ZANAFLEX) 2 MG tablet Take 1 tablet at bedtime for 3 days then take 1 tablet twice a day thereafter     order for DME Equipment being ordered: Nebulizer     No current facility-administered medications for this visit.          Social History   See HPI    Family History     Family History   Problem Relation Age of Onset     Diabetes Mother      Cardiovascular Mother      Lipids Mother      Depression Mother      Hypertension Father      Lipids Father      Obesity Father      Macular Degeneration Father      Cancer Maternal Grandmother         kind unknown had sores on legs     Eczema Maternal Grandmother      Eczema Maternal Grandfather      Breast Cancer Paternal Grandmother      Cancer Paternal Grandmother         breast     Hypertension Paternal Grandfather      Cardiovascular Paternal Grandfather         heart attack     Hypertension Brother      Thyroid Disease Sister      Hypertension Sister      Depression Sister      Allergies Sister      Allergies Son      Eczema Son      Lipids Sister      Thyroid Disease Sister      Neurologic Disorder Sister      Depression Sister      Respiratory Son      Glaucoma No family hx of      Cerebrovascular Disease No family hx of      Denies family history of rheumatologic/autoimmune disorders    Physical Exam     Temp Readings from Last 3 Encounters:   12/26/18 98.3  F (36.8  C) (Tympanic)   12/06/18 98  F (36.7  C) (Tympanic)   11/10/18 97.2  F (36.2  C) (Oral)     BP Readings from Last 5 Encounters:   03/06/19 140/72   02/21/19 130/75   01/15/19 119/76   01/03/19 122/77   12/26/18 122/74     Pulse Readings from Last 1 Encounters:   03/06/19 96     Resp Readings  "from Last 1 Encounters:   12/26/18 12     Estimated body mass index is 31.51 kg/m  as calculated from the following:    Height as of this encounter: 1.753 m (5' 9\").    Weight as of this encounter: 96.8 kg (213 lb 6.4 oz).    GEN: NAD; overweight  HEENT: MMM. No oral lesions.  Anicteric, noninjected sclera  CV: S1, S2. RRR. No m/r/g.  PULM: CTA bilaterally. No w/c.  ABD: +BS.   MSK:  Hands, wrists, and elbows without synovial swelling, increased warmth, tenderness to palpation, or overlying erythema.  Negative MCP squeeze.  Normal  strength. Bilateral shoulders nontender to palpation; normal range of motion.  Hips nontender to palpation.  Knees, ankles, and MTPs without swelling or tenderness palpation.  Nontender to palpation of the large muscle groups in the arms and thighs.    NEURO: UE and LE strengths 5/5 and equal bilaterally.  Biceps, triceps, patellar, and Achilles tendon reflexes 2+ bilaterally  SKIN: No rash.  No nail pitting.  EXT: No LE edema  PSYCH: Alert. Appropriate.    Labs / Imaging (select studies)     RF/CCP  Recent Labs   Lab Test 01/15/18  1605 01/09/17  1620   RHF <20 <20     DIEGO  Recent Labs   Lab Test 01/09/17  1620   LOLA <1.0  Interpretation:  Negative       Send-out Labs  Recent Labs   Lab Test 05/05/11  1525   SRESLT Canceled, Test credited  Duplicate request   STSTNM GLIADIN ANTIBODY IGA IGG   SSPTYP Serum     CBC  Recent Labs   Lab Test 09/08/17  1555 01/09/17  1620 09/28/16  1550 05/02/16  1601 12/08/15  0735   WBC 5.6 6.5  --  6.0 4.6   RBC 4.40 4.44  --  4.41 4.34   HGB 13.4 13.5 14.0 13.4 13.5   HCT 40.4 40.0  --  40.0 39.9   MCV 92 90  --  91 92   RDW 12.6 12.7  --  12.2 12.1    258  --  227 273   MCH 30.5 30.4  --  30.4 31.1   MCHC 33.2 33.8  --  33.5 33.8   NEUTROPHIL  --  56.2  --  55.1 61.4   LYMPH  --  32.3  --  32.5 25.1   MONOCYTE  --  8.2  --  8.6 9.9   EOSINOPHIL  --  2.5  --  3.3 3.0   BASOPHIL  --  0.8  --  0.5 0.6   ANEU  --  3.6  --  3.3 2.8   ALYM  --  2.1  " --  2.0 1.2   SHANNAN  --  0.5  --  0.5 0.5   AEOS  --  0.2  --  0.2 0.1   ABAS  --  0.1  --  0.0 0.0     CMP  Recent Labs   Lab Test 08/02/18  1547 09/15/17  1504 09/08/17  1555 05/02/16  1601 12/08/15  0735 04/08/15  0936    139 138 139 139 137   POTASSIUM 4.1 4.0 4.2 4.0 4.2 4.3   CHLORIDE 105 104 105 103 104 101   CO2 30 29 27 28 33* 32   ANIONGAP 5 6 6 8 2* 4   GLC 86 85 75 95 94 85   BUN 14 16 14 15 16 14   CR 0.85 0.84 0.94 0.76 0.76 0.83   GFRESTIMATED 69 70 62 80 79 72   GFRESTBLACK 84 84 75 >90   GFR Calc   >90   GFR Calc   87   ANNIE 9.6 9.7 9.4 9.4 9.0 9.8   BILITOTAL  --  0.3 0.5 0.4 0.4  --    ALBUMIN  --  3.8 4.0 3.9 3.7 4.0   PROTTOTAL  --  6.7* 7.0 7.3 6.9  --    ALKPHOS  --  79 78 80 93  --    AST  --  32 45 24 26  --    ALT  --  48 57* 42 39  --      HgA1c  Recent Labs   Lab Test 11/14/14  0911   A1C 5.3     Iron Studies  Recent Labs   Lab Test 05/02/16  1601   SAMIA 34     Calcium/VitaminD  Recent Labs   Lab Test 08/02/18  1547 09/15/17  1504 09/08/17  1555 05/02/16  1601  09/11/12  1236   ANNIE 9.6 9.7 9.4 9.4   < > 9.8   VITDT  --   --   --  58  --  31    < > = values in this interval not displayed.     ESR/CRP  Recent Labs   Lab Test 01/15/18  1605 09/08/17  1555 01/09/17  1620 05/05/11  1434   SED  --  8 16 9   CRP 4.0 <2.9 <2.9  --      CK/Aldolase  Recent Labs   Lab Test 09/10/18  1528 08/02/18  1547 11/07/17  1300   * 530* 254*   ALDOLASE  --   --  3.3     TSH/T4  Recent Labs   Lab Test 09/10/18  1528 08/02/18  1547 09/08/17  1555  12/28/10  1542   TSH 0.29* 0.33* 0.68   < > 1.98   T4 1.26 1.23  --   --  1.04    < > = values in this interval not displayed.     Lipid Panel  Recent Labs   Lab Test 06/30/17  0608 04/29/16  0737 01/29/15  0656   CHOL 221* 220* 217*   TRIG 111 122 131   HDL 56 58 50*   * 138* 141*   VLDL  --   --  26   CHOLHDLRATIO  --   --  4.3   NHDL 165* 162*  --      Hepatitis C  Recent Labs   Lab Test 04/27/16  1508   HCVAB  Nonreactive   Assay performance characteristics have not been established for newborns,   infants, and children       Lyme ab screening  Recent Labs   Lab Test 09/08/17  1555   LYMEGM 0.14     Immunization History     Immunization History   Administered Date(s) Administered     DTaP, Unspecified 05/08/2008, 07/08/2013     FLU 6-35 months 11/10/2008, 09/22/2009, 10/13/2010, 09/22/2011, 11/14/2012     Influenza (IIV3) PF 10/31/2005, 10/30/2008, 09/22/2009, 10/13/2010, 09/22/2011, 11/14/2012     Influenza Vaccine IM 3yrs+ 4 Valent IIV4 01/07/2014, 12/29/2015, 09/13/2016, 09/10/2018     TD (ADULT, 7+) 05/08/2008     TDAP Vaccine (Adacel) 07/08/2013          Chart documentation done in part with Dragon Voice recognition Software. Although reviewed after completion, some word and grammatical error may remain.    Gaurav Chung MD

## 2019-03-07 LAB — ALDOLASE SERPL-CCNC: 4 U/L (ref 1.5–8.1)

## 2019-03-10 NOTE — RESULT ENCOUNTER NOTE
"Rheumatology team: Please call Ms. Fernandez to ensure she gets the following information:    Kindful message sent:  \"Ms. Fernandez,    Muscle enzyme CK remains elevated at 312.  Thyroid test TSH is low, suggesting that the dose of levothyroxine may be able to be reduced - please discuss this with your primary care provider.      Re-evaluation / monitoring for the renal cell carcinoma is still recommended as we discussed.  Please see Dr. Pa for this.     No further evaluation of the elevated CK is needed at this time.   If you develop worsening symptoms such as weakness, or if you have a risking CK level then you should be re-evaluated.     Sincerely,  Gaurav Chung MD  3/10/2019 4:59 PM\""

## 2019-03-11 ENCOUNTER — MYC MEDICAL ADVICE (OUTPATIENT)
Dept: FAMILY MEDICINE | Facility: CLINIC | Age: 59
End: 2019-03-11

## 2019-03-11 ENCOUNTER — TELEPHONE (OUTPATIENT)
Dept: RHEUMATOLOGY | Facility: CLINIC | Age: 59
End: 2019-03-11

## 2019-03-11 DIAGNOSIS — E03.9 ACQUIRED HYPOTHYROIDISM: Primary | ICD-10-CM

## 2019-03-11 NOTE — TELEPHONE ENCOUNTER
Left vm for pt to return call to notify of provider's result note below:    Muscle enzyme CK remains elevated at 312.  Thyroid test TSH is low, suggesting that the dose of levothyroxine may be able to be reduced - please discuss this with your primary care provider.      Re-evaluation / monitoring for the renal cell carcinoma is still recommended as we discussed.  Please see Dr. Pa for this.     No further evaluation of the elevated CK is needed at this time.   If you develop worsening symptoms such as weakness, or if you have a risking CK level then you should be re-evaluated.     Cisco Robles RN....3/11/2019 10:19 AM

## 2019-03-11 NOTE — TELEPHONE ENCOUNTER
Patient returned the call and was informed of results.  She verbalized understanding and has no questions.    Cisco Robles RN....3/11/2019 1:02 PM

## 2019-03-12 DIAGNOSIS — E03.9 HYPOTHYROIDISM, UNSPECIFIED TYPE: Primary | ICD-10-CM

## 2019-03-12 RX ORDER — LEVOTHYROXINE SODIUM 100 UG/1
100 TABLET ORAL DAILY
Qty: 90 TABLET | Refills: 3 | Status: SHIPPED | OUTPATIENT
Start: 2019-03-12 | End: 2019-03-12

## 2019-03-12 RX ORDER — LEVOTHYROXINE SODIUM 100 UG/1
100 TABLET ORAL DAILY
Qty: 30 TABLET | Refills: 0 | Status: SHIPPED | OUTPATIENT
Start: 2019-03-12 | End: 2020-11-05 | Stop reason: DRUGHIGH

## 2019-03-12 RX ORDER — LEVOTHYROXINE SODIUM 100 UG/1
100 TABLET ORAL DAILY
Qty: 30 TABLET | Refills: 0 | Status: SHIPPED | OUTPATIENT
Start: 2019-03-12 | End: 2019-03-12

## 2019-03-12 NOTE — TELEPHONE ENCOUNTER
Clare, please see her mychart note.   Rheumatology, Dr Chung tested thyroid and advised her to discuss with you.     Lab Results   Component Value Date    TSH 0.21 03/06/2019    TSH 0.29 09/10/2018     T4 Free 1.30   0.76 - 1.46 ng/dL Final 03/06/2019 12:14 PM       Current dose is 125 mcg  levothyroxine (SYNTHROID/LEVOTHROID) 125 MCG tablet 90 tablet 1 10/12/2018  No   Sig: TAKE 1 TABLET DAILY     Angelica Ross RNC

## 2019-03-12 NOTE — NURSING NOTE
Clinic notes have been faxed to Dr. Pa with [facility]. Fax number: [phone].  Temi Tripp CMA Rheumatology  3/12/2019 11:41 AM

## 2019-03-12 NOTE — TELEPHONE ENCOUNTER
I sent in new dose of Levothyroxine- have patient stop taking 125 mcg Levothyroxine and start 100 mcg dose  She should recheck TSH/T4 in 12 weeks - please order lab

## 2019-03-12 NOTE — TELEPHONE ENCOUNTER
Patient informed of message below from provider.   Patient requests a 30 day supply be sent to [facility] location within orders only encounter from today.

## 2019-03-12 NOTE — TELEPHONE ENCOUNTER
Attempted to contact patient, no answer, left voice message to call back.  Placed TSH and T4 as written order below by provider.

## 2019-03-14 ENCOUNTER — OFFICE VISIT (OUTPATIENT)
Dept: PSYCHOLOGY | Facility: CLINIC | Age: 59
End: 2019-03-14
Payer: OTHER GOVERNMENT

## 2019-03-14 DIAGNOSIS — F33.0 MAJOR DEPRESSIVE DISORDER, RECURRENT, MILD (H): Primary | ICD-10-CM

## 2019-03-14 PROCEDURE — 90834 PSYTX W PT 45 MINUTES: CPT | Performed by: PSYCHOLOGIST

## 2019-03-18 ENCOUNTER — HOSPITAL ENCOUNTER (OUTPATIENT)
Dept: PHYSICAL THERAPY | Facility: CLINIC | Age: 59
Setting detail: THERAPIES SERIES
End: 2019-03-18
Attending: FAMILY MEDICINE
Payer: OTHER GOVERNMENT

## 2019-03-18 PROCEDURE — 97140 MANUAL THERAPY 1/> REGIONS: CPT | Mod: GP | Performed by: PHYSICAL THERAPIST

## 2019-03-18 PROCEDURE — 97110 THERAPEUTIC EXERCISES: CPT | Mod: GP | Performed by: PHYSICAL THERAPIST

## 2019-03-18 ASSESSMENT — ANXIETY QUESTIONNAIRES
7. FEELING AFRAID AS IF SOMETHING AWFUL MIGHT HAPPEN: NOT AT ALL
1. FEELING NERVOUS, ANXIOUS, OR ON EDGE: NOT AT ALL
6. BECOMING EASILY ANNOYED OR IRRITABLE: SEVERAL DAYS
GAD7 TOTAL SCORE: 1
5. BEING SO RESTLESS THAT IT IS HARD TO SIT STILL: NOT AT ALL
4. TROUBLE RELAXING: NOT AT ALL
2. NOT BEING ABLE TO STOP OR CONTROL WORRYING: NOT AT ALL
3. WORRYING TOO MUCH ABOUT DIFFERENT THINGS: NOT AT ALL

## 2019-03-18 ASSESSMENT — PATIENT HEALTH QUESTIONNAIRE - PHQ9: SUM OF ALL RESPONSES TO PHQ QUESTIONS 1-9: 1

## 2019-03-18 NOTE — PROGRESS NOTES
Progress Note  Disclaimer: This note consists of symbols derived from keyboarding, dictation and/or voice recognition software. As a result, there may be errors in the script that have gone undetected. Please consider this when interpreting information found in this chart.    Client Name: Teresa Fernandez  Date: 3/14/2019         Service Type: Individual      Session Start Time: 4:00 PM  session End Time: 4:45 PM      Session Length: 45     Session #: 11     Attendees: Client attended alone    Treatment Plan Last Reviewed: 3/14/2019  PHQ-9 / ERIC-7 : See flowsheet's     DATA   Client reports she has seen both rheumatology and oncology for her pain issues.  The consensus seems to be pointing towards fibromyalgia.  Her son had his court date for his DWI and managed to get probation and a fine.  We talked about the pitfalls of trying to monitor his behavior.  She also started a new job which she is quite excited about.      Progress Since Last Session (Related to Symptoms / Goals / Homework):   Symptoms: Stable    Homework: Achieved / completed to satisfaction      Episode of Care Goals: Satisfactory progress - ACTION (Actively working towards change); Intervened by reinforcing change plan / affirming steps taken     Current / Ongoing Stressors and Concerns:   Some with alcohol and legal problems, multiple medical problems, work stress.     Treatment Objective(s) Addressed in This Session:   use at least 2 coping skills for anxiety management in the next 2 weeks       Intervention:   CBT: Behavioral activation and CBT for anxiety and panic.   12 step facilitation for family and parents     ASSESSMENT: Current Emotional / Mental Status (status of significant symptoms):   Risk status (Self / Other harm or suicidal ideation)   Client denies current fears or concerns for personal safety.   Client denies current or recent suicidal ideation or behaviors.   Client denies current or  recent homicidal ideation or behaviors.   Client denies current or recent self injurious behavior or ideation.   Client denies other safety concerns.   Client Client reports there has been no change in risk factors since their last session.     Client Client reports there has been no change in protective factors since their last session.     A safety and risk management plan has not been developed at this time, however client was given the after-hours number / 911 should there be a change in any of these risk factors.     Appearance:   Appropriate    Eye Contact:   Good    Psychomotor Behavior: Normal    Attitude:   Cooperative    Orientation:   All   Speech    Rate / Production: Normal     Volume:  Normal    Mood:    Normal   Affect:    Appropriate    Thought Content:  Clear    Thought Form:  Coherent  Logical    Insight:    Good      Medication Review:   No changes to current psychiatric medication(s)     Medication Compliance:   Yes     Changes in Health Issues:   None reported     Chemical Use Review:   Substance Use: Chemical use reviewed, no active concerns identified      Tobacco Use: No current tobacco use.       Collateral Reports Completed:   Not Applicable    PLAN: (Client Tasks / Therapist Tasks / Other)   Client to continue to maintain healthy boundaries with her alcoholic child.  Continue attending 12-step family support group and contact other members if she needs to.        Carter Tierney                                                         ________________________________________________________________________    Treatment Plan    Client's Name: Teresa Fernandez  YOB: 1960    Date: 7/23/2018    DSM5 Diagnoses: (Sustained by DSM5 Criteria Listed Above)  Diagnoses:            296.31 (F33.0) Major Depressive Disorder, Recurrent Episode, Mild _ and With anxious distress  Psychosocial & Contextual Factors: Multiple medical issues  WHODAS 2.0 (12 item)                          This  questionnaire asks about difficulties due to health conditions. Health conditions                   include                        disease or illnesses, other health problems that may be short or long lasting,                    injuries, mental health or emotional problems, and problems with alcohol or drugs.                              Think back over the past 30 days and answer these questions, thinking about how much              difficulty you had doing the following activities. For each question, please [place] only                   one response.      S1 Standing for long periods such as 30 minutes? Mild =           2   S2 Taking care of household responsibilities? None =         1   S3 Learning a new task, for example, learning how to get to a new place? None =         1   S4 How much of a problem do you have joining community activities (for example, festivals, [employer] or other activities) in the same way as anyone else can? None =         1   S5 How much have you been emotionally affected by your health problems? Moderate =   3               In the past 30 days, how much difficulty did you have in:   S6 Concentrating on doing something for ten minutes? Mild =           2   S7 Walking a long distance such as a kilometer (or equivalent)? None =         1   S8 Washing your whole body? None =         1   S9 Getting dressed? None =         1   S10 Dealing with people you do not know? None =         1   S11 Maintaining a friendship? None =         1   S12 Your day to day work? None =         1       H1 Overall, in the past 30 days, how many days were these difficulties present? Record number of days 3   H2 In the past 30 days, for how many days were you totally unable to carry out your usual activities or work because of any health condition? Record number of days  0   H3 In the past 30 days, not counting the days that you were totally unable, for how many days did you cut back or reduce your usual activities  or work because of any health condition? Record number of days 0           Referral / Collaboration:  Referral to another professional/service is not indicated at this time..    Anticipated number of session or this episode of care: 15  Anxiety Treatment plan:  1. Education- the Biopsychosocial model of anxiety  a. Client will be able to describe how anxiety is effecting them physically, emotionally and socially  2. Distraction  a. Client will learn 2 techniques to distract themselves when becoming anxious  3. Diaphragmatic breathing  a. Client will learn to breath using their diaphragm  b. Client will learn 2 breathing patterns to use to reduce anxiety  4. Visualization  a. Client will learn to establish a safe, calm place in their mind utilizing all their senses  b. Client will practice using visualization at times when they are not anxious so they will be able to use it when anxiety occurs  5. Progressive muscle relaxation  a. Client will learn progressive muscle relaxation techniques and practice them 4-5 times per week.  b. Client will learn to use mental images of relaxation to relax muscle groups and do this on a daily basis  6. Self-care  a. Client will identify 3 things they can do just for themselves  b. Client will take time for quiet, reflection, meditation 3 times per week  c. Client will Learn to set boundaries when appropriate  d. Client will Identify 3 individuals they can call on for support, distraction  7. Assessment of progress  a. Client will engage in assessment of their progress on a regular basis      Client has reviewed and agreed to the above plan.      Carter Tierney  July 24, 2018

## 2019-03-19 ASSESSMENT — ANXIETY QUESTIONNAIRES: GAD7 TOTAL SCORE: 1

## 2019-03-26 ENCOUNTER — HOSPITAL ENCOUNTER (OUTPATIENT)
Dept: PHYSICAL THERAPY | Facility: CLINIC | Age: 59
Setting detail: THERAPIES SERIES
End: 2019-03-26
Attending: FAMILY MEDICINE
Payer: OTHER GOVERNMENT

## 2019-03-26 PROCEDURE — 97140 MANUAL THERAPY 1/> REGIONS: CPT | Mod: GP | Performed by: PHYSICAL THERAPIST

## 2019-03-26 PROCEDURE — 97110 THERAPEUTIC EXERCISES: CPT | Mod: GP | Performed by: PHYSICAL THERAPIST

## 2019-03-28 ENCOUNTER — OFFICE VISIT (OUTPATIENT)
Dept: PSYCHOLOGY | Facility: CLINIC | Age: 59
End: 2019-03-28
Payer: OTHER GOVERNMENT

## 2019-03-28 DIAGNOSIS — F33.0 MAJOR DEPRESSIVE DISORDER, RECURRENT, MILD (H): Primary | ICD-10-CM

## 2019-03-28 PROCEDURE — 90834 PSYTX W PT 45 MINUTES: CPT | Performed by: PSYCHOLOGIST

## 2019-03-29 ENCOUNTER — TRANSFERRED RECORDS (OUTPATIENT)
Dept: HEALTH INFORMATION MANAGEMENT | Facility: CLINIC | Age: 59
End: 2019-03-29

## 2019-04-02 DIAGNOSIS — F41.1 GAD (GENERALIZED ANXIETY DISORDER): ICD-10-CM

## 2019-04-02 DIAGNOSIS — M79.10 MYALGIA: ICD-10-CM

## 2019-04-02 NOTE — TELEPHONE ENCOUNTER
"Requested Prescriptions   Pending Prescriptions Disp Refills     DULoxetine (CYMBALTA) 60 MG capsule [Pharmacy Med Name: DULOXETINE HCL DR CAPS 60MG] 90 capsule 0    Last Written Prescription Date:  1/2/19  Last Fill Quantity: 90 cap,  # refills: 0   Last office visit: 12/26/2018 with prescribing provider:  Nuzhat Burt     Future Office Visit:   Next 5 appointments (look out 90 days)    Apr 08, 2019  5:00 PM CDT  Return Visit with [facility] [address] [address]  [phone]   Apr 29, 2019  5:00 PM CDT  Return Visit with [facility] [address] [address]  [phone]          Sig: TAKE 1 CAPSULE EVERY MORNING    Serotonin-Norepinephrine Reuptake Inhibitors  Failed - 4/2/2019  2:55 AM       Failed - Blood pressure under 140/90 in past 12 months    BP Readings from Last 3 Encounters:   03/06/19 140/72   02/21/19 130/75   01/15/19 119/76                Passed - Recent (12 mo) or future (30 days) visit within the authorizing provider's specialty    Patient had office visit in the last 12 months or has a visit in the next 30 days with authorizing provider or within the authorizing provider's specialty.  See \"Patient Info\" tab in inbasket, or \"Choose Columns\" in Meds & Orders section of the refill encounter.             Passed - Medication is active on med list       Passed - Patient is age 18 or older       Passed - No active pregnancy on record       Passed - No positive pregnancy test in past 12 months          "

## 2019-04-02 NOTE — PROGRESS NOTES
Progress Note  Disclaimer: This note consists of symbols derived from keyboarding, dictation and/or voice recognition software. As a result, there may be errors in the script that have gone undetected. Please consider this when interpreting information found in this chart.    Client Name: Teresa Fernandez  Date: 3/28/2019         Service Type: Individual      Session Start Time: 4:00 PM  session End Time: 4:45 PM      Session Length: 45     Session #: 12     Attendees: Client attended alone    Treatment Plan Last Reviewed: 3/14/2019  PHQ-9 / ERIC-7 : See flowsheet's     DATA   Client reports her son is in treatment and appears to be doing well talked about the best ways to support both him and her own sanity during the recovery process.      Progress Since Last Session (Related to Symptoms / Goals / Homework):   Symptoms: Stable    Homework: Achieved / completed to satisfaction      Episode of Care Goals: Satisfactory progress - ACTION (Actively working towards change); Intervened by reinforcing change plan / affirming steps taken     Current / Ongoing Stressors and Concerns:   Some with alcohol and legal problems, multiple medical problems, work stress.     Treatment Objective(s) Addressed in This Session:   use at least 2 coping skills for anxiety management in the next 2 weeks       Intervention:   CBT: Behavioral activation and CBT for anxiety and panic.   12 step facilitation for family and parents     ASSESSMENT: Current Emotional / Mental Status (status of significant symptoms):   Risk status (Self / Other harm or suicidal ideation)   Client denies current fears or concerns for personal safety.   Client denies current or recent suicidal ideation or behaviors.   Client denies current or recent homicidal ideation or behaviors.   Client denies current or recent self injurious behavior or ideation.   Client denies other safety concerns.   Client Client reports there  has been no change in risk factors since their last session.     Client Client reports there has been no change in protective factors since their last session.     A safety and risk management plan has not been developed at this time, however client was given the after-hours number / 911 should there be a change in any of these risk factors.     Appearance:   Appropriate    Eye Contact:   Good    Psychomotor Behavior: Normal    Attitude:   Cooperative    Orientation:   All   Speech    Rate / Production: Normal     Volume:  Normal    Mood:    Normal   Affect:    Appropriate    Thought Content:  Clear    Thought Form:  Coherent  Logical    Insight:    Good      Medication Review:   No changes to current psychiatric medication(s)     Medication Compliance:   Yes     Changes in Health Issues:   None reported     Chemical Use Review:   Substance Use: Chemical use reviewed, no active concerns identified      Tobacco Use: No current tobacco use.       Collateral Reports Completed:   Not Applicable    PLAN: (Client Tasks / Therapist Tasks / Other)   Client to continue to maintain healthy boundaries with her alcoholic child.  Continue attending 12-step family support group and contact other members if she needs to.        Carter Tierney                                                         ________________________________________________________________________    Treatment Plan    Client's Name: Teresa Fernandez  YOB: 1960    Date: 7/23/2018    DSM5 Diagnoses: (Sustained by DSM5 Criteria Listed Above)  Diagnoses:            296.31 (F33.0) Major Depressive Disorder, Recurrent Episode, Mild _ and With anxious distress  Psychosocial & Contextual Factors: Multiple medical issues  WHODAS 2.0 (12 item)                          This questionnaire asks about difficulties due to health conditions. Health conditions                   include                        disease or illnesses, other health problems that may  be short or long lasting,                    injuries, mental health or emotional problems, and problems with alcohol or drugs.                              Think back over the past 30 days and answer these questions, thinking about how much              difficulty you had doing the following activities. For each question, please [place] only                   one response.      S1 Standing for long periods such as 30 minutes? Mild =           2   S2 Taking care of household responsibilities? None =         1   S3 Learning a new task, for example, learning how to get to a new place? None =         1   S4 How much of a problem do you have joining community activities (for example, festivals, [employer]ae[employer] or other activities) in the same way as anyone else can? None =         1   S5 How much have you been emotionally affected by your health problems? Moderate =   3               In the past 30 days, how much difficulty did you have in:   S6 Concentrating on doing something for ten minutes? Mild =           2   S7 Walking a long distance such as a kilometer (or equivalent)? None =         1   S8 Washing your whole body? None =         1   S9 Getting dressed? None =         1   S10 Dealing with people you do not know? None =         1   S11 Maintaining a friendship? None =         1   S12 Your day to day work? None =         1       H1 Overall, in the past 30 days, how many days were these difficulties present? Record number of days 3   H2 In the past 30 days, for how many days were you totally unable to carry out your usual activities or work because of any health condition? Record number of days  0   H3 In the past 30 days, not counting the days that you were totally unable, for how many days did you cut back or reduce your usual activities or work because of any health condition? Record number of days 0           Referral / Collaboration:  Referral to another professional/service is not indicated at this  time..    Anticipated number of session or this episode of care: 15  Anxiety Treatment plan:  1. Education- the Biopsychosocial model of anxiety  a. Client will be able to describe how anxiety is effecting them physically, emotionally and socially  2. Distraction  a. Client will learn 2 techniques to distract themselves when becoming anxious  3. Diaphragmatic breathing  a. Client will learn to breath using their diaphragm  b. Client will learn 2 breathing patterns to use to reduce anxiety  4. Visualization  a. Client will learn to establish a safe, calm place in their mind utilizing all their senses  b. Client will practice using visualization at times when they are not anxious so they will be able to use it when anxiety occurs  5. Progressive muscle relaxation  a. Client will learn progressive muscle relaxation techniques and practice them 4-5 times per week.  b. Client will learn to use mental images of relaxation to relax muscle groups and do this on a daily basis  6. Self-care  a. Client will identify 3 things they can do just for themselves  b. Client will take time for quiet, reflection, meditation 3 times per week  c. Client will Learn to set boundaries when appropriate  d. Client will Identify 3 individuals they can call on for support, distraction  7. Assessment of progress  a. Client will engage in assessment of their progress on a regular basis      Client has reviewed and agreed to the above plan.      Carter Tierney  July 24, 2018

## 2019-04-03 RX ORDER — DULOXETIN HYDROCHLORIDE 60 MG/1
CAPSULE, DELAYED RELEASE ORAL
Qty: 90 CAPSULE | Refills: 0 | Status: SHIPPED | OUTPATIENT
Start: 2019-04-03 | End: 2019-07-02

## 2019-04-05 ENCOUNTER — HOSPITAL ENCOUNTER (OUTPATIENT)
Dept: CT IMAGING | Facility: CLINIC | Age: 59
Discharge: HOME | End: 2019-04-05
Attending: INTERNAL MEDICINE | Admitting: INTERNAL MEDICINE
Payer: OTHER GOVERNMENT

## 2019-04-05 DIAGNOSIS — Z80.51 FAMILY HISTORY OF KIDNEY CANCER: ICD-10-CM

## 2019-04-05 DIAGNOSIS — C64.9 RENAL CELL CANCER (H): ICD-10-CM

## 2019-04-05 DIAGNOSIS — Z90.5 S/P NEPHRECTOMY: ICD-10-CM

## 2019-04-05 PROCEDURE — 25000125 ZZHC RX 250: Performed by: RADIOLOGY

## 2019-04-05 PROCEDURE — 71260 CT THORAX DX C+: CPT

## 2019-04-05 PROCEDURE — 25000128 H RX IP 250 OP 636: Performed by: RADIOLOGY

## 2019-04-05 PROCEDURE — 74177 CT ABD & PELVIS W/CONTRAST: CPT

## 2019-04-05 RX ORDER — IOPAMIDOL 755 MG/ML
100 INJECTION, SOLUTION INTRAVASCULAR ONCE
Status: COMPLETED | OUTPATIENT
Start: 2019-04-05 | End: 2019-04-05

## 2019-04-05 RX ADMIN — SODIUM CHLORIDE 69 ML: 9 INJECTION, SOLUTION INTRAVENOUS at 12:13

## 2019-04-05 RX ADMIN — IOPAMIDOL 100 ML: 755 INJECTION, SOLUTION INTRAVENOUS at 12:13

## 2019-04-26 ENCOUNTER — TELEPHONE (OUTPATIENT)
Dept: PALLIATIVE MEDICINE | Facility: CLINIC | Age: 59
End: 2019-04-26

## 2019-04-26 NOTE — TELEPHONE ENCOUNTER
Received 4-     Dr. Roni Rosales with [facility]'s [place] site ordered a left C7-T1 facet joint injection for spondylosis with radiculopathy-cervical to be scheduled at [facility] [place] site.     authorization included in order. Routing to the Scheduling Coordinators to schedule.      Hannah Geneva  Patient Representative  [facility]

## 2019-04-29 ENCOUNTER — OFFICE VISIT (OUTPATIENT)
Dept: PSYCHOLOGY | Facility: CLINIC | Age: 59
End: 2019-04-29
Payer: OTHER GOVERNMENT

## 2019-04-29 DIAGNOSIS — F33.0 MAJOR DEPRESSIVE DISORDER, RECURRENT, MILD (H): Primary | ICD-10-CM

## 2019-04-29 PROCEDURE — 90834 PSYTX W PT 45 MINUTES: CPT | Performed by: PSYCHOLOGIST

## 2019-04-30 NOTE — TELEPHONE ENCOUNTER
LM on  for pt to schedule Left Facet Joint Injection.      Jose Daniel CRUZ    [facility] [place]

## 2019-04-30 NOTE — TELEPHONE ENCOUNTER
Pre-screening Questions for Radiology Injections:    Injection to be done at which interventional clinic site? [facility]    Instruct patient to arrive as directed prior to the scheduled appointment time:    [facility] before      [place]: 30 minutes before; if IV needed 1 hour before     Procedure ordered by Connie    Procedure ordered? Cervical Facet Joint Injection    What insurance would patient like us to bill for this procedure?  [place]      Worker's comp or MVA (motor vehicle accident) -Any injection DO NOT SCHEDULE and route to Madhuri Alen.      [employer] insurance - For SI joint injections, DO NOT SCHEDULE and route Madhuri Alen.       [employer] - Any injection besides hip/shoulder/knee joint DO NOT SCHEDULE and route to Madhuri Alen. She will obtain PA and call pt back to schedule procedure or notify pt of denial.        [employer]-Route to Madhuri for review        IF SCHEDULING IN [place] AND NEEDS A PA, IT IS OKAY TO SCHEDULE. [place] HANDLES THEIR OWN PA'S AFTER THE PATIENT IS SCHEDULED. PLEASE SCHEDULE AT LEAST 1 WEEK OUT SO A PA CAN BE OBTAINED.      Any chance of pregnancy? NO   If YES, do NOT schedule and route to RN pool    Is an  needed? No     Patient has a drive home? (mandatory) YES: ok    Is patient taking any blood thinners (plavix, coumadin, jantoven, warfarin, heparin, pradaxa or dabigatran )? No   If hold needed, do NOT schedule, route to RN pool     Is patient taking any aspirin products (includes Excedrin and Fiorinal)? No     If more than 325mg/day do NOT schedule; route to RN pool     For CERVICAL procedures, hold all aspirin products for 6 days.     Tell pt that if aspirin product is not held for 6 days, the procedure WILL BE cancelled.      Does the patient have a bleeding or clotting disorder? No     If YES, okay to schedule AND route to RN nurse pool    For any patients with platelet count <100, must be forwarded to provider    Is patient  diabetic?  NO  If YES, have them bring their glucometer.    Does patient have an active infection or treated for one within the past week? No     Is patient currently taking any antibiotics?  No     For patients on chronic, preventative, or prophylactic antibiotics, procedures may be scheduled.     For patients on antibiotics for active or recent infection:antibiotic course must have been completed for 4 days    Is patient currently taking any steroid medications? (i.e. Prednisone, Medrol)  No     For patients on steroid medications, course must have been completed for 4 days    Reviewed with patient:  If you are started on any steroids or antibiotics between now and your appointment, you must contact us because the procedure may need to be cancelled.  Yes    Is patient actively being treated for cancer or immunocompromised? No  If YES, do NOT schedule and route to RN pool     Are you able to get on and off an exam table with minimal or no assistance? Yes  If NO, do NOT schedule and route to RN pool    Are you able to roll over and lay on your stomach with minimal or no assistance? Yes  If NO, do NOT schedule and route to RN pool     Any allergies to contrast dye, iodine, shellfish, or numbing and steroid medications? YES  If YES, route to RN pool AND add allergy information to appointment notes    Allergies: Omnipaque [iohexol]; Gluten; Iodine; and Penicillins      Has the patient had a flu shot or any other vaccinations within 7 days before or after the procedure.  No     Does patient have an MRI/CT?  YES: MRI  (SI joint, hip injections, lumbar sympathetic blocks, and stellate ganglion blocks do not require an MRI)    Was the MRI done w/in the last 3 years?  Yes    Was MRI done at [place]? No      If not, [facility] was it done? [facility]       [facility] [place] do NOT schedule and route to nursing.  If pt has an imaging disc, the injection may be scheduled but pt has to bring disc to  appt. If they show up w/out disc the injection cannot be done    Reminders (please tell patient if applicable):       Instructed pt to arrive 30 minutes early for IV start if this is for a cervical procedure, ALL sympathetic (stellate ganglion, hypogastric, or lumbar sympathetic block) and all sedation procedures (RFA, spinal cord stimulation trials).  YES: ok   -IVs are not routinely placed for Dr. Lamb cervical cases   -Dr. Kidd: IVs for cervical ESIs and cervical TBDs (not CMBBs/facet inj)      If NPO for sedation, informed patient that it is okay to take medications with sips of water (except if they are to hold blood thinners).  Not Applicable   *DO take blood pressure medication if it is prescribed*      If this is for a cervical JUAN, informed patient that aspirin needs to be held for 6 days.   NO      For all patients not having spinal cord stimulator (SCS) trials or radiofrequency ablations (RFAs), informed patient:    IV sedation is not provided for this procedure.  If you feel that an oral anti-anxiety medication is needed, you can discuss this further with your referring provider or primary care provider.  The Pain Clinic provider will discuss specifics of what the procedure includes at your appointment.  Most procedures last 10-20 minutes.  We use numbing medications to help with any discomfort during the procedure.  Not Applicable      Do not schedule procedures requiring IV placement in the first appointment of the day or first appointment after lunch. Do NOT schedule at 0745, 0815 or 1245.       For patients 85 or older we recommend having an adult stay w/ them for the remainder of the day.       Does the patient have any questions?  NO  Tamika Lynch  [facility]

## 2019-05-01 NOTE — ADDENDUM NOTE
Encounter addended by: Renée Bhat, PT on: 5/1/2019 4:00 PM   Actions taken: Sign clinical note, Flowsheet accepted, Episode resolved

## 2019-05-01 NOTE — PROGRESS NOTES
OUTPATIENT PHYSICAL THERAPY DISCHARGE SUMMARY   Miguel Angel Hooker, DO 1/22/19 to 03/26/19 1600   Signing Clinician's Name / Credentials   Signing clinician's name / credentials Renée Manova,PT [id number]   Session Number   Session Number 7    Ortho Goal 1   Goal Description 1.  Pt will be able to dress in the morning w/ pain no > 4/10.  2/18/19  3/10 MET.   3/26/19 5/10   Target Date 03/24/19   Ortho Goal 2   Goal Description 2.  Pt will tolerate working 3 hours w/ increased awareness of posture and decreased pain to 4/10.  3/26/19 Pt needs to take tylenol every 4 hours ---pain w/ tylenol 2/10   Target Date 03/24/19   Ortho Goal 3   Goal Description 3.  Pt will wake no > 5X/week due to scapular complaints.  3/26/19  not waking due to pain as she takes either tylenol or antiinflammatory med   Target Date 03/24/19   Ortho Goal 4   Goal Description 4.  Pt will be independent and consistent w/  HEP and have increased awareness of posture.   3/26/19 doing ex 2X/wk   Target Date 03/24/19   Subjective Report   Subjective Report Pain level currently 6/10.     Objective Measures   Objective Measure L shoulder elevated and increased tone   Details CROM flex/ ext/ B Rot WFL   Therapeutic Procedure/exercise   Treatment Detail Scifit seat 5 elevated L 3 X 2 min (retro).     Cat/ cow X 5.  Murphy pose at countertop (straight and w/ rotation) .   GTB rows X 10 and shoulder ext w/ LT set x 20   Manual Therapy   Treatment Detail JACQUELINE  (postural restoration)  L and R  rib pump ,  L rib stretch w/ arm pull and 1 person B subclavian pump.  MET for L 1st rib and direct inf glides L 1st rib.   Slide lying rib stretch w/ breathiing   Plan   Home program ex as above, TENS   Plan Pt reporting meds are controlling symptoms and she feels good w/ her exercises therefore will continue on own w/ HEP.  Discharge from physical therapy.   Comments   Comments Progress towards goals as noted above.

## 2019-05-02 ENCOUNTER — HOSPITAL ENCOUNTER (OUTPATIENT)
Dept: RADIOLOGY | Facility: CLINIC | Age: 59
Discharge: HOME | End: 2019-05-02
Attending: ANESTHESIOLOGY | Admitting: ANESTHESIOLOGY
Payer: OTHER GOVERNMENT

## 2019-05-02 ENCOUNTER — RADIOLOGY INJECTION OFFICE VISIT (OUTPATIENT)
Dept: PALLIATIVE MEDICINE | Facility: CLINIC | Age: 59
End: 2019-05-02
Payer: OTHER GOVERNMENT

## 2019-05-02 VITALS — DIASTOLIC BLOOD PRESSURE: 85 MMHG | HEART RATE: 83 BPM | SYSTOLIC BLOOD PRESSURE: 145 MMHG | RESPIRATION RATE: 16 BRPM

## 2019-05-02 DIAGNOSIS — M47.812 CERVICAL FACET JOINT SYNDROME: Primary | ICD-10-CM

## 2019-05-02 DIAGNOSIS — M47.812 CERVICAL SPONDYLOSIS WITHOUT MYELOPATHY: ICD-10-CM

## 2019-05-02 DIAGNOSIS — M47.812 ARTHROPATHY OF CERVICAL FACET JOINT: ICD-10-CM

## 2019-05-02 PROCEDURE — 25000125 ZZHC RX 250: Performed by: ANESTHESIOLOGY

## 2019-05-02 PROCEDURE — 64490 INJ PARAVERT F JNT C/T 1 LEV: CPT | Mod: LT | Performed by: ANESTHESIOLOGY

## 2019-05-02 PROCEDURE — 25000128 H RX IP 250 OP 636: Performed by: ANESTHESIOLOGY

## 2019-05-02 PROCEDURE — 27210202 XR CERVICAL THORACIC FACET INJ LEFT: Mod: TC

## 2019-05-02 RX ORDER — IOPAMIDOL 612 MG/ML
15 INJECTION, SOLUTION INTRATHECAL ONCE
Status: DISCONTINUED | OUTPATIENT
Start: 2019-05-02 | End: 2019-05-02 | Stop reason: CLARIF

## 2019-05-02 RX ORDER — LIDOCAINE HYDROCHLORIDE 10 MG/ML
5 INJECTION, SOLUTION EPIDURAL; INFILTRATION; INTRACAUDAL; PERINEURAL ONCE
Status: COMPLETED | OUTPATIENT
Start: 2019-05-02 | End: 2019-05-02

## 2019-05-02 RX ORDER — LIDOCAINE HYDROCHLORIDE 20 MG/ML
5 INJECTION, SOLUTION EPIDURAL; INFILTRATION; INTRACAUDAL; PERINEURAL ONCE
Status: COMPLETED | OUTPATIENT
Start: 2019-05-02 | End: 2019-05-02

## 2019-05-02 RX ORDER — METHYLPREDNISOLONE ACETATE 40 MG/ML
40 INJECTION, SUSPENSION INTRA-ARTICULAR; INTRALESIONAL; INTRAMUSCULAR; SOFT TISSUE ONCE
Status: COMPLETED | OUTPATIENT
Start: 2019-05-02 | End: 2019-05-02

## 2019-05-02 RX ADMIN — LIDOCAINE HYDROCHLORIDE 1 ML: 20 INJECTION, SOLUTION EPIDURAL; INFILTRATION; INTRACAUDAL; PERINEURAL at 11:13

## 2019-05-02 RX ADMIN — METHYLPREDNISOLONE ACETATE 40 MG: 40 INJECTION, SUSPENSION INTRA-ARTICULAR; INTRALESIONAL; INTRAMUSCULAR; SOFT TISSUE at 11:13

## 2019-05-02 RX ADMIN — LIDOCAINE HYDROCHLORIDE 1 ML: 10 INJECTION, SOLUTION EPIDURAL; INFILTRATION; INTRACAUDAL; PERINEURAL at 11:14

## 2019-05-02 ASSESSMENT — ANXIETY QUESTIONNAIRES
5. BEING SO RESTLESS THAT IT IS HARD TO SIT STILL: NOT AT ALL
6. BECOMING EASILY ANNOYED OR IRRITABLE: SEVERAL DAYS
2. NOT BEING ABLE TO STOP OR CONTROL WORRYING: NOT AT ALL
1. FEELING NERVOUS, ANXIOUS, OR ON EDGE: SEVERAL DAYS
GAD7 TOTAL SCORE: 2
3. WORRYING TOO MUCH ABOUT DIFFERENT THINGS: NOT AT ALL
7. FEELING AFRAID AS IF SOMETHING AWFUL MIGHT HAPPEN: NOT AT ALL
4. TROUBLE RELAXING: NOT AT ALL

## 2019-05-02 ASSESSMENT — PAIN SCALES - GENERAL
PAINLEVEL: MILD PAIN (2)
PAINLEVEL: SEVERE PAIN (6)

## 2019-05-02 ASSESSMENT — PATIENT HEALTH QUESTIONNAIRE - PHQ9: SUM OF ALL RESPONSES TO PHQ QUESTIONS 1-9: 4

## 2019-05-02 NOTE — PROGRESS NOTES
Pre procedure Diagnosis: cervical facet arthropathy, cervical spondylosis   Post procedure Diagnosis: Same  Procedure performed: cervical facet joint injection at C7-T1 on the left, fluoroscopically guided  Indication:  Therapeutic for pain  Anesthesia: none  Complication:  none  Operators: John Enciso MD                                    Indications:   Teresa Fernandez is a 59 year old female was sent by Dr Rosales for cervical facet procedures.  The patient has a history of chronic left greater than right neck pain.  Exam shows tenderness to palpation along the cervical articular pillars and paraspinal muscles and reproduction of pain with extension and lateral rotation of the cervical spine.  They have tried conservative treatment including physical therapy, medications.    MRI Cervical Spine completed at [facility] on 11/11/17 read as:  INTERPRETATION:   Fat suppressed images are negative for acute or subacute fractures. Positive vertebral artery flow. Craniovertebral junction structures are unremarkable.  Postcontrast images are negative for enhancing intradural mass or cord lesion.  C2-3: 2 mm degenerative spondylolisthesis, no disc herniation or central stenosis, chronic hypertrophic right facet arthropathy and severe right foraminal stenosis.  C3-4: Moderate disc degeneration, bulge and osteophyte nearly abut cord without significant central stenosis. Chronic moderate left foraminal stenosis. Also, mild right facet joint degeneration.  C4-5: Severe disc degeneration, mild central stenosis with osteophyte and thickened ligamentum flavum. Left facet joint degeneration is mild with chronic moderate left and mild right foraminal stenosis.  C5-6: Severe disc degeneration, osteophyte nearly contacts ventral cord without significant central stenosis. Chronic moderate to severe left and mild right foraminal stenosis. No facet arthropathy.  C6-7: Severe disc degeneration, osteophyte and bulge without cord contact.  Severe left and moderate right foraminal stenosis. No facet arthropathy.  C7-T1: 2.5 mm degenerative spondylolisthesis without central stenosis. Hypertrophic right greater than left facet arthropathy and moderate chronic right foraminal stenosis with C8 impingement.  T1-2: Active right facet arthropathy, no bulge, herniation or central stenosis and mild right foraminal stenosis.  T2-3 and T3-4: Degenerative disc disease with 1-2 mm spondylolisthesis without central stenosis. Chronic bilateral facet hypertrophy, left greater than right.  CONCLUSION: Multilevel disc and facet joint degeneration, no fractures or destructive lesions and significant findings as follows:  1. 2.5 mm C7-T1 degenerative spondylolisthesis with hypertrophic right facet arthropathy and moderate right foraminal stenosis with C8 compression.  2. Mild degenerative central stenosis at C4-5 with multilevel posterior marginal osteophyte and annular bulging without disc herniation or cord compression.  3. Active and hypertrophic T1-2 right facet arthropathy and foraminal stenosis; correlate for facet mediated pain.   4. Multilevel chronic moderate or moderate to severe foraminal stenosis, as reported.  5. No spinal cord lesions.     Options/alternatives, benefits and risks were discussed with the patient including bleeding, infection, flared pain, tissue trauma, exposure to radiation, reaction to medications including seizure, spinal cord injury, paralysis, weakness, numbness and headache.     Questions were answered to his satisfaction and he wishes to proceed. Voluntary informed consent was obtained and signed.      Vitals were reviewed: Yes  /77   Pulse 80   Resp 16   Allergies were reviewed:  Yes   Medications were reviewed:  Yes   Pre-procedure pain score: 6/10     Procedure:  After obtaining signed informed consent, the patient was brought into the procedure suite and was placed in a prone position on the [facility] Frame.   A Pause for  the Cause was performed.  The patient was prepped and draped in the usual sterile fashion.      Under AP fluoroscopic guidance the C7-T1 facet joint on the left side was identified, and the C-arm was rotated caudally to open the joint space. Then, Lidocaine 1% was injected at the needle entry point and needle tract for local anesthetic. Then, a 27 gauge 3.5 inch spinal needle was inserted and advanced under AP and Lateral fluoroscopic guidance into the facet joint with positive pain reproduction.  Aspiration was negative.      Needle placement was confirmed with AP and lateral views and contrast was not used due to previous allergic reaction to contrast.     Then, the facet joint was injected with 2 ml of a combination of Depomedrol 40 mg and Lidocaine 2% 1 ml for a total injectate volume of 2 ml and the needle was flushed with Lidocaine 1% as it was withdrawn.       Hemostasis was achieved, the area was cleaned, and bandaids were placed when appropriate.  The patient tolerated the procedure well, and was taken to the recovery room.    Images were saved to PACS.     Post-procedure pain score: 2/10  Follow-up includes:   -f/u phone call in one week  -f/u with referring provider     John Enciso MD  [facility]

## 2019-05-02 NOTE — IP AVS SNAPSHOT
[facility]  [address]  [facility] [place] [address]  Phone:  [phone]  Fax:  [phone]                                    After Visit Summary   5/2/2019    Teresa Fernandez    MRN: [id number]           After Visit Summary Signature Page    I have received my discharge instructions, and my questions have been answered. I have discussed any challenges I see with this plan with the nurse or doctor.    ..........................................................................................................................................  Patient/Patient Representative Signature      ..........................................................................................................................................  Patient Representative Print Name and Relationship to Patient    ..................................................               ................................................  Date                                   Time    ..........................................................................................................................................  Reviewed by Signature/Title    ...................................................              ..............................................  Date                                               Time          22EPIC Rev 08/18

## 2019-05-02 NOTE — PROGRESS NOTES
Progress Note  Disclaimer: This note consists of symbols derived from keyboarding, dictation and/or voice recognition software. As a result, there may be errors in the script that have gone undetected. Please consider this when interpreting information found in this chart.    Client Name: Teresa Fernandez  Date: 4/29/2019          Service Type: Individual      Session Start Time: 5:00 PM  session End Time: 5:45 PM      Session Length: 45     Session #: 13     Attendees: Client attended alone    Treatment Plan Last Reviewed: 3/14/2019  PHQ-9 / ERIC-7 : See flowsheet's     DATA   Client reports her son is in treatment and appears to be doing well talked about the best ways to support both him and her own sanity during the recovery process.  Her recent CT scan came back good so she is cancer free but she still experiences some pain.  Her recent work moved turned out to be a good thing though she misses friends at her previous job.      Progress Since Last Session (Related to Symptoms / Goals / Homework):   Symptoms: Stable    Homework: Achieved / completed to satisfaction      Episode of Care Goals: Satisfactory progress - ACTION (Actively working towards change); Intervened by reinforcing change plan / affirming steps taken     Current / Ongoing Stressors and Concerns:   Some with alcohol and legal problems, multiple medical problems, work stress.     Treatment Objective(s) Addressed in This Session:   use at least 2 coping skills for anxiety management in the next 2 weeks       Intervention:   CBT: Behavioral activation and CBT for anxiety and panic.   12 step facilitation for family and parents     ASSESSMENT: Current Emotional / Mental Status (status of significant symptoms):   Risk status (Self / Other harm or suicidal ideation)   Client denies current fears or concerns for personal safety.   Client denies current or recent suicidal ideation or behaviors.   Client  denies current or recent homicidal ideation or behaviors.   Client denies current or recent self injurious behavior or ideation.   Client denies other safety concerns.   Client Client reports there has been no change in risk factors since their last session.     Client Client reports there has been no change in protective factors since their last session.     A safety and risk management plan has not been developed at this time, however client was given the after-hours number / 911 should there be a change in any of these risk factors.     Appearance:   Appropriate    Eye Contact:   Good    Psychomotor Behavior: Normal    Attitude:   Cooperative    Orientation:   All   Speech    Rate / Production: Normal     Volume:  Normal    Mood:    Normal   Affect:    Appropriate    Thought Content:  Clear    Thought Form:  Coherent  Logical    Insight:    Good      Medication Review:   No changes to current psychiatric medication(s)     Medication Compliance:   Yes     Changes in Health Issues:   None reported     Chemical Use Review:   Substance Use: Chemical use reviewed, no active concerns identified      Tobacco Use: No current tobacco use.       Collateral Reports Completed:   Not Applicable    PLAN: (Client Tasks / Therapist Tasks / Other)   Client to continue to maintain healthy boundaries with her alcoholic child.  Continue attending 12-step family support group and contact other members if she needs to.        Carter Tierney                                                         ________________________________________________________________________    Treatment Plan    Client's Name: Teresa Fernandez  YOB: 1960    Date: 7/23/2018    DSM5 Diagnoses: (Sustained by DSM5 Criteria Listed Above)  Diagnoses:            296.31 (F33.0) Major Depressive Disorder, Recurrent Episode, Mild _ and With anxious distress  Psychosocial & Contextual Factors: Multiple medical issues  WHODAS 2.0 (12  item)                          This questionnaire asks about difficulties due to health conditions. Health conditions                   include                        disease or illnesses, other health problems that may be short or long lasting,                    injuries, mental health or emotional problems, and problems with alcohol or drugs.                              Think back over the past 30 days and answer these questions, thinking about how much              difficulty you had doing the following activities. For each question, please [place] only                   one response.      S1 Standing for long periods such as 30 minutes? Mild =           2   S2 Taking care of household responsibilities? None =         1   S3 Learning a new task, for example, learning how to get to a new place? None =         1   S4 How much of a problem do you have joining community activities (for example, festivals, L[employer] day or other activities) in the same way as anyone else can? None =         1   S5 How much have you been emotionally affected by your health problems? Moderate =   3               In the past 30 days, how much difficulty did you have in:   S6 Concentrating on doing something for ten minutes? Mild =           2   S7 Walking a long distance such as a kilometer (or equivalent)? None =         1   S8 Washing your whole body? None =         1   S9 Getting dressed? None =         1   S10 Dealing with people you do not know? None =         1   S11 Maintaining a friendship? None =         1   S12 Your day to day work? None =         1       H1 Overall, in the past 30 days, how many days were these difficulties present? Record number of days 3   H2 In the past 30 days, for how many days were you totally unable to carry out your usual activities or work because of any health condition? Record number of days  0   H3 In the past 30 days, not counting the days that you were totally unable, for how many days did you cut  back or reduce your usual activities or work because of any health condition? Record number of days 0           Referral / Collaboration:  Referral to another professional/service is not indicated at this time..    Anticipated number of session or this episode of care: 15  Anxiety Treatment plan:  1. Education- the Biopsychosocial model of anxiety  a. Client will be able to describe how anxiety is effecting them physically, emotionally and socially  2. Distraction  a. Client will learn 2 techniques to distract themselves when becoming anxious  3. Diaphragmatic breathing  a. Client will learn to breath using their diaphragm  b. Client will learn 2 breathing patterns to use to reduce anxiety  4. Visualization  a. Client will learn to establish a safe, calm place in their mind utilizing all their senses  b. Client will practice using visualization at times when they are not anxious so they will be able to use it when anxiety occurs  5. Progressive muscle relaxation  a. Client will learn progressive muscle relaxation techniques and practice them 4-5 times per week.  b. Client will learn to use mental images of relaxation to relax muscle groups and do this on a daily basis  6. Self-care  a. Client will identify 3 things they can do just for themselves  b. Client will take time for quiet, reflection, meditation 3 times per week  c. Client will Learn to set boundaries when appropriate  d. Client will Identify 3 individuals they can call on for support, distraction  7. Assessment of progress  a. Client will engage in assessment of their progress on a regular basis      Client has reviewed and agreed to the above plan.      Carter Tierney  July 24, 2018

## 2019-05-02 NOTE — PROGRESS NOTES
Pre-procedure Intake    Have you been fasting? No     If yes, for how long?     Are you taking a prescribed blood thinner such as coumadin, Plavix, Xarelto?    No    If yes, when did you take your last dose?     Do you take aspirin?  No    If cervical procedure, have you held aspirin for 6 days?   NA    Do you have any allergies to contrast dye, iodine, steroid and/or numbing medications?  YES: Iodine and Omnipaque    Are you currently taking antibiotics or have an active infection?  NO    Have you had a fever/elevated temperature within the past week? NO    Are you currently taking oral steroids? NO    Do you have a ? Yes       Are you pregnant or breastfeeding?  Not Applicable    Are the vital signs normal?  Yes

## 2019-05-02 NOTE — IP AVS SNAPSHOT
MRN:[id number]                      After Visit Summary   5/2/2019    Teresa Fernandez    MRN: [id number]           Visit Information        Provider Department      5/2/2019 10:15 AM HELE[facility]           Review of your medicines      UNREVIEWED medicines. Ask your doctor about these medicines       Dose / Directions   * albuterol (2.5 MG/3ML) 0.083% neb solution  Commonly known as:  PROVENTIL  Used for:  Asthma exacerbation      Dose:  1 vial  Take 1 vial (2.5 mg) by nebulization every 6 hours as needed for shortness of breath / dyspnea or wheezing  Quantity:  25 vial  Refills:  0     * albuterol 108 (90 Base) MCG/ACT inhaler  Commonly known as:  PROAIR HFA/PROVENTIL HFA/VENTOLIN HFA  Used for:  Moderate persistent asthma without complication      Dose:  2 puff  Inhale 2 puffs into the lungs every 6 hours as needed for shortness of breath / dyspnea  Quantity:  1 Inhaler  Refills:  5     celecoxib 100 MG capsule  Commonly known as:  celeBREX  Used for:  Wrist tendonitis      Dose:  100 mg  Take 1 capsule (100 mg) by mouth 2 times daily as needed for moderate pain  Quantity:  60 capsule  Refills:  3     doxycycline hyclate 100 MG capsule  Commonly known as:  VIBRAMYCIN  Used for:  Other chronic sinusitis, Acute sinusitis with symptoms > 10 days  Ask about: Should I take this medication?      Dose:  100 mg  Take 1 capsule (100 mg) by mouth 2 times daily for 10 days  Quantity:  20 capsule  Refills:  0     DULoxetine 60 MG capsule  Commonly known as:  CYMBALTA  Used for:  ERIC (generalized anxiety disorder), Myalgia      TAKE 1 CAPSULE EVERY MORNING  Quantity:  90 capsule  Refills:  0     fexofenadine 180 MG tablet  Commonly known as:  ALLEGRA  Used for:  Seasonal allergic rhinitis      Dose:  180 mg  Take 1 tablet (180 mg) by mouth daily  Quantity:  30 tablet  Refills:  1     fluticasone-salmeterol 250-50 MCG/DOSE inhaler  Commonly known as:  ADVAIR  Used for:  Moderate  persistent asthma without complication      Dose:  1 puff  Inhale 1 puff into the lungs 2 times daily  Quantity:  3 Inhaler  Refills:  3     GLUCOSAMINE SULFATE PO      Dose:  1000 mg  Take 1,000 mg by mouth 2 times daily  Refills:  0     levothyroxine 100 MCG tablet  Commonly known as:  SYNTHROID/LEVOTHROID  Used for:  Hypothyroidism, unspecified type      Dose:  100 mcg  Take 1 tablet (100 mcg) by mouth daily  Quantity:  30 tablet  Refills:  0     MAGNESIUM OXIDE PO      Dose:  200 mg  Take 200 mg by mouth daily  Refills:  0     MULTIVITAMIN ADULT PO      Take by mouth every morning  Refills:  0     OVER-THE-COUNTER  Used for:  Dry eye syndrome      Dose:  1 drop  Place 1 drop into both eyes 3 times daily. Systane Ultra, Systane Balance, Blink Tears or Refresh Optive Artificial Tear  Quantity:  1 Bottle  Refills:  0     ranitidine 150 MG tablet  Commonly known as:  ZANTAC  Used for:  Gastroesophageal reflux disease, esophagitis presence not specified      TAKE 1 TABLET TWICE A DAY  Quantity:  180 tablet  Refills:  3     tiZANidine 2 MG tablet  Commonly known as:  ZANAFLEX  Used for:  Myofascial muscle pain      Take 1 tablet at bedtime for 3 days then take 1 tablet twice a day thereafter  Quantity:  60 tablet  Refills:  1     TYLENOL 325 MG tablet  Generic drug:  acetaminophen      Dose:  325-650 mg  Take 325-650 mg by mouth every 6 hours as needed for mild pain  Refills:  0     VITAMIN B 12 PO      Dose:  500 mcg  Take 500 mcg by mouth daily  Refills:  0     VITAMIN D3 PO      Dose:  2000 Units  Take 2,000 Units by mouth 2 times daily  Refills:  0         * This list has 2 medication(s) that are the same as other medications prescribed for you. Read the directions carefully, and ask your doctor or other care provider to review them with you.            CONTINUE these medicines which have NOT CHANGED       Dose / Directions   order for DME  Used for:  Asthma exacerbation      Equipment being ordered:  Nebulizer  Quantity:  1 Units  Refills:  0              Protect others around you: Learn how to safely use, store and throw away your medicines at www.disposemymeds.org.       Follow-ups after your visit       Your next 10 appointments already scheduled    May 21, 2019  4:00 PM CDT  Return Visit with Carter MATSON Niall  [facility] [address] [address]  [phone]      Jun 04, 2019  6:00 PM CDT  Return Visit with Carter MATSON Niall  [facility] [address] [address]  [phone]         Care Instructions       Further instructions from your care team       [facility]   Procedure Discharge Instructions    Today you saw:    Dr. John Enciso      You had a:   Cervical Facet Injections, Left C7-T1    Medications used:  Lidocaine  Depo-medrol              Be cautious when walking. Numbness and/or weakness in the lower extremities may occur for up to 6-8 hours after the procedure due to effect of the local anesthetic    Do not drive for 6 hours. The effect of the local anesthetic could slow your reflexes.     You may resume your regular activities after 24 hours    Avoid strenuous activity for the first 24 hours    You may shower, however avoid swimming, tub baths or hot tubs for 24 hours following your procedure    You may have a mild to moderate increase in pain for several days following the injection.    It may take up to 14 days for the steroid medication to start working although you may feel the effect as early as a few days after the procedure.       You may use ice packs for 10-15 minutes, 3 to 4 times a day at the injection site for comfort    Do not use heat to painful areas for 6 to 8 hours. This will give the local anesthetic time to wear off and prevent you from accidentally burning your skin.     Unless you have been directed to avoid the use of  anti-inflammatory medications (NSAIDS), you may use medications such as ibuprofen, Aleve or Tylenol for pain control if needed.     If you were fasting, you may resume your normal diet and medications after the procedure    Possible side effects of steroids that you may experience include flushing, elevated blood pressure, increased appetite, mild headaches and restlessness.  All of these symptoms will get better with time.    If you experience any of the following, call the Pain Clinic during work hours at [phone] or the Provider Line after hours at [phone]:  -Fever over 100 degree F  -Swelling, bleeding, redness, drainage, warmth at the injection site  -Progressive weakness or numbness in your legs or arms  -Unusual headache that is not relieved by Tylenol or other pain reliever  -Unusual new onset of pain that is not improving          Additional Information About Your Visit       [facility] Information    [id number] gives you secure access to your electronic health record. If you see a primary care provider, you can also send messages to your care team and make appointments. If you have questions, please call your primary care clinic.  If you do not have a primary care provider, please call [phone] and they will assist you.       Care EveryWhere ID    This is your Care EveryWhere ID. This could be used by other organizations to access your [facility] medical records  [id number]        Primary Care Provider Office Phone # Fax #    Nuzhat Burt APREYAD [facility] [phone]      Equal Access to Services    MERRILL PRESTON : Hadii gilma westono Soayanna, waaxda luqadaha, qaybta kaalmada lillian liu . So [facility] [phone].    ATENCIÓN: Si habla español, tiene a anderson disposición servicios gratuitos de asistencia lingüística. Llame al [phone].    We comply with applicable federal civil rights laws and [place] laws. We do not discriminate on the basis of  race, color, national origin, age, disability, sex, sexual orientation, or gender identity.           Thank you!    Thank you for choosing [facility] for your care. Our goal is always to provide you with excellent care. Hearing back from our patients is one way we can continue to improve our services. Please take a few minutes to complete the written survey that you may receive in the mail after you visit with us. Thank you!            Medication List      Medications          Morning Afternoon Evening Bedtime As Needed    order for DME  INSTRUCTIONS:  Equipment being ordered: Nebulizer                       ASK your doctor about these medications          Morning Afternoon Evening Bedtime As Needed    * albuterol (2.5 MG/3ML) 0.083% neb solution  Also known as:  PROVENTIL  INSTRUCTIONS:  Take 1 vial (2.5 mg) by nebulization every 6 hours as needed for shortness of breath / dyspnea or wheezing                     * albuterol 108 (90 Base) MCG/ACT inhaler  Also known as:  PROAIR HFA/PROVENTIL HFA/VENTOLIN HFA  INSTRUCTIONS:  Inhale 2 puffs into the lungs every 6 hours as needed for shortness of breath / dyspnea                     celecoxib 100 MG capsule  Also known as:  celeBREX  INSTRUCTIONS:  Take 1 capsule (100 mg) by mouth 2 times daily as needed for moderate pain                     doxycycline hyclate 100 MG capsule  Also known as:  VIBRAMYCIN  INSTRUCTIONS:  Take 1 capsule (100 mg) by mouth 2 times daily for 10 days  Ask about: Should I take this medication?                     DULoxetine 60 MG capsule  Also known as:  CYMBALTA  INSTRUCTIONS:  TAKE 1 CAPSULE EVERY MORNING                     fexofenadine 180 MG tablet  Also known as:  ALLEGRA  INSTRUCTIONS:  Take 1 tablet (180 mg) by mouth daily                     fluticasone-salmeterol 250-50 MCG/DOSE inhaler  Also known as:  ADVAIR  INSTRUCTIONS:  Inhale 1 puff into the lungs 2 times daily                     GLUCOSAMINE SULFATE PO  INSTRUCTIONS:  Take  1,000 mg by mouth 2 times daily                     levothyroxine 100 MCG tablet  Also known as:  SYNTHROID/LEVOTHROID  INSTRUCTIONS:  Take 1 tablet (100 mcg) by mouth daily                     MAGNESIUM OXIDE PO  INSTRUCTIONS:  Take 200 mg by mouth daily                     MULTIVITAMIN ADULT PO  INSTRUCTIONS:  Take by mouth every morning                     OVER-THE-COUNTER  INSTRUCTIONS:  Place 1 drop into both eyes 3 times daily. Systane Ultra, Systane Balance, Blink Tears or Refresh Optive Artificial Tear                     ranitidine 150 MG tablet  Also known as:  ZANTAC  INSTRUCTIONS:  TAKE 1 TABLET TWICE A DAY                     tiZANidine 2 MG tablet  Also known as:  ZANAFLEX  INSTRUCTIONS:  Take 1 tablet at bedtime for 3 days then take 1 tablet twice a day thereafter                     TYLENOL 325 MG tablet  INSTRUCTIONS:  Take 325-650 mg by mouth every 6 hours as needed for mild pain  Generic drug:  acetaminophen                     VITAMIN B 12 PO  INSTRUCTIONS:  Take 500 mcg by mouth daily                     VITAMIN D3 PO  INSTRUCTIONS:  Take 2,000 Units by mouth 2 times daily                        * This list has 2 medication(s) that are the same as other medications prescribed for you. Read the directions carefully, and ask your doctor or other care provider to review them with you.

## 2019-05-03 ASSESSMENT — ANXIETY QUESTIONNAIRES: GAD7 TOTAL SCORE: 2

## 2019-05-20 ENCOUNTER — OFFICE VISIT (OUTPATIENT)
Dept: FAMILY MEDICINE | Facility: CLINIC | Age: 59
End: 2019-05-20
Payer: OTHER GOVERNMENT

## 2019-05-20 VITALS
OXYGEN SATURATION: 97 % | DIASTOLIC BLOOD PRESSURE: 82 MMHG | WEIGHT: 210 LBS | BODY MASS INDEX: 31.01 KG/M2 | TEMPERATURE: 98.1 F | SYSTOLIC BLOOD PRESSURE: 112 MMHG | RESPIRATION RATE: 20 BRPM | HEART RATE: 92 BPM

## 2019-05-20 DIAGNOSIS — J40 BRONCHITIS: Primary | ICD-10-CM

## 2019-05-20 DIAGNOSIS — J45.40 MODERATE PERSISTENT ASTHMA WITHOUT COMPLICATION: ICD-10-CM

## 2019-05-20 DIAGNOSIS — E03.9 ACQUIRED HYPOTHYROIDISM: ICD-10-CM

## 2019-05-20 LAB — TSH SERPL DL<=0.005 MIU/L-ACNC: 4 MU/L (ref 0.4–4)

## 2019-05-20 PROCEDURE — 36415 COLL VENOUS BLD VENIPUNCTURE: CPT | Performed by: NURSE PRACTITIONER

## 2019-05-20 PROCEDURE — 99214 OFFICE O/P EST MOD 30 MIN: CPT | Performed by: NURSE PRACTITIONER

## 2019-05-20 PROCEDURE — 84443 ASSAY THYROID STIM HORMONE: CPT | Performed by: NURSE PRACTITIONER

## 2019-05-20 RX ORDER — AZITHROMYCIN 250 MG/1
TABLET, FILM COATED ORAL
Qty: 6 TABLET | Refills: 0 | Status: SHIPPED | OUTPATIENT
Start: 2019-05-20 | End: 2019-09-27

## 2019-05-20 RX ORDER — ALBUTEROL SULFATE 0.83 MG/ML
2.5 SOLUTION RESPIRATORY (INHALATION) EVERY 6 HOURS PRN
Qty: 25 VIAL | Refills: 0 | Status: SHIPPED | OUTPATIENT
Start: 2019-05-20 | End: 2021-08-10

## 2019-05-20 NOTE — PROGRESS NOTES
Subjective     Teresa Fernandez is a 59 year old female who presents to clinic today for the following health issues:    HPI   Hypothyroidism Follow-up      Since last visit, patient describes the following symptoms: Weight stable, no hair loss, no skin changes, no constipation, no loose stools      Amount of exercise or physical activity: None    Problems taking medications regularly: No    Medication side effects: none    Diet: regular (no restrictions)      ENT Symptoms             Symptoms: cc Present Absent Comment   Fever/Chills  x  Did have   Fatigue  x     Muscle Aches   x    Eye Irritation   x    Sneezing   x    Nasal Placido/Drg   x    Sinus Pressure/Pain   x    Loss of smell   x    Dental pain   x    Sore Throat   x    Swollen Glands   x    Ear Pain/Fullness   x    Cough x   Productive    Wheeze  x     Chest Pain   x    Shortness of breath  x     Rash   x    Other   x      Symptom duration:  2 weeks   Symptom severity:  moderate   Treatments tried:  Nebs, otc cough meds   Contacts:  co workers       Patient with history of asthma.     Reviewed and updated as needed this visit by Provider         Review of Systems   ROS COMP: Constitutional, HEENT, cardiovascular, pulmonary, GI, , musculoskeletal, neuro, skin, endocrine and psych systems are negative, except as otherwise noted.      Objective    There were no vitals taken for this visit.  There is no height or weight on file to calculate BMI.  Physical Exam   GENERAL: alert, no distress and fatigued  NECK: no adenopathy, no asymmetry, masses, or scars and thyroid normal to palpation  RESP: lungs clear to auscultation - no rales, rhonchi or wheezes  CV: regular rate and rhythm, normal S1 S2, no S3 or S4, no murmur, click or rub, no peripheral edema and peripheral pulses strong  MS: no gross musculoskeletal defects noted, no edema    Diagnostic Test Results:  Labs reviewed in Epic        Assessment & Plan     1. Bronchitis    - azithromycin (ZITHROMAX Z-NAGA)  250 MG tablet; Take 2 tablets on day 1 and then 1 tablet on days 2-5.  Dispense: 6 tablet; Refill: 0    2. Moderate persistent asthma without complication    - albuterol (PROVENTIL) (2.5 MG/3ML) 0.083% neb solution; Take 1 vial (2.5 mg) by nebulization every 6 hours as needed for shortness of breath / dyspnea or wheezing  Dispense: 25 vial; Refill: 0    3. Acquired hypothyroidism  - continue current dose of levothyroxine   - TSH with free T4 reflex         No follow-ups on file.    HERIBERTO Delgado CNP  [facility]

## 2019-05-20 NOTE — PATIENT INSTRUCTIONS
Thank you for choosing [facility].  You may be receiving an email and/or telephone survey request from [facility] [employer] regarding your visit today.  Please take a few minutes to respond to the survey to let us know how we are doing.      If you have questions or concerns, please contact us via [employer] or you can contact your care team at [phone].    Our Clinic hours are:  Monday 6:40 am  to 7:00 pm  Tuesday -Friday 6:40 am to 5:00 pm    The [place] outpatient lab hours are:  Monday - Friday 6:10 am to 4:45 pm  Saturdays 7:00 am to 11:00 am  Appointments are required, call [phone]    If you have clinical questions after hours or would like to schedule an appointment,  call the clinic at [phone].

## 2019-06-04 ENCOUNTER — OFFICE VISIT (OUTPATIENT)
Dept: PSYCHOLOGY | Facility: CLINIC | Age: 59
End: 2019-06-04
Payer: OTHER GOVERNMENT

## 2019-06-04 DIAGNOSIS — F33.0 MAJOR DEPRESSIVE DISORDER, RECURRENT, MILD (H): Primary | ICD-10-CM

## 2019-06-04 PROCEDURE — 90834 PSYTX W PT 45 MINUTES: CPT | Performed by: PSYCHOLOGIST

## 2019-06-05 ASSESSMENT — ANXIETY QUESTIONNAIRES
2. NOT BEING ABLE TO STOP OR CONTROL WORRYING: NOT AT ALL
GAD7 TOTAL SCORE: 5
6. BECOMING EASILY ANNOYED OR IRRITABLE: SEVERAL DAYS
4. TROUBLE RELAXING: SEVERAL DAYS
1. FEELING NERVOUS, ANXIOUS, OR ON EDGE: SEVERAL DAYS
3. WORRYING TOO MUCH ABOUT DIFFERENT THINGS: SEVERAL DAYS
5. BEING SO RESTLESS THAT IT IS HARD TO SIT STILL: SEVERAL DAYS
7. FEELING AFRAID AS IF SOMETHING AWFUL MIGHT HAPPEN: NOT AT ALL

## 2019-06-05 ASSESSMENT — PATIENT HEALTH QUESTIONNAIRE - PHQ9: SUM OF ALL RESPONSES TO PHQ QUESTIONS 1-9: 5

## 2019-06-05 NOTE — PROGRESS NOTES
Progress Note  Disclaimer: This note consists of symbols derived from keyboarding, dictation and/or voice recognition software. As a result, there may be errors in the script that have gone undetected. Please consider this when interpreting information found in this chart.    Client Name: Teresa Fernandez  Date: 6/4/2019         Service Type: Individual      Session Start Time: 6:00 PM  session End Time: 6:45 PM      Session Length: 45     Session #: 14     Attendees: Client attended alone    Treatment Plan Last Reviewed: 6/4/2019  PHQ-9 / ERIC-7 : See flowsheet's     DATA   Client reports her other children are having disagreements regarding her son who has recently completed alcohol treatment.  This is causing considerable stress for the client.  However she has learned to stay to hang out of these things.  As a result she is noting less muscle tension.      Progress Since Last Session (Related to Symptoms / Goals / Homework):   Symptoms: Improving See above    Homework: Achieved / completed to satisfaction      Episode of Care Goals: Satisfactory progress - ACTION (Actively working towards change); Intervened by reinforcing change plan / affirming steps taken     Current / Ongoing Stressors and Concerns:   Some with alcohol and legal problems, multiple medical problems, work stress.     Treatment Objective(s) Addressed in This Session:   use at least 2 coping skills for anxiety management in the next 2 weeks       Intervention:   CBT: Behavioral activation and CBT for anxiety and panic.   12 step facilitation for family and parents     ASSESSMENT: Current Emotional / Mental Status (status of significant symptoms):   Risk status (Self / Other harm or suicidal ideation)   Client denies current fears or concerns for personal safety.   Client denies current or recent suicidal ideation or behaviors.   Client denies current or recent homicidal ideation or behaviors.   Client  denies current or recent self injurious behavior or ideation.   Client denies other safety concerns.   Client Client reports there has been no change in risk factors since their last session.     Client Client reports there has been no change in protective factors since their last session.     A safety and risk management plan has not been developed at this time, however client was given the after-hours number / 911 should there be a change in any of these risk factors.     Appearance:   Appropriate    Eye Contact:   Good    Psychomotor Behavior: Normal    Attitude:   Cooperative    Orientation:   All   Speech    Rate / Production: Normal     Volume:  Normal    Mood:    Normal   Affect:    Appropriate    Thought Content:  Clear    Thought Form:  Coherent  Logical    Insight:    Good      Medication Review:   No changes to current psychiatric medication(s)     Medication Compliance:   Yes     Changes in Health Issues:   None reported     Chemical Use Review:   Substance Use: Chemical use reviewed, no active concerns identified      Tobacco Use: No current tobacco use.       Collateral Reports Completed:   Not Applicable    PLAN: (Client Tasks / Therapist Tasks / Other)   Client to continue to maintain healthy boundaries with her alcoholic child.  Continue attending 12-step family support group and contact other members if she needs to.        Carter Tierney                                                         ________________________________________________________________________    Treatment Plan    Client's Name: Teresa Fernandez  YOB: 1960    Date: 7/23/2018    DSM5 Diagnoses: (Sustained by DSM5 Criteria Listed Above)  Diagnoses:            296.31 (F33.0) Major Depressive Disorder, Recurrent Episode, Mild _ and With anxious distress  Psychosocial & Contextual Factors: Multiple medical issues  WHODAS 2.0 (12 item)                          This questionnaire asks about difficulties due to health  conditions. Health conditions                   include                        disease or illnesses, other health problems that may be short or long lasting,                    injuries, mental health or emotional problems, and problems with alcohol or drugs.                              Think back over the past 30 days and answer these questions, thinking about how much              difficulty you had doing the following activities. For each question, please [place] only                   one response.      S1 Standing for long periods such as 30 minutes? Mild =           2   S2 Taking care of household responsibilities? None =         1   S3 Learning a new task, for example, learning how to get to a new place? None =         1   S4 How much of a problem do you have joining community activities (for example, festivals, [employer] or other activities) in the same way as anyone else can? None =         1   S5 How much have you been emotionally affected by your health problems? Moderate =   3               In the past 30 days, how much difficulty did you have in:   S6 Concentrating on doing something for ten minutes? Mild =           2   S7 Walking a long distance such as a kilometer (or equivalent)? None =         1   S8 Washing your whole body? None =         1   S9 Getting dressed? None =         1   S10 Dealing with people you do not know? None =         1   S11 Maintaining a friendship? None =         1   S12 Your day to day work? None =         1       H1 Overall, in the past 30 days, how many days were these difficulties present? Record number of days 3   H2 In the past 30 days, for how many days were you totally unable to carry out your usual activities or work because of any health condition? Record number of days  0   H3 In the past 30 days, not counting the days that you were totally unable, for how many days did you cut back or reduce your usual activities or work because of any health condition? Record  number of days 0           Referral / Collaboration:  Referral to another professional/service is not indicated at this time..    Anticipated number of session or this episode of care: 15  Anxiety Treatment plan:  1. Education- the Biopsychosocial model of anxiety  a. Client will be able to describe how anxiety is effecting them physically, emotionally and socially  2. Distraction  a. Client will learn 2 techniques to distract themselves when becoming anxious  3. Diaphragmatic breathing  a. Client will learn to breath using their diaphragm  b. Client will learn 2 breathing patterns to use to reduce anxiety  4. Visualization  a. Client will learn to establish a safe, calm place in their mind utilizing all their senses  b. Client will practice using visualization at times when they are not anxious so they will be able to use it when anxiety occurs  5. Progressive muscle relaxation  a. Client will learn progressive muscle relaxation techniques and practice them 4-5 times per week.  b. Client will learn to use mental images of relaxation to relax muscle groups and do this on a daily basis  6. Self-care  a. Client will identify 3 things they can do just for themselves  b. Client will take time for quiet, reflection, meditation 3 times per week  c. Client will Learn to set boundaries when appropriate  d. Client will Identify 3 individuals they can call on for support, distraction  7. Assessment of progress  a. Client will engage in assessment of their progress on a regular basis      Client has reviewed and agreed to the above plan.      Carter Tierney  July 24, 2018

## 2019-06-06 ASSESSMENT — ANXIETY QUESTIONNAIRES: GAD7 TOTAL SCORE: 5

## 2019-07-02 DIAGNOSIS — F41.1 GAD (GENERALIZED ANXIETY DISORDER): ICD-10-CM

## 2019-07-02 DIAGNOSIS — M79.10 MYALGIA: ICD-10-CM

## 2019-07-02 NOTE — TELEPHONE ENCOUNTER
"Requested Prescriptions   Pending Prescriptions Disp Refills     DULoxetine (CYMBALTA) 60 MG capsule [Pharmacy Med Name: DULOXETINE HCL DR CAPS 60MG] 90 capsule 0     Sig: TAKE 1 CAPSULE EVERY MORNING   Last Written Prescription Date:  4/3/19  Last Fill Quantity: 90 cap,  # refills: 0   Last office visit: 5/20/2019 with prescribing provider:  Nuzhat Burt     Future Office Visit:   Next 5 appointments (look out 90 days)    Jul 15, 2019  5:00 PM CDT  Return Visit with Carter Tierney  [facility] [address] [address]  [phone]             Serotonin-Norepinephrine Reuptake Inhibitors  Passed - 7/2/2019  2:04 AM        Passed - Blood pressure under 140/90 in past 12 months     BP Readings from Last 3 Encounters:   05/20/19 112/82   05/02/19 145/85   03/06/19 140/72                 Passed - Recent (12 mo) or future (30 days) visit within the authorizing provider's specialty     Patient had office visit in the last 12 months or has a visit in the next 30 days with authorizing provider or within the authorizing provider's specialty.  See \"Patient Info\" tab in inbasket, or \"Choose Columns\" in Meds & Orders section of the refill encounter.              Passed - Medication is active on med list        Passed - Patient is age 18 or older        Passed - No active pregnancy on record        Passed - No positive pregnancy test in past 12 months          "

## 2019-07-05 RX ORDER — DULOXETIN HYDROCHLORIDE 60 MG/1
CAPSULE, DELAYED RELEASE ORAL
Qty: 90 CAPSULE | Refills: 0 | Status: SHIPPED | OUTPATIENT
Start: 2019-07-05 | End: 2019-10-14

## 2019-07-05 NOTE — TELEPHONE ENCOUNTER
S:  Refill request for duloxetine    B:  LOV 5/20/19  Duloxetine last written 4/3/19 for 3 month supply    A:  Passes [facility] refill protocol    R:  Filled per [facility] refill protocol    No further action necessary.  Encounter closed.    Paco Toney RN

## 2019-08-07 ENCOUNTER — OFFICE VISIT (OUTPATIENT)
Dept: RHEUMATOLOGY | Facility: CLINIC | Age: 59
End: 2019-08-07
Payer: OTHER GOVERNMENT

## 2019-08-07 VITALS
TEMPERATURE: 98.2 F | OXYGEN SATURATION: 98 % | BODY MASS INDEX: 31.84 KG/M2 | SYSTOLIC BLOOD PRESSURE: 118 MMHG | WEIGHT: 215 LBS | HEIGHT: 69 IN | DIASTOLIC BLOOD PRESSURE: 84 MMHG | HEART RATE: 93 BPM

## 2019-08-07 DIAGNOSIS — R41.3 MEMORY PROBLEM: ICD-10-CM

## 2019-08-07 DIAGNOSIS — R21 RASH AND NONSPECIFIC SKIN ERUPTION: ICD-10-CM

## 2019-08-07 DIAGNOSIS — M79.10 MYALGIA: Primary | ICD-10-CM

## 2019-08-07 DIAGNOSIS — R74.8 ELEVATED CK: ICD-10-CM

## 2019-08-07 LAB
ALBUMIN SERPL-MCNC: 3.9 G/DL (ref 3.4–5)
ALP SERPL-CCNC: 94 U/L (ref 40–150)
ALT SERPL W P-5'-P-CCNC: 55 U/L (ref 0–50)
ANION GAP SERPL CALCULATED.3IONS-SCNC: 6 MMOL/L (ref 3–14)
AST SERPL W P-5'-P-CCNC: 40 U/L (ref 0–45)
BASOPHILS # BLD AUTO: 0 10E9/L (ref 0–0.2)
BASOPHILS NFR BLD AUTO: 0.6 %
BILIRUB SERPL-MCNC: 0.4 MG/DL (ref 0.2–1.3)
BUN SERPL-MCNC: 11 MG/DL (ref 7–30)
C3 SERPL-MCNC: 107 MG/DL (ref 76–169)
C4 SERPL-MCNC: 23 MG/DL (ref 15–50)
CALCIUM SERPL-MCNC: 10.1 MG/DL (ref 8.5–10.1)
CHLORIDE SERPL-SCNC: 103 MMOL/L (ref 94–109)
CK SERPL-CCNC: 200 U/L (ref 30–225)
CO2 SERPL-SCNC: 30 MMOL/L (ref 20–32)
CREAT SERPL-MCNC: 0.79 MG/DL (ref 0.52–1.04)
CRP SERPL-MCNC: <2.9 MG/L (ref 0–8)
DIFFERENTIAL METHOD BLD: NORMAL
EOSINOPHIL # BLD AUTO: 0.1 10E9/L (ref 0–0.7)
EOSINOPHIL NFR BLD AUTO: 2.5 %
ERYTHROCYTE [DISTWIDTH] IN BLOOD BY AUTOMATED COUNT: 12.5 % (ref 10–15)
ERYTHROCYTE [SEDIMENTATION RATE] IN BLOOD BY WESTERGREN METHOD: 11 MM/H (ref 0–30)
GFR SERPL CREATININE-BSD FRML MDRD: 82 ML/MIN/{1.73_M2}
GLUCOSE SERPL-MCNC: 95 MG/DL (ref 70–99)
HCT VFR BLD AUTO: 42.1 % (ref 35–47)
HGB BLD-MCNC: 14.1 G/DL (ref 11.7–15.7)
LYMPHOCYTES # BLD AUTO: 1.3 10E9/L (ref 0.8–5.3)
LYMPHOCYTES NFR BLD AUTO: 25.1 %
MCH RBC QN AUTO: 30.9 PG (ref 26.5–33)
MCHC RBC AUTO-ENTMCNC: 33.5 G/DL (ref 31.5–36.5)
MCV RBC AUTO: 92 FL (ref 78–100)
MONOCYTES # BLD AUTO: 0.5 10E9/L (ref 0–1.3)
MONOCYTES NFR BLD AUTO: 10.1 %
NEUTROPHILS # BLD AUTO: 3.2 10E9/L (ref 1.6–8.3)
NEUTROPHILS NFR BLD AUTO: 61.7 %
PLATELET # BLD AUTO: 251 10E9/L (ref 150–450)
POTASSIUM SERPL-SCNC: 4.2 MMOL/L (ref 3.4–5.3)
PROT SERPL-MCNC: 7.2 G/DL (ref 6.8–8.8)
RBC # BLD AUTO: 4.57 10E12/L (ref 3.8–5.2)
SODIUM SERPL-SCNC: 139 MMOL/L (ref 133–144)
TSH SERPL DL<=0.005 MIU/L-ACNC: 3.18 MU/L (ref 0.4–4)
WBC # BLD AUTO: 5.3 10E9/L (ref 4–11)

## 2019-08-07 PROCEDURE — 86225 DNA ANTIBODY NATIVE: CPT | Performed by: INTERNAL MEDICINE

## 2019-08-07 PROCEDURE — 86160 COMPLEMENT ANTIGEN: CPT | Performed by: INTERNAL MEDICINE

## 2019-08-07 PROCEDURE — 99000 SPECIMEN HANDLING OFFICE-LAB: CPT | Performed by: INTERNAL MEDICINE

## 2019-08-07 PROCEDURE — 85652 RBC SED RATE AUTOMATED: CPT | Performed by: INTERNAL MEDICINE

## 2019-08-07 PROCEDURE — 85025 COMPLETE CBC W/AUTO DIFF WBC: CPT | Performed by: INTERNAL MEDICINE

## 2019-08-07 PROCEDURE — 99214 OFFICE O/P EST MOD 30 MIN: CPT | Performed by: INTERNAL MEDICINE

## 2019-08-07 PROCEDURE — 82550 ASSAY OF CK (CPK): CPT | Performed by: INTERNAL MEDICINE

## 2019-08-07 PROCEDURE — 84443 ASSAY THYROID STIM HORMONE: CPT | Performed by: INTERNAL MEDICINE

## 2019-08-07 PROCEDURE — 80053 COMPREHEN METABOLIC PANEL: CPT | Performed by: INTERNAL MEDICINE

## 2019-08-07 PROCEDURE — 86140 C-REACTIVE PROTEIN: CPT | Performed by: INTERNAL MEDICINE

## 2019-08-07 PROCEDURE — 82085 ASSAY OF ALDOLASE: CPT | Mod: 90 | Performed by: INTERNAL MEDICINE

## 2019-08-07 PROCEDURE — 36415 COLL VENOUS BLD VENIPUNCTURE: CPT | Performed by: INTERNAL MEDICINE

## 2019-08-07 ASSESSMENT — MIFFLIN-ST. JEOR: SCORE: 1619.61

## 2019-08-07 NOTE — PROGRESS NOTES
Rheumatology Clinic Visit      Teresa Fernandez MRN# [id number]   YOB: 1960 Age: 59 year old      Date of visit: 8/07/19   Referring provider: Jazzmine Davis  PCP: Nuzhat Burt    Chief Complaint   Patient presents with:  HyperCKemia: Had a flare Friday (in arms) balance is off and having pain. Feels foggy then falls asleep and when she wakes us she is trembling. Muscles hurt.    Assessment and Plan     1. HyperCKemia: Elevated CK, with several measurements as follows: 254, 530, 366, 312.  She does not have weakness.  No skin changes to suggest dermatomyositis.  Previous workup including a NCS/EMG did not suggest myopathy.  She does have hypothyroidism that is well controlled.   CK has been fairly stable and will recheck today as she is complaining of worsening myalgias but no weakness.   She has a history of one melanoma being removed in the distant past, and a history of nephrectomy for renal cell carcinoma.  She was following with Dr. Pa at [facility] previously and she follows with Dr. Pa for surveillance.    -Labs today: CBC, CMP, C3, C4, dsDNA, ESR, CRP, CK, aldolase, TSH    2.  Rash on her upper arm: Likely dermatitis.  She would like to see a dermatologist and this is reasonable.  -Dermatology referral    3.  Memory problem: Difficulty with memory lately.  Advised that she follow-up with her PCP and discuss with her psychologist.    4. Fibromyalgia: Currently her symptoms are most consistent with fibromyalgia.  She says that this is already being managed in the pain clinic.  Note that the memory issues and diffuse myalgias are likely at least in part due to fibromyalgia    Ms. Fernandez verbalized agreement with and understanding of the rational for the diagnosis and treatment plan.  All questions were answered to best of my ability and the patient's satisfaction. Ms. Fernandez was advised to contact the clinic with any questions that may arise after the clinic visit.      Thank you  for involving me in the care of the patient    Return to clinic: [facility]      HPI   Teresa Fernandez is a 59 year old female with a past medical history significant for hypothyroidism, allergic rhinitis, chronic sinusitis, anxiety, GERD, dry eyes, chronic low back pain / degenerative disc disease of the lumbar spine, osteopenia, history of melanoma, asthma, and history of renal cell carcinoma who is seen for follow-up of myalgias    3/6/2019: Ms. Fernandez reported that about 2 years prior she had flulike symptoms and was evaluated by her PCP where an elevated CK was found.  She reports that her main issue is diffuse body pain.  She has 2 rods and spacers in her back that was placed about 6 years ago.  She has hypothyroidism that was diagnosed in 1995 and is on levothyroxine.  He says that the hypothyroidism was diagnosed the same year that she was diagnosed with renal cell carcinoma that was treated by unilateral nephrectomy.  Renal cell carcinoma was monitored by her oncologist with CT scans for a while with no recurrence and therefore no more CT scans were being done.  She has gluten sensitivity and therefore avoids gluten; she ate gluten once and felt absolutely miserable so she avoids it completely.  She was tested once in 2011 for gliadin antibody IgA that was negative but she says that she had been on a gluten-free diet for a while at that point.  Restless leg syndrome and is taking iron twice daily.  He is a Celebrex as needed for her pain that is effective.  Mildly dry eyes that she does not do anything for.  No dry mouth.  No joint swelling.  Denies fevers, chills, nausea, or vomiting.  Occasional constipation or diarrhea. No abdominal pain. No chest pain/pressure, palpitations, or shortness of breath. No LE swelling.  No oral or nasal sores.  No rash.  No photosensitivity or photophobia. No eye pain or redness. No history of inflammatory eye disease.  No history of inflammatory bowel disease.  No history of  DVT, pulmonary embolism, or miscarriage.   No history of serositis.  No history of Raynaud's Phenomenon.  Nonrestorative sleep; waking up several times at night; tired throughout the day.  No weakness.  No difficulty doing her daily activities.    Previous workup includes:    Myositis panel negative:    Niharika-1 (histidyl-tRNA synthetase) Ab, IgG    PL-12 (alanyl-tRNA synthetase) Antibody     PL-7 (threonyl-tRNA synthetase) Antibody     EJ (glycyl-tRNA synthetase) Antibody    OJ (isoleucyl-tRNA synthetase) Antibody     SRP (Signal Recognition Particle) Ab     Mi-2 (nuclear helicase protein) Antibody     P155/140 Antibody    TIF-1 gamma (155 kDA) Antibody    SAE1 (SUMO activating enzyme) Antibody    MDA5 (CADM-140) Antibody     NXP-2 (Nuclear matrix proten-2) Ab    Labs for creatinine kinase with a maximum of 530    Normal CRP, ESR, RF, DIEGO    TSH mildly suppressed    11/2017 NCS/EMG showed chronic left-sided lumbosacral radiculopathy affecting the S1 and probably L5 nerve roots with no evidence of generalized myopathy or generalized disorder the lower motor neurons    MRI of the lumbar spine showing degenerative changes    MRI of the cervical spine showing degenerative changes    No muscle biopsy hx    Previously was evaluated by Dr. Zavala in the neuromuscular clinic and his note was reviewed.  Mild elevation of the CK.  Chronic multifocal pain possibly fibromyalgia.    Today, she reports that she has had diffuse myalgias.  Everything hurts.  She also has a rash that comes and goes on her upper extremity, only affecting the upper arm and typically resolves within a couple days.  All diffuse body pain worsens with activity but she says she forces herself to walk anyway.  She is forgetting things more frequently but does not give specific details.  Hips bother her some.  She says that she gets a weird feeling that affects her whole body for about 1 day and then will go away on its own.  No weakness.  No joint swelling.   She comments that there is one day since last seen in this clinic where she was very angry for no clear reason so she went to bed and woke up happy.    Tobacco: Former smoker; quit in 1995  EtOH: None  Drugs: None    ROS   GEN: No fevers, chills, night sweats, or weight change  SKIN: No itching, rashes, sores  HEENT: No epistaxis. No oral or nasal ulcers.  CV: No chest pain, pressure, palpitations, or dyspnea on exertion.  PULM: No SOB, wheeze, cough.  GI: See HPI  : No blood in urine.  MSK: See HPI.  NEURO: No numbness, tingling, or weakness.  ENDO: No heat/cold intolerance.  EXT: No LE swelling  PSYCH: See HPI    Active Problem List     Patient Active Problem List   Diagnosis     Acquired hypothyroidism     Allergic state     Chronic rhinitis     Allergic rhinitis due to pollen     Sinusitis, chronic     History of skin cancer     ERIC (generalized anxiety disorder)     Leukoplakia of oral mucosa, including tongue     Renal cancer (H)     GERD (gastroesophageal reflux disease)     Chronic low back pain     Dry eye syndrome     Chronic fatigue     Osteopenia     History of melanoma in situ     Eczema     Moderate persistent asthma without complication     History of kidney cancer     DDD (degenerative disc disease), lumbar     Hyperlipidemia LDL goal <160     Chest tightness or pressure     Muscle pain     Past Medical History     Past Medical History:   Diagnosis Date     ALLERGIC RHINITIS NOS 4/12/2005     Anxiety      Arthritis     herniated disc     Bronchitis, not specified as acute or chronic      CHR MAXILLARY SINUSITIS 4/12/2005     Depressive disorder, not elsewhere classified      Eczema 10/17/2012     Environmental and seasonal allergies      GERD (gastroesophageal reflux disease)      Gluten intolerance      Malignant neoplasm of renal pelvis (H) 1995    Renal cell carcinoma     Melanoma (H) 06/2010     Other specified acquired hypothyroidism 4/12/2005     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     FUSION LUMBAR ANTERIOR TWO LEVELS  4/13/2015     GYN SURGERY  04/08/1999    hysterectomy.  [facility]     HYSTERECTOMY, PAP NO LONGER INDICATED       SOFT TISSUE SURGERY      chest-melonoma     SURGICAL HISTORY OF -   3/1996    Left nephrectomy-renal cell CA     Allergy     Allergies   Allergen Reactions     Omnipaque [Iohexol] Swelling and Difficulty breathing     Immediate throat swelling following around 1-2cc of omnipaque 300 for spine procedure     Gluten      Iodine      Welts from CT contrast, surgical scrub is ok.     Penicillins Hives     Current Medication List     Current Outpatient Medications   Medication Sig     acetaminophen (TYLENOL) 325 MG tablet Take 325-650 mg by mouth every 6 hours as needed for mild pain     albuterol (PROAIR HFA/PROVENTIL HFA/VENTOLIN HFA) 108 (90 BASE) MCG/ACT Inhaler Inhale 2 puffs into the lungs every 6 hours as needed for shortness of breath / dyspnea     albuterol (PROVENTIL) (2.5 MG/3ML) 0.083% neb solution Take 1 vial (2.5 mg) by nebulization every 6 hours as needed for shortness of breath / dyspnea or wheezing     Cholecalciferol (VITAMIN D3 PO) Take 2,000 Units by mouth 2 times daily      Cyanocobalamin (VITAMIN B 12 PO) Take 500 mcg by mouth daily     DULoxetine (CYMBALTA) 60 MG capsule TAKE 1 CAPSULE EVERY MORNING     fexofenadine (ALLEGRA) 180 MG tablet Take 1 tablet (180 mg) by mouth daily     fluticasone-salmeterol (ADVAIR) 250-50 MCG/DOSE diskus inhaler Inhale 1 puff into the lungs 2 times daily     GLUCOSAMINE SULFATE PO Take 1,000 mg by mouth 2 times daily     levothyroxine (SYNTHROID/LEVOTHROID) 100 MCG tablet Take 1 tablet (100 mcg) by mouth daily     MAGNESIUM OXIDE PO Take 200 mg by mouth daily      Multiple Vitamins-Minerals (MULTIVITAMIN ADULT PO) Take by mouth every morning      OVER-THE-COUNTER Place 1 drop into both eyes 3 times daily. Systane Ultra, Systane Balance, Blink Tears or Refresh Optive Artificial Tear     ranitidine (ZANTAC) 150 MG tablet TAKE 1 TABLET TWICE A DAY     azithromycin (ZITHROMAX Z-NAGA) 250 MG tablet Take 2 tablets on day 1 and then 1 tablet on days 2-5. (Patient not taking: Reported on 8/7/2019)     celecoxib (CELEBREX) 100 MG capsule Take 1 capsule (100 mg) by mouth 2 times daily as needed for moderate pain (Patient not taking: Reported on 8/7/2019)     order for DME Equipment being ordered: Nebulizer     tiZANidine (ZANAFLEX) 2 MG tablet Take 1 tablet at bedtime for 3 days then take 1 tablet twice a day thereafter (Patient not taking: Reported on 8/7/2019)     No current facility-administered medications for this visit.          Social History   See HPI    Family History     Family History   Problem Relation Age of Onset     Diabetes Mother      Cardiovascular Mother      Lipids Mother      Depression Mother      Hypertension Father      Lipids Father      Obesity Father      Macular Degeneration Father      Cancer Maternal Grandmother         kind unknown had sores on legs     Eczema Maternal Grandmother      Eczema Maternal Grandfather      Breast Cancer Paternal Grandmother      Cancer Paternal Grandmother         breast     Hypertension Paternal Grandfather      Cardiovascular Paternal Grandfather         heart attack     Hypertension Brother      Thyroid Disease Sister      Hypertension Sister      Depression Sister      Allergies Sister      Allergies Son      Eczema Son      Lipids Sister      Thyroid Disease Sister      Neurologic Disorder Sister      Depression Sister      Respiratory Son      Glaucoma No family hx of      Cerebrovascular Disease No family hx of      Denies family history of rheumatologic/autoimmune disorders    Physical Exam     Temp Readings from  "Last 3 Encounters:   08/07/19 98.2  F (36.8  C) (Oral)   05/20/19 98.1  F (36.7  C) (Tympanic)   12/26/18 98.3  F (36.8  C) (Tympanic)     BP Readings from Last 5 Encounters:   08/07/19 118/84   05/20/19 112/82   05/02/19 145/85   03/06/19 140/72   02/21/19 130/75     Pulse Readings from Last 1 Encounters:   08/07/19 93     Resp Readings from Last 1 Encounters:   05/20/19 20     Estimated body mass index is 31.75 kg/m  as calculated from the following:    Height as of this encounter: 1.753 m (5' 9\").    Weight as of this encounter: 97.5 kg (215 lb).    GEN: NAD; obese  HEENT: MMM. No oral lesions.  Anicteric, noninjected sclera  CV: S1, S2. RRR. No m/r/g.  PULM: CTA bilaterally. No w/c.  MSK:  MCPs, PIPs, wrists, elbows, shoulders, knees, ankles, and MTPs without swelling or tenderness to palpation.  Negative MCP and MTP squeeze.   Hips mildly tender to palpation.  All fibromyalgia tender points positive.  NEURO: UE and LE strengths 5/5 and equal bilaterally.  Biceps, triceps, patellar, and Achilles tendon reflexes 2+ bilaterally  SKIN: Faint erythema on the posterior aspect of the bilateral upper arms that was nontender to palpation and without increased warmth.  No nail pitting.  EXT: No LE edema  PSYCH: Alert. Appropriate.    Labs / Imaging (select studies)   RF/CCP  Recent Labs   Lab Test 01/15/18  1605 01/09/17  1620   RHF <20 <20     DIEGO  Recent Labs   Lab Test 01/09/17  1620   LOLA <1.0  Interpretation:  Negative       CBC  Recent Labs   Lab Test 03/06/19  1214 09/08/17  1555 01/09/17  1620  05/02/16  1601   WBC 5.4 5.6 6.5  --  6.0   RBC 4.50 4.40 4.44  --  4.41   HGB 13.8 13.4 13.5   < > 13.4   HCT 41.0 40.4 40.0  --  40.0   MCV 91 92 90  --  91   RDW 12.5 12.6 12.7  --  12.2    216 258  --  227   MCH 30.7 30.5 30.4  --  30.4   MCHC 33.7 33.2 33.8  --  33.5   NEUTROPHIL 54.0  --  56.2  --  55.1   LYMPH 32.3  --  32.3  --  32.5   MONOCYTE 9.9  --  8.2  --  8.6   EOSINOPHIL 3.4  --  2.5  --  3.3 "   BASOPHIL 0.4  --  0.8  --  0.5   ANEU 2.9  --  3.6  --  3.3   ALYM 1.7  --  2.1  --  2.0   SHANNAN 0.5  --  0.5  --  0.5   AEOS 0.2  --  0.2  --  0.2   ABAS 0.0  --  0.1  --  0.0    < > = values in this interval not displayed.     CMP  Recent Labs   Lab Test 03/06/19  1214 08/02/18  1547 09/15/17  1504 09/08/17  1555 05/02/16  1601 12/08/15  0735 04/08/15  0936   NA  --  140 139 138 139 139 137   POTASSIUM  --  4.1 4.0 4.2 4.0 4.2 4.3   CHLORIDE  --  105 104 105 103 104 101   CO2  --  30 29 27 28 33* 32   ANIONGAP  --  5 6 6 8 2* 4   GLC  --  86 85 75 95 94 85   BUN  --  14 16 14 15 16 14   CR 0.79 0.85 0.84 0.94 0.76 0.76 0.83   GFRESTIMATED 83 69 70 62 80 79 72   GFRESTBLACK >90 84 84 75 >90   GFR Calc   >90   GFR Calc   87   ANNIE  --  9.6 9.7 9.4 9.4 9.0 9.8   BILITOTAL 0.5  --  0.3 0.5 0.4 0.4  --    ALBUMIN 4.1  --  3.8 4.0 3.9 3.7 4.0   PROTTOTAL 7.0  --  6.7* 7.0 7.3 6.9  --    ALKPHOS 79  --  79 78 80 93  --    AST 33  --  32 45 24 26  --    ALT 49  --  48 57* 42 39  --      HgA1c  Recent Labs   Lab Test 11/14/14  0911   A1C 5.3     Iron Studies  Recent Labs   Lab Test 05/02/16  1601   SAMIA 34     Calcium/VitaminD  Recent Labs   Lab Test 08/02/18  1547 09/15/17  1504 09/08/17  1555 05/02/16  1601  09/11/12  1236   ANNIE 9.6 9.7 9.4 9.4   < > 9.8   VITDT  --   --   --  58  --  31    < > = values in this interval not displayed.     ESR/CRP  Recent Labs   Lab Test 03/06/19  1214 01/15/18  1605 09/08/17  1555 01/09/17  1620   SED 9  --  8 16   CRP <2.9 4.0 <2.9 <2.9     CK/Aldolase  Recent Labs   Lab Test 03/06/19  1214 09/10/18  1528 08/02/18  1547 11/07/17  1300   * 366* 530* 254*   ALDOLASE 4.0  --   --  3.3     TSH/T4  Recent Labs   Lab Test 05/20/19  1646 03/06/19  1214 09/10/18  1528 08/02/18  1547   TSH 4.00 0.21* 0.29* 0.33*   T4  --  1.30 1.26 1.23     Hepatitis C  Recent Labs   Lab Test 04/27/16  1508   HCVAB Nonreactive   Assay performance characteristics have not  been established for newborns,   infants, and children       Lyme ab screening  Recent Labs   Lab Test 09/08/17  1555   LYMEGM 0.14     Immunization History     Immunization History   Administered Date(s) Administered     DTaP, Unspecified 05/08/2008, 07/08/2013     FLU 6-35 months 11/10/2008, 09/22/2009, 10/13/2010, 09/22/2011, 11/14/2012     Influenza (IIV3) PF 10/31/2005, 10/30/2008, 09/22/2009, 10/13/2010, 09/22/2011, 11/14/2012     Influenza Vaccine IM 3yrs+ 4 Valent IIV4 01/07/2014, 12/29/2015, 09/13/2016, 09/10/2018     TD (ADULT, 7+) 05/08/2008     TDAP Vaccine (Adacel) 07/08/2013          Chart documentation done in part with Dragon Voice recognition Software. Although reviewed after completion, some word and grammatical error may remain.    Gaurav Chung MD

## 2019-08-07 NOTE — PATIENT INSTRUCTIONS
Rheumatology    Dr. Gaurav Chung         [facility]   (Monday)  [address]  [place] [address]       [facility]   (Tuesday)  [address]  [place] [address]    [facility]   (Wed., Thurs., and Friday)  [address] [place] [address]    Phone number: [phone]  Thank you for choosing [facility].  Temi Tripp CMA

## 2019-08-07 NOTE — NURSING NOTE
RAPID3 (0-30) Cumulative Score  14.3          RAPID3 Weighted Score (divide #4 by 3 and that is the weighted score)  4.7

## 2019-08-08 LAB
ALDOLASE SERPL-CCNC: 4.8 U/L (ref 1.5–8.1)
DSDNA AB SER-ACNC: <1 IU/ML

## 2019-08-09 ENCOUNTER — TELEPHONE (OUTPATIENT)
Dept: DERMATOLOGY | Facility: CLINIC | Age: 59
End: 2019-08-09

## 2019-08-09 NOTE — RESULT ENCOUNTER NOTE
"[employer] message sent:  \"Ms. Fernandez,    Labs were normal except for one liver enzyme, ALT, that was just above the normal range.  Other labs, including muscle enzymes CK and aldolase, were normal.     I believe that most of your symptoms are fibromyalgia related.  Please follow-up with your primary care provider and the pain management clinic for fibromyalgia management.     Sincerely,  Gaurav Chung MD  8/8/2019 11:47 PM\""

## 2019-08-09 NOTE — TELEPHONE ENCOUNTER
Pt called, No answer.  does not identify pt. Left general message for pt to call the [facility] back at [phone].    Elizabeth Hua LPN

## 2019-08-09 NOTE — TELEPHONE ENCOUNTER
Patient has dermatology order from Gaurav Chung MD at [facility] for a rash. Per protocols, clinic to reach out to patient and schedule.  Routing to  Derm to connect with patient.    Eugenia HAIR

## 2019-08-13 NOTE — TELEPHONE ENCOUNTER
Pt called, No answer.  does not identify pt. Left general message for pt to call the [facility] back at [phone].    Elizabeth Hua LPN

## 2019-09-04 ENCOUNTER — OFFICE VISIT (OUTPATIENT)
Dept: PSYCHOLOGY | Facility: CLINIC | Age: 59
End: 2019-09-04
Payer: OTHER GOVERNMENT

## 2019-09-04 DIAGNOSIS — F33.0 MAJOR DEPRESSIVE DISORDER, RECURRENT, MILD (H): Primary | ICD-10-CM

## 2019-09-04 PROCEDURE — 90834 PSYTX W PT 45 MINUTES: CPT | Performed by: PSYCHOLOGIST

## 2019-09-06 ENCOUNTER — OFFICE VISIT (OUTPATIENT)
Dept: DERMATOLOGY | Facility: CLINIC | Age: 59
End: 2019-09-06
Payer: OTHER GOVERNMENT

## 2019-09-06 DIAGNOSIS — Z85.820 HISTORY OF MELANOMA: ICD-10-CM

## 2019-09-06 DIAGNOSIS — R21 RASH: ICD-10-CM

## 2019-09-06 DIAGNOSIS — L65.9 NON-SCARRING ALOPECIA: Primary | ICD-10-CM

## 2019-09-06 PROCEDURE — 88305 TISSUE EXAM BY PATHOLOGIST: CPT | Mod: TC | Performed by: DERMATOLOGY

## 2019-09-06 PROCEDURE — 88312 SPECIAL STAINS GROUP 1: CPT | Mod: TC | Performed by: DERMATOLOGY

## 2019-09-06 PROCEDURE — 11104 PUNCH BX SKIN SINGLE LESION: CPT | Performed by: DERMATOLOGY

## 2019-09-06 PROCEDURE — 99203 OFFICE O/P NEW LOW 30 MIN: CPT | Mod: 25 | Performed by: DERMATOLOGY

## 2019-09-06 ASSESSMENT — ANXIETY QUESTIONNAIRES
7. FEELING AFRAID AS IF SOMETHING AWFUL MIGHT HAPPEN: NOT AT ALL
2. NOT BEING ABLE TO STOP OR CONTROL WORRYING: NOT AT ALL
GAD7 TOTAL SCORE: 5
3. WORRYING TOO MUCH ABOUT DIFFERENT THINGS: SEVERAL DAYS
5. BEING SO RESTLESS THAT IT IS HARD TO SIT STILL: SEVERAL DAYS
4. TROUBLE RELAXING: NOT AT ALL
6. BECOMING EASILY ANNOYED OR IRRITABLE: SEVERAL DAYS
1. FEELING NERVOUS, ANXIOUS, OR ON EDGE: MORE THAN HALF THE DAYS

## 2019-09-06 ASSESSMENT — PAIN SCALES - GENERAL: PAINLEVEL: NO PAIN (0)

## 2019-09-06 ASSESSMENT — PATIENT HEALTH QUESTIONNAIRE - PHQ9: SUM OF ALL RESPONSES TO PHQ QUESTIONS 1-9: 6

## 2019-09-06 NOTE — PROGRESS NOTES
Progress Note  Disclaimer: This note consists of symbols derived from keyboarding, dictation and/or voice recognition software. As a result, there may be errors in the script that have gone undetected. Please consider this when interpreting information found in this chart.    Client Name: Teresa Fernandez  Date: 9/4/2019         Service Type: Individual      Session Start Time: 4:00 PM  session End Time: 4:45 PM      Session Length: 45     Session #: 15     Attendees: Client attended alone    Treatment Plan Last Reviewed: 6/4/2019  PHQ-9 / ERIC-7 : See flowsheet's     DATA   Client reports increasing difficulty with memory.  She finds she learn something one month, possibly quite well and that is gone the next.  On the plus side her son is continuing his recovery and was back to full driving privileges.      Progress Since Last Session (Related to Symptoms / Goals / Homework):   Symptoms: Improving See above    Homework: Achieved / completed to satisfaction      Episode of Care Goals: Satisfactory progress - ACTION (Actively working towards change); Intervened by reinforcing change plan / affirming steps taken     Current / Ongoing Stressors and Concerns:   Some with alcohol and legal problems, multiple medical problems, work stress.     Treatment Objective(s) Addressed in This Session:   use at least 2 coping skills for anxiety management in the next 2 weeks       Intervention:   CBT: Behavioral activation and CBT for anxiety and panic.   12 step facilitation for family and parents     ASSESSMENT: Current Emotional / Mental Status (status of significant symptoms):   Risk status (Self / Other harm or suicidal ideation)   Client denies current fears or concerns for personal safety.   Client denies current or recent suicidal ideation or behaviors.   Client denies current or recent homicidal ideation or behaviors.   Client denies current or recent self injurious behavior or  ideation.   Client denies other safety concerns.   Client Client reports there has been no change in risk factors since their last session.     Client Client reports there has been no change in protective factors since their last session.     A safety and risk management plan has not been developed at this time, however client was given the after-hours number / 911 should there be a change in any of these risk factors.     Appearance:   Appropriate    Eye Contact:   Good    Psychomotor Behavior: Normal    Attitude:   Cooperative    Orientation:   All   Speech    Rate / Production: Normal     Volume:  Normal    Mood:    Normal   Affect:    Appropriate    Thought Content:  Clear    Thought Form:  Coherent  Logical    Insight:    Good      Medication Review:   No changes to current psychiatric medication(s)     Medication Compliance:   Yes     Changes in Health Issues:   None reported     Chemical Use Review:   Substance Use: Chemical use reviewed, no active concerns identified      Tobacco Use: No current tobacco use.       Collateral Reports Completed:   Not Applicable    PLAN: (Client Tasks / Therapist Tasks / Other)   Client to continue to maintain healthy boundaries with her alcoholic child.  Continue attending 12-step family support group and contact other members if she needs to.        Carter Tierney                                                         ________________________________________________________________________    Treatment Plan    Client's Name: Teresa Fernandez  YOB: 1960    Date: 7/23/2018    DSM5 Diagnoses: (Sustained by DSM5 Criteria Listed Above)  Diagnoses:            296.31 (F33.0) Major Depressive Disorder, Recurrent Episode, Mild _ and With anxious distress  Psychosocial & Contextual Factors: Multiple medical issues  WHODAS 2.0 (12 item)                          This questionnaire asks about difficulties due to health conditions. Health conditions                    include                        disease or illnesses, other health problems that may be short or long lasting,                    injuries, mental health or emotional problems, and problems with alcohol or drugs.                              Think back over the past 30 days and answer these questions, thinking about how much              difficulty you had doing the following activities. For each question, please [place] only                   one response.      S1 Standing for long periods such as 30 minutes? Mild =           2   S2 Taking care of household responsibilities? None =         1   S3 Learning a new task, for example, learning how to get to a new place? None =         1   S4 How much of a problem do you have joining community activities (for example, festivals, [employer]ism or other activities) in the same way as anyone else can? None =         1   S5 How much have you been emotionally affected by your health problems? Moderate =   3               In the past 30 days, how much difficulty did you have in:   S6 Concentrating on doing something for ten minutes? Mild =           2   S7 Walking a long distance such as a kilometer (or equivalent)? None =         1   S8 Washing your whole body? None =         1   S9 Getting dressed? None =         1   S10 Dealing with people you do not know? None =         1   S11 Maintaining a friendship? None =         1   S12 Your day to day work? None =         1       H1 Overall, in the past 30 days, how many days were these difficulties present? Record number of days 3   H2 In the past 30 days, for how many days were you totally unable to carry out your usual activities or work because of any health condition? Record number of days  0   H3 In the past 30 days, not counting the days that you were totally unable, for how many days did you cut back or reduce your usual activities or work because of any health condition? Record number of days 0            Referral / Collaboration:  Referral to another professional/service is not indicated at this time..    Anticipated number of session or this episode of care: 15  Anxiety Treatment plan:  1. Education- the Biopsychosocial model of anxiety  a. Client will be able to describe how anxiety is effecting them physically, emotionally and socially  2. Distraction  a. Client will learn 2 techniques to distract themselves when becoming anxious  3. Diaphragmatic breathing  a. Client will learn to breath using their diaphragm  b. Client will learn 2 breathing patterns to use to reduce anxiety  4. Visualization  a. Client will learn to establish a safe, calm place in their mind utilizing all their senses  b. Client will practice using visualization at times when they are not anxious so they will be able to use it when anxiety occurs  5. Progressive muscle relaxation  a. Client will learn progressive muscle relaxation techniques and practice them 4-5 times per week.  b. Client will learn to use mental images of relaxation to relax muscle groups and do this on a daily basis  6. Self-care  a. Client will identify 3 things they can do just for themselves  b. Client will take time for quiet, reflection, meditation 3 times per week  c. Client will Learn to set boundaries when appropriate  d. Client will Identify 3 individuals they can call on for support, distraction  7. Assessment of progress  a. Client will engage in assessment of their progress on a regular basis      Client has reviewed and agreed to the above plan.      Carter Tierney  July 24, 2018

## 2019-09-06 NOTE — LETTER
"    9/6/2019         RE: Teresa Fernandez  [address] [id number]        Dear Colleague,    Thank you for referring your patient, Teresa Fernandez, to the [facility]. Please see a copy of my visit note below.    [facility] Dermatology Note      Dermatology Problem List:  1. Melanoma in situ, left chest  - s/p excision 7/27/2010 at [facility]  2. 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 Subcutaneous nodule, left forearm - consistent with lipoma    Encounter Date: Sep 6, 2019    CC:  Chief Complaint   Patient presents with     Rash     bilateral arms, spongiotic dermatitis, referral.         History of Present Illness:  Ms. Teresa Fernandez is a 59 year old female who presents as a referral from her rheumatologist Dr. Gaurav Chung for spongiotic dermatitis. She was last seen by dermatology by Dr. Saleh on 5/13/14 when the skin on her right arm was biopsied and diagnosed as spongiotic dermatitis. Today, the patient reports the rash appeared around 2014. It can be itchy and painful, especially during a hot flash or during sun exposure. She also describes it as \"burning.\" She has tried hydrocortisone and dry skin lotions on the area, but it has not helped. She had used triamcinolone cream prescribed by Dr. Saleh in 2014 but it did not resolve the rash so she stopped using it. She would also like a bump on her arm looked at. She reports she is gluten intolerant, and gets sick if she eats any gluten. Patient admits to hair loss and hair thinning for past few years, but she has not tried using anything. No other specific concerns addressed.    Past Medical History:   Patient Active Problem List   Diagnosis     Acquired hypothyroidism     Allergic state     Chronic rhinitis     Allergic rhinitis due to pollen     Sinusitis, chronic     History of skin cancer     ERIC (generalized anxiety disorder)     Leukoplakia of oral mucosa, including " tongue     Renal cancer (H)     GERD (gastroesophageal reflux disease)     Chronic low back pain     Dry eye syndrome     Chronic fatigue     Osteopenia     History of melanoma in situ     Eczema     Moderate persistent asthma without complication     History of kidney cancer     DDD (degenerative disc disease), lumbar     Hyperlipidemia LDL goal <160     Chest tightness or pressure     Muscle pain     Past Medical History:   Diagnosis Date     ALLERGIC RHINITIS NOS 4/12/2005     Anxiety      Arthritis     herniated disc     Bronchitis, not specified as acute or chronic      CHR MAXILLARY SINUSITIS 4/12/2005     Depressive disorder, not elsewhere classified      Eczema 10/17/2012     Environmental and seasonal allergies      GERD (gastroesophageal reflux disease)      Gluten intolerance      Malignant neoplasm of renal pelvis (H) 1995    Renal cell carcinoma     Melanoma (H) 06/2010     Other specified acquired hypothyroidism 4/12/2005     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     FUSION LUMBAR ANTERIOR TWO LEVELS  4/13/2015     GYN SURGERY  04/08/1999    hysterectomy.  [facility]     HYSTERECTOMY, PAP NO LONGER INDICATED       SOFT TISSUE SURGERY      chest-melonoma     SURGICAL HISTORY OF -   3/1996    Left nephrectomy-renal cell CA       Social History:  Patient reports that she quit smoking about 23 years ago. She has a 30.00 pack-year smoking history. She has never used smokeless tobacco. She reports that she does not drink alcohol or use drugs. Working in a factory.     Family History:  Family History   Problem Relation Age of Onset     Diabetes Mother      Cardiovascular Mother      Lipids Mother      Depression Mother      Hypertension Father      Lipids Father      Obesity  Father      Macular Degeneration Father      Cancer Maternal Grandmother         kind unknown had sores on legs     Eczema Maternal Grandmother      Eczema Maternal Grandfather      Breast Cancer Paternal Grandmother      Cancer Paternal Grandmother         breast     Hypertension Paternal Grandfather      Cardiovascular Paternal Grandfather         heart attack     Hypertension Brother      Thyroid Disease Sister      Hypertension Sister      Depression Sister      Allergies Sister      Allergies Son      Eczema Son      Lipids Sister      Thyroid Disease Sister      Neurologic Disorder Sister      Depression Sister      Respiratory Son      Glaucoma No family hx of      Cerebrovascular Disease No family hx of    No family history of melanoma    Medications:  Current Outpatient Medications   Medication Sig Dispense Refill     acetaminophen (TYLENOL) 325 MG tablet Take 325-650 mg by mouth every 6 hours as needed for mild pain       albuterol (PROAIR HFA/PROVENTIL HFA/VENTOLIN HFA) 108 (90 BASE) MCG/ACT Inhaler Inhale 2 puffs into the lungs every 6 hours as needed for shortness of breath / dyspnea 1 Inhaler 5     albuterol (PROVENTIL) (2.5 MG/3ML) 0.083% neb solution Take 1 vial (2.5 mg) by nebulization every 6 hours as needed for shortness of breath / dyspnea or wheezing 25 vial 0     azithromycin (ZITHROMAX Z-NAGA) 250 MG tablet Take 2 tablets on day 1 and then 1 tablet on days 2-5. 6 tablet 0     DULoxetine (CYMBALTA) 60 MG capsule TAKE 1 CAPSULE EVERY MORNING 90 capsule 0     fexofenadine (ALLEGRA) 180 MG tablet Take 1 tablet (180 mg) by mouth daily 30 tablet 1     fluticasone-salmeterol (ADVAIR) 250-50 MCG/DOSE diskus inhaler Inhale 1 puff into the lungs 2 times daily 3 Inhaler 3     GLUCOSAMINE SULFATE PO Take 1,000 mg by mouth 2 times daily       levothyroxine (SYNTHROID/LEVOTHROID) 100 MCG tablet Take 1 tablet (100 mcg) by mouth daily 30 tablet 0     MAGNESIUM OXIDE PO Take 200 mg by mouth daily       Multiple  Vitamins-Minerals (MULTIVITAMIN ADULT PO) Take by mouth every morning        order for DME Equipment being ordered: Nebulizer 1 Units 0     OVER-THE-COUNTER Place 1 drop into both eyes 3 times daily. Systane Ultra, Systane Balance, Blink Tears or Refresh Optive Artificial Tear 1 Bottle      ranitidine (ZANTAC) 150 MG tablet TAKE 1 TABLET TWICE A  tablet 3     celecoxib (CELEBREX) 100 MG capsule Take 1 capsule (100 mg) by mouth 2 times daily as needed for moderate pain (Patient not taking: Reported on 8/7/2019) 60 capsule 3     Cholecalciferol (VITAMIN D3 PO) Take 2,000 Units by mouth 2 times daily        Cyanocobalamin (VITAMIN B 12 PO) Take 500 mcg by mouth daily       tiZANidine (ZANAFLEX) 2 MG tablet Take 1 tablet at bedtime for 3 days then take 1 tablet twice a day thereafter (Patient not taking: Reported on 8/7/2019) 60 tablet 1       Allergies   Allergen Reactions     Omnipaque [Iohexol] Swelling and Difficulty breathing     Immediate throat swelling following around 1-2cc of omnipaque 300 for spine procedure     Gluten      Iodine      Welts from CT contrast, surgical scrub is ok.     Penicillins Hives       Review of Systems:  -Constitutional: The patient is feeling generally well.  -Skin: As per HPI.   -no genital sores    Physical exam:  Vitals: There were no vitals taken for this visit.  GEN: This is a well developed, well-nourished female in no acute distress, in a pleasant mood.    SKIN: Total skin excluding the undergarment areas was performed. The exam included the head/face, neck, both arms, chest, back, abdomen, both legs, digits and/or nails.   - Oconnell skin type: II  - diffuse erythema and scaling on right upper arm, left upper arm, forearms  - keratotic perifollicular papules, consistent with keratosis pilaris  - Scattered brown macules on sun exposed areas.   - Patient has slight tan.  - thinning of hair along the crown and right temple area  - Hair pull test negative  - No  perifollicular scaling or erythema of scalp  - nails are painted  - oral exam normal  - There is a well healed surgical scar without erythema, nodularity or telangiectasias on the left chest at site of previous excision. No repigmentation  -normal oral exam  - 1 cm pink fleshy plaque on left second web space  - 1.5 cm soft mobile nodule, left forearm  - LYMPH: There is no cervical, supraclavicular, axillary, or inguinal lymphadenopathy.  - No other lesions of concern on areas examined.     Component      Latest Ref Rng & Units 8/7/2019   WBC      4.0 - 11.0 10e9/L 5.3   RBC Count      3.8 - 5.2 10e12/L 4.57   Hemoglobin      11.7 - 15.7 g/dL 14.1   Hematocrit      35.0 - 47.0 % 42.1   MCV      78 - 100 fl 92   MCH      26.5 - 33.0 pg 30.9   MCHC      31.5 - 36.5 g/dL 33.5   RDW      10.0 - 15.0 % 12.5   Platelet Count      150 - 450 10e9/L 251   Diff Method       Automated Method   % Neutrophils      % 61.7   % Lymphocytes      % 25.1   % Monocytes      % 10.1   % Eosinophils      % 2.5   % Basophils      % 0.6   Absolute Neutrophil      1.6 - 8.3 10e9/L 3.2   Absolute Lymphocytes      0.8 - 5.3 10e9/L 1.3   Absolute Monocytes      0.0 - 1.3 10e9/L 0.5   Absolute Eosinophils      0.0 - 0.7 10e9/L 0.1   Absolute Basophils      0.0 - 0.2 10e9/L 0.0   Sodium      133 - 144 mmol/L 139   Potassium      3.4 - 5.3 mmol/L 4.2   Chloride      94 - 109 mmol/L 103   Carbon Dioxide      20 - 32 mmol/L 30   Anion Gap      3 - 14 mmol/L 6   Glucose      70 - 99 mg/dL 95   Urea Nitrogen      7 - 30 mg/dL 11   Creatinine      0.52 - 1.04 mg/dL 0.79   GFR Estimate      >60 mL/min/1.73:m2 82   GFR Estimate If Black      >60 mL/min/1.73:m2 >90   Calcium      8.5 - 10.1 mg/dL 10.1   Bilirubin Total      0.2 - 1.3 mg/dL 0.4   Albumin      3.4 - 5.0 g/dL 3.9   Protein Total      6.8 - 8.8 g/dL 7.2   Alkaline Phosphatase      40 - 150 U/L 94   ALT      0 - 50 U/L 55 (H)   AST      0 - 45 U/L 40   Complement C4      15 - 50 mg/dL 23    Complement C3      76 - 169 mg/dL 107   DNA-ds      <10 IU/mL <1   CRP Inflammation      0.0 - 8.0 mg/L <2.9   Sed Rate      0 - 30 mm/h 11   CK Total      30 - 225 U/L 200   Aldolase      1.5 - 8.1 U/L 4.8   TSH      0.40 - 4.00 mU/L 3.18       Impression/Plan:  1. History of Melanoma    Recommend sunscreens SPF #30 or greater, protective clothing and avoidance of tanning beds.    Recommend yearly skin checks    ABCD's of melanoma were reviewed with patient and handout provided.       2. Rash, upper extremities - prior biopsy showed spongiosis dermatitis diagnosis but concern for automimmune disease such as lupus or connective tissue disease or DM given photosensitivity, appearance, and hair loss  Punch biopsy:  After discussion of benefits and risks including but not limited to bleeding/bruising, pain/swelling, infection, scar, incomplete removal, nerve damage/numbness, recurrence, and non-diagnostic biopsy, written consent, verbal consent and photographs were obtained. Time-out was performed. The area was cleaned with isopropyl alcohol. 0.5mL of 1% lidocaine with epinephrine was injected to obtain adequate anesthesia of the lesion on the right upper arm. A 4 mm punch biopsy was performed.  4-0 prolene sutures were utilized to approximate the epidermal edges.  White petroleum jelly/VaselineTM and a bandage was applied to the wound.  Explicit verbal and written wound care instructions were provided.  The patient left the Dermatology Clinic in good condition. The patient was counseled to follow up for suture removal in approximately 12-14 days.    3.  Keratotic perifollicular papules, consistent with keratosis pilaris thighs    No further intervention required. Patient to report changes.     3. Hair Loss, appears non scarring = possibly female pattern hair loss but more diffuse than expected, consider autoimmune loss usch as lupus, will start with biopsy of arm to see if this provides info and consider scalp  biopsy    Consider biopsy for hair pending biopsy of right arm skin.    CBC with Diff, ferritin, and Vitamin D.     Start Minoxidil     4. 1 cm pink fleshy plaque on left second web space, consistent with benign nevus- need clinical monitoring but patient reports no changes since dhilhood    Photo taken at today's visit    We will clinically monitor this spot.    5. Subcutaneous nodule, left forearm - consistent with lipoma. Painful. Declines biopsy and or ultrasound or clinical montioring    Photo taken at today's visit    Schedule with Dr. Cruz for lipoma excision  6. Solar lentigenes, no intervention needed  Follow-up in 3 months, or earlier for new or changing lesions.       Staff Involved:  Scribe/Staff    Scribe Disclosure  I, Milena Murillo, am serving as a scribe to document services personally performed by Dr. Lina Bonner MD, based on data collection and the provider's statements to me.    Provider Disclosure:   The documentation recorded by the scribe accurately reflects the services I personally performed and the decisions made by me.    Lina Bonner MD    Department of Dermatology  [facility]: Phone: [phone], Fax:[phone]  [facility]: Phone: [phone], Fax: [phone]          Again, thank you for allowing me to participate in the care of your patient.        Sincerely,        Lina Bonner MD

## 2019-09-06 NOTE — PROGRESS NOTES
"[facility] Dermatology Note      Dermatology Problem List:  1. Melanoma in situ, left chest  - s/p excision 7/27/2010 at [facility]  2. 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 Subcutaneous nodule, left forearm - consistent with lipoma    Encounter Date: Sep 6, 2019    CC:  Chief Complaint   Patient presents with     Rash     bilateral arms, spongiotic dermatitis, referral.         History of Present Illness:  Ms. Teresa Fernandez is a 59 year old female who presents as a referral from her rheumatologist Dr. Gaurav Chung for spongiotic dermatitis. She was last seen by dermatology by Dr. Saleh on 5/13/14 when the skin on her right arm was biopsied and diagnosed as spongiotic dermatitis. Today, the patient reports the rash appeared around 2014. It can be itchy and painful, especially during a hot flash or during sun exposure. She also describes it as \"burning.\" She has tried hydrocortisone and dry skin lotions on the area, but it has not helped. She had used triamcinolone cream prescribed by Dr. Saleh in 2014 but it did not resolve the rash so she stopped using it. She would also like a bump on her arm looked at. She reports she is gluten intolerant, and gets sick if she eats any gluten. Patient admits to hair loss and hair thinning for past few years, but she has not tried using anything. No other specific concerns addressed.    Past Medical History:   Patient Active Problem List   Diagnosis     Acquired hypothyroidism     Allergic state     Chronic rhinitis     Allergic rhinitis due to pollen     Sinusitis, chronic     History of skin cancer     ERIC (generalized anxiety disorder)     Leukoplakia of oral mucosa, including tongue     Renal cancer (H)     GERD (gastroesophageal reflux disease)     Chronic low back pain     Dry eye syndrome     Chronic fatigue     Osteopenia     History of melanoma in situ     Eczema     Moderate persistent asthma without complication     " History of kidney cancer     DDD (degenerative disc disease), lumbar     Hyperlipidemia LDL goal <160     Chest tightness or pressure     Muscle pain     Past Medical History:   Diagnosis Date     ALLERGIC RHINITIS NOS 4/12/2005     Anxiety      Arthritis     herniated disc     Bronchitis, not specified as acute or chronic      CHR MAXILLARY SINUSITIS 4/12/2005     Depressive disorder, not elsewhere classified      Eczema 10/17/2012     Environmental and seasonal allergies      GERD (gastroesophageal reflux disease)      Gluten intolerance      Malignant neoplasm of renal pelvis (H) 1995    Renal cell carcinoma     Melanoma (H) 06/2010     Other specified acquired hypothyroidism 4/12/2005     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     FUSION LUMBAR ANTERIOR TWO LEVELS  4/13/2015     GYN SURGERY  04/08/1999    hysterectomy.  [facility]     HYSTERECTOMY, PAP NO LONGER INDICATED       SOFT TISSUE SURGERY      chest-melonoma     SURGICAL HISTORY OF -   3/1996    Left nephrectomy-renal cell CA       Social History:  Patient reports that she quit smoking about 23 years ago. She has a 30.00 pack-year smoking history. She has never used smokeless tobacco. She reports that she does not drink alcohol or use drugs. Working in a factory.     Family History:  Family History   Problem Relation Age of Onset     Diabetes Mother      Cardiovascular Mother      Lipids Mother      Depression Mother      Hypertension Father      Lipids Father      Obesity Father      Macular Degeneration Father      Cancer Maternal Grandmother         kind unknown had sores on legs     Eczema Maternal Grandmother      Eczema Maternal Grandfather      Breast Cancer Paternal Grandmother      Cancer Paternal Grandmother          breast     Hypertension Paternal Grandfather      Cardiovascular Paternal Grandfather         heart attack     Hypertension Brother      Thyroid Disease Sister      Hypertension Sister      Depression Sister      Allergies Sister      Allergies Son      Eczema Son      Lipids Sister      Thyroid Disease Sister      Neurologic Disorder Sister      Depression Sister      Respiratory Son      Glaucoma No family hx of      Cerebrovascular Disease No family hx of    No family history of melanoma    Medications:  Current Outpatient Medications   Medication Sig Dispense Refill     acetaminophen (TYLENOL) 325 MG tablet Take 325-650 mg by mouth every 6 hours as needed for mild pain       albuterol (PROAIR HFA/PROVENTIL HFA/VENTOLIN HFA) 108 (90 BASE) MCG/ACT Inhaler Inhale 2 puffs into the lungs every 6 hours as needed for shortness of breath / dyspnea 1 Inhaler 5     albuterol (PROVENTIL) (2.5 MG/3ML) 0.083% neb solution Take 1 vial (2.5 mg) by nebulization every 6 hours as needed for shortness of breath / dyspnea or wheezing 25 vial 0     azithromycin (ZITHROMAX Z-NAGA) 250 MG tablet Take 2 tablets on day 1 and then 1 tablet on days 2-5. 6 tablet 0     DULoxetine (CYMBALTA) 60 MG capsule TAKE 1 CAPSULE EVERY MORNING 90 capsule 0     fexofenadine (ALLEGRA) 180 MG tablet Take 1 tablet (180 mg) by mouth daily 30 tablet 1     fluticasone-salmeterol (ADVAIR) 250-50 MCG/DOSE diskus inhaler Inhale 1 puff into the lungs 2 times daily 3 Inhaler 3     GLUCOSAMINE SULFATE PO Take 1,000 mg by mouth 2 times daily       levothyroxine (SYNTHROID/LEVOTHROID) 100 MCG tablet Take 1 tablet (100 mcg) by mouth daily 30 tablet 0     MAGNESIUM OXIDE PO Take 200 mg by mouth daily       Multiple Vitamins-Minerals (MULTIVITAMIN ADULT PO) Take by mouth every morning        order for DME Equipment being ordered: Nebulizer 1 Units 0     OVER-THE-COUNTER Place 1 drop into both eyes 3 times daily. Systane Ultra, Systane Balance, Blink Tears or  Refresh Optive Artificial Tear 1 Bottle      ranitidine (ZANTAC) 150 MG tablet TAKE 1 TABLET TWICE A  tablet 3     celecoxib (CELEBREX) 100 MG capsule Take 1 capsule (100 mg) by mouth 2 times daily as needed for moderate pain (Patient not taking: Reported on 8/7/2019) 60 capsule 3     Cholecalciferol (VITAMIN D3 PO) Take 2,000 Units by mouth 2 times daily        Cyanocobalamin (VITAMIN B 12 PO) Take 500 mcg by mouth daily       tiZANidine (ZANAFLEX) 2 MG tablet Take 1 tablet at bedtime for 3 days then take 1 tablet twice a day thereafter (Patient not taking: Reported on 8/7/2019) 60 tablet 1       Allergies   Allergen Reactions     Omnipaque [Iohexol] Swelling and Difficulty breathing     Immediate throat swelling following around 1-2cc of omnipaque 300 for spine procedure     Gluten      Iodine      Welts from CT contrast, surgical scrub is ok.     Penicillins Hives       Review of Systems:  -Constitutional: The patient is feeling generally well.  -Skin: As per HPI.   -no genital sores    Physical exam:  Vitals: There were no vitals taken for this visit.  GEN: This is a well developed, well-nourished female in no acute distress, in a pleasant mood.    SKIN: Total skin excluding the undergarment areas was performed. The exam included the head/face, neck, both arms, chest, back, abdomen, both legs, digits and/or nails.   - Oconnell skin type: II  - diffuse erythema and scaling on right upper arm, left upper arm, forearms  - keratotic perifollicular papules, consistent with keratosis pilaris  - Scattered brown macules on sun exposed areas.   - Patient has slight tan.  - thinning of hair along the crown and right temple area  - Hair pull test negative  - No perifollicular scaling or erythema of scalp  - nails are painted  - oral exam normal  - There is a well healed surgical scar without erythema, nodularity or telangiectasias on the left chest at site of previous excision. No repigmentation  -normal oral  exam  - 1 cm pink fleshy plaque on left second web space  - 1.5 cm soft mobile nodule, left forearm  - LYMPH: There is no cervical, supraclavicular, axillary, or inguinal lymphadenopathy.  - No other lesions of concern on areas examined.     Component      Latest Ref Rng & Units 8/7/2019   WBC      4.0 - 11.0 10e9/L 5.3   RBC Count      3.8 - 5.2 10e12/L 4.57   Hemoglobin      11.7 - 15.7 g/dL 14.1   Hematocrit      35.0 - 47.0 % 42.1   MCV      78 - 100 fl 92   MCH      26.5 - 33.0 pg 30.9   MCHC      31.5 - 36.5 g/dL 33.5   RDW      10.0 - 15.0 % 12.5   Platelet Count      150 - 450 10e9/L 251   Diff Method       Automated Method   % Neutrophils      % 61.7   % Lymphocytes      % 25.1   % Monocytes      % 10.1   % Eosinophils      % 2.5   % Basophils      % 0.6   Absolute Neutrophil      1.6 - 8.3 10e9/L 3.2   Absolute Lymphocytes      0.8 - 5.3 10e9/L 1.3   Absolute Monocytes      0.0 - 1.3 10e9/L 0.5   Absolute Eosinophils      0.0 - 0.7 10e9/L 0.1   Absolute Basophils      0.0 - 0.2 10e9/L 0.0   Sodium      133 - 144 mmol/L 139   Potassium      3.4 - 5.3 mmol/L 4.2   Chloride      94 - 109 mmol/L 103   Carbon Dioxide      20 - 32 mmol/L 30   Anion Gap      3 - 14 mmol/L 6   Glucose      70 - 99 mg/dL 95   Urea Nitrogen      7 - 30 mg/dL 11   Creatinine      0.52 - 1.04 mg/dL 0.79   GFR Estimate      >60 mL/min/1.73:m2 82   GFR Estimate If Black      >60 mL/min/1.73:m2 >90   Calcium      8.5 - 10.1 mg/dL 10.1   Bilirubin Total      0.2 - 1.3 mg/dL 0.4   Albumin      3.4 - 5.0 g/dL 3.9   Protein Total      6.8 - 8.8 g/dL 7.2   Alkaline Phosphatase      40 - 150 U/L 94   ALT      0 - 50 U/L 55 (H)   AST      0 - 45 U/L 40   Complement C4      15 - 50 mg/dL 23   Complement C3      76 - 169 mg/dL 107   DNA-ds      <10 IU/mL <1   CRP Inflammation      0.0 - 8.0 mg/L <2.9   Sed Rate      0 - 30 mm/h 11   CK Total      30 - 225 U/L 200   Aldolase      1.5 - 8.1 U/L 4.8   TSH      0.40 - 4.00 mU/L 3.18        Impression/Plan:  1. History of Melanoma    Recommend sunscreens SPF #30 or greater, protective clothing and avoidance of tanning beds.    Recommend yearly skin checks    ABCD's of melanoma were reviewed with patient and handout provided.       2. Rash, upper extremities - prior biopsy showed spongiosis dermatitis diagnosis but concern for automimmune disease such as lupus or connective tissue disease or DM given photosensitivity, appearance, and hair loss  Punch biopsy:  After discussion of benefits and risks including but not limited to bleeding/bruising, pain/swelling, infection, scar, incomplete removal, nerve damage/numbness, recurrence, and non-diagnostic biopsy, written consent, verbal consent and photographs were obtained. Time-out was performed. The area was cleaned with isopropyl alcohol. 0.5mL of 1% lidocaine with epinephrine was injected to obtain adequate anesthesia of the lesion on the right upper arm. A 4 mm punch biopsy was performed.  4-0 prolene sutures were utilized to approximate the epidermal edges.  White petroleum jelly/VaselineTM and a bandage was applied to the wound.  Explicit verbal and written wound care instructions were provided.  The patient left the Dermatology Clinic in good condition. The patient was counseled to follow up for suture removal in approximately 12-14 days.    3.  Keratotic perifollicular papules, consistent with keratosis pilaris thighs    No further intervention required. Patient to report changes.     3. Hair Loss, appears non scarring = possibly female pattern hair loss but more diffuse than expected, consider autoimmune loss usch as lupus, will start with biopsy of arm to see if this provides info and consider scalp biopsy    Consider biopsy for hair pending biopsy of right arm skin.    CBC with Diff, ferritin, and Vitamin D.     Start Minoxidil     4. 1 cm pink fleshy plaque on left second web space, consistent with benign nevus- need clinical monitoring but  patient reports no changes since [facility]    Photo taken at today's visit    We will clinically monitor this spot.    5. Subcutaneous nodule, left forearm - consistent with lipoma. Painful. Declines biopsy and or ultrasound or clinical montioring    Photo taken at today's visit    Schedule with Dr. Cruz for lipoma excision  6. Solar lentigenes, no intervention needed  Follow-up in 3 months, or earlier for new or changing lesions.       Staff Involved:  Scribe/Staff    Scribe Disclosure  I, Milena Murillo, am serving as a scribe to document services personally performed by Dr. Lina Bonner MD, based on data collection and the provider's statements to me.    Provider Disclosure:   The documentation recorded by the scribe accurately reflects the services I personally performed and the decisions made by me.    Lina Bonner MD    Department of Dermatology  [facility]: Phone: [phone], Fax:[phone]  [facility]: Phone: [phone], Fax: [phone]

## 2019-09-06 NOTE — NURSING NOTE
Teresa Fernandez's goals for this visit include:   Chief Complaint   Patient presents with     Rash     bilateral arms, spongiotic dermatitis, referral.       She requests these members of her care team be copied on today's visit information: Yes    PCP: Nuzhat Burt    Referring Provider:  Gaurav Chung MD  [id number] [facility]  [place] [id number]    There were no vitals taken for this visit.    Do you need any medication refills at today's visit? No    Kalpana Toro LPN

## 2019-09-06 NOTE — PATIENT INSTRUCTIONS

## 2019-09-07 ASSESSMENT — ANXIETY QUESTIONNAIRES: GAD7 TOTAL SCORE: 5

## 2019-09-09 DIAGNOSIS — K21.9 GASTROESOPHAGEAL REFLUX DISEASE, ESOPHAGITIS PRESENCE NOT SPECIFIED: ICD-10-CM

## 2019-09-09 NOTE — TELEPHONE ENCOUNTER
"Requested Prescriptions   Pending Prescriptions Disp Refills     ranitidine (ZANTAC) 150 MG tablet [Pharmacy Med Name: RANITIDINE TABS 150MG] 180 tablet 4     Sig: TAKE 1 TABLET TWICE A DAY   Last Written Prescription Date:  9/14/18  Last Fill Quantity: 180 tab,  # refills: 3   Last office visit: 5/20/2019 with prescribing provider:  Nuzhat Burt     Future Office Visit:   Next 5 appointments (look out 90 days)    Sep 20, 2019  1:15 PM CDT  Nurse Only with NURSE ONLY [facility]  [facility] [id number] [place] [id number]  [phone]   Oct 01, 2019  4:00 PM CDT  Return Visit with Carter DANO Bas[address]Nial[address] [address]  [phone]   Oct 15, 2019  5:00 PM CDT  Return Visit with Carter Tierney  [address] [address]  [phone]   Nov 29, 2019  1:15 PM CST  Return Visit with JEREMI Goins MD  [facility]) [address] [place] [id number]  [phone]             H2 Blockers Protocol Passed - 9/9/2019  2:33 AM        Passed - Patient is age 12 or older        Passed - Recent (12 mo) or future (30 days) visit within the authorizing provider's specialty     Patient had office visit in the last 12 months or has a visit in the next 30 days with authorizing provider or within the authorizing provider's specialty.  See \"Patient Info\" tab in inbasket, or \"Choose Columns\" in Meds & Orders section of the refill encounter.              Passed - Medication is active on med list          "

## 2019-09-12 LAB — COPATH REPORT: NORMAL

## 2019-09-18 ENCOUNTER — TELEPHONE (OUTPATIENT)
Dept: DERMATOLOGY | Facility: CLINIC | Age: 59
End: 2019-09-18

## 2019-09-18 NOTE — TELEPHONE ENCOUNTER
Health Call Center    Phone Message    May a detailed message be left on voicemail: yes    Reason for Call: Other: Pt called and wanted to schedule an appt but does not remember who the doctor you wanted her to see for a second opinion regarding her biopsy.  Please call the pt at [phone] to discuss this. Thanks     Action Taken: Message routed to:  [facility]: derm clinic

## 2019-09-18 NOTE — TELEPHONE ENCOUNTER
Attempted to call pt back, however after the phone rang it went to a busy signal. Unable to leave a voicemail.     Dr. Bonner wanted this pt to see Dr. Alcaraz in his extended clinics.     LORENZO Frye

## 2019-09-20 ENCOUNTER — ALLIED HEALTH/NURSE VISIT (OUTPATIENT)
Dept: NURSING | Facility: CLINIC | Age: 59
End: 2019-09-20
Payer: OTHER GOVERNMENT

## 2019-09-20 ENCOUNTER — TELEPHONE (OUTPATIENT)
Dept: DERMATOLOGY | Facility: CLINIC | Age: 59
End: 2019-09-20

## 2019-09-20 DIAGNOSIS — R21 RASH: Primary | ICD-10-CM

## 2019-09-20 NOTE — NURSING NOTE
Teresa Fernandez comes into clinic today at the request of Dr. Bonner Ordering Provider for suture removal.      Dermatology Suture Removal Record  S: Teresa presents to the clinic today for  removal of sutures.  The patient has had the sutures in place for 14 days.    There has been no history of infection or drainage.    O: 2 sutures are seen located on the right upper arm.  The wound is healing well with no signs of infection.    A: Suture removal.    P:  All sutures were easily removed today. Vaseline and bandaid applied.  Routine wound care discussed.  The patient will follow up with Dr. Alcaraz.    This service provided today was under the supervising provider of the day Dr. Youssef, who was available if needed.    Tiffany Zavala RN

## 2019-09-20 NOTE — TELEPHONE ENCOUNTER
FUTURE VISIT INFORMATION      FUTURE VISIT INFORMATION:    Date: 9.27.19    Time: 11:20    Location: [facility]  REFERRAL INFORMATION:    Referring provider:  Dr. Bonner    Referring providers clinic:  [facility]    Reason for visit/diagnosis:  rash with possible automimmune disease    RECORDS REQUESTED FROM:       Clinic name Comments Records Status Photos Status   [facility] 9.6.19 Dr. Bonner   Path # [id number] In [facility] In [facility] Rheumatology 8.7.19 Dr. Gaurav Chung In Epic

## 2019-09-27 ENCOUNTER — PRE VISIT (OUTPATIENT)
Dept: DERMATOLOGY | Facility: CLINIC | Age: 59
End: 2019-09-27

## 2019-09-27 ENCOUNTER — OFFICE VISIT (OUTPATIENT)
Dept: DERMATOLOGY | Facility: CLINIC | Age: 59
End: 2019-09-27
Payer: OTHER GOVERNMENT

## 2019-09-27 VITALS — HEART RATE: 89 BPM | DIASTOLIC BLOOD PRESSURE: 77 MMHG | SYSTOLIC BLOOD PRESSURE: 123 MMHG

## 2019-09-27 DIAGNOSIS — R21 RASH: ICD-10-CM

## 2019-09-27 DIAGNOSIS — R21 RASH: Primary | ICD-10-CM

## 2019-09-27 DIAGNOSIS — E03.9 ACQUIRED HYPOTHYROIDISM: ICD-10-CM

## 2019-09-27 DIAGNOSIS — L65.9 NON-SCARRING ALOPECIA: ICD-10-CM

## 2019-09-27 LAB
ALBUMIN UR-MCNC: NEGATIVE MG/DL
APPEARANCE UR: CLEAR
BACTERIA #/AREA URNS HPF: ABNORMAL /HPF
BASOPHILS # BLD AUTO: 0 10E9/L (ref 0–0.2)
BASOPHILS NFR BLD AUTO: 0.6 %
BILIRUB UR QL STRIP: NEGATIVE
CK SERPL-CCNC: 241 U/L (ref 30–225)
COLOR UR AUTO: YELLOW
DIFFERENTIAL METHOD BLD: NORMAL
EOSINOPHIL # BLD AUTO: 0.1 10E9/L (ref 0–0.7)
EOSINOPHIL NFR BLD AUTO: 2.7 %
ERYTHROCYTE [DISTWIDTH] IN BLOOD BY AUTOMATED COUNT: 12.1 % (ref 10–15)
FERRITIN SERPL-MCNC: 90 NG/ML (ref 8–252)
GLUCOSE UR STRIP-MCNC: NEGATIVE MG/DL
HCT VFR BLD AUTO: 43 % (ref 35–47)
HGB BLD-MCNC: 14.3 G/DL (ref 11.7–15.7)
HGB UR QL STRIP: NEGATIVE
IMM GRANULOCYTES # BLD: 0 10E9/L (ref 0–0.4)
IMM GRANULOCYTES NFR BLD: 0.4 %
KETONES UR STRIP-MCNC: 5 MG/DL
LEUKOCYTE ESTERASE UR QL STRIP: NEGATIVE
LYMPHOCYTES # BLD AUTO: 1.4 10E9/L (ref 0.8–5.3)
LYMPHOCYTES NFR BLD AUTO: 29.3 %
MCH RBC QN AUTO: 30.7 PG (ref 26.5–33)
MCHC RBC AUTO-ENTMCNC: 33.3 G/DL (ref 31.5–36.5)
MCV RBC AUTO: 92 FL (ref 78–100)
MONOCYTES # BLD AUTO: 0.4 10E9/L (ref 0–1.3)
MONOCYTES NFR BLD AUTO: 7.7 %
MUCOUS THREADS #/AREA URNS LPF: PRESENT /LPF
NEUTROPHILS # BLD AUTO: 2.9 10E9/L (ref 1.6–8.3)
NEUTROPHILS NFR BLD AUTO: 59.3 %
NITRATE UR QL: NEGATIVE
NRBC # BLD AUTO: 0 10*3/UL
NRBC BLD AUTO-RTO: 0 /100
PH UR STRIP: 6 PH (ref 5–7)
PLATELET # BLD AUTO: 253 10E9/L (ref 150–450)
RBC # BLD AUTO: 4.66 10E12/L (ref 3.8–5.2)
RBC #/AREA URNS AUTO: <1 /HPF (ref 0–2)
SOURCE: ABNORMAL
SP GR UR STRIP: 1.01 (ref 1–1.03)
SQUAMOUS #/AREA URNS AUTO: <1 /HPF (ref 0–1)
T4 FREE SERPL-MCNC: 1.17 NG/DL (ref 0.76–1.46)
TRANS CELLS #/AREA URNS HPF: <1 /HPF
TSH SERPL DL<=0.005 MIU/L-ACNC: 3.57 MU/L (ref 0.4–4)
UROBILINOGEN UR STRIP-MCNC: 0 MG/DL (ref 0–2)
WBC # BLD AUTO: 4.8 10E9/L (ref 4–11)
WBC #/AREA URNS AUTO: <1 /HPF (ref 0–5)

## 2019-09-27 RX ORDER — TRIAMCINOLONE ACETONIDE 1 MG/G
OINTMENT TOPICAL
Qty: 453.6 G | Refills: 3 | Status: SHIPPED | OUTPATIENT
Start: 2019-09-27 | End: 2021-10-29

## 2019-09-27 ASSESSMENT — PAIN SCALES - GENERAL: PAINLEVEL: NO PAIN (0)

## 2019-09-27 NOTE — PATIENT INSTRUCTIONS
We will obtain lab work today, as well as pulmonary function tests. We will let you know of the results, and our management plan will be based on them.

## 2019-09-27 NOTE — NURSING NOTE
Dermatology Rooming Note    Teresa Fernandez's goals for this visit include:   Chief Complaint   Patient presents with     Derm Problem     Referred for auto immune disease, current rash on arms.     Rahel Boyle LPN

## 2019-09-27 NOTE — PROGRESS NOTES
[facility] Dermatology Note      Dermatology Problem List:    Last total body skin exam on September 6, 2019  1. Melanoma in situ, left chest  - s/p excision 7/27/2010 at [facility]  2.  Chronic burning rash of the upper extremities with biopsy showing a largely normal-appearing epidermis above mild perivascular lymphocytic inflammation without fungal elements or increased basement membrane noted on September 6, 2019  -Differential diagnosis includes (photo)allergic contact dermatitis, connective tissue disease (DM > NADIA as the former may lack significant epidermal changes)  -Pending as of September 27, 2019: Aldolase, DIEGO, CK, PFTs, polymyositis and dermatomyositis panel, urinalysis, SSA, and SSB  3. Subcutaneous nodule, left forearm - consistent with lipoma    Encounter Date: Sep 27, 2019    CC:   Chief Complaint   Patient presents with     Derm Problem     Referred for auto immune disease, current rash on arms.       History of Present Illness:    Ms. Teresa Fernandez is a 59 year old female who presents for evaluation of a chronic burning rash of the upper extremities.  The patient says onset was roughly 8 years ago.  She says that the rash mostly involves the upper extremities, but she has occasionally noticed spots on the trunk and lower extremities.  The patient endorses asthma and seasonal allergies, but denies a history of childhood eczema.  She says that her son has eczema.  She denies any known skin allergies.  She takes a hot shower once a day and uses products that contain fragrance and color additives.  She is particularly concerned that she has an autoimmune disease.  There is no family history of auto immune disease.  She endorses subjective hot flashes, but otherwise denies fevers, chills or sweats.  She denies any unexplained weight loss.  Of note, the patient had a spinal fusion surgery about 5 years ago, but the rash preceded this operation by about 3 years.  When  the rash occurs, it seems to flare with sun exposure, then she applies a moisturizing cream, which she says does little to improve the symptom of burning.  She denies significant pruritus associated with the rash.  She is accompanied by her daughter today.  Of note, the patient was seen by Dr. Bonner, who performed a biopsy of the right upper extremity, which showed a normal-appearing epidermis with a mild perivascular lymphocytic infiltrate.  There were no fungal elements or increased basement membrane seen.  Furthermore, the patient has had an extensive work-up, including a CBC with differential, CMP, C3, C4, double-stranded DNA, DIEGO, sed rate, CRP, total creatine kinase, and aldolase, which were unremarkable except for a borderline elevated ALT of 55.  The patient says that she takes a reflux medicine, as well as an age-appropriate multivitamin for women, but otherwise denies any homeopathic or herbal supplements.  She does have a history of melanoma in situ of the left chest treated with excision in 2010.  She denies any spots that are itching, burning or bleeding or tender.  Her last total body skin exam was in September 2019.    Past Medical History:   Patient Active Problem List   Diagnosis     Acquired hypothyroidism     Allergic state     Chronic rhinitis     Allergic rhinitis due to pollen     Sinusitis, chronic     History of skin cancer     ERIC (generalized anxiety disorder)     Leukoplakia of oral mucosa, including tongue     Renal cancer (H)     GERD (gastroesophageal reflux disease)     Chronic low back pain     Dry eye syndrome     Chronic fatigue     Osteopenia     History of melanoma in situ     Eczema     Moderate persistent asthma without complication     History of kidney cancer     DDD (degenerative disc disease), lumbar     Hyperlipidemia LDL goal <160     Chest tightness or pressure     Muscle pain     Past Medical History:   Diagnosis Date     ALLERGIC RHINITIS NOS 4/12/2005     Anxiety       Arthritis     herniated disc     Bronchitis, not specified as acute or chronic      CHR MAXILLARY SINUSITIS 4/12/2005     Depressive disorder, not elsewhere classified      Eczema 10/17/2012     Environmental and seasonal allergies      GERD (gastroesophageal reflux disease)      Gluten intolerance      Malignant neoplasm of renal pelvis (H) 1995    Renal cell carcinoma     Melanoma (H) 06/2010     Other specified acquired hypothyroidism 4/12/2005     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     FUSION LUMBAR ANTERIOR TWO LEVELS  4/13/2015     GYN SURGERY  04/08/1999    hysterectomy.  [facility]     HYSTERECTOMY, PAP NO LONGER INDICATED       SOFT TISSUE SURGERY      chest-melonoma     SURGICAL HISTORY OF -   3/1996    Left nephrectomy-renal cell CA       Social History:  Patient reports that she quit smoking about 23 years ago. She has a 30.00 pack-year smoking history. She has never used smokeless tobacco. She reports that she does not drink alcohol or use drugs.    Family History:  Family History   Problem Relation Age of Onset     Diabetes Mother      Cardiovascular Mother      Lipids Mother      Depression Mother      Hypertension Father      Lipids Father      Obesity Father      Macular Degeneration Father      Cancer Maternal Grandmother         kind unknown had sores on legs     Eczema Maternal Grandmother      Eczema Maternal Grandfather      Breast Cancer Paternal Grandmother      Cancer Paternal Grandmother         breast     Hypertension Paternal Grandfather      Cardiovascular Paternal Grandfather         heart attack     Hypertension Brother      Thyroid Disease Sister      Hypertension Sister      Depression Sister      Allergies Sister      Allergies Son      Eczema  Son      Lipids Sister      Thyroid Disease Sister      Neurologic Disorder Sister      Depression Sister      Respiratory Son      Glaucoma No family hx of      Cerebrovascular Disease No family hx of        Medications:  Current Outpatient Medications   Medication Sig Dispense Refill     acetaminophen (TYLENOL) 325 MG tablet Take 325-650 mg by mouth every 6 hours as needed for mild pain       albuterol (PROAIR HFA/PROVENTIL HFA/VENTOLIN HFA) 108 (90 BASE) MCG/ACT Inhaler Inhale 2 puffs into the lungs every 6 hours as needed for shortness of breath / dyspnea 1 Inhaler 5     albuterol (PROVENTIL) (2.5 MG/3ML) 0.083% neb solution Take 1 vial (2.5 mg) by nebulization every 6 hours as needed for shortness of breath / dyspnea or wheezing 25 vial 0     Cholecalciferol (VITAMIN D3 PO) Take 2,000 Units by mouth 2 times daily        Cyanocobalamin (VITAMIN B 12 PO) Take 500 mcg by mouth daily       DULoxetine (CYMBALTA) 60 MG capsule TAKE 1 CAPSULE EVERY MORNING 90 capsule 0     fexofenadine (ALLEGRA) 180 MG tablet Take 1 tablet (180 mg) by mouth daily 30 tablet 1     fluticasone-salmeterol (ADVAIR) 250-50 MCG/DOSE diskus inhaler Inhale 1 puff into the lungs 2 times daily 3 Inhaler 3     GLUCOSAMINE SULFATE PO Take 1,000 mg by mouth 2 times daily       levothyroxine (SYNTHROID/LEVOTHROID) 100 MCG tablet Take 1 tablet (100 mcg) by mouth daily 30 tablet 0     MAGNESIUM OXIDE PO Take 200 mg by mouth daily       Multiple Vitamins-Minerals (MULTIVITAMIN ADULT PO) Take by mouth every morning        OVER-THE-COUNTER Place 1 drop into both eyes 3 times daily. Systane Ultra, Systane Balance, Blink Tears or Refresh Optive Artificial Tear 1 Bottle      ranitidine (ZANTAC) 150 MG tablet TAKE 1 TABLET TWICE A  tablet 2     celecoxib (CELEBREX) 100 MG capsule Take 1 capsule (100 mg) by mouth 2 times daily as needed for moderate pain (Patient not taking: Reported on 8/7/2019) 60 capsule 3     order for DME Equipment being ordered:  Nebulizer 1 Units 0     tiZANidine (ZANAFLEX) 2 MG tablet Take 1 tablet at bedtime for 3 days then take 1 tablet twice a day thereafter (Patient not taking: Reported on 8/7/2019) 60 tablet 1        Allergies   Allergen Reactions     Omnipaque [Iohexol] Swelling and Difficulty breathing     Immediate throat swelling following around 1-2cc of omnipaque 300 for spine procedure     Gluten      Iodine      Welts from CT contrast, surgical scrub is ok.     Penicillins Hives       Review of Systems:  -Skin Establ Pt: The patient denies any new rash, pruritus, or lesions that are symptomatic, changing or bleeding, except as per HPI.  -Constitutional: Otherwise feeling well today, in usual state of health.  -HEENT: Patient denies nonhealing oral sores.  -Skin: As above in HPI. No additional skin concerns.    Physical exam:  Vitals: /77   Pulse 89   GEN: This is a well developed, well-nourished female in no acute distress, in a pleasant mood.    SKIN: Full skin, which includes the head/face, both arms, chest, back, abdomen,both legs, genitalia and/or groin buttocks, digits and/or nails, was examined.  -Oconnell skin type: II  -Of the trunk and extremities, but predominantly of the upper extremities, there are pink and scaly papules coalescing into minimally thick plaques.  This occurs against a background of severe generalized xerosis.  There is no periungual erythema or Samitz sign. No periungual dilated capillary loops. Visualization of the nail plate and bed is not possible due to acrylic nails in place.  Negative Gottron sign. There is a holster sign present on the lateral thighs. Unable to evaluate for heliotrope rash given patient's makeup.  There is poikiloderma of the décolletage area.  -No other lesions of concern on areas examined.  -Photographs taken today    Impression/Plan:  1. Chronic burning rash of the upper extremities predominantly occurring over the last 8 years    The differential diagnosis  includes the following: (photo)allergic contact dermatitis, as well as connective tissue disease including dermatomyositis > NADIA. DM may not demonstrate as marked of findings as NADIA and perivascular dermatitis has been frequently reported in DM. She does endorse muscle pain, mild SOB/BALL and has had a raised CK in the past. She also has a history of kidney cancer. These things increase concern for dermatomyositis. She says that she has had multiple EMGs, which were normal. If this is the case, this would be more reassuring against at least classic dermatomyositis, though certainly clinically amyopathic DM is quite common and accounts for about 30-40% of the DM patients seen in dermatology. Given these slightly disparate data elements, we need to obtain more objective data prior to confirming a diagnosis.    Pending as of September 27, 2019: Aldolase, DIEGO, CK, PFTs, polymyositis and dermatomyositis panel, urinalysis, SSA, and SSB    Use triamcinolone 0.1% ointment BID pending the above workup    CC Lina Bonner MD  [address] [place] [id number] on close of this encounter.  Follow-up TBD, pending today's lab results.    Dr. Alcaraz staffed the patient.    Staff Involved:  Resident(Dr. Estevan Cerrato)/Staff    Staff Physician Comments:   I saw and evaluated the patient with the resident and I edited the assessment and plan as documented in the note. I was present for the examination.    Harrison Alcaraz MD   of Dermatology  Department of Dermatology  [employer]

## 2019-09-27 NOTE — LETTER
9/27/2019       RE: Teresa Fernandez  [address] [facility] [id number]     Dear Colleague,    Thank you for referring your patient, Teresa Fernandez, to the [facility] DERMATOLOGY at [facility]. Please see a copy of my visit note below.    [facility] Dermatology Note      Dermatology Problem List:    Last total body skin exam on September 6, 2019  1. Melanoma in situ, left chest  - s/p excision 7/27/2010 at [facility]  2.  Chronic burning rash of the upper extremities with biopsy showing a largely normal-appearing epidermis above mild perivascular lymphocytic inflammation without fungal elements or increased basement membrane noted on September 6, 2019  -Differential diagnosis includes (photo)allergic contact dermatitis, connective tissue disease (DM > NADIA as the former may lack significant epidermal changes)  -Pending as of September 27, 2019: Aldolase, DIEGO, CK, PFTs, polymyositis and dermatomyositis panel, urinalysis, SSA, and SSB  3. Subcutaneous nodule, left forearm - consistent with lipoma    Encounter Date: Sep 27, 2019    CC:   Chief Complaint   Patient presents with     Derm Problem     Referred for auto immune disease, current rash on arms.       History of Present Illness:    Ms. Teresa Fernandez is a 59 year old female who presents for evaluation of a chronic burning rash of the upper extremities.  The patient says onset was roughly 8 years ago.  She says that the rash mostly involves the upper extremities, but she has occasionally noticed spots on the trunk and lower extremities.  The patient endorses asthma and seasonal allergies, but denies a history of childhood eczema.  She says that her son has eczema.  She denies any known skin allergies.  She takes a hot shower once a day and uses products that contain fragrance and color additives.  She is particularly concerned that she has an autoimmune disease.  There is no family  history of auto immune disease.  She endorses subjective hot flashes, but otherwise denies fevers, chills or sweats.  She denies any unexplained weight loss.  Of note, the patient had a spinal fusion surgery about 5 years ago, but the rash preceded this operation by about 3 years.  When the rash occurs, it seems to flare with sun exposure, then she applies a moisturizing cream, which she says does little to improve the symptom of burning.  She denies significant pruritus associated with the rash.  She is accompanied by her daughter today.  Of note, the patient was seen by Dr. Bonner, who performed a biopsy of the right upper extremity, which showed a normal-appearing epidermis with a mild perivascular lymphocytic infiltrate.  There were no fungal elements or increased basement membrane seen.  Furthermore, the patient has had an extensive work-up, including a CBC with differential, CMP, C3, C4, double-stranded DNA, DIEGO, sed rate, CRP, total creatine kinase, and aldolase, which were unremarkable except for a borderline elevated ALT of 55.  The patient says that she takes a reflux medicine, as well as an age-appropriate multivitamin for women, but otherwise denies any homeopathic or herbal supplements.  She does have a history of melanoma in situ of the left chest treated with excision in 2010.  She denies any spots that are itching, burning or bleeding or tender.  Her last total body skin exam was in September 2019.    Past Medical History:   Patient Active Problem List   Diagnosis     Acquired hypothyroidism     Allergic state     Chronic rhinitis     Allergic rhinitis due to pollen     Sinusitis, chronic     History of skin cancer     ERIC (generalized anxiety disorder)     Leukoplakia of oral mucosa, including tongue     Renal cancer (H)     GERD (gastroesophageal reflux disease)     Chronic low back pain     Dry eye syndrome     Chronic fatigue     Osteopenia     History of melanoma in situ     Eczema     Moderate  persistent asthma without complication     History of kidney cancer     DDD (degenerative disc disease), lumbar     Hyperlipidemia LDL goal <160     Chest tightness or pressure     Muscle pain     Past Medical History:   Diagnosis Date     ALLERGIC RHINITIS NOS 4/12/2005     Anxiety      Arthritis     herniated disc     Bronchitis, not specified as acute or chronic      CHR MAXILLARY SINUSITIS 4/12/2005     Depressive disorder, not elsewhere classified      Eczema 10/17/2012     Environmental and seasonal allergies      GERD (gastroesophageal reflux disease)      Gluten intolerance      Malignant neoplasm of renal pelvis (H) 1995    Renal cell carcinoma     Melanoma (H) 06/2010     Other specified acquired hypothyroidism 4/12/2005     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     FUSION LUMBAR ANTERIOR TWO LEVELS  4/13/2015     GYN SURGERY  04/08/1999    hysterectomy.  [facility]     HYSTERECTOMY, PAP NO LONGER INDICATED       SOFT TISSUE SURGERY      chest-melonoma     SURGICAL HISTORY OF -   3/1996    Left nephrectomy-renal cell CA       Social History:  Patient reports that she quit smoking about 23 years ago. She has a 30.00 pack-year smoking history. She has never used smokeless tobacco. She reports that she does not drink alcohol or use drugs.    Family History:  Family History   Problem Relation Age of Onset     Diabetes Mother      Cardiovascular Mother      Lipids Mother      Depression Mother      Hypertension Father      Lipids Father      Obesity Father      Macular Degeneration Father      Cancer Maternal Grandmother         kind unknown had sores on legs     Eczema Maternal Grandmother      Eczema Maternal Grandfather      Breast Cancer Paternal Grandmother      Cancer  Paternal Grandmother         breast     Hypertension Paternal Grandfather      Cardiovascular Paternal Grandfather         heart attack     Hypertension Brother      Thyroid Disease Sister      Hypertension Sister      Depression Sister      Allergies Sister      Allergies Son      Eczema Son      Lipids Sister      Thyroid Disease Sister      Neurologic Disorder Sister      Depression Sister      Respiratory Son      Glaucoma No family hx of      Cerebrovascular Disease No family hx of        Medications:  Current Outpatient Medications   Medication Sig Dispense Refill     acetaminophen (TYLENOL) 325 MG tablet Take 325-650 mg by mouth every 6 hours as needed for mild pain       albuterol (PROAIR HFA/PROVENTIL HFA/VENTOLIN HFA) 108 (90 BASE) MCG/ACT Inhaler Inhale 2 puffs into the lungs every 6 hours as needed for shortness of breath / dyspnea 1 Inhaler 5     albuterol (PROVENTIL) (2.5 MG/3ML) 0.083% neb solution Take 1 vial (2.5 mg) by nebulization every 6 hours as needed for shortness of breath / dyspnea or wheezing 25 vial 0     Cholecalciferol (VITAMIN D3 PO) Take 2,000 Units by mouth 2 times daily        Cyanocobalamin (VITAMIN B 12 PO) Take 500 mcg by mouth daily       DULoxetine (CYMBALTA) 60 MG capsule TAKE 1 CAPSULE EVERY MORNING 90 capsule 0     fexofenadine (ALLEGRA) 180 MG tablet Take 1 tablet (180 mg) by mouth daily 30 tablet 1     fluticasone-salmeterol (ADVAIR) 250-50 MCG/DOSE diskus inhaler Inhale 1 puff into the lungs 2 times daily 3 Inhaler 3     GLUCOSAMINE SULFATE PO Take 1,000 mg by mouth 2 times daily       levothyroxine (SYNTHROID/LEVOTHROID) 100 MCG tablet Take 1 tablet (100 mcg) by mouth daily 30 tablet 0     MAGNESIUM OXIDE PO Take 200 mg by mouth daily       Multiple Vitamins-Minerals (MULTIVITAMIN ADULT PO) Take by mouth every morning        OVER-THE-COUNTER Place 1 drop into both eyes 3 times daily. Systane Ultra, Systane Balance, Blink Tears or Refresh Optive Artificial Tear 1 Bottle       ranitidine (ZANTAC) 150 MG tablet TAKE 1 TABLET TWICE A  tablet 2     celecoxib (CELEBREX) 100 MG capsule Take 1 capsule (100 mg) by mouth 2 times daily as needed for moderate pain (Patient not taking: Reported on 8/7/2019) 60 capsule 3     order for DME Equipment being ordered: Nebulizer 1 Units 0     tiZANidine (ZANAFLEX) 2 MG tablet Take 1 tablet at bedtime for 3 days then take 1 tablet twice a day thereafter (Patient not taking: Reported on 8/7/2019) 60 tablet 1        Allergies   Allergen Reactions     Omnipaque [Iohexol] Swelling and Difficulty breathing     Immediate throat swelling following around 1-2cc of omnipaque 300 for spine procedure     Gluten      Iodine      Welts from CT contrast, surgical scrub is ok.     Penicillins Hives       Review of Systems:  -Skin Establ Pt: The patient denies any new rash, pruritus, or lesions that are symptomatic, changing or bleeding, except as per HPI.  -Constitutional: Otherwise feeling well today, in usual state of health.  -HEENT: Patient denies nonhealing oral sores.  -Skin: As above in HPI. No additional skin concerns.    Physical exam:  Vitals: /77   Pulse 89   GEN: This is a well developed, well-nourished female in no acute distress, in a pleasant mood.    SKIN: Full skin, which includes the head/face, both arms, chest, back, abdomen,both legs, genitalia and/or groin buttocks, digits and/or nails, was examined.  -Oconnell skin type: II  -Of the trunk and extremities, but predominantly of the upper extremities, there are pink and scaly papules coalescing into minimally thick plaques.  This occurs against a background of severe generalized xerosis.  There is no periungual erythema or Samitz sign. No periungual dilated capillary loops. Visualization of the nail plate and bed is not possible due to acrylic nails in place.  Negative Gottron sign. There is a holster sign present on the lateral thighs. Unable to evaluate for heliotrope rash given  patient's makeup.  There is poikiloderma of the décolletage area.  -No other lesions of concern on areas examined.  -Photographs taken today    Impression/Plan:  1. Chronic burning rash of the upper extremities predominantly occurring over the last 8 years    The differential diagnosis includes the following: (photo)allergic contact dermatitis, as well as connective tissue disease including dermatomyositis > NADIA. DM may not demonstrate as marked of findings as NADIA and perivascular dermatitis has been frequently reported in DM. She does endorse muscle pain, mild SOB/BALL and has had a raised CK in the past. She also has a history of kidney cancer. These things increase concern for dermatomyositis. She says that she has had multiple EMGs, which were normal. If this is the case, this would be more reassuring against at least classic dermatomyositis, though certainly clinically amyopathic DM is quite common and accounts for about 30-40% of the DM patients seen in dermatology. Given these slightly disparate data elements, we need to obtain more objective data prior to confirming a diagnosis.    Pending as of September 27, 2019: Aldolase, DIEGO, CK, PFTs, polymyositis and dermatomyositis panel, urinalysis, SSA, and SSB    Use triamcinolone 0.1% ointment BID pending the above workup    CC Lina Bonner MD  [address] [place] [address] on close of this encounter.  Follow-up TBD, pending today's lab results.    Dr. Alcaraz staffed the patient.    Staff Involved:  Resident(Dr. Estevan Cerrato)/Staff    Staff Physician Comments:   I saw and evaluated the patient with the resident and I edited the assessment and plan as documented in the note. I was present for the examination.    Harrison Alcaraz MD   of Dermatology  Department of Dermatology  [facility] [employer]

## 2019-09-28 LAB
ALDOLASE SERPL-CCNC: 2.2 U/L (ref 1.5–8.1)
ENA SS-A IGG SER IA-ACNC: <0.2 AI (ref 0–0.9)
ENA SS-B IGG SER IA-ACNC: <0.2 AI (ref 0–0.9)

## 2019-09-30 LAB
ANA PAT SER IF-IMP: ABNORMAL
ANA SER QL IF: ABNORMAL
ANA TITR SER IF: ABNORMAL {TITER}
DEPRECATED CALCIDIOL+CALCIFEROL SERPL-MC: 56 UG/L (ref 20–75)

## 2019-10-01 ENCOUNTER — OFFICE VISIT (OUTPATIENT)
Dept: PSYCHOLOGY | Facility: CLINIC | Age: 59
End: 2019-10-01
Payer: OTHER GOVERNMENT

## 2019-10-01 DIAGNOSIS — F33.0 MAJOR DEPRESSIVE DISORDER, RECURRENT, MILD (H): Primary | ICD-10-CM

## 2019-10-01 LAB
DLCOUNC-%PRED-PRE: 105 %
DLCOUNC-PRE: 24.52 ML/MIN/MMHG
DLCOUNC-PRED: 23.33 ML/MIN/MMHG
ERV-%PRED-PRE: 61 %
ERV-PRE: 0.43 L
ERV-PRED: 0.7 L
EXPTIME-PRE: 6.22 SEC
FEF2575-%PRED-PRE: 141 %
FEF2575-PRE: 3.66 L/SEC
FEF2575-PRED: 2.58 L/SEC
FEFMAX-%PRED-PRE: 128 %
FEFMAX-PRE: 9.09 L/SEC
FEFMAX-PRED: 7.05 L/SEC
FEV1-%PRED-PRE: 112 %
FEV1-PRE: 3.27 L
FEV1FEV6-PRE: 85 %
FEV1FEV6-PRED: 81 %
FEV1FVC-PRE: 85 %
FEV1FVC-PRED: 79 %
FEV1SVC-PRE: 92 %
FEV1SVC-PRED: 76 %
FIFMAX-PRE: 6.4 L/SEC
FRCPLETH-%PRED-PRE: 84 %
FRCPLETH-PRE: 2.48 L
FRCPLETH-PRED: 2.93 L
FVC-%PRED-PRE: 104 %
FVC-PRE: 3.87 L
FVC-PRED: 3.72 L
IC-%PRED-PRE: 99 %
IC-PRE: 3.12 L
IC-PRED: 3.14 L
RVPLETH-%PRED-PRE: 99 %
RVPLETH-PRE: 2.05 L
RVPLETH-PRED: 2.06 L
TLCPLETH-%PRED-PRE: 99 %
TLCPLETH-PRE: 5.59 L
TLCPLETH-PRED: 5.63 L
VA-%PRED-PRE: 84 %
VA-PRE: 4.82 L
VC-%PRED-PRE: 92 %
VC-PRE: 3.55 L
VC-PRED: 3.83 L

## 2019-10-01 PROCEDURE — 90834 PSYTX W PT 45 MINUTES: CPT | Performed by: PSYCHOLOGIST

## 2019-10-03 ENCOUNTER — TELEPHONE (OUTPATIENT)
Dept: FAMILY MEDICINE | Facility: CLINIC | Age: 59
End: 2019-10-03

## 2019-10-03 DIAGNOSIS — M79.10 MYALGIA: ICD-10-CM

## 2019-10-03 DIAGNOSIS — F41.1 GAD (GENERALIZED ANXIETY DISORDER): ICD-10-CM

## 2019-10-03 ASSESSMENT — ANXIETY QUESTIONNAIRES
1. FEELING NERVOUS, ANXIOUS, OR ON EDGE: SEVERAL DAYS
5. BEING SO RESTLESS THAT IT IS HARD TO SIT STILL: SEVERAL DAYS
6. BECOMING EASILY ANNOYED OR IRRITABLE: SEVERAL DAYS
7. FEELING AFRAID AS IF SOMETHING AWFUL MIGHT HAPPEN: NOT AT ALL
4. TROUBLE RELAXING: SEVERAL DAYS
GAD7 TOTAL SCORE: 6
3. WORRYING TOO MUCH ABOUT DIFFERENT THINGS: SEVERAL DAYS
2. NOT BEING ABLE TO STOP OR CONTROL WORRYING: SEVERAL DAYS

## 2019-10-03 ASSESSMENT — PATIENT HEALTH QUESTIONNAIRE - PHQ9: SUM OF ALL RESPONSES TO PHQ QUESTIONS 1-9: 6

## 2019-10-03 NOTE — PROGRESS NOTES
Progress Note  Disclaimer: This note consists of symbols derived from keyboarding, dictation and/or voice recognition software. As a result, there may be errors in the script that have gone undetected. Please consider this when interpreting information found in this chart.    Client Name: Teresa Fernandez  Date: 10/1/2019         Service Type: Individual      Session Start Time: 4:00 PM  session End Time: 4:45 PM      Session Length: 45     Session #: 16     Attendees: Client attended alone    Treatment Plan Last Reviewed: 6/4/2019  PHQ-9 / ERIC-7 : See flowsheet's     DATA   Client reports things are going okay at home.  Her medical concerns remain problematic.  She is doing a lot of testing that he will MP, considerable pain.  A diagnosis of lupus is being considered.  Reports her son is doing well.      Progress Since Last Session (Related to Symptoms / Goals / Homework):   Symptoms: Improving See above    Homework: Achieved / completed to satisfaction      Episode of Care Goals: Satisfactory progress - ACTION (Actively working towards change); Intervened by reinforcing change plan / affirming steps taken     Current / Ongoing Stressors and Concerns:   Some with alcohol and legal problems, multiple medical problems, work stress.     Treatment Objective(s) Addressed in This Session:   use at least 2 coping skills for anxiety management in the next 2 weeks       Intervention:   CBT: Behavioral activation and CBT for anxiety and panic.   12 step facilitation for family and parents     ASSESSMENT: Current Emotional / Mental Status (status of significant symptoms):   Risk status (Self / Other harm or suicidal ideation)   Client denies current fears or concerns for personal safety.   Client denies current or recent suicidal ideation or behaviors.   Client denies current or recent homicidal ideation or behaviors.   Client denies current or recent self injurious behavior or  ideation.   Client denies other safety concerns.   Client Client reports there has been no change in risk factors since their last session.     Client Client reports there has been no change in protective factors since their last session.     A safety and risk management plan has not been developed at this time, however client was given the after-hours number / 911 should there be a change in any of these risk factors.     Appearance:   Appropriate    Eye Contact:   Good    Psychomotor Behavior: Normal    Attitude:   Cooperative    Orientation:   All   Speech    Rate / Production: Normal     Volume:  Normal    Mood:    Normal   Affect:    Appropriate    Thought Content:  Clear    Thought Form:  Coherent  Logical    Insight:    Good      Medication Review:   No changes to current psychiatric medication(s)     Medication Compliance:   Yes     Changes in Health Issues:   None reported     Chemical Use Review:   Substance Use: Chemical use reviewed, no active concerns identified      Tobacco Use: No current tobacco use.       Collateral Reports Completed:   Not Applicable    PLAN: (Client Tasks / Therapist Tasks / Other)   Client to continue to maintain healthy boundaries with her alcoholic child.  Continue attending 12-step family support group and contact other members if she needs to.        Carter Tierney                                                         ________________________________________________________________________    Treatment Plan    Client's Name: Teresa Fernandez  YOB: 1960    Date: 7/23/2018    DSM5 Diagnoses: (Sustained by DSM5 Criteria Listed Above)  Diagnoses:            296.31 (F33.0) Major Depressive Disorder, Recurrent Episode, Mild _ and With anxious distress  Psychosocial & Contextual Factors: Multiple medical issues  WHODAS 2.0 (12 item)                          This questionnaire asks about difficulties due to health conditions. Health conditions                    include                        disease or illnesses, other health problems that may be short or long lasting,                    injuries, mental health or emotional problems, and problems with alcohol or drugs.                              Think back over the past 30 days and answer these questions, thinking about how much              difficulty you had doing the following activities. For each question, please [place] only                   one response.      S1 Standing for long periods such as 30 minutes? Mild =           2   S2 Taking care of household responsibilities? None =         1   S3 Learning a new task, for example, learning how to get to a new place? None =         1   S4 How much of a problem do you have joining community activities (for example, festivals, Sikhism or other activities) in the same way as anyone else can? None =         1   S5 How much have you been emotionally affected by your health problems? Moderate =   3               In the past 30 days, how much difficulty did you have in:   S6 Concentrating on doing something for ten minutes? Mild =           2   S7 Walking a long distance such as a kilometer (or equivalent)? None =         1   S8 Washing your whole body? None =         1   S9 Getting dressed? None =         1   S10 Dealing with people you do not know? None =         1   S11 Maintaining a friendship? None =         1   S12 Your day to day work? None =         1       H1 Overall, in the past 30 days, how many days were these difficulties present? Record number of days 3   H2 In the past 30 days, for how many days were you totally unable to carry out your usual activities or work because of any health condition? Record number of days  0   H3 In the past 30 days, not counting the days that you were totally unable, for how many days did you cut back or reduce your usual activities or work because of any health condition? Record number of days 0            Referral / Collaboration:  Referral to another professional/service is not indicated at this time..    Anticipated number of session or this episode of care: 15  Anxiety Treatment plan:  1. Education- the Biopsychosocial model of anxiety  a. Client will be able to describe how anxiety is effecting them physically, emotionally and socially  2. Distraction  a. Client will learn 2 techniques to distract themselves when becoming anxious  3. Diaphragmatic breathing  a. Client will learn to breath using their diaphragm  b. Client will learn 2 breathing patterns to use to reduce anxiety  4. Visualization  a. Client will learn to establish a safe, calm place in their mind utilizing all their senses  b. Client will practice using visualization at times when they are not anxious so they will be able to use it when anxiety occurs  5. Progressive muscle relaxation  a. Client will learn progressive muscle relaxation techniques and practice them 4-5 times per week.  b. Client will learn to use mental images of relaxation to relax muscle groups and do this on a daily basis  6. Self-care  a. Client will identify 3 things they can do just for themselves  b. Client will take time for quiet, reflection, meditation 3 times per week  c. Client will Learn to set boundaries when appropriate  d. Client will Identify 3 individuals they can call on for support, distraction  7. Assessment of progress  a. Client will engage in assessment of their progress on a regular basis      Client has reviewed and agreed to the above plan.      Carter Tierney  July 24, 2018

## 2019-10-03 NOTE — TELEPHONE ENCOUNTER
Unable to reach patient - line busy.  She is due for depression recheck.  Please help schedule appt.  Does she have enough meds to get to appt?    Jannette DIA RN

## 2019-10-03 NOTE — TELEPHONE ENCOUNTER
"Requested Prescriptions   Pending Prescriptions Disp Refills     DULoxetine (CYMBALTA) 60 MG capsule [Pharmacy Med Name: DULOXETINE HCL DR CAPS 60MG] 90 capsule 4     Sig: TAKE 1 CAPSULE EVERY MORNING       Serotonin-Norepinephrine Reuptake Inhibitors  Passed - 10/3/2019  2:30 AM        Passed - Blood pressure under 140/90 in past 12 months     BP Readings from Last 3 Encounters:   09/27/19 123/77   08/07/19 118/84   05/20/19 112/82                 Passed - Recent (12 mo) or future (30 days) visit within the authorizing provider's specialty     Patient has had an office visit with the authorizing provider or a provider within the authorizing providers department within the previous 12 mos or has a future within next 30 days. See \"Patient Info\" tab in inbasket, or \"Choose Columns\" in Meds & Orders section of the refill encounter.              Passed - Medication is active on med list        Passed - Patient is age 18 or older        Passed - No active pregnancy on record        Passed - No positive pregnancy test in past 12 months        Last Written Prescription Date:  7/5/2019  Last Fill Quantity: 90,  # refills: 0   Last office visit: 5/20/2019 with prescribing provider:  Javed  Future Office Visit:   Next 5 appointments (look out 90 days)    Oct 15, 2019  5:00 PM CDT  Return Visit with Carter Basilioifton  [address] [address]  [phone]   Oct 29, 2019  4:00 PM CDT  Return Visit with Carter Basilio[address] [address]  [phone]   Nov 29, 2019  1:15 PM CST  Return Visit with JEREMI Goins MD  [facility] ([facility]) [address] [place] [id number] [phone]           "

## 2019-10-04 RX ORDER — DULOXETIN HYDROCHLORIDE 60 MG/1
CAPSULE, DELAYED RELEASE ORAL
Qty: 90 CAPSULE | Refills: 4 | OUTPATIENT
Start: 2019-10-04

## 2019-10-04 ASSESSMENT — ANXIETY QUESTIONNAIRES: GAD7 TOTAL SCORE: 6

## 2019-10-04 NOTE — TELEPHONE ENCOUNTER
Patient is contacted and appt is scheduled.  Patient states she has enough medication to last until said appt. Tanvi ROMERO RN

## 2019-10-06 DIAGNOSIS — J45.40 MODERATE PERSISTENT ASTHMA WITHOUT COMPLICATION: ICD-10-CM

## 2019-10-07 NOTE — TELEPHONE ENCOUNTER
"Requested Prescriptions   Pending Prescriptions Disp Refills     ADVAIR DISKUS 250-50 MCG/DOSE inhaler [Pharmacy Med Name: ADVAIR DISKUS 60'S 250/50]  4     Sig: USE 1 INHALATION TWICE A DAY (FOLLOW UP FOR FURTHER REFILLS)   Last Written Prescription Date:  9/24/18  Last Fill Quantity: 3 inhaler,  # refills: 3   Last office visit: 5/20/2019 with prescribing provider:  Nuzhat Burt     Future Office Visit:   Next 5 appointments (look out 90 days)    Oct 14, 2019  4:20 PM CDT  SHORT with HERIBERTO Delgado CNP  [facility] ([facility])  Arrive at: Clinic A [address] [place] [id number]  [phone]   Oct 15, 2019  5:00 PM CDT  Return Visit with Carter MATSON [facility] ([facility]) [address] [place] [id number]  [phone]   Oct 29, 2019  4:00 PM CDT  Return Visit with Carter MATSON [facility] ([facility]) [address] [place] [phone]  [phone]   Nov 29, 2019  1:15 PM CST  Return Visit with JEREMI Goins MD  [facility] ([facility]) [address] [place] [id number] [phone]             Inhaled Steroids Protocol Failed - 10/6/2019  5:37 AM        Failed - Asthma control assessment score within normal limits in last 6 months     Please review ACT score.           Passed - Patient is age 12 or older        Passed - Medication is active on med list        Passed - Recent (6 mo) or future (30 days) visit within the authorizing provider's specialty     Patient had office visit in the last 6 months or has a visit in the next 30 days with authorizing provider or within the authorizing provider's specialty.  See \"Patient Info\" tab in inbasket, or \"Choose Columns\" in Meds & Orders section of the refill encounter.              "

## 2019-10-14 ENCOUNTER — OFFICE VISIT (OUTPATIENT)
Dept: FAMILY MEDICINE | Facility: CLINIC | Age: 59
End: 2019-10-14
Payer: OTHER GOVERNMENT

## 2019-10-14 VITALS
OXYGEN SATURATION: 97 % | HEIGHT: 69 IN | SYSTOLIC BLOOD PRESSURE: 112 MMHG | WEIGHT: 217.4 LBS | BODY MASS INDEX: 32.2 KG/M2 | RESPIRATION RATE: 14 BRPM | HEART RATE: 74 BPM | TEMPERATURE: 97.6 F | DIASTOLIC BLOOD PRESSURE: 70 MMHG

## 2019-10-14 DIAGNOSIS — F41.1 GAD (GENERALIZED ANXIETY DISORDER): ICD-10-CM

## 2019-10-14 DIAGNOSIS — Z23 NEED FOR PROPHYLACTIC VACCINATION AND INOCULATION AGAINST INFLUENZA: Primary | ICD-10-CM

## 2019-10-14 DIAGNOSIS — M79.10 MYALGIA: ICD-10-CM

## 2019-10-14 PROCEDURE — 90471 IMMUNIZATION ADMIN: CPT | Performed by: NURSE PRACTITIONER

## 2019-10-14 PROCEDURE — 90682 RIV4 VACC RECOMBINANT DNA IM: CPT | Performed by: NURSE PRACTITIONER

## 2019-10-14 PROCEDURE — 99214 OFFICE O/P EST MOD 30 MIN: CPT | Mod: 25 | Performed by: NURSE PRACTITIONER

## 2019-10-14 RX ORDER — DULOXETIN HYDROCHLORIDE 60 MG/1
CAPSULE, DELAYED RELEASE ORAL
Qty: 90 CAPSULE | Refills: 0 | Status: SHIPPED | OUTPATIENT
Start: 2019-10-14 | End: 2020-01-13

## 2019-10-14 ASSESSMENT — ANXIETY QUESTIONNAIRES
GAD7 TOTAL SCORE: 6
1. FEELING NERVOUS, ANXIOUS, OR ON EDGE: SEVERAL DAYS
IF YOU CHECKED OFF ANY PROBLEMS ON THIS QUESTIONNAIRE, HOW DIFFICULT HAVE THESE PROBLEMS MADE IT FOR YOU TO DO YOUR WORK, TAKE CARE OF THINGS AT HOME, OR GET ALONG WITH OTHER PEOPLE: NOT DIFFICULT AT ALL
3. WORRYING TOO MUCH ABOUT DIFFERENT THINGS: SEVERAL DAYS
5. BEING SO RESTLESS THAT IT IS HARD TO SIT STILL: NOT AT ALL
2. NOT BEING ABLE TO STOP OR CONTROL WORRYING: SEVERAL DAYS
6. BECOMING EASILY ANNOYED OR IRRITABLE: SEVERAL DAYS
7. FEELING AFRAID AS IF SOMETHING AWFUL MIGHT HAPPEN: SEVERAL DAYS

## 2019-10-14 ASSESSMENT — PATIENT HEALTH QUESTIONNAIRE - PHQ9
SUM OF ALL RESPONSES TO PHQ QUESTIONS 1-9: 6
5. POOR APPETITE OR OVEREATING: SEVERAL DAYS

## 2019-10-14 ASSESSMENT — MIFFLIN-ST. JEOR: SCORE: 1630.5

## 2019-10-14 NOTE — PATIENT INSTRUCTIONS
Thank you for choosing [facility].  You may be receiving an email and/or telephone survey request from [facility] Customer Experience regarding your visit today.  Please take a few minutes to respond to the survey to let us know how we are doing.      If you have questions or concerns, please contact us via [employer] or you can contact your care team at [phone].    Our Clinic hours are:  Monday 6:40 am  to 7:00 pm  Tuesday -Friday 6:40 am to 5:00 pm    The [place] outpatient lab hours are:  Monday - Friday 6:10 am to 4:45 pm  Saturdays 7:00 am to 11:00 am  Appointments are required, call [phone]    If you have clinical questions after hours or would like to schedule an appointment,  call the clinic at [phone].

## 2019-10-14 NOTE — PROGRESS NOTES
Subjective     Teresa Fernandez is a 59 year old female who presents to clinic today for the following health issues:    Multiple joint /body pain/ burning rash pain on skin occurring over the past 8 yrs. Patient recently seen dermatology- biopsy were taken to r/u Autoimmune etiology. Patient had very extensive workup which has been unremarkable.     HPI   Medication Followup of ranitidine- patient has questions about it causing cancer    Taking Medication as prescribed: yes    Side Effects:  None    Medication Helping Symptoms:  yes     Depression and Anxiety Follow-Up    How are you doing with your depression since your last visit? Worsened due to health     How are you doing with your anxiety since your last visit?  No change    Are you having other symptoms that might be associated with depression or anxiety? No    Have you had a significant life event? Health Concerns     Do you have any concerns with your use of alcohol or other drugs? No    Social History     Tobacco Use     Smoking status: Former Smoker     Packs/day: 2.00     Years: 15.00     Pack years: 30.00     Last attempt to quit: 3/26/1996     Years since quittin.5     Smokeless tobacco: Never Used   Substance Use Topics     Alcohol use: No     Comment: None now.  Rare in past.      Drug use: No     PHQ 2019 2019 10/3/2019   PHQ-9 Total Score 5 6 6   Q9: Thoughts of better off dead/self-harm past 2 weeks Not at all Not at all Not at all     ERIC-7 SCORE 2019 2019 10/3/2019   Total Score - - -   Total Score - - -   Total Score 5 5 6     No flowsheet data found.  No flowsheet data found.  In the past two weeks have you had thoughts of suicide or self-harm?  No.    Do you have concerns about your personal safety or the safety of others?   No    Suicide Assessment Five-step Evaluation and Treatment (SAFE-T)    Patient Active Problem List   Diagnosis     Acquired hypothyroidism     Allergic state     Chronic rhinitis     Allergic  rhinitis due to pollen     Sinusitis, chronic     History of skin cancer     ERIC (generalized anxiety disorder)     Leukoplakia of oral mucosa, including tongue     Renal cancer (H)     GERD (gastroesophageal reflux disease)     Chronic low back pain     Dry eye syndrome     Chronic fatigue     Osteopenia     History of melanoma in situ     Eczema     Moderate persistent asthma without complication     History of kidney cancer     DDD (degenerative disc disease), lumbar     Hyperlipidemia LDL goal <160     Chest tightness or pressure     Muscle pain     Past Surgical History:   Procedure Laterality Date     CHOLECYSTECTOMY       COLONOSCOPY      -normal     ENT SURGERY  2-15-11    Left cheek bx- Mucositis.     FUSION LUMBAR ANTERIOR TWO LEVELS  2015     GYN SURGERY  1999    hysterectomy.  LAVH     HYSTERECTOMY, PAP NO LONGER INDICATED       SOFT TISSUE SURGERY      chest-melonoma     SURGICAL HISTORY OF -   3/1996    Left nephrectomy-renal cell CA       Social History     Tobacco Use     Smoking status: Former Smoker     Packs/day: 2.00     Years: 15.00     Pack years: 30.00     Last attempt to quit: 3/26/1996     Years since quittin.5     Smokeless tobacco: Never Used   Substance Use Topics     Alcohol use: No     Comment: None now.  Rare in past.      Family History   Problem Relation Age of Onset     Diabetes Mother      Cardiovascular Mother      Lipids Mother      Depression Mother      Hypertension Father      Lipids Father      Obesity Father      Macular Degeneration Father      Cancer Maternal Grandmother         kind unknown had sores on legs     Eczema Maternal Grandmother      Eczema Maternal Grandfather      Breast Cancer Paternal Grandmother      Cancer Paternal Grandmother         breast     Hypertension Paternal Grandfather      Cardiovascular Paternal Grandfather         heart attack     Hypertension Brother      Thyroid Disease Sister      Hypertension Sister      Depression  "Sister      Allergies Sister      Allergies Son      Eczema Son      Lipids Sister      Thyroid Disease Sister      Neurologic Disorder Sister      Depression Sister      Respiratory Son      Glaucoma No family hx of      Cerebrovascular Disease No family hx of            -------------------------------------  Reviewed and updated as needed this visit by Provider         Review of Systems   ROS COMP: Constitutional, HEENT, cardiovascular, pulmonary, GI, , musculoskeletal, neuro, skin, endocrine and psych systems are negative, except as otherwise noted.      Objective    There were no vitals taken for this visit.  There is no height or weight on file to calculate BMI.  Physical Exam   GENERAL: healthy, alert and no distress  RESP: lungs clear to auscultation - no rales, rhonchi or wheezes  CV: regular rate and rhythm, normal S1 S2, no S3 or S4, no murmur, click or rub, no peripheral edema and peripheral pulses strong  MS: no gross musculoskeletal defects noted, no edema  PSYCH: mentation appears normal, affect normal/bright    Diagnostic Test Results:  Labs reviewed in Epic        Assessment & Plan     1. ERIC (generalized anxiety disorder)  Stable- due to current medical problems  - DULoxetine (CYMBALTA) 60 MG capsule; TAKE 1 CAPSULE EVERY MORNING  Dispense: 90 capsule; Refill: 0    2. Myalgia  Patient has had very extensive workup for her pains over the past 8 yrs- patient considering seeing /consulting [facility]  - DULoxetine (CYMBALTA) 60 MG capsule; TAKE 1 CAPSULE EVERY MORNING  Dispense: 90 capsule; Refill: 0    3. Need for prophylactic vaccination and inoculation against influenza    - INFLUENZA QUAD, RECOMBINANT, P-FREE (RIV4) (FLUBLOCK) [58858]  - Vaccine Administration, Initial [47900]     BMI:   Estimated body mass index is 32.1 kg/m  as calculated from the following:    Height as of this encounter: 1.753 m (5' 9\").    Weight as of this encounter: 98.6 kg (217 lb 6.4 oz).   Weight management plan: " Discussed healthy diet and exercise guidelines      No follow-ups on file.    HERIBERTO Delgado [facility]

## 2019-10-15 ASSESSMENT — ANXIETY QUESTIONNAIRES: GAD7 TOTAL SCORE: 6

## 2019-10-27 DIAGNOSIS — J45.40 MODERATE PERSISTENT ASTHMA WITHOUT COMPLICATION: ICD-10-CM

## 2019-10-27 NOTE — TELEPHONE ENCOUNTER
"Requested Prescriptions   Pending Prescriptions Disp Refills     ADVAIR DISKUS 250-50 MCG/DOSE inhaler [Pharmacy Med Name: ADVAIR DISKUS 60'S 250/50]  Last Written Prescription Date:  10/12/19  Last Fill Quantity: 60,  # refills: 12   Last office visit: 10/14/2019 with prescribing provider:  NELLY Burt   Future Office Visit:   Next 5 appointments (look out 90 days)    Oct 29, 2019  4:00 PM CDT  Return Visit with Carter Tierney  [facility] ([facility]) [address] [facility] [address]  [phone]           12     Sig: USE 1 INHALATION TWICE A DAY (FOLLOW UP FOR FURTHER REFILLS)       Inhaled Steroids Protocol Failed - 10/27/2019 12:07 PM        Failed - Asthma control assessment score within normal limits in last 6 months     Please review ACT score.           Passed - Patient is age 12 or older        Passed - Medication is active on med list        Passed - Recent (6 mo) or future (30 days) visit within the authorizing provider's specialty     Patient had office visit in the last 6 months or has a visit in the next 30 days with authorizing provider or within the authorizing provider's specialty.  See \"Patient Info\" tab in inbasket, or \"Choose Columns\" in Meds & Orders section of the refill encounter.              "

## 2019-10-29 ENCOUNTER — OFFICE VISIT (OUTPATIENT)
Dept: PSYCHOLOGY | Facility: CLINIC | Age: 59
End: 2019-10-29
Payer: OTHER GOVERNMENT

## 2019-10-29 DIAGNOSIS — F33.0 MAJOR DEPRESSIVE DISORDER, RECURRENT, MILD (H): Primary | ICD-10-CM

## 2019-10-29 PROCEDURE — 90834 PSYTX W PT 45 MINUTES: CPT | Performed by: PSYCHOLOGIST

## 2019-10-29 NOTE — TELEPHONE ENCOUNTER
Routing refill request to provider for review/approval because:  Sabi refill already given x1   Last ACT done > 1 year ago  Last OV 10/14/19    Violet SPAIN RN    ACT Total Scores 6/27/2017 1/18/2018 8/2/2018   ACT TOTAL SCORE - - -   ASTHMA ER VISITS - - -   ASTHMA HOSPITALIZATIONS - - -   ACT TOTAL SCORE (Goal Greater than or Equal to 20) 25 17 23   In the past 12 months, how many times did you visit the emergency room for your asthma without being admitted to the hospital? 0 0 0   In the past 12 months, how many times were you hospitalized overnight because of your asthma? 0 0 0     Violet SPAIN RN

## 2019-10-30 NOTE — TELEPHONE ENCOUNTER
Please call patient and go over ACT with patient and then let me know her results. I sent in 1 prescription with 1 refill until we can get her ACT done

## 2019-10-31 DIAGNOSIS — E03.9 ACQUIRED HYPOTHYROIDISM: Primary | ICD-10-CM

## 2019-11-01 ASSESSMENT — ANXIETY QUESTIONNAIRES
4. TROUBLE RELAXING: SEVERAL DAYS
5. BEING SO RESTLESS THAT IT IS HARD TO SIT STILL: NOT AT ALL
7. FEELING AFRAID AS IF SOMETHING AWFUL MIGHT HAPPEN: NOT AT ALL
GAD7 TOTAL SCORE: 4
1. FEELING NERVOUS, ANXIOUS, OR ON EDGE: SEVERAL DAYS
3. WORRYING TOO MUCH ABOUT DIFFERENT THINGS: SEVERAL DAYS
6. BECOMING EASILY ANNOYED OR IRRITABLE: SEVERAL DAYS
2. NOT BEING ABLE TO STOP OR CONTROL WORRYING: NOT AT ALL

## 2019-11-01 ASSESSMENT — PATIENT HEALTH QUESTIONNAIRE - PHQ9: SUM OF ALL RESPONSES TO PHQ QUESTIONS 1-9: 4

## 2019-11-01 NOTE — PROGRESS NOTES
Progress Note  Disclaimer: This note consists of symbols derived from keyboarding, dictation and/or voice recognition software. As a result, there may be errors in the script that have gone undetected. Please consider this when interpreting information found in this chart.    Client Name: Teresa Fernandez  Date: 10/29/2019         Service Type: Individual      Session Start Time: 4:00 PM  session End Time: 4:45 PM      Session Length: 45     Session #: 17     Attendees: Client attended alone    Treatment Plan Last Reviewed: 6/4/2019  PHQ-9 / ERIC-7 : See flowsheet's     DATA   Client reports she is still having difficulty with her joint pain.  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  The word she is still dark there is what is really going on.  This is frustrating her.  She reports that her son is doing well in sobriety and this is somewhat of a relief.      Progress Since Last Session (Related to Symptoms / Goals / Homework):   Symptoms: Improving See above    Homework: Achieved / completed to satisfaction      Episode of Care Goals: Satisfactory progress - ACTION (Actively working towards change); Intervened by reinforcing change plan / affirming steps taken     Current / Ongoing Stressors and Concerns:   Some with alcohol and legal problems, multiple medical problems, work stress.     Treatment Objective(s) Addressed in This Session:   use at least 2 coping skills for anxiety management in the next 2 weeks       Intervention:   CBT: Behavioral activation and CBT for anxiety and panic.   12 step facilitation for family and parents     ASSESSMENT: Current Emotional / Mental Status (status of significant symptoms):   Risk status (Self / Other harm or suicidal ideation)   Client denies current fears or concerns for personal safety.   Client denies current or recent suicidal ideation or behaviors.   Client denies current or recent  homicidal ideation or behaviors.   Client denies current or recent self injurious behavior or ideation.   Client denies other safety concerns.   Client Client reports there has been no change in risk factors since their last session.     Client Client reports there has been no change in protective factors since their last session.     A safety and risk management plan has not been developed at this time, however client was given the after-hours number / 911 should there be a change in any of these risk factors.     Appearance:   Appropriate    Eye Contact:   Good    Psychomotor Behavior: Normal    Attitude:   Cooperative    Orientation:   All   Speech    Rate / Production: Normal     Volume:  Normal    Mood:    Normal   Affect:    Appropriate    Thought Content:  Clear    Thought Form:  Coherent  Logical    Insight:    Good      Medication Review:   No changes to current psychiatric medication(s)     Medication Compliance:   Yes     Changes in Health Issues:   None reported     Chemical Use Review:   Substance Use: Chemical use reviewed, no active concerns identified      Tobacco Use: No current tobacco use.       Collateral Reports Completed:   Not Applicable    PLAN: (Client Tasks / Therapist Tasks / Other)   Client to continue to maintain healthy boundaries with her alcoholic child.  Make self-care her first priority.      Carter Tierney                                                         ________________________________________________________________________    Treatment Plan    Client's Name: Teresa Fernandez  YOB: 1960    Date: 7/23/2018    DSM5 Diagnoses: (Sustained by DSM5 Criteria Listed Above)  Diagnoses:            296.31 (F33.0) Major Depressive Disorder, Recurrent Episode, Mild _ and With anxious distress  Psychosocial & Contextual Factors: Multiple medical issues  WHODAS 2.0 (12 item)                          This questionnaire asks about difficulties due to health conditions.  Health conditions                   include                        disease or illnesses, other health problems that may be short or long lasting,                    injuries, mental health or emotional problems, and problems with alcohol or drugs.                              Think back over the past 30 days and answer these questions, thinking about how much              difficulty you had doing the following activities. For each question, please [place] only                   one response.      S1 Standing for long periods such as 30 minutes? Mild =           2   S2 Taking care of household responsibilities? None =         1   S3 Learning a new task, for example, learning how to get to a new place? None =         1   S4 How much of a problem do you have joining community activities (for example, festivals, [employer] or other activities) in the same way as anyone else can? None =         1   S5 How much have you been emotionally affected by your health problems? Moderate =   3               In the past 30 days, how much difficulty did you have in:   S6 Concentrating on doing something for ten minutes? Mild =           2   S7 Walking a long distance such as a kilometer (or equivalent)? None =         1   S8 Washing your whole body? None =         1   S9 Getting dressed? None =         1   S10 Dealing with people you do not know? None =         1   S11 Maintaining a friendship? None =         1   S12 Your day to day work? None =         1       H1 Overall, in the past 30 days, how many days were these difficulties present? Record number of days 3   H2 In the past 30 days, for how many days were you totally unable to carry out your usual activities or work because of any health condition? Record number of days  0   H3 In the past 30 days, not counting the days that you were totally unable, for how many days did you cut back or reduce your usual activities or work because of any health condition? Record number of days 0            Referral / Collaboration:  Referral to another professional/service is not indicated at this time..    Anticipated number of session or this episode of care: 15  Anxiety Treatment plan:  1. Education- the Biopsychosocial model of anxiety  a. Client will be able to describe how anxiety is effecting them physically, emotionally and socially  2. Distraction  a. Client will learn 2 techniques to distract themselves when becoming anxious  3. Diaphragmatic breathing  a. Client will learn to breath using their diaphragm  b. Client will learn 2 breathing patterns to use to reduce anxiety  4. Visualization  a. Client will learn to establish a safe, calm place in their mind utilizing all their senses  b. Client will practice using visualization at times when they are not anxious so they will be able to use it when anxiety occurs  5. Progressive muscle relaxation  a. Client will learn progressive muscle relaxation techniques and practice them 4-5 times per week.  b. Client will learn to use mental images of relaxation to relax muscle groups and do this on a daily basis  6. Self-care  a. Client will identify 3 things they can do just for themselves  b. Client will take time for quiet, reflection, meditation 3 times per week  c. Client will Learn to set boundaries when appropriate  d. Client will Identify 3 individuals they can call on for support, distraction  7. Assessment of progress  a. Client will engage in assessment of their progress on a regular basis      Client has reviewed and agreed to the above plan.      Carter Tierney  July 24, 2018

## 2019-11-01 NOTE — TELEPHONE ENCOUNTER
"Requested Prescriptions   Pending Prescriptions Disp Refills     levothyroxine (SYNTHROID/LEVOTHROID) 125 MCG tablet [Pharmacy Med Name: L-THYROXINE TABS 125MCG] 90 tablet 4     Sig: TAKE 1 TABLET DAILY       Thyroid Protocol Passed - 10/31/2019  4:35 PM        Passed - Patient is 12 years or older        Passed - Recent (12 mo) or future (30 days) visit within the authorizing provider's specialty     Patient has had an office visit with the authorizing provider or a provider within the authorizing providers department within the previous 12 mos or has a future within next 30 days. See \"Patient Info\" tab in inbasket, or \"Choose Columns\" in Meds & Orders section of the refill encounter.              Passed - Medication is active on med list        Passed - Normal TSH on file in past 12 months     Recent Labs   Lab Test 09/27/19  1416   TSH 3.57              Passed - No active pregnancy on record     If patient is pregnant or has had a positive pregnancy test, please check TSH.          Passed - No positive pregnancy test in past 12 months     If patient is pregnant or has had a positive pregnancy test, please check TSH.          Last Written Prescription Date:  3/12/2019  Last Fill Quantity: 30,  # refills: 0   Last office visit: 10/14/2019 with prescribing provider:  Javed   Future Office Visit:   Next 5 appointments (look out 90 days)    Nov 22, 2019 12:00 PM CST  Return Visit with Carter Tierney  [facility] ([facility] [address] [address]  [phone]           "

## 2019-11-01 NOTE — TELEPHONE ENCOUNTER
Routing refill request to provider for review/approval because:  [facility] has levothyroxine (SYNTHROID/LEVOTHROID) 100 MCG tablet on med list, request is for 125mcg  Patient Result Comments     Viewed by Teresa Fernandez on 10/19/2019  7:39 AM   Written by Nuzhat Burt APRN CNP on 9/30/2019  9:24 AM   Vin Moore,     Your thyroid level is normal.     Thank you,     Nuzhat Burt CNP   [facility]   [phone]     Please clarify dose.    Violet SPAIN RN

## 2019-11-02 ASSESSMENT — ANXIETY QUESTIONNAIRES: GAD7 TOTAL SCORE: 4

## 2019-11-03 ENCOUNTER — HEALTH MAINTENANCE LETTER (OUTPATIENT)
Age: 59
End: 2019-11-03

## 2019-11-04 RX ORDER — LEVOTHYROXINE SODIUM 125 UG/1
TABLET ORAL
Qty: 90 TABLET | Refills: 4 | Status: SHIPPED | OUTPATIENT
Start: 2019-11-04 | End: 2020-11-06 | Stop reason: DRUGHIGH

## 2019-12-26 ENCOUNTER — TELEPHONE (OUTPATIENT)
Dept: FAMILY MEDICINE | Facility: CLINIC | Age: 59
End: 2019-12-26

## 2019-12-26 NOTE — TELEPHONE ENCOUNTER
We have no openings, is this something that should be called on or should I just refer to Urgent Care?      Bisi PEÑA   Admissions    [facility]   [phone]    ----- Message -----   From: Teresa Fernandez   Sent: 12/26/2019   5:49 AM CST   To: [facility] Reception Pool   Subject: Appointment Request                                Appointment Request From: Teresa Fernandez      With Provider: HERIBERTO Cormier CNP [[facility]]      Preferred Date Range: Any date 12/27/2019 or later      Preferred Times: Any time      Reason for visit: Request an Appointment      Comments:   Possibly fracture foot

## 2019-12-27 NOTE — TELEPHONE ENCOUNTER
[facility]  Jannette DIA RN

## 2019-12-29 DIAGNOSIS — J45.40 MODERATE PERSISTENT ASTHMA WITHOUT COMPLICATION: ICD-10-CM

## 2019-12-30 NOTE — TELEPHONE ENCOUNTER
"Requested Prescriptions   Pending Prescriptions Disp Refills     ADVAIR DISKUS 250-50 MCG/DOSE inhaler [Pharmacy Med Name: ADVAIR DISKUS 60'S 250/50]  12     Sig: USE 1 INHALATION TWICE A DAY (FOLLOW UP FOR FURTHER REFILLS)   Last Written Prescription Date:  10/30/19  Last Fill Quantity: 1 inhaler,  # refills: 1   Last office visit: 10/14/2019 with prescribing provider:  Nuzhat Burt     Future Office Visit:        Inhaled Steroids Protocol Failed - 12/29/2019  4:53 AM        Failed - Asthma control assessment score within normal limits in last 6 months     Please review ACT score.           Passed - Patient is age 12 or older        Passed - Medication is active on med list        Passed - Recent (6 mo) or future (30 days) visit within the authorizing provider's specialty     Patient had office visit in the last 6 months or has a visit in the next 30 days with authorizing provider or within the authorizing provider's specialty.  See \"Patient Info\" tab in inbasket, or \"Choose Columns\" in Meds & Orders section of the refill encounter.              "

## 2019-12-31 ENCOUNTER — MYC MEDICAL ADVICE (OUTPATIENT)
Dept: FAMILY MEDICINE | Facility: CLINIC | Age: 59
End: 2019-12-31

## 2019-12-31 NOTE — TELEPHONE ENCOUNTER
ACT Total Scores 6/27/2017 1/18/2018 8/2/2018   ACT TOTAL SCORE - - -   ASTHMA ER VISITS - - -   ASTHMA HOSPITALIZATIONS - - -   ACT TOTAL SCORE (Goal Greater than or Equal to 20) 25 17 23   In the past 12 months, how many times did you visit the emergency room for your asthma without being admitted to the hospital? 0 0 0   In the past 12 months, how many times were you hospitalized overnight because of your asthma? 0 0 0     ACT needed. Sent MyChart.      Madhuri FERGUSON BSN, RN

## 2020-01-02 NOTE — TELEPHONE ENCOUNTER
Routing refill request to provider for review/approval because:  Labs not current:  ACT - she has been sent ACT via [facility], has viewed msg however has not completed ACT yet.    Jannette DIA RN        ACT Total Scores 6/27/2017 1/18/2018 8/2/2018   ACT TOTAL SCORE - - -   ASTHMA ER VISITS - - -   ASTHMA HOSPITALIZATIONS - - -   ACT TOTAL SCORE (Goal Greater than or Equal to 20) 25 17 23   In the past 12 months, how many times did you visit the emergency room for your asthma without being admitted to the hospital? 0 0 0   In the past 12 months, how many times were you hospitalized overnight because of your asthma? 0 0 0

## 2020-01-07 ASSESSMENT — ASTHMA QUESTIONNAIRES: ACT_TOTALSCORE: 24

## 2020-01-11 DIAGNOSIS — M79.10 MYALGIA: ICD-10-CM

## 2020-01-11 DIAGNOSIS — F41.1 GAD (GENERALIZED ANXIETY DISORDER): ICD-10-CM

## 2020-01-13 RX ORDER — DULOXETIN HYDROCHLORIDE 60 MG/1
CAPSULE, DELAYED RELEASE ORAL
Qty: 90 CAPSULE | Refills: 2 | Status: SHIPPED | OUTPATIENT
Start: 2020-01-13 | End: 2020-10-12

## 2020-01-13 NOTE — TELEPHONE ENCOUNTER
"Requested Prescriptions   Pending Prescriptions Disp Refills     DULoxetine (CYMBALTA) 60 MG capsule [Pharmacy Med Name: DULOXETINE HCL DR CAPS 60MG] 90 capsule 4     Sig: TAKE 1 CAPSULE EVERY MORNING       Serotonin-Norepinephrine Reuptake Inhibitors  Passed - 1/11/2020  8:03 AM        Passed - Blood pressure under 140/90 in past 12 months     BP Readings from Last 3 Encounters:   10/14/19 112/70   09/27/19 123/77   08/07/19 118/84                 Passed - Recent (12 mo) or future (30 days) visit within the authorizing provider's specialty     Patient has had an office visit with the authorizing provider or a provider within the authorizing providers department within the previous 12 mos or has a future within next 30 days. See \"Patient Info\" tab in inbasket, or \"Choose Columns\" in Meds & Orders section of the refill encounter.              Passed - Medication is active on med list        Passed - Patient is age 18 or older        Passed - No active pregnancy on record        Passed - No positive pregnancy test in past 12 months        Last Written Prescription Date:  10/14/31860  Last Fill Quantity: 90,  # refills: 0   Last office visit: 10/14/2019 with prescribing provider:  Javed    Future Office Visit:      "

## 2020-02-02 ENCOUNTER — APPOINTMENT (OUTPATIENT)
Dept: GENERAL RADIOLOGY | Facility: CLINIC | Age: 60
End: 2020-02-02
Attending: FAMILY MEDICINE
Payer: OTHER GOVERNMENT

## 2020-02-02 ENCOUNTER — HOSPITAL ENCOUNTER (EMERGENCY)
Facility: CLINIC | Age: 60
Discharge: HOME | End: 2020-02-02
Attending: PHYSICIAN ASSISTANT | Admitting: PHYSICIAN ASSISTANT
Payer: OTHER GOVERNMENT

## 2020-02-02 VITALS
HEIGHT: 68 IN | WEIGHT: 220 LBS | RESPIRATION RATE: 16 BRPM | DIASTOLIC BLOOD PRESSURE: 87 MMHG | TEMPERATURE: 98.9 F | OXYGEN SATURATION: 98 % | BODY MASS INDEX: 33.34 KG/M2 | SYSTOLIC BLOOD PRESSURE: 146 MMHG

## 2020-02-02 DIAGNOSIS — J20.9 ACUTE BRONCHITIS: ICD-10-CM

## 2020-02-02 DIAGNOSIS — J45.901 ASTHMA EXACERBATION: ICD-10-CM

## 2020-02-02 PROCEDURE — G0463 HOSPITAL OUTPT CLINIC VISIT: HCPCS | Mod: 25 | Performed by: PHYSICIAN ASSISTANT

## 2020-02-02 PROCEDURE — 71046 X-RAY EXAM CHEST 2 VIEWS: CPT

## 2020-02-02 PROCEDURE — 99214 OFFICE O/P EST MOD 30 MIN: CPT | Mod: Z6 | Performed by: PHYSICIAN ASSISTANT

## 2020-02-02 RX ORDER — PREDNISONE 20 MG/1
TABLET ORAL
Qty: 10 TABLET | Refills: 0 | Status: SHIPPED | OUTPATIENT
Start: 2020-02-02 | End: 2020-03-06

## 2020-02-02 RX ORDER — AZITHROMYCIN 250 MG/1
TABLET, FILM COATED ORAL
Qty: 6 TABLET | Refills: 0 | Status: SHIPPED | OUTPATIENT
Start: 2020-02-02 | End: 2020-03-06

## 2020-02-02 RX ORDER — ALBUTEROL SULFATE 90 UG/1
2 AEROSOL, METERED RESPIRATORY (INHALATION) EVERY 6 HOURS PRN
Qty: 1 INHALER | Refills: 0 | Status: SHIPPED | OUTPATIENT
Start: 2020-02-02 | End: 2021-07-19

## 2020-02-02 ASSESSMENT — ENCOUNTER SYMPTOMS
COUGH: 1
CHILLS: 1
WHEEZING: 1
MUSCULOSKELETAL NEGATIVE: 1
FEVER: 1
CARDIOVASCULAR NEGATIVE: 1
SHORTNESS OF BREATH: 1

## 2020-02-02 ASSESSMENT — MIFFLIN-ST. JEOR: SCORE: 1626.41

## 2020-02-02 NOTE — ED PROVIDER NOTES
History     Chief Complaint   Patient presents with     Cough     HPI  Teresa Fernandez is a 59 year old female who presents with complaints of productive cough, nasal congestion, and progressive shortness of breath and wheezing over the past week.  Patient states she initially developed fevers and chills at the onset of her illness which have since resolved.  She states her symptoms have settled into her lungs and also are triggering her asthma.  She is using her albuterol inhaler and neb without improvement.  Denies fevers, chills, rash, sore throat, sinus pressure, or chest pain.  She denies any ill contacts.  Patient reports history of bronchitis in the past that felt similar.  She does not smoke.      Allergies:  Allergies   Allergen Reactions     Omnipaque [Iohexol] Swelling and Difficulty breathing     Immediate throat swelling following around 1-2cc of omnipaque 300 for spine procedure     Gluten      Iodine      Welts from CT contrast, surgical scrub is ok.     Penicillins Hives       Problem List:    Patient Active Problem List    Diagnosis Date Noted     Muscle pain 10/20/2017     Priority: Medium     Increased CK       Chest tightness or pressure 10/03/2016     Priority: Medium     Hyperlipidemia LDL goal <160 01/29/2015     Priority: Medium     DDD (degenerative disc disease), lumbar 06/24/2014     Priority: Medium     [facility] Spine Following       Moderate persistent asthma without complication 07/08/2013     Priority: Medium     History of kidney cancer 07/08/2013     Priority: Medium     Left kidney was removed i n1996       History of melanoma in situ 10/17/2012     Priority: Medium     Eczema 10/17/2012     Priority: Medium     Osteopenia 06/15/2012     Priority: Medium     Chronic fatigue 10/04/2011     Priority: Medium     Dry eye syndrome 08/24/2011     Priority: Medium     Chronic low back pain 07/07/2011     Priority: Medium     GERD (gastroesophageal reflux disease) 05/11/2011      Priority: Medium     Renal cancer (H) 05/05/2011     Priority: Medium     Leukoplakia of oral mucosa, including tongue 04/18/2011     Priority: Medium     ERIC (generalized anxiety disorder)      Priority: Medium     History of skin cancer 11/09/2010     Priority: Medium     Chronic rhinitis 05/03/2005     Priority: Medium     Allergic rhinitis due to pollen 05/03/2005     Priority: Medium     Sinusitis, chronic 05/03/2005     Priority: Medium     Problem list name updated by automated process. Provider to review       Allergic state 04/19/2005     Priority: Medium     Problem list name updated by automated process. Provider to review       Acquired hypothyroidism 04/12/2005     Priority: Medium     Problem list name updated by automated process. 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     FUSION LUMBAR ANTERIOR TWO LEVELS  4/13/2015     GYN SURGERY  04/08/1999    hysterectomy.  [facility]     HYSTERECTOMY, PAP NO LONGER INDICATED       SOFT TISSUE SURGERY      chest-melonoma     SURGICAL HISTORY OF -   3/1996    Left nephrectomy-renal cell CA       Family History:    Family History   Problem Relation Age of Onset     Diabetes  Mother      Cardiovascular Mother      Lipids Mother      Depression Mother      Hypertension Father      Lipids Father      Obesity Father      Macular Degeneration Father      Cancer Maternal Grandmother         kind unknown had sores on legs     Eczema Maternal Grandmother      Eczema Maternal Grandfather      Breast Cancer Paternal Grandmother      Cancer Paternal Grandmother         breast     Hypertension Paternal Grandfather      Cardiovascular Paternal Grandfather         heart attack     Hypertension Brother      Thyroid Disease Sister      Hypertension Sister      Depression Sister      Allergies Sister      Allergies Son      Eczema Son      Lipids Sister      Thyroid Disease Sister      Neurologic Disorder Sister      Depression Sister      Respiratory Son      Glaucoma No family hx of      Cerebrovascular Disease No family hx of        Social History:  Marital Status:   [2]  Social History     Tobacco Use     Smoking status: Former Smoker     Packs/day: 2.00     Years: 15.00     Pack years: 30.00     Last attempt to quit: 3/26/1996     Years since quittin.8     Smokeless tobacco: Never Used   Substance Use Topics     Alcohol use: No     Comment: None now.  Rare in past.      Drug use: No        Medications:    albuterol (PROAIR HFA/PROVENTIL HFA/VENTOLIN HFA) 108 (90 Base) MCG/ACT inhaler  azithromycin (ZITHROMAX Z-NAGA) 250 MG tablet  predniSONE (DELTASONE) 20 MG tablet  acetaminophen (TYLENOL) 325 MG tablet  ADVAIR DISKUS 250-50 MCG/DOSE inhaler  albuterol (PROAIR HFA/PROVENTIL HFA/VENTOLIN HFA) 108 (90 BASE) MCG/ACT Inhaler  albuterol (PROVENTIL) (2.5 MG/3ML) 0.083% neb solution  celecoxib (CELEBREX) 100 MG capsule  Cholecalciferol (VITAMIN D3 PO)  Cyanocobalamin (VITAMIN B 12 PO)  DULoxetine (CYMBALTA) 60 MG capsule  fexofenadine (ALLEGRA) 180 MG tablet  GLUCOSAMINE SULFATE PO  levothyroxine (SYNTHROID/LEVOTHROID) 100 MCG tablet  levothyroxine (SYNTHROID/LEVOTHROID) 125 MCG  "tablet  MAGNESIUM OXIDE PO  Multiple Vitamins-Minerals (MULTIVITAMIN ADULT PO)  order for DME  OVER-THE-COUNTER  ranitidine (ZANTAC) 150 MG tablet  tiZANidine (ZANAFLEX) 2 MG tablet  triamcinolone (KENALOG) 0.1 % external ointment          Review of Systems   Constitutional: Positive for chills and fever.   HENT: Positive for congestion.    Respiratory: Positive for cough, shortness of breath and wheezing.    Cardiovascular: Negative.    Musculoskeletal: Negative.    Skin: Negative.    All other systems reviewed and are negative.      Physical Exam   BP: (!) 146/87  Heart Rate: 96  Temp: 98.9  F (37.2  C)  Resp: 16  Height: 172.7 cm (5' 8\")  Weight: 99.8 kg (220 lb)  SpO2: 98 %      Physical Exam  Constitutional:       General: She is not in acute distress.     Appearance: She is well-developed. She is not ill-appearing, toxic-appearing or diaphoretic.   HENT:      Head: Normocephalic and atraumatic.      Right Ear: Tympanic membrane, ear canal and external ear normal.      Left Ear: Tympanic membrane, ear canal and external ear normal.      Nose: Congestion and rhinorrhea present. No mucosal edema.      Mouth/Throat:      Lips: Pink.      Mouth: Mucous membranes are moist.      Pharynx: Oropharynx is clear. Uvula midline. No pharyngeal swelling, oropharyngeal exudate, posterior oropharyngeal erythema or uvula swelling.      Tonsils: No tonsillar exudate or tonsillar abscesses.   Eyes:      Conjunctiva/sclera: Conjunctivae normal.      Pupils: Pupils are equal, round, and reactive to light.   Neck:      Musculoskeletal: Full passive range of motion without pain, normal range of motion and neck supple. Normal range of motion. No neck rigidity.   Cardiovascular:      Rate and Rhythm: Normal rate and regular rhythm.      Heart sounds: Normal heart sounds.   Pulmonary:      Effort: Pulmonary effort is normal. No respiratory distress.      Breath sounds: Normal air entry. No stridor, decreased air movement or transmitted " upper airway sounds. Wheezing present. No decreased breath sounds, rhonchi or rales.   Lymphadenopathy:      Cervical: No cervical adenopathy.   Skin:     General: Skin is warm and dry.   Neurological:      Mental Status: She is alert and oriented to person, place, and time.         ED Course        Procedures      Results for orders placed or performed during the hospital encounter of 02/02/20 (from the past 24 hour(s))   XR Chest 2 Views    Narrative    CHEST TWO VIEWS  2/2/2020 11:35 AM     HISTORY: 59-year-old woman with shortness of breath and cough.       Impression    IMPRESSION: Since September 19, 2016, heart size is normal. No pleural  effusion, pneumothorax, or abnormal area of consolidation.    MAURICIO DELGADO MD       Medications - No data to display    Assessments & Plan (with Medical Decision Making)     Pt is a 59 year old female who presents with complaints of productive cough, nasal congestion, and progressive shortness of breath and wheezing over the past week.  She states her symptoms have settled into her lungs and also are triggering her asthma.  Pt is afebrile on arrival.  Exam as above.  CXR was negative for pneumonia or acute pathology.  Discussed results with patient.  Return precautions were reviewed.  Hand-outs were provided.    Patient was sent with Prednisone burst, Albuterol inhaler, and Azithromycin for coverage of bacterial pathogen in an asthmatic.  Pt was also instructed to follow-up with PCP if no improvement in 5-7 days for continued care and management or sooner if new or worsening symptoms.  She is to return to the ED for persistent and/or worsening symptoms.  Patient expressed understanding of the diagnosis and plan and was discharged home in good condition.    I have reviewed the nursing notes.    I have reviewed the findings, diagnosis, plan and need for follow up with the patient.    Discharge Medication List as of 2/2/2020 12:16 PM      START taking these medications     Details   !! albuterol (PROAIR HFA/PROVENTIL HFA/VENTOLIN HFA) 108 (90 Base) MCG/ACT inhaler Inhale 2 puffs into the lungs every 6 hours as needed for shortness of breath / dyspnea or wheezing, Disp-1 Inhaler, R-0, E-PrescribePharmacy may dispense brand covered by insurance (Proair, or proventil or ventolin or generic albuterol inhaler)      azithromycin (ZITHROMAX Z-NAGA) 250 MG tablet Two tablets on the first day, then one tablet daily for the next 4 days, Disp-6 tablet, R-0, E-Prescribe      predniSONE (DELTASONE) 20 MG tablet Take two tablets (= 40mg) each day for 5 (five) days, Disp-10 tablet, R-0, E-Prescribe       !! - Potential duplicate medications found. Please discuss with provider.          Final diagnoses:   Acute bronchitis   Asthma exacerbation       2/2/2020   [facility] EMERGENCY DEPARTMENT      Disclaimer:  This note consists of symbols derived from keyboarding, dictation and/or voice recognition software.  As a result, there may be errors in the script that have gone undetected.  Please consider this when interpreting information found in this chart.     Candelaria Dorantes PA-C  02/02/20 [id number]

## 2020-02-02 NOTE — ED AVS SNAPSHOT
[facility] Emergency Department  [address] [address]  Phone:  [phone]  Fax:  [phone]                                    Teresa Fernandez   MRN: [id number]    Department:  [facility] Emergency Department   Date of Visit:  2/2/2020           After Visit Summary Signature Page    I have received my discharge instructions, and my questions have been answered. I have discussed any challenges I see with this plan with the nurse or doctor.    ..........................................................................................................................................  Patient/Patient Representative Signature      ..........................................................................................................................................  Patient Representative Print Name and Relationship to Patient    ..................................................               ................................................  Date                                   Time    ..........................................................................................................................................  Reviewed by Signature/Title    ...................................................              ..............................................  Date                                               Time          22EPIC Rev 08/18

## 2020-02-02 NOTE — ED TRIAGE NOTES
URI SYMPTOMS FOR 1 WEEK, NOW MORE SOB ASTHMA EXACERBATED Sveta Gastelum LPN on 2/2/2020 at 12:04 PM

## 2020-02-11 ENCOUNTER — PRE VISIT (OUTPATIENT)
Dept: NEUROLOGY | Facility: CLINIC | Age: 60
End: 2020-02-11

## 2020-02-11 NOTE — TELEPHONE ENCOUNTER
FUTURE VISIT INFORMATION      FUTURE VISIT INFORMATION:    Date: 3/6/2020    Time: 730AM     Location: [facility]  REFERRAL INFORMATION:    Referring provider:  Self     Referring providers clinic:      Reason for visit/diagnosis  Balance issues, Tremors and Autoimmune disorder     RECORDS REQUESTED FROM:       Clinic name Comments Records Status Imaging Status    * All care Everywhere records are from 2015 and before*

## 2020-02-21 ENCOUNTER — HOSPITAL ENCOUNTER (EMERGENCY)
Facility: CLINIC | Age: 60
Discharge: HOME | End: 2020-02-21
Attending: NURSE PRACTITIONER | Admitting: NURSE PRACTITIONER
Payer: OTHER GOVERNMENT

## 2020-02-21 VITALS
TEMPERATURE: 97.5 F | OXYGEN SATURATION: 100 % | BODY MASS INDEX: 33.45 KG/M2 | HEART RATE: 94 BPM | SYSTOLIC BLOOD PRESSURE: 121 MMHG | HEIGHT: 68 IN | DIASTOLIC BLOOD PRESSURE: 65 MMHG | RESPIRATION RATE: 16 BRPM

## 2020-02-21 DIAGNOSIS — J02.9 ACUTE PHARYNGITIS: ICD-10-CM

## 2020-02-21 LAB
INTERNAL QC OK POCT: YES
S PYO AG THROAT QL IA.RAPID: NORMAL

## 2020-02-21 PROCEDURE — G0463 HOSPITAL OUTPT CLINIC VISIT: HCPCS | Performed by: NURSE PRACTITIONER

## 2020-02-21 PROCEDURE — 99213 OFFICE O/P EST LOW 20 MIN: CPT | Mod: Z6 | Performed by: NURSE PRACTITIONER

## 2020-02-21 PROCEDURE — 87081 CULTURE SCREEN ONLY: CPT | Performed by: NURSE PRACTITIONER

## 2020-02-21 PROCEDURE — 87880 STREP A ASSAY W/OPTIC: CPT | Performed by: NURSE PRACTITIONER

## 2020-02-21 NOTE — ED AVS SNAPSHOT
[facility] Emergency Department  [address] [address]  Phone:  [phone]  Fax:  [phone]                                    Teresa Fernandez   MRN: [id number]    Department:  [facility] Emergency Department   Date of Visit:  2/21/2020           After Visit Summary Signature Page    I have received my discharge instructions, and my questions have been answered. I have discussed any challenges I see with this plan with the nurse or doctor.    ..........................................................................................................................................  Patient/Patient Representative Signature      ..........................................................................................................................................  Patient Representative Print Name and Relationship to Patient    ..................................................               ................................................  Date                                   Time    ..........................................................................................................................................  Reviewed by Signature/Title    ...................................................              ..............................................  Date                                               Time          22EPIC Rev 08/18

## 2020-02-22 NOTE — ED PROVIDER NOTES
History     Chief Complaint   Patient presents with     Pharyngitis     HPI  Teresa Fernandez is a 60 year old female who presents urgent care for evaluation sore throat.  Symptoms started 2 days ago.  No cough or congestion.  Chills yesterday, but no objective fevers.  Eating drinking normally.  She did have bronchitis a few weeks ago, but symptoms resolved.  No known ill exposures.    Allergies:  Allergies   Allergen Reactions     Omnipaque [Iohexol] Swelling and Difficulty breathing     Immediate throat swelling following around 1-2cc of omnipaque 300 for spine procedure     Gluten      Iodine      Welts from CT contrast, surgical scrub is ok.     Penicillins Hives       Problem List:    Patient Active Problem List    Diagnosis Date Noted     Muscle pain 10/20/2017     Priority: Medium     Increased CK       Chest tightness or pressure 10/03/2016     Priority: Medium     Hyperlipidemia LDL goal <160 01/29/2015     Priority: Medium     DDD (degenerative disc disease), lumbar 06/24/2014     Priority: Medium     [place] Spine Following       Moderate persistent asthma without complication 07/08/2013     Priority: Medium     History of kidney cancer 07/08/2013     Priority: Medium     Left kidney was removed i n1996       History of melanoma in situ 10/17/2012     Priority: Medium     Eczema 10/17/2012     Priority: Medium     Osteopenia 06/15/2012     Priority: Medium     Chronic fatigue 10/04/2011     Priority: Medium     Dry eye syndrome 08/24/2011     Priority: Medium     Chronic low back pain 07/07/2011     Priority: Medium     GERD (gastroesophageal reflux disease) 05/11/2011     Priority: Medium     Renal cancer (H) 05/05/2011     Priority: Medium     Leukoplakia of oral mucosa, including tongue 04/18/2011     Priority: Medium     ERIC (generalized anxiety disorder)      Priority: Medium     History of skin cancer 11/09/2010     Priority: Medium     Chronic rhinitis 05/03/2005     Priority: Medium      Allergic rhinitis due to pollen 05/03/2005     Priority: Medium     Sinusitis, chronic 05/03/2005     Priority: Medium     Problem list name updated by automated process. Provider to review       Allergic state 04/19/2005     Priority: Medium     Problem list name updated by automated process. Provider to review       Acquired hypothyroidism 04/12/2005     Priority: Medium     Problem list name updated by automated process. 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     FUSION LUMBAR ANTERIOR TWO LEVELS  4/13/2015     GYN SURGERY  04/08/1999    hysterectomy.  [facility]     HYSTERECTOMY, PAP NO LONGER INDICATED       SOFT TISSUE SURGERY      chest-melonoma     SURGICAL HISTORY OF -   3/1996    Left nephrectomy-renal cell CA       Family History:    Family History   Problem Relation Age of Onset     Diabetes Mother      Cardiovascular Mother      Lipids Mother      Depression Mother      Hypertension Father      Lipids Father      Obesity Father      Macular Degeneration Father      Cancer Maternal Grandmother         kind unknown had sores on legs     Eczema Maternal Grandmother      Eczema Maternal Grandfather      Breast Cancer  Paternal Grandmother      Cancer Paternal Grandmother         breast     Hypertension Paternal Grandfather      Cardiovascular Paternal Grandfather         heart attack     Hypertension Brother      Thyroid Disease Sister      Hypertension Sister      Depression Sister      Allergies Sister      Allergies Son      Eczema Son      Lipids Sister      Thyroid Disease Sister      Neurologic Disorder Sister      Depression Sister      Respiratory Son      Glaucoma No family hx of      Cerebrovascular Disease No family hx of        Social History:  Marital Status:   [2]  Social History     Tobacco Use     Smoking status: Former Smoker     Packs/day: 2.00     Years: 15.00     Pack years: 30.00     Last attempt to quit: 3/26/1996     Years since quittin.9     Smokeless tobacco: Never Used   Substance Use Topics     Alcohol use: No     Comment: None now.  Rare in past.      Drug use: No        Medications:    acetaminophen (TYLENOL) 325 MG tablet  ADVAIR DISKUS 250-50 MCG/DOSE inhaler  albuterol (PROAIR HFA/PROVENTIL HFA/VENTOLIN HFA) 108 (90 Base) MCG/ACT inhaler  albuterol (PROAIR HFA/PROVENTIL HFA/VENTOLIN HFA) 108 (90 BASE) MCG/ACT Inhaler  albuterol (PROVENTIL) (2.5 MG/3ML) 0.083% neb solution  celecoxib (CELEBREX) 100 MG capsule  Cholecalciferol (VITAMIN D3 PO)  Cyanocobalamin (VITAMIN B 12 PO)  DULoxetine (CYMBALTA) 60 MG capsule  fexofenadine (ALLEGRA) 180 MG tablet  GLUCOSAMINE SULFATE PO  levothyroxine (SYNTHROID/LEVOTHROID) 100 MCG tablet  levothyroxine (SYNTHROID/LEVOTHROID) 125 MCG tablet  MAGNESIUM OXIDE PO  Multiple Vitamins-Minerals (MULTIVITAMIN ADULT PO)  order for DME  OVER-THE-COUNTER  predniSONE (DELTASONE) 20 MG tablet  ranitidine (ZANTAC) 150 MG tablet  tiZANidine (ZANAFLEX) 2 MG tablet  triamcinolone (KENALOG) 0.1 % external ointment          Review of Systems  As mentioned above in the history present illness. All other systems were reviewed and are negative.    Physical Exam   BP:  "121/65  Pulse: 94  Temp: 97.5  F (36.4  C)  Resp: 16  Height: 172.7 cm (5' 8\")  SpO2: 100 %      Physical Exam  GENERAL APPEARANCE: healthy, alert and no acute distress  EYES: conjunctiva clear  HENT: external ears and canals normal. Right TM normal. Left TM normal. Nose normal.  Posterior oropharynx erythema without exudate.  Uvula is midline.  No unilateral peritonsillar swelling. No trismus  NECK: supple, nontender, no lymphadenopathy  RESP: lungs clear to auscultation - no rales, rhonchi or wheezes  CV: regular rates and rhythm, no murmur noted    ED Course        Procedures               Results for orders placed or performed during the hospital encounter of 02/21/20 (from the past 24 hour(s))   Rapid strep group A screen POCT   Result Value Ref Range    Rapid Strep A Screen Neg neg    Internal QC OK Yes        Medications - No data to display    Assessments & Plan (with Medical Decision Making)   Posterior oropharynx erythema without exudate.  RST negative with culture pending.  No evidence of peritonsillar cellulitis or abscess.  Normal TMs.  Tolerating oral intake. Likely viral process. Patient  was instructed to continue OTC symptomatic treatment.  Follow up with PCP if no improvement in 5-7 days.  Worrisome reasons to seek care sooner discussed.      I have reviewed the nursing notes.    I have reviewed the findings, diagnosis, plan and need for follow up with the patient.      Discharge Medication List as of 2/21/2020  7:08 PM          Final diagnoses:   Acute pharyngitis       2/21/2020   [facility] EMERGENCY DEPARTMENT     Araceli Tracy APRN CNP  02/21/20 1922    "

## 2020-02-23 LAB
BACTERIA SPEC CULT: NORMAL
SPECIMEN SOURCE: NORMAL

## 2020-03-06 ENCOUNTER — OFFICE VISIT (OUTPATIENT)
Dept: NEUROLOGY | Facility: CLINIC | Age: 60
End: 2020-03-06
Payer: OTHER GOVERNMENT

## 2020-03-06 VITALS
WEIGHT: 224.1 LBS | SYSTOLIC BLOOD PRESSURE: 124 MMHG | BODY MASS INDEX: 34.07 KG/M2 | DIASTOLIC BLOOD PRESSURE: 84 MMHG | OXYGEN SATURATION: 98 % | HEART RATE: 94 BPM

## 2020-03-06 DIAGNOSIS — R40.0 DAYTIME SOMNOLENCE: Primary | ICD-10-CM

## 2020-03-06 DIAGNOSIS — R26.9 GAIT DISORDER: ICD-10-CM

## 2020-03-06 LAB — VIT B12 SERPL-MCNC: 771 PG/ML (ref 193–986)

## 2020-03-06 ASSESSMENT — ENCOUNTER SYMPTOMS
NUMBNESS: 1
POOR WOUND HEALING: 0
DIZZINESS: 1
SPUTUM PRODUCTION: 0
SINUS PAIN: 0
DECREASED APPETITE: 1
POSTURAL DYSPNEA: 0
POLYPHAGIA: 1
MEMORY LOSS: 1
DIFFICULTY URINATING: 0
POLYDIPSIA: 0
PANIC: 0
DEPRESSION: 1
NECK PAIN: 1
DOUBLE VISION: 0
WEAKNESS: 1
TROUBLE SWALLOWING: 1
ORTHOPNEA: 0
NAUSEA: 1
HYPOTENSION: 1
MUSCLE CRAMPS: 1
HEADACHES: 1
DIARRHEA: 1
NIGHT SWEATS: 1
HOT FLASHES: 1
BRUISES/BLEEDS EASILY: 1
CONSTIPATION: 1
BOWEL INCONTINENCE: 1
MYALGIAS: 1
EYE REDNESS: 1
COUGH: 0
WHEEZING: 1
JAUNDICE: 0
ARTHRALGIAS: 1
SEIZURES: 0
SYNCOPE: 0
BACK PAIN: 1
SPEECH CHANGE: 1
ALTERED TEMPERATURE REGULATION: 1
MUSCLE WEAKNESS: 1
HEMATURIA: 0
LEG PAIN: 1
BLOATING: 1
DISTURBANCES IN COORDINATION: 1
NECK MASS: 0
HYPERTENSION: 0
SKIN CHANGES: 1
VOMITING: 0
TINGLING: 1
EYE PAIN: 1
TREMORS: 1
SINUS CONGESTION: 0
NERVOUS/ANXIOUS: 1
SMELL DISTURBANCE: 1
PARALYSIS: 0
WEIGHT GAIN: 1
CHILLS: 1
FATIGUE: 1
LOSS OF CONSCIOUSNESS: 0
INSOMNIA: 1
SWOLLEN GLANDS: 0
FLANK PAIN: 0
HOARSE VOICE: 0
HEARTBURN: 1
ABDOMINAL PAIN: 1
COUGH DISTURBING SLEEP: 0
EYE WATERING: 1
DYSPNEA ON EXERTION: 1
SHORTNESS OF BREATH: 1
JOINT SWELLING: 1
DECREASED LIBIDO: 0
HEMOPTYSIS: 0
PALPITATIONS: 1
STIFFNESS: 1
DYSURIA: 0
BLOOD IN STOOL: 0
FEVER: 0
RECTAL PAIN: 0
DECREASED CONCENTRATION: 1
TASTE DISTURBANCE: 0
WEIGHT LOSS: 0

## 2020-03-06 ASSESSMENT — PAIN SCALES - GENERAL: PAINLEVEL: NO PAIN (0)

## 2020-03-06 NOTE — PROGRESS NOTES
3/17 addendum: reviewed MRI brain with pt.  Study unremarkable and does not explain sx.  F/u as sched.      3/10 addendum: reported normal B12 level to pt.       Service Date: 03/06/2020      HISTORY OF PRESENT ILLNESS:  This patient is a 60-year-old right-handed woman seen for evaluation of multiple neurologic issues including imbalance, blurred vision, difficulty focusing.  She is here on self-referral.  She reports that she had an episode of tremor and these have become more frequent.  They may have started a couple years ago.  More recently she had an episode where she went to scratch her left eye with her left hand and the left hand began to shake and then her whole inside of her body began to shake.  She had to sit and rest.  She also has issues with her balance.  She has to catch herself to keep from falling.  She does not have specific issues with her balance walking in the dark.  She has had episodes of sharp pains behind her eyes.  It lasts for a few seconds or so.  She has had some hot flashes.  She reports that sometimes she will lose focus in her vision.  She also has had pain radiating from the back of her head up to the top of her head followed by a weird sensation.  This can occur on awaking.  It sometimes is accompanied by her right arm going up in the air and shaking.  She initially had problems a couple years ago.  Symptoms came on over Labor Day weekend of total body pain.  She had an evaluation.  Elevated CK was identified.  She saw Rheumatology and subsequently had an extensive evaluation both at [facility] and then at the [place].  She had an EEG in 10/2017 for evaluation of spells of shaking.  The EEG was unremarkable and it did capture one of the spells.  She had an EEG in 11/2017.  It showed evidence of a left S1 and L5 radiculopathy.  There was no superimposed neuropathy.  The elevated CK was of indeterminate etiology and workup in that regard was nondiagnostic.  She does  "report no problems with hearing.  She has occasional word finding difficulty.  She has no issues with her swallowing.  She does get occasional bladder urgency with incontinence.  I did recommend that she discuss this with her primary care.  She has intermittent numbness, tingling, pins and needles in her arms.  The tremor is mainly in the left arm and is intermittent and often associated with action or various postures.  She does have issues of forgetfulness.  In reviewing her medical record, she did have extensive evaluation at the [facility] in 2018.  This included an EMG which confirmed the chronic left S1 radiculopathy.  EEG was normal.  Neuropsychometric testing was normal with a tendency to somatization.  Her brain MRI was normal in 02/2018.      She does have issues with her sleep.  She feels tired all the time.  She does snore.  She does have an adequate diet.  She walks for exercise.      PAST MEDICAL HISTORY:  Renal cell cancer in 1996.  She has thyroid issues and is on replacement.  She has asthma.  She does not have high blood pressure or diabetes.  I did review her medical history and her medications.  She is on multivitamin.  She has not had pertinent surgery to the head or neck but did have a whiplash in 1985.  She does not drink.  She quit smoking years ago.      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      FAMILY HISTORY:  Positive for tremor in her mother at an advanced age.  I have reviewed the family history in Epic and agree with it.      PHYSICAL EXAMINATION:  On exam, the patient is cooperative and in no distress.  Her blood pressure is 124/84.  There are no carotid bruits.  Auscultation of the heart shows S1, S2.  On neurologic exam, the patient is alert, oriented and lucid.  She scored 28/30 on a bedside cognitive assessment.  She had difficulty spelling the word \"world\" backwards.  Cranial nerve testing shows full visual fields to confrontation.  Funduscopic exam shows " sharp discs bilaterally.  Visual acuity 20/20 bilaterally.  Eye movements are complete and conjugate without nystagmus.  Pupils react to light.  Facial sensation is normal.  Face moves symmetrically.  Palate elevates in the midline.  Tongue protrudes in the midline.  Motor evaluation shows no pronator drift, normal finger tapping, finger-nose-finger and heel-knee-shin.  No tremor is identified.  Strength is preserved in the arms and legs.  Muscle stretch reflexes are low amplitude and symmetric in the arms, normal at the knees and absent at the ankles.  Toes are downgoing.  Sensory exam shows reduced vibration in the toes.  Vibration and temperature are normal in the hands.  Romberg sign is minimally present.  She has a difficult time performing tandem gait but can get up on her heels and toes.      ASSESSMENT:   1.  Concerns about imbalance.   2.  Intermittent tremor.   3.  Daytime somnolence and nonrestorative sleep.      DISCUSSION:  The patient is seen with the above issues.  With regard to the balance, her exam on this point does show a somewhat positive Romberg with difficulty on tandem gait.  I am going to check an MRI scan of the brain to rule out any structural or inflammatory lesions and also check a vitamin B12 level.      With regard to her sleep issues, I am going to have her referred to Sleep Clinic for a consultation and possible evaluation.  That could explain a lot of her symptoms.  I will see her in followup in 4-6 weeks for reexamination.         LOREN SAUCEDO MD             D: 2020   T: 2020   MT: wily      Name:     RILEY CANDELARIO   MRN:      [id number]        Account:      [id number]   :      1960           Service Date: 2020      Document: [id number]

## 2020-03-06 NOTE — NURSING NOTE
Chief Complaint   Patient presents with     Consult     [facility] NEW - BALANCE ISSUES, TREMORS, AUTOIMMUNE DISORDER       Maik Quinn, EMT

## 2020-03-06 NOTE — LETTER
RE: Teresa Fernandez  [address] [address]     Dear Colleague,    Thank you for referring your patient, Teresa Fernandez, to the [facility] at [facility]. Please see a copy of my visit note below.    3/10 addendum: reported normal B12 level to pt.     Service Date: 03/06/2020      HISTORY OF PRESENT ILLNESS:  This patient is a 60-year-old right-handed woman seen for evaluation of multiple neurologic issues including imbalance, blurred vision, difficulty focusing.  She is here on self-referral.  She reports that she had an episode of tremor and these have become more frequent.  They may have started a couple years ago.  More recently she had an episode where she went to scratch her left eye with her left hand and the left hand began to shake and then her whole inside of her body began to shake.  She had to sit and rest.  She also has issues with her balance.  She has to catch herself to keep from falling.  She does not have specific issues with her balance walking in the dark.  She has had episodes of sharp pains behind her eyes.  It lasts for a few seconds or so.  She has had some hot flashes.  She reports that sometimes she will lose focus in her vision.  She also has had pain radiating from the back of her head up to the top of her head followed by a weird sensation.  This can occur on awaking.  It sometimes is accompanied by her right arm going up in the air and shaking.  She initially had problems a couple years ago.  Symptoms came on over Labor Day weekend of total body pain.  She had an evaluation.  Elevated CK was identified.  She saw Rheumatology and subsequently had an extensive evaluation both at [facility] and then at the [place].  She had an EEG in 10/2017 for evaluation of spells of shaking.  The EEG was unremarkable and it did capture one of the spells.  She had an EEG in 11/2017.  It showed evidence of a left S1  and L5 radiculopathy.  There was no superimposed neuropathy.  The elevated CK was of indeterminate etiology and workup in that regard was nondiagnostic.  She does report no problems with hearing.  She has occasional word finding difficulty.  She has no issues with her swallowing.  She does get occasional bladder urgency with incontinence.  I did recommend that she discuss this with her primary care.  She has intermittent numbness, tingling, pins and needles in her arms.  The tremor is mainly in the left arm and is intermittent and often associated with action or various postures.  She does have issues of forgetfulness.  In reviewing her medical record, she did have extensive evaluation at the [facility] in 2018.  This included an EMG which confirmed the chronic left S1 radiculopathy.  EEG was normal.  Neuropsychometric testing was normal with a tendency to somatization.  Her brain MRI was normal in 02/2018.      She does have issues with her sleep.  She feels tired all the time.  She does snore.  She does have an adequate diet.  She walks for exercise.      PAST MEDICAL HISTORY:  Renal cell cancer in 1996.  She has thyroid issues and is on replacement.  She has asthma.  She does not have high blood pressure or diabetes.  I did review her medical history and her medications.  She is on multivitamin.  She has not had pertinent surgery to the head or neck but did have a whiplash in 1985.  She does not drink.  She quit smoking years ago.      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      FAMILY HISTORY:  Positive for tremor in her mother at an advanced age.  I have reviewed the family history in Epic and agree with it.      PHYSICAL EXAMINATION:  On exam, the patient is cooperative and in no distress.  Her blood pressure is 124/84.  There are no carotid bruits.  Auscultation of the heart shows S1, S2.  On neurologic exam, the patient is alert, oriented and lucid.  She scored 28/30 on a bedside  "cognitive assessment.  She had difficulty spelling the word \"world\" backwards.  Cranial nerve testing shows full visual fields to confrontation.  Funduscopic exam shows sharp discs bilaterally.  Visual acuity 20/20 bilaterally.  Eye movements are complete and conjugate without nystagmus.  Pupils react to light.  Facial sensation is normal.  Face moves symmetrically.  Palate elevates in the midline.  Tongue protrudes in the midline.  Motor evaluation shows no pronator drift, normal finger tapping, finger-nose-finger and heel-knee-shin.  No tremor is identified.  Strength is preserved in the arms and legs.  Muscle stretch reflexes are low amplitude and symmetric in the arms, normal at the knees and absent at the ankles.  Toes are downgoing.  Sensory exam shows reduced vibration in the toes.  Vibration and temperature are normal in the hands.  Romberg sign is minimally present.  She has a difficult time performing tandem gait but can get up on her heels and toes.      ASSESSMENT:   1.  Concerns about imbalance.   2.  Intermittent tremor.   3.  Daytime somnolence and nonrestorative sleep.      DISCUSSION:  The patient is seen with the above issues.  With regard to the balance, her exam on this point does show a somewhat positive Romberg with difficulty on tandem gait.  I am going to check an MRI scan of the brain to rule out any structural or inflammatory lesions and also check a vitamin B12 level.      With regard to her sleep issues, I am going to have her referred to Sleep Clinic for a consultation and possible evaluation.  That could explain a lot of her symptoms.  I will see her in followup in 4-6 weeks for reexamination.     LOREN SAUCEDO MD      D: 2020   T: 2020   MT: wily      Name:     RILEY CANDELARIO   MRN:      [id number]        Account:      [id number]   :      1960           Service Date: 2020      Document: [id number]   "

## 2020-03-12 LAB — METHYLMALONATE SERPL-SCNC: 0.14 UMOL/L (ref 0–0.4)

## 2020-03-16 ENCOUNTER — ANCILLARY PROCEDURE (OUTPATIENT)
Dept: MRI IMAGING | Facility: CLINIC | Age: 60
End: 2020-03-16
Attending: PSYCHIATRY & NEUROLOGY
Payer: OTHER GOVERNMENT

## 2020-03-16 DIAGNOSIS — R26.9 GAIT DISORDER: ICD-10-CM

## 2020-03-17 ENCOUNTER — OFFICE VISIT (OUTPATIENT)
Dept: FAMILY MEDICINE | Facility: CLINIC | Age: 60
End: 2020-03-17
Payer: OTHER GOVERNMENT

## 2020-03-17 ENCOUNTER — ANCILLARY PROCEDURE (OUTPATIENT)
Dept: GENERAL RADIOLOGY | Facility: CLINIC | Age: 60
End: 2020-03-17
Attending: NURSE PRACTITIONER
Payer: OTHER GOVERNMENT

## 2020-03-17 VITALS
HEART RATE: 91 BPM | WEIGHT: 225.2 LBS | DIASTOLIC BLOOD PRESSURE: 72 MMHG | HEIGHT: 68 IN | OXYGEN SATURATION: 98 % | SYSTOLIC BLOOD PRESSURE: 110 MMHG | RESPIRATION RATE: 12 BRPM | TEMPERATURE: 97.7 F | BODY MASS INDEX: 34.13 KG/M2

## 2020-03-17 DIAGNOSIS — M25.532 LEFT WRIST PAIN: Primary | ICD-10-CM

## 2020-03-17 DIAGNOSIS — M25.532 LEFT WRIST PAIN: ICD-10-CM

## 2020-03-17 PROCEDURE — 99213 OFFICE O/P EST LOW 20 MIN: CPT | Performed by: NURSE PRACTITIONER

## 2020-03-17 PROCEDURE — 73110 X-RAY EXAM OF WRIST: CPT | Mod: LT

## 2020-03-17 ASSESSMENT — MIFFLIN-ST. JEOR: SCORE: 1645

## 2020-03-17 NOTE — PROGRESS NOTES
Subjective     Teresa Fernandez is a 60 year old female who presents to clinic today for the following health issues:    HPI   Joint Pain    Onset: 2 months or more    Pain on lateral side of left wrist and numbness along thumb/medial side. She does repetitive work at a factory. No history of carpal tunnel or wrist surgery. No injury. No swelling. Slight decrease in strength. She has been wearing a wrist splint which has been helping.     Description:   Location: left hand  Character: Sharp and Dull ache    Intensity: moderate    Progression of Symptoms: worse    Accompanying Signs & Symptoms:  Other symptoms: swelling    History:   Previous similar pain: YES- HX of RA     Precipitating factors:   Trauma or overuse: no     Alleviating factors:  Improved by: tylenol and brace    Therapies Tried and outcome: helpful      -------------------------------------    Patient Active Problem List   Diagnosis     Acquired hypothyroidism     Allergic state     Chronic rhinitis     Allergic rhinitis due to pollen     Sinusitis, chronic     History of skin cancer     ERIC (generalized anxiety disorder)     Leukoplakia of oral mucosa, including tongue     Renal cancer (H)     GERD (gastroesophageal reflux disease)     Chronic low back pain     Dry eye syndrome     Chronic fatigue     Osteopenia     History of melanoma in situ     Eczema     Moderate persistent asthma without complication     History of kidney cancer     DDD (degenerative disc disease), lumbar     Hyperlipidemia LDL goal <160     Chest tightness or pressure     Muscle pain     Past Surgical History:   Procedure Laterality Date     CHOLECYSTECTOMY       COLONOSCOPY      2007-normal     ENT SURGERY  2-15-11    Left cheek bx- Mucositis.     FUSION LUMBAR ANTERIOR TWO LEVELS  4/13/2015     GYN SURGERY  04/08/1999    hysterectomy.  [facility]     HYSTERECTOMY, PAP NO LONGER INDICATED       SOFT TISSUE SURGERY      chest-melonoma     SURGICAL HISTORY OF -   3/1996    Left  nephrectomy-renal cell CA       Social History     Tobacco Use     Smoking status: Former Smoker     Packs/day: 2.00     Years: 15.00     Pack years: 30.00     Last attempt to quit: 3/26/1996     Years since quittin.9     Smokeless tobacco: Never Used   Substance Use Topics     Alcohol use: No     Comment: None now.  Rare in past.      Family History   Problem Relation Age of Onset     Diabetes Mother      Cardiovascular Mother      Lipids Mother      Depression Mother      Hypertension Father      Lipids Father      Obesity Father      Macular Degeneration Father      Cancer Maternal Grandmother         kind unknown had sores on legs     Eczema Maternal Grandmother      Eczema Maternal Grandfather      Breast Cancer Paternal Grandmother      Cancer Paternal Grandmother         breast     Hypertension Paternal Grandfather      Cardiovascular Paternal Grandfather         heart attack     Hypertension Brother      Thyroid Disease Sister      Hypertension Sister      Depression Sister      Allergies Sister      Allergies Son      Eczema Son      Lipids Sister      Thyroid Disease Sister      Neurologic Disorder Sister      Depression Sister      Respiratory Son      Glaucoma No family hx of      Cerebrovascular Disease No family hx of            -------------------------------------  Reviewed and updated as needed this visit by Provider         Review of Systems   ROS COMP: Constitutional, HEENT, cardiovascular, pulmonary, GI, , musculoskeletal, neuro, skin, endocrine and psych systems are negative, except as otherwise noted.      Objective    There were no vitals taken for this visit.  There is no height or weight on file to calculate BMI.  Physical Exam   ORTHO: Wrist Exam: WRIST:  Inspection: no swelling  Palpation: Tender: Tenderness:, lateral side of wrist and thumb area    Range of Motion: normal  Strength: no deficits  Special tests: negative Tinel's at carpal tunnel.  , negative Phalen's.  , negative  Finkelstein's.        Diagnostic Test Results:  Labs reviewed in Epic        Assessment & Plan     1. Left wrist pain  Continue to use wrist splint- ? Carpal tunnel vs OA vs tendonitis   - XR Wrist Left G/E 3 Views; Future  - SPORTS MEDICINE REFERRAL  - recommend ice/tylenol          No follow-ups on file.    HERIBERTO Delgado [facility]

## 2020-03-17 NOTE — PATIENT INSTRUCTIONS
Thank you for choosing [facility].  You may be receiving an email and/or telephone survey request from [facility] Customer Experience regarding your visit today.  Please take a few minutes to respond to the survey to let us know how we are doing.      If you have questions or concerns, please contact us via [employer] or you can contact your care team at [phone].    Our Clinic hours are:  Monday 6:40 am  to 7:00 pm  Tuesday -Friday 6:40 am to 5:00 pm    The [place] outpatient lab hours are:  Monday - Friday 6:10 am to 4:45 pm  Saturdays 7:00 am to 11:00 am  Appointments are required, call [phone]    If you have clinical questions after hours or would like to schedule an appointment,  call the clinic at [phone].

## 2020-03-24 ENCOUNTER — MYC MEDICAL ADVICE (OUTPATIENT)
Dept: FAMILY MEDICINE | Facility: CLINIC | Age: 60
End: 2020-03-24

## 2020-03-26 ENCOUNTER — TELEPHONE (OUTPATIENT)
Dept: FAMILY MEDICINE | Facility: CLINIC | Age: 60
End: 2020-03-26

## 2020-03-26 NOTE — TELEPHONE ENCOUNTER
S-(situation): from  below:  Patient called stating that she needs medical documentation that she has asthma for her employer.     B-(background): diagnosis: Moderate persistent asthma without complication    A-(assessment): Patient reports she has stayed decided to home starting last Tuesday 3/24/20.   HR and employer are requiring a letter to state patient has Asthma.   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     R-(recommendations): Provider please review and advise. Thank you.

## 2020-03-26 NOTE — TELEPHONE ENCOUNTER
Patient informed of message below from provider.  She would like a letter mailed to her home please.     Provider please review and advise. Thank you.

## 2020-03-26 NOTE — LETTER
[facility]  [address] [address]  [phone]        March 26, 2020    Teresa Fernandez                                                                                                                     [id number] [address] [facility] [address]

## 2020-03-26 NOTE — TELEPHONE ENCOUNTER
Reason for call:    Symptom or request:     Patient called stating that she needs medical documentation that she has asthma for her employer.     Mail to home address      Best Time:  any    Can we leave a detailed message on this number?  YES     Wendy BOWDEN  Station

## 2020-03-26 NOTE — TELEPHONE ENCOUNTER
I can sign a letter stating she has asthma, or if she has a form from her employer, I can also sign that in Clare's absence.    Tanya Novak, CNP

## 2020-03-26 NOTE — LETTER
2020        To Whom It May Concern:    Please be advised that Teresa Fernandez ( 1960) has a diagnosis of asthma.      Thank you,      Tanya Novak, CNP

## 2020-04-09 VITALS — BODY MASS INDEX: 33.34 KG/M2 | HEIGHT: 68 IN | WEIGHT: 220 LBS

## 2020-04-09 ASSESSMENT — MIFFLIN-ST. JEOR: SCORE: 1621.41

## 2020-04-09 NOTE — PROGRESS NOTES
"Teresa Fernandez is a 60 year old female who is being evaluated via a billable telephone visit.      The patient has been notified of following:     \"This telephone visit will be conducted via a call between you and your physician/provider. We have found that certain health care needs can be provided without the need for a physical exam.  This service lets us provide the care you need with a short phone conversation.  If a prescription is necessary we can send it directly to your pharmacy.  If lab work is needed we can place an order for that and you can then stop by our lab to have the test done at a later time.    Telephone visits are billed at different rates depending on your insurance coverage. During this emergency period, for some insurers they may be billed the same as an in-person visit.  Please reach out to your insurance provider with any questions.    If during the course of the call the physician/provider feels a telephone visit is not appropriate, you will not be charged for this service.\"    Patient has given verbal consent for Telephone visit?  Yes    How would you like to obtain your AVS? Herbert    Teresa Fernandez complains of    Chief Complaint   Patient presents with     Consult     Rule out sleep apnea       I have reviewed and updated the patient's Past Medical History, Social History, Family History and Medication List.    ALLERGIES  Omnipaque [iohexol]; Gluten; Iodine; and Penicillins    Additional provider notes:   Chief complaint: Consultation requested by Dr. Migel Whitfield for the evaluation of daytime sleepiness    Visit was converted to telephone visit due to COVID19 pandemic    History of Present Illness: 60-year-old female with history of renal cell carcinoma, chronic myalgias and joint pains, asthma who has had excessive daytime sleepiness.  Her  has noted snoring and apnea in the last year or so.  She does admit to tossing and turning a lot at night due to pain.  She is currently " on leave but will be returning to work next week.  Typically her schedule while working is to get into bed around 730.  She gets up for work around 3:45 in the morning.  After work sometimes she will take a rest and fall asleep.  Her commute is 25 minutes and she denies excessive sleepiness behind the wheel.  These last few weeks when she has been on leave she has been staying up a little bit later with lights out closer to 9 PM.  She has been staying in bed until 7 to 7:30 in the morning.  She is not taking naps during the day at this time.  She does feel that she is less sleepy now that she is getting more time in bed.    Patient also has a Fitbit and reports that on some nights that her oxygen saturation has higher variability.  She denies a sense of gasping choking or shortness of breath at night.  She uses albuterol for asthma and allergies but has not needed it at night.  She uses a mouthguard for bruxism.  She only rarely wakes up with headaches in the morning.  She has dry mouth but she thinks that is related to her autoimmune issues.  She denies any difficulty breathing through her nose.  Reflux is well controlled on medication.  She not taking any sleep aids.  She usually falls asleep within 30 minutes.  She is drinking 2 cups of coffee in the morning.  She is usually done by 9 AM.  She is a non-smoker.  Does not drink alcohol or use medical or recreational marijuana.    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  No heavy machinery.  On occasion she has been sleepy at work and struggles.  There is been no sleepwalking sleep talking or dream enactment behavior no restless legs.  She is walking regularly for exercise in an attempt to lose weight.  She feels like she is making some progress.    She does note that her sleep at night because sometimes is bothered by her 's snoring and apnea.  She asks him to get evaluated but he has not.  He is not using CPAP.  She is not using  any narcotics or pain medication regularly.  She does take Tylenol on occasion for  pain.  But does not use Tylenol with diphenhydramine.    Total score - [place]: 13 (4/9/2020 11:00 AM) (Less than 10 normal)    MARISOL Total Score: 11 (normal 0-7, mild 8-14, moderate 15-21, severe 22-28)    STOP-BANG  Loud Snore   0-1  Excessively Tired/Sleepy   0-1  Observed apnea   1  Hypertension   0  BMI> 35 kg/m2   0  Age >50   1  Neck >16 in/40cm   ?  Male Gender   0  Total =   2-4?  (0-2 low, 3-4 intermediate, 5-8 high risk of GAYE)      Past Medical History:   Diagnosis Date     ALLERGIC RHINITIS NOS 4/12/2005     Anxiety      Arthritis     herniated disc     Bronchitis, not specified as acute or chronic      CHR MAXILLARY SINUSITIS 4/12/2005     Depressive disorder, not elsewhere classified      Eczema 10/17/2012     Environmental and seasonal allergies      GERD (gastroesophageal reflux disease)      Gluten intolerance      Malignant neoplasm of renal pelvis (H) 1995    Renal cell carcinoma     Melanoma (H) 06/2010     Other specified acquired hypothyroidism 4/12/2005     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     Penicillins Hives       Current Outpatient Medications   Medication     acetaminophen (TYLENOL) 325 MG tablet     ADVAIR DISKUS 250-50 MCG/DOSE inhaler     albuterol (PROAIR HFA/PROVENTIL HFA/VENTOLIN HFA) 108 (90 Base) MCG/ACT inhaler     albuterol (PROAIR HFA/PROVENTIL HFA/VENTOLIN HFA) 108 (90 BASE) MCG/ACT Inhaler     albuterol (PROVENTIL) (2.5 MG/3ML) 0.083% neb solution     celecoxib (CELEBREX) 100 MG capsule     Cholecalciferol (VITAMIN D3 PO)     Cyanocobalamin (VITAMIN B 12  PO)     DULoxetine (CYMBALTA) 60 MG capsule     fexofenadine (ALLEGRA) 180 MG tablet     GLUCOSAMINE SULFATE PO     levothyroxine (SYNTHROID/LEVOTHROID) 100 MCG tablet     levothyroxine (SYNTHROID/LEVOTHROID) 125 MCG tablet     MAGNESIUM OXIDE PO     Multiple Vitamins-Minerals (MULTIVITAMIN ADULT PO)     order for DME     OVER-THE-COUNTER     ranitidine (ZANTAC) 150 MG tablet     triamcinolone (KENALOG) 0.1 % external ointment     No current facility-administered medications for this visit.        Social History     Socioeconomic History     Marital status:      Spouse name: Not on file     Number of children: Not on file     Years of education: Not on file     Highest education level: Not on file   Occupational History     Employer: [employer]     Employer: SELF   Social Needs     Financial resource strain: Not on file     Food insecurity     Worry: Not on file     Inability: Not on file     Transportation needs     Medical: Not on file     Non-medical: Not on file   Tobacco Use     Smoking status: Former Smoker     Packs/day: 2.00     Years: 15.00     Pack years: 30.00     Last attempt to quit: 3/26/1996     Years since quittin.0     Smokeless tobacco: Never Used   Substance and Sexual Activity     Alcohol use: No     Comment: None now.  Rare in past.      Drug use: No     Sexual activity: Yes     Partners: Male   Lifestyle     Physical activity     Days per week: Not on file     Minutes per session: Not on file     Stress: Not on file   Relationships     Social connections     Talks on phone: Not on file     Gets together: Not on file     Attends Yazdanism service: Not on file     Active member of club or organization: Not on file     Attends meetings of clubs or organizations: Not on file     Relationship status: Not on file     Intimate partner violence     Fear of current or ex partner: Not on file     Emotionally abused: Not on file     Physically abused: Not on file     Forced sexual activity:  "Not on file   Other Topics Concern     Parent/sibling w/ CABG, MI or angioplasty before 65F 55M? No   Social History Narrative    .  Lives with .  Makes medical supplies.     4 children - healthy    4 siblings - + FH depression, hypertension, chol, diabetes mellitus    No family of muscle problems.        Family History   Problem Relation Age of Onset     Diabetes Mother      Cardiovascular Mother      Lipids Mother      Depression Mother      Hypertension Father      Lipids Father      Obesity Father      Macular Degeneration Father      Sleep Apnea Father      Cancer Maternal Grandmother         kind unknown had sores on legs     Eczema Maternal Grandmother      Eczema Maternal Grandfather      Breast Cancer Paternal Grandmother      Cancer Paternal Grandmother         breast     Hypertension Paternal Grandfather      Cardiovascular Paternal Grandfather         heart attack     Hypertension Brother      Thyroid Disease Sister      Hypertension Sister      Depression Sister      Allergies Sister      Allergies Son      Eczema Son      Lipids Sister      Thyroid Disease Sister      Neurologic Disorder Sister      Depression Sister      Respiratory Son      Sleep Apnea Son      Glaucoma No family hx of      Cerebrovascular Disease No family hx of        Review of Systems: Positve per HPI, otherwise comprehensive review of systems is negative.    EXAM: Alert, pleasant  Ht 1.727 m (5' 8\")   Wt 99.8 kg (220 lb)   BMI 33.45 kg/m    Pulm:, No cough during the visit ,speaking full sentences  Psychiatric: Mood and affect appear normal    Chart reviewed for labs chest x-ray and PFTs all normal    ASSESSMENT:  60-year-old female with history of excessive daytime sleepiness, observed apnea and snoring.  Sleepiness has improved when she gets more sleep associated with changes to her work schedule.  However snoring and apnea persist.  There is a suggestion that it could be somewhat positional.  Stop bang score " could not be completely calculated however does indicate and possible intermediate risk for sleep apnea.    PLAN:  Urged her to work with her employer to have a later start time so she can get little more sleep time.  Had an extensive discussion regarding the pathophysiology of obstructive sleep apnea and its association with weight.  We discussed testing options including home sleep apnea and in lab testing.  We also discussed treatment options including position restriction, weight loss, CPAP and oral appliances.  We are not doing diagnostic testing at this time but when we resume would recommend in lab testing due to her significant tossing and turning and need for more accurate testing given her lower to intermediate risk.  Orders generated for this.  Patient was provided a prescription for 1 tablet of a sleep aid to bring to the lab and take at lights out.  In the meantime patient is encouraged to continue her efforts at weight control and side sleeping.  She is agreeable with this plan.  Please see patient instructions for further details of counseling provided.    Phone call duration: 38 minutes      Katerine Allan M.D.  Pulmonary/Critical Care/Sleep Medicine    [facility]   Floor 1, [address]   [address] [place] [address]. [place] [address]   Appointments: [phone]    The above note was dictated using voice recognition software and may include typographical errors. Please contact the author for any clarifications.

## 2020-04-09 NOTE — PATIENT INSTRUCTIONS
"MY TREATMENT INFORMATION FOR SLEEP APNEA-  Teresa Fernandez    DOCTOR : Katerine Allan MD  SLEEP CENTER :      MY CONTACT NUMBER:     Am I having a sleep study at a sleep center?  Make sure you have an appointment for the study before you leave!    Am I having a home sleep study?  Watch this video:  [email or web address]  Please verify your insurance coverage with your insurance carrier    Frequently asked questions:  1. What is Obstructive Sleep Apnea (GAYE)? GAYE is the most common type of sleep apnea. Apnea means, \"without breath.\"  Apnea is most often caused by narrowing or collapse of the upper airway as muscles relax during sleep.   Almost everyone has occasional apneas. Most people with sleep apnea have had brief interruptions at night frequently for many years.  The severity of sleep apnea is related to how frequent and severe the events are.   2. What are the consequences of GAYE? Symptoms include: feeling sleepy during the day, snoring loudly, gasping or stopping of breathing, trouble sleeping, and occasionally morning headaches or heartburn at night.  Sleepiness can be serious and even increase the risk of falling asleep while driving. Other health consequences may include development of high blood pressure and other cardiovascular disease in persons who are susceptible. Untreated GAYE  can contribute to heart disease, stroke and diabetes.   3. What are the treatment options? In most situations, sleep apnea is a lifelong disease that must be managed with daily therapy. Medications are not effective for sleep apnea and surgery is generally not considered until other therapies have been tried. Your treatment is your choice . Continuous Positive Airway (CPAP) works right away and is the therapy that is effective in nearly everyone. An oral device to hold your jaw forward is usually the next most reliable option. Other options include postioning devices (to keep you off your back), " weight loss, and surgery including a tongue pacing device. There is more detail about some of these options below.    Important tips for using CPAP and similar devices   Know your equipment:  CPAP is continuous positive airway pressure that prevents obstructive sleep apnea by keeping the throat from collapsing while you are sleeping. In most cases, the device is  smart  and can slowly self-adjusts if your throat collapses and keeps a record every day of how well you are treated-this information is available to you and your care team.  BPAP is bilevel positive airway pressure that keeps your throat open and also assists each breath with a pressure boost to maintain adequate breathing.  Special kinds of BPAP are used in patients who have inadequate breathing from lung or heart disease. In most cases, the device is  smart  and can slowly self-adjusts to assist breathing. Like CPAP, the device keeps a record of how well you are treated.  Your mask is your connection to the device. You get to choose what feels most comfortable and the staff will help to make sure if fits. Here: are some examples of the different masks that are available:       Key points to remember on your journey with sleep apnea:  1. Sleep study.  PAP devices often need to be adjusted during a sleep study to show that they are effective and adjusted right.  2. Good tips to remember: Try wearing just the mask during a quiet time during the day so your body adapts to wearing it. A humidifier is recommended for comfort in most cases to prevent drying of your nose and throat. Allergy medication from your provider may help you if you are having nasal congestion.  3. Getting settled-in. It takes more than one night for most of us to get used to wearing a mask. Try wearing just the mask during a quiet time during the day so your body adapts to wearing it. A humidifier is recommended for comfort in most cases. Our team will work with you carefully on the  first day and will be in contact within 4 days and again at 2 and 4 weeks for advice and remote device adjustments. Your therapy is evaluated by the device each day.   4. Use it every night. The more you are able to sleep naturally for 7-8 hours, the more likely you will have good sleep and to prevent health risks or symptoms from sleep apnea. Even if you use it 4 hours it helps. Occasionally all of us are unable to use a medical therapy, in sleep apnea, it is not dangerous to miss one night.   5. Communicate. Call our skilled team on the number provided on the first day if your visit for problems that make it difficult to wear the device. Over 2 out of 3 patients can learn to wear the device long-term with help from our team. Remember to call our team or your sleep providers if you are unable to wear the device as we may have other solutions for those who cannot adapt to mask CPAP therapy. It is recommended that you sleep your sleep provider within the first 3 months and yearly after that if you are not having problems.   6. Use it for your health. We encourage use of CPAP masks during daytime quiet periods to allow your face and brain to adapt to the sensation of CPAP so that it will be a more natural sensation to awaken to at night or during naps. This can be very useful during the first few weeks or months of adapting to CPAP though it does not help medically to wear CPAP during wakefulness and  should not be used as a strategy just to meet guidelines.  7. Take care of your equipment. Make sure you clean your mask and tubing using directions every day and that your filter and mask are replaced as recommended or if they are not working.     BESIDES CPAP, WHAT OTHER THERAPIES ARE THERE?    Positioning Device  Positioning devices are generally used when sleep apnea is mild and only occurs on your back.This example shows a pillow that straps around the waist. It may be appropriate for those whose sleep study shows  milder sleep apnea that occurs primarily when lying flat on one's back. Preliminary studies have shown benefit but effectiveness at home may need to be verified by a home sleep test. These devices are generally not covered by medical insurance.  Examples of devices that maintain sleeping on the back to prevent snoring and mild sleep apnea.    Belt type body positioner  [email or web address]    Electronic reminder  Http://nightshifttherapy.com/  [email or web address]      Oral Appliance  What is oral appliance therapy?  An oral appliance device fits on your teeth at night like a retainer used after having braces. The device is made by a specialized dentist and requires several visits over 1-2 months before a manufactured device is made to fit your teeth and is adjusted to prevent your sleep apnea. Once an oral device is working properly, snoring should be improved. A home sleep test may be recommended at that time if to determine whether the sleep apnea is adequately treated.       Some things to remember:  -Oral devices are often, but not always, covered by your medical insurance. Be sure to check with your insurance provider.   -If you are referred for oral therapy, you will be given a list of specialized dentists to consider or you may choose to visit the Web site of the American Academy of Dental Sleep Medicine  -Oral devices are less likely to work if you have severe sleep apnea or are extremely overweight.     More detailed information  An oral appliance is a small acrylic device that fits over the upper and lower teeth  (similar to a retainer or a mouth guard). This device slightly moves jaw forward, which moves the base of the tongue forward, opens the airway, improves breathing for effective treat snoring and obstructive sleep apnea in perhaps 7 out of 10 people .  The best working devices are custom-made by a dental device  after a mold is made of the teeth 1, 2, 3.  When is an oral  appliance indicated?  Oral appliance therapy is recommended as a first-line treatment for patients with primary snoring, mild sleep apnea, and for patients with moderate sleep apnea who prefer appliance therapy to use of CPAP4, 5. Severity of sleep apnea is determined by sleep testing and is based on the number of respiratory events per hour of sleep.   How successful is oral appliance therapy?  The success rate of oral appliance therapy in patients with mild sleep apnea is 75-80% while in patients with moderate sleep apnea it is 50-70%. The chance of success in patients with severe sleep apnea is 40-50%. The research also shows that oral appliances have a beneficial effect on the cardiovascular health of GAYE patients at the same magnitude as CPAP therapy7.  Oral appliances should be a second-line treatment in cases of severe sleep apnea, but if not completely successful then a combination therapy utilizing CPAP plus oral appliance therapy may be effective. Oral appliances tend to be effective in a broad range of patients although studies show that the patients who have the highest success are females, younger patients, those with milder disease, and less severe obesity. 3, 6.   Finding a dentist that practices dental sleep medicine  Specific training is available through the American Academy of Dental Sleep Medicine for dentists interested in working in the field of sleep. To find a dentist who is educated in the field of sleep and the use of oral appliances, near you, visit the Web site of the American Academy of Dental Sleep Medicine.    References  1. Kristopher et al. Objectively measured vs self-reported compliance during oral appliance therapy for sleep-disordered breathing. Chest 2013; 144(5): 6483-1960.  2. Manisha et al. Objective measurement of compliance during oral appliance therapy for sleep-disordered breathing. Thorax 2013; 68(1): 91-96.  3. Valdo et al. Mandibular advancement devices in  620 men and women with GAYE and snoring: tolerability and predictors of treatment success. Chest 2004; 125: 9892-3840.  4. Luca et al. Oral appliances for snoring and GAYE: a review. Sleep 2006; 29: 244-262.  5. Racheal et al. Oral appliance treatment for GAYE: an update. J Clin Sleep Med 2014; 10(2): 215-227.  6. Yazmin et al. Predictors of OSAH treatment outcome. J Dent Res 2007; 86: 5762-2984.      Weight Loss:    Weight loss is a long-term strategy that may improve sleep apnea in some patients.      Surgery:    Surgery for obstructive sleep apnea is considered generally only when other therapies fail to work. Surgery may be discussed with you if you are having a difficult time tolerating CPAP and or when there is an abnormal structure that requires surgical correction.  Nose and throat surgeries often enlarge the airway to prevent collapse.  Most of these surgeries create pain for 1-2 weeks and up to half of the most common surgeries are not effective throughout life.  You should carefully discuss the benefits and drawbacks to surgery with your sleep provider and surgeon to determine if it is the best solution for you.   More information  Surgery for GAYE is directed at areas that are responsible for narrowing or complete obstruction of the airway during sleep.  There are a wide range of procedures available to enlarge and/or stabilize the airway to prevent blockage of breathing in the three major areas where it can occur: the palate, tongue, and nasal regions.  Successful surgical treatment depends on the accurate identification of the factors responsible for obstructive sleep apnea in each person.  A personalized approach is required because there is no single treatment that works well for everyone.  Because of anatomic variation, consultation with an examination by a sleep surgeon is a critical first step in determining what surgical options are best for each patient.  In some cases, examination during  sedation may be recommended in order to guide the selection of procedures.  Patients will be counseled about risks and benefits as well as the typical recovery course after surgery. Surgery is typically not a cure for a person s GAYE.  However, surgery will often significantly improve one s GAYE severity (termed  success rate ).  Even in the absence of a cure, surgery will decrease the cardiovascular risk associated with OSA7; improve overall quality of life8 (sleepiness, functionality, sleep quality, etc).      Palate Procedures:  Patients with GAYE often have narrowing of their airway in the region of their tonsils and uvula.  The goals of palate procedures are to widen the airway in this region as well as to help the tissues resist collapse.  Modern palate procedure techniques focus on tissue conservation and soft tissue rearrangement, rather than tissue removal.  Often the uvula is preserved in this procedure. Residual sleep apnea is common in patient after pharyngoplasty with an average reduction in sleep apnea events of 33%2.      Tongue Procedures:  ExamWhile patients are awake, the muscles that surround the throat are active and keep this region open for breathing. These muscles relax during sleep, allowing the tongue and other structures to collapse and block breathing.  There are several different tongue procedures available.  Selection of a tongue base procedure depends on characteristics seen on physical exam.  Generally, procedures are aimed at removing bulky tissues in this area or preventing the back of the tongue from falling back during sleep.  Success rates for tongue surgery range from 50-62%3.    Hypoglossal Nerve Stimulation:  Hypoglossal nerve stimulation has recently received approval from the [place] Food and Drug Administration for the treatment of obstructive sleep apnea.  This is based on research showing that the system was safe and effective in treating sleep apnea6.  Results showed  that the median AHI score decreased 68%, from 29.3 to 9.0. This therapy uses an implant system that senses breathing patterns and delivers mild stimulation to airway muscles, which keeps the airway open during sleep.  The system consists of three fully implanted components: a small generator (similar in size to a pacemaker), a breathing sensor, and a stimulation lead.  Using a small handheld remote, a patient turns the therapy on before bed and off upon awakening.    Candidates for this device must be greater than 22 years of age, have moderate to severe GAYE (AHI between 20-65), BMI less than 32, have tried CPAP/oral appliance without success, and have appropriate upper airway anatomy (determined by a sleep endoscopy performed by Dr. Dotson).    Hypoglossal Nerve Stimulation Pathway:    The sleep surgeon s office will work with the patient through the insurance prior-authorization process (including communications and appeals).    Nasal Procedures:  Nasal obstruction can interfere with nasal breathing during the day and night.  Studies have shown that relief of nasal obstruction can improve the ability of some patients to tolerate positive airway pressure therapy for obstructive sleep apnea1.  Treatment options include medications such as nasal saline, topical corticosteroid and antihistamine sprays, and oral medications such as antihistamines or decongestants. Non-surgical treatments can include external nasal dilators for selected patients. If these are not successful by themselves, surgery can improve the nasal airway either alone or in combination with these other options.      Combination Procedures:  Combination of surgical procedures and other treatments may be recommended, particularly if patients have more than one area of narrowing or persistent positional disease.  The success rate of combination surgery ranges from 66-80%2,3.    References  1. Jody MCHUGH. The Role of the Nose in Snoring and Obstructive  Sleep Apnoea: An Update.  Eur Arch Otorhinolaryngol. 2011; 268: 1365-73.  2.  Loraine SM; Ry JA; Kurt JR; Pallanch JF; Taty MB; Nicole SG; Paolo GONZALEZ. Surgical modifications of the upper airway for obstructive sleep apnea in adults: a systematic review and meta-analysis. SLEEP 2010;33(10):9480-8978. Jourdan BROWN. Hypopharyngeal surgery in obstructive sleep apnea: an evidence-based medicine review.  Arch Otolaryngol Head Neck Surg. 2006 Feb;132(2):206-13.  3. Duane YH1, Ileana Y, Aftab RITA. The efficacy of anatomically based multilevel surgery for obstructive sleep apnea. Otolaryngol Head Neck Surg. 2003 Oct;129(4):327-35.  4. Kezirian E, Goldberg A. Hypopharyngeal Surgery in Obstructive Sleep Apnea: An Evidence-Based Medicine Review. Arch Otolaryngol Head Neck Surg. 2006 Feb;132(2):206-13.  5. Nasra ALEGRIA et al. Upper-Airway Stimulation for Obstructive Sleep Apnea.  N Engl J Med. 2014 Jan 9;370(2):139-49.  6. Dinh Y et al. Increased Incidence of Cardiovascular Disease in Middle-aged Men with Obstructive Sleep Apnea. Am J Respir Crit Care Med; 2002 166: 159-165  7. Ochoa EM et al. Studying Life Effects and Effectiveness of Palatopharyngoplasty (SLEEP) study: Subjective Outcomes of Isolated Uvulopalatopharyngoplasty. Otolaryngol Head Neck Surg. 2011; 144: 623-631.              Your BMI is Body mass index is 33.45 kg/m .  Weight management is a personal decision.  If you are interested in exploring weight loss strategies, the following discussion covers the approaches that may be successful. Body mass index (BMI) is one way to tell whether you are at a healthy weight, overweight, or obese. It measures your weight in relation to your height.  A BMI of 18.5 to 24.9 is in the healthy range. A person with a BMI of 25 to 29.9 is considered overweight, and someone with a BMI of 30 or greater is considered obese. More than two-thirds of American adults are considered overweight or obese.  Being overweight or obese  increases the risk for further weight gain. Excess weight may lead to heart disease and diabetes.  Creating and following plans for healthy eating and physical activity may help you improve your health.  Weight control is part of healthy lifestyle and includes exercise, emotional health, and healthy eating habits. Careful eating habits lifelong are the mainstay of weight control. Though there are significant health benefits from weight loss, long-term weight loss with diet alone may be very difficult to achieve- studies show long-term success with dietary management in less than 10% of people. Attaining a healthy weight may be especially difficult to achieve in those with severe obesity. In some cases, medications, devices and surgical management might be considered.  What can you do?  If you are overweight or obese and are interested in methods for weight loss, you should discuss this with your provider.     Consider reducing daily calorie intake by 500 calories.     Keep a food journal.     Avoiding skipping meals, consider cutting portions instead.    Diet combined with exercise helps maintain muscle while optimizing fat loss. Strength training is particularly important for building and maintaining muscle mass. Exercise helps reduce stress, increase energy, and improves fitness. Increasing exercise without diet control, however, may not burn enough calories to loose weight.       Start walking three days a week 10-20 minutes at a time    Work towards walking thirty minutes five days a week     Eventually, increase the speed of your walking for 1-2 minutes at time    In addition, we recommend that you review healthy lifestyles and methods for weight loss available through the National Institutes of Health patient information sites:  http://win.niddk.nih.gov/publications/index.htm    And look into health and wellness programs that may be available through your health insurance provider, employer, local community  center, or mike club.    Weight management plan: Patient was referred to their PCP to discuss a diet and exercise plan.

## 2020-04-10 ENCOUNTER — VIRTUAL VISIT (OUTPATIENT)
Dept: SLEEP MEDICINE | Facility: CLINIC | Age: 60
End: 2020-04-10
Attending: PSYCHIATRY & NEUROLOGY
Payer: OTHER GOVERNMENT

## 2020-04-10 DIAGNOSIS — G47.30 OBSERVED SLEEP APNEA: ICD-10-CM

## 2020-04-10 DIAGNOSIS — E66.9 OBESITY (BMI 30-39.9): ICD-10-CM

## 2020-04-10 DIAGNOSIS — R40.0 DAYTIME SOMNOLENCE: Primary | ICD-10-CM

## 2020-04-10 PROCEDURE — 99214 OFFICE O/P EST MOD 30 MIN: CPT | Mod: TEL | Performed by: INTERNAL MEDICINE

## 2020-04-10 RX ORDER — ZOLPIDEM TARTRATE 5 MG/1
TABLET ORAL
Qty: 1 TABLET | Refills: 0 | Status: SHIPPED | OUTPATIENT
Start: 2020-04-10 | End: 2020-09-17

## 2020-04-24 ENCOUNTER — VIRTUAL VISIT (OUTPATIENT)
Dept: NEUROLOGY | Facility: CLINIC | Age: 60
End: 2020-04-24
Payer: OTHER GOVERNMENT

## 2020-04-24 DIAGNOSIS — R26.9 GAIT DISORDER: Primary | ICD-10-CM

## 2020-04-24 ASSESSMENT — PAIN SCALES - GENERAL: PAINLEVEL: NO PAIN (0)

## 2020-04-24 NOTE — PROGRESS NOTES
"Teresa Fernandez is a 60 year old female who is being evaluated via a billable video visit.      The patient has been notified of following:     \"This video visit will be conducted via a call between you and your physician/provider. We have found that certain health care needs can be provided without the need for an in-person physical exam.  This service lets us provide the care you need with a video conversation.  If a prescription is necessary we can send it directly to your pharmacy.  If lab work is needed we can place an order for that and you can then stop by our lab to have the test done at a later time.    Video visits are billed at different rates depending on your insurance coverage.  Please reach out to your insurance provider with any questions.    If during the course of the call the physician/provider feels a video visit is not appropriate, you will not be charged for this service.\"    Patient has given verbal consent for Video visit? YES    How would you like to obtain your AVS?     Patient would like the video invitation sent by: [email or web address]    Will anyone else be joining your video visit?         Video-Visit Details    Type of service:  Video Visit    Video Start Time: 1:21 PM  Video End Time: 1:36 PM    Originating Location (pt. Location): Home    Distant Location (provider location):  [facility] NEUROLOGY     Mode of Communication:  Video Conference via ZOYA Boudreaux         "

## 2020-04-24 NOTE — PROGRESS NOTES
Service Date: 04/24/2020      TELEPHONE FOLLOWUP VISIT      HISTORY OF PRESENT ILLNESS:  This is a telephone followup visit because of the viral epidemic.  Call initiated time 1:21, call terminated time 1:36.  The patient was at home.      I last saw the patient about 6 weeks ago.  She had 3 issues, including concerns about imbalance, intermittent tremor and daytime somnolence with nonrestorative sleep.  With regard to the sleep issue, she did have a consultation with Dr. Allna and is going to be scheduled for a sleep study.      With regard to the tremor, it is mainly in her left arm.  When she awakens in the morning, her head will shake as well, but once she gets moving, the head settles down.  However, the left arm does have tremor, mostly with action.  It does not interfere with her activities.  Her balance continues to be a problem.  Some days are worse than others.  She does have celiac disease and is on a special diet.  She does take a multivitamin.  She does not have issues with her inner ear.  She has no problem with hearing or ringing in her ears.  She does have a sister with Meniere syndrome.  She does not drink alcohol.      She did have an evaluation including MRI of the brain which showed minimal age-related change.  A vitamin B12 level was normal at 770.      PHYSICAL EXAMINATION:  There is no examination as this was a telephone followup appointment.      ASSESSMENT:   1.  Concerns about gait imbalance.   2.  Probable essential tremor.   3.  Daytime somnolence, evaluation by Sleep Medicine.      DISCUSSION:  The patient had a followup visit to discuss the above issues.  The main outstanding issue is her problem with balance. Etiology is not determined.  I have recommended that she have an appointment with physical therapy to see if they could help her address some of the problems with her balance and gait.  This will be arranged.      I would not treat her tremor at this time.  It does not  interfere with her activities and the medications could cause side effects.  She has no interest in a course of treatment at this time.      She will be following with Dr. Allan with regard to the sleep problems.      I asked her to make an appointment in clinic for 6-8 weeks.      This was a 15-minute appointment, more than half of which was spent in counseling and coordination of care.         LOREN SAUCEDO MD             D: 2020   T: 2020   MT: wily      Name:     RILEY CANDELARIO   MRN:      [id number]        Account:      [id number]   :      1960           Service Date: 2020      Document: [id number]

## 2020-04-30 ENCOUNTER — TELEPHONE (OUTPATIENT)
Dept: NEUROLOGY | Facility: CLINIC | Age: 60
End: 2020-04-30

## 2020-05-05 ENCOUNTER — TRANSFERRED RECORDS (OUTPATIENT)
Dept: HEALTH INFORMATION MANAGEMENT | Facility: CLINIC | Age: 60
End: 2020-05-05

## 2020-06-07 ASSESSMENT — ENCOUNTER SYMPTOMS
SEIZURES: 0
HEARTBURN: 1
SMELL DISTURBANCE: 0
NERVOUS/ANXIOUS: 1
JAUNDICE: 0
HOARSE VOICE: 0
LIGHT-HEADEDNESS: 1
NECK MASS: 0
POOR WOUND HEALING: 0
MYALGIAS: 1
MUSCLE WEAKNESS: 1
DIZZINESS: 1
SPEECH CHANGE: 1
SHORTNESS OF BREATH: 1
DEPRESSION: 1
DIARRHEA: 1
PANIC: 0
POSTURAL DYSPNEA: 0
SNORES LOUDLY: 1
BOWEL INCONTINENCE: 0
STIFFNESS: 1
BLOOD IN STOOL: 0
TROUBLE SWALLOWING: 0
WEAKNESS: 1
HEMOPTYSIS: 0
TREMORS: 1
CONSTIPATION: 1
NUMBNESS: 1
NAIL CHANGES: 0
SPUTUM PRODUCTION: 0
DYSPNEA ON EXERTION: 1
JOINT SWELLING: 1
SLEEP DISTURBANCES DUE TO BREATHING: 0
BLOATING: 1
INSOMNIA: 1
BRUISES/BLEEDS EASILY: 1
LOSS OF CONSCIOUSNESS: 0
HYPOTENSION: 1
DISTURBANCES IN COORDINATION: 1
HYPERTENSION: 0
PALPITATIONS: 0
ARTHRALGIAS: 1
BACK PAIN: 1
SINUS CONGESTION: 0
ORTHOPNEA: 0
MEMORY LOSS: 1
SINUS PAIN: 0
PARALYSIS: 0
NAUSEA: 1
ABDOMINAL PAIN: 1
WHEEZING: 0
SYNCOPE: 0
SKIN CHANGES: 0
RECTAL PAIN: 0
TINGLING: 1
LEG PAIN: 0
HEADACHES: 1
COUGH DISTURBING SLEEP: 0
NECK PAIN: 0
TASTE DISTURBANCE: 0
SORE THROAT: 0
SWOLLEN GLANDS: 0
VOMITING: 0
COUGH: 0
MUSCLE CRAMPS: 1
DECREASED CONCENTRATION: 1
EXERCISE INTOLERANCE: 0

## 2020-06-12 ENCOUNTER — OFFICE VISIT (OUTPATIENT)
Dept: NEUROLOGY | Facility: CLINIC | Age: 60
End: 2020-06-12
Payer: OTHER GOVERNMENT

## 2020-06-12 ENCOUNTER — HOSPITAL ENCOUNTER (OUTPATIENT)
Dept: PHYSICAL THERAPY | Facility: CLINIC | Age: 60
Setting detail: THERAPIES SERIES
End: 2020-06-12
Attending: PSYCHIATRY & NEUROLOGY
Payer: OTHER GOVERNMENT

## 2020-06-12 VITALS
OXYGEN SATURATION: 97 % | SYSTOLIC BLOOD PRESSURE: 117 MMHG | RESPIRATION RATE: 16 BRPM | HEART RATE: 96 BPM | DIASTOLIC BLOOD PRESSURE: 76 MMHG

## 2020-06-12 DIAGNOSIS — R42 DIZZY SPELLS: Primary | ICD-10-CM

## 2020-06-12 DIAGNOSIS — R26.9 GAIT DISORDER: ICD-10-CM

## 2020-06-12 PROCEDURE — 97162 PT EVAL MOD COMPLEX 30 MIN: CPT | Mod: GP | Performed by: PHYSICAL THERAPIST

## 2020-06-12 PROCEDURE — 97110 THERAPEUTIC EXERCISES: CPT | Mod: GP | Performed by: PHYSICAL THERAPIST

## 2020-06-12 ASSESSMENT — PAIN SCALES - GENERAL: PAINLEVEL: NO PAIN (0)

## 2020-06-12 NOTE — NURSING NOTE
Chief Complaint   Patient presents with     Tremors     Balance/ Vestibular     [facility] RETURN F/U       Mp Hua, EMT

## 2020-06-12 NOTE — PROGRESS NOTES
Service Date: 06/12/2020      HISTORY OF PRESENT ILLNESS:  This patient is seen in followup in clinic for issues of tremor and dizziness and gait problems.  She has issues with her balance.  She was initially seen in March.  She had a followup in April that was by video.  She continues to have concerns about her balance.  About 10 days ago, she fell in her driveway.  She was walking down the driveway and suddenly her left leg gave out on her.  She stumbled into the side of the driveway.  Her  helped her get up, but she was able to get up immediately and did not notice any weakness or any other problem.  She did not trip on anything.  She is really not sure what happened other than that the left leg seemed to give out on her.  She continues to have the tremor.  It is a left-sided tremor.  She writes with her right hand.  The tremor involves her left hand and also at times the left leg and head.  The symptoms of tremor come on spontaneously.  She wonders if she is nervous and that might trigger it.  She also has dizzy spells.  She says that more recently she has developed a numbness in her feet.      PHYSICAL EXAMINATION:   GENERAL:  The patient is cooperative and in no distress.   VITAL SIGNS:  Blood pressure 117/76.   NEUROLOGIC:  There is no tremor noted at rest or with her hands held out in front of her.  Finger tapping, finger-nose-finger and heel-knee-shin are normal.  Strength is well preserved in the arms, hands and legs.  Muscle stretch reflexes are low amplitude and symmetric in the arms and knees and trace at the ankles.  Ankle reflexes appear to be absent when I saw the patient in March.  Toes are downgoing.  Sensory exam shows absent vibration in the toes today.  This was reduced when I saw her in March.  Temperature sense is preserved.  She easily gets out of a chair without the use of her arms.  She can walk on her heels, toes and tandem without much difficulty.      She did have an MRI scan of  the brain performed.  I reviewed the images with her today.  The study is normal.  She had A vitamin B12 level checked in March and that was 770.  A sedimentation rate checked in  is 22 and a CPK is slightly elevated at 254.  She has been seeing Rheumatology in this regard.  She has had mildly elevated CK levels in the past.      ASSESSMENT:   1.  Essential tremor.   2.  Concerns about gait and balance.      DISCUSSION:  The patient is seen in followup with the above issues.  Etiology of the gait and balance complaints is not determined.  Her exam on this point is normal and her workup has been nondiagnostic.  She just started physical therapy for balance and had an appointment today.  I recommend that she continue with this treatment for the next few weeks before we decide to pursue additional testing.  With her symptom of numb feet, an electrodiagnostic study may be in order as well as additional labs for neuropathy.      With regard to the symptoms of essential tremor, I would monitor her neurologic status for now.  The tremor does not interfere in her activities in any significant or disabling way.  She is in agreement on that point.  I will see her in followup in a month for reexamination.      This was a 25-minute appointment, more than half of which was spent in counseling and coordination of care.      MD LOREN Arias MD             D: 2020   T: 2020   MT: al      Name:     RILEY CANDELARIO   MRN:      [id number]        Account:      [id number]   :      1960           Service Date: 2020      Document: [id number]

## 2020-06-12 NOTE — PROGRESS NOTES
06/12/20 0800   General Information   Type of Visit Initial OP Ortho PT Evaluation   Start of Care Date 06/12/20   Referring Physician Carter Rees    Patient/Family Goals Statement Decrease falling, better balance.   Orders Evaluate and Treat   Date of Order 04/24/20   Certification Required? No  ( - Authorized. )   Medical Diagnosis Gait Disorder/ Unknown etiology    Surgical/Medical history reviewed   (FMS, Kidney CA, Asthma, Depr., Back surgery 10 years)   Precautions/Limitations no known precautions/limitations   Special Instructions Cymbalta,    General Information Comments Seeing Neurologist today.    Body Part(s)   Body Part(s) Lumbar Spine/SI   Presentation and Etiology   Pertinent history of current problem (include personal factors and/or comorbidities that impact the POC) Started to have balance issues a bout a year ago. Feels off balance, has had some falls. Gets dizzy spells.    Impairments A. Pain;E. Decreased flexibility;H. Impaired gait;G. Impaired balance;Q. Dizziness   Functional Limitations perform desired leisure / sports activities;perform required work activities   Symptom Location Pain in legs, muscular.    How/Where did it occur From insidious onset   Onset date of current episode/exacerbation   (4/24/2020)   Chronicity Chronic   Pain rating (0-10 point scale) Other   Pain rating comment Did not rate pain.    Pain/symptoms are: The same all the time   Pain/symptoms exacerbated by B. Walking;K. Home tasks   Pain/symptoms eased by A. Sitting   Progression of symptoms since onset: Worsened   Current / Previous Interventions   Diagnostic Tests: MRI   MRI Results Results   MRI results Leukoaraoiosis/ Ventricles.    Prior Level of Function   Functional Level Prior Comment Independnet w/ ADL's   Current Level of Function   Current Community Support Family/friend caregiver   Patient role/employment history A. Employed   Employment Comments Sitting, assembly work.    Living environment  House/townhome   Home/community accessibility Some stairs, has railings, pain in knees and hips.    Fall Risk Screen   Fall screen completed by PT   Have you fallen 2 or more times in the past year? Yes   Have you fallen and had an injury in the past year? Yes   Timed Up and Go score (seconds) 12   Is patient a fall risk? No   Abuse Screen (yes response referral indicated)   Feels Unsafe at Home or Work/School no   Feels Threatened by Someone no   Does Anyone Try to Keep You From Having Contact with Others or Doing Things Outside Your Home? no   Physical Signs of Abuse Present no   System Outcome Measures   Outcome Measures   (Tinetti  Balance 12, Gait 11, Total 23. )   Functional Scales   Functional Scales OPTIMAL   Optimal (1=able to do without difficulty, 2=able to do with little difficulty, 3=able to do with moderate difficulty, 4=able to do with much difficulty, 5=unable to do, 9=NA) Activity 1;Activity 2;Activity 3   Activity 1 comment Falling    Activity 2 comment Some dizziness bouts.    Activity 3 comment Decreased confidence in Balance with HH duties, movement.    Lumbar Spine/SI Objective Findings   Posture Head forward.    Gait/Locomotion No obvious faults.    Balance/Proprioception (Single Leg Stance) EO R 7, L 10;  EC R 2, L 4.    Hip Flexion (L2) Strength B 4   Knee Flexion Strength R 4+, L 5   Knee Extension (L3) Strength B 5-   Ankle Dorsiflexion (L4) Strength 5 B    Lumbar/SI Special Tests Comments Tinetti  Balance 12, Gait 11, Total 23.    Planned Therapy Interventions   Planned Therapy Interventions Comment Balance, Gait, Coordination. PTRX# [id number]   Clinical Impression   Criteria for Skilled Therapeutic Interventions Met yes, treatment indicated   PT Diagnosis Impaired balance, unknown etiology.    Influenced by the following impairments Dizziness, Impaired walking and balance.    Functional limitations due to impairments Safe mobility at home and in dept.  duties impaired.    Clinical  "Presentation Evolving/Changing   Clinical Presentation Rationale Tinetti, MMT, SLS Tests,    Clinical Decision Making (Complexity) Moderate complexity   Therapy Frequency 1 time/week   Predicted Duration of Therapy Intervention (days/wks) 8 weeks, 9 visits.   Risk & Benefits of therapy have been explained Yes   Patient, Family & other staff in agreement with plan of care Yes   Clinical Impression Comments Impaired balance, unknown etiology.    Education Assessment   Preferred Learning Style Pictures/video;Demonstration;Listening   Barriers to Learning No barriers   ORTHO GOALS   PT Ortho Eval Goals 1;2;3;4   Ortho Goal 1   Goal Identifier 1   Goal Description STG: Pt will be able to do SLS balance 10\" B sides w/ EO.    Target Date 07/31/20   Ortho Goal 2   Goal Identifier 2   Goal Description STG: Pt will be able to do SLS balance 8\" B w/ EC.    Target Date 08/14/20   Ortho Goal 3   Goal Identifier 3   Goal Description STG: Pt to improve mm strength to 5- for all L/E MMT tests.    Target Date 08/14/20   Ortho Goal 4   Goal Identifier 4   Goal Description LTG: Pt will be independent w/ HEP to improve balance and confidence for HH duties and community mobiliyt.    Target Date 08/14/20   Total Evaluation Time   PT Stellaal, Moderate Complexity Minutes (53549) 20   Gabriela Madera, PT, MTC (#[id number])  [facility]           [phone]  Fax          [phone]  Appt #      [phone]    "

## 2020-06-12 NOTE — LETTER
6/12/2020       RE: Teresa Fernandez  [address] [address]     Dear Colleague,    Thank you for referring your patient, Teresa Fernandez, to the [facility] NEUROLOGY at [facility]. Please see a copy of my visit note below.    Service Date: 06/12/2020      HISTORY OF PRESENT ILLNESS:  This patient is seen in followup in clinic for issues of tremor and dizziness and gait problems.  She has issues with her balance.  She was initially seen in March.  She had a followup in April that was by video.  She continues to have concerns about her balance.  About 10 days ago, she fell in her driveway.  She was walking down the driveway and suddenly her left leg gave out on her.  She stumbled into the side of the driveway.  Her  helped her get up, but she was able to get up immediately and did not notice any weakness or any other problem.  She did not trip on anything.  She is really not sure what happened other than that the left leg seemed to give out on her.  She continues to have the tremor.  It is a left-sided tremor.  She writes with her right hand.  The tremor involves her left hand and also at times the left leg and head.  The symptoms of tremor come on spontaneously.  She wonders if she is nervous and that might trigger it.  She also has dizzy spells.  She says that more recently she has developed a numbness in her feet.      PHYSICAL EXAMINATION:   GENERAL:  The patient is cooperative and in no distress.   VITAL SIGNS:  Blood pressure 117/76.   NEUROLOGIC:  There is no tremor noted at rest or with her hands held out in front of her.  Finger tapping, finger-nose-finger and heel-knee-shin are normal.  Strength is well preserved in the arms, hands and legs.  Muscle stretch reflexes are low amplitude and symmetric in the arms and knees and trace at the ankles.  Ankle reflexes appear to be absent when I saw the patient in March.  Toes are downgoing.  Sensory exam  shows absent vibration in the toes today.  This was reduced when I saw her in March.  Temperature sense is preserved.  She easily gets out of a chair without the use of her arms.  She can walk on her heels, toes and tandem without much difficulty.      She did have an MRI scan of the brain performed.  I reviewed the images with her today.  The study is normal.  She had A vitamin B12 level checked in March and that was 770.  A sedimentation rate checked in  is 22 and a CPK is slightly elevated at 254.  She has been seeing Rheumatology in this regard.  She has had mildly elevated CK levels in the past.      ASSESSMENT:   1.  Essential tremor.   2.  Concerns about gait and balance.      DISCUSSION:  The patient is seen in followup with the above issues.  Etiology of the gait and balance complaints is not determined.  Her exam on this point is normal and her workup has been nondiagnostic.  She just started physical therapy for balance and had an appointment today.  I recommend that she continue with this treatment for the next few weeks before we decide to pursue additional testing.  With her symptom of numb feet, an electrodiagnostic study may be in order as well as additional labs for neuropathy.      With regard to the symptoms of essential tremor, I would monitor her neurologic status for now.  The tremor does not interfere in her activities in any significant or disabling way.  She is in agreement on that point.  I will see her in followup in a month for reexamination.      This was a 25-minute appointment, more than half of which was spent in counseling and coordination of care.      Carter Callahan MD            D: 2020   T: 2020   MT: al      Name:     RILEY CANDELARIO   MRN:      [id number]        Account:      [id number]   :      1960           Service Date: 2020      Document: [id number]

## 2020-06-22 ENCOUNTER — TELEPHONE (OUTPATIENT)
Dept: FAMILY MEDICINE | Facility: CLINIC | Age: 60
End: 2020-06-22

## 2020-06-22 ENCOUNTER — HOSPITAL ENCOUNTER (EMERGENCY)
Facility: CLINIC | Age: 60
Discharge: HOME | End: 2020-06-22
Attending: PHYSICIAN ASSISTANT | Admitting: PHYSICIAN ASSISTANT
Payer: OTHER GOVERNMENT

## 2020-06-22 VITALS
WEIGHT: 225 LBS | BODY MASS INDEX: 34.21 KG/M2 | OXYGEN SATURATION: 98 % | HEART RATE: 93 BPM | TEMPERATURE: 97.2 F | RESPIRATION RATE: 16 BRPM | DIASTOLIC BLOOD PRESSURE: 85 MMHG | SYSTOLIC BLOOD PRESSURE: 137 MMHG

## 2020-06-22 DIAGNOSIS — L23.7 CONTACT DERMATITIS DUE TO POISON IVY: ICD-10-CM

## 2020-06-22 PROCEDURE — 99282 EMERGENCY DEPT VISIT SF MDM: CPT | Performed by: PHYSICIAN ASSISTANT

## 2020-06-22 PROCEDURE — 99284 EMERGENCY DEPT VISIT MOD MDM: CPT | Mod: Z6 | Performed by: PHYSICIAN ASSISTANT

## 2020-06-22 RX ORDER — PREDNISONE 10 MG/1
TABLET ORAL
Qty: 32 TABLET | Refills: 0 | Status: SHIPPED | OUTPATIENT
Start: 2020-06-22 | End: 2020-07-02

## 2020-06-22 NOTE — TELEPHONE ENCOUNTER
S-(situation): I spoke with pt.  She reports suspected poison ivy with lesions on her arm, neck, chin, and eye lid.    B-(background): per above.    A-(assessment): possible poison ivy on arm, neck, chin, and eye lid.    R-(recommendations): Advised needs to be seen in person today.  We are out of clinic appts.  Will need to be seen in urgent care or ED.    Pt agrees.    Rhiannon Bowen RN

## 2020-06-22 NOTE — ED AVS SNAPSHOT
[facility] Emergency Department  [address] [address]  Phone:  [phone]  Fax:  [phone]                                    Teresa Fernandez   MRN: [id number]    Department:  [facility] Emergency Department   Date of Visit:  6/22/2020           After Visit Summary Signature Page    I have received my discharge instructions, and my questions have been answered. I have discussed any challenges I see with this plan with the nurse or doctor.    ..........................................................................................................................................  Patient/Patient Representative Signature      ..........................................................................................................................................  Patient Representative Print Name and Relationship to Patient    ..................................................               ................................................  Date                                   Time    ..........................................................................................................................................  Reviewed by Signature/Title    ...................................................              ..............................................  Date                                               Time          22EPIC Rev 08/18

## 2020-06-22 NOTE — TELEPHONE ENCOUNTER
Reason for call:  Patient reporting a symptom    Symptom or request: Pt has a poison ivy rash on her face x 3 days.  Pt declines to schedule appt. and wants Rx sent to [facility]  Please call patient and advise.     Duration (how long have symptoms been present): 3 days    Have you been treated for this before? Yes    Additional comments:     Phone Number patient can be reached at:  Cell number on file:    Telephone Information:   Mobile [phone]       Best Time:  ny    Can we leave a detailed message on this number:  YES    Call taken on 6/22/2020 at 2:06 PM by Magdalena Leong

## 2020-06-22 NOTE — ED NOTES
Poison ivy to arms, neck and face.  Started 2 days ago.  Itching.  No swelling  NO respiratory difficulties

## 2020-06-22 NOTE — ED PROVIDER NOTES
History     Chief Complaint   Patient presents with     Poison Ivy     on arms and near eye     HPI  Teresa Fernandez is a 60 year old female who presents to the emergency department with concern of a suspected poison ivy.  Patient initially noted pruritic rash on her right forearm.  It is since spread to her bilateral upper and lower extremities, chin, face.  She described it as intensely pruritic with blistering.  She denies any significant pain.  She has had similar response in the past with poison ivy.  She denies any other new concerning exposures.  She has not had any associated fever, chills, malaise, sore throat, cough, chest pain, dyspnea, wheezing, nausea, vomiting or other systemic complaints.  She has attempted treat with calamine without significant improvement.  She states that symptoms are similar to what she develops approximately once yearly.      Allergies:  Allergies   Allergen Reactions     Omnipaque [Iohexol] Swelling and Difficulty breathing     Immediate throat swelling following around 1-2cc of omnipaque 300 for spine procedure     Gluten      Iodine      Welts from CT contrast, surgical scrub is ok.     Penicillins Hives       Problem List:    Patient Active Problem List    Diagnosis Date Noted     Muscle pain 10/20/2017     Priority: Medium     Increased CK       Chest tightness or pressure 10/03/2016     Priority: Medium     Hyperlipidemia LDL goal <160 01/29/2015     Priority: Medium     DDD (degenerative disc disease), lumbar 06/24/2014     Priority: Medium     [place] [facility] Following       Moderate persistent asthma without complication 07/08/2013     Priority: Medium     History of kidney cancer 07/08/2013     Priority: Medium     Left kidney was removed i n1996       History of melanoma in situ 10/17/2012     Priority: Medium     Eczema 10/17/2012     Priority: Medium     Osteopenia 06/15/2012     Priority: Medium     Chronic fatigue 10/04/2011     Priority: Medium     Dry eye  syndrome 08/24/2011     Priority: Medium     Chronic low back pain 07/07/2011     Priority: Medium     GERD (gastroesophageal reflux disease) 05/11/2011     Priority: Medium     Renal cancer (H) 05/05/2011     Priority: Medium     Leukoplakia of oral mucosa, including tongue 04/18/2011     Priority: Medium     ERIC (generalized anxiety disorder)      Priority: Medium     History of skin cancer 11/09/2010     Priority: Medium     Chronic rhinitis 05/03/2005     Priority: Medium     Allergic rhinitis due to pollen 05/03/2005     Priority: Medium     Sinusitis, chronic 05/03/2005     Priority: Medium     Problem list name updated by automated process. Provider to review       Allergic state 04/19/2005     Priority: Medium     Problem list name updated by automated process. Provider to review       Acquired hypothyroidism 04/12/2005     Priority: Medium     Problem list name updated by automated process. 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     FUSION LUMBAR ANTERIOR TWO LEVELS  4/13/2015     GYN SURGERY  04/08/1999    hysterectomy.  LAVH     HYSTERECTOMY, PAP NO LONGER INDICATED       SOFT TISSUE SURGERY       chest-melonoma     SURGICAL HISTORY OF -   3/1996    Left nephrectomy-renal cell CA       Family History:    Family History   Problem Relation Age of Onset     Diabetes Mother      Cardiovascular Mother      Lipids Mother      Depression Mother      Hypertension Father      Lipids Father      Obesity Father      Macular Degeneration Father      Sleep Apnea Father      Cancer Maternal Grandmother         kind unknown had sores on legs     Eczema Maternal Grandmother      Eczema Maternal Grandfather      Breast Cancer Paternal Grandmother      Cancer Paternal Grandmother         breast     Hypertension Paternal Grandfather      Cardiovascular Paternal Grandfather         heart attack     Hypertension Brother      Thyroid Disease Sister      Hypertension Sister      Depression Sister      Allergies Sister      Allergies Son      Eczema Son      Lipids Sister      Thyroid Disease Sister      Neurologic Disorder Sister      Depression Sister      Respiratory Son      Sleep Apnea Son      Glaucoma No family hx of      Cerebrovascular Disease No family hx of        Social History:  Marital Status:   [2]  Social History     Tobacco Use     Smoking status: Former Smoker     Packs/day: 2.00     Years: 15.00     Pack years: 30.00     Last attempt to quit: 3/26/1996     Years since quittin.2     Smokeless tobacco: Never Used   Substance Use Topics     Alcohol use: No     Comment: None now.  Rare in past.      Drug use: No        Medications:    predniSONE (DELTASONE) 10 MG tablet  acetaminophen (TYLENOL) 325 MG tablet  ADVAIR DISKUS 250-50 MCG/DOSE inhaler  albuterol (PROAIR HFA/PROVENTIL HFA/VENTOLIN HFA) 108 (90 Base) MCG/ACT inhaler  albuterol (PROAIR HFA/PROVENTIL HFA/VENTOLIN HFA) 108 (90 BASE) MCG/ACT Inhaler  albuterol (PROVENTIL) (2.5 MG/3ML) 0.083% neb solution  celecoxib (CELEBREX) 100 MG capsule  Cholecalciferol (VITAMIN D3 PO)  Cyanocobalamin (VITAMIN B 12 PO)  DULoxetine (CYMBALTA) 60 MG  capsule  fexofenadine (ALLEGRA) 180 MG tablet  GLUCOSAMINE SULFATE PO  levothyroxine (SYNTHROID/LEVOTHROID) 100 MCG tablet  levothyroxine (SYNTHROID/LEVOTHROID) 125 MCG tablet  MAGNESIUM OXIDE PO  Multiple Vitamins-Minerals (MULTIVITAMIN ADULT PO)  order for DME  OVER-THE-COUNTER  ranitidine (ZANTAC) 150 MG tablet  triamcinolone (KENALOG) 0.1 % external ointment  zolpidem (AMBIEN) 5 MG tablet      Review of Systems  CONSTITUTIONAL:NEGATIVE for fever, chills, change in weight  INTEGUMENTARY/SKIN: POSITIVE for pruritic rash   EYES: NEGATIVE for vision changes or irritation  ENT/MOUTH: NEGATIVE for sore throat, nasal congestion, ear pain,   RESP:NEGATIVE for significant cough or SOB  GI: NEGATIVE for abdominal pain, diarrhea, nausea and vomiting  Physical Exam   BP: 137/85  Pulse: 93  Temp: 97.2  F (36.2  C)  Resp: 16  Weight: 102.1 kg (225 lb)  SpO2: 98 %  Physical Exam  GENERAL APPEARANCE: healthy, alert and no distress  EYES: EOMI,  PERRL, conjunctiva clear  HENT: Posterior pharynx nonerythematous without exudate  NECK: supple, nontender, no lymphadenopathy  SKIN: Numerous papulovesicular plaques on the upper and lower extremities, face bilaterally  ED Course        Procedures        Critical Care time:  none            No results found for this or any previous visit (from the past 24 hour(s)).  Medications - No data to display    Assessments & Plan (with Medical Decision Making)     I have reviewed the nursing notes.    I have reviewed the findings, diagnosis, plan and need for follow up with the patient.       New Prescriptions    PREDNISONE (DELTASONE) 10 MG TABLET    Take 4 tablets daily for 5 days,  take 2 tablets daily for 3 days, take 1 tablet daily for 3 days, take half a tablet for 3 days.       Final diagnoses:   Contact dermatitis due to poison ivy     60-year-old female presents to the emergency department with concern over suspected poison ivy given 3-day history of pruritic rash which has been  gradually spreading since onset.  She had stable vital signs upon arrival.  Physical exam findings are consistent with contact dermatitis likely secondary to poison ivy.  Do not suspect infectious etiology at this time.  She was discharged home stable with instructions for continued symptomatic treatment as needed with calamine, OTC antihistamine.  Prescription for prednisone given.  Follow up if no improvement in three days.  Worrisome reasons to return to ER/UC sooner discussed.     Disclaimer: This note consists of symbols derived from keyboarding, dictation, and/or voice recognition software. As a result, there may be errors in the script that have gone undetected.  Please consider this when interpreting information found in the chart.      6/22/2020   [facility] EMERGENCY DEPARTMENT     Dalila Aguilar PA-C  06/22/20 [id number]

## 2020-07-03 ENCOUNTER — OFFICE VISIT (OUTPATIENT)
Dept: NEUROLOGY | Facility: CLINIC | Age: 60
End: 2020-07-03
Payer: OTHER GOVERNMENT

## 2020-07-03 VITALS
RESPIRATION RATE: 16 BRPM | HEART RATE: 80 BPM | OXYGEN SATURATION: 96 % | SYSTOLIC BLOOD PRESSURE: 122 MMHG | DIASTOLIC BLOOD PRESSURE: 67 MMHG

## 2020-07-03 DIAGNOSIS — G62.9 NEUROPATHY: Primary | ICD-10-CM

## 2020-07-03 ASSESSMENT — PAIN SCALES - GENERAL: PAINLEVEL: NO PAIN (0)

## 2020-07-03 NOTE — NURSING NOTE
Chief Complaint   Patient presents with     Tremors     Balance/ Vestibular     [facility] RETURN ESSENTIAL F/U       Mp Hua, EMT

## 2020-07-03 NOTE — LETTER
7/3/2020       RE: Teresa Fernandez  [address] [facility] [id number]     Dear Colleague,    Thank you for referring your patient, Teresa Fernandez, to the [facility] NEUROLOGY at [facility]. Please see a copy of my visit note below.    Service Date: 07/03/2020      HISTORY OF PRESENT ILLNESS:  This patient is seen in followup with problems of dizziness and gait imbalance with falls.  I most recently saw the patient about a month ago.  She continues to have the concerns.  She had another fall about a week ago.  She was getting out of the boat.  She was stepping onto rocks and down she went.  She bruised her low back.  She does have tingling from her neck up into her head.  She has essential tremor.  The main issue though was her balance.  She does not really get a dizzy or vertigo type sensation.  She has been to physical therapy but has not found that beneficial.  She does not really have much in the way of neck or low back pain.  Seven years ago she did have a lumbar spine fusion and that is working well.  Her left foot is more numb and tingly than the right.  She does have a history of dry mouth and dry eyes.  She has had an extensive rheumatologic evaluation for joint pain.  This has been nondiagnostic.  She had extensive lab testing in this regard.  Of note, her Sjogren antibodies in 09/2019 were both negative.  DIEGO has been borderline positive.  She has had several tests for inflammation including C-reactive protein and sed rate which have been negative.  A vitamin B12 level in March was 770.        Diabetes does run in her family and her mother had diabetes.      PHYSICAL EXAMINATION:  On examination, the patient is cooperative and in no distress.  Her blood pressure is 122/67.  She has good strength in the legs and feet.  Muscle stretch reflexes are low amplitude at the knee and trace at the ankle.  Toes are downgoing.  Sensory exam shows reduced-absent vibration  in the toes, especially on the left.  Vibration is also reduced at the ankles.  Temperature sense is reduced, more on the left foot than in the right foot.  Proprioceptive sense seems to be impaired bilaterally.  Romberg sign is minimally present.  She can get up on her heels and toes without much difficulty.  Her tandem gait is clumsy.      ASSESSMENT:   1.  Gait imbalance with occasional fall.   2.  History of essential tremor.      DISCUSSION:  The patient is seen in followup with the above issues.  Her main concern has to do with her clumsiness and occasional falling down.  She falls down under various circumstances.  This could represent neuropathy.  For evaluation, she has had extensive lab testing to date.  I am going to do some additional testing including glucose tolerance and also protein electrophoresis and immune fixation.  I am going to obtain electrodiagnostic testing of the legs with attention to the left leg especially.  No treatment is being initiated.  I did review with her the differential diagnosis of neuropathy.  She should be seen in followup in clinic in the next month or two after electrodiagnostic testing.  I encouraged her to call if there are questions or problems in the meantime.      This was a 25-minute appointment, more than half of which was spent in counseling and coordination of care.         LOREN SAUCEDO MD             D: 2020   T: 2020   MT: wily      Name:     RILEY CANDELARIO   MRN:      [id number]        Account:      [id number]   :      1960           Service Date: 2020      Document: [id number]

## 2020-07-03 NOTE — PROGRESS NOTES
Service Date: 07/03/2020      HISTORY OF PRESENT ILLNESS:  This patient is seen in followup with problems of dizziness and gait imbalance with falls.  I most recently saw the patient about a month ago.  She continues to have the concerns.  She had another fall about a week ago.  She was getting out of the boat.  She was stepping onto rocks and down she went.  She bruised her low back.  She does have tingling from her neck up into her head.  She has essential tremor.  The main issue though was her balance.  She does not really get a dizzy or vertigo type sensation.  She has been to physical therapy but has not found that beneficial.  She does not really have much in the way of neck or low back pain.  Seven years ago she did have a lumbar spine fusion and that is working well.  Her left foot is more numb and tingly than the right.  She does have a history of dry mouth and dry eyes.  She has had an extensive rheumatologic evaluation for joint pain.  This has been nondiagnostic.  She had extensive lab testing in this regard.  Of note, her Sjogren antibodies in 09/2019 were both negative.  DIEGO has been borderline positive.  She has had several tests for inflammation including C-reactive protein and sed rate which have been negative.  A vitamin B12 level in March was 770.        Diabetes does run in her family and her mother had diabetes.      PHYSICAL EXAMINATION:  On examination, the patient is cooperative and in no distress.  Her blood pressure is 122/67.  She has good strength in the legs and feet.  Muscle stretch reflexes are low amplitude at the knee and trace at the ankle.  Toes are downgoing.  Sensory exam shows reduced-absent vibration in the toes, especially on the left.  Vibration is also reduced at the ankles.  Temperature sense is reduced, more on the left foot than in the right foot.  Proprioceptive sense seems to be impaired bilaterally.  Romberg sign is minimally present.  She can get up on her heels and  toes without much difficulty.  Her tandem gait is clumsy.      ASSESSMENT:   1.  Gait imbalance with occasional fall.   2.  History of essential tremor.      DISCUSSION:  The patient is seen in followup with the above issues.  Her main concern has to do with her clumsiness and occasional falling down.  She falls down under various circumstances.  This could represent neuropathy.  For evaluation, she has had extensive lab testing to date.  I am going to do some additional testing including glucose tolerance and also protein electrophoresis and immune fixation.  I am going to obtain electrodiagnostic testing of the legs with attention to the left leg especially.  No treatment is being initiated.  I did review with her the differential diagnosis of neuropathy.  She should be seen in followup in clinic in the next month or two after electrodiagnostic testing.  I encouraged her to call if there are questions or problems in the meantime.      This was a 25-minute appointment, more than half of which was spent in counseling and coordination of care.         LOREN SAUCEDO MD             D: 2020   T: 2020   MT: wily      Name:     RILEY CANDELARIO   MRN:      [id number]        Account:      [id number]   :      1960           Service Date: 2020      Document: [id number]

## 2020-07-09 DIAGNOSIS — G62.9 NEUROPATHY: ICD-10-CM

## 2020-07-09 LAB
GLUCOSE 2H P 75 G GLC PO SERPL-MCNC: 94 MG/DL (ref 60–200)
GLUCOSE PRE 75 G GLC PO SERPL-MCNC: 94 MG/DL (ref 60–126)

## 2020-07-09 PROCEDURE — 84165 PROTEIN E-PHORESIS SERUM: CPT | Performed by: PSYCHIATRY & NEUROLOGY

## 2020-07-09 PROCEDURE — 82950 GLUCOSE TEST: CPT | Performed by: PSYCHIATRY & NEUROLOGY

## 2020-07-09 PROCEDURE — 82784 ASSAY IGA/IGD/IGG/IGM EACH: CPT | Performed by: PSYCHIATRY & NEUROLOGY

## 2020-07-09 PROCEDURE — 86334 IMMUNOFIX E-PHORESIS SERUM: CPT | Performed by: PSYCHIATRY & NEUROLOGY

## 2020-07-09 PROCEDURE — 82947 ASSAY GLUCOSE BLOOD QUANT: CPT | Performed by: PSYCHIATRY & NEUROLOGY

## 2020-07-09 PROCEDURE — 00000402 ZZHCL STATISTIC TOTAL PROTEIN: Performed by: PSYCHIATRY & NEUROLOGY

## 2020-07-09 PROCEDURE — 36415 COLL VENOUS BLD VENIPUNCTURE: CPT | Performed by: PSYCHIATRY & NEUROLOGY

## 2020-07-10 LAB
ALBUMIN SERPL ELPH-MCNC: 3.9 G/DL (ref 3.7–5.1)
ALPHA1 GLOB SERPL ELPH-MCNC: 0.3 G/DL (ref 0.2–0.4)
ALPHA2 GLOB SERPL ELPH-MCNC: 0.8 G/DL (ref 0.5–0.9)
B-GLOBULIN SERPL ELPH-MCNC: 0.7 G/DL (ref 0.6–1)
GAMMA GLOB SERPL ELPH-MCNC: 0.6 G/DL (ref 0.7–1.6)
IGA SERPL-MCNC: 60 MG/DL (ref 84–499)
IGG SERPL-MCNC: 650 MG/DL (ref 610–1616)
IGM SERPL-MCNC: 49 MG/DL (ref 35–242)
M PROTEIN SERPL ELPH-MCNC: 0 G/DL
PROT PATTERN SERPL ELPH-IMP: ABNORMAL
PROT PATTERN SERPL IFE-IMP: ABNORMAL

## 2020-07-15 ENCOUNTER — MYC MEDICAL ADVICE (OUTPATIENT)
Dept: FAMILY MEDICINE | Facility: CLINIC | Age: 60
End: 2020-07-15

## 2020-07-17 ENCOUNTER — TELEPHONE (OUTPATIENT)
Dept: SLEEP MEDICINE | Facility: CLINIC | Age: 60
End: 2020-07-17

## 2020-07-17 NOTE — TELEPHONE ENCOUNTER
Patient was called to schedule in lab sleep study at [facility].     Voicemail left requesting call back.

## 2020-07-22 ENCOUNTER — OFFICE VISIT (OUTPATIENT)
Dept: NEUROLOGY | Facility: CLINIC | Age: 60
End: 2020-07-22
Attending: PSYCHIATRY & NEUROLOGY
Payer: OTHER GOVERNMENT

## 2020-07-22 DIAGNOSIS — G62.9 NEUROPATHY: ICD-10-CM

## 2020-07-22 NOTE — LETTER
7/22/2020       RE: Teresa Fernandez  [address] [facility] [id number]     Dear Colleague,    Thank you for referring your patient, Teresa Fernandez, to the [facility] EMG at [facility]. Please see a copy of my visit note below.        [facility]  Electrodiagnostic Laboratory    Nerve Conduction & EMG Report          Patient:       Teresa Sousa  Patient ID:    [id number]  Gender:        Female  YOB: 1960  Age:           60 Years 5 Months        History & Examination:  Teresa Sousa is a 60-year-old woman who presents for an EMG ordered by Dr. Callahan to assess numbness.  She reports the numbness is more severe in the left leg.  The patient reports the most significant numbness is in the bottom of both feet worse on the left than the right.  It has led to 2 falls.  She denies any weakness.  She has some chronic low back pain but no radicular features.    On strength testing the patient has normal strength in the lower extremities.  Reflexes at the knees are present and bilaterally symmetric within normal range.  Ankle jerks are reduced bilaterally and plantar responses are flexor.  Vibration sensation is normal in both lower extremities temperature sensation is reduced on the bottom of the feet bilaterally.    Techniques: Motor and sensory conduction studies were done with surface recording electrodes. Temperature was monitored and recorded throughout the study. Upper extremities were maintained at a temperature of 32 degrees Centigrade or higher.  Lower extremities were maintained at a temperature of 31degrees Centigrade or higher. EMG was done with a concentric needle electrode.        Results:  Sensory nerve conduction studies:  Bilateral sural studies and the left superficial peroneal are considered normal for age.    Motor nerve conduction studies:  Left deep peroneal motor study reveals normal distal latency, borderline amplitude and  normal conduction velocity.  Right deep peroneal motor study is within normal limits.  Left tibial motor study is within normal limits.  Right tibial motor nerve study reveals a normal distal latency and borderline to low amplitude.    Left tibial F wave is within normal limits.    Needle examination:  Needle examination of the left lower extremity is within normal limits.  Right lower extremity reveals some fibrillation potentials and positive waves seen in the right medial gastrocnemius with normal activation.    Interpretation:  The EMG shows mild abnormalities.  Insertional abnormality seen in the right medial gastrocnemius and a borderline to low tibial motor amplitude in the right leg could be consistent with an S1 radiculopathy although gluteus mirella did not show any changes and clinical correlation is advised.  The low tibial motor amplitude on the right and peroneal amplitude on the left could also be related to body habitus and submaximal stimulation.  Clinical correlation is strongly advised.      EMG Physician:  Milena Amaya MD        Sensory NCS      Nerve / Sites Rec. Site Onset Peak NP Amp Ref. PP Amp Dist Marco A Ref. Temp     ms ms  V  V  V cm m/s m/s  C   L SURAL - Lat Mall 60      Calf Ankle 2.92 3.80 6.4 5.0 3.4 14 48.0 38.0 31.5      Calf Ankle 2.71 3.49 6.3 5.0 9.5 14 51.7  31.5   R SURAL - Lat Mall 60      Calf Ankle 2.97 3.75 6.8 5.0 3.4 14 47.2 38.0 30.1      Calf Ankle 2.45 3.23 6.1 5.0 3.2 14 57.2  30.1   L SUP PERONEAL      Lat Leg Mauro 2.50 3.18 4.7  1.6 12.5 50.0 38.0 31.6       Motor NCS      Nerve / Sites Rec. Site Lat Ref. Amp Ref. Rel Amp Dist Marco A Ref. Dur. Area Temp.     ms ms mV mV % cm m/s m/s ms %  C   L DEEP PERONEAL - EDB 60      Ankle EDB 3.59 6.00 1.9 2.0 100 8   7.29 100 31.5      FibHead EDB 11.77  1.7  90.4 37 45.2 38.0 7.86 81.8 31.5      Pop Fos EDB 13.49  1.7  89.4 9 52.4 38.0 7.76 82.9 31.5   R DEEP PERONEAL - EDB 60      Ankle EDB 4.43 6.00 4.6 2.0 100 8   6.09 100  30.4   L TIBIAL - AH      Ankle AH 3.44 6.00 6.0 4.0 100 8   6.93 100 31.3      Pop Fos AH 13.13  3.3  55.5 44 45.4 38.0 7.86 66.6 31.3   R TIBIAL - AH      Ankle AH 3.80 6.00 3.9 4.0 100 8   6.67 100 31.3       F  Wave      Nerve Min F Lat Max F Lat Mean FLat Temp.    ms ms ms  C   L TIBIAL 54.37 58.59 56.09 31.3       EMG Summary Table     Spontaneous MUAP Recruitment    IA Fib PSW Fasc H.F. Amp Dur. PPP Pattern   L. TIB ANTERIOR N None None None None N N N N   L. GASTROCN (MED) N None None None None N N N N   L. TIB POSTERIOR N None None None None N N N N   L. EXT SIMONS LONG N None None None None N N N N   L. VAST LATERALIS N None None None None N N N N   R. TIB ANTERIOR N None None None None N N N N   R. GLUTEUS MAX N None None None None N N N N   R. GASTROCN (MED) 1+ 1+ 1+ None None N N N N

## 2020-07-22 NOTE — PROGRESS NOTES
[facility]  Electrodiagnostic Laboratory    Nerve Conduction & EMG Report          Patient:       Teresa Sousa  Patient ID:    [id number]  Gender:        Female  YOB: 1960  Age:           60 Years 5 Months        History & Examination:  Teresa Sousa is a 60-year-old woman who presents for an EMG ordered by Dr. Callahan to assess numbness.  She reports the numbness is more severe in the left leg.  The patient reports the most significant numbness is in the bottom of both feet worse on the left than the right.  It has led to 2 falls.  She denies any weakness.  She has some chronic low back pain but no radicular features.    On strength testing the patient has normal strength in the lower extremities.  Reflexes at the knees are present and bilaterally symmetric within normal range.  Ankle jerks are reduced bilaterally and plantar responses are flexor.  Vibration sensation is normal in both lower extremities temperature sensation is reduced on the bottom of the feet bilaterally.    Techniques: Motor and sensory conduction studies were done with surface recording electrodes. Temperature was monitored and recorded throughout the study. Upper extremities were maintained at a temperature of 32 degrees Centigrade or higher.  Lower extremities were maintained at a temperature of 31degrees Centigrade or higher. EMG was done with a concentric needle electrode.        Results:  Sensory nerve conduction studies:  Bilateral sural studies and the left superficial peroneal are considered normal for age.    Motor nerve conduction studies:  Left deep peroneal motor study reveals normal distal latency, borderline amplitude and normal conduction velocity.  Right deep peroneal motor study is within normal limits.  Left tibial motor study is within normal limits.  Right tibial motor nerve study reveals a normal distal latency and borderline to low amplitude.    Left tibial F wave is within normal  limits.    Needle examination:  Needle examination of the left lower extremity is within normal limits.  Right lower extremity reveals some fibrillation potentials and positive waves seen in the right medial gastrocnemius with normal activation.    Interpretation:  The EMG shows mild abnormalities.  Insertional abnormality seen in the right medial gastrocnemius and a borderline to low tibial motor amplitude in the right leg could be consistent with an S1 radiculopathy although gluteus mirella did not show any changes and clinical correlation is advised.  The low tibial motor amplitude on the right and peroneal amplitude on the left could also be related to body habitus and submaximal stimulation.  Clinical correlation is strongly advised.      EMG Physician:  Milena Amaya MD        Sensory NCS      Nerve / Sites Rec. Site Onset Peak NP Amp Ref. PP Amp Dist Marco A Ref. Temp     ms ms  V  V  V cm m/s m/s  C   L SURAL - Lat Mall 60      Calf Ankle 2.92 3.80 6.4 5.0 3.4 14 48.0 38.0 31.5      Calf Ankle 2.71 3.49 6.3 5.0 9.5 14 51.7  31.5   R SURAL - Lat Mall 60      Calf Ankle 2.97 3.75 6.8 5.0 3.4 14 47.2 38.0 30.1      Calf Ankle 2.45 3.23 6.1 5.0 3.2 14 57.2  30.1   L SUP PERONEAL      Lat Leg Mauro 2.50 3.18 4.7  1.6 12.5 50.0 38.0 31.6       Motor NCS      Nerve / Sites Rec. Site Lat Ref. Amp Ref. Rel Amp Dist Marco A Ref. Dur. Area Temp.     ms ms mV mV % cm m/s m/s ms %  C   L DEEP PERONEAL - EDB 60      Ankle EDB 3.59 6.00 1.9 2.0 100 8   7.29 100 31.5      FibHead EDB 11.77  1.7  90.4 37 45.2 38.0 7.86 81.8 31.5      Pop Fos EDB 13.49  1.7  89.4 9 52.4 38.0 7.76 82.9 31.5   R DEEP PERONEAL - EDB 60      Ankle EDB 4.43 6.00 4.6 2.0 100 8   6.09 100 30.4   L TIBIAL - AH      Ankle AH 3.44 6.00 6.0 4.0 100 8   6.93 100 31.3      Pop Fos AH 13.13  3.3  55.5 44 45.4 38.0 7.86 66.6 31.3   R TIBIAL - AH      Ankle AH 3.80 6.00 3.9 4.0 100 8   6.67 100 31.3       F  Wave      Nerve Min F Lat Max F Lat Mean FLat Temp.    ms  ms ms  C   L TIBIAL 54.37 58.59 56.09 31.3       EMG Summary Table     Spontaneous MUAP Recruitment    IA Fib PSW Fasc H.F. Amp Dur. PPP Pattern   L. TIB ANTERIOR N None None None None N N N N   L. GASTROCN (MED) N None None None None N N N N   L. TIB POSTERIOR N None None None None N N N N   L. EXT SIMONS LONG N None None None None N N N N   L. VAST LATERALIS N None None None None N N N N   R. TIB ANTERIOR N None None None None N N N N   R. GLUTEUS MAX N None None None None N N N N   R. GASTROCN (MED) 1+ 1+ 1+ None None N N N N

## 2020-07-28 ENCOUNTER — TELEPHONE (OUTPATIENT)
Dept: NEUROLOGY | Facility: CLINIC | Age: 60
End: 2020-07-28

## 2020-07-28 NOTE — TELEPHONE ENCOUNTER
Reviewed EMG with pt.  Study shows some denervation right gastroc, but otherwise unremarkable and no evidence of neuropathy.  Will review clinical status at appt Friday.  She had low back surgery 5 years ago.

## 2020-07-31 ENCOUNTER — OFFICE VISIT (OUTPATIENT)
Dept: NEUROLOGY | Facility: CLINIC | Age: 60
End: 2020-07-31
Payer: OTHER GOVERNMENT

## 2020-07-31 VITALS
RESPIRATION RATE: 16 BRPM | HEART RATE: 84 BPM | SYSTOLIC BLOOD PRESSURE: 119 MMHG | OXYGEN SATURATION: 99 % | DIASTOLIC BLOOD PRESSURE: 78 MMHG

## 2020-07-31 DIAGNOSIS — G47.10 HYPERSOMNOLENCE: Primary | ICD-10-CM

## 2020-07-31 ASSESSMENT — PAIN SCALES - GENERAL: PAINLEVEL: MILD PAIN (2)

## 2020-07-31 NOTE — PROGRESS NOTES
Service Date: 07/31/2020      HISTORY OF PRESENT ILLNESS:  This patient is seen in followup with issues of gait imbalance and tremor as well as sensory disturbance.  I have seen this patient on several occasions over the last few months, most recently 4 weeks ago.  She is here today with her , Vadim.  She continues to have issues with her impaired balance.  It can come on unprovoked.  She has not fallen since her last visit.  On that occasion, she had fallen trying to get out of a boat.  She is being extra careful.  If she over exerts herself, then her balance seems to be more compromised.  She also gets a sensation at the base of her neck radiating into the occiput.  It is a tingling type sensation.  This can begin in the morning.  Yesterday, she had a situation where her left leg was shaking when she was at work.  She has had that going on a couple of years.  She does have a history of essential tremor.      At the time of her last visit, the question was raised whether or not she might have neuropathy to explain problems with balance.  She did have a glucose tolerance test, protein electrophoresis, and immune fixation.  These studies were all normal.  She did have electrodiagnostic testing of the legs.  I have reviewed that report with her in detail.  There are some denervation potentials in the right gastrocnemius muscle but not on the left, which is the side of her greater symptoms.  She did have low back surgery 5 years ago.  There were no chronic motor unit changes noted on the EMG.  There was no evidence of neuropathy.      She had an MRI scan of the brain performed at my direction and the study was unremarkable.  She had an MRI of the cervical spine in 11/2017.  I reviewed those images with her today.  The study shows degenerative change, but no compromise of neural structures.      PHYSICAL EXAMINATION:  On exam, her blood pressure is 119/78.  Visual fields are full to confrontation.  Eye movements  are complete and conjugate without nystagmus.  There is no pronator drift.  Finger tapping, finger-nose-finger and heel-knee-shin are all normal.  Strength is well preserved in the arms, hands, legs and feet.  Muscle stretch reflexes are low amplitude and symmetric in the arms and knees and absent at the ankles.  Toes are downgoing.  Sensory exam shows reduced vibratory sense at the left great toe.  Vibration preserved at the ankles.  Temperature sense preserved in the feet and hands.  Romberg sign is absent.  She can get up on her heels and toes with some difficulty and is clumsy performing a tandem gait.      ASSESSMENT:   1.  Gait imbalance with occasional fall.   2.  History of essential tremor.   3.  Hypersomnolence.   4.  Sensory disturbance.   5.  Status post lumbar spine surgery 5 years ago.      DISCUSSION:  The patient is seen with the above problem list.  The etiology of her gait imbalance with occasional fall has not yet been determined.  She has had an extensive workup in this regard that has been nondiagnostic.  One of the issues that is unresolved i s her hypersomnolence.  We talked about that at her initial visit in March.  She did have a virtual visit with Sleep Medicine and was recommended to have a sleep study.  This has not yet been arranged.  She does have a history suspicious for sleep apnea.  She will have to proceed with the sleep study and I have put in a referral to that end.  I will not make an appointment for her to see Neurology at this point in followup.  We should await the results of the sleep study.  If the issues persist, she will have to follow up in Neurology Clinic for reevaluation.  The patient is in agreement with the plan.      This was a 25-minute appointment, more than half of which was spent in counseling and coordination of care.         LOREN SAUCEDO MD             D: 07/31/2020   T: 07/31/2020   MT: wily      Name:     RILEY CANDELARIO   MRN:      [id number]         Account:      [id number]   :      1960           Service Date: 2020      Document: [id number]

## 2020-07-31 NOTE — LETTER
7/31/2020       RE: Teresa Fernandez  [id number] [facility] [id number]     Dear Colleague,    Thank you for referring your patient, Teresa Fernandez, to the [facility] NEUROLOGY at [facility]. Please see a copy of my visit note below.    Service Date: 07/31/2020      HISTORY OF PRESENT ILLNESS:  This patient is seen in followup with issues of gait imbalance and tremor as well as sensory disturbance.  I have seen this patient on several occasions over the last few months, most recently 4 weeks ago.  She is here today with her , Vadim.  She continues to have issues with her impaired balance.  It can come on unprovoked.  She has not fallen since her last visit.  On that occasion, she had fallen trying to get out of a boat.  She is being extra careful.  If she over exerts herself, then her balance seems to be more compromised.  She also gets a sensation at the base of her neck radiating into the occiput.  It is a tingling type sensation.  This can begin in the morning.  Yesterday, she had a situation where her left leg was shaking when she was at work.  She has had that going on a couple of years.  She does have a history of essential tremor.      At the time of her last visit, the question was raised whether or not she might have neuropathy to explain problems with balance.  She did have a glucose tolerance test, protein electrophoresis, and immune fixation.  These studies were all normal.  She did have electrodiagnostic testing of the legs.  I have reviewed that report with her in detail.  There are some denervation potentials in the right gastrocnemius muscle but not on the left, which is the side of her greater symptoms.  She did have low back surgery 5 years ago.  There were no chronic motor unit changes noted on the EMG.  There was no evidence of neuropathy.      She had an MRI scan of the brain performed at my direction and the study was unremarkable.  She had  an MRI of the cervical spine in 11/2017.  I reviewed those images with her today.  The study shows degenerative change, but no compromise of neural structures.      PHYSICAL EXAMINATION:  On exam, her blood pressure is 119/78.  Visual fields are full to confrontation.  Eye movements are complete and conjugate without nystagmus.  There is no pronator drift.  Finger tapping, finger-nose-finger and heel-knee-shin are all normal.  Strength is well preserved in the arms, hands, legs and feet.  Muscle stretch reflexes are low amplitude and symmetric in the arms and knees and absent at the ankles.  Toes are downgoing.  Sensory exam shows reduced vibratory sense at the left great toe.  Vibration preserved at the ankles.  Temperature sense preserved in the feet and hands.  Romberg sign is absent.  She can get up on her heels and toes with some difficulty and is clumsy performing a tandem gait.      ASSESSMENT:   1.  Gait imbalance with occasional fall.   2.  History of essential tremor.   3.  Hypersomnolence.   4.  Sensory disturbance.   5.  Status post lumbar spine surgery 5 years ago.      DISCUSSION:  The patient is seen with the above problem list.  The etiology of her gait imbalance with occasional fall has not yet been determined.  She has had an extensive workup in this regard that has been nondiagnostic.  One of the issues that is unresolved i s her hypersomnolence.  We talked about that at her initial visit in March.  She did have a virtual visit with Sleep Medicine and was recommended to have a sleep study.  This has not yet been arranged.  She does have a history suspicious for sleep apnea.  She will have to proceed with the sleep study and I have put in a referral to that end.  I will not make an appointment for her to see Neurology at this point in followup.  We should await the results of the sleep study.  If the issues persist, she will have to follow up in Neurology Clinic for reevaluation.  The patient is in  agreement with the plan.      This was a 25-minute appointment, more than half of which was spent in counseling and coordination of care.         LOREN SAUCEDO MD             D: 2020   T: 2020   MT: wily      Name:     RILEY CANDELARIO   MRN:      [id number]        Account:      [id number]   :      1960           Service Date: 2020      Document: [id number]

## 2020-08-05 NOTE — TELEPHONE ENCOUNTER
Received referral from Carter Callahan MD in  NEUROLOGY for sleep consultation, patient completed this on 4/10/2020. Order in [facility] for sleep study. Message to patient to schedule.

## 2020-08-31 ENCOUNTER — TELEPHONE (OUTPATIENT)
Dept: FAMILY MEDICINE | Facility: CLINIC | Age: 60
End: 2020-08-31

## 2020-08-31 NOTE — TELEPHONE ENCOUNTER
Message        ----- Message -----    From: Teresa Fernandez    Sent: 8/28/2020   1:41 PM CDT    To: [facility] Reception Pool    Subject: Appointment Request                                 Appointment Request From: Teresa Fernandez       With Provider: HERIBERTO Cormier CNP [[facility]]       Preferred Date Range: Any date 8/28/2020 or later       Preferred Times: Any Time       Reason for visit: Request an Appointment       Comments:    I have Fibromyalgia My knee's are swollen and painful    anything we can do ??

## 2020-08-31 NOTE — TELEPHONE ENCOUNTER
Left message to schedule a virtual visit with her provider.  Fibromyalgia is not on her problem list. Tanvi ROMERO RN

## 2020-09-09 ENCOUNTER — THERAPY VISIT (OUTPATIENT)
Dept: SLEEP MEDICINE | Facility: CLINIC | Age: 60
End: 2020-09-09
Payer: OTHER GOVERNMENT

## 2020-09-09 DIAGNOSIS — E66.9 OBESITY (BMI 30-39.9): ICD-10-CM

## 2020-09-09 DIAGNOSIS — G47.30 OBSERVED SLEEP APNEA: ICD-10-CM

## 2020-09-09 DIAGNOSIS — R40.0 DAYTIME SOMNOLENCE: ICD-10-CM

## 2020-09-09 PROCEDURE — 95810 POLYSOM 6/> YRS 4/> PARAM: CPT | Performed by: INTERNAL MEDICINE

## 2020-09-17 ENCOUNTER — VIRTUAL VISIT (OUTPATIENT)
Dept: SLEEP MEDICINE | Facility: CLINIC | Age: 60
End: 2020-09-17
Payer: OTHER GOVERNMENT

## 2020-09-17 VITALS — BODY MASS INDEX: 33.45 KG/M2 | WEIGHT: 220 LBS

## 2020-09-17 DIAGNOSIS — G47.33 OSA (OBSTRUCTIVE SLEEP APNEA): Primary | ICD-10-CM

## 2020-09-17 LAB — SLPCOMP: NORMAL

## 2020-09-17 PROCEDURE — 99214 OFFICE O/P EST MOD 30 MIN: CPT | Mod: 95 | Performed by: INTERNAL MEDICINE

## 2020-09-17 NOTE — PROGRESS NOTES
"Teresa Fernandez is a 60 year old female who is being evaluated via a billable video visit.      The patient has been notified of following:     \"This video visit will be conducted via a call between you and your physician/provider. We have found that certain health care needs can be provided without the need for an in-person physical exam.  This service lets us provide the care you need with a video conversation.  If a prescription is necessary we can send it directly to your pharmacy.  If lab work is needed we can place an order for that and you can then stop by our lab to have the test done at a later time.    Video visits are billed at different rates depending on your insurance coverage.  Please reach out to your insurance provider with any questions.    If during the course of the call the physician/provider feels a video visit is not appropriate, you will not be charged for this service.\"    Patient has given verbal consent for Video visit? Yes  How would you like to obtain your AVS? MyChart  If you are dropped from the video visit, the video invite should be resent to: Text to cell phone: [phone]  Will anyone else be joining your video visit? No        Video-Visit Details    Type of service:  Video Visit    Video Start Time: 11:35 AM  Video End Time: 11:55 AM    Originating Location (pt. Location): Other [facility]    Distant Location (provider location):  [facility] / [place] Office  Platform used for Video Visit: Clement Allan MD    Chief complaint: Follow-up sleep study    History of Present Illness: 60-year-old female with history of asthma and renal cell carcinoma.  She was evaluated for excessive daytime sleepiness and balance issues with overnight polysomnography.  Those results were reviewed with her in detail today.  She reports that she continues to have some balance issues particularly when overexerted or overheated.  She recalls having some head tremor during " sleep study when transitioning from supine to right side.  I did open the  sleep study and review it the position changes but did not see any tremor however she did have the blanket all the way up to her head. No tremors were identified on EMG.  She did have significant obstructive sleep apnea worse in REM and supine.  Overall moderate in severity with significant hypoxemia particularly in rem sleep.  There were frequent periodic limb movements associated with arousals.  Sinus tachycardia with rates in the low 90s and sleep also noted.    Total score - [place]: 8 (9/10/2020  2:56 PM) (Less than 10 normal)     MARISOL 11 (normal 0-7, mild 8-14, moderate 15-21, severe 22-28)    Past Medical History:   Diagnosis Date     ALLERGIC RHINITIS NOS 4/12/2005     Anxiety      Arthritis     herniated disc     Bronchitis, not specified as acute or chronic      CHR MAXILLARY SINUSITIS 4/12/2005     Depressive disorder, not elsewhere classified      Eczema 10/17/2012     Environmental and seasonal allergies      GERD (gastroesophageal reflux disease)      Gluten intolerance      Malignant neoplasm of renal pelvis (H) 1995    Renal cell carcinoma     Melanoma (H) 06/2010     Other specified acquired hypothyroidism 4/12/2005     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     Penicillins Hives       Current Outpatient Medications   Medication     acetaminophen (TYLENOL) 325 MG tablet     ADVAIR DISKUS 250-50 MCG/DOSE inhaler     albuterol (PROAIR HFA/PROVENTIL HFA/VENTOLIN HFA) 108 (90 Base) MCG/ACT inhaler     albuterol (PROAIR HFA/PROVENTIL HFA/VENTOLIN HFA) 108 (90 BASE) MCG/ACT Inhaler     albuterol  (PROVENTIL) (2.5 MG/3ML) 0.083% neb solution     celecoxib (CELEBREX) 100 MG capsule     Cholecalciferol (VITAMIN D3 PO)     Cyanocobalamin (VITAMIN B 12 PO)     DULoxetine (CYMBALTA) 60 MG capsule     fexofenadine (ALLEGRA) 180 MG tablet     GLUCOSAMINE SULFATE PO     levothyroxine (SYNTHROID/LEVOTHROID) 100 MCG tablet     levothyroxine (SYNTHROID/LEVOTHROID) 125 MCG tablet     MAGNESIUM OXIDE PO     Multiple Vitamins-Minerals (MULTIVITAMIN ADULT PO)     order for DME     OVER-THE-COUNTER     ranitidine (ZANTAC) 150 MG tablet     triamcinolone (KENALOG) 0.1 % external ointment     No current facility-administered medications for this visit.        Social History     Socioeconomic History     Marital status:      Spouse name: Not on file     Number of children: Not on file     Years of education: Not on file     Highest education level: Not on file   Occupational History     Employer: [employer]     Employer: SELF   Social Needs     Financial resource strain: Not on file     Food insecurity     Worry: Not on file     Inability: Not on file     Transportation needs     Medical: Not on file     Non-medical: Not on file   Tobacco Use     Smoking status: Former Smoker     Packs/day: 2.00     Years: 15.00     Pack years: 30.00     Last attempt to quit: 3/26/1996     Years since quittin.4     Smokeless tobacco: Never Used   Substance and Sexual Activity     Alcohol use: No     Comment: None now.  Rare in past.      Drug use: No     Sexual activity: Yes     Partners: Male   Lifestyle     Physical activity     Days per week: Not on file     Minutes per session: Not on file     Stress: Not on file   Relationships     Social connections     Talks on phone: Not on file     Gets together: Not on file     Attends Mandaen service: Not on file     Active member of club or organization: Not on file     Attends meetings of clubs or organizations: Not on file     Relationship status: Not on file     Intimate partner  violence     Fear of current or ex partner: Not on file     Emotionally abused: Not on file     Physically abused: Not on file     Forced sexual activity: Not on file   Other Topics Concern     Parent/sibling w/ CABG, MI or angioplasty before 65F 55M? No   Social History Narrative    .  Lives with .  Makes medical supplies.     4 children - healthy    4 siblings - + FH depression, hypertension, chol, diabetes mellitus    No family of muscle problems.        Family History   Problem Relation Age of Onset     Diabetes Mother      Cardiovascular Mother      Lipids Mother      Depression Mother      Hypertension Father      Lipids Father      Obesity Father      Macular Degeneration Father      Sleep Apnea Father      Cancer Maternal Grandmother         kind unknown had sores on legs     Eczema Maternal Grandmother      Eczema Maternal Grandfather      Breast Cancer Paternal Grandmother      Cancer Paternal Grandmother         breast     Hypertension Paternal Grandfather      Cardiovascular Paternal Grandfather         heart attack     Hypertension Brother      Thyroid Disease Sister      Hypertension Sister      Depression Sister      Allergies Sister      Allergies Son      Eczema Son      Lipids Sister      Thyroid Disease Sister      Neurologic Disorder Sister      Depression Sister      Respiratory Son      Sleep Apnea Son      Glaucoma No family hx of      Cerebrovascular Disease No family hx of          EXAM: Alert, no distress sitting in a car  Wt 99.8 kg (220 lb)   BMI 33.45 kg/m    GENERAL: Healthy, alert and no distress  EYES: Eyes grossly normal to inspection.  No discharge or erythema, or obvious scleral/conjunctival abnormalities.  RESP: No audible wheeze, cough, or visible cyanosis.  No visible retractions or increased work of breathing.    SKIN: Visible skin clear. No significant rash, abnormal pigmentation or lesions.  NEURO: Cranial nerves grossly intact.  Mentation and speech  appropriate for age.  PSYCH: Mentation appears normal, affect normal/bright, judgement and insight intact, normal speech and appearance well-groomed.    PSG 9/9/2020  Weight 225 lbs BMI 34.5  AHI 19.8, RDI 26.4, Time below 88% 40 min (REM)  PLMAI 16.5  Sinus tach    ASSESSMENT:  60-year-old female with excessive daytime sleepiness balance issues during the day.  She has overall moderate obstructive sleep apnea with associated hypoxemia particularly in REM sleep.  No lateral REM was identified.  I suspect she would continue to have sleep apnea in lateral REM and therefore position restriction would not be adequate treatment.  Optimal treatment could likely be achieved with auto titrating CPAP.  Patient formally has normal chest x-ray and PFTs and does not require further evaluation of hypoxemia at this point.      PLAN:  Recommended Auto titrating CPAP.  Patient is interested in this.  Will place orders.  She will be enrolled in the sleep therapy management program for close follow-up and adjustment of pressures and support.  This was reviewed with her.  She should follow-up with me approximately 7 weeks after starting CPAP.  Did  her that this may improve her sleep quality and daytime symptoms but might not completely treat her concerns about balance or tremor.  Patient is agreeable with this plan.  PCP is instructions for further details.        Katerine Allan M.D.  Pulmonary/Critical Care/Sleep Medicine    [facility]   Floor 1, [address]   [address] [place] [address]. [place] [address]   Appointments: [phone]    The above note was dictated using voice recognition software and may include typographical errors. Please contact the author for any clarifications.

## 2020-09-17 NOTE — PROCEDURES
" SLEEP STUDY INTERPRETATION  DIAGNOSTIC POLYSOMNOGRAPHY REPORT      Patient: RILEY CANDELARIO  YOB: 1960  Study Date: 9/9/2020  MRN: [id number]  Referring Provider: Self  Ordering Provider: Katerine Allan MD    Indications for Polysomnography: The patient is a 60 year old Female who is 5' 8\" and weighs 225.0 lbs. Her BMI is 34.5, [place] sleepiness scale 13 and neck circumference is 38 cm. Relevant medical history includes renal cell carcinoma and asthma. A diagnostic polysomnogram was performed to evaluate for sleep apnea.    Polysomnogram Data: A full night polysomnogram recorded the standard physiologic parameters including EEG, EOG, EMG, ECG, nasal and oral airflow. Respiratory parameters of chest and abdominal movements were recorded with respiratory inductance plethysmography. Oxygen saturation was recorded by pulse oximetry. Hypopnea scoring rule used: 1B 4%.    Sleep Architecture: Decreased sleep efficiency with increased arousal index and delay sleep latency.  The total recording time of the polysomnogram was 492.3 minutes. The total sleep time was 396.0 minutes. Sleep latency was increased at 55.0 minutes with the use of a sleep aid (zolpidem 5 mg). REM latency was 236.5 minutes. Arousal index was increased at 46.8 arousals per hour. Sleep efficiency was decreased at 80.4%. Wake after sleep onset was 31.0 minutes. The patient spent 9.8% of total sleep time in Stage N1, 38.9% in Stage N2, 31.9% in Stage N3, and 19.3% in REM. Time in REM supine was 76.5 minutes.    Respiration: Moderately severe obstructive sleep apnea with associated hypoxemia in REM.    Events ? The polysomnogram revealed a presence of 1 obstructive, 3 central, and 2 mixed apneas resulting in an apnea index of 0.9 events per hour. There were 125 obstructive hypopneas and - central hypopneas resulting in an obstructive hypopnea index of 18.9 and central hypopnea index of - events per hour. The combined apnea/hypopnea index " was 19.8 events per hour (central apnea/hypopnea index was 0.5 events per hour). The REM AHI was 36.9 events per hour. The supine AHI was 22.0 events per hour. The RERA index was 6.5 events per hour.  The RDI was 26.4 events per hour.    Snoring - was reported as mild to moderate.    Respiratory rate and pattern - was notable for normal respiratory rate and pattern.    Sustained Sleep Associated Hypoventilation - Transcutaneous carbon dioxide monitoring was not used, however significant hypoventilation was not suggested by oximetry.    Sleep Associated Hypoxemia - (Greater than 5 minutes O2 sat at or below 88%) was present. Baseline oxygen saturation was 91.6%. Lowest oxygen saturation was 79.5%. Time spent less than or equal to 88% was 40.1 minutes. Time spent less than or equal to 89% was 51.6 minutes.    Movement Activity: Frequent periodic limb movements with associated arousals.    Periodic Limb Activity - There were 335 PLMs during the entire study. The PLM index was 50.8 movements per hour. The PLM Arousal Index was 16.5 per hour.    REM EMG Activity - Excessive transient/sustained muscle activity was not present.    Nocturnal Behavior - Abnormal sleep related behaviors were not noted.    Bruxism - None apparent.    Cardiac Summary: Sinus tachycardia in sleep with rates> 90 bpm  The average pulse rate was 91.1 bpm. The minimum pulse rate was 71.9 bpm while the maximum pulse rate was 117.0 bpm.        Assessment:     Moderately severe obstructive sleep apnea with AHI 19.8 events per hour associated hypoxemia in REM.    Decreased sleep efficiency with increased arousal index and delay sleep latency.    Frequent periodic limb movements with associated arousals.    Sinus tachycardia in sleep with rates> 90 bpm    Recommendations:    Based on the presence of moderate obstructive sleep apnea and excessive daytime sleepiness, treatment could be empirically initiated with Auto?titrating PAP therapy with a range of 5  to 15 cmH2O. Recommend clinical follow up with sleep management team.    Patient may be a candidate for dental appliance through referral to Sleep Dentistry for the treatment of obstructive sleep apnea.    If devices are not acceptable or effective, patient may benefit from evaluation of possible surgical options. If she is interested, would recommend referral to specialized ENT-Sleep provider.    Advice regarding the risks of drowsy driving.    Suggest optimizing sleep schedule and avoiding sleep deprivation.    Weight management (BMI > 30).    Pharmacologic therapy should be used for management of restless legs syndrome only if present and clinically indicated and not based on the presence of periodic limb movements alone.    Diagnostic Codes:   Obstructive Sleep Apnea G47.33  Sleep Hypoxemia G47.36   Periodic Limb Movement Disorder G47.61        _____________________________________   Electronically Signed By: Katerine Allan MD 9/17/2020

## 2020-09-17 NOTE — PATIENT INSTRUCTIONS
For general sleep health questions: http://sleepeducation.org    For tips about PAP and COVID -19:  https://www.thoracic.org/patients/patient-resources/resources/covid-19-and-home-pap-therapy.pdf    You should get a call in the next couple weeks about getting a CPAP machine.      Continue PAP therapy every night, for all hours that you are sleeping.  If you nap use CPAP.  As always, try to get at least 8 hours of sleep or more each day, keep a regular sleep schedule, and avoid sleep deprivation. Avoid alcohol.    Reasons that you might need a change to your pressure therapy would be weight gain or loss, waking having inadvertently removed your PAP overnight, having previously felt refreshed by sleep with CPAP use and now waking un-refreshed, and return of daytime sleepiness. Also, the development of new medical problems  (such as heart failure, stroke, medications such as narcotics) can sometimes affect breathing at night and change your PAP therapy needs.    Please bring PAP with you if you are hospitalized.  If anticipating surgery be sure to discuss with your surgeon that you have sleep apnea and use PAP therapy.      Maintain your equipment as recommended which includes routine cleaning and replacement of supplies.      Call DME for any questions regarding supplies or maintenance.    [facility], [facility] [phone]      Do not drive on engage in potentially dangerous activities if feeling sleepy.    Please follow up in sleep clinic again in 2 months and bring your machine and card to your follow-up visit.          Tips for your PAP use-    Mask fitting tips  Mask fitting exercise:    To improve your mask seal and your mobility at night, put mask on and secure in place.  Lie down in bed with full pressure and roll to one side, adjust headgear while in that position to eliminate any leaks. Repeat process rolling to other side.     The mask seal does not have to be  perfect:   CPAP machines are designed to make up for small leaks. However, you will not tolerate leaks blowing in your eyes so you will need to adjust.   Any leak should only be near or at the bottom of the mask.  We expect your mask to leak slightly at night.    Do not over-tighten the headgear straps, tighter IS NOT better, we expect minimal leak.    First try re-positioning the mask or headgear before tightening the headgear straps.  Mask leaks are expected due to changing sleeping positions. Try pulling the mask away from your skin allowing the cushion to re-inflate will minimize the leak.  If you struggle for a good fit, try turning the CPAP off and then readjust the mask by pulling it away from your face and then turning back on the CPAP.        Humidifier tips  Humidifiers can be adjusted to increase or decrease the amount of moisture according to your comfort level. You may need to adjust this frequently at first, but then might only change it with seasonal weather changes.     Try INCREASING the humidity if:  You experience a dry, irritated nasal passage or throat.  You have a runny, drippy nose or sneezing fits after using CPAP.  You experience nasal congestion during or after CPAP use.    Try DECREASING the humidity if:  You have excessive condensation or  rain out  in the tubing or mask.  Otherwise keep the tubing warm during the night by running it underneath the blankets or pillow.      Clinic visit after initial PAP set-up   Bring your equipment with you to your 4 week follow up clinic visit.  We will be extracting your data from the machine.        Travel  Always take your equipment with you.  If you fly with your equipment bring it on with you as a carry on.  Medical equipment does not count as a carry on.    If you travel international the machines take 110-240.  The only adapter needed is the adapter that will fit into the receptacle (outlet).    You may also want to bring an extension cord as  many hotel rooms have limited outlets at the bedside.  Do not travel with water in your humidifier chamber.     Cleaning and Maintenance Guidelines    Equipment Frequency Cleaning Method   Mask First Day    Daily      Weekly Soak mask in hot soapy water for 30 minutes, rinse and air dry.  Wipe nasal cushion with a hot soapy (Ivory, baby shampoo) cloth and rinse.  Baby wipes may also be used.  Do not use anti-bacterial soaps,Connie  liquid soap, rubbing alcohol, bleach or ammonia.  Wash frame in hot soapy water (Ivory, baby shampoo) rinse and let air dry   Headgear Biweekly Wash in hot soapy water, rinse and air dry   Reusable Gray Filter Weekly Wash in hot soapy water, rinse, put in towel squeeze moisture out, let air dry   Disposable White Filter Check Weekly Replace when brown or gray in color; at least every 2 to 3 months   Humidifier Chamber Daily    Weekly Empty distilled water from humidifier and let air dry    Hand wash in hot soapy water, rinse and air dry   Tubing Weekly Wash in hot soapy water, rinse and let air dry   Mask, Tubing and Humidifier Chamber As needed Disinfect: Soak in 1 part distilled white vinegar to 3 parts hot water for 30 minutes, rinse well and air dry  Not the material headgear        MASK AND SUPPLY REORDERING and EQUIPMENT NEEDS through your DME and per your insurance  Reminder: Most insurance companies will allow for a new mask, headgear, tubing, and reusable gray filter every six months.  Disposable white ultra-fine filters are covered monthly.      HOME AND SAFETY INSTRUCTIONS    Do not use frayed or cracked electrical cords, multi plug adaptors, or switched receptacles    Do not immerse electrical equipment into water    Assure that electrical cords do not become a tripping hazard

## 2020-09-28 ENCOUNTER — OFFICE VISIT (OUTPATIENT)
Dept: FAMILY MEDICINE | Facility: CLINIC | Age: 60
End: 2020-09-28
Payer: OTHER GOVERNMENT

## 2020-09-28 ENCOUNTER — TELEPHONE (OUTPATIENT)
Dept: SLEEP MEDICINE | Facility: CLINIC | Age: 60
End: 2020-09-28

## 2020-09-28 ENCOUNTER — ANCILLARY PROCEDURE (OUTPATIENT)
Dept: GENERAL RADIOLOGY | Facility: CLINIC | Age: 60
End: 2020-09-28
Attending: NURSE PRACTITIONER
Payer: OTHER GOVERNMENT

## 2020-09-28 VITALS
OXYGEN SATURATION: 96 % | BODY MASS INDEX: 34.4 KG/M2 | HEIGHT: 68 IN | HEART RATE: 92 BPM | TEMPERATURE: 97.4 F | DIASTOLIC BLOOD PRESSURE: 80 MMHG | WEIGHT: 227 LBS | SYSTOLIC BLOOD PRESSURE: 114 MMHG | RESPIRATION RATE: 16 BRPM

## 2020-09-28 DIAGNOSIS — E03.9 ACQUIRED HYPOTHYROIDISM: ICD-10-CM

## 2020-09-28 DIAGNOSIS — M25.521 RIGHT ELBOW PAIN: ICD-10-CM

## 2020-09-28 DIAGNOSIS — E78.5 HYPERLIPIDEMIA LDL GOAL <100: ICD-10-CM

## 2020-09-28 DIAGNOSIS — M25.521 RIGHT ELBOW PAIN: Primary | ICD-10-CM

## 2020-09-28 DIAGNOSIS — Z23 NEED FOR PROPHYLACTIC VACCINATION AND INOCULATION AGAINST INFLUENZA: ICD-10-CM

## 2020-09-28 PROCEDURE — 73070 X-RAY EXAM OF ELBOW: CPT | Mod: RT

## 2020-09-28 PROCEDURE — 90471 IMMUNIZATION ADMIN: CPT | Performed by: NURSE PRACTITIONER

## 2020-09-28 PROCEDURE — 90682 RIV4 VACC RECOMBINANT DNA IM: CPT | Performed by: NURSE PRACTITIONER

## 2020-09-28 PROCEDURE — 99214 OFFICE O/P EST MOD 30 MIN: CPT | Mod: 25 | Performed by: NURSE PRACTITIONER

## 2020-09-28 RX ORDER — PREDNISONE 20 MG/1
20 TABLET ORAL DAILY
Qty: 7 TABLET | Refills: 0 | Status: SHIPPED | OUTPATIENT
Start: 2020-09-28 | End: 2020-11-06

## 2020-09-28 ASSESSMENT — PATIENT HEALTH QUESTIONNAIRE - PHQ9
5. POOR APPETITE OR OVEREATING: NOT AT ALL
SUM OF ALL RESPONSES TO PHQ QUESTIONS 1-9: 7

## 2020-09-28 ASSESSMENT — ANXIETY QUESTIONNAIRES
6. BECOMING EASILY ANNOYED OR IRRITABLE: NOT AT ALL
7. FEELING AFRAID AS IF SOMETHING AWFUL MIGHT HAPPEN: NOT AT ALL
3. WORRYING TOO MUCH ABOUT DIFFERENT THINGS: NOT AT ALL
2. NOT BEING ABLE TO STOP OR CONTROL WORRYING: NOT AT ALL
GAD7 TOTAL SCORE: 0
1. FEELING NERVOUS, ANXIOUS, OR ON EDGE: NOT AT ALL
IF YOU CHECKED OFF ANY PROBLEMS ON THIS QUESTIONNAIRE, HOW DIFFICULT HAVE THESE PROBLEMS MADE IT FOR YOU TO DO YOUR WORK, TAKE CARE OF THINGS AT HOME, OR GET ALONG WITH OTHER PEOPLE: NOT DIFFICULT AT ALL
5. BEING SO RESTLESS THAT IT IS HARD TO SIT STILL: NOT AT ALL

## 2020-09-28 ASSESSMENT — MIFFLIN-ST. JEOR: SCORE: 1653.17

## 2020-09-28 NOTE — PATIENT INSTRUCTIONS
Thank you for choosing [facility].  You may be receiving an email and/or telephone survey request from [facility] regarding your visit today.  Please take a few minutes to respond to the survey to let us know how we are doing.      If you have questions or concerns, please contact us via My[employer] or you can contact your care team at [phone].    Our Clinic hours are:  Monday 6:40 am  to 7:00 pm  Tuesday -Friday 6:40 am to 5:00 pm    The [place] outpatient lab hours are:  Monday - Friday 6:10 am to 4:45 pm  Saturdays 7:00 am to 11:00 am  Appointments are required, call [phone]    If you have clinical questions after hours or would like to schedule an appointment,  call the clinic at [phone].

## 2020-09-28 NOTE — TELEPHONE ENCOUNTER
Pt called and message left for pt to call back to schedule 2 month cpap follow up.  Pt told to call clinic if she has not been called for.    LORENZO Bowden

## 2020-09-28 NOTE — PROGRESS NOTES
Subjective     Teresa Fernandez is a 60 year old female who presents to clinic today for the following health issues:    HPI       Musculoskeletal problem/pain  Onset/Duration: 2 weeks ago   Description Patient valencia shad DAVID elbow pain for about 2 weeks she has been using heat ice ibuprofen and tylenol has helped   Location: elbow - right- sharp pain on elbow when she leans on it.   Joint Swelling: no  Redness: no  Pain: YES  Warmth: no  Intensity:  moderate  Progression of Symptoms:  same  Accompanying signs and symptoms:   Fevers: no  Numbness/tingling/weakness: no  History  Trauma to the area: no  Recent illness:  no  Previous similar problem: no  Previous evaluation:  no  Precipitating or alleviating factors:  Aggravating factors include: leaning on it   Therapies tried and outcome: Patient has been using heat, ice, acetaminophen and Ibuprofen has been helping a little     Review of Systems   Constitutional, HEENT, cardiovascular, pulmonary, GI, , musculoskeletal, neuro, skin, endocrine and psych systems are negative, except as otherwise noted.      Objective    There were no vitals taken for this visit.  There is no height or weight on file to calculate BMI.  Physical Exam   GENERAL APPEARANCE: healthy, alert and no distress  ORTHO: Elbow Exam: Inspection: no swelling  Tender: olecranon bursa  Non-tender: lateral epicondyle and medial epicondyle  Range of Motion: all normal  Strength: elbow strength full          Assessment & Plan     Right elbow pain  ? Bursitis vs fracture  - XR Elbow Left 2 Views; Future  - predniSONE (DELTASONE) 20 MG tablet; Take 1 tablet (20 mg) by mouth daily  - Orthopedic & Spine  Referral; Future    Acquired hypothyroidism    - TSH with free T4 reflex; Future    Hyperlipidemia LDL goal <100    - Lipid panel reflex to direct LDL Fasting; Future    Need for prophylactic vaccination and inoculation against influenza    - INFLUENZA QUAD, RECOMBINANT, P-FREE (RIV4) (FLUBLOCK)  "[45356]  - Vaccine Administration, Initial [23338]     BMI:   Estimated body mass index is 34.52 kg/m  as calculated from the following:    Height as of this encounter: 1.727 m (5' 8\").    Weight as of this encounter: 103 kg (227 lb).   Weight management plan: Discussed healthy diet and exercise guidelines        No follow-ups on file.    HERIBERTO Delgado [facility]    "

## 2020-09-29 ASSESSMENT — ASTHMA QUESTIONNAIRES: ACT_TOTALSCORE: 24

## 2020-09-29 ASSESSMENT — ANXIETY QUESTIONNAIRES: GAD7 TOTAL SCORE: 0

## 2020-10-06 ENCOUNTER — DOCUMENTATION ONLY (OUTPATIENT)
Dept: SLEEP MEDICINE | Facility: CLINIC | Age: 60
End: 2020-10-06

## 2020-10-06 NOTE — PROGRESS NOTES
Patient was offered choice of vendor and chose formerly [facility].  Patient Teresa Fernandez was set up at [place]  on October 6, 2020. Patient received a Resmed AirSense 10 Auto. Pressures were set at 5-15 cm H2O.   Patient s ramp is off cm H2O for Off and FLEX/EPR is 2.  Patient received a Resmed Mask name: [id number]  Nasal mask size Medium, heated tubing and heated humidifier.  Patient does need to meet compliance. Patient will call to schedule follow up appointment.  Angelica Skelton

## 2020-10-07 ENCOUNTER — OFFICE VISIT (OUTPATIENT)
Dept: ORTHOPEDICS | Facility: CLINIC | Age: 60
End: 2020-10-07
Payer: OTHER GOVERNMENT

## 2020-10-07 VITALS
SYSTOLIC BLOOD PRESSURE: 118 MMHG | WEIGHT: 227 LBS | BODY MASS INDEX: 34.4 KG/M2 | DIASTOLIC BLOOD PRESSURE: 74 MMHG | HEIGHT: 68 IN

## 2020-10-07 DIAGNOSIS — M25.521 RIGHT ELBOW PAIN: ICD-10-CM

## 2020-10-07 PROCEDURE — 99214 OFFICE O/P EST MOD 30 MIN: CPT | Performed by: PEDIATRICS

## 2020-10-07 ASSESSMENT — MIFFLIN-ST. JEOR: SCORE: 1653.17

## 2020-10-07 NOTE — PATIENT INSTRUCTIONS
We discussed these other possible diagnosis: bursitis, bone  Spur, triceps tendonitis    Plan:  - Today's Plan of Care:  Continue with relative rest and activity modification, Ice, Compression, and Elevation.  Can apply ice 10-15 minutes 3-4 times per day as needed.  Discussed wrap or elbow pad    -We also discussed other future treatment options:  Hand Therapy  Repeat x-rays    Follow Up: 1 month    If you have any further questions for your physician or physician s care team you can call [phone] and use option 3 to leave a voice message. Calls received during business hours will be returned same day.

## 2020-10-07 NOTE — LETTER
10/7/2020         RE: Teresa Fernandez  [id number] [address] [id number]        Dear Colleague,    Thank you for referring your patient, Teresa Fernandez, to the [facility] [place]. Please see a copy of my visit note below.    Sports Medicine Clinic Visit    PCP: Nuzhat Burt    Teresa Fernandez is a 60 year old female who is seen  in consultation at the request of  Nuzhat Burt C.N.P. presenting with right elbow pain    Injury: She reports 2 months of posterior right elbow pain. She denies injury. She denies pain with motion. Her pain increases with pressure to the posterior elbow. She has minimal swelling - had more when her pain started. PCP gave prednisone, this has helped.  No fever, chills, erythema. She right hand dominate.    Location of Pain: right posterior elbow  Duration of Pain: 2 month(s)  Rating of Pain at worst: 7/10  Rating of Pain Currently: 0/10  Symptoms are better with: Ice and Heat  Symptoms are worse with: leaning on elbow or pressure to elbow  Additional Features:   Positive: pain with pressure   Negative: swelling, bruising, popping, grinding, catching, locking, instability, paresthesias, numbness and weakness  Other evaluation and/or treatments so far consists of: Nothing  Prior History of related problems: nothing    Social History:     Review of Systems  Skin: no bruising, initial swelling  Musculoskeletal: as above  Neurologic: no numbness, paresthesias  Remainder of review of systems is negative including constitutional, CV, pulmonary, GI, except as noted in HPI or medical history.    Patient's current problem list, past medical and surgical history, and family history were reviewed.    Patient Active Problem List   Diagnosis     Acquired hypothyroidism     Allergic state     Chronic rhinitis     Allergic rhinitis due to pollen     Sinusitis, chronic     History of skin cancer     ERIC (generalized anxiety disorder)      Leukoplakia of oral mucosa, including tongue     Renal cancer (H)     GERD (gastroesophageal reflux disease)     Chronic low back pain     Dry eye syndrome     Chronic fatigue     Osteopenia     History of melanoma in situ     Eczema     Moderate persistent asthma without complication     History of kidney cancer     DDD (degenerative disc disease), lumbar     Hyperlipidemia LDL goal <160     Chest tightness or pressure     Muscle pain     GAYE (obstructive sleep apnea)     Past Medical History:   Diagnosis Date     ALLERGIC RHINITIS NOS 4/12/2005     Anxiety      Arthritis     herniated disc     Bronchitis, not specified as acute or chronic      CHR MAXILLARY SINUSITIS 4/12/2005     Depressive disorder, not elsewhere classified      Eczema 10/17/2012     Environmental and seasonal allergies      GERD (gastroesophageal reflux disease)      Gluten intolerance      Malignant neoplasm of renal pelvis (H) 1995    Renal cell carcinoma     Melanoma (H) 06/2010     Other specified acquired hypothyroidism 4/12/2005     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     FUSION LUMBAR ANTERIOR TWO LEVELS  4/13/2015     GYN SURGERY  04/08/1999    hysterectomy.  [facility]     HYSTERECTOMY, PAP NO LONGER INDICATED       SOFT TISSUE SURGERY      chest-melonoma     SURGICAL HISTORY OF -   3/1996    Left nephrectomy-renal cell CA     Family History   Problem Relation Age of Onset     Diabetes Mother      Cardiovascular Mother      Lipids Mother      Depression Mother      Hypertension Father      Lipids Father      Obesity Father      Macular Degeneration Father      Sleep Apnea Father      Cancer Maternal Grandmother         kind unknown had sores on legs     Eczema Maternal Grandmother      Eczema  "Maternal Grandfather      Breast Cancer Paternal Grandmother      Cancer Paternal Grandmother         breast     Hypertension Paternal Grandfather      Cardiovascular Paternal Grandfather         heart attack     Hypertension Brother      Thyroid Disease Sister      Hypertension Sister      Depression Sister      Allergies Sister      Allergies Son      Eczema Son      Lipids Sister      Thyroid Disease Sister      Neurologic Disorder Sister      Depression Sister      Respiratory Son      Sleep Apnea Son      Glaucoma No family hx of      Cerebrovascular Disease No family hx of        Objective  /74   Ht 1.727 m (5' 8\")   Wt 103 kg (227 lb)   BMI 34.52 kg/m      GENERAL APPEARANCE: healthy, alert and no distress   GAIT: NORMAL  SKIN: no suspicious lesions or rashes  HEENT: Sclera clear, anicteric  CV: good peripheral pulses  RESP: Breathing not labored  NEURO: Normal strength and tone, mentation intact and speech normal  PSYCH:  mentation appears normal and affect normal/bright    Bilateral Elbow exam:    Inspection:     no ecchymosis       no edema or effusion    Tender:     olecranon right    Non-Tender:      remainder of the elbow bilaterally    ROM:      full with flexion, extension, forearm supination and pronation bilateral    Strength:     flexion 5/5 bilateral       extension 5/5 bilateral       forearm supination 5/5 bilateral       forearm pronation 5/5 bilateral    Sensation:     intact throughout hand       intact throughout forearm    Radiology  I ordered, visualized and reviewed these images with the patient  Xr Elbow Right 2 Views  Result Date: 9/28/2020  ELBOW TWO VIEWS RIGHT 9/28/2020 4:25 PM HISTORY: Right elbow pain COMPARISON: None. FINDINGS: There is no significant degenerative change. No posterior fat-pad is seen. No convincing anterior sail sign is seen. There is no acute fracture or dislocation. There are no worrisome bony lesions.   IMPRESSION: No acute osseous abnormality " demonstrated. MAURICIO THORNE MD    - Despite read, possibly small bone spur at olecranon    Assessment:  1. Right elbow pain      We discussed these other possible diagnosis: bursitis, bone  Spur, triceps tendonitis    Plan:  - Today's Plan of Care:  Continue with relative rest and activity modification, Ice, Compression, and Elevation.  Can apply ice 10-15 minutes 3-4 times per day as needed.  Discussed wrap or elbow pad    -We also discussed other future treatment options:  Hand Therapy  Repeat x-rays    Follow Up: 1 month    Concerning signs and symptoms were reviewed - especially for infection.  The patient  expressed understanding of this management plan and all questions were answered at this time.    Thanks for the opportunity to participate in the care of this patient, I will keep you updated on their progress.    CC: Nuzhat Zhu MD CAQ  Primary Care Sports Medicine  [facility]      Again, thank you for allowing me to participate in the care of your patient.        Sincerely,        Milena Zhu MD

## 2020-10-07 NOTE — PROGRESS NOTES
Sports Medicine Clinic Visit    PCP: Nuzhat Burt    Teresa Fernandez is a 60 year old female who is seen  in consultation at the request of  Nuzhat Burt C.N.P. presenting with right elbow pain    Injury: She reports 2 months of posterior right elbow pain. She denies injury. She denies pain with motion. Her pain increases with pressure to the posterior elbow. She has minimal swelling - had more when her pain started. PCP gave prednisone, this has helped.  No fever, chills, erythema. She right hand dominate.    Location of Pain: right posterior elbow  Duration of Pain: 2 month(s)  Rating of Pain at worst: 7/10  Rating of Pain Currently: 0/10  Symptoms are better with: Ice and Heat  Symptoms are worse with: leaning on elbow or pressure to elbow  Additional Features:   Positive: pain with pressure   Negative: swelling, bruising, popping, grinding, catching, locking, instability, paresthesias, numbness and weakness  Other evaluation and/or treatments so far consists of: Nothing  Prior History of related problems: nothing    Social History:     Review of Systems  Skin: no bruising, initial swelling  Musculoskeletal: as above  Neurologic: no numbness, paresthesias  Remainder of review of systems is negative including constitutional, CV, pulmonary, GI, except as noted in HPI or medical history.    Patient's current problem list, past medical and surgical history, and family history were reviewed.    Patient Active Problem List   Diagnosis     Acquired hypothyroidism     Allergic state     Chronic rhinitis     Allergic rhinitis due to pollen     Sinusitis, chronic     History of skin cancer     ERIC (generalized anxiety disorder)     Leukoplakia of oral mucosa, including tongue     Renal cancer (H)     GERD (gastroesophageal reflux disease)     Chronic low back pain     Dry eye syndrome     Chronic fatigue     Osteopenia     History of melanoma in situ     Eczema     Moderate persistent asthma  without complication     History of kidney cancer     DDD (degenerative disc disease), lumbar     Hyperlipidemia LDL goal <160     Chest tightness or pressure     Muscle pain     GAYE (obstructive sleep apnea)     Past Medical History:   Diagnosis Date     ALLERGIC RHINITIS NOS 4/12/2005     Anxiety      Arthritis     herniated disc     Bronchitis, not specified as acute or chronic      CHR MAXILLARY SINUSITIS 4/12/2005     Depressive disorder, not elsewhere classified      Eczema 10/17/2012     Environmental and seasonal allergies      GERD (gastroesophageal reflux disease)      Gluten intolerance      Malignant neoplasm of renal pelvis (H) 1995    Renal cell carcinoma     Melanoma (H) 06/2010     Other specified acquired hypothyroidism 4/12/2005     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     FUSION LUMBAR ANTERIOR TWO LEVELS  4/13/2015     GYN SURGERY  04/08/1999    hysterectomy.  [facility]     HYSTERECTOMY, PAP NO LONGER INDICATED       SOFT TISSUE SURGERY      chest-melonoma     SURGICAL HISTORY OF -   3/1996    Left nephrectomy-renal cell CA     Family History   Problem Relation Age of Onset     Diabetes Mother      Cardiovascular Mother      Lipids Mother      Depression Mother      Hypertension Father      Lipids Father      Obesity Father      Macular Degeneration Father      Sleep Apnea Father      Cancer Maternal Grandmother         kind unknown had sores on legs     Eczema Maternal Grandmother      Eczema Maternal Grandfather      Breast Cancer Paternal Grandmother      Cancer Paternal Grandmother         breast     Hypertension Paternal Grandfather      Cardiovascular Paternal Grandfather         heart attack     Hypertension Brother      Thyroid Disease Sister       "Hypertension Sister      Depression Sister      Allergies Sister      Allergies Son      Eczema Son      Lipids Sister      Thyroid Disease Sister      Neurologic Disorder Sister      Depression Sister      Respiratory Son      Sleep Apnea Son      Glaucoma No family hx of      Cerebrovascular Disease No family hx of        Objective  /74   Ht 1.727 m (5' 8\")   Wt 103 kg (227 lb)   BMI 34.52 kg/m      GENERAL APPEARANCE: healthy, alert and no distress   GAIT: NORMAL  SKIN: no suspicious lesions or rashes  HEENT: Sclera clear, anicteric  CV: good peripheral pulses  RESP: Breathing not labored  NEURO: Normal strength and tone, mentation intact and speech normal  PSYCH:  mentation appears normal and affect normal/bright    Bilateral Elbow exam:    Inspection:     no ecchymosis       no edema or effusion    Tender:     olecranon right    Non-Tender:      remainder of the elbow bilaterally    ROM:      full with flexion, extension, forearm supination and pronation bilateral    Strength:     flexion 5/5 bilateral       extension 5/5 bilateral       forearm supination 5/5 bilateral       forearm pronation 5/5 bilateral    Sensation:     intact throughout hand       intact throughout forearm    Radiology  I ordered, visualized and reviewed these images with the patient  Xr Elbow Right 2 Views  Result Date: 9/28/2020  ELBOW TWO VIEWS RIGHT 9/28/2020 4:25 PM HISTORY: Right elbow pain COMPARISON: None. FINDINGS: There is no significant degenerative change. No posterior fat-pad is seen. No convincing anterior sail sign is seen. There is no acute fracture or dislocation. There are no worrisome bony lesions.   IMPRESSION: No acute osseous abnormality demonstrated. MAURICIO THORNE MD    - Despite read, possibly small bone spur at olecranon    Assessment:  1. Right elbow pain      We discussed these other possible diagnosis: bursitis, bone  Spur, triceps tendonitis    Plan:  - Today's Plan of Care:  Continue with relative " rest and activity modification, Ice, Compression, and Elevation.  Can apply ice 10-15 minutes 3-4 times per day as needed.  Discussed wrap or elbow pad    -We also discussed other future treatment options:  Hand Therapy  Repeat x-rays    Follow Up: 1 month    Concerning signs and symptoms were reviewed - especially for infection.  The patient  expressed understanding of this management plan and all questions were answered at this time.    Thanks for the opportunity to participate in the care of this patient, I will keep you updated on their progress.    CC: Nuzhat Zhu MD CAQ  Primary Care Sports Medicine  [facility]

## 2020-10-09 ENCOUNTER — DOCUMENTATION ONLY (OUTPATIENT)
Dept: SLEEP MEDICINE | Facility: CLINIC | Age: 60
End: 2020-10-09
Payer: OTHER GOVERNMENT

## 2020-10-09 DIAGNOSIS — G47.33 OSA (OBSTRUCTIVE SLEEP APNEA): Primary | ICD-10-CM

## 2020-10-09 DIAGNOSIS — F41.1 GAD (GENERALIZED ANXIETY DISORDER): ICD-10-CM

## 2020-10-09 DIAGNOSIS — M79.10 MYALGIA: ICD-10-CM

## 2020-10-09 NOTE — TELEPHONE ENCOUNTER
"Requested Prescriptions   Pending Prescriptions Disp Refills     DULoxetine (CYMBALTA) 60 MG capsule [Pharmacy Med Name: DULOXETINE HCL DR CAPS 60MG] 90 capsule 3     Sig: TAKE 1 CAPSULE EVERY MORNING       Serotonin-Norepinephrine Reuptake Inhibitors  Passed - 10/9/2020  2:29 AM        Passed - Blood pressure under 140/90 in past 12 months     BP Readings from Last 3 Encounters:   10/07/20 118/74   09/28/20 114/80   07/31/20 119/78                 Passed - Recent (12 mo) or future (30 days) visit within the authorizing provider's specialty     Patient has had an office visit with the authorizing provider or a provider within the authorizing providers department within the previous 12 mos or has a future within next 30 days. See \"Patient Info\" tab in inbasket, or \"Choose Columns\" in Meds & Orders section of the refill encounter.              Passed - Medication is active on med list        Passed - Patient is age 18 or older        Passed - No active pregnancy on record        Passed - No positive pregnancy test in past 12 months             "

## 2020-10-09 NOTE — Clinical Note
Hi states her chest hurts each morning and she is getting morning headaches request pressure change 5-12 cm H20. Thanks

## 2020-10-09 NOTE — PROGRESS NOTES
3 DAY STM VISIT    Diagnostic AHI: 19.8  PSG    Patient contacted for 3 day STM visit.    Confirmed with patient at time of call- Yes Patient is still interested in STM service     Subjective measures:  Patient stated things are going good.     Replacement device: No  STM ordered by provider: Yes     Device type: Auto-CPAP  PAP settings from order::  CPAP min 5 cm  H20       CPAP max 15 cm  H20  Mask type:    Nasal Mask     Device settings from machine      Min CPAP 5           Max CPAP 15      Assessment: Nightly usage over four hours.  Action plan: Patient to have 14 day STM visit. Patient has a follow up visit scheduled:   no.     Total time spent on accessing and  interpreting remote patient PAP therapy data  10 minutes  Total time spent counseling, coaching  and reviewing PAP therapy data with patient  6 minutes  [id number] no

## 2020-10-12 RX ORDER — DULOXETIN HYDROCHLORIDE 60 MG/1
CAPSULE, DELAYED RELEASE ORAL
Qty: 90 CAPSULE | Refills: 0 | Status: SHIPPED | OUTPATIENT
Start: 2020-10-12 | End: 2021-01-08

## 2020-10-12 NOTE — PROGRESS NOTES
Patient called and left message saying her lungs hurt each morning and she has morning headaches. Put pressure in with her Provider.

## 2020-10-20 ENCOUNTER — DOCUMENTATION ONLY (OUTPATIENT)
Dept: SLEEP MEDICINE | Facility: CLINIC | Age: 60
End: 2020-10-20

## 2020-10-20 NOTE — PROGRESS NOTES
14  DAY STM VISIT    Diagnostic AHI: 19.8    PSG    Subjective measures:     Patient reports pressure change has been helpful and patient is feeling benefit from therapy.     Assessment: Pt meeting objective benchmarks.  Patient meeting subjective benchmarks.     Action plan: pt to have 30 day STM visit.      Device type: Auto-CPAP    PAP settings: CPAP min 5.0 cm  H20       CPAP max 12.0 cm  H20           95th% pressure 9.3 cm  H20        RESMED EPR level Setting: TWO    RESMED Soft response setting:  OFF    Mask type:  Nasal Mask    Objective measures: 14 day rolling measures      Compliance  100 %      Leak  9.6  lpm  last  upload      AHI 1.78   last  upload      Average number of minutes 524      Objective measure goal  Compliance   Goal >70%  Leak   Goal < 24 lpm  AHI  Goal < 5  Usage  Goal >240        Total time spent on accessing and interpreting remote patient PAP therapy data  11 minutes    Total time spent counseling, coaching  and reviewing PAP therapy data with patient  3 minutes    [id number]  [id number]  no (3 day STM)

## 2020-10-28 ENCOUNTER — DOCUMENTATION ONLY (OUTPATIENT)
Dept: SLEEP MEDICINE | Facility: CLINIC | Age: 60
End: 2020-10-28
Payer: OTHER GOVERNMENT

## 2020-10-28 NOTE — PROGRESS NOTES
STM Recheck:  Patient called wanting to try a different mask patient will call [facility].

## 2020-11-04 ENCOUNTER — DOCUMENTATION ONLY (OUTPATIENT)
Dept: SLEEP MEDICINE | Facility: CLINIC | Age: 60
End: 2020-11-04

## 2020-11-04 DIAGNOSIS — E78.5 HYPERLIPIDEMIA LDL GOAL <100: ICD-10-CM

## 2020-11-04 DIAGNOSIS — E03.9 ACQUIRED HYPOTHYROIDISM: ICD-10-CM

## 2020-11-04 LAB
T4 FREE SERPL-MCNC: 1.38 NG/DL (ref 0.76–1.46)
TSH SERPL DL<=0.005 MIU/L-ACNC: 0.15 MU/L (ref 0.4–4)

## 2020-11-04 PROCEDURE — 84439 ASSAY OF FREE THYROXINE: CPT | Performed by: NURSE PRACTITIONER

## 2020-11-04 PROCEDURE — 84443 ASSAY THYROID STIM HORMONE: CPT | Performed by: NURSE PRACTITIONER

## 2020-11-04 NOTE — PROGRESS NOTES
Patient came to [place]  for mask fitting appointment on November 4, 2020. Patient requested to switch masks because soreness/skin irritation . Discussed the following masks: P30i -suggested size medium, Dreamwisp, F20 Patient selected a [employer] Mask name: Dreamwisp Nasal mask size Medium  Angelica Skelton [facility]

## 2020-11-05 DIAGNOSIS — E03.9 HYPOTHYROIDISM, UNSPECIFIED TYPE: Primary | ICD-10-CM

## 2020-11-05 RX ORDER — LEVOTHYROXINE SODIUM 88 UG/1
88 TABLET ORAL DAILY
Qty: 90 TABLET | Refills: 3 | Status: SHIPPED | OUTPATIENT
Start: 2020-11-05 | End: 2021-02-19

## 2020-11-06 ENCOUNTER — ANCILLARY PROCEDURE (OUTPATIENT)
Dept: GENERAL RADIOLOGY | Facility: CLINIC | Age: 60
End: 2020-11-06
Attending: NURSE PRACTITIONER
Payer: OTHER GOVERNMENT

## 2020-11-06 ENCOUNTER — DOCUMENTATION ONLY (OUTPATIENT)
Dept: SLEEP MEDICINE | Facility: CLINIC | Age: 60
End: 2020-11-06
Payer: OTHER GOVERNMENT

## 2020-11-06 ENCOUNTER — OFFICE VISIT (OUTPATIENT)
Dept: FAMILY MEDICINE | Facility: CLINIC | Age: 60
End: 2020-11-06
Payer: OTHER GOVERNMENT

## 2020-11-06 VITALS
SYSTOLIC BLOOD PRESSURE: 114 MMHG | OXYGEN SATURATION: 96 % | BODY MASS INDEX: 33.28 KG/M2 | HEIGHT: 69 IN | TEMPERATURE: 98.2 F | WEIGHT: 224.7 LBS | RESPIRATION RATE: 18 BRPM | HEART RATE: 88 BPM | DIASTOLIC BLOOD PRESSURE: 72 MMHG

## 2020-11-06 DIAGNOSIS — E78.5 HYPERLIPIDEMIA LDL GOAL <100: ICD-10-CM

## 2020-11-06 DIAGNOSIS — E03.9 HYPOTHYROIDISM, UNSPECIFIED TYPE: ICD-10-CM

## 2020-11-06 DIAGNOSIS — M25.551 HIP PAIN, RIGHT: Primary | ICD-10-CM

## 2020-11-06 LAB
CHOLEST SERPL-MCNC: 237 MG/DL
HDLC SERPL-MCNC: 62 MG/DL
LDLC SERPL CALC-MCNC: 150 MG/DL
NONHDLC SERPL-MCNC: 175 MG/DL
TRIGL SERPL-MCNC: 123 MG/DL

## 2020-11-06 PROCEDURE — 36415 COLL VENOUS BLD VENIPUNCTURE: CPT | Performed by: NURSE PRACTITIONER

## 2020-11-06 PROCEDURE — 73502 X-RAY EXAM HIP UNI 2-3 VIEWS: CPT | Mod: RT | Performed by: RADIOLOGY

## 2020-11-06 PROCEDURE — 80061 LIPID PANEL: CPT | Performed by: NURSE PRACTITIONER

## 2020-11-06 PROCEDURE — 99213 OFFICE O/P EST LOW 20 MIN: CPT | Performed by: NURSE PRACTITIONER

## 2020-11-06 ASSESSMENT — MIFFLIN-ST. JEOR: SCORE: 1656.99

## 2020-11-06 NOTE — PROGRESS NOTES
30 DAY STM VISIT    Diagnostic AHI: 19.8  PSG    Subjective measures:   Patient doing well with CPAP she has no questions or concerns.    Assessment: Pt meeting objective benchmarks.  Patient meeting subjective benchmarks.   Action plan: pt to have 30 day STM visit.  Patient has not scheduled a follow up visit.   Device type: Auto-CPAP  PAP settings: CPAP min 5.0 cm  H20     CPAP max 12.0 cm  H20    95th% pressure 9 cm  H20      RESMED EPR level Setting: TWO    RESMED Soft response setting:  OFF  Mask type:  Nasal Mask  Objective measures: 14 day rolling measures      Compliance  100 %      Leak  11.04 lpm  last  upload      AHI 2.14   last  upload      Average number of minutes 529      Objective measure goal  Compliance   Goal >70%  Leak   Goal < 24 lpm  AHI  Goal < 5  Usage  Goal >240        Total time spent on accessing and interpreting remote patient PAP therapy data  10 minutes    Total time spent counseling, coaching  and reviewing PAP therapy data with patient  3 minutes     [id number] this call  [id number] no  at 3 or 14 day [facility]

## 2020-11-06 NOTE — PROGRESS NOTES
Subjective     Teresa Fernandez is a 60 year old female who presents to clinic today for the following health issues:    HPI         Musculoskeletal problem/pain  Onset/Duration: over 1 month  Description  Location: hip - right  Joint Swelling: YES- even in her knees  Redness: no  Pain: YES- right now 5/10. Towards the evening 8/10  Warmth: no  Intensity:  moderate, severe  Progression of Symptoms:  worsening  Accompanying signs and symptoms:   Fevers: no  Numbness/tingling/weakness: YES- weak in the knees  History  Trauma to the area: YES- fell two times this summer due to balance  Recent illness:  no  Previous similar problem: no  Previous evaluation:  no  Precipitating or alleviating factors:  Aggravating factors include:  prolonged sitting (at work). trying to walk or walk upstairs. History of fibromyalgia, possible neuropathy.  Therapies tried and outcome:   rest/inactivity helps only when resting  heat, temporary relief  ice helps short term  Exercises, just trying to use it  Acetaminophen, temporary relief    Above HPI reviewed. Additionally, pain to lateral hip, worse when rising from seated position. No paresthesias. Occasionally uses ibuprofen, however single kidney.        Review of Systems   Constitutional, HEENT, cardiovascular, pulmonary, gi and gu systems are negative, except as otherwise noted.      Objective    There were no vitals taken for this visit.  There is no height or weight on file to calculate BMI.  Physical Exam  Vitals signs and nursing note reviewed.   Constitutional:       General: She is not in acute distress.     Appearance: Normal appearance.   HENT:      Head: Normocephalic and atraumatic.      Mouth/Throat:      Mouth: Mucous membranes are moist.   Neck:      Musculoskeletal: Neck supple.   Cardiovascular:      Rate and Rhythm: Normal rate.   Pulmonary:      Effort: Pulmonary effort is normal.   Musculoskeletal:      Comments: Right leg - no deformity or skin changes. Normal ROM of  hip without significantly increased pain. TTP over lateral hip. CMS intact   Skin:     General: Skin is warm and dry.   Neurological:      General: No focal deficit present.      Mental Status: She is alert.   Psychiatric:         Mood and Affect: Mood normal.         Behavior: Behavior normal.            Xray - right hip, no obvious abnormality on independent review. Pending radiology interpretation.          Assessment & Plan     Hip pain, right  No obvious abnormality on xray. Ongoing for a month. Recommend trying PT. Can use sparing naproxen. RTC if not improving.  - XR Pelvis and Hip Right 2 Views  - PHYSICAL THERAPY REFERRAL; Future        Patient Instructions   Xray today  PT will call you  Can try naproxen 1 tablet      Return in about 1 week (around 11/13/2020) for worsening or continued symptoms.    HERIBERTO Baptiste [facility]

## 2020-11-06 NOTE — PROGRESS NOTES
CC: R hip pain    Subjective:   HPI: Pt with history of osteopenia, DDD, chronic back and knee pain presents with one month R hip pain. Feels like aching/throbbing and sometimes stiff. 5/10 at best, 8/10 towards end of the day it is worst. Has problems with sleep due to the discomfort. She did have mechanical fall in the early summer due to balance issues, no obvious injury at that time -- does not think this is related. She has tried Tylenol, Advil, heat/ICE , rest all with minimal relief. Pain is worse when sitting for long periods of time and climbing stairs. Denies fever/chills, cough/SOB, skin issues, B/B changes, dysuria.       Patient's past medical history, problem list, family history, surgical history, medications and allergies were reviewed.     ROS: Completed and negative unless stated in HPI.     Objective:  Exam:  Constitutional: healthy, alert and no distress  Respiratory: Non labored breathing  Gastrointestinal: Non distended  Musculoskeletal: no gross deformities noted, gait normal, normal muscle tone, normal range of motion bilateral hips and tender to palpation   Skin: no suspicious lesions or rashes on exposed skin.  Neurologic: Gait normal. Reflexes normal and symmetric. Sensation grossly WNL.  Psychiatric: mentation appears normal and affect normal/bright       Assessment/Plan:  (M25.551) Hip pain, right  (primary encounter diagnosis)  Comment: No trauma/repetative movement, no obvious abnormal's.  Plan:   -XR Pelvis and Hip Right 2 Views  -PHYSICAL/THERAPY REFERRAL         Advised to return to clinic if sx worsen or not resolved after treatment.            In addition to the above assessment and plan each active problem on the patient's problem list was evaluated today. This included the questioning of the patient for any medication problems. We will continue the current treatment plan for these active problems except as noted.    Kaci Juarez, RN-BSN, DNP-Student

## 2020-11-10 ENCOUNTER — MYC MEDICAL ADVICE (OUTPATIENT)
Dept: FAMILY MEDICINE | Facility: CLINIC | Age: 60
End: 2020-11-10

## 2020-11-12 ENCOUNTER — DOCUMENTATION ONLY (OUTPATIENT)
Dept: CARE COORDINATION | Facility: CLINIC | Age: 60
End: 2020-11-12

## 2020-11-12 NOTE — PROGRESS NOTES
"Outpatient Physical Therapy Discharge Note     Patient: Teresa Fernandez  : 1960    Beginning/End Dates of Reporting Period:  20 to 20 when initially seen. Discharge written on 2020.  Total # of Rx sessions: 1    Referring Provider: Carter Callahan     Therapy Diagnosis: Gait Disorder/Unknown Etiology      Client Self Report: Pain in legs, muscular. Falls, Dizziness.  Did not rate pain.     Objective Measurements:  Objective Measure: Tinetti   Details: Balance 12, Gait 11, Total 23   Objective Measure: SLS  Details: EO R 7, L 10; EC R 2, L 4.   Objective Measure: MMT;   Details: Hip Flex B 4, Knee Flex R 4_, L 5; Knee Ext 5- B, DF 5 B.      Goals:  Goal Identifier 1   Goal Description STG: Pt will be able to do SLS balance 10\" B sides w/ EO.    Target Date 20   Date Met      Progress:     Goal Identifier 2   Goal Description STG: Pt will be able to do SLS balance 8\" B w/ EC.    Target Date 20   Date Met      Progress:     Goal Identifier 3   Goal Description STG: Pt to improve mm strength to 5- for all L/E MMT tests.    Target Date 20   Date Met      Progress:     Goal Identifier 4   Goal Description LTG: Pt will be independent w/ HEP to improve balance and confidence for HH duties and community mobiliyt.    Target Date 20   Date Met      Progress:     Progress Toward Goals:   Not assessed this period.  Seen 1x only.     Plan:  Discharge from therapy.    Discharge:    Reason for Discharge: Patient has failed to schedule further appointments.    Equipment Issued:      Discharge Plan: Patient to continue home program.  Pt to follow up w/MD as appropriate.   Gabriela Madera, PT, [facility] (#[id number])  [facility]           [phone]  Fax          [phone]  Appt #      [phone]    "

## 2020-11-16 ENCOUNTER — HEALTH MAINTENANCE LETTER (OUTPATIENT)
Age: 60
End: 2020-11-16

## 2020-12-18 ENCOUNTER — VIRTUAL VISIT (OUTPATIENT)
Dept: SLEEP MEDICINE | Facility: CLINIC | Age: 60
End: 2020-12-18
Payer: OTHER GOVERNMENT

## 2020-12-18 VITALS — HEIGHT: 68 IN | BODY MASS INDEX: 34.4 KG/M2 | WEIGHT: 227 LBS

## 2020-12-18 DIAGNOSIS — E66.811 CLASS 1 OBESITY DUE TO EXCESS CALORIES WITH SERIOUS COMORBIDITY AND BODY MASS INDEX (BMI) OF 34.0 TO 34.9 IN ADULT: ICD-10-CM

## 2020-12-18 DIAGNOSIS — E66.09 CLASS 1 OBESITY DUE TO EXCESS CALORIES WITH SERIOUS COMORBIDITY AND BODY MASS INDEX (BMI) OF 34.0 TO 34.9 IN ADULT: ICD-10-CM

## 2020-12-18 DIAGNOSIS — G47.34 NOCTURNAL HYPOXEMIA: ICD-10-CM

## 2020-12-18 DIAGNOSIS — G47.33 OSA (OBSTRUCTIVE SLEEP APNEA): Primary | ICD-10-CM

## 2020-12-18 PROCEDURE — 99214 OFFICE O/P EST MOD 30 MIN: CPT | Mod: 95 | Performed by: INTERNAL MEDICINE

## 2020-12-18 ASSESSMENT — MIFFLIN-ST. JEOR: SCORE: 1653.04

## 2020-12-18 NOTE — PATIENT INSTRUCTIONS
For general sleep health questions: http://sleepeducation.org    For tips about PAP and COVID -19:  https://www.[email or web address].org/patients/patient-resources/resources/covid-19-and-home-pap-therapy.pdf        Continue PAP therapy every night, for all hours that you are sleeping.  If you nap use CPAP.  As always, try to get at least 8 hours of sleep or more each day, keep a regular sleep schedule, and avoid sleep deprivation. Avoid alcohol.    Reasons that you might need a change to your pressure therapy would be weight gain or loss, waking having inadvertently removed your PAP overnight, having previously felt refreshed by sleep with CPAP use and now waking un-refreshed, and return of daytime sleepiness. Also, the development of new medical problems  (such as heart failure, stroke, medications such as narcotics) can sometimes affect breathing at night and change your PAP therapy needs.    Please bring PAP with you if you are hospitalized.  If anticipating surgery be sure to discuss with your surgeon that you have sleep apnea and use PAP therapy.      Maintain your equipment as recommended which includes routine cleaning and replacement of supplies.      Call DME for any questions regarding supplies or maintenance.    [place] [facility], [facility] [phone]      Do not drive on engage in potentially dangerous activities if feeling sleepy.    Please follow up in sleep clinic again in 12 months         Tips for your PAP use-    Mask fitting tips  Mask fitting exercise:    To improve your mask seal and your mobility at night, put mask on and secure in place.  Lie down in bed with full pressure and roll to one side, adjust headgear while in that position to eliminate any leaks. Repeat process rolling to other side.     The mask seal does not have to be perfect:   CPAP machines are designed to make up for small leaks. However, you will not tolerate leaks blowing in your eyes so you will  need to adjust.   Any leak should only be near or at the bottom of the mask.  We expect your mask to leak slightly at night.    Do not over-tighten the headgear straps, tighter IS NOT better, we expect minimal leak.    First try re-positioning the mask or headgear before tightening the headgear straps.  Mask leaks are expected due to changing sleeping positions. Try pulling the mask away from your skin allowing the cushion to re-inflate will minimize the leak.  If you struggle for a good fit, try turning the CPAP off and then readjust the mask by pulling it away from your face and then turning back on the CPAP.        Humidifier tips  Humidifiers can be adjusted to increase or decrease the amount of moisture according to your comfort level. You may need to adjust this frequently at first, but then might only change it with seasonal weather changes.     Try INCREASING the humidity if:  You experience a dry, irritated nasal passage or throat.  You have a runny, drippy nose or sneezing fits after using CPAP.  You experience nasal congestion during or after CPAP use.    Try DECREASING the humidity if:  You have excessive condensation or  rain out  in the tubing or mask.  Otherwise keep the tubing warm during the night by running it underneath the blankets or pillow.      Clinic visit after initial PAP set-up   Bring your equipment with you to your 4 week follow up clinic visit.  We will be extracting your data from the machine.        Travel  Always take your equipment with you.  If you fly with your equipment bring it on with you as a carry on.  Medical equipment does not count as a carry on.    If you travel international the machines take 110-240.  The only adapter needed is the adapter that will fit into the receptacle (outlet).    You may also want to bring an extension cord as many hotel rooms have limited outlets at the bedside.  Do not travel with water in your humidifier chamber.     Cleaning and Maintenance  Guidelines    Equipment Frequency Cleaning Method   Mask First Day    Daily      Weekly Soak mask in hot soapy water for 30 minutes, rinse and air dry.  Wipe nasal cushion with a hot soapy (Ivory, baby shampoo) cloth and rinse.  Baby wipes may also be used.  Do not use anti-bacterial soaps,Connie  liquid soap, rubbing alcohol, bleach or ammonia.  Wash frame in hot soapy water (Ivory, baby shampoo) rinse and let air dry   Headgear Biweekly Wash in hot soapy water, rinse and air dry   Reusable Gray Filter Weekly Wash in hot soapy water, rinse, put in towel squeeze moisture out, let air dry   Disposable White Filter Check Weekly Replace when brown or gray in color; at least every 2 to 3 months   Humidifier Chamber Daily    Weekly Empty distilled water from humidifier and let air dry    Hand wash in hot soapy water, rinse and air dry   Tubing Weekly Wash in hot soapy water, rinse and let air dry   Mask, Tubing and Humidifier Chamber As needed Disinfect: Soak in 1 part distilled white vinegar to 3 parts hot water for 30 minutes, rinse well and air dry  Not the material headgear        MASK AND SUPPLY REORDERING and EQUIPMENT NEEDS through your DME and per your insurance  Reminder: Most insurance companies will allow for a new mask, headgear, tubing, and reusable gray filter every six months.  Disposable white ultra-fine filters are covered monthly.      HOME AND SAFETY INSTRUCTIONS    Do not use frayed or cracked electrical cords, multi plug adaptors, or switched receptacles    Do not immerse electrical equipment into water    Assure that electrical cords do not become a tripping hazard      Your BMI is Body mass index is 34.52 kg/m .  Weight management is a personal decision.  If you are interested in exploring weight loss strategies, the following discussion covers the approaches that may be successful. Body mass index (BMI) is one way to tell whether you are at a healthy weight, overweight, or obese. It measures your  weight in relation to your height.  A BMI of 18.5 to 24.9 is in the healthy range. A person with a BMI of 25 to 29.9 is considered overweight, and someone with a BMI of 30 or greater is considered obese. More than two-thirds of American adults are considered overweight or obese.  Being overweight or obese increases the risk for further weight gain. Excess weight may lead to heart disease and diabetes.  Creating and following plans for healthy eating and physical activity may help you improve your health.  Weight control is part of healthy lifestyle and includes exercise, emotional health, and healthy eating habits. Careful eating habits lifelong are the mainstay of weight control. Though there are significant health benefits from weight loss, long-term weight loss with diet alone may be very difficult to achieve- studies show long-term success with dietary management in less than 10% of people. Attaining a healthy weight may be especially difficult to achieve in those with severe obesity. In some cases, medications, devices and surgical management might be considered.  What can you do?  If you are overweight or obese and are interested in methods for weight loss, you should discuss this with your provider.     Consider reducing daily calorie intake by 500 calories.     Keep a food journal.     Avoiding skipping meals, consider cutting portions instead.    Diet combined with exercise helps maintain muscle while optimizing fat loss. Strength training is particularly important for building and maintaining muscle mass. Exercise helps reduce stress, increase energy, and improves fitness. Increasing exercise without diet control, however, may not burn enough calories to loose weight.       Start walking three days a week 10-20 minutes at a time    Work towards walking thirty minutes five days a week     Eventually, increase the speed of your walking for 1-2 minutes at time      And look into health and wellness programs  that may be available through your health insurance provider, employer, local community center, or mike club.    Weight management plan: keep working on diet and exercise

## 2020-12-18 NOTE — PROGRESS NOTES
"Teresa Fernandez is a 60 year old female who is being evaluated via a billable video visit.      The patient has been notified of following:     \"This video visit will be conducted via a call between you and your physician/provider. We have found that certain health care needs can be provided without the need for an in-person physical exam.  This service lets us provide the care you need with a video conversation.  If a prescription is necessary we can send it directly to your pharmacy.  If lab work is needed we can place an order for that and you can then stop by our lab to have the test done at a later time.    Video visits are billed at different rates depending on your insurance coverage.  Please reach out to your insurance provider with any questions.    If during the course of the call the physician/provider feels a video visit is not appropriate, you will not be charged for this service.\"    Patient has given verbal consent for Video visit? Yes  How would you like to obtain your AVS? MyChart  If you are dropped from the video visit, the video invite should be resent to: Send to e-mail at: [email or web address]  Will anyone else be joining your video visit? No        Video-Visit Details    Type of service:  Video Visit    Video Start Time: 1:31 PM  Video End Time: 1:43 PM    Originating Location (pt. Location): Home    Distant Location (provider location):  [facility] SLEEP [facility]     Platform used for Video Visit: Clement Allan MD    Chief complaint: Follow-up sleep apnea and starting CPAP    History of Present Illness: 60-year-old female with history of asthma, renal cell carcinoma daytime sleepiness.  She was found to have moderately severe obstructive sleep apnea and was recently started on CPAP.  She is here today to follow-up.  She reports that she has been finding CPAP very beneficial.  At first she has some difficulty with the pressure in and felt like it was making " it harder to breathe.  Adjustments were made and since then it is working much better for her.  She feels that her sleep is sounder.  She is getting up 0-1 time a night to go to the bathroom which is an improvement.  She wakes up feeling more alert in the morning.  She did use a chinstrap for a while but no longer needs it.  Otherwise she uses a nasal mask.  She has been remembering her dreams more but is not having any nightmares.    She denies new concerns.  She generally is very satisfied.  No upcoming surgeries or procedures.  She drinks 2 cups of coffee in the morning.      Total score - Spurger: 8 (12/18/2020 12:46 PM) (Less than 10 normal)    MARISOL Total Score: 7 (normal 0-7, mild 8-14, moderate 15-21, severe 22-28)    Past Medical History:   Diagnosis Date     ALLERGIC RHINITIS NOS 4/12/2005     Anxiety      Arthritis     herniated disc     Bronchitis, not specified as acute or chronic      CHR MAXILLARY SINUSITIS 4/12/2005     Depressive disorder, not elsewhere classified      Eczema 10/17/2012     Environmental and seasonal allergies      GERD (gastroesophageal reflux disease)      Gluten intolerance      Malignant neoplasm of renal pelvis (H) 1995    Renal cell carcinoma     Melanoma (H) 06/2010     Other specified acquired hypothyroidism 4/12/2005     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     Penicillins Hives       Current Outpatient Medications   Medication     acetaminophen (TYLENOL) 325 MG tablet     ADVAIR DISKUS 250-50 MCG/DOSE inhaler     albuterol (PROAIR HFA/PROVENTIL HFA/VENTOLIN HFA) 108 (90 BASE) MCG/ACT Inhaler     albuterol (PROVENTIL) (2.5 MG/3ML) 0.083% neb  solution     Cholecalciferol (VITAMIN D3 PO)     DULoxetine (CYMBALTA) 60 MG capsule     fexofenadine (ALLEGRA) 180 MG tablet     GLUCOSAMINE SULFATE PO     levothyroxine (SYNTHROID/LEVOTHROID) 88 MCG tablet     MAGNESIUM OXIDE PO     Multiple Vitamins-Minerals (MULTIVITAMIN ADULT PO)     omeprazole (PRILOSEC) 20 MG DR capsule     triamcinolone (KENALOG) 0.1 % external ointment     albuterol (PROAIR HFA/PROVENTIL HFA/VENTOLIN HFA) 108 (90 Base) MCG/ACT inhaler     celecoxib (CELEBREX) 100 MG capsule     Cyanocobalamin (VITAMIN B 12 PO)     order for DME     OVER-THE-COUNTER     No current facility-administered medications for this visit.        Social History     Socioeconomic History     Marital status:      Spouse name: Not on file     Number of children: Not on file     Years of education: Not on file     Highest education level: Not on file   Occupational History     Employer: [employer]     Employer: SELF   Social Needs     Financial resource strain: Not on file     Food insecurity     Worry: Not on file     Inability: Not on file     Transportation needs     Medical: Not on file     Non-medical: Not on file   Tobacco Use     Smoking status: Former Smoker     Packs/day: 2.00     Years: 15.00     Pack years: 30.00     Quit date: 3/26/1996     Years since quittin.7     Smokeless tobacco: Never Used   Substance and Sexual Activity     Alcohol use: No     Comment: None now.  Rare in past.      Drug use: No     Sexual activity: Yes     Partners: Male   Lifestyle     Physical activity     Days per week: Not on file     Minutes per session: Not on file     Stress: Not on file   Relationships     Social connections     Talks on phone: Not on file     Gets together: Not on file     Attends Restoration service: Not on file     Active member of club or organization: Not on file     Attends meetings of clubs or organizations: Not on file     Relationship status: Not on file     Intimate partner violence     Fear  "of current or ex partner: Not on file     Emotionally abused: Not on file     Physically abused: Not on file     Forced sexual activity: Not on file   Other Topics Concern     Parent/sibling w/ CABG, MI or angioplasty before 65F 55M? No   Social History Narrative    .  Lives with .  Makes medical supplies.     4 children - healthy    4 siblings - + FH depression, hypertension, chol, diabetes mellitus    No family of muscle problems.        Family History   Problem Relation Age of Onset     Diabetes Mother      Cardiovascular Mother      Lipids Mother      Depression Mother      Hypertension Father      Lipids Father      Obesity Father      Macular Degeneration Father      Sleep Apnea Father      Cancer Maternal Grandmother         kind unknown had sores on legs     Eczema Maternal Grandmother      Eczema Maternal Grandfather      Breast Cancer Paternal Grandmother      Cancer Paternal Grandmother         breast     Hypertension Paternal Grandfather      Cardiovascular Paternal Grandfather         heart attack     Hypertension Brother      Thyroid Disease Sister      Hypertension Sister      Depression Sister      Allergies Sister      Allergies Son      Eczema Son      Lipids Sister      Thyroid Disease Sister      Neurologic Disorder Sister      Depression Sister      Respiratory Son      Sleep Apnea Son      Glaucoma No family hx of      Cerebrovascular Disease No family hx of          EXAM:  Ht 1.727 m (5' 7.99\")   Wt 103 kg (227 lb)   BMI 34.52 kg/m    GENERAL: Healthy, alert and no distress  EYES: Eyes grossly normal to inspection.  No discharge or erythema, or obvious scleral/conjunctival abnormalities.  RESP: No audible wheeze, cough, or visible cyanosis.  No visible retractions or increased work of breathing.    SKIN: Visible skin clear. No significant rash, abnormal pigmentation or lesions.  NEURO: Cranial nerves grossly intact.  Mentation and speech appropriate for age.  PSYCH: Mentation " appears normal, affect normal/bright, judgement and insight intact, normal speech and appearance well-groomed.       PSG 9/9/2020  Weight 225 lbs BMI 34.5  AHI 19.8, RDI 26.4, Time below 88% 40 min (REM)  PLMAI 16.5  Sinus tach    ResMed   Auto-PAP 5.0 - 12.0 cmH2O 30 day usage data:    100% of days with > 4 hours of use. 0/30 days with no use.   Average use 520 minutes per day.   95%ile Leak 2.71 L/min.   CPAP 95% pressure 8.9 cm.   AHI 2.06 events per hour.     ASSESSMENT:  60-year-old female history of asthma, renal cell carcinoma and moderately severe obstructive sleep apnea.  Symptoms of excessive daytime sleepiness and sleep disruption have been resolved on Pap.  I suspect given normalization of AHI that hypoxemia is resolved as well.  Treatment of sleep apnea in this patient is medically indicated due to severity and symptoms.    PLAN:  Patient is urged to continue using CPAP all night every night.  She was encouraged to inform providers of her diagnosis should she go through any medical procedures requiring sedation, anesthesia, narcotics.  She is reminded of the effects of anesthesia and sedation on worsening sleep apnea.  If she should be stuck without her machine encouraged her to sleep on sides or and with head of the bed elevated.  She should clean supplies as directed by DME. I did not recommend a commercially available cleaning device.  Patient should follow-up in sleep medicine in 1 year.  Continue efforts at weight management.        Katerine Allan M.D.  Pulmonary/Critical Care/Sleep Medicine    [facility]   Floor 1, [address]   [address] [place] [address]. [place] [address]   Appointments: [phone]    The above note was dictated using voice recognition software and may include typographical errors. Please contact the author for any clarifications.

## 2020-12-22 ENCOUNTER — HOSPITAL ENCOUNTER (OUTPATIENT)
Dept: MAMMOGRAPHY | Facility: CLINIC | Age: 60
Discharge: HOME | End: 2020-12-22
Attending: NURSE PRACTITIONER | Admitting: NURSE PRACTITIONER
Payer: OTHER GOVERNMENT

## 2020-12-22 DIAGNOSIS — Z12.31 VISIT FOR SCREENING MAMMOGRAM: ICD-10-CM

## 2020-12-22 PROCEDURE — 77067 SCR MAMMO BI INCL CAD: CPT

## 2021-01-04 DIAGNOSIS — F41.1 GAD (GENERALIZED ANXIETY DISORDER): ICD-10-CM

## 2021-01-04 DIAGNOSIS — M79.10 MYALGIA: ICD-10-CM

## 2021-01-04 NOTE — TELEPHONE ENCOUNTER
"Requested Prescriptions   Pending Prescriptions Disp Refills     DULoxetine (CYMBALTA) 60 MG capsule [Pharmacy Med Name: DULOXETINE HCL DR CAPS 60MG] 90 capsule 3     Sig: TAKE 1 CAPSULE EVERY MORNING       Serotonin-Norepinephrine Reuptake Inhibitors  Passed - 1/4/2021  4:21 PM        Passed - Blood pressure under 140/90 in past 12 months     BP Readings from Last 3 Encounters:   11/06/20 114/72   10/07/20 118/74   09/28/20 114/80                 Passed - Recent (12 mo) or future (30 days) visit within the authorizing provider's specialty     Patient has had an office visit with the authorizing provider or a provider within the authorizing providers department within the previous 12 mos or has a future within next 30 days. See \"Patient Info\" tab in inbasket, or \"Choose Columns\" in Meds & Orders section of the refill encounter.              Passed - Medication is active on med list        Passed - Patient is age 18 or older        Passed - No active pregnancy on record        Passed - No positive pregnancy test in past 12 months             "

## 2021-01-08 RX ORDER — DULOXETIN HYDROCHLORIDE 60 MG/1
CAPSULE, DELAYED RELEASE ORAL
Qty: 90 CAPSULE | Refills: 0 | Status: SHIPPED | OUTPATIENT
Start: 2021-01-08 | End: 2021-04-16

## 2021-01-15 ENCOUNTER — OFFICE VISIT (OUTPATIENT)
Dept: NEUROLOGY | Facility: CLINIC | Age: 61
End: 2021-01-15
Payer: OTHER GOVERNMENT

## 2021-01-15 VITALS
RESPIRATION RATE: 16 BRPM | HEART RATE: 88 BPM | OXYGEN SATURATION: 98 % | DIASTOLIC BLOOD PRESSURE: 82 MMHG | SYSTOLIC BLOOD PRESSURE: 130 MMHG

## 2021-01-15 DIAGNOSIS — R26.9 GAIT DISORDER: Primary | ICD-10-CM

## 2021-01-15 DIAGNOSIS — R20.0 NUMBNESS: ICD-10-CM

## 2021-01-15 PROCEDURE — 99215 OFFICE O/P EST HI 40 MIN: CPT | Performed by: INTERNAL MEDICINE

## 2021-01-15 NOTE — LETTER
"1/15/2021       RE: Teresa Fernandez  [address] [facility] [id number]     Dear Colleague,    Thank you for referring your patient, Teresa Fernandez, to the [facility] [place] at [facility]. Please see a copy of my visit note below.    [facility] Neurology Follow-up    Teresa Fernandez MRN# [id number]   Age: 60 year old YOB: 1960     Requesting physician: Nuzhat Parikh     Reason for Consultation: multiple neurological symptoms      History of Presenting Symptoms:   Teresa Fernandez is a 60 year old female who presents today for evaluation of multiple neurological symptoms, including walking difficulties, tremors, bilateral arm numbness.     Patient previously followed with Dr Callahan, last seen in 7/2020.     Patient reports random dizzy spells, described as a sense of imbalance with walking. This happens with standing up or after walking for a bit. This has been going on for the last few years and currently happens a few times per month.    Patient has problems walking when she over exerts herself and when she gets these \"dizzy spells\". Patient also reports significant hip and knee pains.     Patient reports intermittent tremors. This happens about 3-4 times per week. It usually happens in the morning when she walks up and then will intermittently bother her throughout the day. The tremors are in the head and the bilateral arms.     Patient notes problems with clumsiness of the left hand and intermittent non painful shocks in the left arm into the thumb.     She notices numbness in the bilateral arms when she lies down at night. The numbness is present throughout the entire arms and involves the whole hands. The numbness is not painful. Numbness appears to be positional dependent and when she repositions the symptoms improve.    In addition patient feels like she is noticing more word finding difficulties as " of late.       Past Medical History:     Patient Active Problem List   Diagnosis     Acquired hypothyroidism     Allergic state     Chronic rhinitis     Allergic rhinitis due to pollen     Sinusitis, chronic     History of skin cancer     ERIC (generalized anxiety disorder)     Leukoplakia of oral mucosa, including tongue     Renal cancer (H)     GERD (gastroesophageal reflux disease)     Chronic low back pain     Dry eye syndrome     Chronic fatigue     Osteopenia     History of melanoma in situ     Eczema     Moderate persistent asthma without complication     History of kidney cancer     DDD (degenerative disc disease), lumbar     Hyperlipidemia LDL goal <160     Chest tightness or pressure     Muscle pain     GAYE (obstructive sleep apnea)     Past Medical History:   Diagnosis Date     ALLERGIC RHINITIS NOS 4/12/2005     Anxiety      Arthritis     herniated disc     Bronchitis, not specified as acute or chronic      CHR MAXILLARY SINUSITIS 4/12/2005     Depressive disorder, not elsewhere classified      Eczema 10/17/2012     Environmental and seasonal allergies      GERD (gastroesophageal reflux disease)      Gluten intolerance      Malignant neoplasm of renal pelvis (H) 1995    Renal cell carcinoma     Melanoma (H) 06/2010     Other specified acquired hypothyroidism 4/12/2005     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     FUSION LUMBAR ANTERIOR TWO LEVELS  4/13/2015     GYN SURGERY  04/08/1999    hysterectomy.  [facility]     HYSTERECTOMY, PAP NO LONGER INDICATED       SOFT TISSUE SURGERY      chest-melonoma     SURGICAL HISTORY OF -   3/1996    Left nephrectomy-renal cell CA        Social History:     Social History     Tobacco Use     Smoking  status: Former Smoker     Packs/day: 2.00     Years: 15.00     Pack years: 30.00     Quit date: 3/26/1996     Years since quittin.8     Smokeless tobacco: Never Used   Substance Use Topics     Alcohol use: No     Comment: None now.  Rare in past.      Drug use: No        Family History:     Family History   Problem Relation Age of Onset     Diabetes Mother      Cardiovascular Mother      Lipids Mother      Depression Mother      Hypertension Father      Lipids Father      Obesity Father      Macular Degeneration Father      Sleep Apnea Father      Cancer Maternal Grandmother         kind unknown had sores on legs     Eczema Maternal Grandmother      Eczema Maternal Grandfather      Breast Cancer Paternal Grandmother      Cancer Paternal Grandmother         breast     Hypertension Paternal Grandfather      Cardiovascular Paternal Grandfather         heart attack     Hypertension Brother      Thyroid Disease Sister      Hypertension Sister      Depression Sister      Allergies Sister      Allergies Son      Eczema Son      Lipids Sister      Thyroid Disease Sister      Neurologic Disorder Sister      Depression Sister      Respiratory Son      Sleep Apnea Son      Glaucoma No family hx of      Cerebrovascular Disease No family hx of         Medications:     Current Outpatient Medications   Medication Sig     acetaminophen (TYLENOL) 325 MG tablet Take 325-650 mg by mouth every 6 hours as needed for mild pain     ADVAIR DISKUS 250-50 MCG/DOSE inhaler USE 1 INHALATION TWICE A DAY (FOLLOW UP FOR FURTHER REFILLS)     albuterol (PROAIR HFA/PROVENTIL HFA/VENTOLIN HFA) 108 (90 Base) MCG/ACT inhaler Inhale 2 puffs into the lungs every 6 hours as needed for shortness of breath / dyspnea or wheezing     albuterol (PROAIR HFA/PROVENTIL HFA/VENTOLIN HFA) 108 (90 BASE) MCG/ACT Inhaler Inhale 2 puffs into the lungs every 6 hours as needed for shortness of breath / dyspnea     albuterol (PROVENTIL) (2.5 MG/3ML) 0.083% neb  solution Take 1 vial (2.5 mg) by nebulization every 6 hours as needed for shortness of breath / dyspnea or wheezing     celecoxib (CELEBREX) 100 MG capsule Take 1 capsule (100 mg) by mouth 2 times daily as needed for moderate pain (Patient not taking: Reported on 11/6/2020)     Cholecalciferol (VITAMIN D3 PO) Take 2,000 Units by mouth 2 times daily      Cyanocobalamin (VITAMIN B 12 PO) Take 500 mcg by mouth daily     DULoxetine (CYMBALTA) 60 MG capsule TAKE 1 CAPSULE EVERY MORNING     fexofenadine (ALLEGRA) 180 MG tablet Take 1 tablet (180 mg) by mouth daily     GLUCOSAMINE SULFATE PO Take 1,000 mg by mouth 2 times daily     levothyroxine (SYNTHROID/LEVOTHROID) 88 MCG tablet Take 1 tablet (88 mcg) by mouth daily     MAGNESIUM OXIDE PO Take 200 mg by mouth daily     Multiple Vitamins-Minerals (MULTIVITAMIN ADULT PO) Take by mouth every morning      omeprazole (PRILOSEC) 20 MG DR capsule Take 20 mg by mouth daily     order for DME Equipment being ordered: Nebulizer     OVER-THE-COUNTER Place 1 drop into both eyes 3 times daily. Systane Ultra, Systane Balance, Blink Tears or Refresh Optive Artificial Tear (Patient not taking: Reported on 12/18/2020)     triamcinolone (KENALOG) 0.1 % external ointment Use 1-2 times daily as needed for burning rash     No current facility-administered medications for this visit.         Allergies:     Allergies   Allergen Reactions     Omnipaque [Iohexol] Swelling and Difficulty breathing     Immediate throat swelling following around 1-2cc of omnipaque 300 for spine procedure     Gluten      Iodine      Welts from CT contrast, surgical scrub is ok.     Penicillins Hives        Review of Systems:   As above     Physical Exam:   Vitals: /82   Pulse 88   Resp 16   SpO2 98%    General: Seated comfortably in no acute distress.  HEENT: Neck supple with normal range of motion. No paracervical muscle tenderness or tightness. Optic discs sharp and vasculature normal on funduscopic exam.    Heart: Regular rate  Lungs: breathing comfortably  Extremities: no edema  Skin: No rashes  Neurologic:     Mental Status: Fully alert, attentive and oriented. Normal memory and fund of knowledge. Language normal, speech clear and fluent, no paraphasic errors.     Cranial Nerves: Visual fields intact. PERRL. EOMI with normal smooth pursuit. Facial sensation intact/symmetric. Facial movements symmetric. Hearing not formally tested but intact to conversation. Palate elevation symmetric, uvula midline. No dysarthria. Shoulder shrug strong bilaterally. Tongue protrusion midline.     Motor: No tremors or other abnormal movements observed. Muscle tone normal throughout. No pronator drift. Normal/symmetric rapid finger tapping. Strength 5/5 throughout upper and lower extremities.     Deep Tendon Reflexes: 2+/symmetric throughout upper and lower extremities with exception of 1+ ankle jerks. Toes downgoing bilaterally.     Sensory: Absent vibration sense in bilateral toes. 11 seconds of vibration present in bilateral ankles. Normal vibration sense in the remainder of the extremities. Intact/symmetric to light touch, temperature and proprioception throughout upper and lower extremities. Negative Romberg.      Coordination: Finger-nose-finger and heel-shin intact without dysmetria. Rapid alternating movements intact/symmetric with normal speed and rhythm.     Gait: Antalgic, but steady casual gait, mildly wide based. Able to walk on toes, heels and tandem with mild difficulty.         Data: Pertinent prior to visit   Imaging:  MRI brain 3/2020  Impression:   1. There is no mass lesion, acute infarction or intracranial  hemorrhage.  2. Leukoaraiosis    MRI cervical spine 11/2017      MRI lumbar spine 2/2015  IMPRESSION:  1. Advanced multilevel degenerative disc disease with discogenic  endplate reactive marrow edema noted at L5-S1, L4-L5, and L2-L3.  2. Advanced apophyseal joint degenerative arthrosis, greatest at L4-L5  where  there is degenerative grade 1 spondylolisthesis which has  progressed since 7/9/2012.  3. Central stenosis which is greatest and severe at L4-L5. This also  has progressed since 2012.  4. Multilevel foraminal stenosis as above.    Procedures:  EMG/NCS 7/2020  Interpretation:  The EMG shows mild abnormalities.  Insertional abnormality seen in the right medial gastrocnemius and a borderline to low tibial motor amplitude in the right leg could be consistent with an S1 radiculopathy although gluteus mirella did not show any changes and clinical correlation is advised.  The low tibial motor amplitude on the right and peroneal amplitude on the left could also be related to body habitus and submaximal stimulation.  Clinical correlation is strongly advised.    Laboratory:  B12 771 (3/2020)         Assessment and Plan:   Assessment:  Teresa Fernandez is a 60 year old female who presents today for evaluation of multiple neurological symptoms, including walking difficulties, tremors, bilateral arm numbness. Patient previously followed with Dr Callahan and had work-up of walking difficulties. MRI brain with minimal white matter changes. MRI cervical spine without any areas of significant canal stenosis. Patient previously had lumbar decompression surgery around 2015. EMG recently showed some mild changes which could be related to prior lumbar stenosis. EMG did not show evidence of peripheral neuropathy. On examination today gait is antalgic and mildly wide based. Patient is able to perform heel, toe, and tandem gaits. Discussed with patient that gait changes are likely multifactorial in setting of hip/knee arthritis and prior lumbar radiculopathy. Could consider MRI thoracic imaging in the future if symptoms worsen.     Regarding bilateral arm numbness this appears to be position dependent and resolves with change in position. Discussed with patient symptoms of carpal tunnel syndrome, ulnar neuropathy, and cervical radiculopathy.  Could consider EMG/NCS in the future of the upper extremities if symptoms become more bothersome.     On examination today patient without evidence of tremor. She reports tremors don't occur every day and when they occur, tremors are intermittent. There is documented history of essential tremor in the chart, but examination showed no sign of this today.      Plan:  - Monitor symptoms for now, if worsening could consider further evaluations as above    Follow up in Neurology clinic as needed should new concerns arise.    Kenneth Naik MD   of Neurology  [facility]    The total time of this encounter amounted to 54 minutes. This time included time spent with the patient, prep work, ordering tests, and performing post visit documentation.        Again, thank you for allowing me to participate in the care of your patient.      Sincerely,    Kenneth Naik MD

## 2021-01-15 NOTE — PROGRESS NOTES
"[facility] Neurology Follow-up    Teresa Fernandez MRN# [id number]   Age: 60 year old YOB: 1960     Requesting physician: Nuzhat Parikh     Reason for Consultation: multiple neurological symptoms      History of Presenting Symptoms:   Teresa Fernandez is a 60 year old female who presents today for evaluation of multiple neurological symptoms, including walking difficulties, tremors, bilateral arm numbness.     Patient previously followed with Dr Callahan, last seen in 7/2020.     Patient reports random dizzy spells, described as a sense of imbalance with walking. This happens with standing up or after walking for a bit. This has been going on for the last few years and currently happens a few times per month.    Patient has problems walking when she over exerts herself and when she gets these \"dizzy spells\". Patient also reports significant hip and knee pains.     Patient reports intermittent tremors. This happens about 3-4 times per week. It usually happens in the morning when she walks up and then will intermittently bother her throughout the day. The tremors are in the head and the bilateral arms.     Patient notes problems with clumsiness of the left hand and intermittent non painful shocks in the left arm into the thumb.     She notices numbness in the bilateral arms when she lies down at night. The numbness is present throughout the entire arms and involves the whole hands. The numbness is not painful. Numbness appears to be positional dependent and when she repositions the symptoms improve.    In addition patient feels like she is noticing more word finding difficulties as of late.       Past Medical History:     Patient Active Problem List   Diagnosis     Acquired hypothyroidism     Allergic state     Chronic rhinitis     Allergic rhinitis due to pollen     Sinusitis, chronic     History of skin cancer     ERIC (generalized anxiety disorder)     Leukoplakia of oral mucosa, " including tongue     Renal cancer (H)     GERD (gastroesophageal reflux disease)     Chronic low back pain     Dry eye syndrome     Chronic fatigue     Osteopenia     History of melanoma in situ     Eczema     Moderate persistent asthma without complication     History of kidney cancer     DDD (degenerative disc disease), lumbar     Hyperlipidemia LDL goal <160     Chest tightness or pressure     Muscle pain     GAYE (obstructive sleep apnea)     Past Medical History:   Diagnosis Date     ALLERGIC RHINITIS NOS 2005     Anxiety      Arthritis     herniated disc     Bronchitis, not specified as acute or chronic      CHR MAXILLARY SINUSITIS 2005     Depressive disorder, not elsewhere classified      Eczema 10/17/2012     Environmental and seasonal allergies      GERD (gastroesophageal reflux disease)      Gluten intolerance      Malignant neoplasm of renal pelvis (H)     Renal cell carcinoma     Melanoma (H) 2010     Other specified acquired hypothyroidism 2005     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     FUSION LUMBAR ANTERIOR TWO LEVELS  2015     GYN SURGERY  1999    hysterectomy.  LAVH     HYSTERECTOMY, PAP NO LONGER INDICATED       SOFT TISSUE SURGERY      chest-melonoma     SURGICAL HISTORY OF -   3/1996    Left nephrectomy-renal cell CA        Social History:     Social History     Tobacco Use     Smoking status: Former Smoker     Packs/day: 2.00     Years: 15.00     Pack years: 30.00     Quit date: 3/26/1996     Years since quittin.8     Smokeless tobacco: Never Used   Substance Use Topics     Alcohol use: No     Comment: None now.  Rare in past.      Drug use: No        Family History:     Family History    Problem Relation Age of Onset     Diabetes Mother      Cardiovascular Mother      Lipids Mother      Depression Mother      Hypertension Father      Lipids Father      Obesity Father      Macular Degeneration Father      Sleep Apnea Father      Cancer Maternal Grandmother         kind unknown had sores on legs     Eczema Maternal Grandmother      Eczema Maternal Grandfather      Breast Cancer Paternal Grandmother      Cancer Paternal Grandmother         breast     Hypertension Paternal Grandfather      Cardiovascular Paternal Grandfather         heart attack     Hypertension Brother      Thyroid Disease Sister      Hypertension Sister      Depression Sister      Allergies Sister      Allergies Son      Eczema Son      Lipids Sister      Thyroid Disease Sister      Neurologic Disorder Sister      Depression Sister      Respiratory Son      Sleep Apnea Son      Glaucoma No family hx of      Cerebrovascular Disease No family hx of         Medications:     Current Outpatient Medications   Medication Sig     acetaminophen (TYLENOL) 325 MG tablet Take 325-650 mg by mouth every 6 hours as needed for mild pain     ADVAIR DISKUS 250-50 MCG/DOSE inhaler USE 1 INHALATION TWICE A DAY (FOLLOW UP FOR FURTHER REFILLS)     albuterol (PROAIR HFA/PROVENTIL HFA/VENTOLIN HFA) 108 (90 Base) MCG/ACT inhaler Inhale 2 puffs into the lungs every 6 hours as needed for shortness of breath / dyspnea or wheezing     albuterol (PROAIR HFA/PROVENTIL HFA/VENTOLIN HFA) 108 (90 BASE) MCG/ACT Inhaler Inhale 2 puffs into the lungs every 6 hours as needed for shortness of breath / dyspnea     albuterol (PROVENTIL) (2.5 MG/3ML) 0.083% neb solution Take 1 vial (2.5 mg) by nebulization every 6 hours as needed for shortness of breath / dyspnea or wheezing     celecoxib (CELEBREX) 100 MG capsule Take 1 capsule (100 mg) by mouth 2 times daily as needed for moderate pain (Patient not taking: Reported on 11/6/2020)     Cholecalciferol (VITAMIN D3 PO) Take  2,000 Units by mouth 2 times daily      Cyanocobalamin (VITAMIN B 12 PO) Take 500 mcg by mouth daily     DULoxetine (CYMBALTA) 60 MG capsule TAKE 1 CAPSULE EVERY MORNING     fexofenadine (ALLEGRA) 180 MG tablet Take 1 tablet (180 mg) by mouth daily     GLUCOSAMINE SULFATE PO Take 1,000 mg by mouth 2 times daily     levothyroxine (SYNTHROID/LEVOTHROID) 88 MCG tablet Take 1 tablet (88 mcg) by mouth daily     MAGNESIUM OXIDE PO Take 200 mg by mouth daily     Multiple Vitamins-Minerals (MULTIVITAMIN ADULT PO) Take by mouth every morning      omeprazole (PRILOSEC) 20 MG DR capsule Take 20 mg by mouth daily     order for DME Equipment being ordered: Nebulizer     OVER-THE-COUNTER Place 1 drop into both eyes 3 times daily. Systane Ultra, Systane Balance, Blink Tears or Refresh Optive Artificial Tear (Patient not taking: Reported on 12/18/2020)     triamcinolone (KENALOG) 0.1 % external ointment Use 1-2 times daily as needed for burning rash     No current facility-administered medications for this visit.         Allergies:     Allergies   Allergen Reactions     Omnipaque [Iohexol] Swelling and Difficulty breathing     Immediate throat swelling following around 1-2cc of omnipaque 300 for spine procedure     Gluten      Iodine      Welts from CT contrast, surgical scrub is ok.     Penicillins Hives        Review of Systems:   As above     Physical Exam:   Vitals: /82   Pulse 88   Resp 16   SpO2 98%    General: Seated comfortably in no acute distress.  HEENT: Neck supple with normal range of motion. No paracervical muscle tenderness or tightness. Optic discs sharp and vasculature normal on funduscopic exam.   Heart: Regular rate  Lungs: breathing comfortably  Extremities: no edema  Skin: No rashes  Neurologic:     Mental Status: Fully alert, attentive and oriented. Normal memory and fund of knowledge. Language normal, speech clear and fluent, no paraphasic errors.     Cranial Nerves: Visual fields intact. PERRL. EOMI  with normal smooth pursuit. Facial sensation intact/symmetric. Facial movements symmetric. Hearing not formally tested but intact to conversation. Palate elevation symmetric, uvula midline. No dysarthria. Shoulder shrug strong bilaterally. Tongue protrusion midline.     Motor: No tremors or other abnormal movements observed. Muscle tone normal throughout. No pronator drift. Normal/symmetric rapid finger tapping. Strength 5/5 throughout upper and lower extremities.     Deep Tendon Reflexes: 2+/symmetric throughout upper and lower extremities with exception of 1+ ankle jerks. Toes downgoing bilaterally.     Sensory: Absent vibration sense in bilateral toes. 11 seconds of vibration present in bilateral ankles. Normal vibration sense in the remainder of the extremities. Intact/symmetric to light touch, temperature and proprioception throughout upper and lower extremities. Negative Romberg.      Coordination: Finger-nose-finger and heel-shin intact without dysmetria. Rapid alternating movements intact/symmetric with normal speed and rhythm.     Gait: Antalgic, but steady casual gait, mildly wide based. Able to walk on toes, heels and tandem with mild difficulty.         Data: Pertinent prior to visit   Imaging:  MRI brain 3/2020  Impression:   1. There is no mass lesion, acute infarction or intracranial  hemorrhage.  2. Leukoaraiosis    MRI cervical spine 11/2017      MRI lumbar spine 2/2015  IMPRESSION:  1. Advanced multilevel degenerative disc disease with discogenic  endplate reactive marrow edema noted at L5-S1, L4-L5, and L2-L3.  2. Advanced apophyseal joint degenerative arthrosis, greatest at L4-L5  where there is degenerative grade 1 spondylolisthesis which has  progressed since 7/9/2012.  3. Central stenosis which is greatest and severe at L4-L5. This also  has progressed since 2012.  4. Multilevel foraminal stenosis as above.    Procedures:  EMG/NCS 7/2020  Interpretation:  The EMG shows mild abnormalities.   Insertional abnormality seen in the right medial gastrocnemius and a borderline to low tibial motor amplitude in the right leg could be consistent with an S1 radiculopathy although gluteus mirella did not show any changes and clinical correlation is advised.  The low tibial motor amplitude on the right and peroneal amplitude on the left could also be related to body habitus and submaximal stimulation.  Clinical correlation is strongly advised.    Laboratory:  B12 771 (3/2020)         Assessment and Plan:   Assessment:  Teresa Fernandez is a 60 year old female who presents today for evaluation of multiple neurological symptoms, including walking difficulties, tremors, bilateral arm numbness. Patient previously followed with Dr Callahan and had work-up of walking difficulties. MRI brain with minimal white matter changes. MRI cervical spine without any areas of significant canal stenosis. Patient previously had lumbar decompression surgery around 2015. EMG recently showed some mild changes which could be related to prior lumbar stenosis. EMG did not show evidence of peripheral neuropathy. On examination today gait is antalgic and mildly wide based. Patient is able to perform heel, toe, and tandem gaits. Discussed with patient that gait changes are likely multifactorial in setting of hip/knee arthritis and prior lumbar radiculopathy. Could consider MRI thoracic imaging in the future if symptoms worsen.     Regarding bilateral arm numbness this appears to be position dependent and resolves with change in position. Discussed with patient symptoms of carpal tunnel syndrome, ulnar neuropathy, and cervical radiculopathy. Could consider EMG/NCS in the future of the upper extremities if symptoms become more bothersome.     On examination today patient without evidence of tremor. She reports tremors don't occur every day and when they occur, tremors are intermittent. There is documented history of essential tremor in the chart,  but examination showed no sign of this today.      Plan:  - Monitor symptoms for now, if worsening could consider further evaluations as above    Follow up in Neurology clinic as needed should new concerns arise.    Kenneth Naik MD   of Neurology  [facility]    The total time of this encounter amounted to 54 minutes. This time included time spent with the patient, prep work, ordering tests, and performing post visit documentation.

## 2021-01-15 NOTE — NURSING NOTE
Chief Complaint   Patient presents with     NEUROPATHY     [facility] NEW NEUROPATHY CONSULT       Mp Hua, EMT

## 2021-01-18 ENCOUNTER — MYC MEDICAL ADVICE (OUTPATIENT)
Dept: FAMILY MEDICINE | Facility: CLINIC | Age: 61
End: 2021-01-18

## 2021-01-18 DIAGNOSIS — E03.9 ACQUIRED HYPOTHYROIDISM: Primary | ICD-10-CM

## 2021-01-26 DIAGNOSIS — E03.9 ACQUIRED HYPOTHYROIDISM: ICD-10-CM

## 2021-01-26 LAB
T4 FREE SERPL-MCNC: 1.02 NG/DL (ref 0.76–1.46)
TSH SERPL DL<=0.005 MIU/L-ACNC: 4.7 MU/L (ref 0.4–4)

## 2021-01-26 PROCEDURE — 36415 COLL VENOUS BLD VENIPUNCTURE: CPT | Performed by: NURSE PRACTITIONER

## 2021-01-26 PROCEDURE — 84443 ASSAY THYROID STIM HORMONE: CPT | Performed by: NURSE PRACTITIONER

## 2021-01-26 PROCEDURE — 84439 ASSAY OF FREE THYROXINE: CPT | Performed by: NURSE PRACTITIONER

## 2021-02-18 ENCOUNTER — OFFICE VISIT (OUTPATIENT)
Dept: FAMILY MEDICINE | Facility: CLINIC | Age: 61
End: 2021-02-18
Payer: OTHER GOVERNMENT

## 2021-02-18 VITALS
DIASTOLIC BLOOD PRESSURE: 86 MMHG | RESPIRATION RATE: 16 BRPM | HEIGHT: 68 IN | OXYGEN SATURATION: 97 % | WEIGHT: 231.4 LBS | HEART RATE: 90 BPM | TEMPERATURE: 97.8 F | SYSTOLIC BLOOD PRESSURE: 126 MMHG | BODY MASS INDEX: 35.07 KG/M2

## 2021-02-18 DIAGNOSIS — E03.9 HYPOTHYROIDISM, UNSPECIFIED TYPE: ICD-10-CM

## 2021-02-18 DIAGNOSIS — R53.83 FATIGUE, UNSPECIFIED TYPE: Primary | ICD-10-CM

## 2021-02-18 DIAGNOSIS — L30.9 DERMATITIS: ICD-10-CM

## 2021-02-18 LAB
ALBUMIN SERPL-MCNC: 3.9 G/DL (ref 3.4–5)
ALP SERPL-CCNC: 107 U/L (ref 40–150)
ALT SERPL W P-5'-P-CCNC: 37 U/L (ref 0–50)
ANION GAP SERPL CALCULATED.3IONS-SCNC: 4 MMOL/L (ref 3–14)
AST SERPL W P-5'-P-CCNC: 29 U/L (ref 0–45)
BASOPHILS # BLD AUTO: 0 10E9/L (ref 0–0.2)
BASOPHILS NFR BLD AUTO: 0.4 %
BILIRUB SERPL-MCNC: 0.3 MG/DL (ref 0.2–1.3)
BUN SERPL-MCNC: 9 MG/DL (ref 7–30)
CALCIUM SERPL-MCNC: 9.7 MG/DL (ref 8.5–10.1)
CHLORIDE SERPL-SCNC: 106 MMOL/L (ref 94–109)
CO2 SERPL-SCNC: 29 MMOL/L (ref 20–32)
CREAT SERPL-MCNC: 0.75 MG/DL (ref 0.52–1.04)
DIFFERENTIAL METHOD BLD: NORMAL
EOSINOPHIL # BLD AUTO: 0.2 10E9/L (ref 0–0.7)
EOSINOPHIL NFR BLD AUTO: 3.1 %
ERYTHROCYTE [DISTWIDTH] IN BLOOD BY AUTOMATED COUNT: 12.9 % (ref 10–15)
GFR SERPL CREATININE-BSD FRML MDRD: 87 ML/MIN/{1.73_M2}
GLUCOSE SERPL-MCNC: 90 MG/DL (ref 70–99)
HCT VFR BLD AUTO: 39.9 % (ref 35–47)
HGB BLD-MCNC: 13.1 G/DL (ref 11.7–15.7)
LIPASE SERPL-CCNC: 105 U/L (ref 73–393)
LYMPHOCYTES # BLD AUTO: 1.8 10E9/L (ref 0.8–5.3)
LYMPHOCYTES NFR BLD AUTO: 34.3 %
MCH RBC QN AUTO: 29.7 PG (ref 26.5–33)
MCHC RBC AUTO-ENTMCNC: 32.8 G/DL (ref 31.5–36.5)
MCV RBC AUTO: 91 FL (ref 78–100)
MONOCYTES # BLD AUTO: 0.4 10E9/L (ref 0–1.3)
MONOCYTES NFR BLD AUTO: 7.8 %
NEUTROPHILS # BLD AUTO: 2.8 10E9/L (ref 1.6–8.3)
NEUTROPHILS NFR BLD AUTO: 54.4 %
PLATELET # BLD AUTO: 267 10E9/L (ref 150–450)
POTASSIUM SERPL-SCNC: 3.8 MMOL/L (ref 3.4–5.3)
PROT SERPL-MCNC: 7.1 G/DL (ref 6.8–8.8)
RBC # BLD AUTO: 4.41 10E12/L (ref 3.8–5.2)
SODIUM SERPL-SCNC: 139 MMOL/L (ref 133–144)
T4 FREE SERPL-MCNC: 1 NG/DL (ref 0.76–1.46)
TSH SERPL DL<=0.005 MIU/L-ACNC: 5.6 MU/L (ref 0.4–4)
WBC # BLD AUTO: 5.1 10E9/L (ref 4–11)

## 2021-02-18 PROCEDURE — 99214 OFFICE O/P EST MOD 30 MIN: CPT | Performed by: NURSE PRACTITIONER

## 2021-02-18 PROCEDURE — 84439 ASSAY OF FREE THYROXINE: CPT | Performed by: NURSE PRACTITIONER

## 2021-02-18 PROCEDURE — 82306 VITAMIN D 25 HYDROXY: CPT | Performed by: NURSE PRACTITIONER

## 2021-02-18 PROCEDURE — 83690 ASSAY OF LIPASE: CPT | Performed by: NURSE PRACTITIONER

## 2021-02-18 PROCEDURE — 36415 COLL VENOUS BLD VENIPUNCTURE: CPT | Performed by: NURSE PRACTITIONER

## 2021-02-18 PROCEDURE — 80050 GENERAL HEALTH PANEL: CPT | Performed by: NURSE PRACTITIONER

## 2021-02-18 ASSESSMENT — MIFFLIN-ST. JEOR: SCORE: 1664.95

## 2021-02-18 NOTE — PATIENT INSTRUCTIONS
Patient Education     Understanding Contact Dermatitis    Contact dermatitis is a common type of skin rash. It s caused by something that touches the skin and makes it irritated and inflamed. It can occur on skin on any part of the body, such as the face, neck, hands, arms, and legs. Contact dermatitis is not spread from person to person.  Often, the reaction of contact dermatitis occurs 1 to 2 days after contact with the offending agent.    How to say it  JERED-tact [id number]  What causes contact dermatitis?  It s caused by something that irritates the skin, or that creates an allergic reaction on the skin. People can get contact dermatitis from many kinds of things. These include:     Plant oils in poison ivy, oak, and sumac    Chemicals in household , solvents, and glue    Chemicals in makeup, soap, laundry detergent, perfume, acne cream, and hair products    Certain medicines, such as neomycin, bacitracin, benzocaine, and thimerosal    Metals such as nickel, found in some jewelry and watch bands     The sticky material on the back of bandages and tape (adhesive)    Things that can cause tiny breaks in the skin, such as wood, fiberglass, metal tools, and plant thorns    Rubber latex in surgical gloves and other medical supplies  Dermatitis can also be caused by the skin being damp for long periods of time. This can happen from washing your hands too often, or working with wet materials.   Symptoms of contact dermatitis  Symptoms can include skin that is:    Blistered    Burning    Cracked    Dry    Itchy    Painful    Red    Rough, thickened, and leathery    Swollen    Warm  The blisters may ooze fluid and form crusts.  Treatment for contact dermatitis  Treatment is done to help relieve itching and reduce inflammation. The rash should go away in a few days to a few weeks. Treatments include:     Cool, moist compress. Use a clean damp cloth. Put it on the area for 20 to 30 minutes, 5 to 6 times a day  for the first 3 days.    Steroid cream or ointment. You can apply this medicine several times a day on clean skin.    Oral corticosteroid. Your healthcare provider may prescribe this medicine if you have severe skin symptoms on a large part of your body.  Your healthcare provider may give you a steroid injection instead of pills.    Oral antihistamine. This medicine can help reduce itching.    Colloidal oatmeal bath. Soaking in water with colloidal oatmeal can help soothe skin.    Plain cream, lotion, or ointment. Cream, lotion, or ointment without medicine can help to soothe and protect your skin.  Living with contact dermatitis  Talk with your healthcare provider about what may have caused your contact dermatitis. Patch testing may help you figure out what caused the rash so you can avoid further contact with it. Once you learn what caused your rash, make sure to avoid that substance. If your skin comes into contact with it again, make sure to wash your skin right away. If you can t avoid the substance, wear gloves or other protective clothing before you touch it. Wash the clothing you were wearing. This is because the substance you are allergic to may stay on them until it's washed off. Pets can also pass on allergens such as poison ivy from the plant to your skin. Bathe pets if you suspect they have been in contact. Or use a cream, lotion, or ointment to protect your skin.   When to call your healthcare provider  Call your healthcare provider right away if you have any of these:    Fever of 100.4 F (38 C) or higher, or as directed by your healthcare provider    Symptoms that don t get better, or get worse    New symptoms  [employer] last reviewed this educational content on 6/1/2019 2000-2020 [employer]. [address] [place] [address], [place] [address]. All rights reserved. This information is not intended as a substitute for professional medical care. Always follow your healthcare professional's  instructions.           Patient Education     Hypothyroidism    You have hypothyroidism. This means your thyroid gland is not making enough thyroid hormone. This hormone is vital to body growth and metabolism. If you don t make enough, many body processes slow down. This can cause symptoms throughout the body. Hypothyroidism can range from mild to severe. The most severe form is called myxedema.  There are a number of causes of hypothyroidism. A common cause is Hashimoto s disease. This disease causes the body s own immune system to attack the thyroid gland. When you have certain treatments, such as surgery to remove the thyroid gland, this can also cause hypothyroidism. Sometimes the thyroid gland is not functioning because of lack of stimulation from the pituitary gland.  Symptoms of hypothyroidism can include:    Fatigue    Trouble concentrating or thinking clearly; forgetfulness    Dry skin    Hair loss    Weight gain    Low tolerance to cold    Constipation    Depression    Personality changes    Tingling or prickling of the hands or feet    Heavy, absent, or irregular periods (women only)  Older adults may sometimes have other symptoms. These can include:    Muscle aches and weakness    Confusion    Incontinence (unable to control urine or stool)    Trouble moving around    Falling  Treatment for hypothyroidism involves taking thyroid hormone pills daily. These pills replace the hormone your thyroid doesn t make. You will likely need to take a daily pill for the rest of your life. Tips for taking this medicine are given below.  Home care  Tips for taking your medicine    Take your thyroid hormone pills as prescribed by your healthcare provider. This is most often 1 pill a day on an empty stomach. Use a pillbox labeled with the days of the week. This will help you remember to take your pill each day.    Don t take products that contain iron and calcium or antacids within 4 hours of taking your thyroid hormone  pills.    Don t take other medicines with your thyroid hormone pill without checking with your provider first.    Tell your provider if you have any side effects from your medicines that bother you, especially any chest pain or irregular heartbeats.    Never change the dosage or stop taking your thyroid pills without talking to your provider first.  General care    Always talk with your provider before trying other medicines or treatments for your thyroid problem.    If you see other healthcare providers, be sure to let them know about your thyroid problem.    Let your healthcare provider know if you become pregnant because your dose of thyroid hormone will need to be adjusted.  Follow-up care  See your healthcare provider for checkups as advised. You may need regular tests to check the level of thyroid hormone in your blood.  When to seek medical advice  Call your healthcare provider right away if any of these occur:    New symptoms develop    Symptoms return, continue, or worsen even after treatment    Extreme fatigue    Puffy hands, face, or feet    Fast or irregular heartbeat    Confusion  Call 911  Call 911 if any of these occur:    Fainting    Chest pain    Shortness of breath or trouble breathing  [employer] last reviewed this educational content on 4/1/2018 2000-2020 [employer]. [address] [place] [id number]. All rights reserved. This information is not intended as a substitute for professional medical care. Always follow your healthcare professional's instructions.

## 2021-02-18 NOTE — PROGRESS NOTES
Assessment & Plan     Dermatitis    - APNO CREA ointment; Apply to rash on nose twice daily as needed.    Fatigue, unspecified type  Suspect she is feeling the effects of having her thyroid dose reduced. If TSH is still elevated will change her medication to 88 mcg on Tuesday/Thursday and 100 mcg on other days, recheck in 8 weeks. Patient agreeable to plan  - TSH with free T4 reflex  - CBC with platelets and differential  - Comprehensive metabolic panel (BMP + Alb, Alk Phos, ALT, AST, Total. Bili, TP)  - Lipase  - Vitamin D Deficiency  - JUST IN CASE      30 minutes spent on the date of the encounter doing chart review, history and exam, documentation and further activities as noted above       FUTURE APPOINTMENTS:       - Follow-up visit in case of new or worsening symptoms. Treatment dependent on lab results. Likely will increase Synthroid dose as a trial. Instructed to trial washing nose piece in antibacterial soap and water and not Dreft, contact medical supply if irritation persists.    Patient Instructions     Patient Education     Understanding Contact Dermatitis    Contact dermatitis is a common type of skin rash. It s caused by something that touches the skin and makes it irritated and inflamed. It can occur on skin on any part of the body, such as the face, neck, hands, arms, and legs. Contact dermatitis is not spread from person to person.  Often, the reaction of contact dermatitis occurs 1 to 2 days after contact with the offending agent.    How to say it  JERED-tact [id number]  What causes contact dermatitis?  It s caused by something that irritates the skin, or that creates an allergic reaction on the skin. People can get contact dermatitis from many kinds of things. These include:     Plant oils in poison ivy, oak, and sumac    Chemicals in household , solvents, and glue    Chemicals in makeup, soap, laundry detergent, perfume, acne cream, and hair products    Certain medicines, such as  neomycin, bacitracin, benzocaine, and thimerosal    Metals such as nickel, found in some jewelry and watch bands     The sticky material on the back of bandages and tape (adhesive)    Things that can cause tiny breaks in the skin, such as wood, fiberglass, metal tools, and plant thorns    Rubber latex in surgical gloves and other medical supplies  Dermatitis can also be caused by the skin being damp for long periods of time. This can happen from washing your hands too often, or working with wet materials.   Symptoms of contact dermatitis  Symptoms can include skin that is:    Blistered    Burning    Cracked    Dry    Itchy    Painful    Red    Rough, thickened, and leathery    Swollen    Warm  The blisters may ooze fluid and form crusts.  Treatment for contact dermatitis  Treatment is done to help relieve itching and reduce inflammation. The rash should go away in a few days to a few weeks. Treatments include:     Cool, moist compress. Use a clean damp cloth. Put it on the area for 20 to 30 minutes, 5 to 6 times a day for the first 3 days.    Steroid cream or ointment. You can apply this medicine several times a day on clean skin.    Oral corticosteroid. Your healthcare provider may prescribe this medicine if you have severe skin symptoms on a large part of your body.  Your healthcare provider may give you a steroid injection instead of pills.    Oral antihistamine. This medicine can help reduce itching.    Colloidal oatmeal bath. Soaking in water with colloidal oatmeal can help soothe skin.    Plain cream, lotion, or ointment. Cream, lotion, or ointment without medicine can help to soothe and protect your skin.  Living with contact dermatitis  Talk with your healthcare provider about what may have caused your contact dermatitis. Patch testing may help you figure out what caused the rash so you can avoid further contact with it. Once you learn what caused your rash, make sure to avoid that substance. If your skin  comes into contact with it again, make sure to wash your skin right away. If you can t avoid the substance, wear gloves or other protective clothing before you touch it. Wash the clothing you were wearing. This is because the substance you are allergic to may stay on them until it's washed off. Pets can also pass on allergens such as poison ivy from the plant to your skin. Bathe pets if you suspect they have been in contact. Or use a cream, lotion, or ointment to protect your skin.   When to call your healthcare provider  Call your healthcare provider right away if you have any of these:    Fever of 100.4 F (38 C) or higher, or as directed by your healthcare provider    Symptoms that don t get better, or get worse    New symptoms  Rene last reviewed this educational content on 6/1/2019 2000-2020 [employer]. [address] [place] [address]. All rights reserved. This information is not intended as a substitute for professional medical care. Always follow your healthcare professional's instructions.           Patient Education     Hypothyroidism    You have hypothyroidism. This means your thyroid gland is not making enough thyroid hormone. This hormone is vital to body growth and metabolism. If you don t make enough, many body processes slow down. This can cause symptoms throughout the body. Hypothyroidism can range from mild to severe. The most severe form is called myxedema.  There are a number of causes of hypothyroidism. A common cause is Hashimoto s disease. This disease causes the body s own immune system to attack the thyroid gland. When you have certain treatments, such as surgery to remove the thyroid gland, this can also cause hypothyroidism. Sometimes the thyroid gland is not functioning because of lack of stimulation from the pituitary gland.  Symptoms of hypothyroidism can include:    Fatigue    Trouble concentrating or thinking clearly; forgetfulness    Dry skin    Hair  loss    Weight gain    Low tolerance to cold    Constipation    Depression    Personality changes    Tingling or prickling of the hands or feet    Heavy, absent, or irregular periods (women only)  Older adults may sometimes have other symptoms. These can include:    Muscle aches and weakness    Confusion    Incontinence (unable to control urine or stool)    Trouble moving around    Falling  Treatment for hypothyroidism involves taking thyroid hormone pills daily. These pills replace the hormone your thyroid doesn t make. You will likely need to take a daily pill for the rest of your life. Tips for taking this medicine are given below.  Home care  Tips for taking your medicine    Take your thyroid hormone pills as prescribed by your healthcare provider. This is most often 1 pill a day on an empty stomach. Use a pillbox labeled with the days of the week. This will help you remember to take your pill each day.    Don t take products that contain iron and calcium or antacids within 4 hours of taking your thyroid hormone pills.    Don t take other medicines with your thyroid hormone pill without checking with your provider first.    Tell your provider if you have any side effects from your medicines that bother you, especially any chest pain or irregular heartbeats.    Never change the dosage or stop taking your thyroid pills without talking to your provider first.  General care    Always talk with your provider before trying other medicines or treatments for your thyroid problem.    If you see other healthcare providers, be sure to let them know about your thyroid problem.    Let your healthcare provider know if you become pregnant because your dose of thyroid hormone will need to be adjusted.  Follow-up care  See your healthcare provider for checkups as advised. You may need regular tests to check the level of thyroid hormone in your blood.  When to seek medical advice  Call your healthcare provider right away if any  of these occur:    New symptoms develop    Symptoms return, continue, or worsen even after treatment    Extreme fatigue    Puffy hands, face, or feet    Fast or irregular heartbeat    Confusion  Call 911  Call 911 if any of these occur:    Fainting    Chest pain    Shortness of breath or trouble breathing  Rene last reviewed this educational content on 4/1/2018 2000-2020 [employer]. [address] [place] [address]. All rights reserved. This information is not intended as a substitute for professional medical care. Always follow your healthcare professional's instructions.               No follow-ups on file.    HERIBERTO Linton CNP  [facility]    Eric Moore is a 61 year old who presents for the following health issues  accompanied by her self:    HPI       Concern - Fatigue  Onset: over a week  Description: Patient says she is so tired she almost fell asleep at work. Feels tired all the time.   Intensity: moderate  Progression of Symptoms:  worsening  Accompanying Signs & Symptoms: Fibromyalgia, neuropathy. Rash under nose from using cpap.   Previous history of similar problem: none   Precipitating factors:        Worsened by: none  Alleviating factors:        Improved by: cpap helps her to sleep better.  Therapies tried and outcome: listed above     ADDITIONAL HPI:   Patient unknown to me with multiple chronic health autoimmune type symptoms. Has been assessed by Rheumatology, Neurology, Dermatology and Sports Medicine without any real definitive diagnosis or treatment plan. Hx of hypothyroidism that has been overtreated when checked 11/2020. Dose was decreased and when rechecked 01/2021 was undertreated, but the dose has remained the same for now. Recently diagnosed with GAYE and wearing CPAP religiously, did feel a lot better after starting the therapy. Has a rash under her nose piece, tender and itchy. Washes in Dreft weekly. Has no real  "other new symptoms from her chronic symptoms which include constipation, abdominal bloating, muscle aches, joint pain and swelling, headaches, sores in mouth, itchy rash on extremities. Hx of renal cancer- followed by [place]. Denies urinary symptoms.     Review of Systems   Constitutional, HEENT, cardiovascular, pulmonary, gi and gu systems are negative, except as otherwise noted.      Objective    /86   Pulse 90   Temp 97.8  F (36.6  C) (Tympanic)   Resp 16   Ht 1.722 m (5' 7.8\")   Wt 105 kg (231 lb 6.4 oz)   SpO2 97%   BMI 35.39 kg/m    Body mass index is 35.39 kg/m .  Physical Exam   GENERAL: healthy, alert and no distress  EYES: Eyes grossly normal to inspection, PERRL and conjunctivae and sclerae normal  HENT: normal cephalic/atraumatic, ear canals and TM's normal, oropharynx clear, oral mucous membranes moist and leukoplakia to bilateral buccal mucosa  NECK: no adenopathy, no asymmetry, masses, or scars and thyroid normal to palpation  RESP: lungs clear to auscultation - no rales, rhonchi or wheezes  CV: regular rate and rhythm, normal S1 S2, no S3 or S4, no murmur, click or rub, no peripheral edema and peripheral pulses strong  ABDOMEN: tenderness upper midline, no organomegaly or masses and bowel sounds normal  MS: no gross musculoskeletal defects noted, no edema  SKIN: scattered scaly plaques to bilateral arms  NEURO: Normal strength and tone, mentation intact and speech normal  PSYCH: mentation appears normal, affect normal/bright    Results for orders placed or performed in visit on 02/18/21 (from the past 24 hour(s))   CBC with platelets and differential   Result Value Ref Range    WBC 5.1 4.0 - 11.0 10e9/L    RBC Count 4.41 3.8 - 5.2 10e12/L    Hemoglobin 13.1 11.7 - 15.7 g/dL    Hematocrit 39.9 35.0 - 47.0 %    MCV 91 78 - 100 fl    MCH 29.7 26.5 - 33.0 pg    MCHC 32.8 31.5 - 36.5 g/dL    RDW 12.9 10.0 - 15.0 %    Platelet Count 267 150 - 450 10e9/L    % Neutrophils 54.4 %    % Lymphocytes " 34.3 %    % Monocytes 7.8 %    % Eosinophils 3.1 %    % Basophils 0.4 %    Absolute Neutrophil 2.8 1.6 - 8.3 10e9/L    Absolute Lymphocytes 1.8 0.8 - 5.3 10e9/L    Absolute Monocytes 0.4 0.0 - 1.3 10e9/L    Absolute Eosinophils 0.2 0.0 - 0.7 10e9/L    Absolute Basophils 0.0 0.0 - 0.2 10e9/L    Diff Method Automated Method

## 2021-02-19 LAB — DEPRECATED CALCIDIOL+CALCIFEROL SERPL-MC: 36 UG/L (ref 20–75)

## 2021-02-19 RX ORDER — LEVOTHYROXINE SODIUM 88 UG/1
TABLET ORAL
Qty: 45 TABLET | Refills: 3 | Status: SHIPPED | OUTPATIENT
Start: 2021-02-19 | End: 2022-02-14

## 2021-02-19 RX ORDER — LEVOTHYROXINE SODIUM 100 UG/1
TABLET ORAL
Qty: 90 TABLET | Refills: 0 | Status: SHIPPED | OUTPATIENT
Start: 2021-02-19 | End: 2021-05-21

## 2021-02-19 NOTE — RESULT ENCOUNTER NOTE
Mesfin Moore    Your lab results came back within normal limits with the exception of the thyroid. Plan will be to have you take the 88 mcg dose on Tuesday and Thursday and 100 mcg all other days. Plan for recheck in 8 weeks. Still waiting on vitamin D level. Please let us know if you have any questions.     Take care,    HERIBERTO Miller CNP

## 2021-02-22 NOTE — RESULT ENCOUNTER NOTE
Mesfin Moore    Your vit D lab results came back within normal limits. Please let us know if you have any questions.     Take care,    HERIBERTO Miller CNP

## 2021-03-02 ENCOUNTER — MYC MEDICAL ADVICE (OUTPATIENT)
Dept: FAMILY MEDICINE | Facility: CLINIC | Age: 61
End: 2021-03-02

## 2021-03-16 ENCOUNTER — OFFICE VISIT (OUTPATIENT)
Dept: FAMILY MEDICINE | Facility: CLINIC | Age: 61
End: 2021-03-16
Payer: OTHER GOVERNMENT

## 2021-03-16 VITALS
OXYGEN SATURATION: 98 % | BODY MASS INDEX: 34.62 KG/M2 | SYSTOLIC BLOOD PRESSURE: 138 MMHG | RESPIRATION RATE: 16 BRPM | WEIGHT: 228.4 LBS | TEMPERATURE: 97.8 F | HEIGHT: 68 IN | HEART RATE: 88 BPM | DIASTOLIC BLOOD PRESSURE: 84 MMHG

## 2021-03-16 DIAGNOSIS — H04.123 DRY EYE SYNDROME OF BOTH EYES: ICD-10-CM

## 2021-03-16 DIAGNOSIS — R53.82 CHRONIC FATIGUE: ICD-10-CM

## 2021-03-16 DIAGNOSIS — M79.10 MUSCLE PAIN: ICD-10-CM

## 2021-03-16 DIAGNOSIS — N90.4 LICHEN SCLEROSUS OF FEMALE GENITALIA: Primary | ICD-10-CM

## 2021-03-16 DIAGNOSIS — L43.8 EROSIVE ORAL LICHEN PLANUS: ICD-10-CM

## 2021-03-16 PROCEDURE — 99214 OFFICE O/P EST MOD 30 MIN: CPT | Performed by: NURSE PRACTITIONER

## 2021-03-16 RX ORDER — TRIAMCINOLONE ACETONIDE 5 MG/G
CREAM TOPICAL
Qty: 15 G | Refills: 3 | Status: SHIPPED | OUTPATIENT
Start: 2021-03-16 | End: 2022-03-31

## 2021-03-16 RX ORDER — PREDNISONE 20 MG/1
TABLET ORAL
Qty: 20 TABLET | Refills: 0 | Status: SHIPPED | OUTPATIENT
Start: 2021-03-16 | End: 2021-05-27

## 2021-03-16 ASSESSMENT — MIFFLIN-ST. JEOR: SCORE: 1651.35

## 2021-03-16 NOTE — PROGRESS NOTES
Assessment & Plan     Lichen sclerosus of female genitalia    - triamcinolone (ARISTOCORT HP) 0.5 % external cream; Apply to vaginal area twice daily as needed for itching  - Rheumatology Referral; Future    Erosive oral lichen planus    - magic mouthwash (ENTER INGREDIENTS IN COMMENTS) suspension; Take 10 mLs by mouth every 4 hours as needed (mouth pain)  - Rheumatology Referral; Future  - predniSONE (DELTASONE) 20 MG tablet; Take 3 tabs by mouth daily x 3 days, then 2 tabs daily x 3 days, then 1 tab daily x 3 days, then 1/2 tab daily x 3 days.    Chronic fatigue    - Rheumatology Referral; Future    Dry eye syndrome of both eyes    - Rheumatology Referral; Future    Muscle pain    - Rheumatology Referral; Future     CONSULTATION/REFERRAL to Rheumatology given her multiple chronic symptoms that are consistent with an autoimmune process    FUTURE APPOINTMENTS:       - Follow up in 1 week for persistent symptoms, sooner for new or worsening symptoms.     See Patient Instructions    No follow-ups on file.    Over 30 minutes spent in chart review, patient exam/counseling/interview and charting.     HERIBERTO Linton [facility]    Eric Moore is a 61 year old who presents for the following health issues accompanied by her self:    HPI     Concern - Mouth Problem  Onset: 3 weeks   Description: Sores on the inside of both her cheeks.   Intensity: currently 5/10, at it's worst 10/10 when eating   Progression of Symptoms:  improving  Accompanying Signs & Symptoms: Redness, burning when she eats or brushes her teeth  Previous history of similar problem: has happened a few times in the last 15 years, tried gluten free which seemed to help. Saw ENT who wanted to do laser treatment for Leukoplakia, she didn't want to do it at the time.  Has same painful and white issues on genitals   Precipitating factors:        Worsened by: food, anything hot, toothpaste   Alleviating factors:  "       Improved by: none   Therapies tried and outcome: biotene used to help but not anymore      Review of Systems   Constitutional, HEENT, cardiovascular, pulmonary, gi and gu systems are negative, except as otherwise noted.      Objective    /84   Pulse 88   Temp 97.8  F (36.6  C) (Tympanic)   Resp 16   Ht 1.722 m (5' 7.8\")   Wt 103.6 kg (228 lb 6.4 oz)   SpO2 98%   BMI 34.93 kg/m    Body mass index is 34.93 kg/m .  Physical Exam   GENERAL: healthy, alert and no distress  EYES: Eyes grossly normal to inspection, PERRL and conjunctivae and sclerae normal  HENT: normal cephalic/atraumatic, oropharynx clear, oral mucous membranes moist and lacy appearing white plaques bilateral buccal mucosa with ulcerations  NECK: no adenopathy, no asymmetry, masses, or scars and thyroid normal to palpation  RESP: lungs clear to auscultation - no rales, rhonchi or wheezes  CV: regular rates and rhythm, normal S1 S2, no S3 or S4, no murmur, click or rub and no peripheral edema  NEURO: Normal strength and tone, mentation intact and speech normal          "

## 2021-03-16 NOTE — PATIENT INSTRUCTIONS
Patient Education     Sjögren Syndrome  Sjögren syndrome is an autoimmune health problem. It s a disease where the body s immune system attacks its own cells and tissues. With Sjögren syndrome, white blood cells fight the glands that make moisture in the body. They mainly attack the tear glands and salivary glands. It most often affects women over age 40.   Types of Sjögren syndrome  Sjögren syndrome has 2 types:    Primary Sjögren. This is Sjögren syndrome that happens by itself, with no other disease or illness. About 50% of Sjögren syndrome cases are primary Sjögren.    Secondary Sjögren. This is Sjögren syndrome that happens along with another disease. It most often happens along with other autoimmune health problems. These may be scleroderma, lupus, or rheumatoid arthritis (RA).  What causes Sjögren syndrome?  Researchers are still learning about the cause of Sjögren syndrome. It may be caused by a combination of genes and things in the environment. For example, a virus may trigger the syndrome in a person with a certain gene. You may be more at risk for Sjögren if you have a rheumatic disease. Examples are lupus and RA.   Symptoms of Sjögren syndrome  Symptoms can range from mild to severe. Severe symptoms can affect quality of life. The symptoms can include:     Dry mouth, which can lead to trouble with talking, chewing, or swallowing    Dry eyes that can have a gritty or burning feeling    Dry, peeling lips    Pain or cracking on the tongue    Dry or sore throat    Tooth decay    Dry skin    Vaginal dryness    Dry nose    Changes in how well you taste or smell    Tiredness    Joint pain    Digestive problems  Diagnosing Sjögren syndrome  Diagnosis may be done by a rheumatologist, primary healthcare provider, or other specialist. A rheumatologist is a healthcare provider who treats rheumatic diseases. These are complex health problems that affect many parts of the body.   Sjögren syndrome is often hard to  diagnose. That s because the symptoms can be like those of other health problems. Similar symptoms can be caused by chronic fatigue syndrome (CFS), fibromyalgia, lupus, RA, or multiple sclerosis (MS).   A point-based test is used to see if your symptoms may be from Sjögren syndrome. The more points you have, the more likely it is that you have the disease. You may also have blood tests, eye tests, and dental tests. These are done to take a closer look at eye and mouth symptoms.   Treatment for Sjögren syndrome  There is no cure for Sjögren syndrome. Treatment is done to help ease symptoms.  Eye and mouth symptoms may be treated with over-the-counter (OTC) eye and mouth drops. Your healthcare provider may order stronger medicine if OTC versions don t help. You may also use pain medicine. Punctal occlusion can also be used to decrease ocular dryness. This is a procedure to plug the tear ducts that drain tears from the eyes.    If your symptoms affect your whole body, you will be treated with special medicines. These are called immunosuppressive medicines. They are used to treat autoimmune health problems. Your healthcare provider will tell you more about the risks, benefits, and side effects of these medicines.   Living with Sjögren syndrome  Things you eat and drink may make symptoms of Sjögren syndrome worse. You may want to:     Not eat foods that are spicy, hard, crunchy, or acidic    Eat more smooth, soft, and creamy foods such as soups, casseroles, and pasta dishes    Not eat gluten if you also have celiac disease    Not drink alcohol    Eat more foods with omega-3 fatty acids    Not drink carbonated or acidic drinks    Drink water to help with dry mouth   You can soothe dry eyes by:    Putting moist, warm compresses on your eyes    Using eye lubricants every day    Using prescription eye gel when you sleep    Not taking medicines that can dry your eyes, such as antihistamines    Not sitting near air  conditioning or heating vents    Using a humidifier at home  Mouth dryness can lead to cavities. You can help prevent cavities by:    Using products that can help create mouth moisture    Chewing sugarless gum    Brushing your teeth after each meal    Flossing your teeth every day    Getting dental checkups regularly    Using sugar-free lemon candies to stimulate saliva production   Rene last reviewed this educational content on 6/1/2018 2000-2020 [employer]. All rights reserved. This information is not intended as a substitute for professional medical care. Always follow your healthcare professional's instructions.           Patient Education     Understanding Lichen Planus  Lichen planus is a long-term (chronic) skin disease. It s a rash. It is not contagious, so it doesn't spread from person to person. It is not cancer (benign). The rash can develop anywhere on the body. But it most often occurs on the wrists, arms, legs, scalp, and genitals. It may also be seen on the nails and in the mouth. People ages 30 to 60 are more likely to get it.    How to say it  LY-liz PLAY -nuhs  What causes lichen planus?   The cause of lichen planus is often not known. But metals such as gold may trigger it. So may some medicines. These may include blood pressure, heart and arthritis medicines, and medicines to prevent malaria. Some research suggests the disease may also be linked to hepatitis C.   Symptoms of lichen planus      On the skin. Lichen planus causes flat-topped bumps or patches to form. These bumps may be very itchy. They are often shiny reddish or purple in color. They are often straight-edged (or polygonal), not round. They may also have fine white lines on them. Over time, the bumps may form thick patches of rough, scaly skin.    In the mouth. The condition may look like patches of white lace. These are often not painful.    On the genitals. The skin here becomes bright red and raw. Sometimes  sores can appear. These can make sex painful.    On the nails. It can cause the nails to become thin, split, and form grooves.    Treatment for lichen planus  Lichen planus often goes away in a few years. It may go away without treatment. To ease itching and improve the look of the rash, treatment options may include:     Steroids. These medicines can be put directly onto the skin or injected into the affected area. They can also be taken by mouth.    Other medicines. Your healthcare provider may give you other medicines, such as an antihistamine or retinoid, to ease itching and pain. Anti-itch creams or ointments may also work. Your provider might also prescribe other medicines that suppress the immune system.    Phototherapy. This treatment directs ultraviolet light on the skin to help clear it.  Self-care tips for lichen planus    Don t scratch any affected areas. This can sometimes spread the rash.    Use mild soap and moisturizing lotion after bathing.    Having lichen planus in your mouth may raise your risk of getting cancer in your mouth (oral cancer). To reduce that risk:  ? Stop smoking, chewing tobacco, and drinking alcohol.  ? Get an oral cancer screening every 6 to 12 months. You can get this from your dentist or dermatologist.  ? Brush your teeth twice a day.  ? Floss every day.  ? Get a dental checkup and cleaning twice a year.  ? Don't have foods or drinks that can make lichen planus in the mouth worse. This includes spicy foods, citrus fruits and juices (such as oranges and grapefruits), tomatoes and foods made with tomatoes (such as salsa, pasta sauces, and ketchup), crispy and salty snacks (such as corn chips), and drinks that have caffeine (such as coffee, tea, and cola).    When to call your healthcare provider  Call your healthcare provider right away if you have any of these:    Fever of 100.4 F (38 C) or higher, or as directed by your provider    New symptoms    Pain that gets  worse    Symptoms that don t get better, or get worse    Mouth sores  [employer] last reviewed this educational content on 6/1/2019 2000-2020 [employer]. All rights reserved. This information is not intended as a substitute for professional medical care. Always follow your healthcare professional's instructions.           Patient Education     Understanding Lichen Sclerosus   Lichen sclerosus is a long-term (chronic) skin condition. It causes white patches to form on the body. These most often affect skin around the genitals and anus. But they can appear anywhere, even in the mouth. The condition is more common in women who have gone through menopause and young girls who have not gone through puberty. It also tends to occur in men who are not circumcised. This disorder is not contagious. It is not a sexually transmitted disease.    How to say it  MORENA-liz [id number]-[id number]  What causes lichen sclerosus?   Experts don t yet know exactly what causes lichen sclerosus. It may be an autoimmune disease. That s when the immune system attacks healthy parts of the body, such as the skin. It may also be linked to genetics, hormones, or some infections.   Symptoms of lichen sclerosus   Lichen sclerosus causes white patches on the skin. These patches break down the skin. The skin may become thin, wrinkled, and cracked. It can be itchy and painful. The patches may scar, discolor, and disfigure the skin. These changes can damage the skin. In most cases the patches appear around the vagina and on the penis. Genital lesions can be very itchy or sometimes painful. Skin breakdown or scarring may lead to problems having sex and using the bathroom. In some cases the patches are found on the back, shoulders, neck, wrist, thigh, and breast areas. They may also appear on the lips or in the mouth.   Treatment for lichen sclerosus   Treatment can ease symptoms and prevent scarring. It should be started early to prevent lasting  (permanent) damage to the skin. Treatment options include:     Skin care. Bathing with mild soaps and using moisturizing cream may ease itching.    Steroids. These medicines are often put on the skin as an ointment or cream. Very strong, prescription steroid creams are used. Your provider may also inject these into the white patches in severe cases.    Other medicines. An oral medicine (antihistamine) may be given to ease itching. Other creams or ointments are also available if a steroid doesn t work.    Phototherapy. This treatment directs ultraviolet light on the skin to help clear it.    Surgery. This treatment helps with scarring and skin disfigurement. Men may benefit from circumcision if they haven t yet had it done.  Possible complications of lichen sclerosus      Skin cancer of the genitals    Rene last reviewed this educational content on 8/1/2019 2000-2020 [employer]. All rights reserved. This information is not intended as a substitute for professional medical care. Always follow your healthcare professional's instructions.

## 2021-03-17 ENCOUNTER — TELEPHONE (OUTPATIENT)
Dept: FAMILY MEDICINE | Facility: CLINIC | Age: 61
End: 2021-03-17

## 2021-03-17 NOTE — TELEPHONE ENCOUNTER
Please advise if this is okay to schedule:    Reason for Referral   Scleroderma. 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

## 2021-03-17 NOTE — TELEPHONE ENCOUNTER
Pt has been seen by [facility] in 2020, [facility] in 2019 and another [facility] rheumatologist in 2017.  Pt had +CRP in 2020, in 2019 DIEGO: 1:80 homogenous, -SSA/SSB, -polymyositis panel.      Pt saw Dr. Aclaraz in 2019, nothing significant found.      PCP has referred pt to Rheumatology for scleroderma, but indicated in notes that she may have sjogrens.    Will send to on call rheumatologist to review and advise.    Teodora Moreland RN  Rheumatology Clinic

## 2021-03-23 NOTE — TELEPHONE ENCOUNTER
Her DIEGO is borderline and the rest of her work-up has been pretty negative.    On the other hand if her Keratoconjunctivitis sicca is severe enough she may benefit from secretagogue treatment for that.  To really confirm think she would need a salivary gland biopsy, given her negative ELVIS.    I do not see anything that suggests systemic sclerosis so I would not put her into scleroderma clinic based on what I have here.    Lichen sclerosus is completely managed by dermatology.  We do not have anything to do with that so if that is the question she should stick with them.    Otherwise she can be seen for consult with any provider.

## 2021-04-01 ENCOUNTER — OFFICE VISIT (OUTPATIENT)
Dept: FAMILY MEDICINE | Facility: CLINIC | Age: 61
End: 2021-04-01
Payer: OTHER GOVERNMENT

## 2021-04-01 VITALS
RESPIRATION RATE: 16 BRPM | BODY MASS INDEX: 34.25 KG/M2 | SYSTOLIC BLOOD PRESSURE: 124 MMHG | DIASTOLIC BLOOD PRESSURE: 77 MMHG | TEMPERATURE: 97.8 F | HEART RATE: 99 BPM | OXYGEN SATURATION: 97 % | WEIGHT: 226 LBS | HEIGHT: 68 IN

## 2021-04-01 DIAGNOSIS — R10.9 FLANK PAIN: ICD-10-CM

## 2021-04-01 DIAGNOSIS — Z85.528 HISTORY OF KIDNEY CANCER: Primary | ICD-10-CM

## 2021-04-01 LAB
ALBUMIN UR-MCNC: NEGATIVE MG/DL
APPEARANCE UR: CLEAR
BILIRUB UR QL STRIP: NEGATIVE
COLOR UR AUTO: YELLOW
CREAT UR-MCNC: 24 MG/DL
GLUCOSE UR STRIP-MCNC: NEGATIVE MG/DL
HGB UR QL STRIP: NEGATIVE
KETONES UR STRIP-MCNC: NEGATIVE MG/DL
LEUKOCYTE ESTERASE UR QL STRIP: NEGATIVE
MICROALBUMIN UR-MCNC: <5 MG/L
MICROALBUMIN/CREAT UR: NORMAL MG/G CR (ref 0–25)
NITRATE UR QL: NEGATIVE
PH UR STRIP: 6 PH (ref 5–7)
SOURCE: NORMAL
SP GR UR STRIP: 1.01 (ref 1–1.03)
UROBILINOGEN UR STRIP-ACNC: 0.2 EU/DL (ref 0.2–1)

## 2021-04-01 PROCEDURE — 81003 URINALYSIS AUTO W/O SCOPE: CPT | Performed by: NURSE PRACTITIONER

## 2021-04-01 PROCEDURE — 99213 OFFICE O/P EST LOW 20 MIN: CPT | Performed by: NURSE PRACTITIONER

## 2021-04-01 PROCEDURE — 82043 UR ALBUMIN QUANTITATIVE: CPT | Performed by: NURSE PRACTITIONER

## 2021-04-01 ASSESSMENT — ASTHMA QUESTIONNAIRES
ACT_TOTALSCORE: 22
QUESTION_3 LAST FOUR WEEKS HOW OFTEN DID YOUR ASTHMA SYMPTOMS (WHEEZING, COUGHING, SHORTNESS OF BREATH, CHEST TIGHTNESS OR PAIN) WAKE YOU UP AT NIGHT OR EARLIER THAN USUAL IN THE MORNING: NOT AT ALL
ACUTE_EXACERBATION_TODAY: NO
QUESTION_4 LAST FOUR WEEKS HOW OFTEN HAVE YOU USED YOUR RESCUE INHALER OR NEBULIZER MEDICATION (SUCH AS ALBUTEROL): ONCE A WEEK OR LESS
QUESTION_1 LAST FOUR WEEKS HOW MUCH OF THE TIME DID YOUR ASTHMA KEEP YOU FROM GETTING AS MUCH DONE AT WORK, SCHOOL OR AT HOME: NONE OF THE TIME
QUESTION_2 LAST FOUR WEEKS HOW OFTEN HAVE YOU HAD SHORTNESS OF BREATH: THREE TO SIX TIMES A WEEK
QUESTION_5 LAST FOUR WEEKS HOW WOULD YOU RATE YOUR ASTHMA CONTROL: COMPLETELY CONTROLLED

## 2021-04-01 ASSESSMENT — MIFFLIN-ST. JEOR: SCORE: 1640.46

## 2021-04-01 NOTE — PATIENT INSTRUCTIONS
Please call [facility] to schedule your imaging [phone].    Patient Education     Unknown Causes of Abdominal Pain (Female)    The exact cause of your belly (abdominal) pain is not clear. This does not mean that this is something to worry about. Everyone likes to know the exact cause of the problem. But sometimes with belly pain, there is no clear-cut cause, and this could be a good thing. The good news is that your symptoms can be treated, and you will feel better.   Your condition does not seem serious now. But sometimes the signs of a serious problem may take more time to appear. For this reason, it is important for you to watch for any new symptoms, problems, or worsening of your condition.  Over the next few days, the abdominal pain may come and go. Or it may be constant. Other common symptoms can include nausea and vomiting. Sometimes it can be difficult to tell if you feel nauseous. You may just feel bad and not connect that feeling to nausea. Constipation, diarrhea, and a fever may go along with the pain.  The pain may continue even if treated correctly over the following days. Depending on how things go, sometimes the cause can become clear and may need more or different treatment. Additional evaluations, medicines, or tests may also be needed.  Home care  Your healthcare provider may prescribe medicine for pain, symptoms, or an infection.  Follow the healthcare provider's instructions for taking these medicines.  General care    Rest as much as you can until your next exam. No strenuous activities.    Try to find positions that ease discomfort. A small pillow placed on the abdomen may help relieve pain.    Something warm on your abdomen (such as a heating pad) may help, but be careful not to burn yourself.  Diet    Don t force yourself to eat, especially if having cramps, vomiting, or diarrhea.    Water is important so you don't get dehydrated. Soup may also be good. Sports  drinks may also help, especially if they are not too acidic. Don't drink sugary drinks as this can make things worse. Take liquids in small amounts. Don t guzzle them.    Caffeine sometimes makes the pain and cramping worse.    Don t take dairy products if you have vomiting or diarrhea.    Don't eat large amounts at a time. Wait a few minutes between bites.    Eat a diet low in fiber (called a low-residue diet). Foods allowed include refined breads, white rice, fruit and vegetable juices without pulp, tender meats. These foods will pass more easily through the intestine.    Don t have whole-grain foods, whole fruits and vegetables, meats, seeds and nuts, fried or fatty foods, dairy, alcohol and spicy foods until your symptoms go away.  Follow-up care  Follow up with your healthcare provider, or as advised, if your pain does not begin to improve in the next 24 hours.  Call 911  Call 911 if any of these occur:    Trouble breathing    Confusion    Fainting or loss of consciousness    Rapid heart rate    Seizure  When to seek medical advice  Call your healthcare provider right away if any of these occur:    Pain gets worse or moves to the right lower abdomen    New or worsening vomiting or diarrhea    Swelling of the abdomen    Unable to pass stool for more than 3 days    Fever of 100.4 F (38 C) or higher, or as directed by your healthcare provider.    Blood in vomit or bowel movements (dark red or black color)    Yellow color of eyes and skin (jaundice)    Weakness, dizziness    Chest, arm, back, neck, or jaw pain    Unexpected vaginal bleeding or missed period    Can't keep down liquids or water and you are getting dehydrated  [employer] last reviewed this educational content on 6/1/2018 2000-2020 [employer]. All rights reserved. This information is not intended as a substitute for professional medical care. Always follow your healthcare professional's instructions.

## 2021-04-01 NOTE — PROGRESS NOTES
"    Assessment & Plan     History of kidney cancer    - *UA reflex to Microscopic and Culture ([place] [facility] (except [place] and [place])  - Albumin Random Urine Quantitative with Creat Ratio  - US Renal Limited; Future    Flank pain    - *UA reflex to Microscopic and Culture ([place] and [facility] (except [place] and [place])  - Albumin Random Urine Quantitative with Creat Ratio  - US Renal Limited; Future             FURTHER TESTING:       - renal ultrasound    FUTURE APPOINTMENTS:       - Follow-up visit in case of new or worsening symptoms.     See Patient Instructions    No follow-ups on file.    HERIBERTO Linton CNP  [facility]    Eric Moore is a 61 year old who presents for the following health issues     HPI     Patient presents today with urinary frequency and right kidney pain. Patient states she is not having any other urinary/vaginal symptoms    Abdominal/Flank Pain  Onset/Duration: few days  Description:   Character: Dull ache  Location: right flank  Radiation: None  Intensity: mild  Progression of Symptoms:  same  Accompanying Signs & Symptoms:  Fever/Chills: no  Gas/Bloating: no  Nausea: YES- chronic  Vomitting: no  Diarrhea: no  Constipation: no  Dysuria or Hematuria: no  History:   Trauma: no  Previous similar pain: YES- kidney cancer  Previous tests done: none  Precipitating factors:   Does the pain change with:     Food: no    Bowel Movement: no    Urination: no   Other factors:  no  Therapies tried and outcome: None.    Was recently on Prednisone for mouth sores. Takes ibuprofen/acetaminophin combo at bedtime.    No LMP recorded. Patient has had a hysterectomy.        Review of Systems   Constitutional, HEENT, cardiovascular, pulmonary, gi and gu systems are negative, except as otherwise noted.      Objective    /77   Pulse 99   Temp 97.8  F (36.6  C) (Oral)   Resp 16   Ht 1.722 m (5' 7.8\")   Wt 102.5 kg (226 lb)   " SpO2 97%   BMI 34.57 kg/m    Body mass index is 34.57 kg/m .  Physical Exam   GENERAL: healthy, alert and no distress  CV: regular rates and rhythm  MS: no gross musculoskeletal defects noted, no edema  NEURO: Normal strength and tone, mentation intact and speech normal    Results for orders placed or performed in visit on 04/01/21 (from the past 24 hour(s))   *UA reflex to Microscopic and Culture ([place] [facility] (except [place] and [place])    Specimen: Midstream Urine   Result Value Ref Range    Color Urine Yellow     Appearance Urine Clear     Glucose Urine Negative NEG^Negative mg/dL    Bilirubin Urine Negative NEG^Negative    Ketones Urine Negative NEG^Negative mg/dL    Specific Gravity Urine 1.010 1.003 - 1.035    Blood Urine Negative NEG^Negative    pH Urine 6.0 5.0 - 7.0 pH    Protein Albumin Urine Negative NEG^Negative mg/dL    Urobilinogen Urine 0.2 0.2 - 1.0 EU/dL    Nitrite Urine Negative NEG^Negative    Leukocyte Esterase Urine Negative NEG^Negative    Source Midstream Urine

## 2021-04-02 ASSESSMENT — ASTHMA QUESTIONNAIRES: ACT_TOTALSCORE: 22

## 2021-04-05 ENCOUNTER — IMMUNIZATION (OUTPATIENT)
Dept: FAMILY MEDICINE | Facility: CLINIC | Age: 61
End: 2021-04-05
Payer: OTHER GOVERNMENT

## 2021-04-05 PROCEDURE — 91301 PR COVID VAC MODERNA 100 MCG/0.5 ML IM: CPT

## 2021-04-05 PROCEDURE — 0011A PR COVID VAC MODERNA 100 MCG/0.5 ML IM: CPT

## 2021-04-05 NOTE — RESULT ENCOUNTER NOTE
Mesfin Moore    Your urine protein level is normal. Please let us know if you have any questions.     Take care,    HERIBERTO Miller CNP

## 2021-04-11 ENCOUNTER — HOSPITAL ENCOUNTER (OUTPATIENT)
Dept: ULTRASOUND IMAGING | Facility: CLINIC | Age: 61
Discharge: HOME | End: 2021-04-11
Attending: NURSE PRACTITIONER | Admitting: NURSE PRACTITIONER
Payer: OTHER GOVERNMENT

## 2021-04-11 DIAGNOSIS — Z85.528 HISTORY OF KIDNEY CANCER: ICD-10-CM

## 2021-04-11 DIAGNOSIS — R10.9 FLANK PAIN: ICD-10-CM

## 2021-04-11 PROCEDURE — 76775 US EXAM ABDO BACK WALL LIM: CPT

## 2021-04-12 NOTE — RESULT ENCOUNTER NOTE
Mesfin Moore    Your ultrasound results came back within normal limits. Please let us know if you have any questions.     Take care,    HERIBERTO Miller CNP

## 2021-04-14 DIAGNOSIS — M79.10 MYALGIA: ICD-10-CM

## 2021-04-14 DIAGNOSIS — F41.1 GAD (GENERALIZED ANXIETY DISORDER): ICD-10-CM

## 2021-04-16 RX ORDER — DULOXETIN HYDROCHLORIDE 60 MG/1
CAPSULE, DELAYED RELEASE ORAL
Qty: 90 CAPSULE | Refills: 1 | Status: SHIPPED | OUTPATIENT
Start: 2021-04-16 | End: 2021-09-22

## 2021-04-16 NOTE — TELEPHONE ENCOUNTER
"Requested Prescriptions   Pending Prescriptions Disp Refills     DULoxetine (CYMBALTA) 60 MG capsule [Pharmacy Med Name: DULOXETINE HCL DR CAPS 60MG] 90 capsule 3     Sig: TAKE 1 CAPSULE EVERY MORNING       Serotonin-Norepinephrine Reuptake Inhibitors  Passed - 4/14/2021  5:08 PM        Passed - Blood pressure under 140/90 in past 12 months     BP Readings from Last 3 Encounters:   04/01/21 124/77   03/16/21 138/84   02/18/21 126/86                 Passed - Recent (12 mo) or future (30 days) visit within the authorizing provider's specialty     Patient has had an office visit with the authorizing provider or a provider within the authorizing providers department within the previous 12 mos or has a future within next 30 days. See \"Patient Info\" tab in inbasket, or \"Choose Columns\" in Meds & Orders section of the refill encounter.              Passed - Medication is active on med list        Passed - Patient is age 18 or older        Passed - No active pregnancy on record        Passed - No positive pregnancy test in past 12 months           Prescription approved per [facility] Refill Protocol.    "

## 2021-04-23 DIAGNOSIS — E03.9 HYPOTHYROIDISM, UNSPECIFIED TYPE: ICD-10-CM

## 2021-04-23 LAB — TSH SERPL DL<=0.005 MIU/L-ACNC: 3.76 MU/L (ref 0.4–4)

## 2021-04-23 PROCEDURE — 36415 COLL VENOUS BLD VENIPUNCTURE: CPT | Performed by: NURSE PRACTITIONER

## 2021-04-23 PROCEDURE — 84443 ASSAY THYROID STIM HORMONE: CPT | Performed by: NURSE PRACTITIONER

## 2021-04-25 ENCOUNTER — MYC MEDICAL ADVICE (OUTPATIENT)
Dept: FAMILY MEDICINE | Facility: CLINIC | Age: 61
End: 2021-04-25

## 2021-04-25 DIAGNOSIS — J45.40 MODERATE PERSISTENT ASTHMA WITHOUT COMPLICATION: ICD-10-CM

## 2021-04-26 NOTE — RESULT ENCOUNTER NOTE
Mesfin Moore    Your TSH is at goal. Please let us know if you have any questions.     Take care,    HERIBERTO Miller CNP

## 2021-04-26 NOTE — TELEPHONE ENCOUNTER
Advair refill sent to [facility].  Last ACT was 22 on 4/1/21.  Kpavelrn

## 2021-05-03 ENCOUNTER — VIRTUAL VISIT (OUTPATIENT)
Dept: DERMATOLOGY | Facility: CLINIC | Age: 61
End: 2021-05-03
Payer: OTHER GOVERNMENT

## 2021-05-03 ENCOUNTER — IMMUNIZATION (OUTPATIENT)
Dept: FAMILY MEDICINE | Facility: CLINIC | Age: 61
End: 2021-05-03
Attending: FAMILY MEDICINE
Payer: OTHER GOVERNMENT

## 2021-05-03 DIAGNOSIS — L43.8 EROSIVE ORAL LICHEN PLANUS: Primary | ICD-10-CM

## 2021-05-03 DIAGNOSIS — L43.8 EROSIVE ORAL LICHEN PLANUS: ICD-10-CM

## 2021-05-03 DIAGNOSIS — Z79.899 ENCOUNTER FOR LONG-TERM (CURRENT) USE OF HIGH-RISK MEDICATION: ICD-10-CM

## 2021-05-03 PROCEDURE — 0012A PR COVID VAC MODERNA 100 MCG/0.5 ML IM: CPT

## 2021-05-03 PROCEDURE — 91301 PR COVID VAC MODERNA 100 MCG/0.5 ML IM: CPT

## 2021-05-03 PROCEDURE — 99443 PR PHYSICIAN TELEPHONE EVALUATION 21-30 MIN: CPT | Mod: TEL | Performed by: DERMATOLOGY

## 2021-05-03 RX ORDER — BETAMETHASONE DIPROPIONATE 0.5 MG/G
OINTMENT, AUGMENTED TOPICAL 2 TIMES DAILY
Qty: 50 G | Refills: 3 | Status: SHIPPED | OUTPATIENT
Start: 2021-05-03 | End: 2023-12-07

## 2021-05-03 RX ORDER — HYDROXYCHLOROQUINE SULFATE 200 MG/1
400 TABLET, FILM COATED ORAL DAILY
Qty: 60 TABLET | Refills: 3 | Status: SHIPPED | OUTPATIENT
Start: 2021-05-03 | End: 2021-07-19 | Stop reason: SINTOL

## 2021-05-03 RX ORDER — DEXAMETHASONE 0.5 MG/5ML
0.5 ELIXIR ORAL 3 TIMES DAILY
Qty: 474 ML | Refills: 3 | Status: SHIPPED | OUTPATIENT
Start: 2021-05-03 | End: 2022-02-14

## 2021-05-03 RX ORDER — HYDROXYCHLOROQUINE SULFATE 200 MG/1
400 TABLET, FILM COATED ORAL DAILY
Qty: 60 TABLET | Refills: 3 | Status: CANCELLED | OUTPATIENT
Start: 2021-05-03

## 2021-05-03 ASSESSMENT — PAIN SCALES - GENERAL: PAINLEVEL: NO PAIN (0)

## 2021-05-03 NOTE — NURSING NOTE
Dermatology Rooming Note    Teresa Fernandez's goals for this visit include:   Chief Complaint   Patient presents with     Derm Problem     Teresa states the sores in her mouth are very painful and she cant eat or drink anything      Sung Floyd, [facility]

## 2021-05-03 NOTE — PROGRESS NOTES
[facility] Dermatology Note  Encounter Date: May 3, 2021  Store-and-Forward and Telephone ([phone]). Location of teledermatologist: [facility] [place].  Start time: 12:58. End time: 1:23.    Dermatology Problem List:  1. Melanoma in situ, left chest  - s/p excision 7/27/2010 at [facility]  2.  Chronic burning rash of the upper extremities with biopsy showing a largely normal-appearing epidermis above mild perivascular lymphocytic inflammation without fungal elements or increased basement membrane noted on September 6, 2019  - Differential diagnosis includes (photo)allergic contact dermatitis, connective tissue disease (DM > NADIA as the former may lack significant epidermal changes)  - negative/normal: Aldolase, CK, PFTs, polymyositis and dermatomyositis panel, urinalysis, SSA, and SSB  - DIEGO borderline positive 1:80  3. Subcutaneous nodule, left forearm - consistent with lipoma  4. 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 Orogenital ulcers: predominantly on buccal and vaginal mucosae; favor erosive lichen planus however differential includes pemphigus and mucous membrane pemphigoid; complex aphthosis felt to be less likely given indiscrete nature of ulcers. Oral lichenoid reaction from amalgam possible; difficult to discern in absence of in-person physical exam and would not completely explain genital involvement. DDx of genital involvement includes lichen sclerosus however mucosal predominance makes this less likely. Will check labs and hepatitis panel. We discussed risks/benefits of starting hydroxychloroquine vs methotrexate vs mycophenolate; will start with hydroxychloroquine. Will need screening eye exam and plan for baseline ECG with repeat at 1 month. In the interim, start topicals. If refractory, low threshold for biopsy with DIF.  -  start dexamethasone S&S for mouth  - start augmented betamethasone ointment BID to vaginal mucosae/vulva  - Start hydroxychloroquine 400 mg daily  - check ECG and repeat at 1 month  - get baseline eye exam  - check CBC, CMP, hepatitis panel    Procedures Performed:    None    Follow-up: 4-6 weeks    Staff:     Harrison Alcaraz MD   of Dermatology  Department of Dermatology  [facility] [employer]    ____________________________________________    CC: Derm Problem (Treesa states the sores in her mouth are very painful and she cant eat or drink anything )    HPI:  Ms. Teresa Fernandez is a(n) 61 year old female who presents today as a return patient for mouth and genital sores    Mouth sores - started years ago  - cut out gluten, seemed to be less severe - would still come and go  - in last 6 months, has been worse - blisters, pain - has been constant  - whenever eat, burns on inside of mouth  - still avoiding gluten, but mouth ulcers are still present  - also has vaginal sores - very itchy - start triamcinolone cream which was helpful but they have recurred - now more painful; unsure if white around area when first noticed it    Lots of indigestion, constipation, bloating - has changed diet - fish, chicken, vegetables    Patient is otherwise feeling well, without additional skin concerns.    Labs Reviewed:  2/2021 CMP, CBC unremarkable  9/2019 DIEGO 1:80  4/2016 HCV negative    Physical Exam:  Vitals: There were no vitals taken for this visit.  SKIN: Teledermatology photos were reviewed; image quality and interpretability: acceptable. Image date: 4/21/21.  - erythematous erosions on the buccal mucosae  - No other lesions of concern on areas examined.     Medications:  Current Outpatient Medications   Medication     acetaminophen (TYLENOL) 325 MG tablet     albuterol (PROAIR HFA/PROVENTIL HFA/VENTOLIN HFA) 108 (90 Base) MCG/ACT inhaler     albuterol (PROAIR HFA/PROVENTIL  HFA/VENTOLIN HFA) 108 (90 BASE) MCG/ACT Inhaler     albuterol (PROVENTIL) (2.5 MG/3ML) 0.083% neb solution     APNO CREA ointment     celecoxib (CELEBREX) 100 MG capsule     Cholecalciferol (VITAMIN D3 PO)     Cyanocobalamin (VITAMIN B 12 PO)     DULoxetine (CYMBALTA) 60 MG capsule     fexofenadine (ALLEGRA) 180 MG tablet     fluticasone-salmeterol (ADVAIR DISKUS) 250-50 MCG/DOSE inhaler     GLUCOSAMINE SULFATE PO     levothyroxine (SYNTHROID/LEVOTHROID) 100 MCG tablet     levothyroxine (SYNTHROID/LEVOTHROID) 88 MCG tablet     magic mouthwash (ENTER INGREDIENTS IN COMMENTS) suspension     Multiple Vitamins-Minerals (MULTIVITAMIN ADULT PO)     omeprazole (PRILOSEC) 20 MG DR capsule     order for DME     OVER-THE-COUNTER     triamcinolone (ARISTOCORT HP) 0.5 % external cream     triamcinolone (KENALOG) 0.1 % external ointment     predniSONE (DELTASONE) 20 MG tablet     No current facility-administered medications for this visit.       Past Medical/Surgical History:   Patient Active Problem List   Diagnosis     Acquired hypothyroidism     Allergic state     Chronic rhinitis     Allergic rhinitis due to pollen     Sinusitis, chronic     History of skin cancer     ERIC (generalized anxiety disorder)     Leukoplakia of oral mucosa, including tongue     GERD (gastroesophageal reflux disease)     Chronic low back pain     Dry eye syndrome     Chronic fatigue     Osteopenia     History of melanoma in situ     Eczema     Moderate persistent asthma without complication     DDD (degenerative disc disease), lumbar     Hyperlipidemia LDL goal <160     Chest tightness or pressure     Muscle pain     GAYE (obstructive sleep apnea)     History of kidney cancer     Past Medical History:   Diagnosis Date     ALLERGIC RHINITIS NOS 4/12/2005     Anxiety      Arthritis     herniated disc     Bronchitis, not specified as acute or chronic      CHR MAXILLARY SINUSITIS 4/12/2005     Depressive disorder, not elsewhere classified      Eczema  10/17/2012     Environmental and seasonal allergies      GERD (gastroesophageal reflux disease)      Gluten intolerance      Malignant neoplasm of renal pelvis (H) 1995    Renal cell carcinoma     Melanoma (H) 06/2010     Other specified acquired hypothyroidism 4/12/2005     Renal cancer (H) 5/5/2011     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

## 2021-05-03 NOTE — LETTER
5/3/2021       RE: Teresa Fernandez  [address] [address] [facility] [id number]     Dear Colleague,    Thank you for referring your patient, Teresa Fernandez, to the [facility] [place] [facility]. Please see a copy of my visit note below.    [facility] Dermatology Note  Encounter Date: May 3, 2021  Store-and-Forward and Telephone ([phone]). Location of teledermatologist: [facility] [place].  Start time: 12:58. End time: 1:23.    Dermatology Problem List:  1. Melanoma in situ, left chest  - s/p excision 7/27/2010 at [facility]  2.  Chronic burning rash of the upper extremities with biopsy showing a largely normal-appearing epidermis above mild perivascular lymphocytic inflammation without fungal elements or increased basement membrane noted on September 6, 2019  - Differential diagnosis includes (photo)allergic contact dermatitis, connective tissue disease (DM > NADIA as the former may lack significant epidermal changes)  - negative/normal: Aldolase, CK, PFTs, polymyositis and dermatomyositis panel, urinalysis, SSA, and SSB  - DIEGO borderline positive 1:80  3. Subcutaneous nodule, left forearm - consistent with lipoma  4. 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 Orogenital ulcers: predominantly on buccal and vaginal mucosae; favor erosive lichen planus however differential includes pemphigus and mucous membrane pemphigoid; complex aphthosis felt to be less likely given indiscrete nature of ulcers. Oral lichenoid reaction from amalgam possible; difficult to discern in absence of in-person physical exam and would not completely explain genital involvement. DDx of genital involvement includes lichen sclerosus however mucosal predominance makes this less likely. Will  check labs and hepatitis panel. We discussed risks/benefits of starting hydroxychloroquine vs methotrexate vs mycophenolate; will start with hydroxychloroquine. Will need screening eye exam and plan for baseline ECG with repeat at 1 month. In the interim, start topicals. If refractory, low threshold for biopsy with DIF.  - start dexamethasone S&S for mouth  - start augmented betamethasone ointment BID to vaginal mucosae/vulva  - Start hydroxychloroquine 400 mg daily  - check ECG and repeat at 1 month  - get baseline eye exam  - check CBC, CMP, hepatitis panel    Procedures Performed:    None    Follow-up: 4-6 weeks    Staff:     Harrison Alcaraz MD   of Dermatology  Department of Dermatology  [facility]    ____________________________________________    CC: Derm Problem (Teresa states the sores in her mouth are very painful and she cant eat or drink anything )    HPI:  Ms. Teresa Fernandez is a(n) 61 year old female who presents today as a return patient for mouth and genital sores    Mouth sores - started years ago  - cut out gluten, seemed to be less severe - would still come and go  - in last 6 months, has been worse - blisters, pain - has been constant  - whenever eat, burns on inside of mouth  - still avoiding gluten, but mouth ulcers are still present  - also has vaginal sores - very itchy - start triamcinolone cream which was helpful but they have recurred - now more painful; unsure if white around area when first noticed it    Lots of indigestion, constipation, bloating - has changed diet - fish, chicken, vegetables    Patient is otherwise feeling well, without additional skin concerns.    Labs Reviewed:  2/2021 CMP, CBC unremarkable  9/2019 DIEGO 1:80  4/2016 HCV negative    Physical Exam:  Vitals: There were no vitals taken for this visit.  SKIN: Teledermatology photos were reviewed; image quality and interpretability: acceptable. Image date: 4/21/21.  -  erythematous erosions on the buccal mucosae  - No other lesions of concern on areas examined.     Medications:  Current Outpatient Medications   Medication     acetaminophen (TYLENOL) 325 MG tablet     albuterol (PROAIR HFA/PROVENTIL HFA/VENTOLIN HFA) 108 (90 Base) MCG/ACT inhaler     albuterol (PROAIR HFA/PROVENTIL HFA/VENTOLIN HFA) 108 (90 BASE) MCG/ACT Inhaler     albuterol (PROVENTIL) (2.5 MG/3ML) 0.083% neb solution     APNO CREA ointment     celecoxib (CELEBREX) 100 MG capsule     Cholecalciferol (VITAMIN D3 PO)     Cyanocobalamin (VITAMIN B 12 PO)     DULoxetine (CYMBALTA) 60 MG capsule     fexofenadine (ALLEGRA) 180 MG tablet     fluticasone-salmeterol (ADVAIR DISKUS) 250-50 MCG/DOSE inhaler     GLUCOSAMINE SULFATE PO     levothyroxine (SYNTHROID/LEVOTHROID) 100 MCG tablet     levothyroxine (SYNTHROID/LEVOTHROID) 88 MCG tablet     magic mouthwash (ENTER INGREDIENTS IN COMMENTS) suspension     Multiple Vitamins-Minerals (MULTIVITAMIN ADULT PO)     omeprazole (PRILOSEC) 20 MG DR capsule     order for DME     OVER-THE-COUNTER     triamcinolone (ARISTOCORT HP) 0.5 % external cream     triamcinolone (KENALOG) 0.1 % external ointment     predniSONE (DELTASONE) 20 MG tablet     No current facility-administered medications for this visit.       Past Medical/Surgical History:   Patient Active Problem List   Diagnosis     Acquired hypothyroidism     Allergic state     Chronic rhinitis     Allergic rhinitis due to pollen     Sinusitis, chronic     History of skin cancer     ERIC (generalized anxiety disorder)     Leukoplakia of oral mucosa, including tongue     GERD (gastroesophageal reflux disease)     Chronic low back pain     Dry eye syndrome     Chronic fatigue     Osteopenia     History of melanoma in situ     Eczema     Moderate persistent asthma without complication     DDD (degenerative disc disease), lumbar     Hyperlipidemia LDL goal <160     Chest tightness or pressure     Muscle pain     GAYE (obstructive  sleep apnea)     History of kidney cancer     Past Medical History:   Diagnosis Date     ALLERGIC RHINITIS NOS 4/12/2005     Anxiety      Arthritis     herniated disc     Bronchitis, not specified as acute or chronic      CHR MAXILLARY SINUSITIS 4/12/2005     Depressive disorder, not elsewhere classified      Eczema 10/17/2012     Environmental and seasonal allergies      GERD (gastroesophageal reflux disease)      Gluten intolerance      Malignant neoplasm of renal pelvis (H) 1995    Renal cell carcinoma     Melanoma (H) 06/2010     Other specified acquired hypothyroidism 4/12/2005     Renal cancer (H) 5/5/2011     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

## 2021-05-03 NOTE — PATIENT INSTRUCTIONS
[facility] Dermatology Visit    Thank you for allowing us to participate in your care. Your findings, instructions and follow-up plan are as follows:     Get screening eye exam and ECG  Start hydroxychloroquine 400 mg (2 tablets) daily  Check labs (bloodwork)  Repeat ECG after 1 month of hydroxychloroquine    Start dexamethasone S&S 3-4 times daily. Let sit in mouth for 5 min in contact with ulcers, then spit out.  Start augmented betamethasone ointment twice daily for ulcers in the genital area    When should I call my doctor?    If you are worsening or not improving, please, contact us or seek urgent care as noted below.     Who should I call with questions (adults)?    [facility] (adult and pediatric): [phone]     [facility] (adult): [phone]    For urgent needs outside of business hours call the [facility] at [phone] and ask for the dermatology resident on call    If this is a medical emergency and you are unable to reach an ER, Call [phone]      Who should I call with questions (pediatric)?  [facility]- Pediatric Dermatology  Dr. Vidya Brown, Dr. Alphonso Looney, Dr. Yelena Alejo, Cyn Degroot, PA  Dr. Madhuri Hays, Dr. Hannah Mckeon & Dr. Harrison Ennis  Non Urgent  Nurse Triage Line; [phone]- Lauren and Ayse MEZA Care Coordinators   Rhea (/Complex ) [phone]    If you need a prescription refill, please contact your pharmacy. Refills are approved or denied by our Physicians during normal business hours, Monday through Fridays  Per office policy, refills will not be granted if you have not been seen within the past year (or sooner depending on your child's condition)    Scheduling Information:  Pediatric Appointment Scheduling and Call Center [phone]  Radiology Scheduling- [phone]  Sedation Unit Scheduling- [phone]  [facility] Scheduling- General [phone]; Pediatric Dermatology [phone]  Main  Services: [phone]  Malian: [phone]  Liberian: [phone]  Hmong/Thor/Pashto: [phone]  Preadmission Nursing Department Fax Number: [phone] (Fax all pre-operative paperwork to this number)    For urgent matters arising during evenings, weekends, or holidays that cannot wait for normal business hours please call [phone] and ask for the Dermatology Resident On-Call to be paged.

## 2021-05-03 NOTE — TELEPHONE ENCOUNTER
Prior Authorization required on Hydroxychloroquine  Insurance Phone:[phone]  Patient ID:[id number]    Please contact the pharmacy with Prior Auth status (approved/denied).  Thank you, Laura Skelton - Pharmacy [facility]  [phone]

## 2021-05-04 NOTE — TELEPHONE ENCOUNTER
Central Prior Authorization Team   Phone: [phone]      PA Initiation    Medication: hydroxychloroquine (PLAQUENIL) 200 MG tablet  Insurance Company: [employer] - Phone [phone] Fax [phone]  Pharmacy Filling the Rx: [facility] [place], [place] - [address]  Filling Pharmacy Phone: [phone]  Filling Pharmacy Fax:    Start Date: 5/4/2021

## 2021-05-05 NOTE — TELEPHONE ENCOUNTER
Prior Authorization Approval        Authorization Effective Date: 4/4/2021  Authorization Expiration Date: 12/31/2099  Medication: hydroxychloroquine (PLAQUENIL) 200 MG tablet  Approved Dose/Quantity: 60  Reference #: [id number]   Insurance Company:  - Phone [phone] Fax [phone]  Expected CoPay:       Which Pharmacy is filling the prescription (Not needed for infusion/clinic administered): [facility] [place], [place] - [address] [place] [address]  Pharmacy Notified: Yes - spoke to Leonor  Patient Notified: No

## 2021-05-10 ENCOUNTER — HOSPITAL ENCOUNTER (OUTPATIENT)
Dept: CARDIOLOGY | Facility: CLINIC | Age: 61
Discharge: HOME | End: 2021-05-10
Attending: DERMATOLOGY | Admitting: DERMATOLOGY
Payer: OTHER GOVERNMENT

## 2021-05-10 DIAGNOSIS — Z79.899 ENCOUNTER FOR LONG-TERM (CURRENT) USE OF HIGH-RISK MEDICATION: ICD-10-CM

## 2021-05-10 LAB
ALBUMIN SERPL-MCNC: 3.7 G/DL (ref 3.4–5)
ALP SERPL-CCNC: 106 U/L (ref 40–150)
ALT SERPL W P-5'-P-CCNC: 33 U/L (ref 0–50)
ANION GAP SERPL CALCULATED.3IONS-SCNC: 4 MMOL/L (ref 3–14)
AST SERPL W P-5'-P-CCNC: 24 U/L (ref 0–45)
BASOPHILS # BLD AUTO: 0 10E9/L (ref 0–0.2)
BASOPHILS NFR BLD AUTO: 0.4 %
BILIRUB SERPL-MCNC: 0.5 MG/DL (ref 0.2–1.3)
BUN SERPL-MCNC: 13 MG/DL (ref 7–30)
CALCIUM SERPL-MCNC: 9.8 MG/DL (ref 8.5–10.1)
CHLORIDE SERPL-SCNC: 102 MMOL/L (ref 94–109)
CO2 SERPL-SCNC: 29 MMOL/L (ref 20–32)
CREAT SERPL-MCNC: 0.8 MG/DL (ref 0.52–1.04)
DIFFERENTIAL METHOD BLD: NORMAL
EOSINOPHIL # BLD AUTO: 0.2 10E9/L (ref 0–0.7)
EOSINOPHIL NFR BLD AUTO: 4.6 %
ERYTHROCYTE [DISTWIDTH] IN BLOOD BY AUTOMATED COUNT: 12.4 % (ref 10–15)
GFR SERPL CREATININE-BSD FRML MDRD: 80 ML/MIN/{1.73_M2}
GLUCOSE SERPL-MCNC: 114 MG/DL (ref 70–99)
HBV CORE AB SERPL QL IA: NONREACTIVE
HBV SURFACE AB SERPL IA-ACNC: 0.43 M[IU]/ML
HBV SURFACE AG SERPL QL IA: NONREACTIVE
HCT VFR BLD AUTO: 40.8 % (ref 35–47)
HCV AB SERPL QL IA: NONREACTIVE
HGB BLD-MCNC: 13.2 G/DL (ref 11.7–15.7)
LYMPHOCYTES # BLD AUTO: 1.5 10E9/L (ref 0.8–5.3)
LYMPHOCYTES NFR BLD AUTO: 32.3 %
MCH RBC QN AUTO: 29.9 PG (ref 26.5–33)
MCHC RBC AUTO-ENTMCNC: 32.4 G/DL (ref 31.5–36.5)
MCV RBC AUTO: 92 FL (ref 78–100)
MONOCYTES # BLD AUTO: 0.4 10E9/L (ref 0–1.3)
MONOCYTES NFR BLD AUTO: 9 %
NEUTROPHILS # BLD AUTO: 2.6 10E9/L (ref 1.6–8.3)
NEUTROPHILS NFR BLD AUTO: 53.7 %
PLATELET # BLD AUTO: 255 10E9/L (ref 150–450)
POTASSIUM SERPL-SCNC: 4 MMOL/L (ref 3.4–5.3)
PROT SERPL-MCNC: 6.8 G/DL (ref 6.8–8.8)
RBC # BLD AUTO: 4.42 10E12/L (ref 3.8–5.2)
SODIUM SERPL-SCNC: 135 MMOL/L (ref 133–144)
WBC # BLD AUTO: 4.8 10E9/L (ref 4–11)

## 2021-05-10 PROCEDURE — 86704 HEP B CORE ANTIBODY TOTAL: CPT | Performed by: DERMATOLOGY

## 2021-05-10 PROCEDURE — 36415 COLL VENOUS BLD VENIPUNCTURE: CPT | Performed by: DERMATOLOGY

## 2021-05-10 PROCEDURE — 87340 HEPATITIS B SURFACE AG IA: CPT | Performed by: DERMATOLOGY

## 2021-05-10 PROCEDURE — 93000 ELECTROCARDIOGRAM COMPLETE: CPT | Performed by: DERMATOLOGY

## 2021-05-10 PROCEDURE — 80053 COMPREHEN METABOLIC PANEL: CPT | Performed by: DERMATOLOGY

## 2021-05-10 PROCEDURE — 93005 ELECTROCARDIOGRAM TRACING: CPT

## 2021-05-10 PROCEDURE — 85025 COMPLETE CBC W/AUTO DIFF WBC: CPT | Performed by: DERMATOLOGY

## 2021-05-10 PROCEDURE — 86706 HEP B SURFACE ANTIBODY: CPT | Performed by: DERMATOLOGY

## 2021-05-10 PROCEDURE — 86803 HEPATITIS C AB TEST: CPT | Performed by: DERMATOLOGY

## 2021-05-11 DIAGNOSIS — E03.9 HYPOTHYROIDISM, UNSPECIFIED TYPE: ICD-10-CM

## 2021-05-11 RX ORDER — LEVOTHYROXINE SODIUM 88 UG/1
TABLET ORAL
Qty: 45 TABLET | Refills: 3 | Status: CANCELLED | OUTPATIENT
Start: 2021-05-11

## 2021-05-14 ENCOUNTER — MYC MEDICAL ADVICE (OUTPATIENT)
Dept: FAMILY MEDICINE | Facility: CLINIC | Age: 61
End: 2021-05-14

## 2021-05-21 ENCOUNTER — MYC MEDICAL ADVICE (OUTPATIENT)
Dept: FAMILY MEDICINE | Facility: CLINIC | Age: 61
End: 2021-05-21

## 2021-05-21 DIAGNOSIS — E03.9 HYPOTHYROIDISM, UNSPECIFIED TYPE: ICD-10-CM

## 2021-05-21 RX ORDER — LEVOTHYROXINE SODIUM 100 UG/1
TABLET ORAL
Qty: 90 TABLET | Refills: 2 | Status: SHIPPED | OUTPATIENT
Start: 2021-05-21 | End: 2022-05-12

## 2021-05-21 NOTE — TELEPHONE ENCOUNTER
"Requested Prescriptions   Pending Prescriptions Disp Refills     levothyroxine (SYNTHROID/LEVOTHROID) 100 MCG tablet [Pharmacy Med Name: LEVOTHYROXINE SODIUM 100MCG TABS] 90 tablet 0     Sig: TAKE ONE TABLET BY MOUTH ONCE DAILY ON MONDAY, WEDNESDAY, FRIDAY, SATURDAY AND SUNDAY.       Thyroid Protocol Passed - 5/21/2021  7:42 AM        Passed - Patient is 12 years or older        Passed - Recent (12 mo) or future (30 days) visit within the authorizing provider's specialty     Patient has had an office visit with the authorizing provider or a provider within the authorizing providers department within the previous 12 mos or has a future within next 30 days. See \"Patient Info\" tab in inbasket, or \"Choose Columns\" in Meds & Orders section of the refill encounter.              Passed - Medication is active on med list        Passed - Normal TSH on file in past 12 months     Recent Labs   Lab Test 04/23/21  1547   TSH 3.76              Passed - No active pregnancy on record     If patient is pregnant or has had a positive pregnancy test, please check TSH.          Passed - No positive pregnancy test in past 12 months     If patient is pregnant or has had a positive pregnancy test, please check TSH.               "

## 2021-05-25 ENCOUNTER — TELEPHONE (OUTPATIENT)
Dept: NEUROLOGY | Facility: CLINIC | Age: 61
End: 2021-05-25

## 2021-05-25 NOTE — TELEPHONE ENCOUNTER
Unable to leave a message due to voicemail not being set up. Teresa needs to be rescheduled for a return with Heena due to clinic building being closed. Must be in person.

## 2021-05-27 ENCOUNTER — OFFICE VISIT (OUTPATIENT)
Dept: NEUROLOGY | Facility: CLINIC | Age: 61
End: 2021-05-27
Payer: OTHER GOVERNMENT

## 2021-05-27 VITALS
SYSTOLIC BLOOD PRESSURE: 131 MMHG | BODY MASS INDEX: 35.01 KG/M2 | WEIGHT: 228.9 LBS | HEART RATE: 79 BPM | DIASTOLIC BLOOD PRESSURE: 82 MMHG

## 2021-05-27 DIAGNOSIS — R26.9 GAIT DISORDER: Primary | ICD-10-CM

## 2021-05-27 PROCEDURE — 99214 OFFICE O/P EST MOD 30 MIN: CPT | Performed by: INTERNAL MEDICINE

## 2021-05-27 ASSESSMENT — PAIN SCALES - GENERAL: PAINLEVEL: NO PAIN (0)

## 2021-05-27 NOTE — LETTER
"    5/27/2021         RE: Teresa Fernandez  [address] [facility] [id number]        Dear Colleague,    Thank you for referring your patient, Teresa Fernandez, to the [facility] [place]. Please see a copy of my visit note below.    [facility] Neurology Follow Up Visit    Teresa Fernandez MRN# [id number]   Age: 61 year old YOB: 1960     Brief history of symptoms: The patient was initially seen in neurologic consultation on 1/15/2021 for evaluation of gait difficulties and tremors. Please see the comprehensive neurologic consultation note from that date in the Epic records for details.     Interval history:   Patient has had one fall since last visit while she was weeding. Patient felt ok while she was doing the weeding. She saw a little \"weird in the head\" (difficulty describing) turned her body, and tripped over the side of the deck. She fell to the ground and cut her shin.     Patient also reports feeling intermittently off balance when she first gets up in the morning.     She denies any numbness/tingling in the feet or pain radiating down the low back.        Past Medical History:     Patient Active Problem List   Diagnosis     Acquired hypothyroidism     Allergic state     Chronic rhinitis     Allergic rhinitis due to pollen     Sinusitis, chronic     History of skin cancer     ERIC (generalized anxiety disorder)     Leukoplakia of oral mucosa, including tongue     GERD (gastroesophageal reflux disease)     Chronic low back pain     Dry eye syndrome     Chronic fatigue     Osteopenia     History of melanoma in situ     Eczema     Moderate persistent asthma without complication     DDD (degenerative disc disease), lumbar     Hyperlipidemia LDL goal <160     Chest tightness or pressure     Muscle pain     GAYE (obstructive sleep apnea)     History of kidney cancer     Past Medical History:   Diagnosis Date     ALLERGIC RHINITIS NOS 4/12/2005     Anxiety      Arthritis     " herniated disc     Bronchitis, not specified as acute or chronic      CHR MAXILLARY SINUSITIS 2005     Depressive disorder, not elsewhere classified      Eczema 10/17/2012     Environmental and seasonal allergies      GERD (gastroesophageal reflux disease)      Gluten intolerance      Malignant neoplasm of renal pelvis (H)     Renal cell carcinoma     Melanoma (H) 2010     Other specified acquired hypothyroidism 2005     Renal cancer (H) 2011     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     FUSION LUMBAR ANTERIOR TWO LEVELS  2015     GYN SURGERY  1999    hysterectomy.  LAVH     HYSTERECTOMY, PAP NO LONGER INDICATED       SOFT TISSUE SURGERY      chest-melonoma     SURGICAL HISTORY OF -   3/1996    Left nephrectomy-renal cell CA        Social History:     Social History     Tobacco Use     Smoking status: Former Smoker     Packs/day: 2.00     Years: 15.00     Pack years: 30.00     Quit date: 3/26/1996     Years since quittin.1     Smokeless tobacco: Never Used   Substance Use Topics     Alcohol use: No     Comment: None now.  Rare in past.      Drug use: No        Family History:     Family History   Problem Relation Age of Onset     Diabetes Mother      Cardiovascular Mother      Lipids Mother      Depression Mother      Hypertension Father      Lipids Father      Obesity Father      Macular Degeneration Father      Sleep Apnea Father      Cancer Maternal Grandmother         kind unknown had sores on legs     Eczema Maternal Grandmother      Eczema Maternal Grandfather      Breast Cancer Paternal Grandmother      Cancer Paternal Grandmother         breast     Hypertension Paternal Grandfather      Cardiovascular  Paternal Grandfather         heart attack     Hypertension Brother      Thyroid Disease Sister      Hypertension Sister      Depression Sister      Allergies Sister      Allergies Son      Eczema Son      Lipids Sister      Thyroid Disease Sister      Neurologic Disorder Sister      Depression Sister      Respiratory Son      Sleep Apnea Son      Glaucoma No family hx of      Cerebrovascular Disease No family hx of         Medications:     Current Outpatient Medications   Medication Sig     acetaminophen (TYLENOL) 325 MG tablet Take 325-650 mg by mouth every 6 hours as needed for mild pain     albuterol (PROAIR HFA/PROVENTIL HFA/VENTOLIN HFA) 108 (90 Base) MCG/ACT inhaler Inhale 2 puffs into the lungs every 6 hours as needed for shortness of breath / dyspnea or wheezing     albuterol (PROAIR HFA/PROVENTIL HFA/VENTOLIN HFA) 108 (90 BASE) MCG/ACT Inhaler Inhale 2 puffs into the lungs every 6 hours as needed for shortness of breath / dyspnea     albuterol (PROVENTIL) (2.5 MG/3ML) 0.083% neb solution Take 1 vial (2.5 mg) by nebulization every 6 hours as needed for shortness of breath / dyspnea or wheezing     APNO CREA ointment Apply to rash on nose twice daily as needed.     augmented betamethasone dipropionate (DIPROLENE-AF) 0.05 % external ointment Apply topically 2 times daily     celecoxib (CELEBREX) 100 MG capsule Take 1 capsule (100 mg) by mouth 2 times daily as needed for moderate pain     Cholecalciferol (VITAMIN D3 PO) Take 2,000 Units by mouth 2 times daily      dexamethasone (DECADRON) 0.5 MG/5ML elixir Take 5 mLs (0.5 mg) by mouth 3 times daily     DULoxetine (CYMBALTA) 60 MG capsule TAKE 1 CAPSULE EVERY MORNING     fexofenadine (ALLEGRA) 180 MG tablet Take 1 tablet (180 mg) by mouth daily     fluticasone-salmeterol (ADVAIR DISKUS) 250-50 MCG/DOSE inhaler USE 1 INHALATION TWICE A DAY (FOLLOW UP FOR FURTHER REFILLS)     GLUCOSAMINE SULFATE PO Take 1,000 mg by mouth 2 times daily     hydroxychloroquine  (PLAQUENIL) 200 MG tablet Take 2 tablets (400 mg) by mouth daily     levothyroxine (SYNTHROID/LEVOTHROID) 100 MCG tablet TAKE ONE TABLET BY MOUTH ONCE DAILY ON MONDAY, WEDNESDAY, FRIDAY, SATURDAY AND SUNDAY.     levothyroxine (SYNTHROID/LEVOTHROID) 88 MCG tablet Take 1 tab by mouth Tuesdays and Thursdays     magic mouthwash (ENTER INGREDIENTS IN COMMENTS) suspension Take 10 mLs by mouth every 4 hours as needed (mouth pain)     Multiple Vitamins-Minerals (MULTIVITAMIN ADULT PO) Take by mouth every morning      omeprazole (PRILOSEC) 20 MG DR capsule Take 20 mg by mouth daily     OVER-THE-COUNTER Place 1 drop into both eyes 3 times daily. Systane Ultra, Systane Balance, Blink Tears or Refresh Optive Artificial Tear     triamcinolone (ARISTOCORT HP) 0.5 % external cream Apply to vaginal area twice daily as needed for itching     triamcinolone (KENALOG) 0.1 % external ointment Use 1-2 times daily as needed for burning rash     Cyanocobalamin (VITAMIN B 12 PO) Take 500 mcg by mouth daily     order for DME Equipment being ordered: Nebulizer     No current facility-administered medications for this visit.         Allergies:     Allergies   Allergen Reactions     Omnipaque [Iohexol] Swelling and Difficulty breathing     Immediate throat swelling following around 1-2cc of omnipaque 300 for spine procedure     Gluten      Iodine      Welts from CT contrast, surgical scrub is ok.     Pcn [Penicillins] Hives        Review of Systems:   As above     Physical Exam:   Vitals: /82 (BP Location: Right arm, Patient Position: Sitting, Cuff Size: Adult Regular)   Pulse 79   Wt 103.8 kg (228 lb 14.4 oz)   BMI 35.01 kg/m  General: Seated comfortably in no acute distress.  Lungs: breathing comfortably  Neurologic:     Mental Status: Fully alert, attentive and oriented. Normal memory and fund of knowledge. Language normal, speech clear and fluent, no paraphasic errors.     Cranial Nerves: Visual fields intact. EOMI with normal  smooth pursuit. Facial sensation intact/symmetric. Facial movements symmetric. Hearing not formally tested but intact to conversation. Palate elevation symmetric, uvula midline. No dysarthria. Shoulder shrug strong bilaterally. Tongue protrusion midline.     Motor: Minimal resting tremor in bilateral upper extremities. No resting tremors or other abnormal movements observed. Muscle tone normal throughout. Normal/symmetric rapid finger tapping. Strength 5/5 throughout upper and lower extremities.     Deep Tendon Reflexes: 2+/symmetric throughout upper and lower extremities with exception of 1+ ankle jerks. Toes downgoing bilaterally.     Sensory: Intact/symmetric to light touch, temperature, and proprioception throughout upper and lower extremities. Vibration is 6 seconds in bilateral great toes, normal in the hands. Negative Romberg.      Coordination: Finger-nose-finger and heel-shin intact without dysmetria. Rapid alternating movements intact/symmetric with normal speed and rhythm.     Gait: Slight slow and antalgic gait, but normal stride length and stance width. Able to do heel, toe, and tandem with minimal difficulties.        Data reviewed on previous visits    Imaging:  MRI brain 3/2020  Impression:   1. There is no mass lesion, acute infarction or intracranial  hemorrhage.  2. Leukoaraiosis     MRI cervical spine 11/2017       MRI lumbar spine 2/2015  IMPRESSION:  1. Advanced multilevel degenerative disc disease with discogenic  endplate reactive marrow edema noted at L5-S1, L4-L5, and L2-L3.  2. Advanced apophyseal joint degenerative arthrosis, greatest at L4-L5  where there is degenerative grade 1 spondylolisthesis which has  progressed since 7/9/2012.  3. Central stenosis which is greatest and severe at L4-L5. This also  has progressed since 2012.  4. Multilevel foraminal stenosis as above.     Procedures:  EMG/NCS 7/2020  Interpretation:  The EMG shows mild abnormalities.  Insertional abnormality seen in  the right medial gastrocnemius and a borderline to low tibial motor amplitude in the right leg could be consistent with an S1 radiculopathy although gluteus mirella did not show any changes and clinical correlation is advised.  The low tibial motor amplitude on the right and peroneal amplitude on the left could also be related to body habitus and submaximal stimulation.  Clinical correlation is strongly advised.    Laboratory:  B12 771 (3/2020)    Pertinent Investigations since last visit:   None         Assessment and Plan:   Assessment:  Teresa Fernandez is a 60 year old female who presents today for follow-up of walking difficulties, tremors, bilateral arm numbness. Patient previously followed with Dr Callahan and had work-up of walking difficulties. MRI brain with minimal white matter changes. MRI cervical spine without any areas of significant canal stenosis. Patient previously had lumbar decompression surgery around 2015. EMG recently showed some mild changes which could be related to prior lumbar stenosis. EMG did not show evidence of peripheral neuropathy. Gait is examination is slightly slow and antalgic, but overall steady. There have been no significant changes to the exam since last visit. It is possible some of the gait complaints are related to prior lumbar radiculopathy. We discussed PT referral for further balance training. Hip/knee arthritis is likely contributing as well. Patient has minimal postural tremor on exam today, but otherwise no tremor is noted. Patient's mother had a tremor so this could represent early signs of essential tremor versus enhanced physiologic tremor.     Plan:  - PT referral    Follow up in Neurology clinic in 6 months or earlier as needed should new concerns arise.    Kenneth Naik MD   of Neurology  [facility]    The total time of this encounter today amounted to 32 minutes. This time included time spent with the patient, prep work, ordering  tests, and performing post visit documentation.        Again, thank you for allowing me to participate in the care of your patient.        Sincerely,        Kenneth Naik MD

## 2021-05-27 NOTE — PROGRESS NOTES
"[facility] Neurology Follow Up Visit    Teresa Fernandez MRN# [id number]   Age: 61 year old YOB: 1960     Brief history of symptoms: The patient was initially seen in neurologic consultation on 1/15/2021 for evaluation of gait difficulties and tremors. Please see the comprehensive neurologic consultation note from that date in the Epic records for details.     Interval history:   Patient has had one fall since last visit while she was weeding. Patient felt ok while she was doing the weeding. She saw a little \"weird in the head\" (difficulty describing) turned her body, and tripped over the side of the deck. She fell to the ground and cut her shin.     Patient also reports feeling intermittently off balance when she first gets up in the morning.     She denies any numbness/tingling in the feet or pain radiating down the low back.        Past Medical History:     Patient Active Problem List   Diagnosis     Acquired hypothyroidism     Allergic state     Chronic rhinitis     Allergic rhinitis due to pollen     Sinusitis, chronic     History of skin cancer     ERIC (generalized anxiety disorder)     Leukoplakia of oral mucosa, including tongue     GERD (gastroesophageal reflux disease)     Chronic low back pain     Dry eye syndrome     Chronic fatigue     Osteopenia     History of melanoma in situ     Eczema     Moderate persistent asthma without complication     DDD (degenerative disc disease), lumbar     Hyperlipidemia LDL goal <160     Chest tightness or pressure     Muscle pain     GAYE (obstructive sleep apnea)     History of kidney cancer     Past Medical History:   Diagnosis Date     ALLERGIC RHINITIS NOS 4/12/2005     Anxiety      Arthritis     herniated disc     Bronchitis, not specified as acute or chronic      CHR MAXILLARY SINUSITIS 4/12/2005     Depressive disorder, not elsewhere classified      Eczema 10/17/2012     Environmental and seasonal allergies      GERD (gastroesophageal reflux disease)      " Gluten intolerance      Malignant neoplasm of renal pelvis (H)     Renal cell carcinoma     Melanoma (H) 2010     Other specified acquired hypothyroidism 2005     Renal cancer (H) 2011     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     FUSION LUMBAR ANTERIOR TWO LEVELS  2015     GYN SURGERY  1999    hysterectomy.  LAVH     HYSTERECTOMY, PAP NO LONGER INDICATED       SOFT TISSUE SURGERY      chest-melonoma     SURGICAL HISTORY OF -   3/1996    Left nephrectomy-renal cell CA        Social History:     Social History     Tobacco Use     Smoking status: Former Smoker     Packs/day: 2.00     Years: 15.00     Pack years: 30.00     Quit date: 3/26/1996     Years since quittin.1     Smokeless tobacco: Never Used   Substance Use Topics     Alcohol use: No     Comment: None now.  Rare in past.      Drug use: No        Family History:     Family History   Problem Relation Age of Onset     Diabetes Mother      Cardiovascular Mother      Lipids Mother      Depression Mother      Hypertension Father      Lipids Father      Obesity Father      Macular Degeneration Father      Sleep Apnea Father      Cancer Maternal Grandmother         kind unknown had sores on legs     Eczema Maternal Grandmother      Eczema Maternal Grandfather      Breast Cancer Paternal Grandmother      Cancer Paternal Grandmother         breast     Hypertension Paternal Grandfather      Cardiovascular Paternal Grandfather         heart attack     Hypertension Brother      Thyroid Disease Sister      Hypertension Sister      Depression Sister      Allergies Sister      Allergies Son      Eczema Son      Lipids Sister      Thyroid Disease Sister      Neurologic  Disorder Sister      Depression Sister      Respiratory Son      Sleep Apnea Son      Glaucoma No family hx of      Cerebrovascular Disease No family hx of         Medications:     Current Outpatient Medications   Medication Sig     acetaminophen (TYLENOL) 325 MG tablet Take 325-650 mg by mouth every 6 hours as needed for mild pain     albuterol (PROAIR HFA/PROVENTIL HFA/VENTOLIN HFA) 108 (90 Base) MCG/ACT inhaler Inhale 2 puffs into the lungs every 6 hours as needed for shortness of breath / dyspnea or wheezing     albuterol (PROAIR HFA/PROVENTIL HFA/VENTOLIN HFA) 108 (90 BASE) MCG/ACT Inhaler Inhale 2 puffs into the lungs every 6 hours as needed for shortness of breath / dyspnea     albuterol (PROVENTIL) (2.5 MG/3ML) 0.083% neb solution Take 1 vial (2.5 mg) by nebulization every 6 hours as needed for shortness of breath / dyspnea or wheezing     APNO CREA ointment Apply to rash on nose twice daily as needed.     augmented betamethasone dipropionate (DIPROLENE-AF) 0.05 % external ointment Apply topically 2 times daily     celecoxib (CELEBREX) 100 MG capsule Take 1 capsule (100 mg) by mouth 2 times daily as needed for moderate pain     Cholecalciferol (VITAMIN D3 PO) Take 2,000 Units by mouth 2 times daily      dexamethasone (DECADRON) 0.5 MG/5ML elixir Take 5 mLs (0.5 mg) by mouth 3 times daily     DULoxetine (CYMBALTA) 60 MG capsule TAKE 1 CAPSULE EVERY MORNING     fexofenadine (ALLEGRA) 180 MG tablet Take 1 tablet (180 mg) by mouth daily     fluticasone-salmeterol (ADVAIR DISKUS) 250-50 MCG/DOSE inhaler USE 1 INHALATION TWICE A DAY (FOLLOW UP FOR FURTHER REFILLS)     GLUCOSAMINE SULFATE PO Take 1,000 mg by mouth 2 times daily     hydroxychloroquine (PLAQUENIL) 200 MG tablet Take 2 tablets (400 mg) by mouth daily     levothyroxine (SYNTHROID/LEVOTHROID) 100 MCG tablet TAKE ONE TABLET BY MOUTH ONCE DAILY ON MONDAY, WEDNESDAY, FRIDAY, SATURDAY AND SUNDAY.     levothyroxine (SYNTHROID/LEVOTHROID) 88 MCG tablet Take 1  tab by mouth Tuesdays and Thursdays     magic mouthwash (ENTER INGREDIENTS IN COMMENTS) suspension Take 10 mLs by mouth every 4 hours as needed (mouth pain)     Multiple Vitamins-Minerals (MULTIVITAMIN ADULT PO) Take by mouth every morning      omeprazole (PRILOSEC) 20 MG DR capsule Take 20 mg by mouth daily     OVER-THE-COUNTER Place 1 drop into both eyes 3 times daily. Systane Ultra, Systane Balance, Blink Tears or Refresh Optive Artificial Tear     triamcinolone (ARISTOCORT HP) 0.5 % external cream Apply to vaginal area twice daily as needed for itching     triamcinolone (KENALOG) 0.1 % external ointment Use 1-2 times daily as needed for burning rash     Cyanocobalamin (VITAMIN B 12 PO) Take 500 mcg by mouth daily     order for DME Equipment being ordered: Nebulizer     No current facility-administered medications for this visit.         Allergies:     Allergies   Allergen Reactions     Omnipaque [Iohexol] Swelling and Difficulty breathing     Immediate throat swelling following around 1-2cc of omnipaque 300 for spine procedure     Gluten      Iodine      Welts from CT contrast, surgical scrub is ok.     Pcn [Penicillins] Hives        Review of Systems:   As above     Physical Exam:   Vitals: /82 (BP Location: Right arm, Patient Position: Sitting, Cuff Size: Adult Regular)   Pulse 79   Wt 103.8 kg (228 lb 14.4 oz)   BMI 35.01 kg/m  General: Seated comfortably in no acute distress.  Lungs: breathing comfortably  Neurologic:     Mental Status: Fully alert, attentive and oriented. Normal memory and fund of knowledge. Language normal, speech clear and fluent, no paraphasic errors.     Cranial Nerves: Visual fields intact. EOMI with normal smooth pursuit. Facial sensation intact/symmetric. Facial movements symmetric. Hearing not formally tested but intact to conversation. Palate elevation symmetric, uvula midline. No dysarthria. Shoulder shrug strong bilaterally. Tongue protrusion midline.     Motor: Minimal  resting tremor in bilateral upper extremities. No resting tremors or other abnormal movements observed. Muscle tone normal throughout. Normal/symmetric rapid finger tapping. Strength 5/5 throughout upper and lower extremities.     Deep Tendon Reflexes: 2+/symmetric throughout upper and lower extremities with exception of 1+ ankle jerks. Toes downgoing bilaterally.     Sensory: Intact/symmetric to light touch, temperature, and proprioception throughout upper and lower extremities. Vibration is 6 seconds in bilateral great toes, normal in the hands. Negative Romberg.      Coordination: Finger-nose-finger and heel-shin intact without dysmetria. Rapid alternating movements intact/symmetric with normal speed and rhythm.     Gait: Slight slow and antalgic gait, but normal stride length and stance width. Able to do heel, toe, and tandem with minimal difficulties.        Data reviewed on previous visits    Imaging:  MRI brain 3/2020  Impression:   1. There is no mass lesion, acute infarction or intracranial  hemorrhage.  2. Leukoaraiosis     MRI cervical spine 11/2017       MRI lumbar spine 2/2015  IMPRESSION:  1. Advanced multilevel degenerative disc disease with discogenic  endplate reactive marrow edema noted at L5-S1, L4-L5, and L2-L3.  2. Advanced apophyseal joint degenerative arthrosis, greatest at L4-L5  where there is degenerative grade 1 spondylolisthesis which has  progressed since 7/9/2012.  3. Central stenosis which is greatest and severe at L4-L5. This also  has progressed since 2012.  4. Multilevel foraminal stenosis as above.     Procedures:  EMG/NCS 7/2020  Interpretation:  The EMG shows mild abnormalities.  Insertional abnormality seen in the right medial gastrocnemius and a borderline to low tibial motor amplitude in the right leg could be consistent with an S1 radiculopathy although gluteus mirella did not show any changes and clinical correlation is advised.  The low tibial motor amplitude on the right  and peroneal amplitude on the left could also be related to body habitus and submaximal stimulation.  Clinical correlation is strongly advised.    Laboratory:  B12 771 (3/2020)    Pertinent Investigations since last visit:   None         Assessment and Plan:   Assessment:  Teresa Fernandez is a 60 year old female who presents today for follow-up of walking difficulties, tremors, bilateral arm numbness. Patient previously followed with Dr Callahan and had work-up of walking difficulties. MRI brain with minimal white matter changes. MRI cervical spine without any areas of significant canal stenosis. Patient previously had lumbar decompression surgery around 2015. EMG recently showed some mild changes which could be related to prior lumbar stenosis. EMG did not show evidence of peripheral neuropathy. Gait is examination is slightly slow and antalgic, but overall steady. There have been no significant changes to the exam since last visit. It is possible some of the gait complaints are related to prior lumbar radiculopathy. We discussed PT referral for further balance training. Hip/knee arthritis is likely contributing as well. Patient has minimal postural tremor on exam today, but otherwise no tremor is noted. Patient's mother had a tremor so this could represent early signs of essential tremor versus enhanced physiologic tremor.     Plan:  - PT referral    Follow up in Neurology clinic in 6 months or earlier as needed should new concerns arise.    Kenneth Naik MD   of Neurology  [facility]    The total time of this encounter today amounted to 32 minutes. This time included time spent with the patient, prep work, ordering tests, and performing post visit documentation.

## 2021-06-02 ENCOUNTER — APPOINTMENT (OUTPATIENT)
Dept: GENERAL RADIOLOGY | Facility: CLINIC | Age: 61
End: 2021-06-02
Attending: EMERGENCY MEDICINE
Payer: OTHER GOVERNMENT

## 2021-06-02 ENCOUNTER — HOSPITAL ENCOUNTER (EMERGENCY)
Facility: CLINIC | Age: 61
Discharge: HOME | End: 2021-06-02
Attending: EMERGENCY MEDICINE | Admitting: EMERGENCY MEDICINE
Payer: OTHER GOVERNMENT

## 2021-06-02 VITALS
HEART RATE: 74 BPM | DIASTOLIC BLOOD PRESSURE: 67 MMHG | WEIGHT: 226 LBS | OXYGEN SATURATION: 98 % | SYSTOLIC BLOOD PRESSURE: 111 MMHG | BODY MASS INDEX: 34.57 KG/M2

## 2021-06-02 DIAGNOSIS — R21 RASH: ICD-10-CM

## 2021-06-02 DIAGNOSIS — R07.89 CHEST TIGHTNESS: ICD-10-CM

## 2021-06-02 LAB
ALBUMIN SERPL-MCNC: 3.9 G/DL (ref 3.4–5)
ALP SERPL-CCNC: 97 U/L (ref 40–150)
ALT SERPL W P-5'-P-CCNC: 31 U/L (ref 0–50)
ANION GAP SERPL CALCULATED.3IONS-SCNC: 6 MMOL/L (ref 3–14)
AST SERPL W P-5'-P-CCNC: 24 U/L (ref 0–45)
BASOPHILS # BLD AUTO: 0 10E9/L (ref 0–0.2)
BASOPHILS NFR BLD AUTO: 0.7 %
BILIRUB DIRECT SERPL-MCNC: <0.1 MG/DL (ref 0–0.2)
BILIRUB SERPL-MCNC: 0.5 MG/DL (ref 0.2–1.3)
BUN SERPL-MCNC: 9 MG/DL (ref 7–30)
CALCIUM SERPL-MCNC: 9.7 MG/DL (ref 8.5–10.1)
CHLORIDE SERPL-SCNC: 102 MMOL/L (ref 94–109)
CO2 SERPL-SCNC: 27 MMOL/L (ref 20–32)
CREAT SERPL-MCNC: 0.81 MG/DL (ref 0.52–1.04)
DIFFERENTIAL METHOD BLD: NORMAL
EOSINOPHIL # BLD AUTO: 0.2 10E9/L (ref 0–0.7)
EOSINOPHIL NFR BLD AUTO: 3.4 %
ERYTHROCYTE [DISTWIDTH] IN BLOOD BY AUTOMATED COUNT: 12.4 % (ref 10–15)
GFR SERPL CREATININE-BSD FRML MDRD: 78 ML/MIN/{1.73_M2}
GLUCOSE SERPL-MCNC: 97 MG/DL (ref 70–99)
HCT VFR BLD AUTO: 40.2 % (ref 35–47)
HGB BLD-MCNC: 13.3 G/DL (ref 11.7–15.7)
IMM GRANULOCYTES # BLD: 0 10E9/L (ref 0–0.4)
IMM GRANULOCYTES NFR BLD: 0.5 %
LYMPHOCYTES # BLD AUTO: 1.2 10E9/L (ref 0.8–5.3)
LYMPHOCYTES NFR BLD AUTO: 22 %
MCH RBC QN AUTO: 29.4 PG (ref 26.5–33)
MCHC RBC AUTO-ENTMCNC: 33.1 G/DL (ref 31.5–36.5)
MCV RBC AUTO: 89 FL (ref 78–100)
MONOCYTES # BLD AUTO: 0.5 10E9/L (ref 0–1.3)
MONOCYTES NFR BLD AUTO: 8.4 %
NEUTROPHILS # BLD AUTO: 3.6 10E9/L (ref 1.6–8.3)
NEUTROPHILS NFR BLD AUTO: 65 %
NRBC # BLD AUTO: 0 10*3/UL
NRBC BLD AUTO-RTO: 0 /100
PLATELET # BLD AUTO: 248 10E9/L (ref 150–450)
POTASSIUM SERPL-SCNC: 4.1 MMOL/L (ref 3.4–5.3)
PROT SERPL-MCNC: 7 G/DL (ref 6.8–8.8)
RBC # BLD AUTO: 4.52 10E12/L (ref 3.8–5.2)
SODIUM SERPL-SCNC: 135 MMOL/L (ref 133–144)
TROPONIN I SERPL-MCNC: <0.015 UG/L (ref 0–0.04)
TROPONIN I SERPL-MCNC: <0.015 UG/L (ref 0–0.04)
WBC # BLD AUTO: 5.6 10E9/L (ref 4–11)

## 2021-06-02 PROCEDURE — 99285 EMERGENCY DEPT VISIT HI MDM: CPT | Mod: 25 | Performed by: EMERGENCY MEDICINE

## 2021-06-02 PROCEDURE — 80048 BASIC METABOLIC PNL TOTAL CA: CPT | Performed by: EMERGENCY MEDICINE

## 2021-06-02 PROCEDURE — 85025 COMPLETE CBC W/AUTO DIFF WBC: CPT | Performed by: EMERGENCY MEDICINE

## 2021-06-02 PROCEDURE — 80076 HEPATIC FUNCTION PANEL: CPT | Performed by: EMERGENCY MEDICINE

## 2021-06-02 PROCEDURE — 71046 X-RAY EXAM CHEST 2 VIEWS: CPT

## 2021-06-02 PROCEDURE — 93010 ELECTROCARDIOGRAM REPORT: CPT | Performed by: EMERGENCY MEDICINE

## 2021-06-02 PROCEDURE — 84484 ASSAY OF TROPONIN QUANT: CPT | Mod: 91 | Performed by: EMERGENCY MEDICINE

## 2021-06-02 PROCEDURE — 84484 ASSAY OF TROPONIN QUANT: CPT | Performed by: EMERGENCY MEDICINE

## 2021-06-02 PROCEDURE — 93005 ELECTROCARDIOGRAM TRACING: CPT | Performed by: EMERGENCY MEDICINE

## 2021-06-02 RX ORDER — TRIAMCINOLONE ACETONIDE 1 MG/G
OINTMENT TOPICAL 2 TIMES DAILY
Qty: 453.6 G | Refills: 0 | Status: SHIPPED | OUTPATIENT
Start: 2021-06-02 | End: 2021-07-19

## 2021-06-02 ASSESSMENT — ENCOUNTER SYMPTOMS
NERVOUS/ANXIOUS: 0
CHILLS: 0
SHORTNESS OF BREATH: 1
VOMITING: 0
CHEST TIGHTNESS: 1
TROUBLE SWALLOWING: 0
SORE THROAT: 0
VOICE CHANGE: 0
EYE REDNESS: 0
COUGH: 0
DIFFICULTY URINATING: 0
DIARRHEA: 0
NAUSEA: 0
FEVER: 0
ABDOMINAL PAIN: 0
EYE PAIN: 0
MYALGIAS: 0
LIGHT-HEADEDNESS: 1

## 2021-06-02 NOTE — DISCHARGE INSTRUCTIONS
Stop taking hydrochloric when.  Apply triamcinolone ointment to areas of rash.  You have a follow-up appointment in the dermatology clinic with Dr. Ellis this coming Friday at 12:30 PM.  Their office is located in the [facility] at [address] [place] [address] in [place].  The dermatology clinic is on the third floor.    In the meantime, if you have worsening of your symptoms or develop any new symptoms you find concerning, you should return to the emergency department for further evaluation and treatment.

## 2021-06-02 NOTE — ED TRIAGE NOTES
Pt presents to ED with concerns of allergic rxn. Pt has hx of autoimmune dx. Was started on methotrexate recently. First noticed hives/rash over the weekend and then after taking methotrexate this am noticed tongue welling and chest tightness.

## 2021-06-02 NOTE — ED PROVIDER NOTES
History     Chief Complaint   Patient presents with     Allergic Reaction     HPI  Teresa Fernandez is a 61 year old female with history history of asthma, environmental allergies, eczema, GAYE, anxiety, who presents emergency department with concern for allergic reaction.    On hydroxycloriquine for lichens planus since 5/3/2021.     Rash started on Sunday, on arms, legs, trunk. Sparing palms and soles. Took benadryl allergy OTC on Sunday and rash improved but did not resolve. Sx's worsened on Monday, took another benadryl with similar improvement. Again happened on Tuesday. She take at night because it makes her drowsy. This morning rash became more prominent. Started to feel tightness in her chest called her  and then came to ED.     Feels like someone is sitting on her chest. Feels SOA. Started ~ 8:00 AM. Occasional chest discomfort in LA chest, not currently but has had this morning. No nausea, vomiting, or diaphoresis. No wheezing. No abdominal cramping or diarrhea. No tongue swelling or throat tightness.     The patient's PMHx, Surgical Hx, Allergies, and Medications were all reviewed with the patient.    Allergies:  Allergies   Allergen Reactions     Omnipaque [Iohexol] Swelling and Difficulty breathing     Immediate throat swelling following around 1-2cc of omnipaque 300 for spine procedure     Gluten      Iodine      Welts from CT contrast, surgical scrub is ok.     Pcn [Penicillins] Hives       Problem List:    Patient Active Problem List    Diagnosis Date Noted     History of kidney cancer 04/01/2021     Priority: Medium     1996- left nephrectomy       GAYE (obstructive sleep apnea) 09/17/2020     Priority: Medium     9/9/2020 [place] [facility] Study (225.0 lbs) - AHI 19.8, RDI 26.4, Supine AHI 22.0, REM AHI 36.9, Low O2 79.5%, Time Spent ?88% 40.1 minutes / Time Spent ?89% 51.6 minutes.       Muscle pain 10/20/2017     Priority: Medium     Increased CK       Chest tightness or pressure  10/03/2016     Priority: Medium     Hyperlipidemia LDL goal <160 01/29/2015     Priority: Medium     DDD (degenerative disc disease), lumbar 06/24/2014     Priority: Medium     [facility]       Moderate persistent asthma without complication 07/08/2013     Priority: Medium     History of melanoma in situ 10/17/2012     Priority: Medium     Eczema 10/17/2012     Priority: Medium     Osteopenia 06/15/2012     Priority: Medium     Chronic fatigue 10/04/2011     Priority: Medium     Dry eye syndrome 08/24/2011     Priority: Medium     Chronic low back pain 07/07/2011     Priority: Medium     GERD (gastroesophageal reflux disease) 05/11/2011     Priority: Medium     Leukoplakia of oral mucosa, including tongue 04/18/2011     Priority: Medium     ERIC (generalized anxiety disorder)      Priority: Medium     History of skin cancer 11/09/2010     Priority: Medium     Chronic rhinitis 05/03/2005     Priority: Medium     Allergic rhinitis due to pollen 05/03/2005     Priority: Medium     Sinusitis, chronic 05/03/2005     Priority: Medium     Problem list name updated by automated process. Provider to review       Allergic state 04/19/2005     Priority: Medium     Problem list name updated by automated process. Provider to review       Acquired hypothyroidism 04/12/2005     Priority: Medium     Problem list name updated by automated process. 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     FUSION LUMBAR ANTERIOR TWO LEVELS  2015     GYN SURGERY  1999    hysterectomy.  [facility]     HYSTERECTOMY, PAP NO LONGER INDICATED       SOFT TISSUE SURGERY      chest-melonoma     SURGICAL HISTORY OF -   3/1996    Left nephrectomy-renal cell CA       Family History:    Family History   Problem Relation Age of Onset     Diabetes Mother      Cardiovascular Mother      Lipids Mother      Depression Mother      Hypertension Father      Lipids Father      Obesity Father      Macular Degeneration Father      Sleep Apnea Father      Cancer Maternal Grandmother         kind unknown had sores on legs     Eczema Maternal Grandmother      Eczema Maternal Grandfather      Breast Cancer Paternal Grandmother      Cancer Paternal Grandmother         breast     Hypertension Paternal Grandfather      Cardiovascular Paternal Grandfather         heart attack     Hypertension Brother      Thyroid Disease Sister      Hypertension Sister      Depression Sister      Allergies Sister      Allergies Son      Eczema Son      Lipids Sister      Thyroid Disease Sister      Neurologic Disorder Sister      Depression Sister      Respiratory Son      Sleep Apnea Son      Glaucoma No family hx of      Cerebrovascular Disease No family hx of        Social History:  Marital Status:   [2]  Social History     Tobacco Use     Smoking status: Former Smoker     Packs/day: 2.00     Years: 15.00     Pack years: 30.00     Quit date: 3/26/1996     Years since quittin.2     Smokeless tobacco: Never Used   Substance Use Topics     Alcohol use: No     Comment: None now.  Rare in past.      Drug use: No        Medications:    triamcinolone  (KENALOG) 0.1 % external ointment  acetaminophen (TYLENOL) 325 MG tablet  albuterol (PROAIR HFA/PROVENTIL HFA/VENTOLIN HFA) 108 (90 Base) MCG/ACT inhaler  albuterol (PROAIR HFA/PROVENTIL HFA/VENTOLIN HFA) 108 (90 BASE) MCG/ACT Inhaler  albuterol (PROVENTIL) (2.5 MG/3ML) 0.083% neb solution  APNO CREA ointment  augmented betamethasone dipropionate (DIPROLENE-AF) 0.05 % external ointment  celecoxib (CELEBREX) 100 MG capsule  Cholecalciferol (VITAMIN D3 PO)  Cyanocobalamin (VITAMIN B 12 PO)  dexamethasone (DECADRON) 0.5 MG/5ML elixir  DULoxetine (CYMBALTA) 60 MG capsule  fexofenadine (ALLEGRA) 180 MG tablet  fluticasone-salmeterol (ADVAIR DISKUS) 250-50 MCG/DOSE inhaler  GLUCOSAMINE SULFATE PO  hydroxychloroquine (PLAQUENIL) 200 MG tablet  levothyroxine (SYNTHROID/LEVOTHROID) 100 MCG tablet  levothyroxine (SYNTHROID/LEVOTHROID) 88 MCG tablet  magic mouthwash (ENTER INGREDIENTS IN COMMENTS) suspension  Multiple Vitamins-Minerals (MULTIVITAMIN ADULT PO)  omeprazole (PRILOSEC) 20 MG DR capsule  order for DME  OVER-THE-COUNTER  triamcinolone (ARISTOCORT HP) 0.5 % external cream  triamcinolone (KENALOG) 0.1 % external ointment          Review of Systems   Constitutional: Negative for chills and fever.   HENT: Positive for mouth sores. Negative for sore throat, trouble swallowing and voice change.    Eyes: Negative for pain and redness.   Respiratory: Positive for chest tightness and shortness of breath. Negative for cough.    Cardiovascular: Positive for chest pain. Negative for leg swelling.   Gastrointestinal: Negative for abdominal pain, diarrhea, nausea and vomiting.   Genitourinary: Negative for difficulty urinating.   Musculoskeletal: Negative for myalgias.   Skin: Positive for rash.   Neurological: Positive for light-headedness (with standing).   Psychiatric/Behavioral: The patient is not nervous/anxious.        Physical Exam   BP: (!) 140/77  Pulse: 74  Weight: 102.5 kg (226 lb)  SpO2: 100 %    Physical Exam  GEN:  Awake, alert, and cooperative. Appears distressed.   HENT: MMM. External ears and nose normal bilaterally. Whitish colored lesions on buccal mucosa. No tongue edema. Sublingual area is supple. No edema in posterior oropharynx.   EYES: EOM intact. Conjunctiva clear. No discharge.   NECK: Symmetric, freely mobile. No tender.   CV : Regular rate and rhythm. No murmurs appreciated.   PULM: Normal effort. No wheezes, rales, or rhonchi bilaterally. Speaking in full sentences. No prolongation of expiratory phase.   ABD: Soft, non-tender, non-distended. No rebound or guarding.   NEURO: Normal speech. Following commands. CN II-XII grossly intact. Answering questions and interacting appropriately.   EXT: No gross deformity. Warm and well perfused.  INT: diffuse puritic papular rash sparing palms and soles.        ED Course     ED Course as of Jun 02 1349   Wed Jun 02, 2021   1011 I reviewed the case with Dr. Barrientos from dermatology at the [facility].  Papular rash could be secondary to Plaquenil and will discontinue Plaquenil at this time.  We will add on LFTs to evaluate for DRESS.  Will treat symptoms with triamcinolone ointment versus prednisone based on LFT results.  Patient will have follow-up in clinic in 2 days.        Procedures             EKG Interpretation:      Interpreted by John Poole MD  Time reviewed: 10:01 AM   Symptoms at time of EKG: chest tightness   Rhythm: normal sinus   Rate: normal  Axis: normal  Ectopy: none  Conduction: normal  ST Segments/ T Waves: No ST-T wave changes  Q Waves: none  Comparison to prior: TWI in III new compared to 5/10/21    Clinical Impression: nonspecific EKG        Critical Care time:  none               Results for orders placed or performed during the hospital encounter of 06/02/21 (from the past 24 hour(s))   CBC with platelets differential   Result Value Ref Range    WBC 5.6 4.0 - 11.0 10e9/L    RBC Count 4.52 3.8 - 5.2 10e12/L    Hemoglobin 13.3 11.7  - 15.7 g/dL    Hematocrit 40.2 35.0 - 47.0 %    MCV 89 78 - 100 fl    MCH 29.4 26.5 - 33.0 pg    MCHC 33.1 31.5 - 36.5 g/dL    RDW 12.4 10.0 - 15.0 %    Platelet Count 248 150 - 450 10e9/L    Diff Method Automated Method     % Neutrophils 65.0 %    % Lymphocytes 22.0 %    % Monocytes 8.4 %    % Eosinophils 3.4 %    % Basophils 0.7 %    % Immature Granulocytes 0.5 %    Nucleated RBCs 0 0 /100    Absolute Neutrophil 3.6 1.6 - 8.3 10e9/L    Absolute Lymphocytes 1.2 0.8 - 5.3 10e9/L    Absolute Monocytes 0.5 0.0 - 1.3 10e9/L    Absolute Eosinophils 0.2 0.0 - 0.7 10e9/L    Absolute Basophils 0.0 0.0 - 0.2 10e9/L    Abs Immature Granulocytes 0.0 0 - 0.4 10e9/L    Absolute Nucleated RBC 0.0    Basic metabolic panel   Result Value Ref Range    Sodium 135 133 - 144 mmol/L    Potassium 4.1 3.4 - 5.3 mmol/L    Chloride 102 94 - 109 mmol/L    Carbon Dioxide 27 20 - 32 mmol/L    Anion Gap 6 3 - 14 mmol/L    Glucose 97 70 - 99 mg/dL    Urea Nitrogen 9 7 - 30 mg/dL    Creatinine 0.81 0.52 - 1.04 mg/dL    GFR Estimate 78 >60 mL/min/[1.73_m2]    GFR Estimate If Black >90 >60 mL/min/[1.73_m2]    Calcium 9.7 8.5 - 10.1 mg/dL   Troponin I   Result Value Ref Range    Troponin I ES <0.015 0.000 - 0.045 ug/L   Hepatic panel   Result Value Ref Range    Bilirubin Direct <0.1 0.0 - 0.2 mg/dL    Bilirubin Total 0.5 0.2 - 1.3 mg/dL    Albumin 3.9 3.4 - 5.0 g/dL    Protein Total 7.0 6.8 - 8.8 g/dL    Alkaline Phosphatase 97 40 - 150 U/L    ALT 31 0 - 50 U/L    AST 24 0 - 45 U/L   XR Chest 2 Views    Narrative    CHEST TWO VIEWS  6/2/2021 10:14 AM     HISTORY: Chest tightness. No fever, cough, or wheezing.    COMPARISON: None.       Impression    IMPRESSION: Cardiac silhouette is within normal limits. Elevation of  the right hemidiaphragm is unchanged. No evidence for infiltrate,  effusion, or pneumothorax. No significant bony abnormalities. Surgical  clips are seen in the left upper quadrant.   Troponin I   Result Value Ref Range    Troponin I  ES <0.015 0.000 - 0.045 ug/L       Medications - No data to display    Assessments & Plan (with Medical Decision Making)   61 year old female with past medical history of asthma, eczema, environmental allergies, GAYE, anxiety, currently being treated for orogenital with hydroxychloroquine (1 month) with 4 days of diffuse papular rash sparing palms and soles and chest tightness which began this morning.  On exam she appears slightly drowsy and distressed but nontoxic.    Rash is diffuse and papular.  Not urticaria.  No wheezing, swelling of oropharynx, tightness in throat, change in voice or speech, abdominal cramping, nausea, vomiting, or diarrhea.  Her presentation is not consistent with an acute allergic reaction.  She may be having a hypersensitivity to hydroxychloroquine which can manifest as a maculopapular rash.  Case discussed with dermatology and plan for topical steroid treatment 1% triamcinolone and in person follow-up in clinic in 2 days.  Will discontinue triamcinolone as this is likely precipitant of the papular rash.  CBC, BMP, and liver panel within normal limits.    Her chest tightness began this morning and not associated with nausea, vomiting, or diaphoresis but does report feeling slightly short of air.  No known history of hypertension, hyperlipidemia, coronary artery disease, and she is a former smoker.  ECG without any evidence of acute ischemia cardiac troponin below level of detection x2.  Heart score of 2 which does support outpatient management.  Overall very low suspicion for ACS.  We did discuss limitations of ED evaluation and cannot rule ACS out without a percent certainty in the emergency department.  Chest x-ray obtained and no evidence of acute infiltrate, effusion, or pneumothorax.    I have reviewed the nursing notes.         HEART Score  Background  Calculates the overall risk of adverse event in patient's presenting with chest pain.  Based on 5 criteria (each assigned 0-2 points)  including suspiciousness of history, EKG, age, risk factors and troponin.    Data  61 year old female  has Acquired hypothyroidism; Allergic state; Chronic rhinitis; Allergic rhinitis due to pollen; Sinusitis, chronic; History of skin cancer; ERIC (generalized anxiety disorder); Leukoplakia of oral mucosa, including tongue; GERD (gastroesophageal reflux disease); Chronic low back pain; Dry eye syndrome; Chronic fatigue; Osteopenia; History of melanoma in situ; Eczema; Moderate persistent asthma without complication; DDD (degenerative disc disease), lumbar; Hyperlipidemia LDL goal <160; Chest tightness or pressure; Muscle pain; GAYE (obstructive sleep apnea); and History of kidney cancer on their problem list.   reports that she quit smoking about 25 years ago. She has a 30.00 pack-year smoking history. She has never used smokeless tobacco.  family history includes Allergies in her sister and son; Breast Cancer in her paternal grandmother; Cancer in her maternal grandmother and paternal grandmother; Cardiovascular in her mother and paternal grandfather; Depression in her mother, sister, and sister; Diabetes in her mother; Eczema in her maternal grandfather, maternal grandmother, and son; Hypertension in her brother, father, paternal grandfather, and sister; Lipids in her father, mother, and sister; Macular Degeneration in her father; Neurologic Disorder in her sister; Obesity in her father; Respiratory in her son; Sleep Apnea in her father and son; Thyroid Disease in her sister and sister.  Lab Results   Component Value Date    TROPI <0.015 06/02/2021     Criteria   0-2 points for each of 5 items (maximum of 10 points):  Score 0- History slightly suspicious for coronary syndrome  Score 1- EKG with Non-specific repolarization disturbance  Score 1- Age 45 to 65 years old  Score 1- One to 2 risk factors for atherosclerotic disease  Score 0- Within normal limits for troponin levels  Interpretation  Risk of adverse  outcome  Heart Score: 2  Total Score 0-3- Adverse Outcome Risk 2.5% - Supports early discharge with appropriate follow-up        New Prescriptions    TRIAMCINOLONE (KENALOG) 0.1 % EXTERNAL OINTMENT    Apply topically 2 times daily       Final diagnoses:   Rash - likely hypersensitivity to hydroxychloriquine   Chest tightness     John Poole MD    6/2/2021   [facility] EMERGENCY DEPT    Disclaimer: This note consists of words and symbols derived from keyboarding and dictation using voice recognition software.  As a result, there may be errors that have gone undetected.  Please consider this when interpreting information found in this note.             John Poole MD  06/02/21 [id number]

## 2021-06-04 ENCOUNTER — OFFICE VISIT (OUTPATIENT)
Dept: DERMATOLOGY | Facility: CLINIC | Age: 61
End: 2021-06-04
Payer: OTHER GOVERNMENT

## 2021-06-04 DIAGNOSIS — L43.8 EROSIVE ORAL LICHEN PLANUS: Primary | ICD-10-CM

## 2021-06-04 DIAGNOSIS — L98.9 SKIN EROSION: ICD-10-CM

## 2021-06-04 PROCEDURE — 88346 IMFLUOR 1ST 1ANTB STAIN PX: CPT | Performed by: DERMATOLOGY

## 2021-06-04 PROCEDURE — 88305 TISSUE EXAM BY PATHOLOGIST: CPT | Performed by: DERMATOLOGY

## 2021-06-04 PROCEDURE — 88350 IMFLUOR EA ADDL 1ANTB STN PX: CPT | Performed by: DERMATOLOGY

## 2021-06-04 PROCEDURE — 11102 TANGNTL BX SKIN SINGLE LES: CPT | Mod: GC | Performed by: DERMATOLOGY

## 2021-06-04 PROCEDURE — 99214 OFFICE O/P EST MOD 30 MIN: CPT | Mod: 25 | Performed by: DERMATOLOGY

## 2021-06-04 RX ORDER — MUPIROCIN 20 MG/G
OINTMENT TOPICAL
Qty: 30 G | Refills: 0 | Status: SHIPPED | OUTPATIENT
Start: 2021-06-04 | End: 2022-03-31

## 2021-06-04 RX ORDER — PREDNISONE 10 MG/1
TABLET ORAL
Qty: 30 TABLET | Refills: 0 | Status: SHIPPED | OUTPATIENT
Start: 2021-06-04 | End: 2021-07-19

## 2021-06-04 ASSESSMENT — PAIN SCALES - GENERAL: PAINLEVEL: SEVERE PAIN (6)

## 2021-06-04 NOTE — NURSING NOTE
Dermatology Rooming Note    Noel Fernandez's goals for this visit include:   Chief Complaint   Patient presents with     Derm Problem     noel is coming in today for a hospital follow up, states that things have been about the same      Natalymalik Jara CMA on 6/4/2021 at 12:11 PM

## 2021-06-04 NOTE — LETTER
6/4/2021       RE: Teresa Fernandez  [address] [facility] [id number]     Dear Colleague,    Thank you for referring your patient, Teresa Fernandez, to the [facility] [place] [facility]. Please see a copy of my visit note below.    [facility] Dermatology Note  Encounter Date: Jun 4, 2021  Office Visit     Dermatology Problem List:  1. Melanoma in situ, left chest  - s/p excision 7/27/2010 at [facility]  2.  Chronic burning rash of the upper extremities with biopsy showing a largely normal-appearing epidermis above mild perivascular lymphocytic inflammation without fungal elements or increased basement membrane noted on September 6, 2019  - Differential diagnosis includes (photo)allergic contact dermatitis, connective tissue disease (DM > NADIA as the former may lack significant epidermal changes)  - negative/normal: Aldolase, CK, PFTs, polymyositis and dermatomyositis panel, urinalysis, SSA, and SSB  - DIEGO borderline positive 1:80,  (nml )  - Did have drug eruption to Plaquenil 6/2021  3. Subcutaneous nodule, left forearm - consistent with lipoma  4. Orogenital ulcers: suspect lichen planus   - dexamethasone S&S  - augmented betamethasone  - bx pending 6/4/21 with DIF  - prior bx 2011 ulcer, left cheek  - prior: plaquenil (stopped due to rash)    ____________________________________________    Assessment & Plan:   # Orogenital ulcers: predominantly on buccal and vaginal mucosa.  Based on exam, favor erosive lichen planus however differential includes pemphigus and mucous membrane pemphigoid; complex aphthosis and oral lichenoid reaction disfavored given genital involvement. Has been present for 8 years per patient with recent worsening, previously biopsied and showed ulcer per chart review. Will repeat biopsy today given prior bx inconclusive and recent worsening. She also had recent  drug eruption likely due to Plaquenil (see below) and so as she may need escalation of systemic therapy, would like to be more definitive regarding diagnosis. In meantime, continue topicals pending results.  - continue dexamethasone S&S for mouth  - continue augmented betamethasone ointment BID to vaginal mucosae/vulva  - biopsy for H&E and DIF performed today (see note)  - stop plaquenil (see below)  - future: consider methotrexate or MMF pending results  - handout on oral care provided     # Drug eruption.  Recently started Plaquenil about 20 days prior to onset of full body rash. Was seen in the ED on 6/2, at which time there was no signs of systemic involvement or serious drug eruption (afebrile, CBC, LFTs wnl).  Given timing, we suspect that Plaquenil was the culprit. We were called by the ED and we advised to stop Plaquenil and start TMC v prednisone. She started TMC and has had some improvement. Considered biopsy but deferred given improvement noted. Additionally, discussed that given extent of involvement and severity of symptoms, we could consider prednisone but would prefer to avoid if able. We will send a prescription to pharmacy so she has on hand but she will  only if needed. Lastly, we also note a history of a different eruption that was previously bx in 9/2019 and was very subtle and non-specific on bx but DM was considered. As ~1/3 of patients with DM can have drug eruption with Plaquenil, this recent eruption suspected 2/2 Plaquenil could support that idea (although certainly others without DM also get drug eruptions to Plaquenil). It doesn't seem that she has other symptoms of DM currently and most recent rash was not the same as rash bx in 9/2019 but could consider if pt develops ongoing issues or other symptoms.  - Encourage continued use of triamcinolone ointment and Benadryl prn at night for symptomatic improvement  - Provided short prednisone taper (40 mg x3, 30 mg x3, 20 mg x3, 10 mg  x3), patient to only  if needed    # Erosion from trauma. Right lower leg, present x2 weeks. Some surrounding swelling, no concern for cellulitis on exam but may have superinfection. Will start topical antibiotics as below and advised elevation. Patient to report changes.  - Start mupirocin daily for 2 weeks  - Patient to go to ED for fever/chills, increase tenderness or spreading erythema  - Elevate legs  - Avoid prednisone above if able.      Procedures Performed:   - Shave biopsy procedure note, location(s): right buccal mucosa x1 (one biopsy piece split for H&E and DIF). After discussion of benefits and risks including but not limited to bleeding, infection, scar, incomplete removal, recurrence, and non-diagnostic biopsy, written consent and photographs were obtained. The area was cleaned with isopropyl alcohol. 0.5mL of 1% lidocaine with epinephrine was injected to obtain adequate anesthesia of lesion(s). Shave biopsy at site(s) performed. Hemostasis was achieved with aluminium chloride. Petrolatum ointment and a sterile dressing were applied. The patient tolerated the procedure and no complications were noted. The patient was provided with verbal and written post care instructions.       Follow-up: 1-2 weeks with Dr. Alcaraz, sooner if concerns. Will cancel appt for Monday and push back until we anticipate bx results may be available. I am happy to see in-person at any time.    Staff and Resident:     Ne Franks PGY3  Dermatology Resident  pager      Staff Physician Comments:   I saw and evaluated the patient with the resident and I agree with the assessment and plan.  I was present for the key portions of the above major procedure and examination.    Val Ellis MD    Department of Dermatology  [facility]: Phone: [phone], Fax: [phone]  6/10/2021      ____________________________________________    CC: Derm Problem (noel is coming in today for a hospital follow up, states that things have been about the same )    HPI:  Ms. Noel Fernandez is a(n) 61 year old female who presents today for follow-up  with history as above.     New rash that appeared <7 days ago, whole body. Unclear where is started. Noticed arms first. Unclear if getting new spots. Stable to slightly improving. Very pruritic. Tried using betamethasone ointment once or twice without relief. Started plaquenil 30 days ago. Stopped taking plaquenil on Tuesday, after advice of ED physician.     Ulcers, blisters and pain in mouth and vaginal area now flaring. Using betamethasone ointment in vulvar region and dexamethasone in mouth.     New in nose    Denies ocular, pharyngeal, dysuria or pain with defecation.    Patient is otherwise feeling well, without additional skin concerns.    Labs Reviewed:  N/A    Physical Exam:  Vitals: There were no vitals taken for this visit.  SKIN: Focused examination of face, mouth, back, bilateral UE, abdomen, back, legs and genitals was performed.  - Most prominent on bilateral ams and legs, also on trunk there are scattered dull red papules with some overlying scale  - superficial erosions right and left buccal mucosa.   - left inner wall of labia minora with superficial erosion. Ill defined white plaques on labia minora   - No other lesions of concern on areas examined.     Medications:  Current Outpatient Medications   Medication     acetaminophen (TYLENOL) 325 MG tablet     albuterol (PROAIR HFA/PROVENTIL HFA/VENTOLIN HFA) 108 (90 Base) MCG/ACT inhaler     albuterol (PROAIR HFA/PROVENTIL HFA/VENTOLIN HFA) 108 (90 BASE) MCG/ACT Inhaler     albuterol (PROVENTIL) (2.5 MG/3ML) 0.083% neb solution     APNO CREA ointment     augmented betamethasone dipropionate (DIPROLENE-AF) 0.05 % external ointment     celecoxib (CELEBREX) 100 MG capsule     Cholecalciferol (VITAMIN  D3 PO)     Cyanocobalamin (VITAMIN B 12 PO)     dexamethasone (DECADRON) 0.5 MG/5ML elixir     DULoxetine (CYMBALTA) 60 MG capsule     fexofenadine (ALLEGRA) 180 MG tablet     fluticasone-salmeterol (ADVAIR DISKUS) 250-50 MCG/DOSE inhaler     GLUCOSAMINE SULFATE PO     hydroxychloroquine (PLAQUENIL) 200 MG tablet     levothyroxine (SYNTHROID/LEVOTHROID) 100 MCG tablet     levothyroxine (SYNTHROID/LEVOTHROID) 88 MCG tablet     magic mouthwash (ENTER INGREDIENTS IN COMMENTS) suspension     Multiple Vitamins-Minerals (MULTIVITAMIN ADULT PO)     omeprazole (PRILOSEC) 20 MG DR capsule     order for DME     OVER-THE-COUNTER     triamcinolone (ARISTOCORT HP) 0.5 % external cream     triamcinolone (KENALOG) 0.1 % external ointment     triamcinolone (KENALOG) 0.1 % external ointment     No current facility-administered medications for this visit.       Past Medical History:   Patient Active Problem List   Diagnosis     Acquired hypothyroidism     Allergic state     Chronic rhinitis     Allergic rhinitis due to pollen     Sinusitis, chronic     History of skin cancer     ERIC (generalized anxiety disorder)     Leukoplakia of oral mucosa, including tongue     GERD (gastroesophageal reflux disease)     Chronic low back pain     Dry eye syndrome     Chronic fatigue     Osteopenia     History of melanoma in situ     Eczema     Moderate persistent asthma without complication     DDD (degenerative disc disease), lumbar     Hyperlipidemia LDL goal <160     Chest tightness or pressure     Muscle pain     GAYE (obstructive sleep apnea)     History of kidney cancer     Past Medical History:   Diagnosis Date     ALLERGIC RHINITIS NOS 4/12/2005     Anxiety      Arthritis     herniated disc     Bronchitis, not specified as acute or chronic      CHR MAXILLARY SINUSITIS 4/12/2005     Depressive disorder, not elsewhere classified      Eczema 10/17/2012     Environmental and seasonal allergies      GERD (gastroesophageal reflux disease)       Gluten intolerance      Malignant neoplasm of renal pelvis (H) 1995    Renal cell carcinoma     Melanoma (H) 06/2010     Other specified acquired hypothyroidism 4/12/2005     Renal cancer (H) 5/5/2011     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 on close of this encounter.

## 2021-06-04 NOTE — PROGRESS NOTES
[facility] Dermatology Note  Encounter Date: Jun 4, 2021  Office Visit     Dermatology Problem List:  1. Melanoma in situ, left chest  - s/p excision 7/27/2010 at [facility]  2.  Chronic burning rash of the upper extremities with biopsy showing a largely normal-appearing epidermis above mild perivascular lymphocytic inflammation without fungal elements or increased basement membrane noted on September 6, 2019  - Differential diagnosis includes (photo)allergic contact dermatitis, connective tissue disease (DM > NADIA as the former may lack significant epidermal changes)  - negative/normal: Aldolase, CK, PFTs, polymyositis and dermatomyositis panel, urinalysis, SSA, and SSB  - DIEGO borderline positive 1:80,  (nml )  - Did have drug eruption to Plaquenil 6/2021  3. Subcutaneous nodule, left forearm - consistent with lipoma  4. Orogenital ulcers: suspect lichen planus   - dexamethasone S&S  - augmented betamethasone  - bx pending 6/4/21 with DIF  - prior bx 2011 ulcer, left cheek  - prior: plaquenil (stopped due to rash)    ____________________________________________    Assessment & Plan:   # Orogenital ulcers: predominantly on buccal and vaginal mucosa.  Based on exam, favor erosive lichen planus however differential includes pemphigus and mucous membrane pemphigoid; complex aphthosis and oral lichenoid reaction disfavored given genital involvement. Has been present for 8 years per patient with recent worsening, previously biopsied and showed ulcer per chart review. Will repeat biopsy today given prior bx inconclusive and recent worsening. She also had recent drug eruption likely due to Plaquenil (see below) and so as she may need escalation of systemic therapy, would like to be more definitive regarding diagnosis. In meantime, continue topicals pending results.  - continue dexamethasone S&S for mouth  - continue augmented betamethasone ointment BID to vaginal mucosae/vulva  -  biopsy for H&E and DIF performed today (see note)  - stop plaquenil (see below)  - future: consider methotrexate or MMF pending results  - handout on oral care provided     # Drug eruption.  Recently started Plaquenil about 20 days prior to onset of full body rash. Was seen in the ED on 6/2, at which time there was no signs of systemic involvement or serious drug eruption (afebrile, CBC, LFTs wnl).  Given timing, we suspect that Plaquenil was the culprit. We were called by the ED and we advised to stop Plaquenil and start TMC v prednisone. She started TMC and has had some improvement. Considered biopsy but deferred given improvement noted. Additionally, discussed that given extent of involvement and severity of symptoms, we could consider prednisone but would prefer to avoid if able. We will send a prescription to pharmacy so she has on hand but she will  only if needed. Lastly, we also note a history of a different eruption that was previously bx in 9/2019 and was very subtle and non-specific on bx but DM was considered. As ~1/3 of patients with DM can have drug eruption with Plaquenil, this recent eruption suspected 2/2 Plaquenil could support that idea (although certainly others without DM also get drug eruptions to Plaquenil). It doesn't seem that she has other symptoms of DM currently and most recent rash was not the same as rash bx in 9/2019 but could consider if pt develops ongoing issues or other symptoms.  - Encourage continued use of triamcinolone ointment and Benadryl prn at night for symptomatic improvement  - Provided short prednisone taper (40 mg x3, 30 mg x3, 20 mg x3, 10 mg x3), patient to only  if needed    # Erosion from trauma. Right lower leg, present x2 weeks. Some surrounding swelling, no concern for cellulitis on exam but may have superinfection. Will start topical antibiotics as below and advised elevation. Patient to report changes.  - Start mupirocin daily for 2 weeks  -  Patient to go to ED for fever/chills, increase tenderness or spreading erythema  - Elevate legs  - Avoid prednisone above if able.      Procedures Performed:   - Shave biopsy procedure note, location(s): right buccal mucosa x1 (one biopsy piece split for H&E and DIF). After discussion of benefits and risks including but not limited to bleeding, infection, scar, incomplete removal, recurrence, and non-diagnostic biopsy, written consent and photographs were obtained. The area was cleaned with isopropyl alcohol. 0.5mL of 1% lidocaine with epinephrine was injected to obtain adequate anesthesia of lesion(s). Shave biopsy at site(s) performed. Hemostasis was achieved with aluminium chloride. Petrolatum ointment and a sterile dressing were applied. The patient tolerated the procedure and no complications were noted. The patient was provided with verbal and written post care instructions.       Follow-up: 1-2 weeks with Dr. Alcaraz, sooner if concerns. Will cancel appt for Monday and push back until we anticipate bx results may be available. I am happy to see in-person at any time.    Staff and Resident:     Ne Franks PGY3  Dermatology Resident  pager      Staff Physician Comments:   I saw and evaluated the patient with the resident and I agree with the assessment and plan.  I was present for the key portions of the above major procedure and examination.    Val Ellis MD    Department of Dermatology  [facility]: Phone: [phone], Fax: [phone]  6/10/2021     ____________________________________________    CC: Derm Problem (noel is coming in today for a hospital follow up, states that things have been about the same )    HPI:  Ms. Noel Fernandez is a(n) 61 year old female who presents today for follow-up  with history as above.     New rash that appeared <7 days ago, whole body. Unclear where is  started. Noticed arms first. Unclear if getting new spots. Stable to slightly improving. Very pruritic. Tried using betamethasone ointment once or twice without relief. Started plaquenil 30 days ago. Stopped taking plaquenil on Tuesday, after advice of ED physician.     Ulcers, blisters and pain in mouth and vaginal area now flaring. Using betamethasone ointment in vulvar region and dexamethasone in mouth.     New in nose    Denies ocular, pharyngeal, dysuria or pain with defecation.    Patient is otherwise feeling well, without additional skin concerns.    Labs Reviewed:  N/A    Physical Exam:  Vitals: There were no vitals taken for this visit.  SKIN: Focused examination of face, mouth, back, bilateral UE, abdomen, back, legs and genitals was performed.  - Most prominent on bilateral ams and legs, also on trunk there are scattered dull red papules with some overlying scale  - superficial erosions right and left buccal mucosa.   - left inner wall of labia minora with superficial erosion. Ill defined white plaques on labia minora   - No other lesions of concern on areas examined.     Medications:  Current Outpatient Medications   Medication     acetaminophen (TYLENOL) 325 MG tablet     albuterol (PROAIR HFA/PROVENTIL HFA/VENTOLIN HFA) 108 (90 Base) MCG/ACT inhaler     albuterol (PROAIR HFA/PROVENTIL HFA/VENTOLIN HFA) 108 (90 BASE) MCG/ACT Inhaler     albuterol (PROVENTIL) (2.5 MG/3ML) 0.083% neb solution     APNO CREA ointment     augmented betamethasone dipropionate (DIPROLENE-AF) 0.05 % external ointment     celecoxib (CELEBREX) 100 MG capsule     Cholecalciferol (VITAMIN D3 PO)     Cyanocobalamin (VITAMIN B 12 PO)     dexamethasone (DECADRON) 0.5 MG/5ML elixir     DULoxetine (CYMBALTA) 60 MG capsule     fexofenadine (ALLEGRA) 180 MG tablet     fluticasone-salmeterol (ADVAIR DISKUS) 250-50 MCG/DOSE inhaler     GLUCOSAMINE SULFATE PO     hydroxychloroquine (PLAQUENIL) 200 MG tablet     levothyroxine  (SYNTHROID/LEVOTHROID) 100 MCG tablet     levothyroxine (SYNTHROID/LEVOTHROID) 88 MCG tablet     magic mouthwash (ENTER INGREDIENTS IN COMMENTS) suspension     Multiple Vitamins-Minerals (MULTIVITAMIN ADULT PO)     omeprazole (PRILOSEC) 20 MG DR capsule     order for DME     OVER-THE-COUNTER     triamcinolone (ARISTOCORT HP) 0.5 % external cream     triamcinolone (KENALOG) 0.1 % external ointment     triamcinolone (KENALOG) 0.1 % external ointment     No current facility-administered medications for this visit.       Past Medical History:   Patient Active Problem List   Diagnosis     Acquired hypothyroidism     Allergic state     Chronic rhinitis     Allergic rhinitis due to pollen     Sinusitis, chronic     History of skin cancer     ERIC (generalized anxiety disorder)     Leukoplakia of oral mucosa, including tongue     GERD (gastroesophageal reflux disease)     Chronic low back pain     Dry eye syndrome     Chronic fatigue     Osteopenia     History of melanoma in situ     Eczema     Moderate persistent asthma without complication     DDD (degenerative disc disease), lumbar     Hyperlipidemia LDL goal <160     Chest tightness or pressure     Muscle pain     GAYE (obstructive sleep apnea)     History of kidney cancer     Past Medical History:   Diagnosis Date     ALLERGIC RHINITIS NOS 4/12/2005     Anxiety      Arthritis     herniated disc     Bronchitis, not specified as acute or chronic      CHR MAXILLARY SINUSITIS 4/12/2005     Depressive disorder, not elsewhere classified      Eczema 10/17/2012     Environmental and seasonal allergies      GERD (gastroesophageal reflux disease)      Gluten intolerance      Malignant neoplasm of renal pelvis (H) 1995    Renal cell carcinoma     Melanoma (H) 06/2010     Other specified acquired hypothyroidism 4/12/2005     Renal cancer (H) 5/5/2011     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 on close of this encounter.

## 2021-06-04 NOTE — PATIENT INSTRUCTIONS
"Oral Health Care    You have been diagnosed with one of several diseases that can affect the mucous membranes inside your mouth. Many times, these conditions produce sensitive, painful gums or oral discomfort. In most cases, meticulous oral hygiene is recommended to assist in the management of these problems. This handout is designed to provide pertinent information regarding non-prescription products that can be used to maintain or improve the condition of your mouth. The products listed only represent a sample of what is available without a prescription and is not intended to represent a comprehensive list of over-the-counter oral health care products. If you have any questions, please contact your dentist, dental hygienist, or pharmacist.     Toothbrushes    Toothbrushes are available in a variety of shapes, sizes and degrees of stiffness in bristles. Even regular \"soft\" toothbrushes may be too \"hard\" to be used effectively for your specific condition and can damage affected gum tissue. Several options are available. In cases where there is significant gum sensitivity, running the head of the toothbrush under warm running water is often helpful to further soften the bristles. One of the best toothbrushes available is manufactured by [employer] and can be ordered by your pharmacist. Other manufacturers provide alternative toothbrushes for sensitive oral tissues. Oral-B provides their AdvantageTM brush in a \"sensitive\" model with extra-soft bristles. The Crest CompleteTM is also available with an \"extra-soft\" bristle option. The TechniqueTM toothbrush by Sil[employer]re with \"sensitive\" bristles is indicted for use with oral tissues that are irritated. If these are not available, you can try to find a \"baby\" toothbrush that will have a small head and very soft bristles. Lastly, you can have your dentist order a specific tooth brush.    Toothpastes    Regular toothpastes are often very irritating to inflamed or ulcerated oral " "tissues. The \"foaming\" agent in most toothpastes, sodium lauryl sulfate or SLS, is often responsible. There are several toothpastes that are SLS-free or have substantially reduced levels of SLS. Other toothpastes for \"tartar control\" contain a pyrophosphate that may be irritating to normal healthy oral tissue in some patients. Toothpastes with these ingredients should be avoided in individuals with conditions like yours. You should carefully review the ingredient on the back of the box or tube of the toothpaste prior to use. BioteneTM toothpaste is SLS-free and also contains protective enzymes that are not normally found in saliva. It is manufactured by [employer] and can be ordered by your pharmacist. RembrandtTM Toothpaste for Canker Sore Sufferers is also SLS-free and is available in most pharmacies. Finally, Don[employer]'s of [place]TM toothpastes have minimal levels of SLS and are available in health food stores and many grocery stores.      How to brush your teeth    When the gums are inflamed and irritated, it is especially important that you brush your teeth correctly to remove the daily accumulation of bacteria-laden dental plaque. The \"Bass technique\" is widely accepted as an effective method to remove plaque from the teeth and surrounding gums. Using light pressure and holding the bristles at a 45-degree angle to the teeth, gently slide the tips of the bristles just slightly under the gums. You should begin on the cheek side of the upper back teeth and either vibrate the bristles in a gentle back-and-forth motion or move them in very small circles. In either case, do not remove the tips of the bristles from under the gum-line. As you go around the mouth to the other side and get to the last tooth, you should continue onto the palate side. Make sure to reposition the brush to the next group of 2-3 teeth, taking care to overlap placement. Once you have finished with the upper teeth, you should brush the lower teeth in " "exactly the same way.     Electric toothbrushes come in a variety of models. While they all do a good job of removing bacteria and food debris from the teeth and gums, none of them are superior to regular brushing and flossing. Patients who have blistering diseases that affect their gums may find that electric toothbrushes are too rough on their gums and cause them to become eroded.     Dental Floss    Dental floss is used to remove bacteria and food debris from between the teeth and under the gum line. There are several different variations of dental floss, including dental \"tape\", waxed/coated floss and flavored floss. Which one to get is a matter of personal preference; however, most patients find that a waxed/coated dental floss is easier to get between the teeth. There are also certain types of flosses that may be more \"gentle\" to the gum tissue. Oral-B UltraflossTM has spongy nylon fibers that can help to remove more plaque. Rivers's Expanding flossTM with texturized fibers is considered ideal for patients with gingival sensitivity. GlideTM floss is coated with a Sebago-gumaro-like material and is noted for providing increased patient comfort.     Flossing is not a complicated task. Most individuals simply wrap the end of the floss around their middle or ring fingers, stretch the floss between their index fingers and thumb, and gently \"see-saw\" the floss between the teeth until the floss is below the contact point. Once the floss is below the contact, you should wrap the floss around the side of the tooth in a \"C-shape\" and use an up-and-down motion, making sure to access below the gum-line. Do not simply pull the floss and \"snap\" it through the contact point, as it will cut the gums.     Mouth rinses    Many people use mouth rinses to \"freshen\" their breath; however, mouth rinses will not remove the bacteria that cause halitosis or bad breath. They only temporarily cover up the odor and are considered cosmetic " mouth rinses. Patients who practice good oral hygiene usually do not need to use a mouth rinse, but may want to do so as a matter of personal preference.     The choice of mouth rinse depends on several factors. Many popular, over-the-counter mouth rinses contain alcohol ranging from 15-30%. While there is no scientific evidence that alcohol in mouthwashes has any significant negative health effects on humans, the presence of alcohol may cause a stinging sensation, especially if the gum tissue is ulcerated. Alcohol containing rinses also produce dryness of the oral mucosal tissues. This condition, known as xerostomia, may contribute to the already existing problem. Most manufacturers have, or plan to develop, alcohol-free mouthwashes. BioteneTM mouthwash by [employer]nga contains protective enzymes normally found in saliva and is also alcohol-free. Bausch and LombTM is another  that provides an alcohol-free mouthrinse. PeroxylTM mouthrinse, manufactured by [employer], is a hydrogen peroxide based product, that is often helpful in soothing a variety of mouth sores and irritations.     More Information    The World Wide Web provides an expansive source of information regarding oral health care products. The American Dental Association has an excellent web site at http://www.ada.org/. Those products that have received the ADA Seal of Acceptance can be found at http://www.ada.org/public/topics/seal.html. This site also lists contact information for the companies whose products have received the ADA Seal of Acceptance. The vast majority of these products and their manufacturers have their own web sites, as well.       Wound Care After a Biopsy    What is a skin biopsy?  A skin biopsy allows the doctor to examine a very small piece of tissue under the microscope to determine the diagnosis and the best treatment for the skin condition. A local anesthetic (numbing medicine)  is injected with a very small needle into  the skin area to be tested. A small piece of skin is taken from the area. Sometimes a suture (stitch) is used.     What are the risks of a skin biopsy?  I will experience scar, bleeding, swelling, pain, crusting and redness. I may experience incomplete removal or recurrence. Risks of this procedure are excessive bleeding, bruising, infection, nerve damage, numbness, thick (hypertrophic or keloidal) scar and non-diagnostic biopsy.    How should I care for my wound for the first 24 hours?    Keep the wound dry and covered for 24 hours    If it bleeds, hold direct pressure on the area for 15 minutes. If bleeding does not stop then go to the emergency room    Avoid strenuous exercise the first 1-2 days or as your doctor instructs you    How should I care for the wound after 24 hours?    After 24 hours, remove the bandage    You may bathe or shower as normal    If you had a scalp biopsy, you can shampoo as usual and can use shower water to clean the biopsy site daily    Clean the wound twice a day with gentle soap and water    Do not scrub, be gentle    Apply white petroleum/Vaseline after cleaning the wound with a cotton swab or a clean finger, and keep the site covered with a Bandaid /bandage. Bandages are not necessary with a scalp biopsy    If you are unable to cover the site with a Bandaid /bandage, re-apply ointment 2-3 times a day to keep the site moist. Moisture will help with healing    Avoid strenuous activity for first 1-2 days    Avoid lakes, rivers, pools, and oceans until the stitches are removed or the site is healed    How do I clean my wound?    Wash hands thoroughly with soap or use hand  before all wound care    Clean the wound with gentle soap and water    Apply white petroleum/Vaseline  to wound after it is clean    Replace the Bandaid /bandage to keep the wound covered for the first few days or as instructed by your doctor    If you had a scalp biopsy, warm shower water to the area on a  daily basis should suffice    What should I use to clean my wound?     Cotton-tipped applicators (Qtips )    White petroleum jelly (Vaseline ). Use a clean new container and use Q-tips to apply.    Bandaids   as needed    Gentle soap     How should I care for my wound long term?    Do not get your wound dirty    Keep up with wound care for one week or until the area is healed.    A small scab will form and fall off by itself when the area is completely healed. The area will be red and will become pink in color as it heals. Sun protection is very important for how your scar will turn out. Sunscreen with an SPF 30 or greater is recommended once the area is healed.    If you have stitches, stitches need to be removed in 14 days. You may return to our clinic for this or you may have it done locally at your doctor s office.    You should have some soreness but it should be mild and slowly go away over several days. Talk to your doctor about using tylenol for pain,    When should I call my doctor?  If you have increased:     Pain or swelling    Pus or drainage (clear or slightly yellow drainage is ok)    Temperature over 100F    Spreading redness or warmth around wound    When will I hear about my results?  The biopsy results can take 2-3 weeks to come back. The clinic will call you with the results, send you a Stratiot message, or have you schedule a follow-up clinic or phone time to discuss the results. Contact our clinics if you do not hear from us in 3 weeks.     Who should I call with questions?    [facility]: [phone]     [facility]: [phone]    For urgent needs outside of business hours call the [facility] at [phone] and ask for the dermatology resident on call

## 2021-06-16 LAB — COPATH REPORT: NORMAL

## 2021-06-18 ENCOUNTER — HOSPITAL ENCOUNTER (OUTPATIENT)
Dept: PHYSICAL THERAPY | Facility: CLINIC | Age: 61
Setting detail: THERAPIES SERIES
End: 2021-06-18
Attending: NURSE PRACTITIONER
Payer: OTHER GOVERNMENT

## 2021-06-18 DIAGNOSIS — R26.9 GAIT DISORDER: ICD-10-CM

## 2021-06-18 PROCEDURE — 97161 PT EVAL LOW COMPLEX 20 MIN: CPT | Mod: GP | Performed by: PHYSICAL THERAPIST

## 2021-06-18 PROCEDURE — 97112 NEUROMUSCULAR REEDUCATION: CPT | Mod: GP | Performed by: PHYSICAL THERAPIST

## 2021-06-18 NOTE — PROGRESS NOTES
21 1100   Quick Adds   Type of Visit Initial OP PT Evaluation   General Information   Start of Care Date 21   Referring Physician Kenneth Naik   Orders Evaluate and Treat as Indicated   Order Date 21   Medical Diagnosis gait disorder   Onset of illness/injury or Date of Surgery 16   Precautions/Limitations fall precautions   Surgical/Medical history reviewed Yes   Pertinent history of current problem (include personal factors and/or comorbidities that impact the POC) Had been weeding in her garden and went to get up and tripped on the stairs. Has random dizzy spells that come on where she feels light headed. Upon exertion, she notices the dizziness more often. Also notices it more in the heat as well. No symptoms when first getting up in the morning. Works in the [employer] all day without any issues. Just comes on random upon exertion outside.    Pertinent Visual History  no changes   Patient role/Employment history Employed  (works inside in [employer] )   Living environment House/townhome   Patient/Family Goals Statement be able to weed the garden, exert herself without any issues.    Fall Risk Screen   Fall screen completed by PT   Have you fallen 2 or more times in the past year? Yes   Have you fallen and had an injury in the past year? No   Timed Up and Go score (seconds) 10   Is patient a fall risk? Yes   Abuse Screen (yes response referral indicated)   Feels Unsafe at Home or Work/School no   Feels Threatened by Someone no   Does Anyone Try to Keep You From Having Contact with Others or Doing Things Outside Your Home? no   Physical Signs of Abuse Present no   Vitals Signs   Blood Pressure 129/77   Vital Signs Comments Orthostatics: supine: 129/77, sittin/81   standin/77   Range of Motion (ROM)   ROM Comment AROM WNL B    Strength   Strength Comments hip flexion right 4/5, left 4+/5, knee extension Right 4+/5, L 5/5, knee flexion right 4+/5 left 5/5,    Gait Special Tests    Gait Special Tests FUNCTIONAL GAIT ASSESSMENT   Gait Special Tests Functional Gait Assessment Score out of 30   Score out of 30 27   Balance Special Tests   Balance Special Tests Single leg stance right;Single leg stance left;Sandhu balance;Timed up and go;Modified CTSIB Conditions   Balance Special Tests Timed Up and Go   Seconds 10 Seconds   Balance Special Tests Sandhu Balance   Score out of 56 56   Balance Special Tests Single Leg Stance Right,   Right, seconds 10 Seconds   Balance Special Tests Single Leg Stance Left   Left, seconds 10 Seconds   Balance Special Tests Modified CTSIB Conditions   Condition 1, seconds 30 Seconds   Condition 2, seconds 30 Seconds   Condition 4, seconds 30 Seconds   Condition 5, seconds 10 Seconds   Sensory Examination   Sensory Perception Comments in tact to light touch B   Planned Therapy Interventions   Planned Therapy Interventions balance training;neuromuscular re-education   Clinical Impression   Criteria for Skilled Therapeutic Interventions Met yes, treatment indicated   PT Diagnosis decreased right LE strength and balance   Influenced by the following impairments decreased right LE strength and balance   Functional limitations due to impairments walking outside   Clinical Presentation Stable/Uncomplicated   Clinical Presentation Rationale clinical decision making and chart reivew    Clinical Decision Making (Complexity) Low complexity   Therapy Frequency other (see comments)  (1 time visit, instruction to f/u with cardio)   Predicted Duration of Therapy Intervention (days/wks) 4 weeks   Risk & Benefits of therapy have been explained Yes   Patient, Family & other staff in agreement with plan of care Yes   Clinical Impression Comments Teresa Fernandez is a 61 year old female who presented today for physical therapy with complaints of dizziness upon exertion and in hot environments. Dizziness comes on randomly with no warning in these situations. Upon balance testing today, Teresa  scored a perfect score on the GUDINO and well over the cut off score for the FGA. Her right leg is slightly weaker with more balance difficulties than the left but nothing that is alarming at this time. Exercises were provided to work on some more challenging balance drills at home. AT this time, I am recommending that Teresa may benefit from a cardio follow up in regards to discuss BP and her heart. The symptoms she is describing sound more vascular in nature.    Education Assessment   Preferred Learning Style Listening   Barriers to Learning No barriers   GOALS   PT Eval Goals 1   Goal 1   Goal Identifier 1   Goal Description Patient will be independent with HEP in order to continue working on balance outside of PT.    Target Date 07/16/21   Date Met 06/18/21   Total Evaluation Time   PT Reggie, Low Complexity Minutes (54372) 31       Genesis Patel  PT, DPT       6/18/2021   [address]   [address] [place] [id number]  [email or web address]  Voicemail: [phone]

## 2021-06-18 NOTE — Clinical Note
Teresa Fernandez is a 61 year old female who presented today for physical therapy with complaints of dizziness upon exertion and in hot environments. Dizziness comes on randomly with no warning in these situations. Upon balance testing today, Teresa scored a perfect score on the GUDINO and well over the cut off score for the FGA. Her right leg is slightly weaker with more balance difficulties than the left but nothing that is alarming at this time. Exercises were provided to work on some more challenging balance drills at home. AT this time, I am recommending that Teresa may benefit from a cardio follow up due to the nature of her symptoms she was describing today. The symptoms she is describing sound more vascular in nature. I let Teresa know that this would be the doctor's decision for a referral to cardio and didn't know if you needed to see her in clinic again before this. Teresa said she would wait to hear a phone call from the clinic in regards to what to do next. Please let me know if you have any questions.   Genesis

## 2021-06-20 ENCOUNTER — TELEPHONE (OUTPATIENT)
Dept: FAMILY MEDICINE | Facility: CLINIC | Age: 61
End: 2021-06-20

## 2021-06-20 DIAGNOSIS — R42 DIZZINESS: ICD-10-CM

## 2021-06-20 DIAGNOSIS — R07.89 TIGHTNESS IN CHEST: Primary | ICD-10-CM

## 2021-06-21 ENCOUNTER — VIRTUAL VISIT (OUTPATIENT)
Dept: DERMATOLOGY | Facility: CLINIC | Age: 61
End: 2021-06-21
Payer: OTHER GOVERNMENT

## 2021-06-21 ENCOUNTER — TELEPHONE (OUTPATIENT)
Dept: DERMATOLOGY | Facility: CLINIC | Age: 61
End: 2021-06-21

## 2021-06-21 DIAGNOSIS — L43.8 EROSIVE ORAL LICHEN PLANUS: Primary | ICD-10-CM

## 2021-06-21 PROCEDURE — 99214 OFFICE O/P EST MOD 30 MIN: CPT | Mod: GQ | Performed by: DERMATOLOGY

## 2021-06-21 RX ORDER — TACROLIMUS 1 MG/1
CAPSULE ORAL
Qty: 5 CAPSULE | Refills: 3 | Status: SHIPPED | OUTPATIENT
Start: 2021-06-21 | End: 2022-02-14

## 2021-06-21 ASSESSMENT — PAIN SCALES - GENERAL: PAINLEVEL: NO PAIN (0)

## 2021-06-21 NOTE — LETTER
6/21/2021       RE: Teresa Fernandez  [address] [address] [address] [address]     Dear Colleague,    Thank you for referring your patient, Teresa Fernandez, to the [facility]MAT[facility] [place] at [facility]. Please see a copy of my visit note below.    [facility] Dermatology Note  Encounter Date: Jun 21, 2021  Store-and-Forward and Telephone ([phone]). Location of teledermatologist: [facility] [place].  Start time: 2:00. End time: 2:19.    Dermatology Problem List:  1. Melanoma in situ, left chest  - s/p excision 7/27/2010 at [facility]  2.  Chronic burning rash of the upper extremities with biopsy showing a largely normal-appearing epidermis above mild perivascular lymphocytic inflammation without fungal elements or increased basement membrane noted on September 6, 2019  - Differential diagnosis includes (photo)allergic contact dermatitis, connective tissue disease (DM > NADIA as the former may lack significant epidermal changes)  - negative/normal: Aldolase, CK, PFTs, polymyositis and dermatomyositis panel, urinalysis, SSA, and SSB  - DIEGO borderline positive 1:80,  (nml )  - Did have drug eruption to hydroxychloroquine 6/2021  3. Subcutaneous nodule, left forearm - consistent with lipoma  4. Orogenital ulcers: suspect lichen planus   - tacrolimus 1 mg dissolved in 250 mL water S&S TID-QID  - augmented betamethasone ointment  - bx  6/4/21 nonspecific with negative DIF  - prior bx 2011 ulcer, left cheek  - prior: hydroxychloroquine (stopped due to rash), dexamethasone S&S       ____________________________________________    Assessment & Plan:     1. Orogenital ulcers: favor lichen planus given negative DIF but would also consider oral lichenoid reaction to amalgam; this would not entirely explain genital involvement but this is essentially resolved and continue oral  involvement appears to be in close proximity to dental work. Will refer to Dr. Jurado for this assessment in case patch testing may be useful. In the interim, will switch from dexamethasone to tacrolimus S&S (more affordable in general than cyclosporine S&S) and continue augmented betamethasone ointment PRN to ulcers in genital area as this has been effective. Given her overall improvement, will not uptitrate systemic therapy at this time.  - referral to patch testing to assess contribution of amalgam to oral symptoms  - tacrolimus 1 mg in 250 mL water; take 5 mL by mouth S&S TID-QID  - augmented betamethasone ointment to genital ulcers BID PRN    Procedures Performed:    None    Follow-up: 8 weeks    Staff:     Harrison Alcaraz MD   of Dermatology  Department of Dermatology  [employer] [facility] [employer]    ____________________________________________    CC: Derm Problem (Teresa, is being seen for ulcers in her mouth )    HPI:  Ms. Teresa Fernandez is a(n) 61 year old female who presents today as a return patient for orogenital ulcers    Mouth ulcers - doing better - no active blisters  - when eat spicy foods, will cause burning and ulcers - not as bad as prior   - genital ulcers - has been using augmented betamethasone which has been helpful  - used dexamethasone S&S sometimes - seems to help but not as consistent    Patient is otherwise feeling well, without additional skin concerns.    Labs Reviewed:  6/2021 biopsy: H&E with acanthosis but no clear inflammatory infiltrate; DIF negative    Physical Exam:  Vitals: There were no vitals taken for this visit.  SKIN: Teledermatology photos were reviewed; image quality and interpretability: acceptable. Image date: 6/21/21.  - superficial erosions along the posterior buccal and palatine mucosae; metal dental work in close proximity  - No other lesions of concern on areas examined.     Medications:  Current Outpatient Medications    Medication     acetaminophen (TYLENOL) 325 MG tablet     albuterol (PROAIR HFA/PROVENTIL HFA/VENTOLIN HFA) 108 (90 Base) MCG/ACT inhaler     albuterol (PROAIR HFA/PROVENTIL HFA/VENTOLIN HFA) 108 (90 BASE) MCG/ACT Inhaler     albuterol (PROVENTIL) (2.5 MG/3ML) 0.083% neb solution     APNO CREA ointment     augmented betamethasone dipropionate (DIPROLENE-AF) 0.05 % external ointment     celecoxib (CELEBREX) 100 MG capsule     Cholecalciferol (VITAMIN D3 PO)     Cyanocobalamin (VITAMIN B 12 PO)     dexamethasone (DECADRON) 0.5 MG/5ML elixir     DULoxetine (CYMBALTA) 60 MG capsule     fexofenadine (ALLEGRA) 180 MG tablet     fluticasone-salmeterol (ADVAIR DISKUS) 250-50 MCG/DOSE inhaler     GLUCOSAMINE SULFATE PO     hydroxychloroquine (PLAQUENIL) 200 MG tablet     levothyroxine (SYNTHROID/LEVOTHROID) 100 MCG tablet     levothyroxine (SYNTHROID/LEVOTHROID) 88 MCG tablet     magic mouthwash (ENTER INGREDIENTS IN COMMENTS) suspension     Multiple Vitamins-Minerals (MULTIVITAMIN ADULT PO)     mupirocin (BACTROBAN) 2 % external ointment     omeprazole (PRILOSEC) 20 MG DR capsule     order for DME     OVER-THE-COUNTER     predniSONE (DELTASONE) 10 MG tablet     triamcinolone (ARISTOCORT HP) 0.5 % external cream     triamcinolone (KENALOG) 0.1 % external ointment     triamcinolone (KENALOG) 0.1 % external ointment     No current facility-administered medications for this visit.       Past Medical/Surgical History:   Patient Active Problem List   Diagnosis     Acquired hypothyroidism     Allergic state     Chronic rhinitis     Allergic rhinitis due to pollen     Sinusitis, chronic     History of skin cancer     ERIC (generalized anxiety disorder)     Leukoplakia of oral mucosa, including tongue     GERD (gastroesophageal reflux disease)     Chronic low back pain     Dry eye syndrome     Chronic fatigue     Osteopenia     History of melanoma in situ     Eczema     Moderate persistent asthma without complication     DDD  (degenerative disc disease), lumbar     Hyperlipidemia LDL goal <160     Chest tightness or pressure     Muscle pain     GAYE (obstructive sleep apnea)     History of kidney cancer     Past Medical History:   Diagnosis Date     ALLERGIC RHINITIS NOS 4/12/2005     Anxiety      Arthritis     herniated disc     Bronchitis, not specified as acute or chronic      CHR MAXILLARY SINUSITIS 4/12/2005     Depressive disorder, not elsewhere classified      Eczema 10/17/2012     Environmental and seasonal allergies      GERD (gastroesophageal reflux disease)      Gluten intolerance      Malignant neoplasm of renal pelvis (H) 1995    Renal cell carcinoma     Melanoma (H) 06/2010     Other specified acquired hypothyroidism 4/12/2005     Renal cancer (H) 5/5/2011     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 on close of this encounter.

## 2021-06-21 NOTE — NURSING NOTE
Chief Complaint   Patient presents with     Derm Problem     Teresa, is being seen for ulcers in her mouth, she states she thinks she has improved a little bit, she wants to go over biospsy results. no other concerns     Ramirez Damon EMT

## 2021-06-21 NOTE — TELEPHONE ENCOUNTER
[facility] Call Center    Phone Message    May a detailed message be left on voicemail: no     Reason for Call: Medication Question or concern regarding medication   Prescription Clarification  Name of Medication:   tacrolimus (GENERIC EQUIVALENT) 1 MG capsule     Prescribing Provider:   Dr Alcaraz     Pharmacy:   [facility] [address]      What on the order needs clarification?   Pharmacy wants to know if once pill is dissolved in wather, is solution stable for 24 hours?  Or does Pt need to use a new pill?    Please call Pharmacy to clarify.    Action Taken: Message routed to:  [facility] Derm    Travel Screening: Not Applicable

## 2021-06-21 NOTE — PROGRESS NOTES
[facility] Dermatology Note  Encounter Date: Jun 21, 2021  Store-and-Forward and Telephone ([phone]). Location of teledermatologist: [facility]MATOLOG[facility] [place].  Start time: 2:00. End time: 2:19.    Dermatology Problem List:  1. Melanoma in situ, left chest  - s/p excision 7/27/2010 at [facility]  2.  Chronic burning rash of the upper extremities with biopsy showing a largely normal-appearing epidermis above mild perivascular lymphocytic inflammation without fungal elements or increased basement membrane noted on September 6, 2019  - Differential diagnosis includes (photo)allergic contact dermatitis, connective tissue disease (DM > NADIA as the former may lack significant epidermal changes)  - negative/normal: Aldolase, CK, PFTs, polymyositis and dermatomyositis panel, urinalysis, SSA, and SSB  - DIEGO borderline positive 1:80,  (nml )  - Did have drug eruption to hydroxychloroquine 6/2021  3. Subcutaneous nodule, left forearm - consistent with lipoma  4. Orogenital ulcers: suspect lichen planus   - tacrolimus 1 mg dissolved in 250 mL water S&S TID-QID  - augmented betamethasone ointment  - bx  6/4/21 nonspecific with negative DIF  - prior bx 2011 ulcer, left cheek  - prior: hydroxychloroquine (stopped due to rash), dexamethasone S&S       ____________________________________________    Assessment & Plan:     1. Orogenital ulcers: favor lichen planus given negative DIF but would also consider oral lichenoid reaction to amalgam; this would not entirely explain genital involvement but this is essentially resolved and continue oral involvement appears to be in close proximity to dental work. Will refer to Dr. Jurado for this assessment in case patch testing may be useful. In the interim, will switch from dexamethasone to tacrolimus S&S (more affordable in general than cyclosporine S&S) and continue augmented betamethasone ointment PRN to ulcers in  genital area as this has been effective. Given her overall improvement, will not uptitrate systemic therapy at this time.  - referral to patch testing to assess contribution of amalgam to oral symptoms  - tacrolimus 1 mg in 250 mL water; take 5 mL by mouth S&S TID-QID  - augmented betamethasone ointment to genital ulcers BID PRN    Procedures Performed:    None    Follow-up: 8 weeks    Staff:     Harrison Alcaraz MD   of Dermatology  Department of Dermatology  [facility] [employer]    ____________________________________________    CC: Derm Problem (Teresa, is being seen for ulcers in her mouth )    HPI:  Ms. Teresa Fernandez is a(n) 61 year old female who presents today as a return patient for orogenital ulcers    Mouth ulcers - doing better - no active blisters  - when eat spicy foods, will cause burning and ulcers - not as bad as prior   - genital ulcers - has been using augmented betamethasone which has been helpful  - used dexamethasone S&S sometimes - seems to help but not as consistent    Patient is otherwise feeling well, without additional skin concerns.    Labs Reviewed:  6/2021 biopsy: H&E with acanthosis but no clear inflammatory infiltrate; DIF negative    Physical Exam:  Vitals: There were no vitals taken for this visit.  SKIN: Teledermatology photos were reviewed; image quality and interpretability: acceptable. Image date: 6/21/21.  - superficial erosions along the posterior buccal and palatine mucosae; metal dental work in close proximity  - No other lesions of concern on areas examined.     Medications:  Current Outpatient Medications   Medication     acetaminophen (TYLENOL) 325 MG tablet     albuterol (PROAIR HFA/PROVENTIL HFA/VENTOLIN HFA) 108 (90 Base) MCG/ACT inhaler     albuterol (PROAIR HFA/PROVENTIL HFA/VENTOLIN HFA) 108 (90 BASE) MCG/ACT Inhaler     albuterol (PROVENTIL) (2.5 MG/3ML) 0.083% neb solution     APNO CREA ointment     augmented betamethasone  dipropionate (DIPROLENE-AF) 0.05 % external ointment     celecoxib (CELEBREX) 100 MG capsule     Cholecalciferol (VITAMIN D3 PO)     Cyanocobalamin (VITAMIN B 12 PO)     dexamethasone (DECADRON) 0.5 MG/5ML elixir     DULoxetine (CYMBALTA) 60 MG capsule     fexofenadine (ALLEGRA) 180 MG tablet     fluticasone-salmeterol (ADVAIR DISKUS) 250-50 MCG/DOSE inhaler     GLUCOSAMINE SULFATE PO     hydroxychloroquine (PLAQUENIL) 200 MG tablet     levothyroxine (SYNTHROID/LEVOTHROID) 100 MCG tablet     levothyroxine (SYNTHROID/LEVOTHROID) 88 MCG tablet     magic mouthwash (ENTER INGREDIENTS IN COMMENTS) suspension     Multiple Vitamins-Minerals (MULTIVITAMIN ADULT PO)     mupirocin (BACTROBAN) 2 % external ointment     omeprazole (PRILOSEC) 20 MG DR capsule     order for DME     OVER-THE-COUNTER     predniSONE (DELTASONE) 10 MG tablet     triamcinolone (ARISTOCORT HP) 0.5 % external cream     triamcinolone (KENALOG) 0.1 % external ointment     triamcinolone (KENALOG) 0.1 % external ointment     No current facility-administered medications for this visit.       Past Medical/Surgical History:   Patient Active Problem List   Diagnosis     Acquired hypothyroidism     Allergic state     Chronic rhinitis     Allergic rhinitis due to pollen     Sinusitis, chronic     History of skin cancer     ERIC (generalized anxiety disorder)     Leukoplakia of oral mucosa, including tongue     GERD (gastroesophageal reflux disease)     Chronic low back pain     Dry eye syndrome     Chronic fatigue     Osteopenia     History of melanoma in situ     Eczema     Moderate persistent asthma without complication     DDD (degenerative disc disease), lumbar     Hyperlipidemia LDL goal <160     Chest tightness or pressure     Muscle pain     GAYE (obstructive sleep apnea)     History of kidney cancer     Past Medical History:   Diagnosis Date     ALLERGIC RHINITIS NOS 4/12/2005     Anxiety      Arthritis     herniated disc     Bronchitis, not specified as  acute or chronic      CHR MAXILLARY SINUSITIS 4/12/2005     Depressive disorder, not elsewhere classified      Eczema 10/17/2012     Environmental and seasonal allergies      GERD (gastroesophageal reflux disease)      Gluten intolerance      Malignant neoplasm of renal pelvis (H) 1995    Renal cell carcinoma     Melanoma (H) 06/2010     Other specified acquired hypothyroidism 4/12/2005     Renal cancer (H) 5/5/2011     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 on close of this encounter.

## 2021-06-21 NOTE — CONFIDENTIAL NOTE
I put a order for a stress echo in and a cards referral. Can you please have her call and schedule the test. Patient is to contact Cardiac Services  at [phone], to schedule this appointment.    HERIBERTO Miller [facility]

## 2021-06-21 NOTE — PATIENT INSTRUCTIONS
Tacrolimus swish and spit:  Open up 1 capsule (1 mg) and dissolve contents in 250 mL of water  Take 5 mL of this solution and swish for 5-10 minutes 3-4 times per day, then spit out

## 2021-06-22 NOTE — TELEPHONE ENCOUNTER
Dr Alcaraz,    I spoke to the [facility] pharmacy and they make a tacrolimus 0.01% AND 0.001% oral rinse and I think that would be a better option than the patient having to mix this at home and I have questions about stability of mixing it at home. Would you be willing to send a new prescription with updated directions to the [facility] pharmacy? Thanks!    Rhiannon Ratliff, [facility] [place]  [phone]

## 2021-06-23 NOTE — TELEPHONE ENCOUNTER
This was routed back to me but I am not sure what you want to do.  Thanks!    Rhiannon Ratliff, [facility]  [phone]

## 2021-06-25 NOTE — TELEPHONE ENCOUNTER
Health Call Center     Phone Message     May a detailed message be left on voicemail: no      Reason for Call: Medication Question or concern regarding medication   Prescription Clarification  Name of Medication:   tacrolimus (GENERIC EQUIVALENT) 1 MG capsule      Prescribing Provider:   Dr Alcaraz                 Pharmacy:   [facility] [place], [place] - [address]                  What on the order needs clarification?     Pharmacy calling back and needs to know if once pill is dissolved in wather, is solution stable for 24 hours?  Or does Pt need to use a new pill each time?     Please call Pharmacy to clarify.     Action Taken: Message routed to:  [facility] Derm     Travel Screening: Not Applicable     No reply at clinic backline at time of call.    Pharmacy's message was routed back to her with no reply or action. Can someone please address the question so Rx can be dispensed?    Thanks!

## 2021-06-28 ENCOUNTER — MYC MEDICAL ADVICE (OUTPATIENT)
Dept: DERMATOLOGY | Facility: CLINIC | Age: 61
End: 2021-06-28

## 2021-06-28 DIAGNOSIS — L43.8 ORAL LICHEN PLANUS: Primary | ICD-10-CM

## 2021-07-12 ENCOUNTER — MYC MEDICAL ADVICE (OUTPATIENT)
Dept: FAMILY MEDICINE | Facility: CLINIC | Age: 61
End: 2021-07-12

## 2021-07-12 ENCOUNTER — HOSPITAL ENCOUNTER (OUTPATIENT)
Dept: NUCLEAR MEDICINE | Facility: CLINIC | Age: 61
Setting detail: NUCLEAR MEDICINE
End: 2021-07-12
Attending: NURSE PRACTITIONER
Payer: OTHER GOVERNMENT

## 2021-07-12 ENCOUNTER — HOSPITAL ENCOUNTER (OUTPATIENT)
Dept: CARDIOLOGY | Facility: CLINIC | Age: 61
End: 2021-07-12
Attending: NURSE PRACTITIONER
Payer: OTHER GOVERNMENT

## 2021-07-12 VITALS — HEIGHT: 67 IN | BODY MASS INDEX: 35.47 KG/M2 | WEIGHT: 226 LBS

## 2021-07-12 DIAGNOSIS — R42 DIZZINESS: ICD-10-CM

## 2021-07-12 DIAGNOSIS — R07.89 TIGHTNESS IN CHEST: ICD-10-CM

## 2021-07-12 LAB
CV BLOOD PRESSURE: 54 MMHG
CV STRESS MAX HR HE: 148
NUC STRESS EJECTION FRACTION: 61 %
RATE PRESSURE PRODUCT: NORMAL
STRESS ECHO BASELINE DIASTOLIC HE: 82
STRESS ECHO BASELINE HR: 72
STRESS ECHO BASELINE SYSTOLIC BP: 138
STRESS ECHO CALCULATED PERCENT HR: 93 %
STRESS ECHO LAST STRESS DIASTOLIC BP: 70
STRESS ECHO LAST STRESS SYSTOLIC BP: 162
STRESS ECHO POST ESTIMATED WORKLOAD: 7.4 METS
STRESS ECHO POST EXERCISE DUR MIN: 7 MIN
STRESS ECHO POST EXERCISE DUR SEC: 26 SEC
STRESS ECHO TARGET HR: 160
STRESS/REST PERFUSION RATIO: 0.97

## 2021-07-12 PROCEDURE — A9502 TC99M TETROFOSMIN: HCPCS | Performed by: NURSE PRACTITIONER

## 2021-07-12 PROCEDURE — 93018 CV STRESS TEST I&R ONLY: CPT | Performed by: INTERNAL MEDICINE

## 2021-07-12 PROCEDURE — 78452 HT MUSCLE IMAGE SPECT MULT: CPT | Mod: 26 | Performed by: INTERNAL MEDICINE

## 2021-07-12 PROCEDURE — 78452 HT MUSCLE IMAGE SPECT MULT: CPT

## 2021-07-12 PROCEDURE — 93017 CV STRESS TEST TRACING ONLY: CPT

## 2021-07-12 PROCEDURE — 93016 CV STRESS TEST SUPVJ ONLY: CPT | Performed by: INTERNAL MEDICINE

## 2021-07-12 PROCEDURE — 343N000001 HC RX 343: Performed by: NURSE PRACTITIONER

## 2021-07-12 RX ADMIN — TETROFOSMIN 10.9 MCI.: 1.38 INJECTION, POWDER, LYOPHILIZED, FOR SOLUTION INTRAVENOUS at 07:35

## 2021-07-12 RX ADMIN — TETROFOSMIN 31.2 MCI.: 1.38 INJECTION, POWDER, LYOPHILIZED, FOR SOLUTION INTRAVENOUS at 10:15

## 2021-07-12 ASSESSMENT — MIFFLIN-ST. JEOR: SCORE: 1627.76

## 2021-07-12 NOTE — LETTER
July 12, 2021      Teresa Fernandez  [address] [address] [address]        To Whom It May Concern:    Teresa JOHNNY Fernandez attended a scheduled appointment today 7/12/2021. Please excuse her from missed work.        Sincerely,          HERIBERTO Linton CNP

## 2021-07-16 NOTE — TELEPHONE ENCOUNTER
FUTURE VISIT INFORMATION      FUTURE VISIT INFORMATION:    Date: 10.12.21    Time: 8:15    Location: [facility]  REFERRAL INFORMATION:    Referring provider:  Dr. Alcaraz    Referring providers clinic:  [facility] Derm    Reason for visit/diagnosis  Consult- referred by Dr. Alcaraz     oral lichenoid reaction from amalgam    RECORDS REQUESTED FROM:       Clinic name Comments Records Status Photos Status   [facility] Derm 6.28.21, 6.21.21 + more with  Dr. Alcaraz    6.4.21  Dr. Val Ellis [facility] ER 6.2.21  Dr. John Poole Epic na

## 2021-07-19 ENCOUNTER — MYC MEDICAL ADVICE (OUTPATIENT)
Dept: FAMILY MEDICINE | Facility: CLINIC | Age: 61
End: 2021-07-19

## 2021-07-19 ENCOUNTER — OFFICE VISIT (OUTPATIENT)
Dept: FAMILY MEDICINE | Facility: CLINIC | Age: 61
End: 2021-07-19
Payer: OTHER GOVERNMENT

## 2021-07-19 VITALS
SYSTOLIC BLOOD PRESSURE: 122 MMHG | RESPIRATION RATE: 16 BRPM | WEIGHT: 228 LBS | OXYGEN SATURATION: 98 % | BODY MASS INDEX: 34.56 KG/M2 | HEART RATE: 76 BPM | DIASTOLIC BLOOD PRESSURE: 60 MMHG | TEMPERATURE: 97.7 F | HEIGHT: 68 IN

## 2021-07-19 DIAGNOSIS — R35.0 INCREASED FREQUENCY OF URINATION: Primary | ICD-10-CM

## 2021-07-19 LAB
ALBUMIN UR-MCNC: NEGATIVE MG/DL
APPEARANCE UR: CLEAR
BILIRUB UR QL STRIP: NEGATIVE
COLOR UR AUTO: YELLOW
GLUCOSE UR STRIP-MCNC: NEGATIVE MG/DL
HGB UR QL STRIP: ABNORMAL
KETONES UR STRIP-MCNC: NEGATIVE MG/DL
LEUKOCYTE ESTERASE UR QL STRIP: NEGATIVE
NITRATE UR QL: NEGATIVE
PH UR STRIP: 5.5 [PH] (ref 5–7)
RBC #/AREA URNS AUTO: NORMAL /HPF
SP GR UR STRIP: <=1.005 (ref 1–1.03)
UROBILINOGEN UR STRIP-ACNC: 0.2 E.U./DL
WBC #/AREA URNS AUTO: NORMAL /HPF

## 2021-07-19 PROCEDURE — 99213 OFFICE O/P EST LOW 20 MIN: CPT | Performed by: NURSE PRACTITIONER

## 2021-07-19 PROCEDURE — 81001 URINALYSIS AUTO W/SCOPE: CPT | Performed by: NURSE PRACTITIONER

## 2021-07-19 RX ORDER — LIDOCAINE HYDROCHLORIDE 20 MG/ML
SOLUTION OROPHARYNGEAL
COMMUNITY
Start: 2021-03-16 | End: 2022-03-31

## 2021-07-19 ASSESSMENT — MIFFLIN-ST. JEOR: SCORE: 1652.7

## 2021-07-19 NOTE — PATIENT INSTRUCTIONS
Limit water before bed and irritating beverages such as coffee.  Urine test looked good.    Our Clinic hours are:  Mondays    7:20 am - 7 pm  Tues -  Fri  7:20 am - 5 pm    Clinic Phone: [phone]    The clinic lab opens at 7:30 am Mon - Fri and appointments are required.    [facility]  Ph. [phone]  Monday  8 am - 7pm  Tues - Fri 8 am - 5:30 pm

## 2021-07-19 NOTE — PROGRESS NOTES
"    Assessment & Plan     Increased frequency of urination    - UA macro with reflex to Microscopic and Culture - Clinc Collect  - Urine Microscopic    Reassurance, UA was ok, she was relieved and will continue to monitor symptoms.         BMI:   Estimated body mass index is 34.67 kg/m  as calculated from the following:    Height as of this encounter: 1.727 m (5' 8\").    Weight as of this encounter: 103.4 kg (228 lb).       See Patient Instructions  Patient Instructions   Limit water before bed and irritating beverages such as coffee.  Urine test looked good.    Our Clinic hours are:  Mondays    7:20 am - 7 pm  Tues -  Fri  7:20 am - 5 pm    Clinic Phone: [phone]    The clinic lab opens at 7:30 am Mon - Fri and appointments are required.    [facility]. [phone]  Monday  8 am - 7pm  Tues - Fri 8 am - 5:30 pm             Return in about 1 week (around 7/26/2021) for or sooner if symptoms persist or worsen.    HERIBERTO Lopez CNP  [facility]    Eric Moore is a 61 year old who presents for the following health issues  accompanied by her self :    HPI     Genitourinary - Female  Onset/Duration: x 3 days   Description:   Painful urination (Dysuria): no           Frequency: YES  Blood in urine (Hematuria): no  Delay in urine (Hesitency): no  Intensity: moderate  Progression of Symptoms:  same  Accompanying Signs & Symptoms:  Fever/chills: no  Flank pain: no  Nausea and vomiting: no  Vaginal symptoms: none  Abdominal/Pelvic Pain: YES  History:   History of frequent UTI s: no  History of kidney stones: no  Sexually Active: no  Possibility of pregnancy: No  Precipitating or alleviating factors: None  Therapies tried and outcome: Increase fluid intake    Has one kidney, history of renal cancer so wanted to be sure it wasn't a UTI.      Review of Systems   See above      Objective    /60   Pulse 76   Temp 97.7  F (36.5  C)   Resp 16   Ht 1.727 " "m (5' 8\")   Wt 103.4 kg (228 lb)   SpO2 98%   BMI 34.67 kg/m    Body mass index is 34.67 kg/m .  Physical Exam   GENERAL: healthy, alert and no distress  NECK: no adenopathy, no asymmetry  RESP: lungs clear to auscultation   CV: regular rate and rhythm, normal S1 S2, no S3 or S4, no murmur  ABDOMEN: soft, nontender, no hepatosplenomegaly, no masses and bowel sounds normal, no CVA tenderness  MS: no gross musculoskeletal defects noted      Results for orders placed or performed in visit on 07/19/21 (from the past 24 hour(s))   UA macro with reflex to Microscopic and Culture - Clinc Collect    Specimen: Urine, Midstream   Result Value Ref Range    Color Urine Yellow Colorless, Straw, Light Yellow, Yellow    Appearance Urine Clear Clear    Glucose Urine Negative Negative mg/dL    Bilirubin Urine Negative Negative    Ketones Urine Negative Negative mg/dL    Specific Gravity Urine <=1.005 1.003 - 1.035    Blood Urine Trace (A) Negative    pH Urine 5.5 5.0 - 7.0    Protein Albumin Urine Negative Negative mg/dL    Urobilinogen Urine 0.2 0.2, 1.0 E.U./dL    Nitrite Urine Negative Negative    Leukocyte Esterase Urine Negative Negative   Urine Microscopic   Result Value Ref Range    RBC Urine 0-2 0-2 /HPF /HPF    WBC Urine 0-5 0-5 /HPF /HPF    Narrative    Urine Culture not indicated               "

## 2021-07-30 ENCOUNTER — HOSPITAL ENCOUNTER (OUTPATIENT)
Dept: CARDIOLOGY | Facility: CLINIC | Age: 61
Discharge: HOME | End: 2021-07-30
Attending: NURSE PRACTITIONER | Admitting: NURSE PRACTITIONER
Payer: OTHER GOVERNMENT

## 2021-07-30 ENCOUNTER — MYC MEDICAL ADVICE (OUTPATIENT)
Dept: FAMILY MEDICINE | Facility: CLINIC | Age: 61
End: 2021-07-30

## 2021-07-30 DIAGNOSIS — R42 DIZZINESS: ICD-10-CM

## 2021-07-30 DIAGNOSIS — R07.89 TIGHTNESS IN CHEST: ICD-10-CM

## 2021-07-30 LAB — LVEF ECHO: NORMAL

## 2021-07-30 PROCEDURE — 93306 TTE W/DOPPLER COMPLETE: CPT | Mod: 26 | Performed by: INTERNAL MEDICINE

## 2021-07-30 PROCEDURE — 93306 TTE W/DOPPLER COMPLETE: CPT

## 2021-07-30 NOTE — RESULT ENCOUNTER NOTE
Mesfin Moore    Your echocardiogram is abnormal. I suspect the sleep apnea may have caused some heart damage. Likely this is contributing to your dizziness. Make sure you are consistently wearing your CPAP. I am putting an Cardiology referral in for consult, they will be calling to schedule. If you don't hear from them- contact Cardiac Services  at [phone], to schedule this appointment.    Please let us know if you have any questions.     Take care,    HERIBERTO Miller CNP

## 2021-08-04 ENCOUNTER — HOSPITAL ENCOUNTER (OUTPATIENT)
Dept: CARDIOLOGY | Facility: CLINIC | Age: 61
Discharge: HOME | End: 2021-08-04
Attending: INTERNAL MEDICINE | Admitting: INTERNAL MEDICINE
Payer: OTHER GOVERNMENT

## 2021-08-04 ENCOUNTER — OFFICE VISIT (OUTPATIENT)
Dept: CARDIOLOGY | Facility: CLINIC | Age: 61
End: 2021-08-04
Attending: NURSE PRACTITIONER
Payer: OTHER GOVERNMENT

## 2021-08-04 VITALS
DIASTOLIC BLOOD PRESSURE: 79 MMHG | HEART RATE: 84 BPM | SYSTOLIC BLOOD PRESSURE: 131 MMHG | WEIGHT: 227.4 LBS | BODY MASS INDEX: 34.58 KG/M2

## 2021-08-04 DIAGNOSIS — R42 DIZZINESS: ICD-10-CM

## 2021-08-04 DIAGNOSIS — R07.89 TIGHTNESS IN CHEST: ICD-10-CM

## 2021-08-04 PROCEDURE — 93242 EXT ECG>48HR<7D RECORDING: CPT

## 2021-08-04 PROCEDURE — 99204 OFFICE O/P NEW MOD 45 MIN: CPT | Performed by: INTERNAL MEDICINE

## 2021-08-04 PROCEDURE — 93244 EXT ECG>48HR<7D REV&INTERPJ: CPT | Performed by: INTERNAL MEDICINE

## 2021-08-04 NOTE — PATIENT INSTRUCTIONS
"Medication Changes:  None today. Continue taking your medications as you have been.    Recommendations:  1. 7 day heart monitor placed today  2. Cardiac MRI to be done at [facility] on Tuesday 8/17/21. Please check in at the 3rd floor Diagnostic Imaging Desk at 12:30pm. If you need to contact [facility] for any reason, please call [phone] option #1    Follow-up:  Follow up based on results     Cardiology Scheduling~[phone]  Cardiology Clinic RN~[phone] (Laine Lilly RN)    Cardiac MRI with and/or without stress  1. What is this test?  MRI (Magnetic Resonance Imaging) uses a strong magnet and radio waves to look inside the body. An MRA (Magnetic Resonance Angiogram) does the same thing, but lets us look at your blood vessels. A computer turns the radio waves into pictures showing cross sections of the body, much like slices of bread. This helps us see any problems more clearly. You may receive fluid (called \"contrast:) before or during your scan. The fluid helps us see the pictures better. We give the fluid through an IV.   2. How do I prepare for my exam? (food and drink instructions)   The day before your exam, drink extra fluids--at least six 8 ounce glasses (unless your doctor wants you to limit your fluids).   Stop all caffeine 12 hours before the test. This includes coffee, tea, soda, chocolate and certain medicines (such as Anacin, Excedrin, and No Doz). Also avoid decaf coffee and tea as these contain small amounts of caffeine.  Do not eat or drink for 3 hours before your exam. You may drink water and take your morning medicines.  3. What should I wear?   The MRI machine uses a strong magnet. Please wear clothes without metals (snaps, zippers). A sweat suit works well, or we may give you a hospital gown. Please remove any   body piercings and hair extension before you arrive. You will remove watches, jewelry, hair pins, wallets, dentures, partial denture plates and hearing aids. You may " wear contact lenses, and you may be able to wear your rings. We have a safe place to keep your personal items, but it is safer to leave them at home.   4. How long does the exam take?   Most tests take 30-60 minutes.   5. What should I bring?  Bring a list of your current medicines to your exam (including vitamins, minerals and over-the-counter medications). If you are a minor (under age 18), you will need to bring a parent or legal guardian with you to the exam.   6. What should I do after the exam?   No restrictions, you may resume normal activities  7. Who should I call with questions?  If you have any questions, please contact your Imaging Department exam site. Directions, parking instructions, and other information are available on our website [email or web address].     Directions to Department:  [facility] is located at [address] [place] [address]. For your convenience,  parking is available, or you may park your vehicle at the parking rap west of [facility] across the street from [facility]. You will cross the skyway to access our clinic on the 3rd floor. Please call the clinic if you have questions regarding directions at [phone].

## 2021-08-04 NOTE — LETTER
8/4/2021    Amanda Meléndez, HERIBERTO CNP  [address]  [facility] [address]    RE: Teresa Fernandez       Dear Colleague,    I had the pleasure of seeing Teresa Fernandez in the [facility].    HPI and Plan:   Today I had the pleasure of seeing Teresa Fernandez at [facility]. She is a pleasant 61 year old patient with a past medical history of obstructive sleep apnea, chronic fatigue, GERD, left nephrectomy for RCC in 1996, anxiety and hypothyroidism who presents to the clinic in consultation for multiple falls over the last 3 years.    The patient reports sustaining multiple ground-level falls over the last few years on and off.  These episodes occur when she is outside in the hot and humid conditions and are in general preceded by sensation of loss of balance and 'feeling weird in the head'.  She has seen a neurologist for these complaints and work-up so far has been unremarkable.  She is also working with physical therapy for gait/balance issues.  She reports some low normal blood pressure but in general does not become dizzy when changing positions.  Notices rapid heartbeat around the time of these episodes.  She describes 2 episodes one that occurred when she turned after weeding in the garden.  The second episode occurred when she went to a car fair.  She walking with her family when she turned around and suddenly passed out.  Also reports tremors and b/l arm numbness. She did not sustain any major injury.  She reports keeping herself very well-hydrated.    Denies history of premature coronary artery disease in family members but reports that her mother had heart failure.  As part of the work-up she had a transthoracic echocardiogram which was read as showing wall motion abnormalities and apical/anterior/anteroseptum.  Ejection fraction was 45-50%.  She also had a nuclear stress test which did not show ischemia or infarct.   Ejection fraction on stress test was 54%.    Assessment and plan:   1.  Multiple episodes of syncope-possibly vasovagal  2.  Mild cardiomyopathy echocardiogram with ejection fraction of 45% and WMA in anterior/apical/anteroseptum  3.  S/p left nephrectomy in 1996  4.  Hyperlipidemia- mg/dL  5.  Lumbar decompression surgery around 2015    The etiology of patient's fall is noted entirely clear to me.  It is possible that she is having vasovagal syncope which can be precipitated by hot and humid conditions and there may also be some orthostatic component to it.  I believe that because she does report having low normal blood pressure and also at one episode when she was bent over picking weeds in the garden and passed out after standing up.  Work-up so far has been unremarkable.  However, I would do a 7-day Zio patch to rule out arrhythmias.  I told her that the yield is probably going to be low because her symptoms are so infrequent.    As far as wall motion abnormalities and mildly reduced ejection fraction is concerned I will perform a cardiac MRI to further ascertain this.  There seems to be a little bit of discrepancy between the nuclear stress [54%] and echocardiogram findings [45%].  She does not report any symptoms/history suggestive of myopericarditis and does not report any history suggestive of stress-induced cardiomyopathy.  If the MRI shows significant scarring in a particular coronary distribution I would consider performing a coronary angiogram.  She does have risk factors for coronary artery disease in the form of elevated LDL but does not report any symptoms suggestive of angina.  We also discussed medications for cardiomyopathy but I told her that given that she has dizziness and syncope and I believe it may partly be related to orthostatic hypotension I would hold off on starting her on heart failure medications.  If the MRI shows low ejection fraction we may consider doing so at a later date.  Guilherme make a follow up based on the results of the test.     Thank you for allowing me to participate in the care of Teresa Fernandez    This note was completed in part using Dragon voice recognition software. Although reviewed after completion, some word and grammatical errors may occur.    Andrew Betancourt MD  Cardiology    Orders Placed This Encounter   Procedures     MRI Cardiac w/contrast     Leadless EKG Monitor 3 to 7 Days       No orders of the defined types were placed in this encounter.      There are no discontinued medications.    Encounter Diagnoses   Name Primary?     Chest tightness or pressure      Dizziness        CURRENT MEDICATIONS:  Current Outpatient Medications   Medication Sig Dispense Refill     acetaminophen (TYLENOL) 325 MG tablet Take 325-650 mg by mouth every 6 hours as needed for mild pain       albuterol (PROAIR HFA/PROVENTIL HFA/VENTOLIN HFA) 108 (90 BASE) MCG/ACT Inhaler Inhale 2 puffs into the lungs every 6 hours as needed for shortness of breath / dyspnea 1 Inhaler 5     albuterol (PROVENTIL) (2.5 MG/3ML) 0.083% neb solution Take 1 vial (2.5 mg) by nebulization every 6 hours as needed for shortness of breath / dyspnea or wheezing 25 vial 0     APNO CREA ointment Apply to rash on nose twice daily as needed. 100 g 3     augmented betamethasone dipropionate (DIPROLENE-AF) 0.05 % external ointment Apply topically 2 times daily 50 g 3     Cholecalciferol (VITAMIN D3 PO) Take 2,000 Units by mouth 2 times daily        Cyanocobalamin (VITAMIN B 12 PO) Take 500 mcg by mouth daily       dexamethasone (DECADRON) 0.5 MG/5ML elixir Take 5 mLs (0.5 mg) by mouth 3 times daily 474 mL 3     DULoxetine (CYMBALTA) 60 MG capsule TAKE 1 CAPSULE EVERY MORNING 90 capsule 1     fexofenadine (ALLEGRA) 180 MG tablet Take 1 tablet (180 mg) by mouth daily 30 tablet 1     fluticasone-salmeterol (ADVAIR DISKUS) 250-50 MCG/DOSE inhaler USE 1 INHALATION TWICE A DAY (FOLLOW UP FOR FURTHER REFILLS) 1 each 5      levothyroxine (SYNTHROID/LEVOTHROID) 100 MCG tablet TAKE ONE TABLET BY MOUTH ONCE DAILY ON MONDAY, WEDNESDAY, FRIDAY, SATURDAY AND SUNDAY. 90 tablet 2     levothyroxine (SYNTHROID/LEVOTHROID) 88 MCG tablet Take 1 tab by mouth Tuesdays and Thursdays 45 tablet 3     lidocaine (XYLOCAINE) 2 % solution        magic mouthwash (ENTER INGREDIENTS IN COMMENTS) suspension Take 10 mLs by mouth every 4 hours as needed (mouth pain) 280 mL 3     mupirocin (BACTROBAN) 2 % external ointment Use 2 times a day to affected area. 30 g 0     omeprazole (PRILOSEC) 20 MG DR capsule Take 20 mg by mouth daily       order for DME Equipment being ordered: Nebulizer 1 Units 0     OVER-THE-COUNTER Place 1 drop into both eyes 3 times daily. Systane Ultra, Systane Balance, Blink Tears or Refresh Optive Artificial Tear 1 Bottle      tacrolimus (GENERIC EQUIVALENT) 1 MG capsule Dissolve contents of 1 capsule into 250 mL water. Take 5 mL by mouth swish and spit TID-QID. 5 capsule 3     triamcinolone (ARISTOCORT HP) 0.5 % external cream Apply to vaginal area twice daily as needed for itching 15 g 3     triamcinolone (KENALOG) 0.1 % external ointment Use 1-2 times daily as needed for burning rash 453.6 g 3       ALLERGIES     Allergies   Allergen Reactions     Omnipaque [Iohexol] Swelling and Difficulty breathing     Immediate throat swelling following around 1-2cc of omnipaque 300 for spine procedure     Gluten      Iodine      Welts from CT contrast, surgical scrub is ok.     Pcn [Penicillins] Hives       PAST MEDICAL HISTORY:  Past Medical History:   Diagnosis Date     ALLERGIC RHINITIS NOS 4/12/2005     Anxiety      Arthritis     herniated disc     Bronchitis, not specified as acute or chronic      CHR MAXILLARY SINUSITIS 4/12/2005     Depressive disorder, not elsewhere classified      Eczema 10/17/2012     Environmental and seasonal allergies      GERD (gastroesophageal reflux disease)      Gluten intolerance      Malignant neoplasm of renal  pelvis (H) 1995    Renal cell carcinoma     Melanoma (H) 06/2010     Other specified acquired hypothyroidism 4/12/2005     Renal cancer (H) 5/5/2011     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     FUSION LUMBAR ANTERIOR TWO LEVELS  4/13/2015     GYN SURGERY  04/08/1999    hysterectomy.  LAV[facility]     HYSTERECTOMY, PAP NO LONGER INDICATED       SOFT TISSUE SURGERY      chest-melonoma     SURGICAL HISTORY OF -   3/1996    Left nephrectomy-renal cell CA       FAMILY HISTORY:  Family History   Problem Relation Age of Onset     Diabetes Mother      Cardiovascular Mother      Lipids Mother      Depression Mother      Hypertension Father      Lipids Father      Obesity Father      Macular Degeneration Father      Sleep Apnea Father      Cancer Maternal Grandmother         kind unknown had sores on legs     Eczema Maternal Grandmother      Eczema Maternal Grandfather      Breast Cancer Paternal Grandmother      Cancer Paternal Grandmother         breast     Hypertension Paternal Grandfather      Cardiovascular Paternal Grandfather         heart attack     Hypertension Brother      Thyroid Disease Sister      Hypertension Sister      Depression Sister      Allergies Sister      Allergies Son      Eczema Son      Lipids Sister      Thyroid Disease Sister      Neurologic Disorder Sister      Depression Sister      Respiratory Son      Sleep Apnea Son      Glaucoma No family hx of      Cerebrovascular Disease No family hx of        SOCIAL HISTORY:  Social History     Socioeconomic History     Marital status:      Spouse name: None     Number of children: None     Years of education: None     Highest education level: None   Occupational History     Employer: [employer]     Employer: SELF   Tobacco Use     Smoking status: Former Smoker     Packs/day: 2.00     Years: 15.00     Pack years: 30.00     Quit date: 3/26/1996     Years since quittin.3     Smokeless tobacco: Never Used   Substance and Sexual Activity     Alcohol use: No     Comment: None now.  Rare in past.      Drug use: No     Sexual activity: Yes     Partners: Male   Other Topics Concern     Parent/sibling w/ CABG, MI or angioplasty before 65F 55M? No   Social History Narrative    .  Lives with .  Makes medical supplies.     4 children - healthy    4 siblings - + FH depression, hypertension, chol, diabetes mellitus    No family of muscle problems.      Social Determinants of Health     Financial Resource Strain:      Difficulty of Paying Living Expenses:    Food Insecurity:      Worried About Running Out of Food in the Last Year:      Ran Out of Food in the Last Year:    Transportation Needs:      Lack of Transportation (Medical):      Lack of Transportation (Non-Medical):    Physical Activity:      Days of Exercise per Week:      Minutes of Exercise per Session:    Stress:      Feeling of Stress :    Social Connections:      Frequency of Communication with Friends and Family:      Frequency of Social Gatherings with Friends and Family:      Attends [employer] Services:      Active Member of Clubs or Organizations:      Attends Club or Organization Meetings:      Marital Status:    Intimate Partner Violence:      Fear of Current or Ex-Partner:      Emotionally Abused:      Physically Abused:      Sexually Abused:        Review of Systems:  Skin:  Negative       Eyes:  Positive for glasses    ENT:  Negative      Respiratory:  Positive for shortness of breath;cough;sleep apnea     Cardiovascular:    chest pain;Positive for;palpitations;edema;fatigue;lightheadedness;dizziness    Gastroenterology: Positive for heartburn;nausea    Genitourinary:  Negative      Musculoskeletal:  Negative       Neurologic:  Positive for numbness or tingling of hands;numbness or tingling of feet;headaches;memory problems    Psychiatric:  Positive for anxiety    Heme/Lymph/Imm:  Negative      Endocrine:  Positive for thyroid disorder      Physical Exam:  Vitals: /79   Pulse 84   Wt 103.1 kg (227 lb 6.4 oz)   BMI 34.58 kg/m    Eyes: No icterus.  Pulmonary: Chest symmetric, lungs clear bilaterally and no crackles, wheezes or rales.  Cardiovascular: RRR with normal S1 and S2, no murmur, JVP normal.  Musculoskeletal: Edema of the lower extremities: None.  Neurologic: Oriented and appropriate without obvious focal deficits.   Psychiatric: Normal affect.     Recent Lab Results:  LIPID RESULTS:  Lab Results   Component Value Date    CHOL 237 (H) 11/06/2020    HDL 62 11/06/2020     (H) 11/06/2020    TRIG 123 11/06/2020    CHOLHDLRATIO 4.3 01/29/2015       LIVER ENZYME RESULTS:  Lab Results   Component Value Date    AST 24 06/02/2021    ALT 31 06/02/2021       CBC RESULTS:  Lab Results   Component Value Date    WBC 5.6 06/02/2021    RBC 4.52 06/02/2021    HGB 13.3 06/02/2021    HCT 40.2 06/02/2021    MCV 89 06/02/2021    MCH 29.4 06/02/2021    MCHC 33.1 06/02/2021    RDW 12.4 06/02/2021     06/02/2021       BMP RESULTS:  Lab Results   Component Value Date     06/02/2021    POTASSIUM 4.1 06/02/2021    CHLORIDE 102 06/02/2021    CO2 27 06/02/2021    ANIONGAP 6 06/02/2021    GLC 97 06/02/2021    BUN 9 06/02/2021    CR 0.81 06/02/2021    GFRESTIMATED 78 06/02/2021    GFRESTBLACK >90 06/02/2021    ANNIE 9.7 06/02/2021        A1C RESULTS:  Lab Results   Component Value Date    A1C 5.3 11/14/2014       INR RESULTS:  Lab Results   Component Value Date    INR 0.94 04/08/2015       HERIBERTO Foote CNP  [address] [place] [address]    All medical records were reviewed in detail and discussed with the patient. Greater than 30 mins were spent with the patient, 50% of this time was spent on  counseling and coordination of care.  After visit summary was printed and given to the patient.        Thank you for allowing me to participate in the care of your patient.      Sincerely,     Andrew Betancourt MD     [facility]  cc:   HERIBERTO Linton CNP  [address] [place] [address]

## 2021-08-04 NOTE — PROGRESS NOTES
HPI and Plan:   Today I had the pleasure of seeing Teresa Fernandez at [facility]. She is a pleasant 61 year old patient with a past medical history of obstructive sleep apnea, chronic fatigue, GERD, left nephrectomy for RCC in 1996, anxiety and hypothyroidism who presents to the clinic in consultation for multiple falls over the last 3 years.    The patient reports sustaining multiple ground-level falls over the last few years on and off.  These episodes occur when she is outside in the hot and humid conditions and are in general preceded by sensation of loss of balance and 'feeling weird in the head'.  She has seen a neurologist for these complaints and work-up so far has been unremarkable.  She is also working with physical therapy for gait/balance issues.  She reports some low normal blood pressure but in general does not become dizzy when changing positions.  Notices rapid heartbeat around the time of these episodes.  She describes 2 episodes one that occurred when she turned after weeding in the garden.  The second episode occurred when she went to a car fair.  She walking with her family when she turned around and suddenly passed out.  Also reports tremors and b/l arm numbness. She did not sustain any major injury.  She reports keeping herself very well-hydrated.    Denies history of premature coronary artery disease in family members but reports that her mother had heart failure.  As part of the work-up she had a transthoracic echocardiogram which was read as showing wall motion abnormalities and apical/anterior/anteroseptum.  Ejection fraction was 45-50%.  She also had a nuclear stress test which did not show ischemia or infarct.  Ejection fraction on stress test was 54%.    Assessment and plan:   1.  Multiple episodes of syncope-possibly vasovagal  2.  Mild cardiomyopathy echocardiogram with ejection fraction of 45% and WMA in anterior/apical/anteroseptum  3.  S/p left nephrectomy in  1996  4.  Hyperlipidemia- mg/dL  5.  Lumbar decompression surgery around 2015    The etiology of patient's fall is noted entirely clear to me.  It is possible that she is having vasovagal syncope which can be precipitated by hot and humid conditions and there may also be some orthostatic component to it.  I believe that because she does report having low normal blood pressure and also at one episode when she was bent over picking weeds in the garden and passed out after standing up.  Work-up so far has been unremarkable.  However, I would do a 7-day Zio patch to rule out arrhythmias.  I told her that the yield is probably going to be low because her symptoms are so infrequent.    As far as wall motion abnormalities and mildly reduced ejection fraction is concerned I will perform a cardiac MRI to further ascertain this.  There seems to be a little bit of discrepancy between the nuclear stress [54%] and echocardiogram findings [45%].  She does not report any symptoms/history suggestive of myopericarditis and does not report any history suggestive of stress-induced cardiomyopathy.  If the MRI shows significant scarring in a particular coronary distribution I would consider performing a coronary angiogram.  She does have risk factors for coronary artery disease in the form of elevated LDL but does not report any symptoms suggestive of angina.  We also discussed medications for cardiomyopathy but I told her that given that she has dizziness and syncope and I believe it may partly be related to orthostatic hypotension I would hold off on starting her on heart failure medications.  If the MRI shows low ejection fraction we may consider doing so at a later date. Guilherme make a follow up based on the results of the test.     Thank you for allowing me to participate in the care of Teresa Fernandez    This note was completed in part using Dragon voice recognition software. Although reviewed after completion, some word and  grammatical errors may occur.    Andrew Betancourt MD  Cardiology    Orders Placed This Encounter   Procedures     MRI Cardiac w/contrast     Leadless EKG Monitor 3 to 7 Days       No orders of the defined types were placed in this encounter.      There are no discontinued medications.    Encounter Diagnoses   Name Primary?     Chest tightness or pressure      Dizziness        CURRENT MEDICATIONS:  Current Outpatient Medications   Medication Sig Dispense Refill     acetaminophen (TYLENOL) 325 MG tablet Take 325-650 mg by mouth every 6 hours as needed for mild pain       albuterol (PROAIR HFA/PROVENTIL HFA/VENTOLIN HFA) 108 (90 BASE) MCG/ACT Inhaler Inhale 2 puffs into the lungs every 6 hours as needed for shortness of breath / dyspnea 1 Inhaler 5     albuterol (PROVENTIL) (2.5 MG/3ML) 0.083% neb solution Take 1 vial (2.5 mg) by nebulization every 6 hours as needed for shortness of breath / dyspnea or wheezing 25 vial 0     APNO CREA ointment Apply to rash on nose twice daily as needed. 100 g 3     augmented betamethasone dipropionate (DIPROLENE-AF) 0.05 % external ointment Apply topically 2 times daily 50 g 3     Cholecalciferol (VITAMIN D3 PO) Take 2,000 Units by mouth 2 times daily        Cyanocobalamin (VITAMIN B 12 PO) Take 500 mcg by mouth daily       dexamethasone (DECADRON) 0.5 MG/5ML elixir Take 5 mLs (0.5 mg) by mouth 3 times daily 474 mL 3     DULoxetine (CYMBALTA) 60 MG capsule TAKE 1 CAPSULE EVERY MORNING 90 capsule 1     fexofenadine (ALLEGRA) 180 MG tablet Take 1 tablet (180 mg) by mouth daily 30 tablet 1     fluticasone-salmeterol (ADVAIR DISKUS) 250-50 MCG/DOSE inhaler USE 1 INHALATION TWICE A DAY (FOLLOW UP FOR FURTHER REFILLS) 1 each 5     levothyroxine (SYNTHROID/LEVOTHROID) 100 MCG tablet TAKE ONE TABLET BY MOUTH ONCE DAILY ON MONDAY, WEDNESDAY, FRIDAY, SATURDAY AND SUNDAY. 90 tablet 2     levothyroxine (SYNTHROID/LEVOTHROID) 88 MCG tablet Take 1 tab by mouth Tuesdays and Thursdays 45 tablet 3      lidocaine (XYLOCAINE) 2 % solution        magic mouthwash (ENTER INGREDIENTS IN COMMENTS) suspension Take 10 mLs by mouth every 4 hours as needed (mouth pain) 280 mL 3     mupirocin (BACTROBAN) 2 % external ointment Use 2 times a day to affected area. 30 g 0     omeprazole (PRILOSEC) 20 MG DR capsule Take 20 mg by mouth daily       order for DME Equipment being ordered: Nebulizer 1 Units 0     OVER-THE-COUNTER Place 1 drop into both eyes 3 times daily. Systane Ultra, Systane Balance, Blink Tears or Refresh Optive Artificial Tear 1 Bottle      tacrolimus (GENERIC EQUIVALENT) 1 MG capsule Dissolve contents of 1 capsule into 250 mL water. Take 5 mL by mouth swish and spit TID-QID. 5 capsule 3     triamcinolone (ARISTOCORT HP) 0.5 % external cream Apply to vaginal area twice daily as needed for itching 15 g 3     triamcinolone (KENALOG) 0.1 % external ointment Use 1-2 times daily as needed for burning rash 453.6 g 3       ALLERGIES     Allergies   Allergen Reactions     Omnipaque [Iohexol] Swelling and Difficulty breathing     Immediate throat swelling following around 1-2cc of omnipaque 300 for spine procedure     Gluten      Iodine      Welts from CT contrast, surgical scrub is ok.     Pcn [Penicillins] Hives       PAST MEDICAL HISTORY:  Past Medical History:   Diagnosis Date     ALLERGIC RHINITIS NOS 4/12/2005     Anxiety      Arthritis     herniated disc     Bronchitis, not specified as acute or chronic      CHR MAXILLARY SINUSITIS 4/12/2005     Depressive disorder, not elsewhere classified      Eczema 10/17/2012     Environmental and seasonal allergies      GERD (gastroesophageal reflux disease)      Gluten intolerance      Malignant neoplasm of renal pelvis (H) 1995    Renal cell carcinoma     Melanoma (H) 06/2010     Other specified acquired hypothyroidism 4/12/2005     Renal cancer (H) 5/5/2011     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     FUSION LUMBAR ANTERIOR TWO LEVELS  2015     GYN SURGERY  1999    hysterectomy.  [facility]     HYSTERECTOMY, PAP NO LONGER INDICATED       SOFT TISSUE SURGERY      chest-melonoma     SURGICAL HISTORY OF -   3/1996    Left nephrectomy-renal cell CA       FAMILY HISTORY:  Family History   Problem Relation Age of Onset     Diabetes Mother      Cardiovascular Mother      Lipids Mother      Depression Mother      Hypertension Father      Lipids Father      Obesity Father      Macular Degeneration Father      Sleep Apnea Father      Cancer Maternal Grandmother         kind unknown had sores on legs     Eczema Maternal Grandmother      Eczema Maternal Grandfather      Breast Cancer Paternal Grandmother      Cancer Paternal Grandmother         breast     Hypertension Paternal Grandfather      Cardiovascular Paternal Grandfather         heart attack     Hypertension Brother      Thyroid Disease Sister      Hypertension Sister      Depression Sister      Allergies Sister      Allergies Son      Eczema Son      Lipids Sister      Thyroid Disease Sister      Neurologic Disorder Sister      Depression Sister      Respiratory Son      Sleep Apnea Son      Glaucoma No family hx of      Cerebrovascular Disease No family hx of        SOCIAL HISTORY:  Social History     Socioeconomic History     Marital status:      Spouse name: None     Number of children: None     Years of education: None     Highest education level: None   Occupational History     Employer: [employer]     Employer: SELF   Tobacco Use     Smoking status: Former Smoker     Packs/day: 2.00     Years: 15.00     Pack years: 30.00     Quit date: 3/26/1996     Years since quittin.3     Smokeless tobacco: Never Used   Substance and Sexual Activity      Alcohol use: No     Comment: None now.  Rare in past.      Drug use: No     Sexual activity: Yes     Partners: Male   Other Topics Concern     Parent/sibling w/ CABG, MI or angioplasty before 65F 55M? No   Social History Narrative    .  Lives with .  Makes medical supplies.     4 children - healthy    4 siblings - + FH depression, hypertension, chol, diabetes mellitus    No family of muscle problems.      Social Determinants of Health     Financial Resource Strain:      Difficulty of Paying Living Expenses:    Food Insecurity:      Worried About Running Out of Food in the Last Year:      Ran Out of Food in the Last Year:    Transportation Needs:      Lack of Transportation (Medical):      Lack of Transportation (Non-Medical):    Physical Activity:      Days of Exercise per Week:      Minutes of Exercise per Session:    Stress:      Feeling of Stress :    Social Connections:      Frequency of Communication with Friends and Family:      Frequency of Social Gatherings with Friends and Family:      Attends Jewish Services:      Active Member of Clubs or Organizations:      Attends Club or Organization Meetings:      Marital Status:    Intimate Partner Violence:      Fear of Current or Ex-Partner:      Emotionally Abused:      Physically Abused:      Sexually Abused:        Review of Systems:  Skin:  Negative       Eyes:  Positive for glasses    ENT:  Negative      Respiratory:  Positive for shortness of breath;cough;sleep apnea     Cardiovascular:    chest pain;Positive for;palpitations;edema;fatigue;lightheadedness;dizziness    Gastroenterology: Positive for heartburn;nausea    Genitourinary:  Negative      Musculoskeletal:  Negative      Neurologic:  Positive for numbness or tingling of hands;numbness or tingling of feet;headaches;memory problems    Psychiatric:  Positive for anxiety    Heme/Lymph/Imm:  Negative      Endocrine:  Positive for thyroid disorder      Physical Exam:  Vitals: /79    Pulse 84   Wt 103.1 kg (227 lb 6.4 oz)   BMI 34.58 kg/m    Eyes: No icterus.  Pulmonary: Chest symmetric, lungs clear bilaterally and no crackles, wheezes or rales.  Cardiovascular: RRR with normal S1 and S2, no murmur, JVP normal.  Musculoskeletal: Edema of the lower extremities: None.  Neurologic: Oriented and appropriate without obvious focal deficits.   Psychiatric: Normal affect.     Recent Lab Results:  LIPID RESULTS:  Lab Results   Component Value Date    CHOL 237 (H) 11/06/2020    HDL 62 11/06/2020     (H) 11/06/2020    TRIG 123 11/06/2020    CHOLHDLRATIO 4.3 01/29/2015       LIVER ENZYME RESULTS:  Lab Results   Component Value Date    AST 24 06/02/2021    ALT 31 06/02/2021       CBC RESULTS:  Lab Results   Component Value Date    WBC 5.6 06/02/2021    RBC 4.52 06/02/2021    HGB 13.3 06/02/2021    HCT 40.2 06/02/2021    MCV 89 06/02/2021    MCH 29.4 06/02/2021    MCHC 33.1 06/02/2021    RDW 12.4 06/02/2021     06/02/2021       BMP RESULTS:  Lab Results   Component Value Date     06/02/2021    POTASSIUM 4.1 06/02/2021    CHLORIDE 102 06/02/2021    CO2 27 06/02/2021    ANIONGAP 6 06/02/2021    GLC 97 06/02/2021    BUN 9 06/02/2021    CR 0.81 06/02/2021    GFRESTIMATED 78 06/02/2021    GFRESTBLACK >90 06/02/2021    ANNIE 9.7 06/02/2021        A1C RESULTS:  Lab Results   Component Value Date    A1C 5.3 11/14/2014       INR RESULTS:  Lab Results   Component Value Date    INR 0.94 04/08/2015       HERIBERTO Foote CNP  [id number] [place] [address]    All medical records were reviewed in detail and discussed with the patient. Greater than 30 mins were spent with the patient, 50% of this time was spent on counseling and coordination of care.  After visit summary was printed and given to the patient.

## 2021-08-07 ASSESSMENT — ENCOUNTER SYMPTOMS
PARESTHESIAS: 1
CHILLS: 1
PALPITATIONS: 0
HEADACHES: 1
DIZZINESS: 1
EYE PAIN: 1
CONSTIPATION: 1
JOINT SWELLING: 1
SHORTNESS OF BREATH: 1
HEARTBURN: 0
BREAST MASS: 0
WEAKNESS: 1
HEMATURIA: 0
MYALGIAS: 1
DYSURIA: 0
FREQUENCY: 1
NAUSEA: 1
COUGH: 1
ARTHRALGIAS: 1
SORE THROAT: 0
ABDOMINAL PAIN: 0
NERVOUS/ANXIOUS: 1
DIARRHEA: 0
HEMATOCHEZIA: 0
FEVER: 0

## 2021-08-10 ENCOUNTER — OFFICE VISIT (OUTPATIENT)
Dept: FAMILY MEDICINE | Facility: CLINIC | Age: 61
End: 2021-08-10
Payer: OTHER GOVERNMENT

## 2021-08-10 VITALS
HEIGHT: 68 IN | OXYGEN SATURATION: 96 % | DIASTOLIC BLOOD PRESSURE: 80 MMHG | SYSTOLIC BLOOD PRESSURE: 112 MMHG | TEMPERATURE: 98.8 F | WEIGHT: 223.8 LBS | HEART RATE: 85 BPM | BODY MASS INDEX: 33.92 KG/M2 | RESPIRATION RATE: 16 BRPM

## 2021-08-10 DIAGNOSIS — Z00.00 ROUTINE PHYSICAL EXAMINATION: Primary | ICD-10-CM

## 2021-08-10 DIAGNOSIS — Z13.6 CARDIOVASCULAR SCREENING; LDL GOAL LESS THAN 130: ICD-10-CM

## 2021-08-10 LAB
CHOLEST SERPL-MCNC: 232 MG/DL
FASTING STATUS PATIENT QL REPORTED: YES
HDLC SERPL-MCNC: 70 MG/DL
LDLC SERPL CALC-MCNC: 145 MG/DL
NONHDLC SERPL-MCNC: 162 MG/DL
TRIGL SERPL-MCNC: 87 MG/DL

## 2021-08-10 PROCEDURE — 36415 COLL VENOUS BLD VENIPUNCTURE: CPT | Performed by: NURSE PRACTITIONER

## 2021-08-10 PROCEDURE — 99396 PREV VISIT EST AGE 40-64: CPT | Performed by: NURSE PRACTITIONER

## 2021-08-10 PROCEDURE — 80061 LIPID PANEL: CPT | Performed by: NURSE PRACTITIONER

## 2021-08-10 ASSESSMENT — ENCOUNTER SYMPTOMS
ARTHRALGIAS: 1
HEMATURIA: 0
ABDOMINAL PAIN: 0
PALPITATIONS: 0
HEADACHES: 1
NERVOUS/ANXIOUS: 1
WEAKNESS: 1
HEMATOCHEZIA: 0
EYE PAIN: 1
BREAST MASS: 0
MYALGIAS: 1
FREQUENCY: 1
SORE THROAT: 0
PARESTHESIAS: 1
JOINT SWELLING: 1
SHORTNESS OF BREATH: 1
DIZZINESS: 1
CONSTIPATION: 1
DIARRHEA: 0
NAUSEA: 1
DYSURIA: 0
CHILLS: 1
COUGH: 1
FEVER: 0
HEARTBURN: 0

## 2021-08-10 ASSESSMENT — MIFFLIN-ST. JEOR: SCORE: 1625.71

## 2021-08-10 NOTE — PATIENT INSTRUCTIONS
Patient Education     Mediterranean Diet  A heart healthy eating plan  The Mediterranean Diet is based on the eating habits of people in countries near the [place]. People living in this part of the world have long lives and low rates of chronic diseases. They have lower rates of death from heart disease, cancer and other illnesses.  When you follow this eating plan you'll have better control of your blood sugar and weight. The plan requires simple changes in your habits of eating, and the whole family can take part.  Mediterranean lifestyle   Enjoy eating with others. Sit at the table with family and friends and enjoy your meal. When you eat slowly, you are able to tune in to your body's hunger and fullness signals. You're less likely to overeat.  Be physically active: Being active every day is important for overall good health. Run, walk, dance and do lighter activities such as house and yard work. Move more and sit less!  Drink plenty of water during the day.  Drink red wine with meals in modest amounts (optional).  The Mediterranean Pyramid   The pyramid shows the food groups and the amounts eaten in relation to the whole diet. It is more than a diet; it is also a life-style plan.  The largest food group at bottom contains plant foods: vegetables, fruits, grains, nuts, legumes, seeds, olives and olive oil. These foods make up the largest part of your meals.   The next groups above: fish and seafood, then poultry, cheese, eggs and yogurt are eaten less often and in smaller servings.   The top group, meats and sweets, are eaten the least often and in the smallest amounts.    Tips for adding plant foods to your meals   Vegetables and fruits:     Aim for 3 to 8 servings each day. A serving is   to 2 cups, depending on the food.    Choose a variety of colors. Big green salads are a great way to include several vegetable servings.    Try fresh fruit as a dessert: oranges, grapes, apples pomegranates or  "fresh figs.    At home keep fresh fruit in a bowl to tempt family.    Bring fruit and vegetables to work for a snack.  Oil     Replace butter and margarine with healthy fats such as olive oil. Other plant-based oils are canola, walnut and peanut oil. These are high in good fats. Use them in cooking, salad dressings and baking.    Drizzle your bread with olive oil instead of butter or margarine. Use herbs and spices to add flavor and aroma and to reduce fat and salt when cooking.  Whole grains    Look for the term \"whole\" or \"whole grain\" on the package. Processing grains removes vitamins, minerals and fiber. Whole grains may include: corn, wheat, oats, rye, rice and barley.    Examples of Mediterranean grains include: barley, farro, buckwheat, bulgur, couscous, and wheatberries.    Slowly switch to a whole grain by using whole-grain blends of pastas and rice. Or mix whole grains with refined; for example, mix whole-wheat pasta with white pasta.  Beans and legumes     Beans are a good source of protein and fiber, adding flavor and texture to dishes. Examples include cannellini beans, chickpea, ruchi beans, green beans, kidney beans, lentils and split peas.    Cook a vegetarian meal one night a week: use beans or legumes with vegetables and grains.    Nuts are high in healthy fats. Try walnuts, almonds, pine nuts, hazelnuts and cashews. Avoid candied, honey-roasted and heavily salted nuts.    Limit your intake of nuts to a small handful each day. Explore ways to add to nuts to salads and other dishes.  Tips for using fish and seafood in your meals    Aim for meals with fish or shellfish at least twice a week.    Tuna, herring, salmon and sardines are rich in heart-healthy omega-3. Shellfish, such as mussels, oysters and clams, have similar benefits for brain and heart health.  Tips for using poultry, eggs and cheese and yogurt in your meals    Eat poultry or eggs at least twice a week.    Roast, broil or grill your " poultry. Season with fresh or dried herbs.    Enjoy low-fat cheese or yogurt every day.  Tips for using red meat and sweets in your meals     Limit lean red meat to one time a week or 4 times a month.    Red meat has more saturated fats. Choose a lean cut, like top loin, sirloin, flank steak, strip steak or 90% lean ground beef.    Limit portions to 3 to 4 ounces.    Make whole grains and vegetables the main focus of a meal. Add meat in small amounts for flavor.    Limit sweets such as ice cream or cookies for a special times or holidays.  Snacks    Snack on a handful of almonds, walnuts or sunflower seeds in place of chips, cookies or other processed snack foods.    Calcium-rich, low-fat cheese or low- and nonfat plain yogurt with fresh fruit are healthy snacks that are easy to take with you.  Like to know more?  For tips on shoppping, cooking and eating well the Mediterranean way, go to the "[employer] website at www.[employer].org.  For informational purposes only. Not to replace the advice of your health care provider.   Copyright   2014 [employer]. All rights reserved.   Mediterranean pyramrid used with permission of the [employer]. [employer] 382524 - 09/15.

## 2021-08-10 NOTE — PROGRESS NOTES
SUBJECTIVE:   CC: Teresa Fernandez is an 61 year old woman who presents for preventive health visit.       Patient has been advised of split billing requirements and indicates understanding: Yes  Healthy Habits:     Getting at least 3 servings of Calcium per day:  Yes    Bi-annual eye exam:  Yes    Dental care twice a year:  NO    Sleep apnea or symptoms of sleep apnea:  Sleep apnea    Diet:  Gluten-free/reduced    Frequency of exercise:  1 day/week    Duration of exercise:  15-30 minutes    Taking medications regularly:  Yes    PHQ-2 Total Score: 0    Additional concerns today:  No    The 10-year ASCVD risk score (Chelsie REYNOLDS Jr., et al., 2013) is: 2.7%    Values used to calculate the score:      Age: 61 years      Sex: Female      Is Non- : No      Diabetic: No      Tobacco smoker: No      Systolic Blood Pressure: 112 mmHg      Is BP treated: No      HDL Cholesterol: 62 mg/dL      Total Cholesterol: 237 mg/dL        Today's PHQ-2 Score:   PHQ-2 (  [employer]) 2021   Q1: Little interest or pleasure in doing things 0   Q2: Feeling down, depressed or hopeless 0   PHQ-2 Score 0   Q1: Little interest or pleasure in doing things Not at all   Q2: Feeling down, depressed or hopeless Not at all   PHQ-2 Score 0       Abuse: Current or Past (Physical, Sexual or Emotional) - No  Do you feel safe in your environment? Yes    Have you ever done Advance Care Planning? (For example, a Health Directive, POLST, or a discussion with a medical provider or your loved ones about your wishes): No, advance care planning information given to patient to review.  Patient declined advance care planning discussion at this time.    Social History     Tobacco Use     Smoking status: Former Smoker     Packs/day: 2.00     Years: 15.00     Pack years: 30.00     Quit date: 3/26/1996     Years since quittin.3     Smokeless tobacco: Never Used   Substance Use Topics     Alcohol use: No     Comment: None now.  Rare in  past.      If you drink alcohol do you typically have >3 drinks per day or >7 drinks per week? No    Alcohol Use 8/10/2021   Prescreen: >3 drinks/day or >7 drinks/week? -   Prescreen: >3 drinks/day or >7 drinks/week? No       Reviewed orders with patient.  Reviewed health maintenance and updated orders accordingly - Yes  BP Readings from Last 3 Encounters:   08/10/21 112/80   08/04/21 131/79   07/19/21 122/60    Wt Readings from Last 3 Encounters:   08/10/21 101.5 kg (223 lb 12.8 oz)   08/04/21 103.1 kg (227 lb 6.4 oz)   07/19/21 103.4 kg (228 lb)                    Breast Cancer Screening:    FHS-7:   Breast CA Risk Assessment (FHS-7) 8/7/2021   Did any of your first-degree relatives have breast or ovarian cancer? Yes   Did any of your relatives have bilateral breast cancer? Yes   Did any man in your family have breast cancer? No   Did any woman in your family have breast and ovarian cancer? Yes   Did any woman in your family have breast cancer before age 50 y? No   Do you have 2 or more relatives with breast and/or ovarian cancer? No   Do you have 2 or more relatives with breast and/or bowel cancer? No       Mammogram Screening: Recommended mammography every 1-2 years with patient discussion and risk factor consideration  Pertinent mammograms are reviewed under the imaging tab.    History of abnormal Pap smear: Status post benign hysterectomy. Health Maintenance and Surgical History updated.  PAP / HPV 12/2/2010   PAP (Historical) ASC-US(A)     Reviewed and updated as needed this visit by clinical staff  Tobacco  Allergies  Meds   Med Hx  Surg Hx  Fam Hx  Soc Hx        Reviewed and updated as needed this visit by Provider                  Review of Systems   Constitutional: Positive for chills. Negative for fever.   HENT: Positive for congestion. Negative for ear pain, hearing loss and sore throat.    Eyes: Positive for pain and visual disturbance.   Respiratory: Positive for cough and shortness of breath.   "  Cardiovascular: Positive for chest pain and peripheral edema. Negative for palpitations.   Gastrointestinal: Positive for constipation and nausea. Negative for abdominal pain, diarrhea, heartburn and hematochezia.   Breasts:  Negative for tenderness, breast mass and discharge.   Genitourinary: Positive for frequency, genital sores and urgency. Negative for dysuria, hematuria, pelvic pain, vaginal bleeding and vaginal discharge.   Musculoskeletal: Positive for arthralgias, joint swelling and myalgias.   Skin: Positive for rash.   Neurological: Positive for dizziness, weakness, headaches and paresthesias.   Psychiatric/Behavioral: Negative for mood changes. The patient is nervous/anxious.           OBJECTIVE:   /80   Pulse 85   Temp 98.8  F (37.1  C) (Tympanic)   Resp 16   Ht 1.715 m (5' 7.5\")   Wt 101.5 kg (223 lb 12.8 oz)   SpO2 96%   BMI 34.53 kg/m    Physical Exam  GENERAL: healthy, alert and no distress  EYES: Eyes grossly normal to inspection  HENT: normal cephalic/atraumatic, ear canals and TM's normal, oropharynx clear, oral mucous membranes moist and leukoplakia to left buccal  NECK: no adenopathy, no asymmetry, masses, or scars and thyroid normal to palpation  RESP: lungs clear to auscultation - no rales, rhonchi or wheezes  CV: regular rates and rhythm, normal S1 S2, no S3 or S4, no murmur, click or rub and no peripheral edema  ABDOMEN: soft, nontender, without hepatosplenomegaly or masses  MS: no gross musculoskeletal defects noted, no edema  SKIN: varicosities - bilateral lower legs  NEURO: Normal strength and tone, mentation intact and speech normal  PSYCH: mentation appears normal, affect normal/bright    Diagnostic Test Results:  Labs reviewed in Epic    ASSESSMENT/PLAN:   1. Routine physical examination      2. CARDIOVASCULAR SCREENING; LDL GOAL LESS THAN 130    - Lipid panel reflex to direct LDL Fasting; Future  - Lipid panel reflex to direct LDL Fasting    Patient has been advised of " "split billing requirements and indicates understanding: n/a  COUNSELING:  Reviewed preventive health counseling, as reflected in patient instructions  Special attention given to:        Regular exercise       Healthy diet/nutrition    Estimated body mass index is 34.53 kg/m  as calculated from the following:    Height as of this encounter: 1.715 m (5' 7.5\").    Weight as of this encounter: 101.5 kg (223 lb 12.8 oz).    Weight management plan: Discussed healthy diet and exercise guidelines    She reports that she quit smoking about 25 years ago. She has a 30.00 pack-year smoking history. She has never used smokeless tobacco.      Counseling Resources:  ATP IV Guidelines  Pooled Cohorts Equation Calculator  Breast Cancer Risk Calculator  BRCA-Related Cancer Risk Assessment: FHS-7 Tool  FRAX Risk Assessment  ICSI Preventive Guidelines  Dietary Guidelines for Americans, 2010  USDA's MyPlate  ASA Prophylaxis  Lung CA Screening    HERIBERTO Linton CNP  [facility]  "

## 2021-08-10 NOTE — RESULT ENCOUNTER NOTE
Mesfin Moore    Cholesterol lowering medications are recommended at a 10 year cardiac risk score of 7.5% or higher. Your cardiac risk score is:    The 10-year ASCVD risk score (Chelsiepallavi REYNOLDS Jr., et al., 2013) is: 2.4%    Values used to calculate the score:      Age: 61 years      Sex: Female      Is Non- : No      Diabetic: No      Tobacco smoker: No      Systolic Blood Pressure: 112 mmHg      Is BP treated: No      HDL Cholesterol: 70 mg/dL      Total Cholesterol: 232 mg/dL    I will forego any cholesterol lowering medication recommendations for Cardiology right now.     Studies have shown the following to be effective in lowering LDL cholesterol levels:    Mediterranean, Vegetarian, and DASH diets.     Red yeast rice: 6290-6948 mg daily in divided doses    Nuts: Walnuts, almonds, pistachios    Berberine: 900-1500 mg daily    Fiber: such as Metamucil, other fiber supplement or through food sources: at least 10 gm daily        Please let us know if you have any questions.     Take care,    HERIBERTO Miller CNP

## 2021-08-16 ENCOUNTER — VIRTUAL VISIT (OUTPATIENT)
Dept: DERMATOLOGY | Facility: CLINIC | Age: 61
End: 2021-08-16
Payer: OTHER GOVERNMENT

## 2021-08-16 DIAGNOSIS — M33.13 DERMATOMYOSITIS (H): Primary | ICD-10-CM

## 2021-08-16 DIAGNOSIS — L43.8 EROSIVE ORAL LICHEN PLANUS: ICD-10-CM

## 2021-08-16 PROCEDURE — 99442 PR PHYSICIAN TELEPHONE EVALUATION 11-20 MIN: CPT | Performed by: DERMATOLOGY

## 2021-08-16 ASSESSMENT — PAIN SCALES - GENERAL: PAINLEVEL: NO PAIN (0)

## 2021-08-16 NOTE — LETTER
8/16/2021       RE: Teresa Fernandez  [id number] [address] [id number]     Dear Colleague,    Thank you for referring your patient, Teresa Fernandez, to the [facility] [place] at [facility]. Please see a copy of my visit note below.    [facility] Dermatology Note  Encounter Date: Aug 16, 2021  Store-and-Forward and Telephone ([phone]). Location of teledermatologist: [facility] [place].  Start time: 12:02. End time: 12:22.    Dermatology Problem List:  1. Melanoma in situ, left chest  - s/p excision 7/27/2010 at [facility]  2.  Chronic burning rash of the upper extremities with biopsy showing a largely normal-appearing epidermis above mild perivascular lymphocytic inflammation without fungal elements or increased basement membrane noted on September 6, 2019  - Differential diagnosis includes (photo)allergic contact dermatitis, connective tissue disease (DM > NADIA as the former may lack significant epidermal changes)  - negative/normal: Aldolase, CK, PFTs, polymyositis and dermatomyositis panel, urinalysis, SSA, and SSB  - DIEGO borderline positive 1:80,  (nml )  - Did have drug eruption to hydroxychloroquine 6/2021  3. Subcutaneous nodule, left forearm - consistent with lipoma  4. Orogenital ulcers: suspect lichen planus   - tacrolimus 1 mg dissolved in 250 mL water S&S TID-QID  - augmented betamethasone ointment  - bx  6/4/21 nonspecific with negative DIF  - prior bx 2011 ulcer, left cheek  - prior: hydroxychloroquine (stopped due to rash), dexamethasone S&S     ____________________________________________    Assessment & Plan:     1. Orogenital lichen planus: overall doing very well despite downtitration of treatment. Mouth healing nicely though still sensitive to known triggering factors. Vulvar area responds to augmented betamethasone within a few days.  No need to escalate therapy for this at this time, see below.    2. Photosensitive eruption, weakness and arthralgias/arthritis, possible cardiomyopathy (undergoing workup per cardiology), shortness of breath: these symptoms are concerning for dermatomyositis, possibly with myocardial involvement. Has cardiac MRI scheduled for tomorrow, which will hopefully be helpful. Past workup for dermatomyositis revealed minimally elevated CK and DIEGO, but otherwise unremarkable, though she did have a paradoxic hydroxychloroquine reaction, which is seen not uncommonly in this setting. Her PFTs were unremarkable at that time.  - recheck CRP/ESR, CK/aldolase, myositis panel  - consider high-res chest CT to assess for ILD, pending cardiac MRI tomorrow  - consider mycophenolate vs methotrexate pending above workup    Procedures Performed:    None    Follow-up: 3 months, pending workup results    Staff:     Harrison Alcaraz MD, FAAD   of Dermatology  Department of Dermatology  [facility]    ____________________________________________    CC: Derm Problem (Teresa, is here for her oral lichen planus, she states things are much better)    HPI:  Ms. Teresa Fernandez is a(n) 61 year old female who presents today as a return patient for oral erosive lichen planus    Oral erosive lichen planus - stopped tacrolimus S&S about a month ago  - taste and burning seemed to have cleared up - better than in past  - not using any medications  - occasional vaginal sores with soap, but after 1-2 days of topicals, it subsides  - faint red patch on left buccal mucosa  - saw PCP about a week ago who thought was looking good as well    Cardiac workup - feeling short of breath, especially when lying flat  - workup for cardiomyopathy ongoing  - cardiac MRI pending  - stopped Advair - no change  - prior PFTs okay    Hip and knee very painful - chronic; especially right hip  - knees give out when so to sit on  toilet  - slow getting up from chairs; pain with stairs  - no issues combing hair/hands above head    Hair thinning - no rash, no redness/flakiness    Red dots on the ankles, buttocks - applies augmented betamethasone which stops itching  - rash on the arms - if stop using ointment, dries up  - if goes in sun/overheated, rash comes out and turns very red    Patient is otherwise feeling well, without additional skin concerns.    Labs Reviewed:  2019 PFTs unremarkable  2019 autoantibodies notable only for DIEGO+ 1:80    Physical Exam:  Vitals: There were no vitals taken for this visit.  SKIN: Teledermatology photos were reviewed; image quality and interpretability: acceptable. Image date: 8/16/21.  - faint erythema on the buccal mucosa  - No other lesions of concern on areas examined.     Medications:  Current Outpatient Medications   Medication     acetaminophen (TYLENOL) 325 MG tablet     albuterol (PROAIR HFA/PROVENTIL HFA/VENTOLIN HFA) 108 (90 BASE) MCG/ACT Inhaler     APNO CREA ointment     augmented betamethasone dipropionate (DIPROLENE-AF) 0.05 % external ointment     Cholecalciferol (VITAMIN D3 PO)     Cyanocobalamin (VITAMIN B 12 PO)     dexamethasone (DECADRON) 0.5 MG/5ML elixir     DULoxetine (CYMBALTA) 60 MG capsule     fexofenadine (ALLEGRA) 180 MG tablet     fluticasone-salmeterol (ADVAIR DISKUS) 250-50 MCG/DOSE inhaler     levothyroxine (SYNTHROID/LEVOTHROID) 100 MCG tablet     levothyroxine (SYNTHROID/LEVOTHROID) 88 MCG tablet     lidocaine (XYLOCAINE) 2 % solution     magic mouthwash (ENTER INGREDIENTS IN COMMENTS) suspension     mupirocin (BACTROBAN) 2 % external ointment     omeprazole (PRILOSEC) 20 MG DR capsule     order for DME     OVER-THE-COUNTER     tacrolimus (GENERIC EQUIVALENT) 1 MG capsule     triamcinolone (ARISTOCORT HP) 0.5 % external cream     triamcinolone (KENALOG) 0.1 % external ointment     No current facility-administered medications for this visit.      Past Medical/Surgical  History:   Patient Active Problem List   Diagnosis     Acquired hypothyroidism     Allergic state     Chronic rhinitis     Allergic rhinitis due to pollen     Sinusitis, chronic     History of skin cancer     ERIC (generalized anxiety disorder)     Leukoplakia of oral mucosa, including tongue     GERD (gastroesophageal reflux disease)     Chronic low back pain     Dry eye syndrome     Chronic fatigue     Osteopenia     History of melanoma in situ     Eczema     Moderate persistent asthma without complication     DDD (degenerative disc disease), lumbar     Hyperlipidemia LDL goal <160     Chest tightness or pressure     Muscle pain     GAYE (obstructive sleep apnea)     History of kidney cancer     Past Medical History:   Diagnosis Date     ALLERGIC RHINITIS NOS 4/12/2005     Anxiety      Arthritis     herniated disc     Bronchitis, not specified as acute or chronic      CHR MAXILLARY SINUSITIS 4/12/2005     Depressive disorder, not elsewhere classified      Eczema 10/17/2012     Environmental and seasonal allergies      GERD (gastroesophageal reflux disease)      Gluten intolerance      Malignant neoplasm of renal pelvis (H) 1995    Renal cell carcinoma     Melanoma (H) 06/2010     Other specified acquired hypothyroidism 4/12/2005     Renal cancer (H) 5/5/2011     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

## 2021-08-16 NOTE — PROGRESS NOTES
[facility] Dermatology Note  Encounter Date: Aug 16, 2021  Store-and-Forward and Telephone ([phone]). Location of teledermatologist: [facility] [place].  Start time: 12:02. End time: 12:22.    Dermatology Problem List:  1. Melanoma in situ, left chest  - s/p excision 7/27/2010 at [facility]  2.  Chronic burning rash of the upper extremities with biopsy showing a largely normal-appearing epidermis above mild perivascular lymphocytic inflammation without fungal elements or increased basement membrane noted on September 6, 2019  - Differential diagnosis includes (photo)allergic contact dermatitis, connective tissue disease (DM > NADIA as the former may lack significant epidermal changes)  - negative/normal: Aldolase, CK, PFTs, polymyositis and dermatomyositis panel, urinalysis, SSA, and SSB  - DIEGO borderline positive 1:80,  (nml )  - Did have drug eruption to hydroxychloroquine 6/2021  3. Subcutaneous nodule, left forearm - consistent with lipoma  4. Orogenital ulcers: suspect lichen planus   - tacrolimus 1 mg dissolved in 250 mL water S&S TID-QID  - augmented betamethasone ointment  - bx  6/4/21 nonspecific with negative DIF  - prior bx 2011 ulcer, left cheek  - prior: hydroxychloroquine (stopped due to rash), dexamethasone S&S     ____________________________________________    Assessment & Plan:     1. Orogenital lichen planus: overall doing very well despite downtitration of treatment. Mouth healing nicely though still sensitive to known triggering factors. Vulvar area responds to augmented betamethasone within a few days. No need to escalate therapy for this at this time, see below.    2. Photosensitive eruption, weakness and arthralgias/arthritis, possible cardiomyopathy (undergoing workup per cardiology), shortness of breath: these symptoms are concerning for dermatomyositis, possibly with myocardial involvement. Has cardiac MRI  scheduled for tomorrow, which will hopefully be helpful. Past workup for dermatomyositis revealed minimally elevated CK and DIEGO, but otherwise unremarkable, though she did have a paradoxic hydroxychloroquine reaction, which is seen not uncommonly in this setting. Her PFTs were unremarkable at that time.  - recheck CRP/ESR, CK/aldolase, myositis panel  - consider high-res chest CT to assess for ILD, pending cardiac MRI tomorrow  - consider mycophenolate vs methotrexate pending above workup    Procedures Performed:    None    Follow-up: 3 months, pending workup results    Staff:     Harrison Alcaraz MD, FAAD   of Dermatology  Department of Dermatology  [facility]    ____________________________________________    CC: Derm Problem (Teresa, is here for her oral lichen planus, she states things are much better)    HPI:  Ms. Teresa Fernandez is a(n) 61 year old female who presents today as a return patient for oral erosive lichen planus    Oral erosive lichen planus - stopped tacrolimus S&S about a month ago  - taste and burning seemed to have cleared up - better than in past  - not using any medications  - occasional vaginal sores with soap, but after 1-2 days of topicals, it subsides  - faint red patch on left buccal mucosa  - saw PCP about a week ago who thought was looking good as well    Cardiac workup - feeling short of breath, especially when lying flat  - workup for cardiomyopathy ongoing  - cardiac MRI pending  - stopped Advair - no change  - prior PFTs okay    Hip and knee very painful - chronic; especially right hip  - knees give out when so to sit on toilet  - slow getting up from chairs; pain with stairs  - no issues combing hair/hands above head    Hair thinning - no rash, no redness/flakiness    Red dots on the ankles, buttocks - applies augmented betamethasone which stops itching  - rash on the arms - if stop using ointment, dries up  - if goes in  sun/overheated, rash comes out and turns very red    Patient is otherwise feeling well, without additional skin concerns.    Labs Reviewed:  2019 PFTs unremarkable  2019 autoantibodies notable only for DIEGO+ 1:80    Physical Exam:  Vitals: There were no vitals taken for this visit.  SKIN: Teledermatology photos were reviewed; image quality and interpretability: acceptable. Image date: 8/16/21.  - faint erythema on the buccal mucosa  - No other lesions of concern on areas examined.     Medications:  Current Outpatient Medications   Medication     acetaminophen (TYLENOL) 325 MG tablet     albuterol (PROAIR HFA/PROVENTIL HFA/VENTOLIN HFA) 108 (90 BASE) MCG/ACT Inhaler     APNO CREA ointment     augmented betamethasone dipropionate (DIPROLENE-AF) 0.05 % external ointment     Cholecalciferol (VITAMIN D3 PO)     Cyanocobalamin (VITAMIN B 12 PO)     dexamethasone (DECADRON) 0.5 MG/5ML elixir     DULoxetine (CYMBALTA) 60 MG capsule     fexofenadine (ALLEGRA) 180 MG tablet     fluticasone-salmeterol (ADVAIR DISKUS) 250-50 MCG/DOSE inhaler     levothyroxine (SYNTHROID/LEVOTHROID) 100 MCG tablet     levothyroxine (SYNTHROID/LEVOTHROID) 88 MCG tablet     lidocaine (XYLOCAINE) 2 % solution     magic mouthwash (ENTER INGREDIENTS IN COMMENTS) suspension     mupirocin (BACTROBAN) 2 % external ointment     omeprazole (PRILOSEC) 20 MG DR capsule     order for DME     OVER-THE-COUNTER     tacrolimus (GENERIC EQUIVALENT) 1 MG capsule     triamcinolone (ARISTOCORT HP) 0.5 % external cream     triamcinolone (KENALOG) 0.1 % external ointment     No current facility-administered medications for this visit.      Past Medical/Surgical History:   Patient Active Problem List   Diagnosis     Acquired hypothyroidism     Allergic state     Chronic rhinitis     Allergic rhinitis due to pollen     Sinusitis, chronic     History of skin cancer     ERIC (generalized anxiety disorder)     Leukoplakia of oral mucosa, including tongue     GERD  (gastroesophageal reflux disease)     Chronic low back pain     Dry eye syndrome     Chronic fatigue     Osteopenia     History of melanoma in situ     Eczema     Moderate persistent asthma without complication     DDD (degenerative disc disease), lumbar     Hyperlipidemia LDL goal <160     Chest tightness or pressure     Muscle pain     GAYE (obstructive sleep apnea)     History of kidney cancer     Past Medical History:   Diagnosis Date     ALLERGIC RHINITIS NOS 4/12/2005     Anxiety      Arthritis     herniated disc     Bronchitis, not specified as acute or chronic      CHR MAXILLARY SINUSITIS 4/12/2005     Depressive disorder, not elsewhere classified      Eczema 10/17/2012     Environmental and seasonal allergies      GERD (gastroesophageal reflux disease)      Gluten intolerance      Malignant neoplasm of renal pelvis (H) 1995    Renal cell carcinoma     Melanoma (H) 06/2010     Other specified acquired hypothyroidism 4/12/2005     Renal cancer (H) 5/5/2011     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

## 2021-08-16 NOTE — NURSING NOTE
Chief Complaint   Patient presents with     Derm Problem     Teresa, is here for her oral lichen planus, she states things are much better    Ramirez Damon EMT

## 2021-08-17 ENCOUNTER — TELEPHONE (OUTPATIENT)
Dept: CARDIOLOGY | Facility: CLINIC | Age: 61
End: 2021-08-17

## 2021-08-17 ENCOUNTER — HOSPITAL ENCOUNTER (OUTPATIENT)
Dept: CARDIOLOGY | Facility: CLINIC | Age: 61
Discharge: HOME | End: 2021-08-17
Attending: INTERNAL MEDICINE | Admitting: INTERNAL MEDICINE
Payer: OTHER GOVERNMENT

## 2021-08-17 DIAGNOSIS — R07.89 TIGHTNESS IN CHEST: ICD-10-CM

## 2021-08-17 DIAGNOSIS — R42 DIZZINESS: Primary | ICD-10-CM

## 2021-08-17 DIAGNOSIS — R42 DIZZINESS: ICD-10-CM

## 2021-08-17 PROCEDURE — 75561 CARDIAC MRI FOR MORPH W/DYE: CPT

## 2021-08-17 PROCEDURE — 75561 CARDIAC MRI FOR MORPH W/DYE: CPT | Mod: 26 | Performed by: INTERNAL MEDICINE

## 2021-08-17 PROCEDURE — 255N000002 HC RX 255 OP 636: Performed by: INTERNAL MEDICINE

## 2021-08-17 PROCEDURE — A9585 GADOBUTROL INJECTION: HCPCS | Performed by: INTERNAL MEDICINE

## 2021-08-17 RX ORDER — GADOBUTROL 604.72 MG/ML
13 INJECTION INTRAVENOUS ONCE
Status: COMPLETED | OUTPATIENT
Start: 2021-08-17 | End: 2021-08-17

## 2021-08-17 RX ADMIN — GADOBUTROL 13 ML: 604.72 INJECTION INTRAVENOUS at 13:57

## 2021-08-17 NOTE — TELEPHONE ENCOUNTER
Cardiac MRI done 8/17/21   1. The LV is normal in cavity size and wall thickness. The global systolic function is normal. The LVEF is  67%. There are no regional wall motion abnormalities.     2. The RV is normal in cavity size. The global systolic function is normal. The RVEF is 69%.      3. Both atria are normal in size.     4. There is no significant valvular disease.      5. There is no evidence of myocardial edema on T2 weighted imaging. There is no evidence of myocardial iron  overload.      6. Late gadolinium enhancement imaging shows no MI, fibrosis or infiltrative disease.      CONCLUSIONS:  Normal bi-ventricular size and systolic function. No late enhancement to suggest prior  infarct, inflammation or infiltration.     Last saw Dr. Betancourt 8/4/21   As far as wall motion abnormalities and mildly reduced ejection fraction is concerned I will perform a cardiac MRI to further ascertain this.  There seems to be a little bit of discrepancy between the nuclear stress [54%] and echocardiogram findings [45%].  She does not report any symptoms/history suggestive of myopericarditis and does not report any history suggestive of stress-induced cardiomyopathy.  If the MRI shows significant scarring in a particular coronary distribution I would consider performing a coronary angiogram.  She does have risk factors for coronary artery disease in the form of elevated LDL but does not report any symptoms suggestive of angina.  We also discussed medications for cardiomyopathy but I told her that given that she has dizziness and syncope and I believe it may partly be related to orthostatic hypotension I would hold off on starting her on heart failure medications.  If the MRI shows low ejection fraction we may consider doing so at a later date. Guilherme make a follow up based on the results of the test.     Will route to Dr. Betancourt to review

## 2021-08-18 ENCOUNTER — TELEPHONE (OUTPATIENT)
Dept: DERMATOLOGY | Facility: CLINIC | Age: 61
End: 2021-08-18

## 2021-08-18 DIAGNOSIS — J84.9 ILD (INTERSTITIAL LUNG DISEASE) (H): Primary | ICD-10-CM

## 2021-08-18 NOTE — TELEPHONE ENCOUNTER
Called to discuss CT chest. Cardiac MRI without overt evidence of myositis. Will perform chest CT to assess for ILD.

## 2021-08-23 ENCOUNTER — LAB (OUTPATIENT)
Dept: LAB | Facility: CLINIC | Age: 61
End: 2021-08-23
Payer: OTHER GOVERNMENT

## 2021-08-23 DIAGNOSIS — M33.13 DERMATOMYOSITIS (H): ICD-10-CM

## 2021-08-23 LAB
CK SERPL-CCNC: 229 U/L (ref 30–225)
CRP SERPL-MCNC: 5.2 MG/L (ref 0–8)
ERYTHROCYTE [SEDIMENTATION RATE] IN BLOOD BY WESTERGREN METHOD: 13 MM/HR (ref 0–30)

## 2021-08-23 PROCEDURE — 86140 C-REACTIVE PROTEIN: CPT

## 2021-08-23 PROCEDURE — 82550 ASSAY OF CK (CPK): CPT

## 2021-08-23 PROCEDURE — 99000 SPECIMEN HANDLING OFFICE-LAB: CPT

## 2021-08-23 PROCEDURE — 86235 NUCLEAR ANTIGEN ANTIBODY: CPT | Mod: 90

## 2021-08-23 PROCEDURE — 36415 COLL VENOUS BLD VENIPUNCTURE: CPT

## 2021-08-23 PROCEDURE — 82085 ASSAY OF ALDOLASE: CPT | Mod: 90

## 2021-08-23 PROCEDURE — 85652 RBC SED RATE AUTOMATED: CPT

## 2021-08-23 PROCEDURE — 83516 IMMUNOASSAY NONANTIBODY: CPT | Mod: 90

## 2021-08-24 ENCOUNTER — MYC MEDICAL ADVICE (OUTPATIENT)
Dept: FAMILY MEDICINE | Facility: CLINIC | Age: 61
End: 2021-08-24

## 2021-08-24 NOTE — TELEPHONE ENCOUNTER
Andrew Betancourt MD  You 17 hours ago (4:36 PM)   RR  Completely normal study. No e/o MI etc. Follow up with me in a yr.     1 year follow up ordered. Spoke with patient and reviewed cardiac MRI result. Pt verbalized understanding. No concerns at this time.

## 2021-08-26 LAB — ALDOLASE SERPL-CCNC: 4.3 U/L

## 2021-08-30 DIAGNOSIS — M33.13 DERMATOMYOSITIS (H): Primary | ICD-10-CM

## 2021-09-03 LAB
ANA SER QL: NEGATIVE
ANNOTATION COMMENT IMP: NORMAL
EJ AB SER QL: NEGATIVE
ENA JO1 IGG SER-ACNC: 0 AU/ML
MDA5 AB SER QL LINE BLOT: NEGATIVE
MI2 AB SER QL: NEGATIVE
MJ AB SER QL LINE BLOT: NEGATIVE
OJ AB SER QL: NEGATIVE
PL12 AB SER QL: NEGATIVE
PL7 AB SER QL: NEGATIVE
SAE1 AB SER QL LINE BLOT: NEGATIVE
SRP AB SERPL QL: NEGATIVE
TIF1-GAMMA AB SER QL LINE BLOT: NEGATIVE

## 2021-09-17 NOTE — PROGRESS NOTES
Physical Therapy Discharge Summary    Teresa Fernandez is a 61 year old female who presented today for physical therapy with complaints of dizziness upon exertion and in hot environments. Dizziness comes on randomly with no warning in these situations. Upon balance testing today, Teresa scored a perfect score on the GUDINO and well over the cut off score for the FGA. Her right leg is slightly weaker with more balance difficulties than the left but nothing that is alarming at this time. Exercises were provided to work on some more challenging balance drills at home. AT this time, I am recommending that Teresa may benefit from a cardio follow up in regards to discuss BP and her heart. The symptoms she is describing sound more vascular in nature.     Patient did not return for further therapy and this episode of care is being discharged.     Please contact me with any questions or concerns.  Thank you for your referral.    Genesis Patel  PT, DPT       9/17/2021   [address]   [address] [place] [id number]  [email or web address]  [email or web address]: [phone]

## 2021-09-18 ENCOUNTER — HEALTH MAINTENANCE LETTER (OUTPATIENT)
Age: 61
End: 2021-09-18

## 2021-09-20 DIAGNOSIS — F41.1 GAD (GENERALIZED ANXIETY DISORDER): ICD-10-CM

## 2021-09-20 DIAGNOSIS — M79.10 MYALGIA: ICD-10-CM

## 2021-09-22 RX ORDER — DULOXETIN HYDROCHLORIDE 60 MG/1
CAPSULE, DELAYED RELEASE ORAL
Qty: 90 CAPSULE | Refills: 0 | Status: SHIPPED | OUTPATIENT
Start: 2021-09-22 | End: 2021-12-20

## 2021-09-30 ENCOUNTER — ANCILLARY PROCEDURE (OUTPATIENT)
Dept: GENERAL RADIOLOGY | Facility: CLINIC | Age: 61
End: 2021-09-30
Attending: NURSE PRACTITIONER
Payer: OTHER GOVERNMENT

## 2021-09-30 ENCOUNTER — OFFICE VISIT (OUTPATIENT)
Dept: FAMILY MEDICINE | Facility: CLINIC | Age: 61
End: 2021-09-30
Payer: OTHER GOVERNMENT

## 2021-09-30 VITALS
DIASTOLIC BLOOD PRESSURE: 74 MMHG | HEART RATE: 82 BPM | SYSTOLIC BLOOD PRESSURE: 121 MMHG | TEMPERATURE: 98.4 F | HEIGHT: 68 IN | BODY MASS INDEX: 34.4 KG/M2 | OXYGEN SATURATION: 97 % | RESPIRATION RATE: 14 BRPM | WEIGHT: 227 LBS

## 2021-09-30 DIAGNOSIS — M79.7 FIBROMYALGIA: ICD-10-CM

## 2021-09-30 DIAGNOSIS — M89.8X9 BONE PAIN: ICD-10-CM

## 2021-09-30 DIAGNOSIS — M89.8X9 BONE PAIN: Primary | ICD-10-CM

## 2021-09-30 LAB
BASOPHILS # BLD AUTO: 0 10E3/UL (ref 0–0.2)
BASOPHILS NFR BLD AUTO: 1 %
CK SERPL-CCNC: 205 U/L (ref 30–225)
CRP SERPL-MCNC: <2.9 MG/L (ref 0–8)
EOSINOPHIL # BLD AUTO: 0.2 10E3/UL (ref 0–0.7)
EOSINOPHIL NFR BLD AUTO: 3 %
ERYTHROCYTE [DISTWIDTH] IN BLOOD BY AUTOMATED COUNT: 12.7 % (ref 10–15)
ERYTHROCYTE [SEDIMENTATION RATE] IN BLOOD BY WESTERGREN METHOD: 9 MM/HR (ref 0–30)
HCT VFR BLD AUTO: 40.1 % (ref 35–47)
HGB BLD-MCNC: 13.5 G/DL (ref 11.7–15.7)
LYMPHOCYTES # BLD AUTO: 1.8 10E3/UL (ref 0.8–5.3)
LYMPHOCYTES NFR BLD AUTO: 33 %
MCH RBC QN AUTO: 29.4 PG (ref 26.5–33)
MCHC RBC AUTO-ENTMCNC: 33.7 G/DL (ref 31.5–36.5)
MCV RBC AUTO: 87 FL (ref 78–100)
MONOCYTES # BLD AUTO: 0.5 10E3/UL (ref 0–1.3)
MONOCYTES NFR BLD AUTO: 9 %
NEUTROPHILS # BLD AUTO: 3.1 10E3/UL (ref 1.6–8.3)
NEUTROPHILS NFR BLD AUTO: 55 %
PLATELET # BLD AUTO: 264 10E3/UL (ref 150–450)
RBC # BLD AUTO: 4.59 10E6/UL (ref 3.8–5.2)
WBC # BLD AUTO: 5.6 10E3/UL (ref 4–11)

## 2021-09-30 PROCEDURE — 86140 C-REACTIVE PROTEIN: CPT | Performed by: NURSE PRACTITIONER

## 2021-09-30 PROCEDURE — 99214 OFFICE O/P EST MOD 30 MIN: CPT | Mod: 25 | Performed by: NURSE PRACTITIONER

## 2021-09-30 PROCEDURE — 85025 COMPLETE CBC W/AUTO DIFF WBC: CPT | Performed by: NURSE PRACTITIONER

## 2021-09-30 PROCEDURE — 90682 RIV4 VACC RECOMBINANT DNA IM: CPT | Performed by: NURSE PRACTITIONER

## 2021-09-30 PROCEDURE — 80053 COMPREHEN METABOLIC PANEL: CPT | Performed by: NURSE PRACTITIONER

## 2021-09-30 PROCEDURE — 85652 RBC SED RATE AUTOMATED: CPT | Performed by: NURSE PRACTITIONER

## 2021-09-30 PROCEDURE — 73590 X-RAY EXAM OF LOWER LEG: CPT | Mod: RT | Performed by: RADIOLOGY

## 2021-09-30 PROCEDURE — 90471 IMMUNIZATION ADMIN: CPT | Performed by: NURSE PRACTITIONER

## 2021-09-30 PROCEDURE — 36415 COLL VENOUS BLD VENIPUNCTURE: CPT | Performed by: NURSE PRACTITIONER

## 2021-09-30 PROCEDURE — 73523 X-RAY EXAM HIPS BI 5/> VIEWS: CPT | Mod: FY | Performed by: RADIOLOGY

## 2021-09-30 PROCEDURE — 82550 ASSAY OF CK (CPK): CPT | Performed by: NURSE PRACTITIONER

## 2021-09-30 RX ORDER — PREGABALIN 50 MG/1
CAPSULE ORAL
Qty: 90 CAPSULE | Refills: 1 | Status: SHIPPED | OUTPATIENT
Start: 2021-09-30 | End: 2022-02-14

## 2021-09-30 ASSESSMENT — MIFFLIN-ST. JEOR: SCORE: 1640.23

## 2021-09-30 ASSESSMENT — PAIN SCALES - GENERAL: PAINLEVEL: SEVERE PAIN (7)

## 2021-09-30 NOTE — PROGRESS NOTES
Assessment & Plan     Bone pain    - Adult Endocrinology Referral; Future  - CRP, inflammation; Future  - ESR: Erythrocyte sedimentation rate; Future  - CK total; Future  - XR Tibia & Fibula Right 2 Views  - XR Pelvis and Hip Bilateral 2 Views; Future  - Comprehensive metabolic panel (BMP + Alb, Alk Phos, ALT, AST, Total. Bili, TP); Future  - CBC with platelets and differential; Future  - pregabalin (LYRICA) 50 MG capsule; Take 1 cap by mouth daily for 5 days then increase to twice daily for 5 days and then increase to three times daily  - CRP, inflammation  - ESR: Erythrocyte sedimentation rate  - CK total  - Comprehensive metabolic panel (BMP + Alb, Alk Phos, ALT, AST, Total. Bili, TP)  - CBC with platelets and differential    Fibromyalgia    - pregabalin (LYRICA) 50 MG capsule; Take 1 cap by mouth daily for 5 days then increase to twice daily for 5 days and then increase to three times daily             CONSULTATION/REFERRAL to Endocrine- rule out underlying cause for patient symptoms     FUTURE APPOINTMENTS:       - Follow-up visit in 1-2 months on Lyrica    See Patient Instructions    No follow-ups on file.    HERIBERTO Linton CNP  [facility]    Eric Moore is a 61 year old who presents for the following health issues     HPI     Musculoskeletal problem/pain  Onset/Duration: beginning of summer  Description  Location: bilateral hips- worse on right. Right shin and knee. Left distal radius.   Joint Swelling: no  Redness: no  Pain: YES- severe  Warmth: no  Intensity:  moderate, severe  Progression of Symptoms:  worsening, constant and intermittent  Accompanying signs and symptoms:   Fevers: no  Numbness/tingling/weakness: YES  History  Trauma to the area: YES- fell beginning of summer  Recent illness:  no  Previous similar problem: no  Previous evaluation:  no  Precipitating or alleviating factors:  Aggravating factors include: standing, walking, climbing stairs,  "lifting, exercise and overuse  Therapies tried and outcome: rest/inactivity, heat, ice, acetaminophen and Advil    Hx of chronic pain and inflammation. Work up so far from Rheumatology (x 2) has been - fibromyalgia and primary osteoarthritis of multiple sites- testing for autoimmune disease has been unremarkable even though Teresa does exhibit a lot of symptoms such as muscle and joint pain, rashes and mouth sores that come and go, dry eyes and mouth, thyroid disease, fatigue, frequent falls from legs giving out (seeing Neurology). 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 Today Teresa complains of bone pain that is not in a joint.     Review of Systems   Constitutional, HEENT, cardiovascular, pulmonary, gi and gu systems are negative, except as otherwise noted.      Objective    /74 (BP Location: Right arm, Patient Position: Chair, Cuff Size: Adult Large)   Pulse 82   Temp 98.4  F (36.9  C) (Tympanic)   Resp 14   Ht 1.715 m (5' 7.5\")   Wt 103 kg (227 lb)   LMP  (LMP Unknown)   SpO2 97%   Breastfeeding No   BMI 35.03 kg/m    Body mass index is 35.03 kg/m .  Physical Exam   GENERAL: alert, no distress and fatigued  EYES: Eyes grossly normal to inspection  RESP: lungs clear to auscultation - no rales, rhonchi or wheezes  CV: regular rates and rhythm, normal S1 S2, no S3 or S4, no murmur, click or rub and no peripheral edema  ABDOMEN: soft, nontender, no hepatosplenomegaly, no masses and bowel sounds normal  MS: no gross musculoskeletal defects noted, no edema  SKIN: hair qualitythin  NEURO: Normal strength and tone, mentation intact and speech normal    Xray - Reviewed and interpreted by me.  No acute findings, awaiting radiology final read            "

## 2021-10-01 ENCOUNTER — MYC MEDICAL ADVICE (OUTPATIENT)
Dept: FAMILY MEDICINE | Facility: CLINIC | Age: 61
End: 2021-10-01

## 2021-10-01 LAB
ALBUMIN SERPL-MCNC: 3.7 G/DL (ref 3.4–5)
ALP SERPL-CCNC: 100 U/L (ref 40–150)
ALT SERPL W P-5'-P-CCNC: 36 U/L (ref 0–50)
ANION GAP SERPL CALCULATED.3IONS-SCNC: 4 MMOL/L (ref 3–14)
AST SERPL W P-5'-P-CCNC: 27 U/L (ref 0–45)
BILIRUB SERPL-MCNC: 0.3 MG/DL (ref 0.2–1.3)
BUN SERPL-MCNC: 10 MG/DL (ref 7–30)
CALCIUM SERPL-MCNC: 9.4 MG/DL (ref 8.5–10.1)
CHLORIDE BLD-SCNC: 105 MMOL/L (ref 94–109)
CO2 SERPL-SCNC: 29 MMOL/L (ref 20–32)
CREAT SERPL-MCNC: 0.82 MG/DL (ref 0.52–1.04)
GFR SERPL CREATININE-BSD FRML MDRD: 78 ML/MIN/1.73M2
GLUCOSE BLD-MCNC: 89 MG/DL (ref 70–99)
POTASSIUM BLD-SCNC: 4 MMOL/L (ref 3.4–5.3)
PROT SERPL-MCNC: 7 G/DL (ref 6.8–8.8)
SODIUM SERPL-SCNC: 138 MMOL/L (ref 133–144)

## 2021-10-01 NOTE — RESULT ENCOUNTER NOTE
Mesfin Moore    Your lab results came back within normal limits. The CK level is normal. The inflammatory markers are normal. No abnormal blood cells seen. Liver, kidney function and electrolytes are all unremarkable. Follow up with Endocrine as planned. Please let us know if you have any questions.     Take care,    HERIBERTO Miller CNP

## 2021-10-04 ENCOUNTER — MYC MEDICAL ADVICE (OUTPATIENT)
Dept: FAMILY MEDICINE | Facility: CLINIC | Age: 61
End: 2021-10-04

## 2021-10-07 NOTE — RESULT ENCOUNTER NOTE
Mesfin Moore    Your xray read is finally finalized- mild arthritis in the knee, no shin abnormalities. Please let us know if you have any questions.     Take care,    HERIBERTO Miller CNP

## 2021-10-07 NOTE — RESULT ENCOUNTER NOTE
Mesfin Moore    Your xray report shows early arthritis (calcification) in a portion of the bilateral hips. Also shows mild arthritis in the front portion of the hips, worse on the right. Please let us know if you have any questions.     Take care,    HERIBERTO Miller CNP

## 2021-10-10 NOTE — PROGRESS NOTES
"[facility] Dermato-allergology Note  Office visit  Encounter Date: Oct 12, 2021  ____________________________________________    CC: No chief complaint on file.      HPI:  (10/09/21)  Ms. Teresa Fernandez is a(n) 61 year old female who presents today as a new patient for allergy consultation  -\"consider oral lichenoid reaction to amalgam; this would not entirely explain genital involvement but this is essentially resolved and continue oral involvement appears to be in close proximity to dental work. Will refer to Dr. Jurado for this assessment in case patch testing may be useful.\"  - first started having oral lesions in 2007  - was having painful bumps on cheeks and tongue, looked similar to canker sores  - progressively got worse  - cut out gluten and improved  - came back a few years ago and started to see Dr. Alcaraz  - is currently using tacrolimus 1 mg dissolved in 250 mL water S&S TID-QID and augmented betamethasone ointment which improved lesions and not using anymore  - had bridges placed for dental work about 15 years ago  - notes some irritation with metal jewelry  - takes allegra daily, last took this AM   - does not use nasal spray  - otherwise feeling well in usual state of health    Physical exam:  General: In no acute distress, well-developed, well-nourished  Eyes: no conjunctivitis  ENT: no signs of rhinitis   Pulmonary: no wheezing or coughing  Skin: Focused examination of the skin on test sites was performed = see test results below  Focused examination of the oral mucosa and left lower leg was performed.  - Left lower leg with 2, erythematous, nummular papules with scale  - Intraoral exam with dental work on right upper molars along with left upper and lowe molar as well. There are white, lacy plaques on the inside of left cheek with an erythematous erosion in the middle. There is erythema of the left tonsils with no other erosions identified    Past Medical History: "   Patient Active Problem List   Diagnosis     Acquired hypothyroidism     Allergic state     Chronic rhinitis     Allergic rhinitis due to pollen     Sinusitis, chronic     History of skin cancer     ERIC (generalized anxiety disorder)     Leukoplakia of oral mucosa, including tongue     GERD (gastroesophageal reflux disease)     Chronic low back pain     Dry eye syndrome     Chronic fatigue     Osteopenia     History of melanoma in situ     Eczema     Moderate persistent asthma without complication     DDD (degenerative disc disease), lumbar     Hyperlipidemia LDL goal <160     Chest tightness or pressure     Muscle pain     GAYE (obstructive sleep apnea)     History of kidney cancer     Fibromyalgia     Past Medical History:   Diagnosis Date     ALLERGIC RHINITIS NOS 4/12/2005     Anxiety      Arthritis     herniated disc     Bronchitis, not specified as acute or chronic      CHR MAXILLARY SINUSITIS 4/12/2005     Depressive disorder, not elsewhere classified      Eczema 10/17/2012     Environmental and seasonal allergies      GERD (gastroesophageal reflux disease)      Gluten intolerance      Malignant neoplasm of renal pelvis (H) 1995    Renal cell carcinoma     Melanoma (H) 06/2010     Other specified acquired hypothyroidism 4/12/2005     Renal cancer (H) 5/5/2011     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     Pcn [Penicillins] Hives       Medications:  Current Outpatient Medications   Medication     acetaminophen (TYLENOL) 325 MG tablet     albuterol (PROAIR HFA/PROVENTIL HFA/VENTOLIN HFA) 108 (90 BASE) MCG/ACT Inhaler     APNO CREA ointment     augmented betamethasone  dipropionate (DIPROLENE-AF) 0.05 % external ointment     Cholecalciferol (VITAMIN D3 PO)     Cyanocobalamin (VITAMIN B 12 PO)     dexamethasone (DECADRON) 0.5 MG/5ML elixir     DULoxetine (CYMBALTA) 60 MG capsule     fexofenadine (ALLEGRA) 180 MG tablet     fluticasone-salmeterol (ADVAIR DISKUS) 250-50 MCG/DOSE inhaler     levothyroxine (SYNTHROID/LEVOTHROID) 100 MCG tablet     levothyroxine (SYNTHROID/LEVOTHROID) 88 MCG tablet     lidocaine (XYLOCAINE) 2 % solution     magic mouthwash (ENTER INGREDIENTS IN COMMENTS) suspension     mupirocin (BACTROBAN) 2 % external ointment     omeprazole (PRILOSEC) 20 MG DR capsule     order for DME     OVER-THE-COUNTER     pregabalin (LYRICA) 50 MG capsule     tacrolimus (GENERIC EQUIVALENT) 1 MG capsule     triamcinolone (ARISTOCORT HP) 0.5 % external cream     triamcinolone (KENALOG) 0.1 % external ointment     No current facility-administered medications for this visit.       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 Patient has the following hobbies or non-occupational exposure camping and outdoor activities    Family History:  Family History   Problem Relation Age of Onset     Diabetes Mother      Cardiovascular Mother      Lipids Mother      Depression Mother      Hypertension Father      Lipids Father      Obesity Father      Macular Degeneration Father      Sleep Apnea Father      Cancer Maternal Grandmother         kind unknown had sores on legs     Eczema Maternal Grandmother      Eczema Maternal Grandfather      Breast Cancer Paternal Grandmother      Cancer Paternal Grandmother         breast     Hypertension Paternal Grandfather      Cardiovascular Paternal Grandfather         heart attack     Hypertension Brother      Thyroid Disease Sister      Hypertension Sister      Depression Sister      Allergies Sister      Allergies Son      Eczema Son      Lipids Sister      Thyroid Disease Sister       Neurologic Disorder Sister      Depression Sister      Respiratory Son      Sleep Apnea Son      Glaucoma No family hx of      Cerebrovascular Disease No family hx of        Previous Labs, Allergy Tests, Dermatopathology, Imaging:  About 20 years ago had prick testing positive to dandelion and tumbleweed    Referred By: No referring provider defined for this encounter.     Allergy Tests:    Past Allergy Test    Order for Future Allergy Testing:    [x] Outpatient  [] Inpatient: [facility]..../ Bed ....       Skin Atopy (atopic dermatitis) [] Yes   [x] No .........  Contact allergies:   [] Yes   [x] No ..........  Hand eczema:   [] Yes   [x] No           Leading hand:   [] R   [] L       [] Ambidextrous         Drug allergies:        [x] Yes   [] No  Which?...... penicillin cause itchy; contrast got anaphylaxis    Urticaria/Angioedema  [] Yes   [x] No .........  Food Allergy:  [x] Yes   [] No  Which?...... gluten intolerance  Pets :  [x] Yes   [] No  which?......  2 dogs aged 5 and 12        [x]  Rhinitis   [x] Conjunctivitis   [x] Sinusitis   [] Polyposis   [] Otitis   [x] Pharyngitis         []  none  Operations:   [] Tonsils   [] Septum   [] Sinus   [] Polyposis        [] Asthma bronchiale   [x] Coughing      []  none  Symptoms (mostly Rhinoconjunctivitis and Asthma) aggravated by:  Season   [] I   [] II   [] III   [x] IV   [x]V   []VI   []VII   []VIII   [x]IX   [x]X   []XI   []XI     [x] perennial   Day time      [x] morning   [] noon      [] evening        [] night    [] whole day........  []  none  Location/changes    [] inside        [x] outside   [] mountains    [] sea     [] others.............   []  none  Triggers, specific     [] animals     [] plants     [x] dust              [] others ...........................    []  none  Triggers, others       [] work          [] psyche    [] sport            [] others .............................  []  none  Irritant                [] phys efforts [] smoke    [x]  heat/cold     [] odors  []others............... []  none    Order for PATCH TESTS  Reason for tests (suspected allergy): question if allergy to dental work  Known previous allergies: n/a  Standardized panels  [x] Standard panel (40 tests)  [x] Preservatives & Antimicrobials (31 tests)  [x] Emulsifiers & Additives (25 tests)   [x] Perfumes/Flavours & Plants (25 tests)  [] Hairdresser panel (12 tests)  [] Rubber Chemicals (22 tests)  [x] Plastics (26 tests)  [] Colorants/Dyes/Food additives (20 tests)  [x] Metals (implants/dental) (24 tests)  [] Local anaesthetics/NSAIDs (13 tests)  [] Antibiotics & Antimycotics (14 tests)   [] Corticosteroids (15 tests)   [] Photopatch test (62 tests)   [] others: ...      [] Patient's own products: ...    DO NOT test if chemical or biological identity is unknown!     always ask from patient the product information and safety sheets (MSDS)       Order for PRICK TESTS    Reason for tests (suspected allergy): seasonal rhinoconjunctivitis  Known previous allergies: dandelions    Standardized prick panels  [x] Atopic panel (20 tests)  [] Pediatric Panel (12 tests)  [] Milk, Meat, Eggs, Grains (20 tests)   [] Dust, Epithelia, Feathers (10 tests)  [] Fish, Seafood, Shellfish (17 tests)  [] Nuts, Beans (14 tests)  [] Spice, Vegetable, Fruit (17 tests)  [] Pollen Panel = Tree, Grass, Weed (24 tests)  [] Others: ...      [] Patient's own products: ...    DO NOT test if chemical or biological identity is unknown!     always ask from patient the product information and safety sheets (MSDS)     Standardized intradermal tests  [x] Penicillium notatum [x] Aspergillus fumigatus [x] House dust mites D.far & D. pteron  [] Cat    [] dog  [] Others: ...  [] Bee venom   [] Wasp venom  !!Specific protocol with dilutions!!       Order for Drug allergy tests (prick & Intradermal tests)    [x] Penicillin G  [x] Ampicillin [] Cefazolin   [] Ceftriaxone   [] Ceftazidime  [] Bactrim    [x] Others: ...  omnipaque, visipaque, gadolinium  Order for ... as test date    [] Patient needs consultation with Allergy team (changes of tests may apply)  [x] Tests discussed with Allergy team (can have direct appointment for allergy tests)     ________________________________    Assessment & Plan:    ==> Final Diagnosis:     # Recurrent ulcers cheek area. DDx oral lichen planus vs allergic contact sensitization to dental products  * stable chronic illness  History of dental work in about 2005. Reports lesions started around 2007. 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 Lesions have improved with using tacrolimus. However, still recur as identified on exam today. Agree that patch testing to see if has any sensitization to dental products is best next step. Will schedule for patch testing as above.  - Continue tacrolimus 1 mg dissolved in 250 mL water S&S TID-QID and augmented betamethasone ointment for cutaneous involvement    # History of drug allergies; penicillin, iohexol, gadalinium  * stable chronic illness  - Endorses itching and swelling about 24 hours after penicillin use many years ago. Reaction unclear. Does not appear like anaphylaxis but will test at f/u.  - History of swelling and trouble breathing with immediate type reaction after minutes of exposure to iohexol and gadalinium. Will test at f/u    # Suspicion for atopic predisposition with     nummular eczema = atopic dermatitis?    Rhinosinusitis, seasonal and perennial = Suspect dust mites, tree pollen and grass pollen with history.  * stable chronic illness     - Start mometasone spray BID for allergy symptoms  - Can continue with steroid ointments for skin lesions  - Uses allegra daily. Instructed to stop allegra 7 days prior to f/u appointment for prick testing    These conclusions are made at the best of one's knowledge and belief based on the provided evidence such as patient's history and allergy test  results and they can change over time or can be incomplete because of missing information's.    ==> Treatment Plan:  As above    Procedures Performed: Allergy tests, including prick, intradermal and patch tests, drug allergy or provocation tests will be performed at f/u appointment    Staff and Resident:    I, Garrett Muniz MD, discussed and evaluated the patient with Dr. Jurado.    Staff Physician Comments:  I was present with the medical student who participated in the service and in the documentation of the note. I have verified the history and personally performed the physical exam and medical decision making. I agree with the assessment and plan as documented in the note. I have reviewed and if necessary amended the note.      Dagoberto Jurado MD  Professor  Head of Dermato-Allergy Division  Department of Dermatology  [facility]      Follow-up in Derm-Allergy clinic in 4 weeks for testing      I spent a total of 30 minutes with Teresa Fernandez. This time was spent counseling the patient and/or coordinating care, explaining the allergy tests

## 2021-10-12 ENCOUNTER — OFFICE VISIT (OUTPATIENT)
Dept: ALLERGY | Facility: CLINIC | Age: 61
End: 2021-10-12
Payer: OTHER GOVERNMENT

## 2021-10-12 ENCOUNTER — TELEPHONE (OUTPATIENT)
Dept: FAMILY MEDICINE | Facility: CLINIC | Age: 61
End: 2021-10-12

## 2021-10-12 ENCOUNTER — PRE VISIT (OUTPATIENT)
Dept: ALLERGY | Facility: CLINIC | Age: 61
End: 2021-10-12

## 2021-10-12 DIAGNOSIS — J32.9 RHINOSINUSITIS: ICD-10-CM

## 2021-10-12 DIAGNOSIS — K13.21 LEUKOPLAKIA OF ORAL MUCOSA, INCLUDING TONGUE: Primary | ICD-10-CM

## 2021-10-12 DIAGNOSIS — L23.89 ALLERGIC CONTACT DERMATITIS DUE TO OTHER AGENTS: ICD-10-CM

## 2021-10-12 DIAGNOSIS — Z88.9 DRUG ALLERGY: ICD-10-CM

## 2021-10-12 PROCEDURE — 99214 OFFICE O/P EST MOD 30 MIN: CPT | Mod: GC | Performed by: DERMATOLOGY

## 2021-10-12 RX ORDER — MOMETASONE FUROATE MONOHYDRATE 50 UG/1
2 SPRAY, METERED NASAL DAILY
Qty: 17 G | Refills: 11 | Status: SHIPPED | OUTPATIENT
Start: 2021-10-12 | End: 2021-11-29

## 2021-10-12 RX ORDER — MOMETASONE FUROATE MONOHYDRATE 50 UG/1
2 SPRAY, METERED NASAL DAILY
Qty: 17 G | Refills: 11 | Status: CANCELLED | OUTPATIENT
Start: 2021-10-12

## 2021-10-12 ASSESSMENT — PAIN SCALES - GENERAL: PAINLEVEL: NO PAIN (0)

## 2021-10-12 NOTE — LETTER
"    10/12/2021         RE: Teresa Fernandez  [address] [facility] [id number]        Dear Colleague,    Thank you for referring your patient, Teresa Fernandez, to the [facility] [place]. Please see a copy of my visit note below.    [facility] Dermato-allergology Note  Office visit  Encounter Date: Oct 12, 2021  ____________________________________________    CC: No chief complaint on file.      HPI:  (10/09/21)  Ms. Teresa Fernandez is a(n) 61 year old female who presents today as a new patient for allergy consultation  -\"consider oral lichenoid reaction to amalgam; this would not entirely explain genital involvement but this is essentially resolved and continue oral involvement appears to be in close proximity to dental work. Will refer to Dr. Jurado for this assessment in case patch testing may be useful.\"  - first started having oral lesions in 2007  - was having painful bumps on cheeks and tongue, looked similar to canker sores  - progressively got worse  - cut out gluten and improved  - came back a few years ago and started to see Dr. Alcaraz  - is currently using tacrolimus 1 mg dissolved in 250 mL water S&S TID-QID and augmented betamethasone ointment which improved lesions and not using anymore  - had bridges placed for dental work about 15 years ago  - notes some irritation with metal jewelry  - takes allegra daily, last took this AM   - does not use nasal spray  - otherwise feeling well in usual state of health    Physical exam:  General: In no acute distress, well-developed, well-nourished  Eyes: no conjunctivitis  ENT: no signs of rhinitis   Pulmonary: no wheezing or coughing  Skin: Focused examination of the skin on test sites was performed = see test results below  Focused examination of the oral mucosa and left lower leg was performed.  - Left lower leg with 2, erythematous, nummular papules with scale  - Intraoral exam with dental work on " right upper molars along with left upper and lowe molar as well. There are white, lacy plaques on the inside of left cheek with an erythematous erosion in the middle. There is erythema of the left tonsils with no other erosions identified    Past Medical History:   Patient Active Problem List   Diagnosis     Acquired hypothyroidism     Allergic state     Chronic rhinitis     Allergic rhinitis due to pollen     Sinusitis, chronic     History of skin cancer     ERIC (generalized anxiety disorder)     Leukoplakia of oral mucosa, including tongue     GERD (gastroesophageal reflux disease)     Chronic low back pain     Dry eye syndrome     Chronic fatigue     Osteopenia     History of melanoma in situ     Eczema     Moderate persistent asthma without complication     DDD (degenerative disc disease), lumbar     Hyperlipidemia LDL goal <160     Chest tightness or pressure     Muscle pain     GAYE (obstructive sleep apnea)     History of kidney cancer     Fibromyalgia     Past Medical History:   Diagnosis Date     ALLERGIC RHINITIS NOS 4/12/2005     Anxiety      Arthritis     herniated disc     Bronchitis, not specified as acute or chronic      CHR MAXILLARY SINUSITIS 4/12/2005     Depressive disorder, not elsewhere classified      Eczema 10/17/2012     Environmental and seasonal allergies      GERD (gastroesophageal reflux disease)      Gluten intolerance      Malignant neoplasm of renal pelvis (H) 1995    Renal cell carcinoma     Melanoma (H) 06/2010     Other specified acquired hypothyroidism 4/12/2005     Renal cancer (H) 5/5/2011     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      Pcn [Penicillins] Hives       Medications:  Current Outpatient Medications   Medication     acetaminophen (TYLENOL) 325 MG tablet     albuterol (PROAIR HFA/PROVENTIL HFA/VENTOLIN HFA) 108 (90 BASE) MCG/ACT Inhaler     APNO CREA ointment     augmented betamethasone dipropionate (DIPROLENE-AF) 0.05 % external ointment     Cholecalciferol (VITAMIN D3 PO)     Cyanocobalamin (VITAMIN B 12 PO)     dexamethasone (DECADRON) 0.5 MG/5ML elixir     DULoxetine (CYMBALTA) 60 MG capsule     fexofenadine (ALLEGRA) 180 MG tablet     fluticasone-salmeterol (ADVAIR DISKUS) 250-50 MCG/DOSE inhaler     levothyroxine (SYNTHROID/LEVOTHROID) 100 MCG tablet     levothyroxine (SYNTHROID/LEVOTHROID) 88 MCG tablet     lidocaine (XYLOCAINE) 2 % solution     magic mouthwash (ENTER INGREDIENTS IN COMMENTS) suspension     mupirocin (BACTROBAN) 2 % external ointment     omeprazole (PRILOSEC) 20 MG DR capsule     order for DME     OVER-THE-COUNTER     pregabalin (LYRICA) 50 MG capsule     tacrolimus (GENERIC EQUIVALENT) 1 MG capsule     triamcinolone (ARISTOCORT HP) 0.5 % external cream     triamcinolone (KENALOG) 0.1 % external ointment     No current facility-administered medications for this visit.       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 Patient has the following hobbies or non-occupational exposure camping and outdoor activities    Family History:  Family History   Problem Relation Age of Onset     Diabetes Mother      Cardiovascular Mother      Lipids Mother      Depression Mother      Hypertension Father      Lipids Father      Obesity Father      Macular Degeneration Father      Sleep Apnea Father      Cancer Maternal Grandmother         kind unknown had sores on legs     Eczema Maternal Grandmother      Eczema Maternal Grandfather      Breast Cancer Paternal Grandmother      Cancer Paternal Grandmother         breast     Hypertension Paternal Grandfather       Cardiovascular Paternal Grandfather         heart attack     Hypertension Brother      Thyroid Disease Sister      Hypertension Sister      Depression Sister      Allergies Sister      Allergies Son      Eczema Son      Lipids Sister      Thyroid Disease Sister      Neurologic Disorder Sister      Depression Sister      Respiratory Son      Sleep Apnea Son      Glaucoma No family hx of      Cerebrovascular Disease No family hx of        Previous Labs, Allergy Tests, Dermatopathology, Imaging:  About 20 years ago had prick testing positive to dandelion and tumbleweed    Referred By: No referring provider defined for this encounter.     Allergy Tests:    Past Allergy Test    Order for Future Allergy Testing:    [x] Outpatient  [] Inpatient: [facility]..../ Bed ....       Skin Atopy (atopic dermatitis) [] Yes   [x] No .........  Contact allergies:   [] Yes   [x] No ..........  Hand eczema:   [] Yes   [x] No           Leading hand:   [] R   [] L       [] Ambidextrous         Drug allergies:        [x] Yes   [] No  Which?...... penicillin cause itchy; contrast got anaphylaxis    Urticaria/Angioedema  [] Yes   [x] No .........  Food Allergy:  [x] Yes   [] No  Which?...... gluten intolerance  Pets :  [x] Yes   [] No  which?......  2 dogs aged 5 and 12        [x]  Rhinitis   [x] Conjunctivitis   [x] Sinusitis   [] Polyposis   [] Otitis   [x] Pharyngitis         []  none  Operations:   [] Tonsils   [] Septum   [] Sinus   [] Polyposis        [] Asthma bronchiale   [x] Coughing      []  none  Symptoms (mostly Rhinoconjunctivitis and Asthma) aggravated by:  Season   [] I   [] II   [] III   [x] IV   [x]V   []VI   []VII   []VIII   [x]IX   [x]X   []XI   []XI     [x] perennial   Day time      [x] morning   [] noon      [] evening        [] night    [] whole day........  []  none  Location/changes    [] inside        [x] outside   [] mountains    [] sea     [] others.............   []  none  Triggers, specific     [] animals     []  plants     [x] dust              [] others ...........................    []  none  Triggers, others       [] work          [] psyche    [] sport            [] others .............................  []  none  Irritant                [] phys efforts [] smoke    [x] heat/cold     [] odors  []others............... []  none    Order for PATCH TESTS  Reason for tests (suspected allergy): question if allergy to dental work  Known previous allergies: n/a  Standardized panels  [x] Standard panel (40 tests)  [x] Preservatives & Antimicrobials (31 tests)  [x] Emulsifiers & Additives (25 tests)   [x] Perfumes/Flavours & Plants (25 tests)  [] Hairdresser panel (12 tests)  [] Rubber Chemicals (22 tests)  [x] Plastics (26 tests)  [] Colorants/Dyes/Food additives (20 tests)  [x] Metals (implants/dental) (24 tests)  [] Local anaesthetics/NSAIDs (13 tests)  [] Antibiotics & Antimycotics (14 tests)   [] Corticosteroids (15 tests)   [] Photopatch test (62 tests)   [] others: ...      [] Patient's own products: ...    DO NOT test if chemical or biological identity is unknown!     always ask from patient the product information and safety sheets (MSDS)       Order for PRICK TESTS    Reason for tests (suspected allergy): seasonal rhinoconjunctivitis  Known previous allergies: dandelions    Standardized prick panels  [x] Atopic panel (20 tests)  [] Pediatric Panel (12 tests)  [] Milk, Meat, Eggs, Grains (20 tests)   [] Dust, Epithelia, Feathers (10 tests)  [] Fish, Seafood, Shellfish (17 tests)  [] Nuts, Beans (14 tests)  [] Spice, Vegetable, Fruit (17 tests)  [] Pollen Panel = Tree, Grass, Weed (24 tests)  [] Others: ...      [] Patient's own products: ...    DO NOT test if chemical or biological identity is unknown!     always ask from patient the product information and safety sheets (MSDS)     Standardized intradermal tests  [x] Penicillium notatum [x] Aspergillus fumigatus [x] House dust mites D.far & D. pteron  [] Cat    [] dog  []  Others: ...  [] Bee venom   [] Wasp venom  !!Specific protocol with dilutions!!       Order for Drug allergy tests (prick & Intradermal tests)    [x] Penicillin G  [x] Ampicillin [] Cefazolin   [] Ceftriaxone   [] Ceftazidime  [] Bactrim    [x] Others: ... omnipaque, visipaque, gadolinium  Order for ... as test date    [] Patient needs consultation with Allergy team (changes of tests may apply)  [x] Tests discussed with Allergy team (can have direct appointment for allergy tests)     ________________________________    Assessment & Plan:    ==> Final Diagnosis:     # Recurrent ulcers cheek area. DDx oral lichen planus vs allergic contact sensitization to dental products  * stable chronic illness  History of dental work in about 2005. Reports lesions started around 2007. 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 Lesions have improved with using tacrolimus. However, still recur as identified on exam today. Agree that patch testing to see if has any sensitization to dental products is best next step. Will schedule for patch testing as above.  - Continue tacrolimus 1 mg dissolved in 250 mL water S&S TID-QID and augmented betamethasone ointment for cutaneous involvement    # History of drug allergies; penicillin, iohexol, gadalinium  * stable chronic illness  - Endorses itching and swelling about 24 hours after penicillin use many years ago. Reaction unclear. Does not appear like anaphylaxis but will test at f/u.  - History of swelling and trouble breathing with immediate type reaction after minutes of exposure to iohexol and gadalinium. Will test at f/u    # Suspicion for atopic predisposition with     nummular eczema = atopic dermatitis?    Rhinosinusitis, seasonal and perennial = Suspect dust mites, tree pollen and grass pollen with history.  * stable chronic illness     - Start mometasone spray BID for allergy symptoms  - Can continue with steroid ointments for skin  lesions  - Uses allegra daily. Instructed to stop allegra 7 days prior to f/u appointment for prick testing    These conclusions are made at the best of one's knowledge and belief based on the provided evidence such as patient's history and allergy test results and they can change over time or can be incomplete because of missing information's.    ==> Treatment Plan:  As above    Procedures Performed: Allergy tests, including prick, intradermal and patch tests, drug allergy or provocation tests will be performed at f/u appointment    Staff and Resident:    I, Garrett Muniz MD, discussed and evaluated the patient with Dr. Jurado.    Staff Physician Comments:  I was present with the medical student who participated in the service and in the documentation of the note. I have verified the history and personally performed the physical exam and medical decision making. I agree with the assessment and plan as documented in the note. I have reviewed and if necessary amended the note.      Dagoberto Jurado MD  Professor  Head of Dermato-Allergy Division  Department of Dermatology  [facility]      Follow-up in Derm-Allergy clinic in 4 weeks for testing      I spent a total of 30 minutes with Teresa Fernandez. This time was spent counseling the patient and/or coordinating care, explaining the allergy tests       Again, thank you for allowing me to participate in the care of your patient.        Sincerely,        Dagoberto Jurado MD

## 2021-10-12 NOTE — NURSING NOTE
Chief Complaint   Patient presents with     Allergy Consult     Teresa is here today from a Dr Alcaraz referral. She has oral lichen planis and wants to see if she is allergic to anything in her mouth.     Walter Tripp, Paramedic

## 2021-10-12 NOTE — TELEPHONE ENCOUNTER
Insurance does not pay for mometasone nasal spray. Patient must try Aselastine, Flunisolide, Fluticasone or Ipratropium first. Please send new Rx.    Thank you,  Laura Skelton - Pharmacy Technician  [facility]  [phone]

## 2021-10-13 ENCOUNTER — MYC MEDICAL ADVICE (OUTPATIENT)
Dept: FAMILY MEDICINE | Facility: CLINIC | Age: 61
End: 2021-10-13

## 2021-10-14 RX ORDER — TRIAMCINOLONE ACETONIDE 55 UG/1
2 SPRAY, METERED NASAL DAILY
Qty: 16.5 G | Refills: 1 | Status: SHIPPED | OUTPATIENT
Start: 2021-10-14 | End: 2022-02-14

## 2021-10-18 DIAGNOSIS — G47.33 OSA (OBSTRUCTIVE SLEEP APNEA): Primary | ICD-10-CM

## 2021-10-28 ENCOUNTER — TELEPHONE (OUTPATIENT)
Dept: ALLERGY | Facility: CLINIC | Age: 61
End: 2021-10-28

## 2021-10-28 DIAGNOSIS — R21 RASH: ICD-10-CM

## 2021-10-28 NOTE — TELEPHONE ENCOUNTER
STANDARD Series                                          # Substance 2 days 4 days remarks     1 Shravan Mix [C] - -       2 Colophony - -       3  2-Mercaptobenzothiazole  - -       4 Methylisothiazolinone - -       5 Carba Mix - -       6 Thiuram Mix [A] - -       7 Bisphenol A Epoxy Resin - -       8 F-Uvuz-Hycwuvtixor-Formaldehyde Resin - -       9 Mercapto Mix [A] - -       10 Black Rubber Mix- PPD [B] - -       11 Potassium Dichromate  -  -       12 Balsam of [place] (Myroxylon Pereirae Resin) - -       13 Nickel Sulphate Hexahydrate - -       14 Mixed Dialkyl Thiourea - -       15 Paraben Mix [B] - -       16 Methyldibromo Glutaronitrile - -       17 Fragrance Mix - -       18 2-Bromo-2-Nitropropane-1,3-Diol (Bronopol) CT - -       19 Lyral - -       20 Tixocortol-21- Pivalate CT - -       21 Diazolidiyl Urea (Germall II) - -       22 Methyl Methacrylate - -       23 Cobalt (II) Chloride Hexahydrate - -       24 Fragrance Mix II  - -       25 Compositae Mix - -       26 Benzoyl Peroxide - -       27 Bacitracin - -       28 Formaldehyde - -       29 Methylchloroisothiazolinone / Methylisothiazolinone - -       30 Corticosteroid Mix CT - -       31 Sodium Lauryl Sulfate - -       32 Lanolin Alcohol - -       33 Turpentine - -       34 Cetylstearylalcohol - -       35 Chlorhexidine Dicluconate - -       36 Budenoside - -       37 Imidazolidinyl Urea  - -       38 Ethyl-2 Cyanoacrylate - -       39 Quaternium 15 (Dowicil 200) - -       40 Decyl Glucoside - -       PRESERVATIVES & ANTIMICROBIALS        # Substance 2 days 4 days remarks   41 1  1,2-Benzisothiazoline-3-One, Sodium Salt - -     2  1,3,5-Hamlet (2-Hydroxyethyl) - Hexahydrotriazine (Grotan BK) - -     3 5-Gdoxsgqdjsawi-6-Nitro-1, 3-Propanediol - -     4  3, 4, 4' - Triclocarban - -    45 5 4 - Chloro - 3 - Cresol - -     6 4 - Chloro - 4 - Xylenol (PCMX) - -     7 7-Ethylbicyclooxazolidine (Bioban TQ9959) - -     8 Benzalkonium Chloride CT - -     9 Benzyl  Alcohol - -    50 10 Cetalkonium Chloride - -     11 Cetylpyrimidine Chloride  - -     12 Chloroacetamide - -     13 DMDM Hydantoin - -     14 Glutaraldehyde - -    55 15 Triclosan - -     16 Glyoxal Trimeric Dihydrate - -     17 Iodopropynyl Butylcarbamate - -     18 Octylisothiazoline - -     19 Bithionol CT - -    60 20 Bioban P 1487 (Nitrobutyl) Morpholine/(Ethylnitro-Trimethylene) Dimorpholine - -     21 Phenoxyethanol - -     22 Phenyl Salicylate - -     23 Povidone Iodine - -     24 Sodium Benzoate - -    65 25 Sodium Disulfite - -     26 Sorbic Acid - -     27 Thimerosal       28 Melamine Formaldehyde Resin       29 Ethylenediamine Dihydrochloride        Parabens      70 30 Butyl-P-Hydroxybenzoate - -     31 Ethyl-P-Hydroxybenzoate - -     32 Methyl-P-Hydroxybenzoate - -    73 33 Propyl-P-Hydroxybenzoate - -      EMULSIFIERS & ADDITIVES       # Substance 2 days 4 days remarks   74 1 Polyethylene Glycol-400 - -    75 2 Cocamidopropyl Betaine - -     3 Amerchol L101 - -     4 Propylene Glycol - -     5 Triethanolamine - -     6 Sorbitane Sesquiolate CT - -    80 7 Isopropylmyristate - -     8 Polysorbate 80 CT - -     9 Amidoamine   (Stearamidopropyl Dimethylamine) - -     10 Oleamidopropyl Dimethylamine - -     11 Lauryl Glucoside - -    85 12 Coconut Diethanolamide  - -     13 2-Hydroxy-4-Methoxy Benzophenone (Oxybenzone) - -     14 Benzophenone-4 (Sulisobenzon) - -     15 Propolis - -     16 Dexpanthenol - -    90 17 Carboxymethyl Cellulose Sodium - -     18 Abitol - -     19 Tert-Butylhydroquinone - -     20 Benzyl Salicylate - -     21 Dimethylaminopropylamin (DMPA) CT? D053      95 22 Zinc Pyrithione (Zinc Omadine) CT? Z006       23 Hamlet(Hydroxymethyl) Nitromethane CT        Antioxidant - -     24 Dodecyl Gallate - -     25 Butylhydroxyanisole (BHA) - -     26 Butylhydroxytoluene (BHT) - -    100 27 Di-Alpha-Tocopherol (Vit E) - -    101 28 Propyl Gallate - -      PERFUMES, FLAVORS & PLANTS        #  Substance 2 days 4 days remarks   102 1 Benzyl Cinnamate - -     2 Di-Limonene (Dipentene) - -     3 Cananga Odorata (Kelsea Enamorado) (I) - -    105 4 Lichen Acid Mix - -     5 Mentha Piperita Oil (Peppermint Oil) - -     6 Sesquiterpenelactone mix - -     7 Tea Tree Oil, Oxidized - -     8 Wood Tar Mix - -    110 9 Abietic Acid - -     10 Lavendula Angustifolia Oil (Lavender Oil) - -     11 Fragrance mix II CT * - -      Fragrance Mix I       12 Oakmoss Absolute - -     13 Eugenol - -    115 14 Geraniol - -     15 Hydroxycitronellal - -     16 Isoeugenol - -     17 Cinnamic Aldehyde - -     18 Cinnamic Alcohol  - -      Fragrance mix II      120 19 Citronellol - -     20 Alpha-Hexylcinnamic Aldehyde    - -     21 Citral - -     22 Farnesol - -    124 23 Coumarin - -      Hexylcinnamic aldehyde, Coumarin, Farnesol, Hydroxyisohexy3-cyclohexene carboxaldehyde, citral, citrolellol  PLASTICS        # Substance 2 days 4 days remarks     Acrylates - -    125 1 2-Hydroxyethyl Methacrylate (HEMA) - -     2 1,4-Butandioldimethacrylate (BUDMA) - -     3  2-Ethylhexyl Acrylate - -     4 Bisphenol-A-Dimethacrylate  - -     5 Diurethane-Dimethacrylate - -    130 6 Ethyleneglycoldimethacrylate (EGDMA) - -     7 Pentaerythritoltriacrylate (JENNI) - -     8 Triethylene Glycol Dimethacrylate (TEGDMA) - -      Synthetic material/additives        9 O-Agjy-Pmhorvmzilv - -     10 Tricresyl Phosphate - -    135 11 9-Eltu-Zluhcrlyqprkz - -     12 Bis (2-Ethylhexyl) Phthalate - -     13 Dibutylphthalate - -     14 Dimethylphthalate - -     15 Toluene-2,4-Diisocyanate - -    140 16 Diphenylmethane-4,4''-Diisocyanate - -      EPOXY RESIN SYSTEMS        Reactive Solvents - -     17 Cresyl Glycidyl Ether - -     18 Butyl Glycidyl Ether - -     19 Phenyl Glycidyl Ether - -     20 1,4-Butanediol Diglycidyl Ether - -    145 21 1,6-Hexanediole Diglycidyl Ether - -      Hardener / Accelerator - -     22 Triethylenetetramine - -     23  Diethylenetriamine - -     24 Isophorone Diamine (IPD) - -     25 N,N-Dimethyl-P-Toluidine - -    150 26 2-ihic-Ppqujrumarntn (antioxidant/stabilizer) CT - -    151 27 0-jxbs-Wckajntgytzjxqdxbppxiub resin PTBP CT  - -      METALS (Implants / Dental)        # Substance 2 days 4 days remarks   152 1 Ammonium Heptamolybdate (IV) - -     2 Ammonium Tetrachloroplatinate - -     3 Indium (III) Chloride - -    155 4 Iridium (III) Chloride - -     5 Ferric Chloride - -     6 Manganese (II) Chloride - -     7 Niobium (V) Chloride - -     8 Palladium Chloride - -    160 9 Silver Nitrate - -     10 Gold Sodium Thiosulfate - -     11 Tantal - -     12 Tin (II) Chloride - -     13 Titanium (IV) Oxide - -    165 14 Titanium - -     15 Tungstic Acid, Sod Salt Dihydrat - -     16 Vanadium Pentoxide - -     17 Wolfram - -     18 Zinc Chloride - -    170 19 Zirconium (IV) Oxide - -     20 Ammoniated Murcury - -     21 Copper Sulfate Pentahydrate - -     22 Amalgam  - -     23 Aluminum Hydroxide - -    175 24 Platinum Tetrachloride - -        Results of patch tests:                         Interpretation:  - Negative                    A    = Allergic      (+) Erythema    TI   = Toxic/irritant   + E + Infiltration    RaP = Relevance at Present     ++ E/I + Papulovesicle   Rpr  = Relevance Previously     +++ E/I/P + Blister     nR   = No Relevance      Atopy Screen (Placed 10/28/21)    No Substance Readings (15 min) Evaluation   POS Histamine 1mg/ml -    NEG NaCl 0.9% -      No Substance Readings (15 min) Evaluation   1 Alternaria alternata (tenuis)  -    2 Cladosporium herbarum -    3 Aspergillus fumigatus -    4 Penicillium notatum -    5 Dermatophagoides pteronyssinus -    6 Dermatophagoides farinae -    7 Dog epithelium (canis spp) -    8 Cat hair (hellen catus) -    9 Cockroach   (Blatella americana & germanica) -    10 Grass mix midwest   (Carmelina, Orchard, Redtop, Saroj) -    11 Qasim grass (sorghum halepense) -    12 Weed mix    (common Cocklebur, Lamb s quarters, rough redroot Pigweed, Dock/Sorrel) -    13 Mug wort (artemisia vulgare) -    14 Ragweed giant/short (ambrosia spp) -    15 White birch (Betula papyrifera) -    16 Tree mix 1 (Pecan, Maple BHR, Oak RVW, american [place], black [place]) -    17 Red cedar (juniperus virginia) -    18 Tree mix 2   (white [place], river/red Birch, black [place], common [place], american Elm) -    19 Box elder/Maple mix (acer spp) -    20 [place] shagbark (carya ovata) -           Conclusion

## 2021-10-29 RX ORDER — TRIAMCINOLONE ACETONIDE 1 MG/G
OINTMENT TOPICAL
Qty: 453.6 G | Refills: 3 | Status: SHIPPED | OUTPATIENT
Start: 2021-10-29 | End: 2022-03-31

## 2021-10-29 NOTE — TELEPHONE ENCOUNTER
Routing refill request to provider for review/approval because:  Not originating prescriber.    Jossy Claire RN

## 2021-10-30 DIAGNOSIS — Z88.9 DRUG ALLERGY: Primary | ICD-10-CM

## 2021-10-30 RX ORDER — PENICILLIN G POTASSIUM 5000000 [IU]/1
5000000 INJECTION, POWDER, FOR SOLUTION INTRAMUSCULAR; INTRAVENOUS ONCE
Status: ACTIVE | OUTPATIENT
Start: 2021-11-01

## 2021-10-30 RX ORDER — AMPICILLIN 1 G/1
1 INJECTION, POWDER, FOR SOLUTION INTRAMUSCULAR; INTRAVENOUS ONCE
Status: ACTIVE | OUTPATIENT
Start: 2021-11-01

## 2021-10-30 NOTE — PROGRESS NOTES
"[facility] Dermato-allergology Note  Office visit  Encounter Date: Nov 1, 2021  ____________________________________________    CC: No chief complaint on file.      HPI:  Ms. Teresa Fernandez is a(n) 61 year old female who presents today as a return patient for allergy consultation  - Follow-up in Derm-Allergy clinic for allergy tests as planned  - otherwise feeling well in usual state of health    Physical exam:  General: In no acute distress, well-developed, well-nourished  Eyes: no conjunctivitis  ENT: no signs of rhinitis   Pulmonary: no wheezing or coughing  Skin: Focused examination of the skin on test sites was performed = see test results below  No active eczematous skin lesions on tests sites, particularly back    Earlier History and Allergy exams:  (10/09/21)  Ms. Teresa Fernandez is a(n) 61 year old female who presents today as a new patient for allergy consultation  -\"consider oral lichenoid reaction to amalgam; this would not entirely explain genital involvement but this is essentially resolved and continue oral involvement appears to be in close proximity to dental work. Will refer to Dr. Jurado for this assessment in case patch testing may be useful.\"  - first started having oral lesions in 2007  - was having painful bumps on cheeks and tongue, looked similar to canker sores  - progressively got worse  - cut out gluten and improved  - came back a few years ago and started to see Dr. Alcaraz  - is currently using tacrolimus 1 mg dissolved in 250 mL water S&S TID-QID and augmented betamethasone ointment which improved lesions and not using anymore  - had bridges placed for dental work about 15 years ago  - notes some irritation with metal jewelry  - takes allegra daily, last took this AM   - does not use nasal spray  Focused examination of the oral mucosa and left lower leg was performed.  - Left lower leg with 2, erythematous, nummular papules with scale  - Intraoral exam with " dental work on right upper molars along with left upper and lowe molar as well. There are white, lacy plaques on the inside of left cheek with an erythematous erosion in the middle. There is erythema of the left tonsils with no other erosions identified    Past Medical History:   Patient Active Problem List   Diagnosis     Acquired hypothyroidism     Allergic state     Chronic rhinitis     Allergic rhinitis due to pollen     Sinusitis, chronic     History of skin cancer     ERIC (generalized anxiety disorder)     Leukoplakia of oral mucosa, including tongue     GERD (gastroesophageal reflux disease)     Chronic low back pain     Dry eye syndrome     Chronic fatigue     Osteopenia     History of melanoma in situ     Eczema     Moderate persistent asthma without complication     DDD (degenerative disc disease), lumbar     Hyperlipidemia LDL goal <160     Chest tightness or pressure     Muscle pain     GAYE (obstructive sleep apnea)     History of kidney cancer     Fibromyalgia     Past Medical History:   Diagnosis Date     ALLERGIC RHINITIS NOS 4/12/2005     Anxiety      Arthritis     herniated disc     Bronchitis, not specified as acute or chronic      CHR MAXILLARY SINUSITIS 4/12/2005     Depressive disorder, not elsewhere classified      Eczema 10/17/2012     Environmental and seasonal allergies      GERD (gastroesophageal reflux disease)      Gluten intolerance      Malignant neoplasm of renal pelvis (H) 1995    Renal cell carcinoma     Melanoma (H) 06/2010     Other specified acquired hypothyroidism 4/12/2005     Renal cancer (H) 5/5/2011     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     Grave's disease     Unspecified asthma(493.90)        Allergies:  Allergies   Allergen Reactions     Omnipaque [Iohexol] Swelling and Difficulty breathing     Immediate throat swelling following around 1-2cc of omnipaque 300 for spine procedure     Gluten      Pcn [Penicillins] Hives        Medications:  Current Outpatient Medications   Medication     acetaminophen (TYLENOL) 325 MG tablet     albuterol (PROAIR HFA/PROVENTIL HFA/VENTOLIN HFA) 108 (90 BASE) MCG/ACT Inhaler     APNO CREA ointment     augmented betamethasone dipropionate (DIPROLENE-AF) 0.05 % external ointment     Cholecalciferol (VITAMIN D3 PO)     Cyanocobalamin (VITAMIN B 12 PO)     dexamethasone (DECADRON) 0.5 MG/5ML elixir     DULoxetine (CYMBALTA) 60 MG capsule     fexofenadine (ALLEGRA) 180 MG tablet     fluticasone-salmeterol (ADVAIR DISKUS) 250-50 MCG/DOSE inhaler     levothyroxine (SYNTHROID/LEVOTHROID) 100 MCG tablet     levothyroxine (SYNTHROID/LEVOTHROID) 88 MCG tablet     lidocaine (XYLOCAINE) 2 % solution     magic mouthwash (ENTER INGREDIENTS IN COMMENTS) suspension     mometasone (NASONEX) 50 MCG/ACT nasal spray     mupirocin (BACTROBAN) 2 % external ointment     omeprazole (PRILOSEC) 20 MG DR capsule     order for DME     OVER-THE-COUNTER     pregabalin (LYRICA) 50 MG capsule     tacrolimus (GENERIC EQUIVALENT) 1 MG capsule     triamcinolone (ARISTOCORT HP) 0.5 % external cream     triamcinolone (KENALOG) 0.1 % external ointment     triamcinolone (NASACORT) 55 MCG/ACT nasal aerosol     No current facility-administered medications for this visit.       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 Patient has the following hobbies or non-occupational exposure camping and outdoor activities    Family History:  Family History   Problem Relation Age of Onset     Diabetes Mother      Cardiovascular Mother      Lipids Mother      Depression Mother      Hypertension Father      Lipids Father      Obesity Father      Macular Degeneration Father      Sleep Apnea Father      Cancer Maternal Grandmother         kind unknown had sores on legs     Eczema Maternal Grandmother      Eczema Maternal Grandfather      Breast Cancer Paternal Grandmother      Cancer  Paternal Grandmother         breast     Hypertension Paternal Grandfather      Cardiovascular Paternal Grandfather         heart attack     Hypertension Brother      Thyroid Disease Sister      Hypertension Sister      Depression Sister      Allergies Sister      Allergies Son      Eczema Son      Lipids Sister      Thyroid Disease Sister      Neurologic Disorder Sister      Depression Sister      Respiratory Son      Sleep Apnea Son      Glaucoma No family hx of      Cerebrovascular Disease No family hx of        Previous Labs, Allergy Tests, Dermatopathology, Imaging:  About 20 years ago had prick testing positive to dandelion and tumbleweed    Referred By: No referring provider defined for this encounter.     Allergy Tests:    Past Allergy Test    Order for Future Allergy Testing:    [x] Outpatient  [] Inpatient: [facility]..../ Bed ....       Skin Atopy (atopic dermatitis) [] Yes   [x] No .........  Contact allergies:   [] Yes   [x] No ..........  Hand eczema:   [] Yes   [x] No           Leading hand:   [] R   [] L       [] Ambidextrous         Drug allergies:        [x] Yes   [] No  Which?...... penicillin cause itchy; contrast got anaphylaxis    Urticaria/Angioedema  [] Yes   [x] No .........  Food Allergy:  [x] Yes   [] No  Which?...... gluten intolerance  Pets :  [x] Yes   [] No  which?......  2 dogs aged 5 and 12        [x]  Rhinitis   [x] Conjunctivitis   [x] Sinusitis   [] Polyposis   [] Otitis   [x] Pharyngitis         []  none  Operations:   [] Tonsils   [] Septum   [] Sinus   [] Polyposis        [] Asthma bronchiale   [x] Coughing      []  none  Symptoms (mostly Rhinoconjunctivitis and Asthma) aggravated by:  Season   [] I   [] II   [] III   [x] IV   [x]V   []VI   []VII   []VIII   [x]IX   [x]X   []XI   []XI     [x] perennial   Day time      [x] morning   [] noon      [] evening        [] night    [] whole day........  []  none  Location/changes    [] inside        [x] outside   [] mountains    [] sea     []  others.............   []  none  Triggers, specific     [] animals     [] plants     [x] dust              [] others ...........................    []  none  Triggers, others       [] work          [] psyche    [] sport            [] others .............................  []  none  Irritant                [] phys efforts [] smoke    [x] heat/cold     [] odors  []others............... []  none    Order for PATCH TESTS  Reason for tests (suspected allergy): question if allergy to dental work  Known previous allergies: n/a  Standardized panels  [x] Standard panel (40 tests)  [x] Preservatives & Antimicrobials (31 tests)  [x] Emulsifiers & Additives (25 tests)   [x] Perfumes/Flavours & Plants (25 tests)  [] Hairdresser panel (12 tests)  [] Rubber Chemicals (22 tests)  [x] Plastics (26 tests)  [] Colorants/Dyes/Food additives (20 tests)  [x] Metals (implants/dental) (24 tests)  [] Local anaesthetics/NSAIDs (13 tests)  [] Antibiotics & Antimycotics (14 tests)   [] Corticosteroids (15 tests)   [] Photopatch test (62 tests)   [] others: ...      [] Patient's own products: ...    DO NOT test if chemical or biological identity is unknown!     always ask from patient the product information and safety sheets (MSDS)       Order for PRICK TESTS    Reason for tests (suspected allergy): seasonal rhinoconjunctivitis  Known previous allergies: dandelions    Standardized prick panels  [x] Atopic panel (20 tests)  [] Pediatric Panel (12 tests)  [] Milk, Meat, Eggs, Grains (20 tests)   [] Dust, Epithelia, Feathers (10 tests)  [] Fish, Seafood, Shellfish (17 tests)  [] Nuts, Beans (14 tests)  [] Spice, Vegetable, Fruit (17 tests)  [] Pollen Panel = Tree, Grass, Weed (24 tests)  [] Others: ...      [] Patient's own products: ...    DO NOT test if chemical or biological identity is unknown!     always ask from patient the product information and safety sheets (MSDS)     Standardized intradermal tests  [x] Penicillium notatum [x] Aspergillus  fumigatus [x] House dust mites D.far & D. pteron  [] Cat    [] dog  [] Others: ...  [] Bee venom   [] Wasp venom  !!Specific protocol with dilutions!!       Order for Drug allergy tests (prick & Intradermal tests)    [x] Penicillin G  [x] Ampicillin [] Cefazolin   [] Ceftriaxone   [] Ceftazidime  [] Bactrim    [x] Others: ... omnipaque, visipaque, gadolinium  Order for ... as test date    ________________________________    RESULTS & EVALUATION of PATCH TESTS    Patch test readings after     [x] 2 days, [] 3 days [x] 4 days, [] 5 days,   Other duration: ...    11/01/21 application of patch tests with results:    STANDARD Series        # Substance 2 days 4 days remarks    1 Shravan Mix [C] - -     2 Colophony - -     3  2-Mercaptobenzothiazole  - -      4 Methylisothiazolinone - -     5 Carba Mix - -     6 Thiuram Mix [A] - -     7 Bisphenol A Epoxy Resin - -     8 P-Tsay-Fjbkpxckjyp-Formaldehyde Resin - -     9 Mercapto Mix [A] - -     10 Black Rubber Mix- PPD [B] - -     11 Potassium Dichromate  -  -     12 Balsam of [place] (Myroxylon Pereirae Resin) - -     13 Nickel Sulphate Hexahydrate - -     14 Mixed Dialkyl Thiourea - -     15 Paraben Mix [B] - -     16 Methyldibromo Glutaronitrile - -     17 Fragrance Mix - -     18 2-Bromo-2-Nitropropane-1,3-Diol (Bronopol) CT - -     19 Lyral - -     20 Tixocortol-21- Pivalate CT - -     21 Diazolidiyl Urea (Germall II) - -     22 Methyl Methacrylate - -     23 Cobalt (II) Chloride Hexahydrate - -     24 Fragrance Mix II  - -     25 Compositae Mix - -     26 Benzoyl Peroxide - -     27 Bacitracin - -     28 Formaldehyde - -     29 Methylchloroisothiazolinone / Methylisothiazolinone - -     30 Corticosteroid Mix CT NA NA     31 Sodium Lauryl Sulfate - -     32 Lanolin Alcohol - -     33 Turpentine - -     34 Cetylstearylalcohol - -     35 Chlorhexidine Dicluconate - -     36 Budenoside - -     37 Imidazolidinyl Urea  - -     38 Ethyl-2 Cyanoacrylate - -     39 Quaternium 15  (Dowicil 200) - -     40 Decyl Glucoside - -     PRESERVATIVES & ANTIMICROBIALS        # Substance 2 days 4 days remarks   41 1  1,2-Benzisothiazoline-3-One, Sodium Salt - -    42 2  1,3,5-Hamlet (2-Hydroxyethyl) - Hexahydrotriazine (Grotan BK) - -    43 3 9-Vvzlwjxybdkdm-1-Nitro-1, 3-Propanediol NA NA    44 4  3, 4, 4' - Triclocarban - -    45 5 4 - Chloro - 3 - Cresol - -    46 6 4 - Chloro - 4 - Xylenol (PCMX) - -    47 7 7-Ethylbicyclooxazolidine (Bioban QK2716) - -    48 8 Benzalkonium Chloride CT - -    49 9 Benzyl Alcohol - -    50 10 Cetalkonium Chloride - -    51 11 Cetylpyrimidine Chloride  - -    52 12 Chloroacetamide - -    53 13 DMDM Hydantoin - -    54 14 Glutaraldehyde - -    55 15 Triclosan - -    56 16 Glyoxal Trimeric Dihydrate - -    57 17 Iodopropynyl Butylcarbamate - -    58 18 Octylisothiazoline NA NA    59 19 Bithionol CT - -    60 20 Bioban P 1487 (Nitrobutyl) Morpholine/(Ethylnitro-Trimethylene) Dimorpholine - -    61 21 Phenoxyethanol - -    62 22 Phenyl Salicylate - -    63 23 Povidone Iodine - -    64 24 Sodium Benzoate - -    65 25 Sodium Disulfite - -    66 26 Sorbic Acid - -    67 27 Thimerosal      68 28 Melamine Formaldehyde Resin      69 29 Ethylenediamine Dihydrochloride        Parabens      70 30 Butyl-P-Hydroxybenzoate - -    71 31 Ethyl-P-Hydroxybenzoate - -    72 32 Methyl-P-Hydroxybenzoate - -    73 33 Propyl-P-Hydroxybenzoate - -     EMULSIFIERS & ADDITIVES       # Substance 2 days 4 days remarks   74 1 Polyethylene Glycol-400 - -    75 2 Cocamidopropyl Betaine - -    76 3 Amerchol L101 - -    77 4 Propylene Glycol - -    78 5 Triethanolamine - -    79 6 Sorbitane Sesquiolate CT - -    80 7 Isopropylmyristate - -    81 8 Polysorbate 80 CT - -    82 9 Amidoamine   (Stearamidopropyl Dimethylamine) NA NA    83 10 Oleamidopropyl Dimethylamine - -    84 11 Lauryl Glucoside NA NA    85 12 Coconut Diethanolamide  - -    86 13 2-Hydroxy-4-Methoxy Benzophenone (Oxybenzone) - -    87 14  Benzophenone-4 (Sulisobenzon) NA NA    88 15 Propolis - -    89 16 Dexpanthenol - -    90 17 Carboxymethyl Cellulose Sodium NA NA    91 18 Abitol - -    92 19 Tert-Butylhydroquinone - -    93 20 Benzyl Salicylate - -    94 21 Dimethylaminopropylamin (DMPA) CT? D053      95 22 Zinc Pyrithione (Zinc Omadine) CT? Z006      96 23 Hamlet(Hydroxymethyl) Nitromethane CT        Antioxidant - -    97 24 Dodecyl Gallate - -    98 25 Butylhydroxyanisole (BHA) - -    99 26 Butylhydroxytoluene (BHT) - -    100 27 Di-Alpha-Tocopherol (Vit E) - -    101 28 Propyl Gallate - -     PERFUMES, FLAVORS & PLANTS        # Substance 2 days 4 days remarks   102 1 Benzyl Cinnamate - -    103 2 Di-Limonene (Dipentene) - -    104 3 Cananga Odorata (Kelsea Enamorado) (I) - -    105 4 Lichen Acid Mix - -    106 5 Mentha Piperita Oil (Peppermint Oil) - -    107 6 Sesquiterpenelactone mix - -    108 7 Tea Tree Oil, Oxidized - -    109 8 Wood Tar Mix - -    110 9 Abietic Acid - -    111 10 Lavendula Angustifolia Oil (Lavender Oil) - -    112 11 Fragrance mix II CT * - -      Fragrance Mix I      113 12 [place]moss Absolute - -    114 13 Eugenol - -    115 14 Geraniol - -    116 15 Hydroxycitronellal - -    117 16 Isoeugenol - -    118 17 Cinnamic Aldehyde - -    119 18 Cinnamic Alcohol  - -      Fragrance mix II      120 19 Citronellol - -    121 20 Alpha-Hexylcinnamic Aldehyde    - -    122 21 Citral - -    123 22 Farnesol - -    124 23 Coumarin - -      Hexylcinnamic aldehyde, Coumarin, Farnesol, Hydroxyisohexy3-cyclohexene carboxaldehyde, citral, citrolellol   PLASTICS        # Substance 2 days 4 days remarks     Acrylates - -    125 1 2-Hydroxyethyl Methacrylate (HEMA) - -     2 1,4-Butandioldimethacrylate (BUDMA) - -     3  2-Ethylhexyl Acrylate - -     4 Bisphenol-A-Dimethacrylate  - -     5 Diurethane-Dimethacrylate - -    130 6 Ethyleneglycoldimethacrylate (EGDMA) - -     7 Pentaerythritoltriacrylate (JENNI) - -     8 Triethylene Glycol Dimethacrylate  (TEGDMA) - -      Synthetic material/additives        9 Y-Lxrk-Nadfvhavblu - -     10 Tricresyl Phosphate - -    135 11 4-Mxdb-Rcpsrxivzabcj - -     12 Bis (2-Ethylhexyl) Phthalate - -     13 Dibutylphthalate - -     14 Dimethylphthalate - -     15 Toluene-2,4-Diisocyanate - -    140 16 Diphenylmethane-4,4''-Diisocyanate - -      EPOXY RESIN SYSTEMS        Reactive Solvents - -     17 Cresyl Glycidyl Ether - -     18 Butyl Glycidyl Ether - -     19 Phenyl Glycidyl Ether - -     20 1,4-Butanediol Diglycidyl Ether - -    145 21 1,6-Hexanediole Diglycidyl Ether - -      Hardener / Accelerator - -     22 Triethylenetetramine - -     23 Diethylenetriamine - -     24 Isophorone Diamine (IPD) - -     25 N,N-Dimethyl-P-Toluidine - -    150 26 5-glsx-Pwpytxoedbfvp (antioxidant/stabilizer) CT - -    151 27 9-svae-Iailqcyjcxsoomwpjuuvbfy resin PTBP CT  - -    METALS (Implants / Dental)        # Substance 2 days 4 days remarks   152 1 Ammonium Heptamolybdate (IV) - -     2 Ammonium Tetrachloroplatinate - -     3 Indium (III) Chloride - -    155 4 Iridium (III) Chloride - -     5 Ferric Chloride - -     6 Manganese (II) Chloride - -     7 Niobium (V) Chloride - -     8 Palladium Chloride - -    160 9 Silver Nitrate - -     10 Gold Sodium Thiosulfate - -     11 Tantal - -     12 Tin (II) Chloride - -     13 Titanium (IV) Oxide - -    165 14 Titanium - -     15 Tungstic Acid, Sod Salt Dihydrat - -     16 Vanadium Pentoxide - -     17 Wolfram - -     18 Zinc Chloride - -    170 19 Zirconium (IV) Oxide - -     20 Ammoniated Murcury - -     21 Copper Sulfate Pentahydrate - -     22 Amalgam  - -     23 Aluminum Hydroxide - -    175 24 Platinum Tetrachloride - -      Results of patch tests:                         Interpretation:  - Negative                    A    = Allergic      (+) Erythema    TI   = Toxic/irritant   + E + Infiltration    RaP = Relevance at Present     ++ E/I + Papulovesicle   Rpr  = Relevance  Previously     +++ E/I/P + Blister     nR   = No Relevance    Atopy Screen (Placed 11/01/21)    No Substance Readings (15 min) Evaluation   POS Histamine 1mg/ml +/++    NEG NaCl 0.9% -      No Substance Readings (15 min) Evaluation   1 Alternaria alternata (tenuis)  -    2 Cladosporium herbarum -    3 Aspergillus fumigatus -    4 Penicillium notatum -    5 Dermatophagoides pteronyssinus -    6 Dermatophagoides farinae -    7 Dog epithelium (canis spp) -    8 Cat hair (hellen catus) -    9 Cockroach   (Blatella americana & germanica) -    10 Grass mix midwest   (Carmelina, Orchard, Redtop, Saroj) +/++    11 Qasim grass (sorghum halepense) -    12 Weed mix   (common Cocklebur, Lamb s quarters, rough redroot Pigweed, Dock/Sorrel) -    13 Mug wort (artemisia vulgare) -    14 Ragweed giant/short (ambrosia spp) -    15 White birch (Betula papyrifera) -    16 Tree mix 1 (Pecan, Maple BHR, Oak RVW, american Toone, black Quincy) -    17 Red cedar (juniperus virginia) -    18 Tree mix 2   (white Gregg, river/red Birch, black [place], common Jack, american Elm) -    19 Box elder/Maple mix (acer spp) -    20 [place] shagbark (carya ovata) -           Conclusion: slight reaction to grass pollens in prick test      Intradermal Testing (Placed 11/01/21)    No Substance Conc.  Reading (15min)  immediate Papule [mm] / Erythema [mm] Reading   (... days)  delayed Papule [mm] / Erythema [mm] Remarks   DF Standard Dust Mite - D. Farinae 1:10 -   -    DP Standard Dust Mite - D. Pteronyssinus 1:10 -   -    A Aspergillus fumigatus  1:10 -   -    P Penicillium notatum 1:10 -   -    Conclusions: no signs for immediate type reaction to dust mites and molds in intradermal tests. We will read if any delayed reactions    DRUG ALLERGY TEST SERIES 11/01/2021     ANTIBIOTICS: PENICILLINS    No Substance Readings (15min) Evaluation   POS Histamine 1mg/ml +/++    NEG NaCl 0.9% -        Prick Tests         Substance/ Allergen Conc Result (15min)  Remarks    Benzylpenicillin (Penicillin G) 1 [place] IU/ml (600 mg) -     Ampicillin 100 mg/ml -       Intradermal Tests   immed immed delayed delayed      Substance Conc 1st dil  2nd dil   days  days remarks    Benzylpenicillin (Penicillin G) 1:100 -        Ampicillin 1:10 -           CONTRAST MEDIA (iodinated, CT)     Prick Tests         Substance/ Allergen Concentration Result (15min) Remarks    Iodixanol [Visipaque] (*NID) 320/50 ml -     Iohexol [Omnipaque] (*NIM) 350 mg I/ml -       Intradermal Tests   immediate immediate delayed delayed      Substance Conc 1st dil (15 ) 2nd dil (15 )   days   days remarks    Iodixanol (*NID) 1:10 -        Iohexol (*NIM) 1:10 -         * IM = Ionic monomer  * NIM = Non-ionic monomer   * ID = Ionic dimer  * NID = Non-ionic dimer      CONTRAST MEDIA (paramagnetic / Sonography)    Prick Tests         Substance/ Allergen Conc Result (15min) Remarks    Gadobutrol* - Gadovist 1.0 (GK, A, Z) 1mmol (604mg/ml) -       Intradermal Tests   immed immed delayed delayed      Substance Conc 1st dil 2nd dil  days  days remarks    Gadobutrol* (Gadovist) 1:10 -       Conclusion: no immediate type reactions in prick and IDT to contrast media and Penicillins.    [] No relevant allergic reaction observed    [] Allergic reaction diagnosed against following allergens:      Interpretation/ remarks:   See later    [] Patient information given   [] ACDS CAMP information's (# ....) to following compounds: .....   [] General information's to following compounds: ......      Assessment & Plan:    ==> Final Diagnosis:     # Recurrent ulcers cheek area. DDx oral lichen planus vs allergic contact sensitization to dental products  * stable chronic illness  History of dental work in about 2005. Reports lesions started around 2007. 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 Lesions have improved with using tacrolimus. However, still recur as identified on exam today.  Agree that patch testing to see if has any sensitization to dental products is best next step. Will schedule for patch testing as above.  - Continue tacrolimus 1 mg dissolved in 250 mL water S&S TID-QID and augmented betamethasone ointment for cutaneous involvement    # History of drug allergies; penicillin, iohexol, gadolinium  * stable chronic illness    in skin prick and intradermal tests to Penicillin G, Ampicillin, Iohexol and Gadolinium no signs for specific immediate type reaction    Will observe if any delayed reaction  >> would remove Penicillins from allergy list and for contrast media would propose premedication according to radiology protocol (had this in the past and no problems)    # Suspicion for atopic predisposition with     nummular eczema = atopic dermatitis?    Rhinosinusitis, seasonal and perennial = Suspect dust mites, tree pollen and grass pollen with history.  * stable chronic illness     - Start mometasone spray BID for allergy symptoms  - Can continue with steroid ointments for skin lesions  - Uses allegra daily. Instructed to stop allegra 7 days prior to f/u appointment for prick testing    These conclusions are made at the best of one's knowledge and belief based on the provided evidence such as patient's history and allergy test results and they can change over time or can be incomplete because of missing information's.    ==> Treatment Plan:  As above     Procedures Performed: Allergy tests, including prick, intradermal and patch tests, drug allergy tests    Staff: : provider    Follow-up in Derm-Allergy clinic for 1st readings of patch tests after 2 days (virtual) and 2nd readings and final conclusions after 4 days (in person)   ___________________________      I spent a total of 40 minutes with Teresa Fernandez during today s  visit. This time was spent discussing all the individual test results, correlating them to the clinical relevance, counseling the patient and/or coordinating  care and performing allergy tests

## 2021-11-01 ENCOUNTER — OFFICE VISIT (OUTPATIENT)
Dept: ALLERGY | Facility: CLINIC | Age: 61
End: 2021-11-01
Payer: OTHER GOVERNMENT

## 2021-11-01 DIAGNOSIS — Z88.9 DRUG ALLERGY: Primary | ICD-10-CM

## 2021-11-01 DIAGNOSIS — L23.89 ALLERGIC CONTACT DERMATITIS DUE TO OTHER AGENTS: ICD-10-CM

## 2021-11-01 DIAGNOSIS — K13.21 LEUKOPLAKIA OF ORAL MUCOSA, INCLUDING TONGUE: ICD-10-CM

## 2021-11-01 DIAGNOSIS — J32.9 RHINOSINUSITIS: ICD-10-CM

## 2021-11-01 PROCEDURE — 95044 PATCH/APPLICATION TESTS: CPT | Mod: 59 | Performed by: DERMATOLOGY

## 2021-11-01 PROCEDURE — 95018 ALL TSTG PERQ&IQ DRUGS/BIOL: CPT | Performed by: DERMATOLOGY

## 2021-11-01 PROCEDURE — 95004 PERQ TESTS W/ALRGNC XTRCS: CPT | Performed by: DERMATOLOGY

## 2021-11-01 PROCEDURE — 95024 IQ TESTS W/ALLERGENIC XTRCS: CPT | Performed by: DERMATOLOGY

## 2021-11-01 NOTE — LETTER
"    11/1/2021         RE: Teresa Fernandez  [id number] [facility] [id number]        Dear Colleague,    Thank you for referring your patient, Teresa Fernandez, to the [facility] [place]. Please see a copy of my visit note below.    [facility] Dermato-allergology Note  Office visit  Encounter Date: Nov 1, 2021  ____________________________________________    CC: No chief complaint on file.      HPI:  Ms. Teresa Fernandez is a(n) 61 year old female who presents today as a return patient for allergy consultation  - Follow-up in Derm-Allergy clinic for allergy tests as planned  - otherwise feeling well in usual state of health    Physical exam:  General: In no acute distress, well-developed, well-nourished  Eyes: no conjunctivitis  ENT: no signs of rhinitis   Pulmonary: no wheezing or coughing  Skin: Focused examination of the skin on test sites was performed = see test results below  No active eczematous skin lesions on tests sites, particularly back    Earlier History and Allergy exams:  (10/09/21)  Ms. Teresa Fernandez is a(n) 61 year old female who presents today as a new patient for allergy consultation  -\"consider oral lichenoid reaction to amalgam; this would not entirely explain genital involvement but this is essentially resolved and continue oral involvement appears to be in close proximity to dental work. Will refer to Dr. Jurado for this assessment in case patch testing may be useful.\"  - first started having oral lesions in 2007  - was having painful bumps on cheeks and tongue, looked similar to canker sores  - progressively got worse  - cut out gluten and improved  - came back a few years ago and started to see Dr. Alcaraz  - is currently using tacrolimus 1 mg dissolved in 250 mL water S&S TID-QID and augmented betamethasone ointment which improved lesions and not using anymore  - had bridges placed for dental work about 15 years ago  - notes some " irritation with metal jewelry  - takes allegra daily, last took this AM   - does not use nasal spray  Focused examination of the oral mucosa and left lower leg was performed.  - Left lower leg with 2, erythematous, nummular papules with scale  - Intraoral exam with dental work on right upper molars along with left upper and lowe molar as well. There are white, lacy plaques on the inside of left cheek with an erythematous erosion in the middle. There is erythema of the left tonsils with no other erosions identified    Past Medical History:   Patient Active Problem List   Diagnosis     Acquired hypothyroidism     Allergic state     Chronic rhinitis     Allergic rhinitis due to pollen     Sinusitis, chronic     History of skin cancer     ERIC (generalized anxiety disorder)     Leukoplakia of oral mucosa, including tongue     GERD (gastroesophageal reflux disease)     Chronic low back pain     Dry eye syndrome     Chronic fatigue     Osteopenia     History of melanoma in situ     Eczema     Moderate persistent asthma without complication     DDD (degenerative disc disease), lumbar     Hyperlipidemia LDL goal <160     Chest tightness or pressure     Muscle pain     GAYE (obstructive sleep apnea)     History of kidney cancer     Fibromyalgia     Past Medical History:   Diagnosis Date     ALLERGIC RHINITIS NOS 4/12/2005     Anxiety      Arthritis     herniated disc     Bronchitis, not specified as acute or chronic      CHR MAXILLARY SINUSITIS 4/12/2005     Depressive disorder, not elsewhere classified      Eczema 10/17/2012     Environmental and seasonal allergies      GERD (gastroesophageal reflux disease)      Gluten intolerance      Malignant neoplasm of renal pelvis (H) 1995    Renal cell carcinoma     Melanoma (H) 06/2010     Other specified acquired hypothyroidism 4/12/2005     Renal cancer (H) 5/5/2011     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     Grave's disease     Unspecified  asthma(493.90)        Allergies:  Allergies   Allergen Reactions     Omnipaque [Iohexol] Swelling and Difficulty breathing     Immediate throat swelling following around 1-2cc of omnipaque 300 for spine procedure     Gluten      Pcn [Penicillins] Hives       Medications:  Current Outpatient Medications   Medication     acetaminophen (TYLENOL) 325 MG tablet     albuterol (PROAIR HFA/PROVENTIL HFA/VENTOLIN HFA) 108 (90 BASE) MCG/ACT Inhaler     APNO CREA ointment     augmented betamethasone dipropionate (DIPROLENE-AF) 0.05 % external ointment     Cholecalciferol (VITAMIN D3 PO)     Cyanocobalamin (VITAMIN B 12 PO)     dexamethasone (DECADRON) 0.5 MG/5ML elixir     DULoxetine (CYMBALTA) 60 MG capsule     fexofenadine (ALLEGRA) 180 MG tablet     fluticasone-salmeterol (ADVAIR DISKUS) 250-50 MCG/DOSE inhaler     levothyroxine (SYNTHROID/LEVOTHROID) 100 MCG tablet     levothyroxine (SYNTHROID/LEVOTHROID) 88 MCG tablet     lidocaine (XYLOCAINE) 2 % solution     magic mouthwash (ENTER INGREDIENTS IN COMMENTS) suspension     mometasone (NASONEX) 50 MCG/ACT nasal spray     mupirocin (BACTROBAN) 2 % external ointment     omeprazole (PRILOSEC) 20 MG DR capsule     order for DME     OVER-THE-COUNTER     pregabalin (LYRICA) 50 MG capsule     tacrolimus (GENERIC EQUIVALENT) 1 MG capsule     triamcinolone (ARISTOCORT HP) 0.5 % external cream     triamcinolone (KENALOG) 0.1 % external ointment     triamcinolone (NASACORT) 55 MCG/ACT nasal aerosol     No current facility-administered medications for this visit.       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 Patient has the following hobbies or non-occupational exposure camping and outdoor activities    Family History:  Family History   Problem Relation Age of Onset     Diabetes Mother      Cardiovascular Mother      Lipids Mother      Depression Mother      Hypertension Father      Lipids Father      Obesity  Father      Macular Degeneration Father      Sleep Apnea Father      Cancer Maternal Grandmother         kind unknown had sores on legs     Eczema Maternal Grandmother      Eczema Maternal Grandfather      Breast Cancer Paternal Grandmother      Cancer Paternal Grandmother         breast     Hypertension Paternal Grandfather      Cardiovascular Paternal Grandfather         heart attack     Hypertension Brother      Thyroid Disease Sister      Hypertension Sister      Depression Sister      Allergies Sister      Allergies Son      Eczema Son      Lipids Sister      Thyroid Disease Sister      Neurologic Disorder Sister      Depression Sister      Respiratory Son      Sleep Apnea Son      Glaucoma No family hx of      Cerebrovascular Disease No family hx of        Previous Labs, Allergy Tests, Dermatopathology, Imaging:  About 20 years ago had prick testing positive to dandelion and tumbleweed    Referred By: No referring provider defined for this encounter.     Allergy Tests:    Past Allergy Test    Order for Future Allergy Testing:    [x] Outpatient  [] Inpatient: [facility]..../ Bed ....       Skin Atopy (atopic dermatitis) [] Yes   [x] No .........  Contact allergies:   [] Yes   [x] No ..........  Hand eczema:   [] Yes   [x] No           Leading hand:   [] R   [] L       [] Ambidextrous         Drug allergies:        [x] Yes   [] No  Which?...... penicillin cause itchy; contrast got anaphylaxis    Urticaria/Angioedema  [] Yes   [x] No .........  Food Allergy:  [x] Yes   [] No  Which?...... gluten intolerance  Pets :  [x] Yes   [] No  which?......  2 dogs aged 5 and 12        [x]  Rhinitis   [x] Conjunctivitis   [x] Sinusitis   [] Polyposis   [] Otitis   [x] Pharyngitis         []  none  Operations:   [] Tonsils   [] Septum   [] Sinus   [] Polyposis        [] Asthma bronchiale   [x] Coughing      []  none  Symptoms (mostly Rhinoconjunctivitis and Asthma) aggravated by:  Season   [] I   [] II   [] III   [x] IV   [x]V    []VI   []VII   []VIII   [x]IX   [x]X   []XI   []XI     [x] perennial   Day time      [x] morning   [] noon      [] evening        [] night    [] whole day........  []  none  Location/changes    [] inside        [x] outside   [] mountains    [] sea     [] others.............   []  none  Triggers, specific     [] animals     [] plants     [x] dust              [] others ...........................    []  none  Triggers, others       [] work          [] psyche    [] sport            [] others .............................  []  none  Irritant                [] phys efforts [] smoke    [x] heat/cold     [] odors  []others............... []  none    Order for PATCH TESTS  Reason for tests (suspected allergy): question if allergy to dental work  Known previous allergies: n/a  Standardized panels  [x] Standard panel (40 tests)  [x] Preservatives & Antimicrobials (31 tests)  [x] Emulsifiers & Additives (25 tests)   [x] Perfumes/Flavours & Plants (25 tests)  [] Hairdresser panel (12 tests)  [] Rubber Chemicals (22 tests)  [x] Plastics (26 tests)  [] Colorants/Dyes/Food additives (20 tests)  [x] Metals (implants/dental) (24 tests)  [] Local anaesthetics/NSAIDs (13 tests)  [] Antibiotics & Antimycotics (14 tests)   [] Corticosteroids (15 tests)   [] Photopatch test (62 tests)   [] others: ...      [] Patient's own products: ...    DO NOT test if chemical or biological identity is unknown!     always ask from patient the product information and safety sheets (MSDS)       Order for PRICK TESTS    Reason for tests (suspected allergy): seasonal rhinoconjunctivitis  Known previous allergies: dandelions    Standardized prick panels  [x] Atopic panel (20 tests)  [] Pediatric Panel (12 tests)  [] Milk, Meat, Eggs, Grains (20 tests)   [] Dust, Epithelia, Feathers (10 tests)  [] Fish, Seafood, Shellfish (17 tests)  [] Nuts, Beans (14 tests)  [] Spice, Vegetable, Fruit (17 tests)  [] Pollen Panel = Tree, Grass, Weed (24 tests)  [] Others:  ...      [] Patient's own products: ...    DO NOT test if chemical or biological identity is unknown!     always ask from patient the product information and safety sheets (MSDS)     Standardized intradermal tests  [x] Penicillium notatum [x] Aspergillus fumigatus [x] House dust mites D.far & D. pteron  [] Cat    [] dog  [] Others: ...  [] Bee venom   [] Wasp venom  !!Specific protocol with dilutions!!       Order for Drug allergy tests (prick & Intradermal tests)    [x] Penicillin G  [x] Ampicillin [] Cefazolin   [] Ceftriaxone   [] Ceftazidime  [] Bactrim    [x] Others: ... omnipaque, visipaque, gadolinium  Order for ... as test date    ________________________________    RESULTS & EVALUATION of PATCH TESTS    Patch test readings after     [x] 2 days, [] 3 days [x] 4 days, [] 5 days,   Other duration: ...    11/01/21 application of patch tests with results:    STANDARD Series        # Substance 2 days 4 days remarks    1 Shravan Mix [C] - -     2 Colophony - -     3  2-Mercaptobenzothiazole  - -      4 Methylisothiazolinone - -     5 Carba Mix - -     6 Thiuram Mix [A] - -     7 Bisphenol A Epoxy Resin - -     8 T-Hvja-Jrqwjzqnufa-Formaldehyde Resin - -     9 Mercapto Mix [A] - -     10 Black Rubber Mix- PPD [B] - -     11 Potassium Dichromate  -  -     12 Balsam of [place] (Myroxylon Pereirae Resin) - -     13 Nickel Sulphate Hexahydrate - -     14 Mixed Dialkyl Thiourea - -     15 Paraben Mix [B] - -     16 Methyldibromo Glutaronitrile - -     17 Fragrance Mix - -     18 2-Bromo-2-Nitropropane-1,3-Diol (Bronopol) CT - -     19 Lyral - -     20 Tixocortol-21- Pivalate CT - -     21 Diazolidiyl Urea (Germall II) - -     22 Methyl Methacrylate - -     23 Cobalt (II) Chloride Hexahydrate - -     24 Fragrance Mix II  - -     25 Compositae Mix - -     26 Benzoyl Peroxide - -     27 Bacitracin - -     28 Formaldehyde - -     29 Methylchloroisothiazolinone / Methylisothiazolinone - -     30 Corticosteroid Mix CT NA NA     31  Sodium Lauryl Sulfate - -     32 Lanolin Alcohol - -     33 Turpentine - -     34 Cetylstearylalcohol - -     35 Chlorhexidine Dicluconate - -     36 Budenoside - -     37 Imidazolidinyl Urea  - -     38 Ethyl-2 Cyanoacrylate - -     39 Quaternium 15 (Dowicil 200) - -     40 Decyl Glucoside - -     PRESERVATIVES & ANTIMICROBIALS        # Substance 2 days 4 days remarks   41 1  1,2-Benzisothiazoline-3-One, Sodium Salt - -    42 2  1,3,5-Hamlet (2-Hydroxyethyl) - Hexahydrotriazine (Grotan BK) - -    43 3 3-Nuzyetgiuwgou-1-Nitro-1, 3-Propanediol NA NA    44 4  3, 4, 4' - Triclocarban - -    45 5 4 - Chloro - 3 - Cresol - -    46 6 4 - Chloro - 4 - Xylenol (PCMX) - -    47 7 7-Ethylbicyclooxazolidine (Bioban QP3421) - -    48 8 Benzalkonium Chloride CT - -    49 9 Benzyl Alcohol - -    50 10 Cetalkonium Chloride - -    51 11 Cetylpyrimidine Chloride  - -    52 12 Chloroacetamide - -    53 13 DMDM Hydantoin - -    54 14 Glutaraldehyde - -    55 15 Triclosan - -    56 16 Glyoxal Trimeric Dihydrate - -    57 17 Iodopropynyl Butylcarbamate - -    58 18 Octylisothiazoline NA NA    59 19 Bithionol CT - -    60 20 Bioban P 1487 (Nitrobutyl) Morpholine/(Ethylnitro-Trimethylene) Dimorpholine - -    61 21 Phenoxyethanol - -    62 22 Phenyl Salicylate - -    63 23 Povidone Iodine - -    64 24 Sodium Benzoate - -    65 25 Sodium Disulfite - -    66 26 Sorbic Acid - -    67 27 Thimerosal      68 28 Melamine Formaldehyde Resin      69 29 Ethylenediamine Dihydrochloride        Parabens      70 30 Butyl-P-Hydroxybenzoate - -    71 31 Ethyl-P-Hydroxybenzoate - -    72 32 Methyl-P-Hydroxybenzoate - -    73 33 Propyl-P-Hydroxybenzoate - -     EMULSIFIERS & ADDITIVES       # Substance 2 days 4 days remarks   74 1 Polyethylene Glycol-400 - -    75 2 Cocamidopropyl Betaine - -    76 3 Amerchol L101 - -    77 4 Propylene Glycol - -    78 5 Triethanolamine - -    79 6 Sorbitane Sesquiolate CT - -    80 7 Isopropylmyristate - -    81 8  Polysorbate 80 CT - -    82 9 Amidoamine   (Stearamidopropyl Dimethylamine) NA NA    83 10 Oleamidopropyl Dimethylamine - -    84 11 Lauryl Glucoside NA NA    85 12 Coconut Diethanolamide  - -    86 13 2-Hydroxy-4-Methoxy Benzophenone (Oxybenzone) - -    87 14 Benzophenone-4 (Sulisobenzon) NA NA    88 15 Propolis - -    89 16 Dexpanthenol - -    90 17 Carboxymethyl Cellulose Sodium NA NA    91 18 Abitol - -    92 19 Tert-Butylhydroquinone - -    93 20 Benzyl Salicylate - -    94 21 Dimethylaminopropylamin (DMPA) CT? D053      95 22 Zinc Pyrithione (Zinc Omadine) CT? Z006      96 23 Hamlet(Hydroxymethyl) Nitromethane CT        Antioxidant - -    97 24 Dodecyl Gallate - -    98 25 Butylhydroxyanisole (BHA) - -    99 26 Butylhydroxytoluene (BHT) - -    100 27 Di-Alpha-Tocopherol (Vit E) - -    101 28 Propyl Gallate - -     PERFUMES, FLAVORS & PLANTS        # Substance 2 days 4 days remarks   102 1 Benzyl Cinnamate - -    103 2 Di-Limonene (Dipentene) - -    104 3 Cananga Odorata (Kelsea Enamorado) (I) - -    105 4 Lichen Acid Mix - -    106 5 Mentha Piperita Oil (Peppermint Oil) - -    107 6 Sesquiterpenelactone mix - -    108 7 Tea Tree Oil, Oxidized - -    109 8 Wood Tar Mix - -    110 9 Abietic Acid - -    111 10 Lavendula Angustifolia Oil (Lavender Oil) - -    112 11 Fragrance mix II CT * - -      Fragrance Mix I      113 12 Oakmoss Absolute - -    114 13 Eugenol - -    115 14 Geraniol - -    116 15 Hydroxycitronellal - -    117 16 Isoeugenol - -    118 17 Cinnamic Aldehyde - -    119 18 Cinnamic Alcohol  - -      Fragrance mix II      120 19 Citronellol - -    121 20 Alpha-Hexylcinnamic Aldehyde    - -    122 21 Citral - -    123 22 Farnesol - -    124 23 Coumarin - -      Hexylcinnamic aldehyde, Coumarin, Farnesol, Hydroxyisohexy3-cyclohexene carboxaldehyde, citral, citrolellol   PLASTICS        # Substance 2 days 4 days remarks     Acrylates - -    125 1 2-Hydroxyethyl Methacrylate (HEMA) - -     2  1,4-Butandioldimethacrylate (BUDMA) - -     3  2-Ethylhexyl Acrylate - -     4 Bisphenol-A-Dimethacrylate  - -     5 Diurethane-Dimethacrylate - -    130 6 Ethyleneglycoldimethacrylate (EGDMA) - -     7 Pentaerythritoltriacrylate (JENNI) - -     8 Triethylene Glycol Dimethacrylate (TEGDMA) - -      Synthetic material/additives        9 K-Srsi-Vbghlzoqkut - -     10 Tricresyl Phosphate - -    135 11 3-Ypmw-Pzwezyieltwym - -     12 Bis (2-Ethylhexyl) Phthalate - -     13 Dibutylphthalate - -     14 Dimethylphthalate - -     15 Toluene-2,4-Diisocyanate - -    140 16 Diphenylmethane-4,4''-Diisocyanate - -      EPOXY RESIN SYSTEMS        Reactive Solvents - -     17 Cresyl Glycidyl Ether - -     18 Butyl Glycidyl Ether - -     19 Phenyl Glycidyl Ether - -     20 1,4-Butanediol Diglycidyl Ether - -    145 21 1,6-Hexanediole Diglycidyl Ether - -      Hardener / Accelerator - -     22 Triethylenetetramine - -     23 Diethylenetriamine - -     24 Isophorone Diamine (IPD) - -     25 N,N-Dimethyl-P-Toluidine - -    150 26 5-pdmf-Bxotpatnpaeyh (antioxidant/stabilizer) CT - -    151 27 2-dntq-Aryezxynigajechjgpthmmk resin PTBP CT  - -    METALS (Implants / Dental)        # Substance 2 days 4 days remarks   152 1 Ammonium Heptamolybdate (IV) - -     2 Ammonium Tetrachloroplatinate - -     3 Indium (III) Chloride - -    155 4 Iridium (III) Chloride - -     5 Ferric Chloride - -     6 Manganese (II) Chloride - -     7 Niobium (V) Chloride - -     8 Palladium Chloride - -    160 9 Silver Nitrate - -     10 Gold Sodium Thiosulfate - -     11 Tantal - -     12 Tin (II) Chloride - -     13 Titanium (IV) Oxide - -    165 14 Titanium - -     15 Tungstic Acid, Sod Salt Dihydrat - -     16 Vanadium Pentoxide - -     17 Wolfram - -     18 Zinc Chloride - -    170 19 Zirconium (IV) Oxide - -     20 Ammoniated Murcury - -     21 Copper Sulfate Pentahydrate - -     22 Amalgam  - -     23 Aluminum Hydroxide - -    175 24 Platinum Tetrachloride -  -      Results of patch tests:                         Interpretation:  - Negative                    A    = Allergic      (+) Erythema    TI   = Toxic/irritant   + E + Infiltration    RaP = Relevance at Present     ++ E/I + Papulovesicle   Rpr  = Relevance Previously     +++ E/I/P + Blister     nR   = No Relevance    Atopy Screen (Placed 11/01/21)    No Substance Readings (15 min) Evaluation   POS Histamine 1mg/ml +/++    NEG NaCl 0.9% -      No Substance Readings (15 min) Evaluation   1 Alternaria alternata (tenuis)  -    2 Cladosporium herbarum -    3 Aspergillus fumigatus -    4 Penicillium notatum -    5 Dermatophagoides pteronyssinus -    6 Dermatophagoides farinae -    7 Dog epithelium (canis spp) -    8 Cat hair (hellen catus) -    9 Cockroach   (Blatella americana & germanica) -    10 Grass mix midwest   (Carmelina, Orchard, Redtop, Saroj) +/++    11 Qasim grass (sorghum halepense) -    12 Weed mix   (common Cocklebur, Lamb s quarters, rough redroot Pigweed, Dock/Sorrel) -    13 Mug wort (artemisia vulgare) -    14 Ragweed giant/short (ambrosia spp) -    15 White birch (Betula papyrifera) -    16 Tree mix 1 (Pecan, Maple BHR, Oak RVW, american Conde, black [place]) -    17 Red cedar (juniperus virginia) -    18 Tree mix 2   (white Gregg, river/red Birch, black [place], common [place], american Elm) -    19 Box elder/Maple mix (acer spp) -    20 [place] shagbark (carya ovata) -           Conclusion: slight reaction to grass pollens in prick test      Intradermal Testing (Placed 11/01/21)    No Substance Conc.  Reading (15min)  immediate Papule [mm] / Erythema [mm] Reading   (... days)  delayed Papule [mm] / Erythema [mm] Remarks   DF Standard Dust Mite - D. Farinae 1:10 -   -    DP Standard Dust Mite - D. Pteronyssinus 1:10 -   -    A Aspergillus fumigatus  1:10 -   -    P Penicillium notatum 1:10 -   -    Conclusions: no signs for immediate type reaction to dust mites and molds in intradermal tests. We will  read if any delayed reactions    DRUG ALLERGY TEST SERIES 11/01/2021     ANTIBIOTICS: PENICILLINS    No Substance Readings (15min) Evaluation   POS Histamine 1mg/ml +/++    NEG NaCl 0.9% -        Prick Tests         Substance/ Allergen Conc Result (15min) Remarks    Benzylpenicillin (Penicillin G) 1 Suleman IU/ml (600 mg) -     Ampicillin 100 mg/ml -       Intradermal Tests   immed immed delayed delayed      Substance Conc 1st dil  2nd dil   days  days remarks    Benzylpenicillin (Penicillin G) 1:100 -        Ampicillin 1:10 -           CONTRAST MEDIA (iodinated, CT)     Prick Tests         Substance/ Allergen Concentration Result (15min) Remarks    Iodixanol [Visipaque] (*NID) 320/50 ml -     Iohexol [Omnipaque] (*NIM) 350 mg I/ml -       Intradermal Tests   immediate immediate delayed delayed      Substance Conc 1st dil (15 ) 2nd dil (15 )   days   days remarks    Iodixanol (*NID) 1:10 -        Iohexol (*NIM) 1:10 -         * IM = Ionic monomer  * NIM = Non-ionic monomer   * ID = Ionic dimer  * NID = Non-ionic dimer      CONTRAST MEDIA (paramagnetic / Sonography)    Prick Tests         Substance/ Allergen Conc Result (15min) Remarks    Gadobutrol* - Gadovist 1.0 (GK, A, Z) 1mmol (604mg/ml) -       Intradermal Tests   immed immed delayed delayed      Substance Conc 1st dil 2nd dil  days  days remarks    Gadobutrol* (Gadovist) 1:10 -       Conclusion: no immediate type reactions in prick and IDT to contrast media and Penicillins.    [] No relevant allergic reaction observed    [] Allergic reaction diagnosed against following allergens:      Interpretation/ remarks:   See later    [] Patient information given   [] ACDS CAMP information's (# ....) to following compounds: .....   [] General information's to following compounds: ......      Assessment & Plan:    ==> Final Diagnosis:     # Recurrent ulcers cheek area. DDx oral lichen planus vs allergic contact sensitization to dental products  * stable chronic  illness  History of dental work in about 2005. Reports lesions started around 2007. 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 Lesions have improved with using tacrolimus. However, still recur as identified on exam today. Agree that patch testing to see if has any sensitization to dental products is best next step. Will schedule for patch testing as above.  - Continue tacrolimus 1 mg dissolved in 250 mL water S&S TID-QID and augmented betamethasone ointment for cutaneous involvement    # History of drug allergies; penicillin, iohexol, gadolinium  * stable chronic illness    in skin prick and intradermal tests to Penicillin G, Ampicillin, Iohexol and Gadolinium no signs for specific immediate type reaction    Will observe if any delayed reaction  >> would remove Penicillins from allergy list and for contrast media would propose premedication according to radiology protocol (had this in the past and no problems)    # Suspicion for atopic predisposition with     nummular eczema = atopic dermatitis?    Rhinosinusitis, seasonal and perennial = Suspect dust mites, tree pollen and grass pollen with history.  * stable chronic illness     - Start mometasone spray BID for allergy symptoms  - Can continue with steroid ointments for skin lesions  - Uses allegra daily. Instructed to stop allegra 7 days prior to f/u appointment for prick testing    These conclusions are made at the best of one's knowledge and belief based on the provided evidence such as patient's history and allergy test results and they can change over time or can be incomplete because of missing information's.    ==> Treatment Plan:  As above     Procedures Performed: Allergy tests, including prick, intradermal and patch tests, drug allergy tests    Staff: : provider    Follow-up in Derm-Allergy clinic for 1st readings of patch tests after 2 days (virtual) and 2nd readings and final conclusions after 4 days (in  person)   ___________________________      I spent a total of 40 minutes with Teresa Fernandez during today s  visit. This time was spent discussing all the individual test results, correlating them to the clinical relevance, counseling the patient and/or coordinating care and performing allergy tests           Again, thank you for allowing me to participate in the care of your patient.        Sincerely,        Dagoberto Jurado MD

## 2021-11-01 NOTE — NURSING NOTE
Chief Complaint   Patient presents with     Allergy Testing Followup     Patch testing day 1      Anne Ventura RN

## 2021-11-03 ENCOUNTER — OFFICE VISIT (OUTPATIENT)
Dept: ALLERGY | Facility: CLINIC | Age: 61
End: 2021-11-03
Payer: OTHER GOVERNMENT

## 2021-11-03 DIAGNOSIS — Z88.9 DRUG ALLERGY: Primary | ICD-10-CM

## 2021-11-03 DIAGNOSIS — L23.89 ALLERGIC CONTACT DERMATITIS DUE TO OTHER AGENTS: ICD-10-CM

## 2021-11-03 DIAGNOSIS — K13.21 LEUKOPLAKIA OF ORAL MUCOSA, INCLUDING TONGUE: ICD-10-CM

## 2021-11-03 PROCEDURE — 99207 PR NO CHARGE LOS: CPT | Performed by: DERMATOLOGY

## 2021-11-03 NOTE — NURSING NOTE
Dermatology Rooming Note    Teresa Fernandez's goals for this visit include:   Chief Complaint   Patient presents with     Allergy Recheck     Patch testing day 3     Gracy Pantoja, CMA

## 2021-11-03 NOTE — PROGRESS NOTES
"[facility] Dermato-allergology Note  Office visit  Encounter Date: Nov 3, 2021  ____________________________________________    CC: No chief complaint on file.      HPI:  (11/02/21)  Ms. Teresa Fernandez is a(n) 61 year old female who presents today as a return patient for allergy consultation  - Follow-up in Derm-Allergy clinic for 1st readings of patch tests after 2 days  - otherwise feeling well in usual state of health    Physical exam:  General: In no acute distress, well-developed, well-nourished  Eyes: no conjunctivitis  ENT: no signs of rhinitis   Pulmonary: no wheezing or coughing  Skin:Focused examination of the skin on test sites was performed = see test results below    Earlier History and Allergy exams:    (10/09/21)  Ms. Teresa Fernandez is a(n) 61 year old female who presents today as a new patient for allergy consultation  -\"consider oral lichenoid reaction to amalgam; this would not entirely explain genital involvement but this is essentially resolved and continue oral involvement appears to be in close proximity to dental work. Will refer to Dr. Jurado for this assessment in case patch testing may be useful.\"  - first started having oral lesions in 2007  - was having painful bumps on cheeks and tongue, looked similar to canker sores  - progressively got worse  - cut out gluten and improved  - came back a few years ago and started to see Dr. Alcaraz  - is currently using tacrolimus 1 mg dissolved in 250 mL water S&S TID-QID and augmented betamethasone ointment which improved lesions and not using anymore  - had bridges placed for dental work about 15 years ago  - notes some irritation with metal jewelry  - takes allegra daily, last took this AM   - does not use nasal spray  Focused examination of the oral mucosa and left lower leg was performed.  - Left lower leg with 2, erythematous, nummular papules with scale  - Intraoral exam with dental work on right upper molars along with " left upper and lowe molar as well. There are white, lacy plaques on the inside of left cheek with an erythematous erosion in the middle. There is erythema of the left tonsils with no other erosions identified    Past Medical History:   Patient Active Problem List   Diagnosis     Acquired hypothyroidism     Allergic state     Chronic rhinitis     Allergic rhinitis due to pollen     Sinusitis, chronic     History of skin cancer     ERIC (generalized anxiety disorder)     Leukoplakia of oral mucosa, including tongue     GERD (gastroesophageal reflux disease)     Chronic low back pain     Dry eye syndrome     Chronic fatigue     Osteopenia     History of melanoma in situ     Eczema     Moderate persistent asthma without complication     DDD (degenerative disc disease), lumbar     Hyperlipidemia LDL goal <160     Chest tightness or pressure     Muscle pain     GAYE (obstructive sleep apnea)     History of kidney cancer     Fibromyalgia     Past Medical History:   Diagnosis Date     ALLERGIC RHINITIS NOS 4/12/2005     Anxiety      Arthritis     herniated disc     Bronchitis, not specified as acute or chronic      CHR MAXILLARY SINUSITIS 4/12/2005     Depressive disorder, not elsewhere classified      Eczema 10/17/2012     Environmental and seasonal allergies      GERD (gastroesophageal reflux disease)      Gluten intolerance      Malignant neoplasm of renal pelvis (H) 1995    Renal cell carcinoma     Melanoma (H) 06/2010     Other specified acquired hypothyroidism 4/12/2005     Renal cancer (H) 5/5/2011     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     Grave's disease     Unspecified asthma(493.90)        Allergies:  Allergies   Allergen Reactions     Omnipaque [Iohexol] Swelling and Difficulty breathing     Immediate throat swelling following around 1-2cc of omnipaque 300 for spine procedure     Gluten      Pcn [Penicillins] Hives       Medications:  Current Outpatient Medications   Medication      acetaminophen (TYLENOL) 325 MG tablet     albuterol (PROAIR HFA/PROVENTIL HFA/VENTOLIN HFA) 108 (90 BASE) MCG/ACT Inhaler     APNO CREA ointment     augmented betamethasone dipropionate (DIPROLENE-AF) 0.05 % external ointment     Cholecalciferol (VITAMIN D3 PO)     Cyanocobalamin (VITAMIN B 12 PO)     dexamethasone (DECADRON) 0.5 MG/5ML elixir     DULoxetine (CYMBALTA) 60 MG capsule     fexofenadine (ALLEGRA) 180 MG tablet     fluticasone-salmeterol (ADVAIR DISKUS) 250-50 MCG/DOSE inhaler     levothyroxine (SYNTHROID/LEVOTHROID) 100 MCG tablet     levothyroxine (SYNTHROID/LEVOTHROID) 88 MCG tablet     lidocaine (XYLOCAINE) 2 % solution     magic mouthwash (ENTER INGREDIENTS IN COMMENTS) suspension     mometasone (NASONEX) 50 MCG/ACT nasal spray     mupirocin (BACTROBAN) 2 % external ointment     omeprazole (PRILOSEC) 20 MG DR capsule     order for DME     OVER-THE-COUNTER     pregabalin (LYRICA) 50 MG capsule     tacrolimus (GENERIC EQUIVALENT) 1 MG capsule     triamcinolone (ARISTOCORT HP) 0.5 % external cream     triamcinolone (KENALOG) 0.1 % external ointment     triamcinolone (NASACORT) 55 MCG/ACT nasal aerosol     Current Facility-Administered Medications   Medication     ampicillin (OMNIPEN) 1 g vial INTRADERMAL     penicillin g potassium [id number] unit vial INTRADERMAL       Social History:  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 Patient has the following hobbies or non-occupational exposure camping and outdoor activities    Family History:  Family History   Problem Relation Age of Onset     Diabetes Mother      Cardiovascular Mother      Lipids Mother      Depression Mother      Hypertension Father      Lipids Father      Obesity Father      Macular Degeneration Father      Sleep Apnea Father      Cancer Maternal Grandmother         kind unknown had sores on legs     Eczema Maternal Grandmother      Eczema Maternal Grandfather      Breast Cancer  Paternal Grandmother      Cancer Paternal Grandmother         breast     Hypertension Paternal Grandfather      Cardiovascular Paternal Grandfather         heart attack     Hypertension Brother      Thyroid Disease Sister      Hypertension Sister      Depression Sister      Allergies Sister      Allergies Son      Eczema Son      Lipids Sister      Thyroid Disease Sister      Neurologic Disorder Sister      Depression Sister      Respiratory Son      Sleep Apnea Son      Glaucoma No family hx of      Cerebrovascular Disease No family hx of        Previous Labs, Allergy Tests, Dermatopathology, Imaging:  About 20 years ago had prick testing positive to dandelion and tumbleweed    Referred By: No referring provider defined for this encounter.     Allergy Tests:    Past Allergy Test    Order for Future Allergy Testing:    [x] Outpatient  [] Inpatient: [facility]..../ Bed ....       Skin Atopy (atopic dermatitis) [] Yes   [x] No .........  Contact allergies:   [] Yes   [x] No ..........  Hand eczema:   [] Yes   [x] No           Leading hand:   [] R   [] L       [] Ambidextrous         Drug allergies:        [x] Yes   [] No  Which?...... penicillin cause itchy; contrast got anaphylaxis    Urticaria/Angioedema  [] Yes   [x] No .........  Food Allergy:  [x] Yes   [] No  Which?...... gluten intolerance  Pets :  [x] Yes   [] No  which?......  2 dogs aged 5 and 12        [x]  Rhinitis   [x] Conjunctivitis   [x] Sinusitis   [] Polyposis   [] Otitis   [x] Pharyngitis         []  none  Operations:   [] Tonsils   [] Septum   [] Sinus   [] Polyposis        [] Asthma bronchiale   [x] Coughing      []  none  Symptoms (mostly Rhinoconjunctivitis and Asthma) aggravated by:  Season   [] I   [] II   [] III   [x] IV   [x]V   []VI   []VII   []VIII   [x]IX   [x]X   []XI   []XI     [x] perennial   Day time      [x] morning   [] noon      [] evening        [] night    [] whole day........  []  none  Location/changes    [] inside        [x] outside    [] mountains    [] sea     [] others.............   []  none  Triggers, specific     [] animals     [] plants     [x] dust              [] others ...........................    []  none  Triggers, others       [] work          [] psyche    [] sport            [] others .............................  []  none  Irritant                [] phys efforts [] smoke    [x] heat/cold     [] odors  []others............... []  none    Order for PATCH TESTS  Reason for tests (suspected allergy): question if allergy to dental work  Known previous allergies: n/a  Standardized panels  [x] Standard panel (40 tests)  [x] Preservatives & Antimicrobials (31 tests)  [x] Emulsifiers & Additives (25 tests)   [x] Perfumes/Flavours & Plants (25 tests)  [] Hairdresser panel (12 tests)  [] Rubber Chemicals (22 tests)  [x] Plastics (26 tests)  [] Colorants/Dyes/Food additives (20 tests)  [x] Metals (implants/dental) (24 tests)  [] Local anaesthetics/NSAIDs (13 tests)  [] Antibiotics & Antimycotics (14 tests)   [] Corticosteroids (15 tests)   [] Photopatch test (62 tests)   [] others: ...      [] Patient's own products: ...    DO NOT test if chemical or biological identity is unknown!     always ask from patient the product information and safety sheets (MSDS)       Order for PRICK TESTS    Reason for tests (suspected allergy): seasonal rhinoconjunctivitis  Known previous allergies: dandelions    Standardized prick panels  [x] Atopic panel (20 tests)  [] Pediatric Panel (12 tests)  [] Milk, Meat, Eggs, Grains (20 tests)   [] Dust, Epithelia, Feathers (10 tests)  [] Fish, Seafood, Shellfish (17 tests)  [] Nuts, Beans (14 tests)  [] Spice, Vegetable, Fruit (17 tests)  [] Pollen Panel = Tree, Grass, Weed (24 tests)  [] Others: ...      [] Patient's own products: ...    DO NOT test if chemical or biological identity is unknown!     always ask from patient the product information and safety sheets (MSDS)     Standardized intradermal tests  [x]  Penicillium notatum [x] Aspergillus fumigatus [x] House dust mites D.far & D. pteron  [] Cat    [] dog  [] Others: ...  [] Bee venom   [] Wasp venom  !!Specific protocol with dilutions!!       Order for Drug allergy tests (prick & Intradermal tests)    [x] Penicillin G  [x] Ampicillin [] Cefazolin   [] Ceftriaxone   [] Ceftazidime  [] Bactrim    [x] Others: ... omnipaque, visipaque, gadolinium  Order for ... as test date    ________________________________    RESULTS & EVALUATION of PATCH TESTS    Patch test readings after     [x] 2 days, [] 3 days [x] 4 days, [] 5 days,   Other duration: ...    11/01/21 application of patch tests with results:    STANDARD Series        # Substance 2 days 4 days remarks    1 Shravan Mix [C] - -     2 Colophony - -     3  2-Mercaptobenzothiazole  - -      4 Methylisothiazolinone - -     5 Carba Mix - -     6 Thiuram Mix [A] - -     7 Bisphenol A Epoxy Resin - -     8 W-Zxyt-Cbfdafvlvky-Formaldehyde Resin - -     9 Mercapto Mix [A] - -     10 Black Rubber Mix- PPD [B] - -     11 Potassium Dichromate  -  -     12 Balsam of [place] (Myroxylon Pereirae Resin) - -     13 Nickel Sulphate Hexahydrate - -     14 Mixed Dialkyl Thiourea - -     15 Paraben Mix [B] - -     16 Methyldibromo Glutaronitrile - -     17 Fragrance Mix - -     18 2-Bromo-2-Nitropropane-1,3-Diol (Bronopol) CT - -     19 Lyral - -     20 Tixocortol-21- Pivalate CT - -     21 Diazolidiyl Urea (Germall II) - -     22 Methyl Methacrylate - -     23 Cobalt (II) Chloride Hexahydrate - -     24 Fragrance Mix II  - -     25 Compositae Mix - -     26 Benzoyl Peroxide - -     27 Bacitracin - -     28 Formaldehyde - -     29 Methylchloroisothiazolinone / Methylisothiazolinone - -     30 Corticosteroid Mix CT NA NA     31 Sodium Lauryl Sulfate - -     32 Lanolin Alcohol - -     33 Turpentine - -     34 Cetylstearylalcohol - -     35 Chlorhexidine Dicluconate - -     36 Budenoside - -     37 Imidazolidinyl Urea  - -     38 Ethyl-2  Cyanoacrylate - -     39 Quaternium 15 (Dowicil 200) - -     40 Decyl Glucoside - -     PRESERVATIVES & ANTIMICROBIALS        # Substance 2 days 4 days remarks   41 1  1,2-Benzisothiazoline-3-One, Sodium Salt - -    42 2  1,3,5-Hamlet (2-Hydroxyethyl) - Hexahydrotriazine (Grotan BK) - -    43 3 8-Kewxflbirnhnq-5-Nitro-1, 3-Propanediol NA NA    44 4  3, 4, 4' - Triclocarban - -    45 5 4 - Chloro - 3 - Cresol - -    46 6 4 - Chloro - 4 - Xylenol (PCMX) - -    47 7 7-Ethylbicyclooxazolidine (Bioban BO0052) - -    48 8 Benzalkonium Chloride CT - -    49 9 Benzyl Alcohol - -    50 10 Cetalkonium Chloride - -    51 11 Cetylpyrimidine Chloride  - -    52 12 Chloroacetamide - -    53 13 DMDM Hydantoin - -    54 14 Glutaraldehyde - -    55 15 Triclosan - -    56 16 Glyoxal Trimeric Dihydrate - -    57 17 Iodopropynyl Butylcarbamate - -    58 18 Octylisothiazoline NA NA    59 19 Bithionol CT - -    60 20 Bioban P 1487 (Nitrobutyl) Morpholine/(Ethylnitro-Trimethylene) Dimorpholine - -    61 21 Phenoxyethanol - -    62 22 Phenyl Salicylate - -    63 23 Povidone Iodine - -    64 24 Sodium Benzoate - -    65 25 Sodium Disulfite - -    66 26 Sorbic Acid - -    67 27 Thimerosal      68 28 Melamine Formaldehyde Resin      69 29 Ethylenediamine Dihydrochloride        Parabens      70 30 Butyl-P-Hydroxybenzoate - -    71 31 Ethyl-P-Hydroxybenzoate - -    72 32 Methyl-P-Hydroxybenzoate - -    73 33 Propyl-P-Hydroxybenzoate - -     EMULSIFIERS & ADDITIVES       # Substance 2 days 4 days remarks   74 1 Polyethylene Glycol-400 - -    75 2 Cocamidopropyl Betaine - -    76 3 Amerchol L101 - -    77 4 Propylene Glycol - -    78 5 Triethanolamine - -    79 6 Sorbitane Sesquiolate CT - -    80 7 Isopropylmyristate - -    81 8 Polysorbate 80 CT - -    82 9 Amidoamine   (Stearamidopropyl Dimethylamine) NA NA    83 10 Oleamidopropyl Dimethylamine - -    84 11 Lauryl Glucoside NA NA    85 12 Coconut Diethanolamide  - -    86 13 2-Hydroxy-4-Methoxy  Benzophenone (Oxybenzone) - -    87 14 Benzophenone-4 (Sulisobenzon) NA NA    88 15 Propolis - -    89 16 Dexpanthenol - -    90 17 Carboxymethyl Cellulose Sodium NA NA    91 18 Abitol - -    92 19 Tert-Butylhydroquinone - -    93 20 Benzyl Salicylate - -    94 21 Dimethylaminopropylamin (DMPA) CT? D053      95 22 Zinc Pyrithione (Zinc Omadine) CT? Z006      96 23 Hamlet(Hydroxymethyl) Nitromethane CT        Antioxidant - -    97 24 Dodecyl Gallate - -    98 25 Butylhydroxyanisole (BHA) - -    99 26 Butylhydroxytoluene (BHT) - -    100 27 Di-Alpha-Tocopherol (Vit E) - -    101 28 Propyl Gallate - -     PERFUMES, FLAVORS & PLANTS        # Substance 2 days 4 days remarks   102 1 Benzyl Cinnamate - -    103 2 Di-Limonene (Dipentene) - -    104 3 Cananga Odorata (Kelsea Enamorado) (I) - -    105 4 Lichen Acid Mix - -    106 5 Mentha Piperita Oil (Peppermint Oil) - -    107 6 Sesquiterpenelactone mix - -    108 7 Tea Tree Oil, Oxidized - -    109 8 Wood Tar Mix + -    110 9 Abietic Acid - -    111 10 Lavendula Angustifolia Oil (Lavender Oil) - -    112 11 Fragrance mix II CT * + -      Fragrance Mix I      113 12 Oakmoss Absolute - -    114 13 Eugenol - -    115 14 Geraniol - -    116 15 Hydroxycitronellal - -    117 16 Isoeugenol - -    118 17 Cinnamic Aldehyde - -    119 18 Cinnamic Alcohol  - -      Fragrance mix II      120 19 Citronellol - -    121 20 Alpha-Hexylcinnamic Aldehyde    - -    122 21 Citral - -    123 22 Farnesol - -    124 23 Coumarin - -      Hexylcinnamic aldehyde, Coumarin, Farnesol, Hydroxyisohexy3-cyclohexene carboxaldehyde, citral, citrolellol   PLASTICS        # Substance 2 days 4 days remarks     Acrylates - -    125 1 2-Hydroxyethyl Methacrylate (HEMA) - -     2 1,4-Butandioldimethacrylate (BUDMA) - -     3  2-Ethylhexyl Acrylate - -     4 Bisphenol-A-Dimethacrylate  - -     5 Diurethane-Dimethacrylate - -    130 6 Ethyleneglycoldimethacrylate (EGDMA) - -     7 Pentaerythritoltriacrylate (JENNI) - -      8 Triethylene Glycol Dimethacrylate (TEGDMA) - -      Synthetic material/additives        9 D-Nqqq-Umnzfquayro - -     10 Tricresyl Phosphate - -    135 11 5-Rijs-Qesdslspvinxr - -     12 Bis (2-Ethylhexyl) Phthalate - -     13 Dibutylphthalate - -     14 Dimethylphthalate - -     15 Toluene-2,4-Diisocyanate - -    140 16 Diphenylmethane-4,4''-Diisocyanate - -      EPOXY RESIN SYSTEMS        Reactive Solvents - -     17 Cresyl Glycidyl Ether - -     18 Butyl Glycidyl Ether - -     19 Phenyl Glycidyl Ether - -     20 1,4-Butanediol Diglycidyl Ether - -    145 21 1,6-Hexanediole Diglycidyl Ether - -      Hardener / Accelerator - -     22 Triethylenetetramine - -     23 Diethylenetriamine - -     24 Isophorone Diamine (IPD) - -     25 N,N-Dimethyl-P-Toluidine - -    150 26 0-eodo-Onhcauyxzsxsq (antioxidant/stabilizer) CT - -    151 27 9-mpbz-Hednlemvflplciolciciygq resin PTBP CT  - -    METALS (Implants / Dental)        # Substance 2 days 4 days remarks   152 1 Ammonium Heptamolybdate (IV) - -     2 Ammonium Tetrachloroplatinate - -     3 Indium (III) Chloride - -    155 4 Iridium (III) Chloride - -     5 Ferric Chloride - -     6 Manganese (II) Chloride - -     7 Niobium (V) Chloride - -     8 Palladium Chloride - -    160 9 Silver Nitrate - -     10 Gold Sodium Thiosulfate - -     11 Tantal - -     12 Tin (II) Chloride - -     13 Titanium (IV) Oxide - -    165 14 Titanium - -     15 Tungstic Acid, Sod Salt Dihydrat - -     16 Vanadium Pentoxide - -     17 Wolfram - -     18 Zinc Chloride - -    170 19 Zirconium (IV) Oxide - -     20 Ammoniated Murcury - -     21 Copper Sulfate Pentahydrate - -     22 Amalgam  - -     23 Aluminum Hydroxide - -    175 24 Platinum Tetrachloride - -      Results of patch tests:                         Interpretation:  - Negative                    A    = Allergic      (+) Erythema    TI   = Toxic/irritant   + E + Infiltration    RaP = Relevance at Present     ++ E/I +  Papulovesicle   Rpr  = Relevance Previously     +++ E/I/P + Blister     nR   = No Relevance    Atopy Screen (Placed 11/01/21)    No Substance Readings (15 min) Evaluation   POS Histamine 1mg/ml +/++    NEG NaCl 0.9% -      No Substance Readings (15 min) Evaluation   1 Alternaria alternata (tenuis)  -    2 Cladosporium herbarum -    3 Aspergillus fumigatus -    4 Penicillium notatum -    5 Dermatophagoides pteronyssinus -    6 Dermatophagoides farinae -    7 Dog epithelium (canis spp) -    8 Cat hair (hellen catus) -    9 Cockroach   (Blatella americana & germanica) -    10 Grass mix midwest   (Carmelina, Orchard, Redtop, Saroj) +/++    11 Qasim grass (sorghum halepense) -    12 Weed mix   (common Cocklebur, Lamb s quarters, rough redroot Pigweed, Dock/Sorrel) -    13 Mug wort (artemisia vulgare) -    14 Ragweed giant/short (ambrosia spp) -    15 White birch (Betula papyrifera) -    16 Tree mix 1 (Pecan, Maple BHR, Oak RVW, american [place], black Lame [place]er) -    17 Red cedar (juniperus virginia) -    18 Tree mix 2   (white Gregg, river/red Birch, black Kaplan, common [place], american Elm) -    19 Box elder/Maple mix (acer spp) -    20 Edelstein shagbark (carya ovata) -           Conclusion: slight reaction to grass pollens in prick test      Intradermal Testing (Placed 11/01/21)    No Substance Conc.  Reading (15min)  immediate Papule [mm] / Erythema [mm] Reading   (... days)  delayed Papule [mm] / Erythema [mm] Remarks   DF Standard Dust Mite - D. Farinae 1:10 -   -    DP Standard Dust Mite - D. Pteronyssinus 1:10 -   -    A Aspergillus fumigatus  1:10 -   -    P Penicillium notatum 1:10 -   -    Conclusions: no signs for immediate type reaction to dust mites and molds in intradermal tests. We will read if any delayed reactions    DRUG ALLERGY TEST SERIES 11/01/2021     ANTIBIOTICS: PENICILLINS    No Substance Readings (15min) Evaluation   POS Histamine 1mg/ml +/++    NEG NaCl 0.9% -        Prick Tests          Substance/ Allergen Conc Result (15min) Remarks    Benzylpenicillin (Penicillin G) 1 Suleman IU/ml (600 mg) -     Ampicillin 100 mg/ml -       Intradermal Tests   immed immed delayed delayed      Substance Conc 1st dil  2nd dil   2 days  days remarks    Benzylpenicillin (Penicillin G) 1:100 -  -      Ampicillin 1:10 -  -         CONTRAST MEDIA (iodinated, CT)     Prick Tests         Substance/ Allergen Concentration Result (15min) Remarks    Iodixanol [Visipaque] (*NID) 320/50 ml -     Iohexol [Omnipaque] (*NIM) 350 mg I/ml -       Intradermal Tests   immediate immediate delayed delayed      Substance Conc 1st dil (15 ) 2nd dil (15 ) 2 days   days remarks    Iodixanol (*NID) 1:10 -  -      Iohexol (*NIM) 1:10 -  -       * IM = Ionic monomer  * NIM = Non-ionic monomer   * ID = Ionic dimer  * NID = Non-ionic dimer      CONTRAST MEDIA (paramagnetic / Sonography)    Prick Tests         Substance/ Allergen Conc Result (15min) Remarks    Gadobutrol* - Gadovist 1.0 (GK, A, Z) 1mmol (604mg/ml) -       Intradermal Tests   immed immed delayed delayed      Substance Conc 1st dil 2nd dil  2 days  days remarks    Gadobutrol* (Gadovist) 1:10 -  -     Conclusion: no immediate type reactions in prick and IDT to contrast media and Penicillins.    [x] No relevant allergic reaction observed: some irritation over wood tar mix and fragrance mix    [] Allergic reaction diagnosed against following allergens:      Interpretation/ remarks:   See later    [] Patient information given   [] ACDS CAMP information's (# ....) to following compounds: .....   [] General information's to following compounds: ......      Assessment & Plan:    ==> Final Diagnosis:     # Recurrent ulcers cheek area. DDx oral lichen planus vs allergic contact sensitization to dental products  * stable chronic illness  History of dental work in about 2005. Reports lesions started around 2007. 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 Lesions have improved with using tacrolimus. However, still recur as identified on exam today. Agree that patch testing to see if has any sensitization to dental products is best next step. Will schedule for patch testing as above.  - Continue tacrolimus 1 mg dissolved in 250 mL water S&S TID-QID and augmented betamethasone ointment for cutaneous involvement    # History of drug allergies; penicillin, iohexol, gadolinium  * stable chronic illness    in skin prick and intradermal tests to Penicillin G, Ampicillin, Iohexol and Gadolinium no signs for specific immediate type reaction    Will observe if any delayed reaction  >> would remove Penicillins from allergy list and for contrast media would propose premedication according to radiology protocol (had this in the past and no problems)    # Suspicion for atopic predisposition with     nummular eczema = atopic dermatitis?    Rhinosinusitis, seasonal and perennial = Suspect dust mites, tree pollen and grass pollen with history.  * stable chronic illness     - Start mometasone spray BID for allergy symptoms  - Can continue with steroid ointments for skin lesions  - Uses allegra daily. Instructed to stop allegra 7 days prior to f/u appointment for prick testing    These conclusions are made at the best of one's knowledge and belief based on the provided evidence such as patient's history and allergy test results and they can change over time or can be incomplete because of missing information's.    ==> Treatment Plan:  As above    Staff: : provider      Follow-up in Derm-Allergy clinic for 2nd readings and final conclusions after 4 days (in person)   ___________________________    I spent a total of 20 minutes with Teresa Fernandez during today s  visit. This time was spent discussing all the individual test results, correlating them to the clinical relevance, counseling the patient and/or coordinating care.

## 2021-11-03 NOTE — LETTER
"    11/3/2021         RE: Teresa Fernandez  [address] [facility] [id number]        Dear Colleague,    Thank you for referring your patient, Teresa Fernandez, to the [facility] [place]. Please see a copy of my visit note below.    [facility] Dermato-allergology Note  Office visit  Encounter Date: Nov 3, 2021  ____________________________________________    CC: No chief complaint on file.      HPI:  (11/02/21)  Ms. Teresa Fernandez is a(n) 61 year old female who presents today as a return patient for allergy consultation  - Follow-up in Derm-Allergy clinic for 1st readings of patch tests after 2 days  - otherwise feeling well in usual state of health    Physical exam:  General: In no acute distress, well-developed, well-nourished  Eyes: no conjunctivitis  ENT: no signs of rhinitis   Pulmonary: no wheezing or coughing  Skin:Focused examination of the skin on test sites was performed = see test results below    Earlier History and Allergy exams:    (10/09/21)  Ms. Teresa Fernandez is a(n) 61 year old female who presents today as a new patient for allergy consultation  -\"consider oral lichenoid reaction to amalgam; this would not entirely explain genital involvement but this is essentially resolved and continue oral involvement appears to be in close proximity to dental work. Will refer to Dr. Jurado for this assessment in case patch testing may be useful.\"  - first started having oral lesions in 2007  - was having painful bumps on cheeks and tongue, looked similar to canker sores  - progressively got worse  - cut out gluten and improved  - came back a few years ago and started to see Dr. Alcaraz  - is currently using tacrolimus 1 mg dissolved in 250 mL water S&S TID-QID and augmented betamethasone ointment which improved lesions and not using anymore  - had bridges placed for dental work about 15 years ago  - notes some irritation with metal jewelry  - takes " allegra daily, last took this AM   - does not use nasal spray  Focused examination of the oral mucosa and left lower leg was performed.  - Left lower leg with 2, erythematous, nummular papules with scale  - Intraoral exam with dental work on right upper molars along with left upper and lowe molar as well. There are white, lacy plaques on the inside of left cheek with an erythematous erosion in the middle. There is erythema of the left tonsils with no other erosions identified    Past Medical History:   Patient Active Problem List   Diagnosis     Acquired hypothyroidism     Allergic state     Chronic rhinitis     Allergic rhinitis due to pollen     Sinusitis, chronic     History of skin cancer     ERIC (generalized anxiety disorder)     Leukoplakia of oral mucosa, including tongue     GERD (gastroesophageal reflux disease)     Chronic low back pain     Dry eye syndrome     Chronic fatigue     Osteopenia     History of melanoma in situ     Eczema     Moderate persistent asthma without complication     DDD (degenerative disc disease), lumbar     Hyperlipidemia LDL goal <160     Chest tightness or pressure     Muscle pain     GAYE (obstructive sleep apnea)     History of kidney cancer     Fibromyalgia     Past Medical History:   Diagnosis Date     ALLERGIC RHINITIS NOS 4/12/2005     Anxiety      Arthritis     herniated disc     Bronchitis, not specified as acute or chronic      CHR MAXILLARY SINUSITIS 4/12/2005     Depressive disorder, not elsewhere classified      Eczema 10/17/2012     Environmental and seasonal allergies      GERD (gastroesophageal reflux disease)      Gluten intolerance      Malignant neoplasm of renal pelvis (H) 1995    Renal cell carcinoma     Melanoma (H) 06/2010     Other specified acquired hypothyroidism 4/12/2005     Renal cancer (H) 5/5/2011     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     Grave's disease     Unspecified asthma(493.90)        Allergies:  Allergies    Allergen Reactions     Omnipaque [Iohexol] Swelling and Difficulty breathing     Immediate throat swelling following around 1-2cc of omnipaque 300 for spine procedure     Gluten      Pcn [Penicillins] Hives       Medications:  Current Outpatient Medications   Medication     acetaminophen (TYLENOL) 325 MG tablet     albuterol (PROAIR HFA/PROVENTIL HFA/VENTOLIN HFA) 108 (90 BASE) MCG/ACT Inhaler     APNO CREA ointment     augmented betamethasone dipropionate (DIPROLENE-AF) 0.05 % external ointment     Cholecalciferol (VITAMIN D3 PO)     Cyanocobalamin (VITAMIN B 12 PO)     dexamethasone (DECADRON) 0.5 MG/5ML elixir     DULoxetine (CYMBALTA) 60 MG capsule     fexofenadine (ALLEGRA) 180 MG tablet     fluticasone-salmeterol (ADVAIR DISKUS) 250-50 MCG/DOSE inhaler     levothyroxine (SYNTHROID/LEVOTHROID) 100 MCG tablet     levothyroxine (SYNTHROID/LEVOTHROID) 88 MCG tablet     lidocaine (XYLOCAINE) 2 % solution     magic mouthwash (ENTER INGREDIENTS IN COMMENTS) suspension     mometasone (NASONEX) 50 MCG/ACT nasal spray     mupirocin (BACTROBAN) 2 % external ointment     omeprazole (PRILOSEC) 20 MG DR capsule     order for DME     OVER-THE-COUNTER     pregabalin (LYRICA) 50 MG capsule     tacrolimus (GENERIC EQUIVALENT) 1 MG capsule     triamcinolone (ARISTOCORT HP) 0.5 % external cream     triamcinolone (KENALOG) 0.1 % external ointment     triamcinolone (NASACORT) 55 MCG/ACT nasal aerosol     Current Facility-Administered Medications   Medication     ampicillin (OMNIPEN) 1 g vial INTRADERMAL     penicillin g potassium [id number] unit vial INTRADERMAL       Social History:  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 Patient has the following hobbies or non-occupational exposure camping and outdoor activities    Family History:  Family History   Problem Relation Age of Onset     Diabetes Mother      Cardiovascular Mother      Lipids Mother      Depression Mother       Hypertension Father      Lipids Father      Obesity Father      Macular Degeneration Father      Sleep Apnea Father      Cancer Maternal Grandmother         kind unknown had sores on legs     Eczema Maternal Grandmother      Eczema Maternal Grandfather      Breast Cancer Paternal Grandmother      Cancer Paternal Grandmother         breast     Hypertension Paternal Grandfather      Cardiovascular Paternal Grandfather         heart attack     Hypertension Brother      Thyroid Disease Sister      Hypertension Sister      Depression Sister      Allergies Sister      Allergies Son      Eczema Son      Lipids Sister      Thyroid Disease Sister      Neurologic Disorder Sister      Depression Sister      Respiratory Son      Sleep Apnea Son      Glaucoma No family hx of      Cerebrovascular Disease No family hx of        Previous Labs, Allergy Tests, Dermatopathology, Imaging:  About 20 years ago had prick testing positive to dandelion and tumbleweed    Referred By: No referring provider defined for this encounter.     Allergy Tests:    Past Allergy Test    Order for Future Allergy Testing:    [x] Outpatient  [] Inpatient: [facility]..../ Bed ....       Skin Atopy (atopic dermatitis) [] Yes   [x] No .........  Contact allergies:   [] Yes   [x] No ..........  Hand eczema:   [] Yes   [x] No           Leading hand:   [] R   [] L       [] Ambidextrous         Drug allergies:        [x] Yes   [] No  Which?...... penicillin cause itchy; contrast got anaphylaxis    Urticaria/Angioedema  [] Yes   [x] No .........  Food Allergy:  [x] Yes   [] No  Which?...... gluten intolerance  Pets :  [x] Yes   [] No  which?......  2 dogs aged 5 and 12        [x]  Rhinitis   [x] Conjunctivitis   [x] Sinusitis   [] Polyposis   [] Otitis   [x] Pharyngitis         []  none  Operations:   [] Tonsils   [] Septum   [] Sinus   [] Polyposis        [] Asthma bronchiale   [x] Coughing      []  none  Symptoms (mostly Rhinoconjunctivitis and Asthma) aggravated  by:  Season   [] I   [] II   [] III   [x] IV   [x]V   []VI   []VII   []VIII   [x]IX   [x]X   []XI   []XI     [x] perennial   Day time      [x] morning   [] noon      [] evening        [] night    [] whole day........  []  none  Location/changes    [] inside        [x] outside   [] mountains    [] sea     [] others.............   []  none  Triggers, specific     [] animals     [] plants     [x] dust              [] others ...........................    []  none  Triggers, others       [] work          [] psyche    [] sport            [] others .............................  []  none  Irritant                [] phys efforts [] smoke    [x] heat/cold     [] odors  []others............... []  none    Order for PATCH TESTS  Reason for tests (suspected allergy): question if allergy to dental work  Known previous allergies: n/a  Standardized panels  [x] Standard panel (40 tests)  [x] Preservatives & Antimicrobials (31 tests)  [x] Emulsifiers & Additives (25 tests)   [x] Perfumes/Flavours & Plants (25 tests)  [] Hairdresser panel (12 tests)  [] Rubber Chemicals (22 tests)  [x] Plastics (26 tests)  [] Colorants/Dyes/Food additives (20 tests)  [x] Metals (implants/dental) (24 tests)  [] Local anaesthetics/NSAIDs (13 tests)  [] Antibiotics & Antimycotics (14 tests)   [] Corticosteroids (15 tests)   [] Photopatch test (62 tests)   [] others: ...      [] Patient's own products: ...    DO NOT test if chemical or biological identity is unknown!     always ask from patient the product information and safety sheets (MSDS)       Order for PRICK TESTS    Reason for tests (suspected allergy): seasonal rhinoconjunctivitis  Known previous allergies: dandelions    Standardized prick panels  [x] Atopic panel (20 tests)  [] Pediatric Panel (12 tests)  [] Milk, Meat, Eggs, Grains (20 tests)   [] Dust, Epithelia, Feathers (10 tests)  [] Fish, Seafood, Shellfish (17 tests)  [] Nuts, Beans (14 tests)  [] Spice, Vegetable, Fruit (17 tests)  []  Pollen Panel = Tree, Grass, Weed (24 tests)  [] Others: ...      [] Patient's own products: ...    DO NOT test if chemical or biological identity is unknown!     always ask from patient the product information and safety sheets (MSDS)     Standardized intradermal tests  [x] Penicillium notatum [x] Aspergillus fumigatus [x] House dust mites D.far & D. pteron  [] Cat    [] dog  [] Others: ...  [] Bee venom   [] Wasp venom  !!Specific protocol with dilutions!!       Order for Drug allergy tests (prick & Intradermal tests)    [x] Penicillin G  [x] Ampicillin [] Cefazolin   [] Ceftriaxone   [] Ceftazidime  [] Bactrim    [x] Others: ... omnipaque, visipaque, gadolinium  Order for ... as test date    ________________________________    RESULTS & EVALUATION of PATCH TESTS    Patch test readings after     [x] 2 days, [] 3 days [x] 4 days, [] 5 days,   Other duration: ...    11/01/21 application of patch tests with results:    STANDARD Series        # Substance 2 days 4 days remarks    1 Shravan Mix [C] - -     2 Colophony - -     3  2-Mercaptobenzothiazole  - -      4 Methylisothiazolinone - -     5 Carba Mix - -     6 Thiuram Mix [A] - -     7 Bisphenol A Epoxy Resin - -     8 N-Lnxp-Figarrygzrt-Formaldehyde Resin - -     9 Mercapto Mix [A] - -     10 Black Rubber Mix- PPD [B] - -     11 Potassium Dichromate  -  -     12 Balsam of [place] (Myroxylon Pereirae Resin) - -     13 Nickel Sulphate Hexahydrate - -     14 Mixed Dialkyl Thiourea - -     15 Paraben Mix [B] - -     16 Methyldibromo Glutaronitrile - -     17 Fragrance Mix - -     18 2-Bromo-2-Nitropropane-1,3-Diol (Bronopol) CT - -     19 Lyral - -     20 Tixocortol-21- Pivalate CT - -     21 Diazolidiyl Urea (Germall II) - -     22 Methyl Methacrylate - -     23 Cobalt (II) Chloride Hexahydrate - -     24 Fragrance Mix II  - -     25 Compositae Mix - -     26 Benzoyl Peroxide - -     27 Bacitracin - -     28 Formaldehyde - -     29 Methylchloroisothiazolinone /  Methylisothiazolinone - -     30 Corticosteroid Mix CT NA NA     31 Sodium Lauryl Sulfate - -     32 Lanolin Alcohol - -     33 Turpentine - -     34 Cetylstearylalcohol - -     35 Chlorhexidine Dicluconate - -     36 Budenoside - -     37 Imidazolidinyl Urea  - -     38 Ethyl-2 Cyanoacrylate - -     39 Quaternium 15 (Dowicil 200) - -     40 Decyl Glucoside - -     PRESERVATIVES & ANTIMICROBIALS        # Substance 2 days 4 days remarks   41 1  1,2-Benzisothiazoline-3-One, Sodium Salt - -    42 2  1,3,5-Hamlet (2-Hydroxyethyl) - Hexahydrotriazine (Grotan BK) - -    43 3 4-Kquzlfmmeulgp-9-Nitro-1, 3-Propanediol NA NA    44 4  3, 4, 4' - Triclocarban - -    45 5 4 - Chloro - 3 - Cresol - -    46 6 4 - Chloro - 4 - Xylenol (PCMX) - -    47 7 7-Ethylbicyclooxazolidine (Bioban ET6125) - -    48 8 Benzalkonium Chloride CT - -    49 9 Benzyl Alcohol - -    50 10 Cetalkonium Chloride - -    51 11 Cetylpyrimidine Chloride  - -    52 12 Chloroacetamide - -    53 13 DMDM Hydantoin - -    54 14 Glutaraldehyde - -    55 15 Triclosan - -    56 16 Glyoxal Trimeric Dihydrate - -    57 17 Iodopropynyl Butylcarbamate - -    58 18 Octylisothiazoline NA NA    59 19 Bithionol CT - -    60 20 Bioban P 1487 (Nitrobutyl) Morpholine/(Ethylnitro-Trimethylene) Dimorpholine - -    61 21 Phenoxyethanol - -    62 22 Phenyl Salicylate - -    63 23 Povidone Iodine - -    64 24 Sodium Benzoate - -    65 25 Sodium Disulfite - -    66 26 Sorbic Acid - -    67 27 Thimerosal      68 28 Melamine Formaldehyde Resin      69 29 Ethylenediamine Dihydrochloride        Parabens      70 30 Butyl-P-Hydroxybenzoate - -    71 31 Ethyl-P-Hydroxybenzoate - -    72 32 Methyl-P-Hydroxybenzoate - -    73 33 Propyl-P-Hydroxybenzoate - -     EMULSIFIERS & ADDITIVES       # Substance 2 days 4 days remarks   74 1 Polyethylene Glycol-400 - -    75 2 Cocamidopropyl Betaine - -    76 3 Amerchol L101 - -    77 4 Propylene Glycol - -    78 5 Triethanolamine - -    79 6 Sorbitane  Sesquiolate CT - -    80 7 Isopropylmyristate - -    81 8 Polysorbate 80 CT - -    82 9 Amidoamine   (Stearamidopropyl Dimethylamine) NA NA    83 10 Oleamidopropyl Dimethylamine - -    84 11 Lauryl Glucoside NA NA    85 12 Coconut Diethanolamide  - -    86 13 2-Hydroxy-4-Methoxy Benzophenone (Oxybenzone) - -    87 14 Benzophenone-4 (Sulisobenzon) NA NA    88 15 Propolis - -    89 16 Dexpanthenol - -    90 17 Carboxymethyl Cellulose Sodium NA NA    91 18 Abitol - -    92 19 Tert-Butylhydroquinone - -    93 20 Benzyl Salicylate - -    94 21 Dimethylaminopropylamin (DMPA) CT? D053      95 22 Zinc Pyrithione (Zinc Omadine) CT? Z006      96 23 Hamlet(Hydroxymethyl) Nitromethane CT        Antioxidant - -    97 24 Dodecyl Gallate - -    98 25 Butylhydroxyanisole (BHA) - -    99 26 Butylhydroxytoluene (BHT) - -    100 27 Di-Alpha-Tocopherol (Vit E) - -    101 28 Propyl Gallate - -     PERFUMES, FLAVORS & PLANTS        # Substance 2 days 4 days remarks   102 1 Benzyl Cinnamate - -    103 2 Di-Limonene (Dipentene) - -    104 3 Cananga Odorata (Kelsea Enamorado) (I) - -    105 4 Lichen Acid Mix - -    106 5 Mentha Piperita Oil (Peppermint Oil) - -    107 6 Sesquiterpenelactone mix - -    108 7 Tea Tree Oil, Oxidized - -    109 8 Wood Tar Mix + -    110 9 Abietic Acid - -    111 10 Lavendula Angustifolia Oil (Lavender Oil) - -    112 11 Fragrance mix II CT * + -      Fragrance Mix I      113 12 [place]moss Absolute - -    114 13 Eugenol - -    115 14 Geraniol - -    116 15 Hydroxycitronellal - -    117 16 Isoeugenol - -    118 17 Cinnamic Aldehyde - -    119 18 Cinnamic Alcohol  - -      Fragrance mix II      120 19 Citronellol - -    121 20 Alpha-Hexylcinnamic Aldehyde    - -    122 21 Citral - -    123 22 Farnesol - -    124 23 Coumarin - -      Hexylcinnamic aldehyde, Coumarin, Farnesol, Hydroxyisohexy3-cyclohexene carboxaldehyde, citral, citrolellol   PLASTICS        # Substance 2 days 4 days remarks     Acrylates - -    125 1  2-Hydroxyethyl Methacrylate (HEMA) - -     2 1,4-Butandioldimethacrylate (BUDMA) - -     3  2-Ethylhexyl Acrylate - -     4 Bisphenol-A-Dimethacrylate  - -     5 Diurethane-Dimethacrylate - -    130 6 Ethyleneglycoldimethacrylate (EGDMA) - -     7 Pentaerythritoltriacrylate (JENNI) - -     8 Triethylene Glycol Dimethacrylate (TEGDMA) - -      Synthetic material/additives        9 T-Psmu-Najhabvtvnp - -     10 Tricresyl Phosphate - -    135 11 2-Hysx-Ycndlpwpnkwif - -     12 Bis (2-Ethylhexyl) Phthalate - -     13 Dibutylphthalate - -     14 Dimethylphthalate - -     15 Toluene-2,4-Diisocyanate - -    140 16 Diphenylmethane-4,4''-Diisocyanate - -      EPOXY RESIN SYSTEMS        Reactive Solvents - -     17 Cresyl Glycidyl Ether - -     18 Butyl Glycidyl Ether - -     19 Phenyl Glycidyl Ether - -     20 1,4-Butanediol Diglycidyl Ether - -    145 21 1,6-Hexanediole Diglycidyl Ether - -      Hardener / Accelerator - -     22 Triethylenetetramine - -     23 Diethylenetriamine - -     24 Isophorone Diamine (IPD) - -     25 N,N-Dimethyl-P-Toluidine - -    150 26 5-vukh-Idovbyfoktpar (antioxidant/stabilizer) CT - -    151 27 9-kllb-Tlgtmupvubgbmbiwkedvaxd resin PTBP CT  - -    METALS (Implants / Dental)        # Substance 2 days 4 days remarks   152 1 Ammonium Heptamolybdate (IV) - -     2 Ammonium Tetrachloroplatinate - -     3 Indium (III) Chloride - -    155 4 Iridium (III) Chloride - -     5 Ferric Chloride - -     6 Manganese (II) Chloride - -     7 Niobium (V) Chloride - -     8 Palladium Chloride - -    160 9 Silver Nitrate - -     10 Gold Sodium Thiosulfate - -     11 Tantal - -     12 Tin (II) Chloride - -     13 Titanium (IV) Oxide - -    165 14 Titanium - -     15 Tungstic Acid, Sod Salt Dihydrat - -     16 Vanadium Pentoxide - -     17 Wolfram - -     18 Zinc Chloride - -    170 19 Zirconium (IV) Oxide - -     20 Ammoniated Murcury - -     21 Copper Sulfate Pentahydrate - -     22 Amalgam  - -     23 Aluminum  Hydroxide - -    175 24 Platinum Tetrachloride - -      Results of patch tests:                         Interpretation:  - Negative                    A    = Allergic      (+) Erythema    TI   = Toxic/irritant   + E + Infiltration    RaP = Relevance at Present     ++ E/I + Papulovesicle   Rpr  = Relevance Previously     +++ E/I/P + Blister     nR   = No Relevance    Atopy Screen (Placed 11/01/21)    No Substance Readings (15 min) Evaluation   POS Histamine 1mg/ml +/++    NEG NaCl 0.9% -      No Substance Readings (15 min) Evaluation   1 Alternaria alternata (tenuis)  -    2 Cladosporium herbarum -    3 Aspergillus fumigatus -    4 Penicillium notatum -    5 Dermatophagoides pteronyssinus -    6 Dermatophagoides farinae -    7 Dog epithelium (canis spp) -    8 Cat hair (hellen catus) -    9 Cockroach   (Blatella americana & germanica) -    10 Grass mix midwest   (Carmelina, Orchard, Redtop, Saroj) +/++    11 Qasim grass (sorghum halepense) -    12 Weed mix   (common Cocklebur, Lamb s quarters, rough redroot Pigweed, Dock/Sorrel) -    13 Mug wort (artemisia vulgare) -    14 Ragweed giant/short (ambrosia spp) -    15 White birch (Betula papyrifera) -    16 Tree mix 1 (Pecan, Maple BHR, Oak RVW, american Sweetser, black [place]ingree) -    17 Red cedar (juniperus virginia) -    18 Tree mix 2   (white Gregg, river/red Birch, black [place], common [place], american Elm) -    19 Box elder/Maple mix (acer spp) -    20 [place] shagbark (carya ovata) -           Conclusion: slight reaction to grass pollens in prick test      Intradermal Testing (Placed 11/01/21)    No Substance Conc.  Reading (15min)  immediate Papule [mm] / Erythema [mm] Reading   (... days)  delayed Papule [mm] / Erythema [mm] Remarks   DF Standard Dust Mite - D. Farinae 1:10 -   -    DP Standard Dust Mite - D. Pteronyssinus 1:10 -   -    A Aspergillus fumigatus  1:10 -   -    P Penicillium notatum 1:10 -   -    Conclusions: no signs for immediate type reaction to  dust mites and molds in intradermal tests. We will read if any delayed reactions    DRUG ALLERGY TEST SERIES 11/01/2021     ANTIBIOTICS: PENICILLINS    No Substance Readings (15min) Evaluation   POS Histamine 1mg/ml +/++    NEG NaCl 0.9% -        Prick Tests         Substance/ Allergen Conc Result (15min) Remarks    Benzylpenicillin (Penicillin G) 1 [place] IU/ml (600 mg) -     Ampicillin 100 mg/ml -       Intradermal Tests   immed immed delayed delayed      Substance Conc 1st dil  2nd dil   2 days  days remarks    Benzylpenicillin (Penicillin G) 1:100 -  -      Ampicillin 1:10 -  -         CONTRAST MEDIA (iodinated, CT)     Prick Tests         Substance/ Allergen Concentration Result (15min) Remarks    Iodixanol [Visipaque] (*NID) 320/50 ml -     Iohexol [Omnipaque] (*NIM) 350 mg I/ml -       Intradermal Tests   immediate immediate delayed delayed      Substance Conc 1st dil (15 ) 2nd dil (15 ) 2 days   days remarks    Iodixanol (*NID) 1:10 -  -      Iohexol (*NIM) 1:10 -  -       * IM = Ionic monomer  * NIM = Non-ionic monomer   * ID = Ionic dimer  * NID = Non-ionic dimer      CONTRAST MEDIA (paramagnetic / Sonography)    Prick Tests         Substance/ Allergen Conc Result (15min) Remarks    Gadobutrol* - Gadovist 1.0 (GK, A, Z) 1mmol (604mg/ml) -       Intradermal Tests   immed immed delayed delayed      Substance Conc 1st dil 2nd dil  2 days  days remarks    Gadobutrol* (Gadovist) 1:10 -  -     Conclusion: no immediate type reactions in prick and IDT to contrast media and Penicillins.    [x] No relevant allergic reaction observed: some irritation over wood tar mix and fragrance mix    [] Allergic reaction diagnosed against following allergens:      Interpretation/ remarks:   See later    [] Patient information given   [] ACDS CAMP information's (# ....) to following compounds: .....   [] General information's to following compounds: ......      Assessment & Plan:    ==> Final Diagnosis:     # Recurrent ulcers cheek  area. DDx oral lichen planus vs allergic contact sensitization to dental products  * stable chronic illness  History of dental work in about 2005. Reports lesions started around 2007. 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 Lesions have improved with using tacrolimus. However, still recur as identified on exam today. Agree that patch testing to see if has any sensitization to dental products is best next step. Will schedule for patch testing as above.  - Continue tacrolimus 1 mg dissolved in 250 mL water S&S TID-QID and augmented betamethasone ointment for cutaneous involvement    # History of drug allergies; penicillin, iohexol, gadolinium  * stable chronic illness    in skin prick and intradermal tests to Penicillin G, Ampicillin, Iohexol and Gadolinium no signs for specific immediate type reaction    Will observe if any delayed reaction  >> would remove Penicillins from allergy list and for contrast media would propose premedication according to radiology protocol (had this in the past and no problems)    # Suspicion for atopic predisposition with     nummular eczema = atopic dermatitis?    Rhinosinusitis, seasonal and perennial = Suspect dust mites, tree pollen and grass pollen with history.  * stable chronic illness     - Start mometasone spray BID for allergy symptoms  - Can continue with steroid ointments for skin lesions  - Uses allegra daily. Instructed to stop allegra 7 days prior to f/u appointment for prick testing    These conclusions are made at the best of one's knowledge and belief based on the provided evidence such as patient's history and allergy test results and they can change over time or can be incomplete because of missing information's.    ==> Treatment Plan:  As above    Staff: : provider      Follow-up in Derm-Allergy clinic for 2nd readings and final conclusions after 4 days (in person)   ___________________________    I spent a total of  20 minutes with Teresa Fernandez during today s  visit. This time was spent discussing all the individual test results, correlating them to the clinical relevance, counseling the patient and/or coordinating care.           Again, thank you for allowing me to participate in the care of your patient.        Sincerely,        Dagoberto Jurado MD

## 2021-11-04 NOTE — PROGRESS NOTES
"[facility] Dermato-allergology Note  Office visit  Encounter Date: Nov 5, 2021  ____________________________________________    CC: No chief complaint on file.      HPI:  (11/04/21)  Ms. Teresa Fernandez is a(n) 61 year old female who presents today as a return patient for allergy consultation  - Follow-up in Derm-Allergy clinic for 2nd readings and final conclusions after 4 days (in person)   - otherwise feeling well in usual state of health    Physical exam:  General: In no acute distress, well-developed, well-nourished  Eyes: no conjunctivitis  ENT: no signs of rhinitis   Pulmonary: no wheezing or coughing  Skin:Focused examination of the skin on test sites was performed = see test results below    Earlier History and Allergy exams:  (10/09/21)  Ms. Teresa Fernandez is a(n) 61 year old female who presents today as a new patient for allergy consultation  -\"consider oral lichenoid reaction to amalgam; this would not entirely explain genital involvement but this is essentially resolved and continue oral involvement appears to be in close proximity to dental work. Will refer to Dr. Jurado for this assessment in case patch testing may be useful.\"  - first started having oral lesions in 2007  - was having painful bumps on cheeks and tongue, looked similar to canker sores  - progressively got worse  - cut out gluten and improved  - came back a few years ago and started to see Dr. Alcaraz  - is currently using tacrolimus 1 mg dissolved in 250 mL water S&S TID-QID and augmented betamethasone ointment which improved lesions and not using anymore  - had bridges placed for dental work about 15 years ago  - notes some irritation with metal jewelry  - takes allegra daily, last took this AM   - does not use nasal spray  Focused examination of the oral mucosa and left lower leg was performed.  - Left lower leg with 2, erythematous, nummular papules with scale  - Intraoral exam with dental work on right upper " molars along with left upper and lowe molar as well. There are white, lacy plaques on the inside of left cheek with an erythematous erosion in the middle. There is erythema of the left tonsils with no other erosions identified    Past Medical History:   Patient Active Problem List   Diagnosis     Acquired hypothyroidism     Allergic state     Chronic rhinitis     Allergic rhinitis due to pollen     Sinusitis, chronic     History of skin cancer     ERIC (generalized anxiety disorder)     Leukoplakia of oral mucosa, including tongue     GERD (gastroesophageal reflux disease)     Chronic low back pain     Dry eye syndrome     Chronic fatigue     Osteopenia     History of melanoma in situ     Eczema     Moderate persistent asthma without complication     DDD (degenerative disc disease), lumbar     Hyperlipidemia LDL goal <160     Chest tightness or pressure     Muscle pain     GAYE (obstructive sleep apnea)     History of kidney cancer     Fibromyalgia     Past Medical History:   Diagnosis Date     ALLERGIC RHINITIS NOS 4/12/2005     Anxiety      Arthritis     herniated disc     Bronchitis, not specified as acute or chronic      CHR MAXILLARY SINUSITIS 4/12/2005     Depressive disorder, not elsewhere classified      Eczema 10/17/2012     Environmental and seasonal allergies      GERD (gastroesophageal reflux disease)      Gluten intolerance      Malignant neoplasm of renal pelvis (H) 1995    Renal cell carcinoma     Melanoma (H) 06/2010     Other specified acquired hypothyroidism 4/12/2005     Renal cancer (H) 5/5/2011     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     Grave's disease     Unspecified asthma(493.90)        Allergies:  Allergies   Allergen Reactions     Omnipaque [Iohexol] Swelling and Difficulty breathing     Immediate throat swelling following around 1-2cc of omnipaque 300 for spine procedure     Gluten      Pcn [Penicillins] Hives       Medications:  Current Outpatient Medications    Medication     acetaminophen (TYLENOL) 325 MG tablet     albuterol (PROAIR HFA/PROVENTIL HFA/VENTOLIN HFA) 108 (90 BASE) MCG/ACT Inhaler     APNO CREA ointment     augmented betamethasone dipropionate (DIPROLENE-AF) 0.05 % external ointment     Cholecalciferol (VITAMIN D3 PO)     Cyanocobalamin (VITAMIN B 12 PO)     dexamethasone (DECADRON) 0.5 MG/5ML elixir     DULoxetine (CYMBALTA) 60 MG capsule     fexofenadine (ALLEGRA) 180 MG tablet     fluticasone-salmeterol (ADVAIR DISKUS) 250-50 MCG/DOSE inhaler     levothyroxine (SYNTHROID/LEVOTHROID) 100 MCG tablet     levothyroxine (SYNTHROID/LEVOTHROID) 88 MCG tablet     lidocaine (XYLOCAINE) 2 % solution     magic mouthwash (ENTER INGREDIENTS IN COMMENTS) suspension     mometasone (NASONEX) 50 MCG/ACT nasal spray     mupirocin (BACTROBAN) 2 % external ointment     omeprazole (PRILOSEC) 20 MG DR capsule     order for DME     OVER-THE-COUNTER     pregabalin (LYRICA) 50 MG capsule     tacrolimus (GENERIC EQUIVALENT) 1 MG capsule     triamcinolone (ARISTOCORT HP) 0.5 % external cream     triamcinolone (KENALOG) 0.1 % external ointment     triamcinolone (NASACORT) 55 MCG/ACT nasal aerosol     Current Facility-Administered Medications   Medication     ampicillin (OMNIPEN) 1 g vial INTRADERMAL     penicillin g potassium [id number] unit vial INTRADERMAL       Social History:  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 Patient has the following hobbies or non-occupational exposure camping and outdoor activities    Family History:  Family History   Problem Relation Age of Onset     Diabetes Mother      Cardiovascular Mother      Lipids Mother      Depression Mother      Hypertension Father      Lipids Father      Obesity Father      Macular Degeneration Father      Sleep Apnea Father      Cancer Maternal Grandmother         kind unknown had sores on legs     Eczema Maternal Grandmother      Eczema Maternal Grandfather       Breast Cancer Paternal Grandmother      Cancer Paternal Grandmother         breast     Hypertension Paternal Grandfather      Cardiovascular Paternal Grandfather         heart attack     Hypertension Brother      Thyroid Disease Sister      Hypertension Sister      Depression Sister      Allergies Sister      Allergies Son      Eczema Son      Lipids Sister      Thyroid Disease Sister      Neurologic Disorder Sister      Depression Sister      Respiratory Son      Sleep Apnea Son      Glaucoma No family hx of      Cerebrovascular Disease No family hx of        Previous Labs, Allergy Tests, Dermatopathology, Imaging:  About 20 years ago had prick testing positive to dandelion and tumbleweed    Referred By: No referring provider defined for this encounter.     Allergy Tests:    Past Allergy Test    Order for Future Allergy Testing:    [x] Outpatient  [] Inpatient: [facility]..../ Bed ....       Skin Atopy (atopic dermatitis) [] Yes   [x] No .........  Contact allergies:   [] Yes   [x] No ..........  Hand eczema:   [] Yes   [x] No           Leading hand:   [] R   [] L       [] Ambidextrous         Drug allergies:        [x] Yes   [] No  Which?...... penicillin cause itchy; contrast got anaphylaxis    Urticaria/Angioedema  [] Yes   [x] No .........  Food Allergy:  [x] Yes   [] No  Which?...... gluten intolerance  Pets :  [x] Yes   [] No  which?......  2 dogs aged 5 and 12        [x]  Rhinitis   [x] Conjunctivitis   [x] Sinusitis   [] Polyposis   [] Otitis   [x] Pharyngitis         []  none  Operations:   [] Tonsils   [] Septum   [] Sinus   [] Polyposis        [] Asthma bronchiale   [x] Coughing      []  none  Symptoms (mostly Rhinoconjunctivitis and Asthma) aggravated by:  Season   [] I   [] II   [] III   [x] IV   [x]V   []VI   []VII   []VIII   [x]IX   [x]X   []XI   []XI     [x] perennial   Day time      [x] morning   [] noon      [] evening        [] night    [] whole day........  []  none  Location/changes    [] inside         [x] outside   [] mountains    [] sea     [] others.............   []  none  Triggers, specific     [] animals     [] plants     [x] dust              [] others ...........................    []  none  Triggers, others       [] work          [] psyche    [] sport            [] others .............................  []  none  Irritant                [] phys efforts [] smoke    [x] heat/cold     [] odors  []others............... []  none    Order for PATCH TESTS  Reason for tests (suspected allergy): question if allergy to dental work  Known previous allergies: n/a  Standardized panels  [x] Standard panel (40 tests)  [x] Preservatives & Antimicrobials (31 tests)  [x] Emulsifiers & Additives (25 tests)   [x] Perfumes/Flavours & Plants (25 tests)  [] Hairdresser panel (12 tests)  [] Rubber Chemicals (22 tests)  [x] Plastics (26 tests)  [] Colorants/Dyes/Food additives (20 tests)  [x] Metals (implants/dental) (24 tests)  [] Local anaesthetics/NSAIDs (13 tests)  [] Antibiotics & Antimycotics (14 tests)   [] Corticosteroids (15 tests)   [] Photopatch test (62 tests)   [] others: ...      [] Patient's own products: ...    DO NOT test if chemical or biological identity is unknown!     always ask from patient the product information and safety sheets (MSDS)       Order for PRICK TESTS    Reason for tests (suspected allergy): seasonal rhinoconjunctivitis  Known previous allergies: dandelions    Standardized prick panels  [x] Atopic panel (20 tests)  [] Pediatric Panel (12 tests)  [] Milk, Meat, Eggs, Grains (20 tests)   [] Dust, Epithelia, Feathers (10 tests)  [] Fish, Seafood, Shellfish (17 tests)  [] Nuts, Beans (14 tests)  [] Spice, Vegetable, Fruit (17 tests)  [] Pollen Panel = Tree, Grass, Weed (24 tests)  [] Others: ...      [] Patient's own products: ...    DO NOT test if chemical or biological identity is unknown!     always ask from patient the product information and safety sheets (MSDS)     Standardized intradermal  tests  [x] Penicillium notatum [x] Aspergillus fumigatus [x] House dust mites D.far & D. pteron  [] Cat    [] dog  [] Others: ...  [] Bee venom   [] Wasp venom  !!Specific protocol with dilutions!!       Order for Drug allergy tests (prick & Intradermal tests)    [x] Penicillin G  [x] Ampicillin [] Cefazolin   [] Ceftriaxone   [] Ceftazidime  [] Bactrim    [x] Others: ... omnipaque, visipaque, gadolinium  Order for ... as test date    ________________________________    RESULTS & EVALUATION of PATCH TESTS    Patch test readings after     [x] 2 days, [] 3 days [x] 4 days, [] 5 days,   Other duration: ...    11/01/21 application of patch tests with results:    STANDARD Series        # Substance 2 days 4 days remarks    1 Shravan Mix [C] - -     2 Colophony - -     3  2-Mercaptobenzothiazole  - -      4 Methylisothiazolinone - -     5 Carba Mix - -     6 Thiuram Mix [A] - -     7 Bisphenol A Epoxy Resin - -     8 J-Fgku-Llvqoieeknh-Formaldehyde Resin - -     9 Mercapto Mix [A] - -     10 Black Rubber Mix- PPD [B] - -     11 Potassium Dichromate  -  -     12 Balsam of [place] (Myroxylon Pereirae Resin) - +     13 Nickel Sulphate Hexahydrate - -     14 Mixed Dialkyl Thiourea - -     15 Paraben Mix [B] - -     16 Methyldibromo Glutaronitrile - -     17 Fragrance Mix - -     18 2-Bromo-2-Nitropropane-1,3-Diol (Bronopol) CT - -     19 Lyral - -     20 Tixocortol-21- Pivalate CT - -     21 Diazolidiyl Urea (Germall II) - -     22 Methyl Methacrylate - -     23 Cobalt (II) Chloride Hexahydrate - -     24 Fragrance Mix II  - -     25 Compositae Mix - -     26 Benzoyl Peroxide - -     27 Bacitracin - -     28 Formaldehyde - -     29 Methylchloroisothiazolinone / Methylisothiazolinone - -     30 Corticosteroid Mix CT NA NA     31 Sodium Lauryl Sulfate - -     32 Lanolin Alcohol - (+)     33 Turpentine - -     34 Cetylstearylalcohol - -     35 Chlorhexidine Dicluconate - -     36 Budenoside - -     37 Imidazolidinyl Urea  - -     38  Ethyl-2 Cyanoacrylate - -     39 Quaternium 15 (Dowicil 200) - -     40 Decyl Glucoside - -     PRESERVATIVES & ANTIMICROBIALS        # Substance 2 days 4 days remarks   41 1  1,2-Benzisothiazoline-3-One, Sodium Salt - -    42 2  1,3,5-Hamlet (2-Hydroxyethyl) - Hexahydrotriazine (Grotan BK) - -    43 3 8-Rwubgpvnmsqqv-3-Nitro-1, 3-Propanediol NA NA    44 4  3, 4, 4' - Triclocarban - -    45 5 4 - Chloro - 3 - Cresol - -    46 6 4 - Chloro - 4 - Xylenol (PCMX) - -    47 7 7-Ethylbicyclooxazolidine (Bioban [id number]) - -    48 8 Benzalkonium Chloride CT - -    49 9 Benzyl Alcohol - -    50 10 Cetalkonium Chloride - -    51 11 Cetylpyrimidine Chloride  - -    52 12 Chloroacetamide - -    53 13 DMDM Hydantoin - -    54 14 Glutaraldehyde - -    55 15 Triclosan - -    56 16 Glyoxal Trimeric Dihydrate - -    57 17 Iodopropynyl Butylcarbamate - -    58 18 Octylisothiazoline NA NA    59 19 Bithionol CT - -    60 20 Bioban P 1487 (Nitrobutyl) Morpholine/(Ethylnitro-Trimethylene) Dimorpholine - -    61 21 Phenoxyethanol - -    62 22 Phenyl Salicylate - -    63 23 Povidone Iodine - +/++    64 24 Sodium Benzoate - -    65 25 Sodium Disulfite - -    66 26 Sorbic Acid - -    67 27 Thimerosal      68 28 Melamine Formaldehyde Resin      69 29 Ethylenediamine Dihydrochloride        Parabens      70 30 Butyl-P-Hydroxybenzoate - -    71 31 Ethyl-P-Hydroxybenzoate - -    72 32 Methyl-P-Hydroxybenzoate - -    73 33 Propyl-P-Hydroxybenzoate - -     EMULSIFIERS & ADDITIVES       # Substance 2 days 4 days remarks   74 1 Polyethylene Glycol-400 - -    75 2 Cocamidopropyl Betaine - -    76 3 Amerchol L101 - +    77 4 Propylene Glycol - -    78 5 Triethanolamine - -    79 6 Sorbitane Sesquiolate CT - -    80 7 Isopropylmyristate - -    81 8 Polysorbate 80 CT - -    82 9 Amidoamine   (Stearamidopropyl Dimethylamine) NA NA    83 10 Oleamidopropyl Dimethylamine - -    84 11 Lauryl Glucoside NA NA    85 12 Coconut Diethanolamide  - -    86 13  2-Hydroxy-4-Methoxy Benzophenone (Oxybenzone) - -    87 14 Benzophenone-4 (Sulisobenzon) NA NA    88 15 Propolis - ++    89 16 Dexpanthenol - -    90 17 Carboxymethyl Cellulose Sodium NA NA    91 18 Abitol - -    92 19 Tert-Butylhydroquinone - -    93 20 Benzyl Salicylate - -    94 21 Dimethylaminopropylamin (DMPA) CT? D053      95 22 Zinc Pyrithione (Zinc Omadine) CT? Z006      96 23 Hamlet(Hydroxymethyl) Nitromethane CT        Antioxidant - -    97 24 Dodecyl Gallate - -    98 25 Butylhydroxyanisole (BHA) - +/++    99 26 Butylhydroxytoluene (BHT) - -    100 27 Di-Alpha-Tocopherol (Vit E) - -    101 28 Propyl Gallate - -     PERFUMES, FLAVORS & PLANTS        # Substance 2 days 4 days remarks   102 1 Benzyl Cinnamate - -    103 2 Di-Limonene (Dipentene) - -    104 3 Cananga Odorata (Kelsea Enamorado) (I) - -    105 4 Lichen Acid Mix - -    106 5 Mentha Piperita Oil (Peppermint Oil) - -    107 6 Sesquiterpenelactone mix - -    108 7 Tea Tree Oil, Oxidized - -    109 8 Wood Tar Mix + +    110 9 Abietic Acid - -    111 10 Lavendula Angustifolia Oil (Lavender Oil) - -    112 11 Fragrance mix II CT * + -      Fragrance Mix I      113 12 Oakmoss Absolute - -    114 13 Eugenol - -    115 14 Geraniol - -    116 15 Hydroxycitronellal - -    117 16 Isoeugenol - -    118 17 Cinnamic Aldehyde - -    119 18 Cinnamic Alcohol  - -      Fragrance mix II      120 19 Citronellol - -    121 20 Alpha-Hexylcinnamic Aldehyde    - -    122 21 Citral - -    123 22 Farnesol - -    124 23 Coumarin - -      Hexylcinnamic aldehyde, Coumarin, Farnesol, Hydroxyisohexy3-cyclohexene carboxaldehyde, citral, citrolellol   PLASTICS        # Substance 2 days 4 days remarks     Acrylates - -    125 1 2-Hydroxyethyl Methacrylate (HEMA) - -     2 1,4-Butandioldimethacrylate (BUDMA) - -     3  2-Ethylhexyl Acrylate - -     4 Bisphenol-A-Dimethacrylate  - -     5 Diurethane-Dimethacrylate - -    130 6 Ethyleneglycoldimethacrylate (EGDMA) - -     7  Pentaerythritoltriacrylate (JENNI) - -     8 Triethylene Glycol Dimethacrylate (TEGDMA) - -      Synthetic material/additives        9 K-Lzpm-Hsyzqyjljli - -     10 Tricresyl Phosphate - -    135 11 4-Piyr-Zdnodogymjglt - -     12 Bis (2-Ethylhexyl) Phthalate - -     13 Dibutylphthalate - -     14 Dimethylphthalate - -     15 Toluene-2,4-Diisocyanate - -    140 16 Diphenylmethane-4,4''-Diisocyanate - -      EPOXY RESIN SYSTEMS        Reactive Solvents - -     17 Cresyl Glycidyl Ether - -     18 Butyl Glycidyl Ether - -     19 Phenyl Glycidyl Ether - -     20 1,4-Butanediol Diglycidyl Ether - -    145 21 1,6-Hexanediole Diglycidyl Ether - -      Hardener / Accelerator - -     22 Triethylenetetramine - -     23 Diethylenetriamine - -     24 Isophorone Diamine (IPD) - -     25 N,N-Dimethyl-P-Toluidine - -    150 26 1-omrg-Rqfaqqijtgcev (antioxidant/stabilizer) CT - -    151 27 8-prrb-Zahlnuhxltdiqslahonrtgo resin PTBP CT  - -    METALS (Implants / Dental)        # Substance 2 days 4 days remarks   152 1 Ammonium Heptamolybdate (IV) - -     2 Ammonium Tetrachloroplatinate - -     3 Indium (III) Chloride - -    155 4 Iridium (III) Chloride - -     5 Ferric Chloride - -     6 Manganese (II) Chloride - -     7 Niobium (V) Chloride - -     8 Palladium Chloride - +    160 9 Silver Nitrate - -     10 Gold Sodium Thiosulfate - -     11 Tantal - -     12 Tin (II) Chloride - -     13 Titanium (IV) Oxide - -    165 14 Titanium - -     15 Tungstic Acid, Sod Salt Dihydrat - -     16 Vanadium Pentoxide - -     17 Wolfram - -     18 Zinc Chloride - -    170 19 Zirconium (IV) Oxide - -     20 Ammoniated Murcury - -     21 Copper Sulfate Pentahydrate - -     22 Amalgam  - -     23 Aluminum Hydroxide - -    175 24 Platinum Tetrachloride - -      Results of patch tests:                         Interpretation:  - Negative                    A    = Allergic      (+) Erythema    TI   = Toxic/irritant   + E + Infiltration    RaP = Relevance  at Present     ++ E/I + Papulovesicle   Rpr  = Relevance Previously     +++ E/I/P + Blister     nR   = No Relevance    Atopy Screen (Placed 11/01/21)    No Substance Readings (15 min) Evaluation   POS Histamine 1mg/ml +/++    NEG NaCl 0.9% -      No Substance Readings (15 min) Evaluation   1 Alternaria alternata (tenuis)  -    2 Cladosporium herbarum -    3 Aspergillus fumigatus -    4 Penicillium notatum -    5 Dermatophagoides pteronyssinus -    6 Dermatophagoides farinae -    7 Dog epithelium (canis spp) -    8 Cat hair (hellen catus) -    9 Cockroach   (Blatella americana & germanica) -    10 Grass mix midwest   (Carmelina, Orchard, Redtop, Saroj) +/++    11 Qasim grass (sorghum halepense) -    12 Weed mix   (common Cocklebur, Lamb s quarters, rough redroot Pigweed, Dock/Sorrel) -    13 Mug wort (artemisia vulgare) -    14 Ragweed giant/short (ambrosia spp) -    15 White birch (Betula papyrifera) -    16 Tree mix 1 (Pecan, Maple BHR, Oak RVW, american [place], black [place]) -    17 Red cedar (juniperus virginia) -    18 Tree mix 2   (white [place], river/red Birch, black [place], common Brown, american Elm) -    19 Box elder/Maple mix (acer spp) -    20 [place] shagbark (carya ovata) -           Conclusion: slight reaction to grass pollens in prick test      Intradermal Testing (Placed 11/01/21)    No Substance Conc.  Reading (15min)  immediate Papule [mm] / Erythema [mm] Reading   (... days)  delayed Papule [mm] / Erythema [mm] Remarks   DF Standard Dust Mite - D. Farinae 1:10 -   -    DP Standard Dust Mite - D. Pteronyssinus 1:10 -   -    A Aspergillus fumigatus  1:10 -   -    P Penicillium notatum 1:10 -   -    Conclusions: no signs for immediate type reaction to dust mites and molds in intradermal tests. No delayed reaction    DRUG ALLERGY TEST SERIES 11/01/2021     ANTIBIOTICS: PENICILLINS    No Substance Readings (15min) Evaluation   POS Histamine 1mg/ml +/++    NEG NaCl 0.9% -        Prick Tests          Substance/ Allergen Conc Result (15min) Remarks    Benzylpenicillin (Penicillin G) 1 Suleman IU/ml (600 mg) -     Ampicillin 100 mg/ml -       Intradermal Tests   immed immed delayed delayed      Substance Conc 1st dil  2nd dil   2 days  days remarks    Benzylpenicillin (Penicillin G) 1:100 -  -      Ampicillin 1:10 -  -         CONTRAST MEDIA (iodinated, CT)     Prick Tests         Substance/ Allergen Concentration Result (15min) Remarks    Iodixanol [Visipaque] (*NID) 320/50 ml -     Iohexol [Omnipaque] (*NIM) 350 mg I/ml -       Intradermal Tests   immediate immediate delayed delayed      Substance Conc 1st dil (15 ) 2nd dil (15 ) 2 days 4 days remarks    Iodixanol (*NID) 1:10 -  -      Iohexol (*NIM) 1:10 -  -       * IM = Ionic monomer  * NIM = Non-ionic monomer   * ID = Ionic dimer  * NID = Non-ionic dimer      CONTRAST MEDIA (paramagnetic / Sonography)    Prick Tests         Substance/ Allergen Conc Result (15min) Remarks    Gadobutrol* - Gadovist 1.0 (GK, A, Z) 1mmol (604mg/ml) -       Intradermal Tests   immed immed delayed delayed      Substance Conc 1st dil 2nd dil  2 days  4 days remarks    Gadobutrol* (Gadovist) 1:10 -  -     Conclusion: no immediate type reactions in prick and IDT to contrast media and Penicillins.    [x] Allergic reaction diagnosed against following allergens:    Fragrances: Balsam of peru +, Propolis ++, Wood tar mix +    Emulsifier: Amerchol L101 + and lanolin alcohol (+)    Disinfectant: ++ PVP Iodine    Antioxidant: +/++ Butylhydroxyanisole (BHA)    Metal: Palladium Chloride +    Interpretation/ remarks:   Based on the patch tests patient has relevant contact sensitizations that could explain partially the oral symptoms and therefore I propose to avoid the contact allergens using the CAMP Oswald. Not sure how much is contact allergy and how much lichen planus and the future will tell us after removing the allergens  In addition, patient has contact sensitizations that could explain also  the nummular eczemas, but she is also atopic and therefore has genetically dry skin. Should use only products from [employer] Oswald and should moisturize regularly    [x] Patient information given   [x] ACDS CAMP information's (# [id number], and [id number]) to following compounds: Balsam of peru, Propolis, Wood tar mix,  Amerchol L10 and lanolin alcohol, PVP Iodine, Butylhydroxyanisole (BHA), Palladium Chloride   [] General information's to following compounds: ......      Assessment & Plan:    ==> Final Diagnosis:     # Recurrent ulcers cheek area. DDx oral lichen planus vs allergic contact sensitization to dental products  Patient   >> patient has relevant contact sensitizations that could explain the oral pathology and therefore try to follow the recommendations of [employer] oswald for all products used in the mouth that stay there longer  >> check with dentist that bridges do not contain alloy metal palladium  * stable chronic illness  History of dental work in about 2005. Reports lesions started around 2007. 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 Lesions have improved with using tacrolimus. However, still recur as identified on exam today. Agree that patch testing to see if has any sensitization to dental products is best next step. Will schedule for patch testing as above.  - Continue tacrolimus 1 mg dissolved in 250 mL water S&S TID-QID and augmented betamethasone ointment for cutaneous involvement    # History of drug allergies; penicillin, iohexol, gadolinium  * stable chronic illness    in skin prick and intradermal tests to Penicillin G, Ampicillin, Iohexol and Gadolinium no signs for specific immediate type reaction    No signs for Penicillin G and Ampicillin reactions in prick, intradermal and patch tests --> can use these medications. Maybe stay with first, initial dose 30min in clinic for observation = removed from allergy list    contrast media would propose  premedication according to radiology protocol (had this in the past and no problems) = no signs for specific allergy, but premedication justified    # Suspicion for atopic predisposition with     nummular eczema = atopic dermatitis probably with contact dermatitis to emulsifiers and fragrances    Rhinosinusitis, seasonal and perennial = Suspect dust mites, tree pollen and grass pollen with history.  * stable chronic illness     These conclusions are made at the best of one's knowledge and belief based on the provided evidence such as patient's history and allergy test results and they can change over time or can be incomplete because of missing information's.    ==> Treatment Plan:  >> continue on skin with moisturizer daily (from [facility])  >> use steroid from [facility] = mometasone oint 2xweekly  >> try in the mouth daily protopic oint 0.03%     ___________________________    Staff: : provider      Follow-up in [facility] clinic with Dr. Alcaraz  ___________________________    I spent a total of 36 minutes with Teresa Fernandez during today s  visit. This time was spent discussing all the individual test results, correlating them to the clinical relevance, counseling the patient and/or coordinating care. Moreover time was spent to install and explain the [facility] from [employer] with the informations on the individual allergens and propositions of products that can be used. Please see Assessment and Plan for additional details.

## 2021-11-05 ENCOUNTER — OFFICE VISIT (OUTPATIENT)
Dept: ALLERGY | Facility: CLINIC | Age: 61
End: 2021-11-05
Payer: OTHER GOVERNMENT

## 2021-11-05 DIAGNOSIS — K13.21 LEUKOPLAKIA OF ORAL MUCOSA, INCLUDING TONGUE: Primary | ICD-10-CM

## 2021-11-05 DIAGNOSIS — L20.89 OTHER ATOPIC DERMATITIS: ICD-10-CM

## 2021-11-05 DIAGNOSIS — L23.89 ALLERGIC CONTACT DERMATITIS DUE TO OTHER AGENTS: ICD-10-CM

## 2021-11-05 PROCEDURE — 99214 OFFICE O/P EST MOD 30 MIN: CPT | Performed by: DERMATOLOGY

## 2021-11-05 RX ORDER — MOMETASONE FUROATE 1 MG/G
OINTMENT TOPICAL
Qty: 45 G | Refills: 3 | Status: SHIPPED | OUTPATIENT
Start: 2021-11-08

## 2021-11-05 RX ORDER — TACROLIMUS 0.3 MG/G
OINTMENT TOPICAL 2 TIMES DAILY
Qty: 30 G | Refills: 1 | Status: SHIPPED | OUTPATIENT
Start: 2021-11-05 | End: 2022-03-31

## 2021-11-05 ASSESSMENT — PAIN SCALES - GENERAL: PAINLEVEL: NO PAIN (0)

## 2021-11-05 NOTE — NURSING NOTE
Chief Complaint   Patient presents with     Allergy Testing Followup     Teresa is here for Patch Testing Day 5     Walter Tripp Paramedic

## 2021-11-05 NOTE — LETTER
"    11/5/2021         RE: Teresa Fernandez  [id number] [facility] [id number]        Dear Colleague,    Thank you for referring your patient, Teresa Fernandez, to the [facility] [place]. Please see a copy of my visit note below.    [facility] Dermato-allergology Note  Office visit  Encounter Date: Nov 5, 2021  ____________________________________________    CC: No chief complaint on file.      HPI:  (11/04/21)  Ms. Teresa Fernandez is a(n) 61 year old female who presents today as a return patient for allergy consultation  - Follow-up in Derm-Allergy clinic for 2nd readings and final conclusions after 4 days (in person)   - otherwise feeling well in usual state of health    Physical exam:  General: In no acute distress, well-developed, well-nourished  Eyes: no conjunctivitis  ENT: no signs of rhinitis   Pulmonary: no wheezing or coughing  Skin:Focused examination of the skin on test sites was performed = see test results below    Earlier History and Allergy exams:  (10/09/21)  Ms. Teresa Fernandez is a(n) 61 year old female who presents today as a new patient for allergy consultation  -\"consider oral lichenoid reaction to amalgam; this would not entirely explain genital involvement but this is essentially resolved and continue oral involvement appears to be in close proximity to dental work. Will refer to Dr. Jurado for this assessment in case patch testing may be useful.\"  - first started having oral lesions in 2007  - was having painful bumps on cheeks and tongue, looked similar to canker sores  - progressively got worse  - cut out gluten and improved  - came back a few years ago and started to see Dr. Alcaraz  - is currently using tacrolimus 1 mg dissolved in 250 mL water S&S TID-QID and augmented betamethasone ointment which improved lesions and not using anymore  - had bridges placed for dental work about 15 years ago  - notes some irritation with metal " jewelry  - takes allegra daily, last took this AM   - does not use nasal spray  Focused examination of the oral mucosa and left lower leg was performed.  - Left lower leg with 2, erythematous, nummular papules with scale  - Intraoral exam with dental work on right upper molars along with left upper and lowe molar as well. There are white, lacy plaques on the inside of left cheek with an erythematous erosion in the middle. There is erythema of the left tonsils with no other erosions identified    Past Medical History:   Patient Active Problem List   Diagnosis     Acquired hypothyroidism     Allergic state     Chronic rhinitis     Allergic rhinitis due to pollen     Sinusitis, chronic     History of skin cancer     ERIC (generalized anxiety disorder)     Leukoplakia of oral mucosa, including tongue     GERD (gastroesophageal reflux disease)     Chronic low back pain     Dry eye syndrome     Chronic fatigue     Osteopenia     History of melanoma in situ     Eczema     Moderate persistent asthma without complication     DDD (degenerative disc disease), lumbar     Hyperlipidemia LDL goal <160     Chest tightness or pressure     Muscle pain     GAYE (obstructive sleep apnea)     History of kidney cancer     Fibromyalgia     Past Medical History:   Diagnosis Date     ALLERGIC RHINITIS NOS 4/12/2005     Anxiety      Arthritis     herniated disc     Bronchitis, not specified as acute or chronic      CHR MAXILLARY SINUSITIS 4/12/2005     Depressive disorder, not elsewhere classified      Eczema 10/17/2012     Environmental and seasonal allergies      GERD (gastroesophageal reflux disease)      Gluten intolerance      Malignant neoplasm of renal pelvis (H) 1995    Renal cell carcinoma     Melanoma (H) 06/2010     Other specified acquired hypothyroidism 4/12/2005     Renal cancer (H) 5/5/2011     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     Grave's disease     Unspecified asthma(493.90)         Allergies:  Allergies   Allergen Reactions     Omnipaque [Iohexol] Swelling and Difficulty breathing     Immediate throat swelling following around 1-2cc of omnipaque 300 for spine procedure     Gluten      Pcn [Penicillins] Hives       Medications:  Current Outpatient Medications   Medication     acetaminophen (TYLENOL) 325 MG tablet     albuterol (PROAIR HFA/PROVENTIL HFA/VENTOLIN HFA) 108 (90 BASE) MCG/ACT Inhaler     APNO CREA ointment     augmented betamethasone dipropionate (DIPROLENE-AF) 0.05 % external ointment     Cholecalciferol (VITAMIN D3 PO)     Cyanocobalamin (VITAMIN B 12 PO)     dexamethasone (DECADRON) 0.5 MG/5ML elixir     DULoxetine (CYMBALTA) 60 MG capsule     fexofenadine (ALLEGRA) 180 MG tablet     fluticasone-salmeterol (ADVAIR DISKUS) 250-50 MCG/DOSE inhaler     levothyroxine (SYNTHROID/LEVOTHROID) 100 MCG tablet     levothyroxine (SYNTHROID/LEVOTHROID) 88 MCG tablet     lidocaine (XYLOCAINE) 2 % solution     magic mouthwash (ENTER INGREDIENTS IN COMMENTS) suspension     mometasone (NASONEX) 50 MCG/ACT nasal spray     mupirocin (BACTROBAN) 2 % external ointment     omeprazole (PRILOSEC) 20 MG DR capsule     order for DME     OVER-THE-COUNTER     pregabalin (LYRICA) 50 MG capsule     tacrolimus (GENERIC EQUIVALENT) 1 MG capsule     triamcinolone (ARISTOCORT HP) 0.5 % external cream     triamcinolone (KENALOG) 0.1 % external ointment     triamcinolone (NASACORT) 55 MCG/ACT nasal aerosol     Current Facility-Administered Medications   Medication     ampicillin (OMNIPEN) 1 g vial INTRADERMAL     penicillin g potassium [id number] unit vial INTRADERMAL       Social History:  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 Patient has the following hobbies or non-occupational exposure camping and outdoor activities    Family History:  Family History   Problem Relation Age of Onset     Diabetes Mother      Cardiovascular Mother      Lipids  Mother      Depression Mother      Hypertension Father      Lipids Father      Obesity Father      Macular Degeneration Father      Sleep Apnea Father      Cancer Maternal Grandmother         kind unknown had sores on legs     Eczema Maternal Grandmother      Eczema Maternal Grandfather      Breast Cancer Paternal Grandmother      Cancer Paternal Grandmother         breast     Hypertension Paternal Grandfather      Cardiovascular Paternal Grandfather         heart attack     Hypertension Brother      Thyroid Disease Sister      Hypertension Sister      Depression Sister      Allergies Sister      Allergies Son      Eczema Son      Lipids Sister      Thyroid Disease Sister      Neurologic Disorder Sister      Depression Sister      Respiratory Son      Sleep Apnea Son      Glaucoma No family hx of      Cerebrovascular Disease No family hx of        Previous Labs, Allergy Tests, Dermatopathology, Imaging:  About 20 years ago had prick testing positive to dandelion and tumbleweed    Referred By: No referring provider defined for this encounter.     Allergy Tests:    Past Allergy Test    Order for Future Allergy Testing:    [x] Outpatient  [] Inpatient: [facility]..../ Bed ....       Skin Atopy (atopic dermatitis) [] Yes   [x] No .........  Contact allergies:   [] Yes   [x] No ..........  Hand eczema:   [] Yes   [x] No           Leading hand:   [] R   [] L       [] Ambidextrous         Drug allergies:        [x] Yes   [] No  Which?...... penicillin cause itchy; contrast got anaphylaxis    Urticaria/Angioedema  [] Yes   [x] No .........  Food Allergy:  [x] Yes   [] No  Which?...... gluten intolerance  Pets :  [x] Yes   [] No  which?......  2 dogs aged 5 and 12        [x]  Rhinitis   [x] Conjunctivitis   [x] Sinusitis   [] Polyposis   [] Otitis   [x] Pharyngitis         []  none  Operations:   [] Tonsils   [] Septum   [] Sinus   [] Polyposis        [] Asthma bronchiale   [x] Coughing      []  none  Symptoms (mostly  Rhinoconjunctivitis and Asthma) aggravated by:  Season   [] I   [] II   [] III   [x] IV   [x]V   []VI   []VII   []VIII   [x]IX   [x]X   []XI   []XI     [x] perennial   Day time      [x] morning   [] noon      [] evening        [] night    [] whole day........  []  none  Location/changes    [] inside        [x] outside   [] mountains    [] sea     [] others.............   []  none  Triggers, specific     [] animals     [] plants     [x] dust              [] others ...........................    []  none  Triggers, others       [] work          [] psyche    [] sport            [] others .............................  []  none  Irritant                [] phys efforts [] smoke    [x] heat/cold     [] odors  []others............... []  none    Order for PATCH TESTS  Reason for tests (suspected allergy): question if allergy to dental work  Known previous allergies: n/a  Standardized panels  [x] Standard panel (40 tests)  [x] Preservatives & Antimicrobials (31 tests)  [x] Emulsifiers & Additives (25 tests)   [x] Perfumes/Flavours & Plants (25 tests)  [] Hairdresser panel (12 tests)  [] Rubber Chemicals (22 tests)  [x] Plastics (26 tests)  [] Colorants/Dyes/Food additives (20 tests)  [x] Metals (implants/dental) (24 tests)  [] Local anaesthetics/NSAIDs (13 tests)  [] Antibiotics & Antimycotics (14 tests)   [] Corticosteroids (15 tests)   [] Photopatch test (62 tests)   [] others: ...      [] Patient's own products: ...    DO NOT test if chemical or biological identity is unknown!     always ask from patient the product information and safety sheets (MSDS)       Order for PRICK TESTS    Reason for tests (suspected allergy): seasonal rhinoconjunctivitis  Known previous allergies: dandelions    Standardized prick panels  [x] Atopic panel (20 tests)  [] Pediatric Panel (12 tests)  [] Milk, Meat, Eggs, Grains (20 tests)   [] Dust, Epithelia, Feathers (10 tests)  [] Fish, Seafood, Shellfish (17 tests)  [] Nuts, Beans (14  tests)  [] Spice, Vegetable, Fruit (17 tests)  [] Pollen Panel = Tree, Grass, Weed (24 tests)  [] Others: ...      [] Patient's own products: ...    DO NOT test if chemical or biological identity is unknown!     always ask from patient the product information and safety sheets (MSDS)     Standardized intradermal tests  [x] Penicillium notatum [x] Aspergillus fumigatus [x] House dust mites D.far & D. pteron  [] Cat    [] dog  [] Others: ...  [] Bee venom   [] Wasp venom  !!Specific protocol with dilutions!!       Order for Drug allergy tests (prick & Intradermal tests)    [x] Penicillin G  [x] Ampicillin [] Cefazolin   [] Ceftriaxone   [] Ceftazidime  [] Bactrim    [x] Others: ... omnipaque, visipaque, gadolinium  Order for ... as test date    ________________________________    RESULTS & EVALUATION of PATCH TESTS    Patch test readings after     [x] 2 days, [] 3 days [x] 4 days, [] 5 days,   Other duration: ...    11/01/21 application of patch tests with results:    STANDARD Series        # Substance 2 days 4 days remarks    1 Shravan Mix [C] - -     2 Colophony - -     3  2-Mercaptobenzothiazole  - -      4 Methylisothiazolinone - -     5 Carba Mix - -     6 Thiuram Mix [A] - -     7 Bisphenol A Epoxy Resin - -     8 P-Ejff-Wffluwumddz-Formaldehyde Resin - -     9 Mercapto Mix [A] - -     10 Black Rubber Mix- PPD [B] - -     11 Potassium Dichromate  -  -     12 Balsam of Peru (Myroxylon Pereirae Resin) - +     13 Nickel Sulphate Hexahydrate - -     14 Mixed Dialkyl Thiourea - -     15 Paraben Mix [B] - -     16 Methyldibromo Glutaronitrile - -     17 Fragrance Mix - -     18 2-Bromo-2-Nitropropane-1,3-Diol (Bronopol) CT - -     19 Lyral - -     20 Tixocortol-21- Pivalate CT - -     21 Diazolidiyl Urea (Germall II) - -     22 Methyl Methacrylate - -     23 Cobalt (II) Chloride Hexahydrate - -     24 Fragrance Mix II  - -     25 Compositae Mix - -     26 Benzoyl Peroxide - -     27 Bacitracin - -     28 Formaldehyde -  -     29 Methylchloroisothiazolinone / Methylisothiazolinone - -     30 Corticosteroid Mix CT NA NA     31 Sodium Lauryl Sulfate - -     32 Lanolin Alcohol - (+)     33 Turpentine - -     34 Cetylstearylalcohol - -     35 Chlorhexidine Dicluconate - -     36 Budenoside - -     37 Imidazolidinyl Urea  - -     38 Ethyl-2 Cyanoacrylate - -     39 Quaternium 15 (Dowicil 200) - -     40 Decyl Glucoside - -     PRESERVATIVES & ANTIMICROBIALS        # Substance 2 days 4 days remarks   41 1  1,2-Benzisothiazoline-3-One, Sodium Salt - -    42 2  1,3,5-Hamlet (2-Hydroxyethyl) - Hexahydrotriazine (Grotan BK) - -    43 3 4-Tyxkkwscpabyt-7-Nitro-1, 3-Propanediol NA NA    44 4  3, 4, 4' - Triclocarban - -    45 5 4 - Chloro - 3 - Cresol - -    46 6 4 - Chloro - 4 - Xylenol (PCMX) - -    47 7 7-Ethylbicyclooxazolidine (Bioban [id number]) - -    48 8 Benzalkonium Chloride CT - -    49 9 Benzyl Alcohol - -    50 10 Cetalkonium Chloride - -    51 11 Cetylpyrimidine Chloride  - -    52 12 Chloroacetamide - -    53 13 DMDM Hydantoin - -    54 14 Glutaraldehyde - -    55 15 Triclosan - -    56 16 Glyoxal Trimeric Dihydrate - -    57 17 Iodopropynyl Butylcarbamate - -    58 18 Octylisothiazoline NA NA    59 19 Bithionol CT - -    60 20 Bioban P 1487 (Nitrobutyl) Morpholine/(Ethylnitro-Trimethylene) Dimorpholine - -    61 21 Phenoxyethanol - -    62 22 Phenyl Salicylate - -    63 23 Povidone Iodine - +/++    64 24 Sodium Benzoate - -    65 25 Sodium Disulfite - -    66 26 Sorbic Acid - -    67 27 Thimerosal      68 28 Melamine Formaldehyde Resin      69 29 Ethylenediamine Dihydrochloride        Parabens      70 30 Butyl-P-Hydroxybenzoate - -    71 31 Ethyl-P-Hydroxybenzoate - -    72 32 Methyl-P-Hydroxybenzoate - -    73 33 Propyl-P-Hydroxybenzoate - -     EMULSIFIERS & ADDITIVES       # Substance 2 days 4 days remarks   74 1 Polyethylene Glycol-400 - -    75 2 Cocamidopropyl Betaine - -    76 3 Amerchol L101 - +    77 4 Propylene Glycol - -     78 5 Triethanolamine - -    79 6 Sorbitane Sesquiolate CT - -    80 7 Isopropylmyristate - -    81 8 Polysorbate 80 CT - -    82 9 Amidoamine   (Stearamidopropyl Dimethylamine) NA NA    83 10 Oleamidopropyl Dimethylamine - -    84 11 Lauryl Glucoside NA NA    85 12 Coconut Diethanolamide  - -    86 13 2-Hydroxy-4-Methoxy Benzophenone (Oxybenzone) - -    87 14 Benzophenone-4 (Sulisobenzon) NA NA    88 15 Propolis - ++    89 16 Dexpanthenol - -    90 17 Carboxymethyl Cellulose Sodium NA NA    91 18 Abitol - -    92 19 Tert-Butylhydroquinone - -    93 20 Benzyl Salicylate - -    94 21 Dimethylaminopropylamin (DMPA) CT? D053      95 22 Zinc Pyrithione (Zinc Omadine) CT? Z006      96 23 Hamlet(Hydroxymethyl) Nitromethane CT        Antioxidant - -    97 24 Dodecyl Gallate - -    98 25 Butylhydroxyanisole (BHA) - +/++    99 26 Butylhydroxytoluene (BHT) - -    100 27 Di-Alpha-Tocopherol (Vit E) - -    101 28 Propyl Gallate - -     PERFUMES, FLAVORS & PLANTS        # Substance 2 days 4 days remarks   102 1 Benzyl Cinnamate - -    103 2 Di-Limonene (Dipentene) - -    104 3 Cananga Odorata (Kelsea Enamorado) (I) - -    105 4 Lichen Acid Mix - -    106 5 Mentha Piperita Oil (Peppermint Oil) - -    107 6 Sesquiterpenelactone mix - -    108 7 Tea Tree Oil, Oxidized - -    109 8 Wood Tar Mix + +    110 9 Abietic Acid - -    111 10 Lavendula Angustifolia Oil (Lavender Oil) - -    112 11 Fragrance mix II CT * + -      Fragrance Mix I      113 12 Oakmoss Absolute - -    114 13 Eugenol - -    115 14 Geraniol - -    116 15 Hydroxycitronellal - -    117 16 Isoeugenol - -    118 17 Cinnamic Aldehyde - -    119 18 Cinnamic Alcohol  - -      Fragrance mix II      120 19 Citronellol - -    121 20 Alpha-Hexylcinnamic Aldehyde    - -    122 21 Citral - -    123 22 Farnesol - -    124 23 Coumarin - -      Hexylcinnamic aldehyde, Coumarin, Farnesol, Hydroxyisohexy3-cyclohexene carboxaldehyde, citral, citrolellol   PLASTICS        # Substance 2 days  4 days remarks     Acrylates - -    125 1 2-Hydroxyethyl Methacrylate (HEMA) - -     2 1,4-Butandioldimethacrylate (BUDMA) - -     3  2-Ethylhexyl Acrylate - -     4 Bisphenol-A-Dimethacrylate  - -     5 Diurethane-Dimethacrylate - -    130 6 Ethyleneglycoldimethacrylate (EGDMA) - -     7 Pentaerythritoltriacrylate (JENNI) - -     8 Triethylene Glycol Dimethacrylate (TEGDMA) - -      Synthetic material/additives        9 X-Bnls-Lcmqpcsyrba - -     10 Tricresyl Phosphate - -    135 11 9-Lcit-Zwlcwdonnvovr - -     12 Bis (2-Ethylhexyl) Phthalate - -     13 Dibutylphthalate - -     14 Dimethylphthalate - -     15 Toluene-2,4-Diisocyanate - -    140 16 Diphenylmethane-4,4''-Diisocyanate - -      EPOXY RESIN SYSTEMS        Reactive Solvents - -     17 Cresyl Glycidyl Ether - -     18 Butyl Glycidyl Ether - -     19 Phenyl Glycidyl Ether - -     20 1,4-Butanediol Diglycidyl Ether - -    145 21 1,6-Hexanediole Diglycidyl Ether - -      Hardener / Accelerator - -     22 Triethylenetetramine - -     23 Diethylenetriamine - -     24 Isophorone Diamine (IPD) - -     25 N,N-Dimethyl-P-Toluidine - -    150 26 4-ieao-Tpxzwikgvqrrz (antioxidant/stabilizer) CT - -    151 27 4-xryb-Alfebcrmkgwtpkgrhkrkots resin PTBP CT  - -    METALS (Implants / Dental)        # Substance 2 days 4 days remarks   152 1 Ammonium Heptamolybdate (IV) - -     2 Ammonium Tetrachloroplatinate - -     3 Indium (III) Chloride - -    155 4 Iridium (III) Chloride - -     5 Ferric Chloride - -     6 Manganese (II) Chloride - -     7 Niobium (V) Chloride - -     8 Palladium Chloride - +    160 9 Silver Nitrate - -     10 Gold Sodium Thiosulfate - -     11 Tantal - -     12 Tin (II) Chloride - -     13 Titanium (IV) Oxide - -    165 14 Titanium - -     15 Tungstic Acid, Sod Salt Dihydrat - -     16 Vanadium Pentoxide - -     17 Wolfram - -     18 Zinc Chloride - -    170 19 Zirconium (IV) Oxide - -     20 Ammoniated Murcury - -     21 Copper Sulfate Pentahydrate -  -     22 Amalgam  - -     23 Aluminum Hydroxide - -    175 24 Platinum Tetrachloride - -      Results of patch tests:                         Interpretation:  - Negative                    A    = Allergic      (+) Erythema    TI   = Toxic/irritant   + E + Infiltration    RaP = Relevance at Present     ++ E/I + Papulovesicle   Rpr  = Relevance Previously     +++ E/I/P + Blister     nR   = No Relevance    Atopy Screen (Placed 11/01/21)    No Substance Readings (15 min) Evaluation   POS Histamine 1mg/ml +/++    NEG NaCl 0.9% -      No Substance Readings (15 min) Evaluation   1 Alternaria alternata (tenuis)  -    2 Cladosporium herbarum -    3 Aspergillus fumigatus -    4 Penicillium notatum -    5 Dermatophagoides pteronyssinus -    6 Dermatophagoides farinae -    7 Dog epithelium (canis spp) -    8 Cat hair (hellen catus) -    9 Cockroach   (Blatella americana & germanica) -    10 Grass mix midwest   (Carmelina, Orchard, Redtop, Saroj) +/++    11 Qasim grass (sorghum halepense) -    12 Weed mix   (common Cocklebur, Lamb s quarters, rough redroot Pigweed, Dock/Sorrel) -    13 Mug wort (artemisia vulgare) -    14 Ragweed giant/short (ambrosia spp) -    15 White birch (Betula papyrifera) -    16 Tree mix 1 (Pecan, Maple BHR, Oak RVW, american [place], black Avoca) -    17 Red cedar (juniperus virginia) -    18 Tree mix 2   (white Gregg, river/red Birch, black [place], common Wolfe, american Elm) -    19 Box elder/Maple mix (acer spp) -    20 Denhoff shagbark (carya ovata) -           Conclusion: slight reaction to grass pollens in prick test      Intradermal Testing (Placed 11/01/21)    No Substance Conc.  Reading (15min)  immediate Papule [mm] / Erythema [mm] Reading   (... days)  delayed Papule [mm] / Erythema [mm] Remarks   DF Standard Dust Mite - D. Farinae 1:10 -   -    DP Standard Dust Mite - D. Pteronyssinus 1:10 -   -    A Aspergillus fumigatus  1:10 -   -    P Penicillium notatum 1:10 -   -    Conclusions: no  signs for immediate type reaction to dust mites and molds in intradermal tests. No delayed reaction    DRUG ALLERGY TEST SERIES 11/01/2021     ANTIBIOTICS: PENICILLINS    No Substance Readings (15min) Evaluation   POS Histamine 1mg/ml +/++    NEG NaCl 0.9% -        Prick Tests         Substance/ Allergen Conc Result (15min) Remarks    Benzylpenicillin (Penicillin G) 1 Barnett IU/ml (600 mg) -     Ampicillin 100 mg/ml -       Intradermal Tests   immed immed delayed delayed      Substance Conc 1st dil  2nd dil   2 days  days remarks    Benzylpenicillin (Penicillin G) 1:100 -  -      Ampicillin 1:10 -  -         CONTRAST MEDIA (iodinated, CT)     Prick Tests         Substance/ Allergen Concentration Result (15min) Remarks    Iodixanol [Visipaque] (*NID) 320/50 ml -     Iohexol [Omnipaque] (*NIM) 350 mg I/ml -       Intradermal Tests   immediate immediate delayed delayed      Substance Conc 1st dil (15 ) 2nd dil (15 ) 2 days 4 days remarks    Iodixanol (*NID) 1:10 -  -      Iohexol (*NIM) 1:10 -  -       * IM = Ionic monomer  * NIM = Non-ionic monomer   * ID = Ionic dimer  * NID = Non-ionic dimer      CONTRAST MEDIA (paramagnetic / Sonography)    Prick Tests         Substance/ Allergen Conc Result (15min) Remarks    Gadobutrol* - Gadovist 1.0 (GK, A, Z) 1mmol (604mg/ml) -       Intradermal Tests   immed immed delayed delayed      Substance Conc 1st dil 2nd dil  2 days  4 days remarks    Gadobutrol* (Gadovist) 1:10 -  -     Conclusion: no immediate type reactions in prick and IDT to contrast media and Penicillins.    [x] Allergic reaction diagnosed against following allergens:    Fragrances: Balsam of peru +, Propolis ++, Wood tar mix +    Emulsifier: Amerchol L101 + and lanolin alcohol (+)    Disinfectant: ++ PVP Iodine    Antioxidant: +/++ Butylhydroxyanisole (BHA)    Metal: Palladium Chloride +    Interpretation/ remarks:   Based on the patch tests patient has relevant contact sensitizations that could explain partially  the oral symptoms and therefore I propose to avoid the contact allergens using the CAMP Oswald. Not sure how much is contact allergy and how much lichen planus and the future will tell us after removing the allergens  In addition, patient has contact sensitizations that could explain also the nummular eczemas, but she is also atopic and therefore has genetically dry skin. Should use only products from [employer] [facility] and should moisturize regularly    [x] Patient information given   [x] ACDS CAMP information's (# [id number], and [id number]) to following compounds: Balsam of peru, Propolis, Wood tar mix,  Amerchol L10 and lanolin alcohol, PVP Iodine, Butylhydroxyanisole (BHA), Palladium Chloride   [] General information's to following compounds: ......      Assessment & Plan:    ==> Final Diagnosis:     # Recurrent ulcers cheek area. DDx oral lichen planus vs allergic contact sensitization to dental products  Patient   >> patient has relevant contact sensitizations that could explain the oral pathology and therefore try to follow the recommendations of [employer] o[employer]ald for all products used in the mouth that stay there longer  >> check with dentist that bridges do not contain alloy metal palladium  * stable chronic illness  History of dental work in about 2005. Reports lesions started around 2007. 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 Lesions have improved with using tacrolimus. However, still recur as identified on exam today. Agree that patch testing to see if has any sensitization to dental products is best next step. Will schedule for patch testing as above.  - Continue tacrolimus 1 mg dissolved in 250 mL water S&S TID-QID and augmented betamethasone ointment for cutaneous involvement    # History of drug allergies; penicillin, iohexol, gadolinium  * stable chronic illness    in skin prick and intradermal tests to Penicillin G, Ampicillin, Iohexol and Gadolinium no signs for  specific immediate type reaction    No signs for Penicillin G and Ampicillin reactions in prick, intradermal and patch tests --> can use these medications. Maybe stay with first, initial dose 30min in clinic for observation = removed from allergy list    contrast media would propose premedication according to radiology protocol (had this in the past and no problems) = no signs for specific allergy, but premedication justified    # Suspicion for atopic predisposition with     nummular eczema = atopic dermatitis probably with contact dermatitis to emulsifiers and fragrances    Rhinosinusitis, seasonal and perennial = Suspect dust mites, tree pollen and grass pollen with history.  * stable chronic illness     These conclusions are made at the best of one's knowledge and belief based on the provided evidence such as patient's history and allergy test results and they can change over time or can be incomplete because of missing information's.    ==> Treatment Plan:  >> continue on skin with moisturizer daily (from [facility])  >> use steroid from [facility] = mometasone oint 2xweekly  >> try in the mouth daily protopic oint 0.03%     ___________________________    Staff: : provider      Follow-up in Derm-Allergy clinic with Dr. Alcaraz  ___________________________    I spent a total of 36 minutes with Teresa Fernandez during today s  visit. This time was spent discussing all the individual test results, correlating them to the clinical relevance, counseling the patient and/or coordinating care. Moreover time was spent to install and explain the [facility] from [employer] with the informations on the individual allergens and propositions of products that can be used. Please see Assessment and Plan for additional details.            Again, thank you for allowing me to participate in the care of your patient.        Sincerely,        Dagoberto Jurado MD

## 2021-11-05 NOTE — PATIENT INSTRUCTIONS
Patient information given   [] ACDS [facility] information's (# [id number], and [id number]) to following compounds: Balsam of peru +, Propolis ++, Wood tar mix +, Amerchol L101 + and lanolin alcohol (+), PVP Iodine, Butylhydroxyanisole (BHA), Palladium Chloride +

## 2021-11-08 ENCOUNTER — OFFICE VISIT (OUTPATIENT)
Dept: ENDOCRINOLOGY | Facility: CLINIC | Age: 61
End: 2021-11-08
Attending: NURSE PRACTITIONER
Payer: OTHER GOVERNMENT

## 2021-11-08 VITALS
HEART RATE: 86 BPM | HEIGHT: 68 IN | WEIGHT: 224 LBS | OXYGEN SATURATION: 97 % | BODY MASS INDEX: 33.95 KG/M2 | SYSTOLIC BLOOD PRESSURE: 126 MMHG | DIASTOLIC BLOOD PRESSURE: 62 MMHG

## 2021-11-08 DIAGNOSIS — E03.9 ACQUIRED HYPOTHYROIDISM: Primary | ICD-10-CM

## 2021-11-08 DIAGNOSIS — M89.8X9 BONE PAIN: ICD-10-CM

## 2021-11-08 LAB
ALBUMIN SERPL-MCNC: 3.5 G/DL (ref 3.4–5)
ALBUMIN SERPL-MCNC: 3.5 G/DL (ref 3.4–5)
ALP SERPL-CCNC: 102 U/L (ref 40–150)
ALT SERPL W P-5'-P-CCNC: 36 U/L (ref 0–50)
AST SERPL W P-5'-P-CCNC: 26 U/L (ref 0–45)
BILIRUB DIRECT SERPL-MCNC: <0.1 MG/DL (ref 0–0.2)
BILIRUB SERPL-MCNC: 0.5 MG/DL (ref 0.2–1.3)
CALCIUM SERPL-MCNC: 9.8 MG/DL (ref 8.5–10.1)
CREAT SERPL-MCNC: 0.75 MG/DL (ref 0.52–1.04)
DEPRECATED CALCIDIOL+CALCIFEROL SERPL-MC: 40 UG/L (ref 20–75)
GFR SERPL CREATININE-BSD FRML MDRD: 87 ML/MIN/1.73M2
MAGNESIUM SERPL-MCNC: 2.2 MG/DL (ref 1.6–2.3)
PHOSPHATE SERPL-MCNC: 2.8 MG/DL (ref 2.5–4.5)
PROT SERPL-MCNC: 6.9 G/DL (ref 6.8–8.8)
PTH-INTACT SERPL-MCNC: 107 PG/ML (ref 18–80)
TSH SERPL DL<=0.005 MIU/L-ACNC: 2.68 MU/L (ref 0.4–4)

## 2021-11-08 PROCEDURE — 82565 ASSAY OF CREATININE: CPT | Performed by: INTERNAL MEDICINE

## 2021-11-08 PROCEDURE — 84100 ASSAY OF PHOSPHORUS: CPT | Performed by: INTERNAL MEDICINE

## 2021-11-08 PROCEDURE — 36415 COLL VENOUS BLD VENIPUNCTURE: CPT | Performed by: INTERNAL MEDICINE

## 2021-11-08 PROCEDURE — 99204 OFFICE O/P NEW MOD 45 MIN: CPT | Performed by: INTERNAL MEDICINE

## 2021-11-08 PROCEDURE — 84443 ASSAY THYROID STIM HORMONE: CPT | Performed by: INTERNAL MEDICINE

## 2021-11-08 PROCEDURE — 99000 SPECIMEN HANDLING OFFICE-LAB: CPT | Performed by: INTERNAL MEDICINE

## 2021-11-08 PROCEDURE — 82310 ASSAY OF CALCIUM: CPT | Performed by: INTERNAL MEDICINE

## 2021-11-08 PROCEDURE — 84080 ASSAY ALKALINE PHOSPHATASES: CPT | Mod: 90 | Performed by: INTERNAL MEDICINE

## 2021-11-08 PROCEDURE — 83735 ASSAY OF MAGNESIUM: CPT | Performed by: INTERNAL MEDICINE

## 2021-11-08 PROCEDURE — 80076 HEPATIC FUNCTION PANEL: CPT | Performed by: INTERNAL MEDICINE

## 2021-11-08 PROCEDURE — 82306 VITAMIN D 25 HYDROXY: CPT | Performed by: INTERNAL MEDICINE

## 2021-11-08 PROCEDURE — 83970 ASSAY OF PARATHORMONE: CPT | Performed by: INTERNAL MEDICINE

## 2021-11-08 ASSESSMENT — ENCOUNTER SYMPTOMS
EYE IRRITATION: 1
EYE PAIN: 0
ABDOMINAL PAIN: 1
SHORTNESS OF BREATH: 1
CONSTIPATION: 1
MUSCLE CRAMPS: 1
WHEEZING: 1
NAIL CHANGES: 1
MUSCLE WEAKNESS: 1
NAUSEA: 1
DOUBLE VISION: 0
DIARRHEA: 1
EXERCISE INTOLERANCE: 1
SNORES LOUDLY: 0
SPUTUM PRODUCTION: 0
NECK MASS: 0
BACK PAIN: 1
EYE REDNESS: 0
LEG PAIN: 1
ORTHOPNEA: 1
SINUS CONGESTION: 0
LIGHT-HEADEDNESS: 1
COUGH DISTURBING SLEEP: 0
WEAKNESS: 1
BOWEL INCONTINENCE: 1
SMELL DISTURBANCE: 0
SEIZURES: 0
SKIN CHANGES: 1
TASTE DISTURBANCE: 0
ARTHRALGIAS: 1
HYPERTENSION: 0
SPEECH CHANGE: 1
SLEEP DISTURBANCES DUE TO BREATHING: 0
TROUBLE SWALLOWING: 0
MYALGIAS: 1
HOARSE VOICE: 0
PANIC: 0
LOSS OF CONSCIOUSNESS: 1
TINGLING: 1
DYSURIA: 0
DIFFICULTY URINATING: 1
DECREASED CONCENTRATION: 1
BLOATING: 1
BLOOD IN STOOL: 0
POOR WOUND HEALING: 1
RECTAL PAIN: 1
INSOMNIA: 1
DEPRESSION: 1
JOINT SWELLING: 1
HEADACHES: 1
DIZZINESS: 1
HEARTBURN: 1
TREMORS: 1
NUMBNESS: 1
JAUNDICE: 0
NERVOUS/ANXIOUS: 1
HEMOPTYSIS: 0
HYPOTENSION: 1
STIFFNESS: 1
VOMITING: 0
SORE THROAT: 0
HEMATURIA: 0
PARALYSIS: 0
PALPITATIONS: 0
SYNCOPE: 0
EYE WATERING: 1
FLANK PAIN: 0
SINUS PAIN: 0
DYSPNEA ON EXERTION: 1
MEMORY LOSS: 1
NECK PAIN: 1
POSTURAL DYSPNEA: 1
DISTURBANCES IN COORDINATION: 1
COUGH: 0

## 2021-11-08 ASSESSMENT — MIFFLIN-ST. JEOR: SCORE: 1634.56

## 2021-11-08 NOTE — LETTER
"    11/8/2021         RE: Teresa Fernandez  [id number] [facility] [id number]        Dear Colleague,    Thank you for referring your patient, Teresa Fernandez, to the [facility] ENDOCRINOLOGY. Please see a copy of my visit note below.    CC: Bone pain.     HPI:   Patient presents for evaluation of bone pain.   Reports the problem actually began with muscle pain ~ 4 years ago.  States CK level was in the 600's and she referred to N. States that she was told she was have a muscle biopsy but this never occurred. Then ~ 1 year ago the pain moved into her bones. Specifically her left wrist and right leg. Sharp stabbing pain. No clear aggravating or alleviating factors.     Seen by Rheumatology on 5/1/2017:  \"1.  Generalized osteoarthritis.   2.  The patient does not have any convincing evidence of an autoimmune or inflammatory arthritis, nor does she have any obvious signs of fibromyalgia. \"    Last seen 6/2/2020:  \"1. Myalgias. Patient has had extensive work-up from rheumatology in the past with no underlying pathology. Her antibodies have always been negative. She had a mildly elevated DIEGO at 1-80 with no additional antibodies which generally considered a normal finding. No other symptoms consistent with autoimmune process. Fibromyalgia has been discussed in her notes in the past. I would agree that most of her symptoms could be consistent with fibromyalgia. Fatigue, all over body pain is generally highly consistent with fibromyalgia. We will check CK again in some additional inflammatory markers if work-up remains negative would recommend that she pursue treatment for fibromyalgia with primary care and/or pain management.  2. Elevated CK. Although she has had mildly elevated CKs in the past most recently they were in the 200s which is only mildly elevated and when accounting for body habitus would be within the normal range. With an inflammatory myopathy including polymyositis or " "dermatomyositis would expect these to see extreme elevations up to 10 or more times the normal.  3. Rash. She has had biopsies which have been unrevealing. If rash continues would recommend re-following with dermatology.  Reviewed her outside labs from [facility] extensively. An extensive work-up was conducted in August 2019. It is been less than 1 year since she has had a completely negative work-up from a rheumatology standpoint. Would not be prudent to repeat all of those additional labs as none were equivocal, they were all negative.\"     No prior fractures. No kidney stones.   Left kidney has been removed due to RCC.     States her weight rises and falls with \"my inflammation\".    She was prescribed liquid decadron for oral lichen planus.   No use int he last year as this issue resolved.     Uterus removed in her 30's due to anemia.   Ovaries left in place. No HRT.   Hot flashes have resumed recently.     She does not take calcium supplements.   Recently resumed her vitamin D.     Answers for HPI/ROS submitted by the patient on 11/8/2021  General Symptoms: No  Skin Symptoms: Yes  HENT Symptoms: Yes  EYE SYMPTOMS: Yes  HEART SYMPTOMS: Yes  LUNG SYMPTOMS: Yes  INTESTINAL SYMPTOMS: Yes  URINARY SYMPTOMS: Yes  GYNECOLOGIC SYMPTOMS: No  BREAST SYMPTOMS: No  SKELETAL SYMPTOMS: Yes  BLOOD SYMPTOMS: No  NERVOUS SYSTEM SYMPTOMS: Yes  MENTAL HEALTH SYMPTOMS: Yes  Ear pain: No  Ear discharge: No  Hearing loss: No  Tinnitus: No  Nosebleeds: No  Congestion: No  Sinus pain: No  Trouble swallowing: No   Voice hoarseness: No  Mouth sores: Yes  Sore throat: No  Tooth pain: No  Gum tenderness: No  Bleeding gums: No  Change in taste: No  Change in sense of smell: No  Dry mouth: Yes  Hearing aid used: No  Neck lump: No  Changes in hair: Yes  Changes in moles/birth marks: Yes  Itching: Yes  Rashes: Yes  Changes in nails: Yes  Acne: No  Hair in places you don't want it: Yes  Change in facial hair: No  Warts: " Yes  Non-healing sores: Yes  Scarring: No  Flaking of skin: Yes  Color changes of hands/feet in cold : Yes  Sun sensitivity: Yes  Skin thickening: No  Eye pain: No  Vision loss: Yes  Dry eyes: Yes  Watery eyes: Yes  Eye bulging: No  Double vision: No  Flashing of lights: No  Spots: No  Floaters: No  Redness: No  Crossed eyes: No  Tunnel Vision: No  Yellowing of eyes: No  Eye irritation: Yes  Chest pain or pressure: Yes  Fast or irregular heartbeat: No  Pain in legs with walking: Yes  Trouble breathing while lying down: Yes  Fingers or toes appear blue: No  High blood pressure: No  Low blood pressure: Yes  Fainting: No  Murmurs: No  Pacemaker: No  Varicose veins: Yes  Edema or swelling: Yes  Wake up at night with shortness of breath: No  Light-headedness: Yes  Exercise intolerance: Yes  Cough: No  Sputum or phlegm: No  Coughing up blood: No  Difficulty breating or shortness of breath: Yes  Snoring: No  Wheezing: Yes  Difficulty breathing on exertion: Yes  Nighttime Cough: No  Difficulty breathing when lying flat: Yes  Heart burn or indigestion: Yes  Nausea: Yes  Vomiting: No  Abdominal pain: Yes  Bloating: Yes  Constipation: Yes  Diarrhea: Yes  Blood in stool: No  Black stools: No  Rectal or Anal pain: Yes  Fecal incontinence: Yes  Yellowing of skin or eyes: No  Vomit with blood: No  Change in stools: Yes  Trouble holding urine or incontinence: Yes  Pain or burning: No  Trouble starting or stopping: Yes  Increased frequency of urination: Yes  Blood in urine: No  Decreased frequency of urination: No  Frequent nighttime urination: Yes  Flank pain: No  Difficulty emptying bladder: Yes  Back pain: Yes  Muscle aches: Yes  Neck pain: Yes  Swollen joints: Yes  Joint pain: Yes  Bone pain: Yes  Muscle cramps: Yes  Muscle weakness: Yes  Joint stiffness: Yes  Bone fracture: Yes  Trouble with coordination: Yes  Dizziness or trouble with balance: Yes  Fainting or black-out spells: Yes  Memory loss: Yes  Headache: Yes  Seizures:  No  Speech problems: Yes  Tingling: Yes  Tremor: Yes  Weakness: Yes  Difficulty walking: Yes  Paralysis: No  Numbness: Yes  Nervous or Anxious: Yes  Depression: Yes  Trouble sleeping: Yes  Trouble thinking or concentrating: Yes  Mood changes: Yes  Panic attacks: No      ROS: 10 point ROS neg other than the symptoms noted above in the HPI.    PMH:   Patient Active Problem List   Diagnosis     Acquired hypothyroidism     Allergic state     Chronic rhinitis     Allergic rhinitis due to pollen     Sinusitis, chronic     History of skin cancer     ERIC (generalized anxiety disorder)     Leukoplakia of oral mucosa, including tongue     GERD (gastroesophageal reflux disease)     Chronic low back pain     Dry eye syndrome     Chronic fatigue     Osteopenia     History of melanoma in situ     Eczema     Moderate persistent asthma without complication     DDD (degenerative disc disease), lumbar     Hyperlipidemia LDL goal <160     Chest tightness or pressure     Muscle pain     GAYE (obstructive sleep apnea)     History of kidney cancer     Fibromyalgia      Meds:  Current Outpatient Medications   Medication     acetaminophen (TYLENOL) 325 MG tablet     albuterol (PROAIR HFA/PROVENTIL HFA/VENTOLIN HFA) 108 (90 BASE) MCG/ACT Inhaler     APNO CREA ointment     augmented betamethasone dipropionate (DIPROLENE-AF) 0.05 % external ointment     Cholecalciferol (VITAMIN D3 PO)     Cyanocobalamin (VITAMIN B 12 PO)     dexamethasone (DECADRON) 0.5 MG/5ML elixir     DULoxetine (CYMBALTA) 60 MG capsule     fexofenadine (ALLEGRA) 180 MG tablet     fluticasone-salmeterol (ADVAIR DISKUS) 250-50 MCG/DOSE inhaler     levothyroxine (SYNTHROID/LEVOTHROID) 100 MCG tablet     levothyroxine (SYNTHROID/LEVOTHROID) 88 MCG tablet     lidocaine (XYLOCAINE) 2 % solution     magic mouthwash (ENTER INGREDIENTS IN COMMENTS) suspension     mometasone (ELOCON) 0.1 % external ointment     mometasone (NASONEX) 50 MCG/ACT nasal spray     mupirocin (BACTROBAN) 2  "% external ointment     omeprazole (PRILOSEC) 20 MG DR capsule     order for DME     OVER-THE-COUNTER     pregabalin (LYRICA) 50 MG capsule     tacrolimus (GENERIC EQUIVALENT) 1 MG capsule     tacrolimus (PROTOPIC) 0.03 % external ointment     triamcinolone (ARISTOCORT HP) 0.5 % external cream     triamcinolone (KENALOG) 0.1 % external ointment     triamcinolone (NASACORT) 55 MCG/ACT nasal aerosol     Current Facility-Administered Medications   Medication     ampicillin (OMNIPEN) 1 g vial INTRADERMAL     penicillin g potassium 6470802 unit vial INTRADERMAL      FHX:   No bone or muscle disease.     SHX:  Former smoker.     Exam:   Vital signs:      BP: 126/62 Pulse: 86     SpO2: 97 %     Height: 172.7 cm (5' 8\") Weight: 101.6 kg (224 lb)  Estimated body mass index is 34.06 kg/m  as calculated from the following:    Height as of this encounter: 1.727 m (5' 8\").    Weight as of this encounter: 101.6 kg (224 lb).  Gen: In NAD.   HEENT: no proptosis or lid lag, EOMI, no palpable thyroid tissue.  Card: S1 S2 RRR no m/r/g. no LE edema.   Pulm: CTA b/l.   GI: NT ND +BS.   MSK: no gross deformities. +heberden's and galilea's nodes b/l. Left wrist with limited mobility. No synovitis.   Derm: no rashes or lesions.   Neuro: no tremor, +2 DTR's.     A/P:   Bone pain - Work up to date shows a normal CMP, CBC, ESR, CK, CRP . X rays of pelvis, hips, right tib/fib unremarkable aside from degenerative changes. Prior Rheumatology evaluations as above.     Left wrist imaged on 3/17/2020:  Prominent degenerative arthrosis at the thumb CMC joint.  Chondrocalcinosis, consistent with calcium pyrophosphate deposition  disease.  Positive ulnar variance. No acute fracture identified.     This makes me concerned for possible HPTH or other bone disease.   -Labs today including picking up jug for 24 hour urine collection.       Javad Henry M.D          Again, thank you for allowing me to participate in the care of your patient.  "       Sincerely,        Javad Henry MD

## 2021-11-08 NOTE — PROGRESS NOTES
"CC: Bone pain.     HPI:   Patient presents for evaluation of bone pain.   Reports the problem actually began with muscle pain ~ 4 years ago.  States CK level was in the 600's and she referred to N. States that she was told she was have a muscle biopsy but this never occurred. Then ~ 1 year ago the pain moved into her bones. Specifically her left wrist and right leg. Sharp stabbing pain. No clear aggravating or alleviating factors.     Seen by Rheumatology on 5/1/2017:  \"1.  Generalized osteoarthritis.   2.  The patient does not have any convincing evidence of an autoimmune or inflammatory arthritis, nor does she have any obvious signs of fibromyalgia. \"    Last seen 6/2/2020:  \"1. Myalgias. Patient has had extensive work-up from rheumatology in the past with no underlying pathology. Her antibodies have always been negative. She had a mildly elevated DIEGO at 1-80 with no additional antibodies which generally considered a normal finding. No other symptoms consistent with autoimmune process. Fibromyalgia has been discussed in her notes in the past. I would agree that most of her symptoms could be consistent with fibromyalgia. Fatigue, all over body pain is generally highly consistent with fibromyalgia. We will check CK again in some additional inflammatory markers if work-up remains negative would recommend that she pursue treatment for fibromyalgia with primary care and/or pain management.  2. Elevated CK. Although she has had mildly elevated CKs in the past most recently they were in the 200s which is only mildly elevated and when accounting for body habitus would be within the normal range. With an inflammatory myopathy including polymyositis or dermatomyositis would expect these to see extreme elevations up to 10 or more times the normal.  3. Rash. She has had biopsies which have been unrevealing. If rash continues would recommend re-following with dermatology.  Reviewed her outside labs from [facility] " "extensively. An extensive work-up was conducted in August 2019. It is been less than 1 year since she has had a completely negative work-up from a rheumatology standpoint. Would not be prudent to repeat all of those additional labs as none were equivocal, they were all negative.\"     No prior fractures. No kidney stones.   Left kidney has been removed due to RCC.     States her weight rises and falls with \"my inflammation\".    She was prescribed liquid decadron for oral lichen planus.   No use int he last year as this issue resolved.     Uterus removed in her 30's due to anemia.   Ovaries left in place. No HRT.   Hot flashes have resumed recently.     She does not take calcium supplements.   Recently resumed her vitamin D.     Answers for HPI/ROS submitted by the patient on 11/8/2021  General Symptoms: No  Skin Symptoms: Yes  HENT Symptoms: Yes  EYE SYMPTOMS: Yes  HEART SYMPTOMS: Yes  LUNG SYMPTOMS: Yes  INTESTINAL SYMPTOMS: Yes  URINARY SYMPTOMS: Yes  GYNECOLOGIC SYMPTOMS: No  BREAST SYMPTOMS: No  SKELETAL SYMPTOMS: Yes  BLOOD SYMPTOMS: No  NERVOUS SYSTEM SYMPTOMS: Yes  MENTAL HEALTH SYMPTOMS: Yes  Ear pain: No  Ear discharge: No  Hearing loss: No  Tinnitus: No  Nosebleeds: No  Congestion: No  Sinus pain: No  Trouble swallowing: No   Voice hoarseness: No  Mouth sores: Yes  Sore throat: No  Tooth pain: No  Gum tenderness: No  Bleeding gums: No  Change in taste: No  Change in sense of smell: No  Dry mouth: Yes  Hearing aid used: No  Neck lump: No  Changes in hair: Yes  Changes in moles/birth marks: Yes  Itching: Yes  Rashes: Yes  Changes in nails: Yes  Acne: No  Hair in places you don't want it: Yes  Change in facial hair: No  Warts: Yes  Non-healing sores: Yes  Scarring: No  Flaking of skin: Yes  Color changes of hands/feet in cold : Yes  Sun sensitivity: Yes  Skin thickening: No  Eye pain: No  Vision loss: Yes  Dry eyes: Yes  Watery eyes: Yes  Eye bulging: No  Double vision: No  Flashing of lights: No  Spots: " No  Floaters: No  Redness: No  Crossed eyes: No  Tunnel Vision: No  Yellowing of eyes: No  Eye irritation: Yes  Chest pain or pressure: Yes  Fast or irregular heartbeat: No  Pain in legs with walking: Yes  Trouble breathing while lying down: Yes  Fingers or toes appear blue: No  High blood pressure: No  Low blood pressure: Yes  Fainting: No  Murmurs: No  Pacemaker: No  Varicose veins: Yes  Edema or swelling: Yes  Wake up at night with shortness of breath: No  Light-headedness: Yes  Exercise intolerance: Yes  Cough: No  Sputum or phlegm: No  Coughing up blood: No  Difficulty breating or shortness of breath: Yes  Snoring: No  Wheezing: Yes  Difficulty breathing on exertion: Yes  Nighttime Cough: No  Difficulty breathing when lying flat: Yes  Heart burn or indigestion: Yes  Nausea: Yes  Vomiting: No  Abdominal pain: Yes  Bloating: Yes  Constipation: Yes  Diarrhea: Yes  Blood in stool: No  Black stools: No  Rectal or Anal pain: Yes  Fecal incontinence: Yes  Yellowing of skin or eyes: No  Vomit with blood: No  Change in stools: Yes  Trouble holding urine or incontinence: Yes  Pain or burning: No  Trouble starting or stopping: Yes  Increased frequency of urination: Yes  Blood in urine: No  Decreased frequency of urination: No  Frequent nighttime urination: Yes  Flank pain: No  Difficulty emptying bladder: Yes  Back pain: Yes  Muscle aches: Yes  Neck pain: Yes  Swollen joints: Yes  Joint pain: Yes  Bone pain: Yes  Muscle cramps: Yes  Muscle weakness: Yes  Joint stiffness: Yes  Bone fracture: Yes  Trouble with coordination: Yes  Dizziness or trouble with balance: Yes  Fainting or black-out spells: Yes  Memory loss: Yes  Headache: Yes  Seizures: No  Speech problems: Yes  Tingling: Yes  Tremor: Yes  Weakness: Yes  Difficulty walking: Yes  Paralysis: No  Numbness: Yes  Nervous or Anxious: Yes  Depression: Yes  Trouble sleeping: Yes  Trouble thinking or concentrating: Yes  Mood changes: Yes  Panic attacks: No      ROS: 10 point  ROS neg other than the symptoms noted above in the HPI.    PMH:   Patient Active Problem List   Diagnosis     Acquired hypothyroidism     Allergic state     Chronic rhinitis     Allergic rhinitis due to pollen     Sinusitis, chronic     History of skin cancer     ERIC (generalized anxiety disorder)     Leukoplakia of oral mucosa, including tongue     GERD (gastroesophageal reflux disease)     Chronic low back pain     Dry eye syndrome     Chronic fatigue     Osteopenia     History of melanoma in situ     Eczema     Moderate persistent asthma without complication     DDD (degenerative disc disease), lumbar     Hyperlipidemia LDL goal <160     Chest tightness or pressure     Muscle pain     GAYE (obstructive sleep apnea)     History of kidney cancer     Fibromyalgia      Meds:  Current Outpatient Medications   Medication     acetaminophen (TYLENOL) 325 MG tablet     albuterol (PROAIR HFA/PROVENTIL HFA/VENTOLIN HFA) 108 (90 BASE) MCG/ACT Inhaler     APNO CREA ointment     augmented betamethasone dipropionate (DIPROLENE-AF) 0.05 % external ointment     Cholecalciferol (VITAMIN D3 PO)     Cyanocobalamin (VITAMIN B 12 PO)     dexamethasone (DECADRON) 0.5 MG/5ML elixir     DULoxetine (CYMBALTA) 60 MG capsule     fexofenadine (ALLEGRA) 180 MG tablet     fluticasone-salmeterol (ADVAIR DISKUS) 250-50 MCG/DOSE inhaler     levothyroxine (SYNTHROID/LEVOTHROID) 100 MCG tablet     levothyroxine (SYNTHROID/LEVOTHROID) 88 MCG tablet     lidocaine (XYLOCAINE) 2 % solution     magic mouthwash (ENTER INGREDIENTS IN COMMENTS) suspension     mometasone (ELOCON) 0.1 % external ointment     mometasone (NASONEX) 50 MCG/ACT nasal spray     mupirocin (BACTROBAN) 2 % external ointment     omeprazole (PRILOSEC) 20 MG DR capsule     order for DME     OVER-THE-COUNTER     pregabalin (LYRICA) 50 MG capsule     tacrolimus (GENERIC EQUIVALENT) 1 MG capsule     tacrolimus (PROTOPIC) 0.03 % external ointment     triamcinolone (ARISTOCORT HP) 0.5 %  "external cream     triamcinolone (KENALOG) 0.1 % external ointment     triamcinolone (NASACORT) 55 MCG/ACT nasal aerosol     Current Facility-Administered Medications   Medication     ampicillin (OMNIPEN) 1 g vial INTRADERMAL     penicillin g potassium [id number] unit vial INTRADERMAL      FHX:   No bone or muscle disease.     SHX:  Former smoker.     Exam:   Vital signs:      BP: 126/62 Pulse: 86     SpO2: 97 %     Height: 172.7 cm (5' 8\") Weight: 101.6 kg (224 lb)  Estimated body mass index is 34.06 kg/m  as calculated from the following:    Height as of this encounter: 1.727 m (5' 8\").    Weight as of this encounter: 101.6 kg (224 lb).  Gen: In NAD.   HEENT: no proptosis or lid lag, EOMI, no palpable thyroid tissue.  Card: S1 S2 RRR no m/r/g. no LE edema.   Pulm: CTA b/l.   GI: NT ND +BS.   MSK: no gross deformities. +heberden's and galilea's nodes b/l. Left wrist with limited mobility. No synovitis.   Derm: no rashes or lesions.   Neuro: no tremor, +2 DTR's.     A/P:   Bone pain - Work up to date shows a normal CMP, CBC, ESR, CK, CRP . X rays of pelvis, hips, right tib/fib unremarkable aside from degenerative changes. Prior Rheumatology evaluations as above.     Left wrist imaged on 3/17/2020:  Prominent degenerative arthrosis at the thumb CMC joint.  Chondrocalcinosis, consistent with calcium pyrophosphate deposition  disease.  Positive ulnar variance. No acute fracture identified.     This makes me concerned for possible HPTH or other bone disease.   -Labs today including picking up jug for 24 hour urine collection.       Javad Henry M.D      "

## 2021-11-09 ENCOUNTER — LAB (OUTPATIENT)
Dept: LAB | Facility: CLINIC | Age: 61
End: 2021-11-09
Payer: OTHER GOVERNMENT

## 2021-11-09 DIAGNOSIS — M89.8X9 BONE PAIN: ICD-10-CM

## 2021-11-09 DIAGNOSIS — E03.9 ACQUIRED HYPOTHYROIDISM: ICD-10-CM

## 2021-11-09 LAB
ALP BONE SERPL-MCNC: 18.1 UG/L
CALCIUM 24H UR-MRATE: ABNORMAL MG/(24.H)
CALCIUM UR-MCNC: <5 MG/DL
CALCIUM/CREAT UR: ABNORMAL MG/G{CREAT}
COLLECT DURATION TIME UR: 24 H
COLLECT DURATION TIME UR: 24 H
CREAT 24H UR-MRATE: 0.66 G/SPEC (ref 0.8–1.8)
CREAT 24H UR-MRATE: 0.69 G/SPEC (ref 0.8–1.8)
CREAT UR-MCNC: 22 MG/DL
CREAT UR-MCNC: 23 MG/DL
SPECIMEN VOL UR: 3000 ML
SPECIMEN VOL UR: 3000 ML

## 2021-11-09 PROCEDURE — 99000 SPECIMEN HANDLING OFFICE-LAB: CPT

## 2021-11-09 PROCEDURE — 81050 URINALYSIS VOLUME MEASURE: CPT

## 2021-11-09 PROCEDURE — 82340 ASSAY OF CALCIUM IN URINE: CPT

## 2021-11-09 PROCEDURE — 82530 CORTISOL FREE: CPT | Mod: 90

## 2021-11-09 PROCEDURE — 82570 ASSAY OF URINE CREATININE: CPT

## 2021-11-10 DIAGNOSIS — M89.8X9 BONE PAIN: Primary | ICD-10-CM

## 2021-11-12 ENCOUNTER — MYC MEDICAL ADVICE (OUTPATIENT)
Dept: ENDOCRINOLOGY | Facility: CLINIC | Age: 61
End: 2021-11-12
Payer: OTHER GOVERNMENT

## 2021-11-12 LAB
ANNOTATION COMMENT IMP: NORMAL
CORTIS F 24H UR HPLC-MCNC: <1 UG/L
CORTIS F 24H UR-MRATE: <3 UG/D
CORTIS F/CREAT 24H UR: <4.35 UG/G CRT
CREAT 24H UR-MRATE: 690 MG/D
CREAT UR-MCNC: 23 MG/DL

## 2021-11-15 ENCOUNTER — MYC MEDICAL ADVICE (OUTPATIENT)
Dept: ENDOCRINOLOGY | Facility: CLINIC | Age: 61
End: 2021-11-15
Payer: OTHER GOVERNMENT

## 2021-11-19 ENCOUNTER — TRANSFERRED RECORDS (OUTPATIENT)
Dept: HEALTH INFORMATION MANAGEMENT | Facility: CLINIC | Age: 61
End: 2021-11-19

## 2021-11-26 ENCOUNTER — MYC MEDICAL ADVICE (OUTPATIENT)
Dept: DERMATOLOGY | Facility: CLINIC | Age: 61
End: 2021-11-26

## 2021-11-26 ENCOUNTER — LAB (OUTPATIENT)
Dept: LAB | Facility: CLINIC | Age: 61
End: 2021-11-26
Payer: OTHER GOVERNMENT

## 2021-11-26 DIAGNOSIS — M89.8X9 BONE PAIN: ICD-10-CM

## 2021-11-26 LAB — PTH-INTACT SERPL-MCNC: 95 PG/ML (ref 18–80)

## 2021-11-26 PROCEDURE — 83970 ASSAY OF PARATHORMONE: CPT

## 2021-11-26 PROCEDURE — 36415 COLL VENOUS BLD VENIPUNCTURE: CPT

## 2021-11-29 ENCOUNTER — OFFICE VISIT (OUTPATIENT)
Dept: FAMILY MEDICINE | Facility: CLINIC | Age: 61
End: 2021-11-29
Payer: OTHER GOVERNMENT

## 2021-11-29 VITALS
OXYGEN SATURATION: 98 % | HEART RATE: 74 BPM | TEMPERATURE: 97.5 F | SYSTOLIC BLOOD PRESSURE: 128 MMHG | BODY MASS INDEX: 34.68 KG/M2 | RESPIRATION RATE: 16 BRPM | WEIGHT: 228.8 LBS | DIASTOLIC BLOOD PRESSURE: 86 MMHG | HEIGHT: 68 IN

## 2021-11-29 DIAGNOSIS — E21.3 HPTH (HYPERPARATHYROIDISM) (H): Primary | ICD-10-CM

## 2021-11-29 DIAGNOSIS — L57.0 ACTINIC KERATOSIS: ICD-10-CM

## 2021-11-29 DIAGNOSIS — Z23 HIGH PRIORITY FOR 2019-NCOV VACCINE: ICD-10-CM

## 2021-11-29 DIAGNOSIS — L85.8 CUTANEOUS HORN: Primary | ICD-10-CM

## 2021-11-29 PROCEDURE — 17000 DESTRUCT PREMALG LESION: CPT | Performed by: NURSE PRACTITIONER

## 2021-11-29 PROCEDURE — 91306 COVID-19,PF,MODERNA (18+ YRS BOOSTER .25ML): CPT | Performed by: NURSE PRACTITIONER

## 2021-11-29 PROCEDURE — 0064A COVID-19,PF,MODERNA (18+ YRS BOOSTER .25ML): CPT | Performed by: NURSE PRACTITIONER

## 2021-11-29 ASSESSMENT — MIFFLIN-ST. JEOR: SCORE: 1656.33

## 2021-11-29 NOTE — PATIENT INSTRUCTIONS
Patient Education     Understanding Actinic Keratosis    Actinic keratosis is a skin growth caused by ultraviolet (UV) rays from sun exposure or tanning beds. These growths are not cancer, but they may develop into skin cancer (precancerous). Many people get these growths, especially as they age. This is because of the many years of sun exposure. Multiple growths are called actinic keratoses.   What causes actinic keratosis?  Sun damage causes actinic keratosis. These growths usually appear on skin that is most often exposed to the sun, such as the face, ears, or back of the hands. People who easily sunburn are likely to develop actinic keratoses. Actinic keratoses often appear later in life from long-term sun exposure.   What are the symptoms of actinic keratosis?  Actinic keratosis growths may be described as:    Rough, like sandpaper    Wart-like    Scaly or scabby    More easily felt than seen  They may appear singly or in clusters. They may start out as red patches, and the skin around them may be discolored.   Actinic keratoses usually are not painful. For some people they may make the skin feel tender.   How is actinic keratosis treated?  Because actinic keratoses may develop into skin cancer, a healthcare provider should look at them. Your healthcare provider may choose to remove one or more of these growths. It may be looked at under a microscope. This is called a biopsy. Your healthcare provider may remove or destroy actinic keratoses to prevent them from turning in to skin cancer. You may also wish to have actinic keratoses removed if they bother you. They can be removed in several ways:     By using a medicine on the skin such as solaraze gel, 5 fluorouracil, or imiquimod    By removing them with a scalpel. This makes it possible to have the tissue tested.    By freezing them with liquid nitrogen  How can I prevent actinic keratoses?  Preventing sun damage to your skin is the best way to prevent  actinic keratoses. Here are some ways to protect your skin:     Use sunscreen with an SPF of 30 or higher on exposed skin when you are outside.    Wear a hat to protect your face and scalp. Consider wearing clothing that covers your arms and legs.    Stay out of the sun in the middle of the day, when sunlight is most direct.    Be aware of how long you have been out in the sun. Reapply sunscreen according to package directions.    Don't use tanning beds.  What are possible complications of actinic keratosis?   Actinic keratoses are a sign of skin damage. They may become cancerous. It s a good idea to ask your healthcare provider to check new skin growths. Report any skin problem that concerns you.   When should I call my healthcare provider?  Call your healthcare provider right away if any of these occur:    You have an actinic keratosis sore that does not respond to treatment    You have an actinic keratosis sore that does not heal within a few weeks, or heals and then comes back    You have a skin growth that is changing in size, shape, or color    You have a skin growth that looks different on one side from the other    You have a skin growth that is not the same color throughout  [employer] last reviewed this educational content on 5/1/2020 2000-2021 [employer]. All rights reserved. This information is not intended as a substitute for professional medical care. Always follow your healthcare professional's instructions.

## 2021-11-29 NOTE — PROGRESS NOTES
Assessment & Plan     Cutaneous horn  Consistent with AK with development of cutaneous horn  - DESTRUCT PREMALIGNANT LESION, FIRST    Actinic keratosis  - DESTRUCT PREMALIGNANT LESION, FIRST    High priority for 2019-nCoV vaccine    - COVID-19,PF,MODERNA (18+ Yrs BOOSTER .25mL)             FUTURE APPOINTMENTS:       - Follow-up visit in 2-3 weeks for persistent lesion, sooner PRN new or worsening symptoms.     Patient Instructions     Patient Education     Understanding Actinic Keratosis    Actinic keratosis is a skin growth caused by ultraviolet (UV) rays from sun exposure or tanning beds. These growths are not cancer, but they may develop into skin cancer (precancerous). Many people get these growths, especially as they age. This is because of the many years of sun exposure. Multiple growths are called actinic keratoses.   What causes actinic keratosis?  Sun damage causes actinic keratosis. These growths usually appear on skin that is most often exposed to the sun, such as the face, ears, or back of the hands. People who easily sunburn are likely to develop actinic keratoses. Actinic keratoses often appear later in life from long-term sun exposure.   What are the symptoms of actinic keratosis?  Actinic keratosis growths may be described as:    Rough, like sandpaper    Wart-like    Scaly or scabby    More easily felt than seen  They may appear singly or in clusters. They may start out as red patches, and the skin around them may be discolored.   Actinic keratoses usually are not painful. For some people they may make the skin feel tender.   How is actinic keratosis treated?  Because actinic keratoses may develop into skin cancer, a healthcare provider should look at them. Your healthcare provider may choose to remove one or more of these growths. It may be looked at under a microscope. This is called a biopsy. Your healthcare provider may remove or destroy actinic keratoses to prevent them from turning in to skin  cancer. You may also wish to have actinic keratoses removed if they bother you. They can be removed in several ways:     By using a medicine on the skin such as solaraze gel, 5 fluorouracil, or imiquimod    By removing them with a scalpel. This makes it possible to have the tissue tested.    By freezing them with liquid nitrogen  How can I prevent actinic keratoses?  Preventing sun damage to your skin is the best way to prevent actinic keratoses. Here are some ways to protect your skin:     Use sunscreen with an SPF of 30 or higher on exposed skin when you are outside.    Wear a hat to protect your face and scalp. Consider wearing clothing that covers your arms and legs.    Stay out of the sun in the middle of the day, when sunlight is most direct.    Be aware of how long you have been out in the sun. Reapply sunscreen according to package directions.    Don't use tanning beds.  What are possible complications of actinic keratosis?   Actinic keratoses are a sign of skin damage. They may become cancerous. It s a good idea to ask your healthcare provider to check new skin growths. Report any skin problem that concerns you.   When should I call my healthcare provider?  Call your healthcare provider right away if any of these occur:    You have an actinic keratosis sore that does not respond to treatment    You have an actinic keratosis sore that does not heal within a few weeks, or heals and then comes back    You have a skin growth that is changing in size, shape, or color    You have a skin growth that looks different on one side from the other    You have a skin growth that is not the same color throughout  [email or web address] last reviewed this educational content on 5/1/2020 2000-2021 [employer]. All rights reserved. This information is not intended as a substitute for professional medical care. Always follow your healthcare professional's instructions.               No follow-ups on file.    Amanda Everett  "HERIBERTO Meléndez CNP [facility]    Eric Moore is a 61 year old who presents for the following health issues     History of Present Illness       She eats 2-3 servings of fruits and vegetables daily.She consumes 1 sweetened beverage(s) daily.She exercises with enough effort to increase her heart rate 9 or less minutes per day.  She exercises with enough effort to increase her heart rate 3 or less days per week.   She is taking medications regularly.       Warts  Onset/Duration: 1 year  Description (location/number):  Raised flaky wart on the back of her right shoulder   Accompanying signs and symptoms (pain, redness): bleeds when it catches on her clothes   History: prior warts:  no   Therapies tried and outcome: neosporin      Review of Systems   Constitutional, HEENT, cardiovascular, pulmonary, gi and gu systems are negative, except as otherwise noted.      Objective    /86   Pulse 74   Temp 97.5  F (36.4  C) (Tympanic)   Resp 16   Ht 1.727 m (5' 8\")   Wt 103.8 kg (228 lb 12.8 oz)   LMP  (LMP Unknown)   SpO2 98%   BMI 34.79 kg/m    Body mass index is 34.79 kg/m .  Physical Exam   GENERAL: healthy, alert and no distress  SKIN: right upper shoulder blade with AK with cone shaped keratotic projection, no bleeding, erythema, edema, warmth  NEURO: Normal strength and tone, mentation intact and speech normal    Treated with liquid nitrogen with 3 freeze cycles.  Paring was not done prior to liquid nitrogen.  Patient tolerated procedure well.            "

## 2021-11-30 ENCOUNTER — MYC MEDICAL ADVICE (OUTPATIENT)
Dept: FAMILY MEDICINE | Facility: CLINIC | Age: 61
End: 2021-11-30
Payer: OTHER GOVERNMENT

## 2021-12-03 ENCOUNTER — VIRTUAL VISIT (OUTPATIENT)
Dept: DERMATOLOGY | Facility: CLINIC | Age: 61
End: 2021-12-03
Payer: OTHER GOVERNMENT

## 2021-12-03 DIAGNOSIS — M33.91 DERMATOMYOSITIS AFFECTING RESPIRATORY SYSTEM (H): ICD-10-CM

## 2021-12-03 DIAGNOSIS — L43.8 ORAL LICHEN PLANUS: ICD-10-CM

## 2021-12-03 DIAGNOSIS — R53.1 WEAKNESS: ICD-10-CM

## 2021-12-03 DIAGNOSIS — L71.0 PERIORIFICIAL DERMATITIS: Primary | ICD-10-CM

## 2021-12-03 PROCEDURE — 99214 OFFICE O/P EST MOD 30 MIN: CPT | Mod: 95 | Performed by: DERMATOLOGY

## 2021-12-03 RX ORDER — TACROLIMUS 1 MG/G
OINTMENT TOPICAL 2 TIMES DAILY
Qty: 30 G | Refills: 3 | Status: SHIPPED | OUTPATIENT
Start: 2021-12-03 | End: 2022-03-31

## 2021-12-03 ASSESSMENT — PAIN SCALES - GENERAL: PAINLEVEL: NO PAIN (0)

## 2021-12-03 NOTE — NURSING NOTE
Chief Complaint   Patient presents with     Derm Problem     Teresa, is here for her OLP and she states things are stable, no other concerns    Ramirez Damon EMT

## 2021-12-03 NOTE — LETTER
12/3/2021      RE: Teresa Fernandez  [address] [facility] [address]       [facility] Dermatology Note  Encounter Date: Dec 3, 2021  [employer] and Telephone ([phone] ). Location of teledermatologist: [facility] [place].  Start time: 8:37. End time: 9:01.    Dermatology Problem List:  1. Melanoma in situ, left chest  - s/p excision 7/27/2010 at [facility]  2.  Chronic burning rash of the upper extremities with biopsy showing a largely normal-appearing epidermis above mild perivascular lymphocytic inflammation without fungal elements or increased basement membrane noted on September 6, 2019  - Differential diagnosis includes (photo)allergic contact dermatitis, connective tissue disease (DM > NADIA as the former may lack significant epidermal changes)  - negative/normal: Aldolase, CK, PFTs, polymyositis and dermatomyositis panel, urinalysis, SSA, and SSB  - DIEGO borderline positive 1:80,  (nml ; has been in 600s in the past)  - Did have drug eruption to hydroxychloroquine 6/2021  3. Subcutaneous nodule, left forearm - consistent with lipoma  4. Orogenital ulcers: suspect lichen planus   - patch testing: relevant sensitivities to Balsam of peru, Propolis, Wood tar mix, Amerchol L10 and lanolin alcohol, PVP Iodine, Butylhydroxyanisole (BHA), Palladium Chloride  - bx 6/4/21 nonspecific with negative DIF  - prior bx 2011 ulcer, left cheek  - prior: hydroxychloroquine (stopped due to rash), dexamethasone S&S, tacrolimus 1 mg dissolved in 250 mL water S&S TID-QID, augmented betamethasone ointment  5. Periorificial dermatitis: around nose - tacrolimus ointment  6. Workup for hypercalcemia/hypercalciuria ongoing per endocrine (?hyperPTH)     ____________________________________________    Assessment & Plan:     1. Perinasal papular eruption: favor periorificial dermatitis; stop mometasone and will switch to tacrolimus ointment.  Discussed pathophysiology and rebound with topical steroids.  - tacrolimus ointment    2. Weakness, joint pain, shortness of breath, exercise intolerance: chest CTs ordered in August were apparently canceled - orders are no longer active though can see when the most recent was placed (8/30). Not sure how/why this occurred, but repeat order placed today. Lower suspicion for dermatomyositis, however could potentially fit her constellation of symptoms and with history of asthma, would like to pursue this. Repeat high-res chest CT order placed today. Will recheck a few autoantibodies; undergoing workup per endocrine for hyperPTH, which could potentially relate to some of these symptoms as well.  - chest CT  - myasthenia gravis panel, repeat DIEGO, RF, CCP    3. Oral lichen planus: with + contact sensitization after patch testing. Given CAMP codes and products to avoid. Overall doing very well without active symptoms at this time.    Procedures Performed:    None    Follow-up: 2-3 months    Staff:     Harrison Alcaraz MD, FAAD   of Dermatology  Department of Dermatology  [facility]    ____________________________________________    CC: Derm Problem (Teresa, is here for her OLP and she states things are stable, no other concerns)    HPI:  Ms. Teresa Fernandez is a(n) 61 year old female who presents today as a return patient for oral lichen planus, dermatomyositis    Saw Dr. Jurado recently - better defined allergies    Good days and bad days re: joints, body aches  - mouth has cleared up  - weakness and joint pain - on bad days, can be significant  - fell about a month ago - when exert self, don't have strength in ankles  - seeing endocrinologist - working up for hyperPTH; has slight hypercalciuria and hypercalcemia but sounds like workup ongoing  - muscles behind eye - pain - went to ophthalmologist - needs surgery on right eye - has macular hole - surgery scheduled for  1/6  - right hip pain  - hands get swollen - at nighttime, some clicking in joints    Rash beneath nose - right by CPAP; using mometasone ointment    Patient is otherwise feeling well, without additional skin concerns.    Labs Reviewed:  11/2021 Ca 9.8 (Alb 3.5 --> correct Ca 10.2); normal urine calcium & cortisol   --> 95  11/2017 PTH 72    Physical Exam:  Vitals: LMP  (LMP Unknown)   SKIN: Teledermatology photos were reviewed; image quality and interpretability: limited. Image date: 11/26/21.  - perinasal erythema  - no clear ulcers on the right buccal mucosa; perhaps some faint reticular hypopigmentation  - No other lesions of concern on areas examined.     Medications:  Current Outpatient Medications   Medication     acetaminophen (TYLENOL) 325 MG tablet     albuterol (PROAIR HFA/PROVENTIL HFA/VENTOLIN HFA) 108 (90 BASE) MCG/ACT Inhaler     APNO CREA ointment     augmented betamethasone dipropionate (DIPROLENE-AF) 0.05 % external ointment     Cholecalciferol (VITAMIN D3 PO)     Cyanocobalamin (VITAMIN B 12 PO)     dexamethasone (DECADRON) 0.5 MG/5ML elixir     DULoxetine (CYMBALTA) 60 MG capsule     fexofenadine (ALLEGRA) 180 MG tablet     fluticasone-salmeterol (ADVAIR DISKUS) 250-50 MCG/DOSE inhaler     levothyroxine (SYNTHROID/LEVOTHROID) 100 MCG tablet     levothyroxine (SYNTHROID/LEVOTHROID) 88 MCG tablet     lidocaine (XYLOCAINE) 2 % solution     magic mouthwash (ENTER INGREDIENTS IN COMMENTS) suspension     mometasone (ELOCON) 0.1 % external ointment     mupirocin (BACTROBAN) 2 % external ointment     omeprazole (PRILOSEC) 20 MG DR capsule     order for DME     OVER-THE-COUNTER     pregabalin (LYRICA) 50 MG capsule     tacrolimus (GENERIC EQUIVALENT) 1 MG capsule     tacrolimus (PROTOPIC) 0.03 % external ointment     triamcinolone (ARISTOCORT HP) 0.5 % external cream     triamcinolone (KENALOG) 0.1 % external ointment     triamcinolone (NASACORT) 55 MCG/ACT nasal aerosol     Current  Facility-Administered Medications   Medication     ampicillin (OMNIPEN) 1 g vial INTRADERMAL     penicillin g potassium [id number] unit vial INTRADERMAL      Past Medical/Surgical History:   Patient Active Problem List   Diagnosis     Acquired hypothyroidism     Allergic state     Chronic rhinitis     Allergic rhinitis due to pollen     Sinusitis, chronic     History of skin cancer     ERIC (generalized anxiety disorder)     Leukoplakia of oral mucosa, including tongue     GERD (gastroesophageal reflux disease)     Chronic low back pain     Dry eye syndrome     Chronic fatigue     Osteopenia     History of melanoma in situ     Eczema     Moderate persistent asthma without complication     DDD (degenerative disc disease), lumbar     Hyperlipidemia LDL goal <160     Chest tightness or pressure     Muscle pain     GAYE (obstructive sleep apnea)     History of kidney cancer     Fibromyalgia     Past Medical History:   Diagnosis Date     ALLERGIC RHINITIS NOS 4/12/2005     Anxiety      Arthritis     herniated disc     Bronchitis, not specified as acute or chronic      CHR MAXILLARY SINUSITIS 4/12/2005     Depressive disorder, not elsewhere classified      Eczema 10/17/2012     Environmental and seasonal allergies      GERD (gastroesophageal reflux disease)      Gluten intolerance      Malignant neoplasm of renal pelvis (H) 1995    Renal cell carcinoma     Melanoma (H) 06/2010     Other specified acquired hypothyroidism 4/12/2005     Renal cancer (H) 5/5/2011     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      Harrison Alcaraz MD

## 2021-12-03 NOTE — PROGRESS NOTES
[facility] Dermatology Note  Encounter Date: Dec 3, 2021  Store-and-Forward and Telephone ([phone] ). Location of teledermatologist: [facility] [place].  Start time: 8:37. End time: 9:01.    Dermatology Problem List:  1. Melanoma in situ, left chest  - s/p excision 7/27/2010 at [facility]  2.  Chronic burning rash of the upper extremities with biopsy showing a largely normal-appearing epidermis above mild perivascular lymphocytic inflammation without fungal elements or increased basement membrane noted on September 6, 2019  - Differential diagnosis includes (photo)allergic contact dermatitis, connective tissue disease (DM > NADIA as the former may lack significant epidermal changes)  - negative/normal: Aldolase, CK, PFTs, polymyositis and dermatomyositis panel, urinalysis, SSA, and SSB  - DIEGO borderline positive 1:80,  (nml ; has been in 600s in the past)  - Did have drug eruption to hydroxychloroquine 6/2021  3. Subcutaneous nodule, left forearm - consistent with lipoma  4. Orogenital ulcers: suspect lichen planus   - patch testing: relevant sensitivities to Balsam of peru, Propolis, Wood tar mix, Amerchol L10 and lanolin alcohol, PVP Iodine, Butylhydroxyanisole (BHA), Palladium Chloride  - bx 6/4/21 nonspecific with negative DIF  - prior bx 2011 ulcer, left cheek  - prior: hydroxychloroquine (stopped due to rash), dexamethasone S&S, tacrolimus 1 mg dissolved in 250 mL water S&S TID-QID, augmented betamethasone ointment  5. Periorificial dermatitis: around nose - tacrolimus ointment  6. Workup for hypercalcemia/hypercalciuria ongoing per endocrine (?hyperPTH)     ____________________________________________    Assessment & Plan:     1. Perinasal papular eruption: favor periorificial dermatitis; stop mometasone and will switch to tacrolimus ointment. Discussed pathophysiology and rebound with topical steroids.  - tacrolimus ointment    2.  Weakness, joint pain, shortness of breath, exercise intolerance: chest CTs ordered in August were apparently canceled - orders are no longer active though can see when the most recent was placed (8/30). Not sure how/why this occurred, but repeat order placed today. Lower suspicion for dermatomyositis, however could potentially fit her constellation of symptoms and with history of asthma, would like to pursue this. Repeat high-res chest CT order placed today. Will recheck a few autoantibodies; undergoing workup per endocrine for hyperPTH, which could potentially relate to some of these symptoms as well.  - chest CT  - myasthenia gravis panel, repeat DIEGO, RF, CCP    3. Oral lichen planus: with + contact sensitization after patch testing. Given CAMP codes and products to avoid. Overall doing very well without active symptoms at this time.    Procedures Performed:    None    Follow-up: 2-3 months    Staff:     Harrison Alcaraz MD, FAAD   of Dermatology  Department of Dermatology  [facility] [employer]    ____________________________________________    CC: Derm Problem (Teresa, is here for her OLP and she states things are stable, no other concerns)    HPI:  Ms. Teresa Fernandez is a(n) 61 year old female who presents today as a return patient for oral lichen planus, dermatomyositis    Saw Dr. Jurado recently - better defined allergies    Good days and bad days re: joints, body aches  - mouth has cleared up  - weakness and joint pain - on bad days, can be significant  - fell about a month ago - when exert self, don't have strength in ankles  - seeing endocrinologist - working up for hyperPTH; has slight hypercalciuria and hypercalcemia but sounds like workup ongoing  - muscles behind eye - pain - went to ophthalmologist - needs surgery on right eye - has macular hole - surgery scheduled for 1/6  - right hip pain  - hands get swollen - at nighttime, some clicking in joints    Rash  beneath nose - right by CPAP; using mometasone ointment    Patient is otherwise feeling well, without additional skin concerns.    Labs Reviewed:  11/2021 Ca 9.8 (Alb 3.5 --> correct Ca 10.2); normal urine calcium & cortisol   --> 95  11/2017 PTH 72    Physical Exam:  Vitals: LMP  (LMP Unknown)   SKIN: Teledermatology photos were reviewed; image quality and interpretability: limited. Image date: 11/26/21.  - perinasal erythema  - no clear ulcers on the right buccal mucosa; perhaps some faint reticular hypopigmentation  - No other lesions of concern on areas examined.     Medications:  Current Outpatient Medications   Medication     acetaminophen (TYLENOL) 325 MG tablet     albuterol (PROAIR HFA/PROVENTIL HFA/VENTOLIN HFA) 108 (90 BASE) MCG/ACT Inhaler     APNO CREA ointment     augmented betamethasone dipropionate (DIPROLENE-AF) 0.05 % external ointment     Cholecalciferol (VITAMIN D3 PO)     Cyanocobalamin (VITAMIN B 12 PO)     dexamethasone (DECADRON) 0.5 MG/5ML elixir     DULoxetine (CYMBALTA) 60 MG capsule     fexofenadine (ALLEGRA) 180 MG tablet     fluticasone-salmeterol (ADVAIR DISKUS) 250-50 MCG/DOSE inhaler     levothyroxine (SYNTHROID/LEVOTHROID) 100 MCG tablet     levothyroxine (SYNTHROID/LEVOTHROID) 88 MCG tablet     lidocaine (XYLOCAINE) 2 % solution     magic mouthwash (ENTER INGREDIENTS IN COMMENTS) suspension     mometasone (ELOCON) 0.1 % external ointment     mupirocin (BACTROBAN) 2 % external ointment     omeprazole (PRILOSEC) 20 MG DR capsule     order for DME     OVER-THE-COUNTER     pregabalin (LYRICA) 50 MG capsule     tacrolimus (GENERIC EQUIVALENT) 1 MG capsule     tacrolimus (PROTOPIC) 0.03 % external ointment     triamcinolone (ARISTOCORT HP) 0.5 % external cream     triamcinolone (KENALOG) 0.1 % external ointment     triamcinolone (NASACORT) 55 MCG/ACT nasal aerosol     Current Facility-Administered Medications   Medication     ampicillin (OMNIPEN) 1 g vial INTRADERMAL      penicillin g potassium [id number] unit vial INTRADERMAL      Past Medical/Surgical History:   Patient Active Problem List   Diagnosis     Acquired hypothyroidism     Allergic state     Chronic rhinitis     Allergic rhinitis due to pollen     Sinusitis, chronic     History of skin cancer     ERIC (generalized anxiety disorder)     Leukoplakia of oral mucosa, including tongue     GERD (gastroesophageal reflux disease)     Chronic low back pain     Dry eye syndrome     Chronic fatigue     Osteopenia     History of melanoma in situ     Eczema     Moderate persistent asthma without complication     DDD (degenerative disc disease), lumbar     Hyperlipidemia LDL goal <160     Chest tightness or pressure     Muscle pain     GAYE (obstructive sleep apnea)     History of kidney cancer     Fibromyalgia     Past Medical History:   Diagnosis Date     ALLERGIC RHINITIS NOS 4/12/2005     Anxiety      Arthritis     herniated disc     Bronchitis, not specified as acute or chronic      CHR MAXILLARY SINUSITIS 4/12/2005     Depressive disorder, not elsewhere classified      Eczema 10/17/2012     Environmental and seasonal allergies      GERD (gastroesophageal reflux disease)      Gluten intolerance      Malignant neoplasm of renal pelvis (H) 1995    Renal cell carcinoma     Melanoma (H) 06/2010     Other specified acquired hypothyroidism 4/12/2005     Renal cancer (H) 5/5/2011     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

## 2021-12-03 NOTE — LETTER
12/3/2021       RE: Teresa Fernandez  [id number] [address] [address]     Dear Colleague,    Thank you for referring your patient, Teresa Fernandez, to the [facility] [place] at [facility]. Please see a copy of my visit note below.    [facility] Dermatology Note  Encounter Date: Dec 3, 2021  Store-and-Forward and Telephone ([phone] ). Location of teledermatologist: [facility] [place].  Start time: 8:37. End time: 9:01.    Dermatology Problem List:  1. Melanoma in situ, left chest  - s/p excision 7/27/2010 at [facility]  2.  Chronic burning rash of the upper extremities with biopsy showing a largely normal-appearing epidermis above mild perivascular lymphocytic inflammation without fungal elements or increased basement membrane noted on September 6, 2019  - Differential diagnosis includes (photo)allergic contact dermatitis, connective tissue disease (DM > NADIA as the former may lack significant epidermal changes)  - negative/normal: Aldolase, CK, PFTs, polymyositis and dermatomyositis panel, urinalysis, SSA, and SSB  - DIEGO borderline positive 1:80,  (nml ; has been in 600s in the past)  - Did have drug eruption to hydroxychloroquine 6/2021  3. Subcutaneous nodule, left forearm - consistent with lipoma  4. Orogenital ulcers: suspect lichen planus   - patch testing: relevant sensitivities to Balsam of peru, Propolis, Wood tar mix, Amerchol L10 and lanolin alcohol, PVP Iodine, Butylhydroxyanisole (BHA), Palladium Chloride  - bx 6/4/21 nonspecific with negative DIF  - prior bx 2011 ulcer, left cheek  - prior: hydroxychloroquine (stopped due to rash), dexamethasone S&S, tacrolimus 1 mg dissolved in 250 mL water S&S TID-QID, augmented betamethasone ointment  5. Periorificial dermatitis: around nose - tacrolimus ointment  6. Workup for  hypercalcemia/hypercalciuria ongoing per endocrine (?hyperPTH)     ____________________________________________    Assessment & Plan:     1. Perinasal papular eruption: favor periorificial dermatitis; stop mometasone and will switch to tacrolimus ointment. Discussed pathophysiology and rebound with topical steroids.  - tacrolimus ointment    2. Weakness, joint pain, shortness of breath, exercise intolerance: chest CTs ordered in August were apparently canceled - orders are no longer active though can see when the most recent was placed (8/30). Not sure how/why this occurred, but repeat order placed today. Lower suspicion for dermatomyositis, however could potentially fit her constellation of symptoms and with history of asthma, would like to pursue this. Repeat high-res chest CT order placed today. Will recheck a few autoantibodies; undergoing workup per endocrine for hyperPTH, which could potentially relate to some of these symptoms as well.  - chest CT  - myasthenia gravis panel, repeat DIEGO, RF, CCP    3. Oral lichen planus: with + contact sensitization after patch testing. Given CAMP codes and products to avoid. Overall doing very well without active symptoms at this time.    Procedures Performed:    None    Follow-up: 2-3 months    Staff:     Harrison Alcaraz MD, FAAD   of Dermatology  Department of Dermatology  [facility] [employer] [facility]    ____________________________________________    CC: Derm Problem (Teresa, is here for her OLP and she states things are stable, no other concerns)    HPI:  Ms. Teresa Fernandez is a(n) 61 year old female who presents today as a return patient for oral lichen planus, dermatomyositis    Saw Dr. Jurado recently - better defined allergies    Good days and bad days re: joints, body aches  - mouth has cleared up  - weakness and joint pain - on bad days, can be significant  - fell about a month ago - when exert self, don't have strength in  ankles  - seeing endocrinologist - working up for hyperPTH; has slight hypercalciuria and hypercalcemia but sounds like workup ongoing  - muscles behind eye - pain - went to ophthalmologist - needs surgery on right eye - has macular hole - surgery scheduled for 1/6  - right hip pain  - hands get swollen - at nighttime, some clicking in joints    Rash beneath nose - right by CPAP; using mometasone ointment    Patient is otherwise feeling well, without additional skin concerns.    Labs Reviewed:  11/2021 Ca 9.8 (Alb 3.5 --> correct Ca 10.2); normal urine calcium & cortisol   --> 95  11/2017 PTH 72    Physical Exam:  Vitals: LMP  (LMP Unknown)   SKIN: Teledermatology photos were reviewed; image quality and interpretability: limited. Image date: 11/26/21.  - perinasal erythema  - no clear ulcers on the right buccal mucosa; perhaps some faint reticular hypopigmentation  - No other lesions of concern on areas examined.     Medications:  Current Outpatient Medications   Medication     acetaminophen (TYLENOL) 325 MG tablet     albuterol (PROAIR HFA/PROVENTIL HFA/VENTOLIN HFA) 108 (90 BASE) MCG/ACT Inhaler     APNO CREA ointment     augmented betamethasone dipropionate (DIPROLENE-AF) 0.05 % external ointment     Cholecalciferol (VITAMIN D3 PO)     Cyanocobalamin (VITAMIN B 12 PO)     dexamethasone (DECADRON) 0.5 MG/5ML elixir     DULoxetine (CYMBALTA) 60 MG capsule     fexofenadine (ALLEGRA) 180 MG tablet     fluticasone-salmeterol (ADVAIR DISKUS) 250-50 MCG/DOSE inhaler     levothyroxine (SYNTHROID/LEVOTHROID) 100 MCG tablet     levothyroxine (SYNTHROID/LEVOTHROID) 88 MCG tablet     lidocaine (XYLOCAINE) 2 % solution     magic mouthwash (ENTER INGREDIENTS IN COMMENTS) suspension     mometasone (ELOCON) 0.1 % external ointment     mupirocin (BACTROBAN) 2 % external ointment     omeprazole (PRILOSEC) 20 MG DR capsule     order for DME     OVER-THE-COUNTER     pregabalin (LYRICA) 50 MG capsule     tacrolimus (GENERIC  EQUIVALENT) 1 MG capsule     tacrolimus (PROTOPIC) 0.03 % external ointment     triamcinolone (ARISTOCORT HP) 0.5 % external cream     triamcinolone (KENALOG) 0.1 % external ointment     triamcinolone (NASACORT) 55 MCG/ACT nasal aerosol     Current Facility-Administered Medications   Medication     ampicillin (OMNIPEN) 1 g vial INTRADERMAL     penicillin g potassium [id number] unit vial INTRADERMAL      Past Medical/Surgical History:   Patient Active Problem List   Diagnosis     Acquired hypothyroidism     Allergic state     Chronic rhinitis     Allergic rhinitis due to pollen     Sinusitis, chronic     History of skin cancer     EIRC (generalized anxiety disorder)     Leukoplakia of oral mucosa, including tongue     GERD (gastroesophageal reflux disease)     Chronic low back pain     Dry eye syndrome     Chronic fatigue     Osteopenia     History of melanoma in situ     Eczema     Moderate persistent asthma without complication     DDD (degenerative disc disease), lumbar     Hyperlipidemia LDL goal <160     Chest tightness or pressure     Muscle pain     GAYE (obstructive sleep apnea)     History of kidney cancer     Fibromyalgia     Past Medical History:   Diagnosis Date     ALLERGIC RHINITIS NOS 4/12/2005     Anxiety      Arthritis     herniated disc     Bronchitis, not specified as acute or chronic      CHR MAXILLARY SINUSITIS 4/12/2005     Depressive disorder, not elsewhere classified      Eczema 10/17/2012     Environmental and seasonal allergies      GERD (gastroesophageal reflux disease)      Gluten intolerance      Malignant neoplasm of renal pelvis (H) 1995    Renal cell carcinoma     Melanoma (H) 06/2010     Other specified acquired hypothyroidism 4/12/2005     Renal cancer (H) 5/5/2011     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      Again, thank you for  allowing me to participate in the care of your patient.      Sincerely,    Harrison Alcaraz MD

## 2021-12-09 ENCOUNTER — HOSPITAL ENCOUNTER (OUTPATIENT)
Dept: CT IMAGING | Facility: CLINIC | Age: 61
Discharge: HOME | End: 2021-12-09
Attending: DERMATOLOGY | Admitting: DERMATOLOGY
Payer: OTHER GOVERNMENT

## 2021-12-09 DIAGNOSIS — M33.91 DERMATOMYOSITIS AFFECTING RESPIRATORY SYSTEM (H): ICD-10-CM

## 2021-12-09 PROCEDURE — 71250 CT THORAX DX C-: CPT

## 2021-12-20 DIAGNOSIS — M79.10 MYALGIA: ICD-10-CM

## 2021-12-20 DIAGNOSIS — F41.1 GAD (GENERALIZED ANXIETY DISORDER): ICD-10-CM

## 2021-12-20 RX ORDER — DULOXETIN HYDROCHLORIDE 60 MG/1
CAPSULE, DELAYED RELEASE ORAL
Qty: 90 CAPSULE | Refills: 0 | Status: SHIPPED | OUTPATIENT
Start: 2021-12-20 | End: 2022-02-14

## 2021-12-28 ENCOUNTER — OFFICE VISIT (OUTPATIENT)
Dept: FAMILY MEDICINE | Facility: CLINIC | Age: 61
End: 2021-12-28
Payer: OTHER GOVERNMENT

## 2021-12-28 VITALS
RESPIRATION RATE: 16 BRPM | WEIGHT: 227.4 LBS | BODY MASS INDEX: 34.46 KG/M2 | OXYGEN SATURATION: 97 % | DIASTOLIC BLOOD PRESSURE: 80 MMHG | HEART RATE: 99 BPM | SYSTOLIC BLOOD PRESSURE: 108 MMHG | HEIGHT: 68 IN | TEMPERATURE: 97.9 F

## 2021-12-28 DIAGNOSIS — Z80.0 FAMILY HISTORY OF COLON CANCER: ICD-10-CM

## 2021-12-28 DIAGNOSIS — G47.33 OSA (OBSTRUCTIVE SLEEP APNEA): ICD-10-CM

## 2021-12-28 DIAGNOSIS — Z11.59 ENCOUNTER FOR SCREENING FOR VIRAL DISEASE: ICD-10-CM

## 2021-12-28 DIAGNOSIS — E03.9 ACQUIRED HYPOTHYROIDISM: ICD-10-CM

## 2021-12-28 DIAGNOSIS — Z01.818 PREOP GENERAL PHYSICAL EXAM: Primary | ICD-10-CM

## 2021-12-28 DIAGNOSIS — J45.40 MODERATE PERSISTENT ASTHMA WITHOUT COMPLICATION: ICD-10-CM

## 2021-12-28 DIAGNOSIS — H35.341 MACULAR HOLE OF RIGHT EYE: ICD-10-CM

## 2021-12-28 DIAGNOSIS — Z12.11 SCREENING FOR COLON CANCER: ICD-10-CM

## 2021-12-28 PROCEDURE — 99214 OFFICE O/P EST MOD 30 MIN: CPT | Performed by: NURSE PRACTITIONER

## 2021-12-28 ASSESSMENT — MIFFLIN-ST. JEOR: SCORE: 1649.98

## 2021-12-28 NOTE — PROGRESS NOTES
[facility]  [address]  [facility] [address]  Phone: [phone]  Primary Provider: Tashia Meléndez  Pre-op Performing Provider: TASHIA MELÉNDEZ      PREOPERATIVE EVALUATION:  Today's date: 12/28/2021    Teresa Fernandez is a 61 year old female who presents for a preoperative evaluation.    Surgical Information:  Surgery/Procedure: retina repair  Surgery Location: [facility]  Surgeon: Dr. Gauthier  Surgery Date: 1/6/22  Time of Surgery: 1330  Where patient plans to recover: At home with family  Fax number for surgical facility: [phone]    Type of Anesthesia Anticipated: to be determined    Assessment & Plan     The proposed surgical procedure is considered LOW risk.    Preop general physical exam      Macular hole of right eye      Moderate persistent asthma without complication  Controlled    Acquired hypothyroidism  Controlled    GAYE (obstructive sleep apnea)      Encounter for screening for viral disease    - Asymptomatic COVID-19 Virus (Coronavirus) by PCR; Future    Family history of colon cancer    - Adult Gastro Ref - Procedure Only; Future    Screening for colon cancer    - Adult Gastro Ref - Procedure Only; Future         Risks and Recommendations:  The patient has the following additional risks and recommendations for perioperative complications:   - Recurrent use of steroids  Obstructive Sleep Apnea:   Observe for oxygen desaturations if sedated    Medication Instructions:  Patient is to take all scheduled medications on the day of surgery    RECOMMENDATION:  APPROVAL GIVEN to proceed with proposed procedure, without further diagnostic evaluation.    Review of prior external note(s) from - Outside records from [facility]                  Subjective     HPI related to upcoming procedure: Macular hole in right eye needing repair for persistent blurry vision    Preop Questions 12/22/2021   1. Have you ever had a heart attack or  stroke? No   2. Have you ever had surgery on your heart or blood vessels, such as a stent placement, a coronary artery bypass, or surgery on an artery in your head, neck, heart, or legs? No   3. Do you have chest pain with activity? No   4. Do you have a history of  heart failure? No   5. Do you currently have a cold, bronchitis or symptoms of other infection? No   6. Do you have a cough, shortness of breath, or wheezing? No   7. Do you or anyone in your family have previous history of blood clots? No   8. Do you or does anyone in your family have a serious bleeding problem such as prolonged bleeding following surgeries or cuts? No   9. Have you ever had problems with anemia or been told to take iron pills? No   10. Have you had any abnormal blood loss such as black, tarry or bloody stools, or abnormal vaginal bleeding? No   11. Have you ever had a blood transfusion? No   12. Are you willing to have a blood transfusion if it is medically needed before, during, or after your surgery? Yes   13. Have you or any of your relatives ever had problems with anesthesia? No   14. Do you have sleep apnea, excessive snoring or daytime drowsiness? YES - sleep apnea and daytime drowsiness   14a. Do you have a CPAP machine? Yes   15. Do you have any artifical heart valves or other implanted medical devices like a pacemaker, defibrillator, or continuous glucose monitor? No   16. Do you have artificial joints? No   17. Are you allergic to latex? No   18. Is there any chance that you may be pregnant? No       Health Care Directive:  Patient does not have a Health Care Directive or Living Will: Discussed advance care planning with patient; however, patient declined at this time.    Preoperative Review of :   reviewed - no record of controlled substances prescribed.      PDMP Review       Value Time User    State PDMP site checked  Yes 12/28/2021  7:58 AM Amanda Meléndez APRN CNP            Status of Chronic  Conditions:  ASTHMA - Patient has a longstanding history of moderate-severe Asthma . Patient has been doing well overall noting NO SYMPTOMS and continues on medication regimen consisting of Advair without adverse reactions or side effects.      HYPOTHYROIDISM - Patient has a longstanding history of chronic Hypothyroidism. Patient has been doing well, noting no tremor, insomnia, hair loss or changes in skin texture. Continues to take medications as directed, without adverse reactions or side effects. Last TSH   Lab Results   Component Value Date    TSH 2.68 11/08/2021   .      SLEEP PROBLEM - Patient has a longstanding history of GAYE.. Patient wears CPAP mask at home      Review of Systems  CONSTITUTIONAL: NEGATIVE for fever, chills, change in weight  EYES: POSITIVE for blurred vision right  ENT/MOUTH: POSITIVE for hx of lichen planus of mouth  RESP: NEGATIVE for significant cough or SOB  CV: NEGATIVE for chest pain, palpitations or peripheral edema    Patient Active Problem List    Diagnosis Date Noted     Fibromyalgia 09/30/2021     Priority: Medium     History of kidney cancer 04/01/2021     Priority: Medium     1996- left nephrectomy       GAYE (obstructive sleep apnea) 09/17/2020     Priority: Medium     9/9/2020 [facility] (225.0 lbs) - AHI 19.8, RDI 26.4, Supine AHI 22.0, REM AHI 36.9, Low O2 79.5%, Time Spent ?88% 40.1 minutes / Time Spent ?89% 51.6 minutes.       Muscle pain 10/20/2017     Priority: Medium     Increased CK       Chest tightness or pressure 10/03/2016     Priority: Medium     Hyperlipidemia LDL goal <160 01/29/2015     Priority: Medium     DDD (degenerative disc disease), lumbar 06/24/2014     Priority: Medium     [facility] Following       Moderate persistent asthma without complication 07/08/2013     Priority: Medium     History of melanoma in situ 10/17/2012     Priority: Medium     Eczema 10/17/2012     Priority: Medium     Osteopenia 06/15/2012     Priority:  Medium     Chronic fatigue 10/04/2011     Priority: Medium     Dry eye syndrome 08/24/2011     Priority: Medium     Chronic low back pain 07/07/2011     Priority: Medium     GERD (gastroesophageal reflux disease) 05/11/2011     Priority: Medium     Leukoplakia of oral mucosa, including tongue 04/18/2011     Priority: Medium     ERIC (generalized anxiety disorder)      Priority: Medium     History of skin cancer 11/09/2010     Priority: Medium     Chronic rhinitis 05/03/2005     Priority: Medium     Allergic rhinitis due to pollen 05/03/2005     Priority: Medium     Sinusitis, chronic 05/03/2005     Priority: Medium     Problem list name updated by automated process. Provider to review       Allergic state 04/19/2005     Priority: Medium     Problem list name updated by automated process. Provider to review       Acquired hypothyroidism 04/12/2005     Priority: Medium     Problem list name updated by automated process. Provider to review        Past Medical History:   Diagnosis Date     ALLERGIC RHINITIS NOS 4/12/2005     Anxiety      Arthritis     herniated disc     Bronchitis, not specified as acute or chronic      CHR MAXILLARY SINUSITIS 4/12/2005     Depressive disorder, not elsewhere classified      Eczema 10/17/2012     Environmental and seasonal allergies      GERD (gastroesophageal reflux disease)      Gluten intolerance      Malignant neoplasm of renal pelvis (H) 1995    Renal cell carcinoma     Melanoma (H) 06/2010     Other specified acquired hypothyroidism 4/12/2005     Renal cancer (H) 5/5/2011     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     FUSION LUMBAR ANTERIOR TWO LEVELS  4/13/2015     GYN SURGERY  04/08/1999    hysterectomy.  LA[facility]     HYSTERECTOMY, PAP NO LONGER  INDICATED       SOFT TISSUE SURGERY      chest-melonoma     SURGICAL HISTORY OF -   3/1996    Left nephrectomy-renal cell CA     Current Outpatient Medications   Medication Sig Dispense Refill     acetaminophen (TYLENOL) 325 MG tablet Take 325-650 mg by mouth every 6 hours as needed for mild pain       albuterol (PROAIR HFA/PROVENTIL HFA/VENTOLIN HFA) 108 (90 BASE) MCG/ACT Inhaler Inhale 2 puffs into the lungs every 6 hours as needed for shortness of breath / dyspnea 1 Inhaler 5     APNO CREA ointment Apply to rash on nose twice daily as needed. 100 g 3     augmented betamethasone dipropionate (DIPROLENE-AF) 0.05 % external ointment Apply topically 2 times daily 50 g 3     Cholecalciferol (VITAMIN D3 PO) Take 2,000 Units by mouth 2 times daily        Cyanocobalamin (VITAMIN B 12 PO) Take 500 mcg by mouth daily        dexamethasone (DECADRON) 0.5 MG/5ML elixir Take 5 mLs (0.5 mg) by mouth 3 times daily 474 mL 3     DULoxetine (CYMBALTA) 60 MG capsule TAKE 1 CAPSULE EVERY MORNING 90 capsule 0     fexofenadine (ALLEGRA) 180 MG tablet Take 1 tablet (180 mg) by mouth daily 30 tablet 1     fluticasone-salmeterol (ADVAIR DISKUS) 250-50 MCG/DOSE inhaler USE 1 INHALATION TWICE A DAY (FOLLOW UP FOR FURTHER REFILLS) 1 each 5     levothyroxine (SYNTHROID/LEVOTHROID) 100 MCG tablet TAKE ONE TABLET BY MOUTH ONCE DAILY ON MONDAY, WEDNESDAY, FRIDAY, SATURDAY AND SUNDAY. 90 tablet 2     levothyroxine (SYNTHROID/LEVOTHROID) 88 MCG tablet Take 1 tab by mouth Tuesdays and Thursdays 45 tablet 3     lidocaine (XYLOCAINE) 2 % solution        magic mouthwash (ENTER INGREDIENTS IN COMMENTS) suspension Take 10 mLs by mouth every 4 hours as needed (mouth pain) 280 mL 3     mometasone (ELOCON) 0.1 % external ointment Apply topically twice a week Use on eczematous lesions 45 g 3     mupirocin (BACTROBAN) 2 % external ointment Use 2 times a day to affected area. 30 g 0     omeprazole (PRILOSEC) 20 MG DR capsule Take 20 mg by mouth daily        order for DME Equipment being ordered: Nebulizer 1 Units 0     OVER-THE-COUNTER Place 1 drop into both eyes 3 times daily. Systane Ultra, Systane Balance, Blink Tears or Refresh Optive Artificial Tear 1 Bottle      pregabalin (LYRICA) 50 MG capsule Take 1 cap by mouth daily for 5 days then increase to twice daily for 5 days and then increase to three times daily 90 capsule 1     tacrolimus (GENERIC EQUIVALENT) 1 MG capsule Dissolve contents of 1 capsule into 250 mL water. Take 5 mL by mouth swish and spit TID-QID. 5 capsule 3     tacrolimus (PROTOPIC) 0.03 % external ointment Apply topically 2 times daily Use on lichen planus areas in mouth 30 g 1     tacrolimus (PROTOPIC) 0.1 % external ointment Apply topically 2 times daily 30 g 3     triamcinolone (ARISTOCORT HP) 0.5 % external cream Apply to vaginal area twice daily as needed for itching 15 g 3     triamcinolone (KENALOG) 0.1 % external ointment Use 1-2 times daily as needed for burning rash 453.6 g 3     triamcinolone (NASACORT) 55 MCG/ACT nasal aerosol Spray 2 sprays into both nostrils daily 16.5 g 1       Allergies   Allergen Reactions     Omnipaque [Iohexol] Swelling and Difficulty breathing     Immediate throat swelling following around 1-2cc of omnipaque 300 for spine procedure  --> skin prick and intradermal tests with Iohexol negatives. But I would propose anyway to premedicate patient before using iodinated contrast media (for safety reasons).   --> no reactions in skin tests to MRI contrast media Gadovist     Gluten         Social History     Tobacco Use     Smoking status: Former Smoker     Packs/day: 2.00     Years: 15.00     Pack years: 30.00     Quit date: 3/26/1996     Years since quittin.7     Smokeless tobacco: Never Used   Substance Use Topics     Alcohol use: No     Comment: None now.  Rare in past.      Brother with new colon cancer in the last couple years- has a hx of IBS- denies new or worrisome bowel changes.    History   Drug Use No  "        Objective     /80   Pulse 99   Temp 97.9  F (36.6  C) (Tympanic)   Resp 16   Ht 1.727 m (5' 8\")   Wt 103.1 kg (227 lb 6.4 oz)   LMP  (LMP Unknown)   SpO2 97%   BMI 34.58 kg/m      Physical Exam  GENERAL APPEARANCE: healthy, alert and no distress  EYES: Eyes grossly normal to inspection, PERRL and conjunctivae and sclerae normal  HENT: oral mucous membranes moist, oropharynx clear and white plaques to lower right gums  RESP: lungs clear to auscultation - no rales, rhonchi or wheezes  CV: regular rate and rhythm, normal S1 S2, no S3 or S4 and no murmur, click or rub   MS: extremities normal- no gross deformities noted  NEURO: Normal strength and tone, sensory exam grossly normal, mentation intact and speech normal    Recent Labs   Lab Test 11/08/21  0836 09/30/21  1524 06/02/21  0915   HGB  --  13.5 13.3   PLT  --  264 248   NA  --  138 135   POTASSIUM  --  4.0 4.1   CR 0.75 0.82 0.81        Diagnostics:  No labs were ordered during this visit.   No EKG required, no history of coronary heart disease, significant arrhythmia, peripheral arterial disease or other structural heart disease.    Revised Cardiac Risk Index (RCRI):  The patient has the following serious cardiovascular risks for perioperative complications:   - No serious cardiac risks = 0 points     RCRI Interpretation: 0 points: Class I (very low risk - 0.4% complication rate)           Signed Electronically by: HERIBERTO Linton CNP  Copy of this evaluation report is provided to requesting physician.      "

## 2021-12-28 NOTE — PATIENT INSTRUCTIONS
Preparing for Your Surgery  Getting started  A nurse will call you to review your health history and instructions. They will give you an arrival time based on your scheduled surgery time. Please be ready to share:    Your doctor's clinic name and phone number    Your medical, surgical and anesthesia history    A list of allergies and sensitivities    A list of medicines, including herbal treatments and over-the-counter drugs    Whether the patient has a legal guardian (ask how to send us the papers in advance)  Please tell us if you're pregnant--or if there's any chance you might be pregnant. Some surgeries may injure a fetus (unborn baby), so they require a pregnancy test. Surgeries that are safe for a fetus don't always need a test, and you can choose whether to have one.   If you have a child who's having surgery, please ask for a copy of Preparing for Your Child's Surgery.    Preparing for surgery    Within 30 days of surgery: Have a pre-op exam (sometimes called an H&P, or History and Physical). This can be done at a clinic or pre-operative center.  ? If you're having a , you may not need this exam. Talk to your care team.    At your pre-op exam, talk to your care team about all medicines you take. If you need to stop any medicines before surgery, ask when to start taking them again.  ? We do this for your safety. Many medicines can make you bleed too much during surgery. Some change how well surgery (anesthesia) drugs work.    Call your insurance company to let them know you're having surgery. (If you don't have insurance, call [phone].)    Call your clinic if there's any change in your health. This includes signs of a cold or flu (sore throat, runny nose, cough, rash, fever). It also includes a scrape or scratch near the surgery site.    If you have questions on the day of surgery, call your hospital or surgery center.  COVID testing  You may need to be tested for COVID-19 before having  surgery. If so, we will give you instructions.  Eating and drinking guidelines  For your safety: Unless your surgeon tells you otherwise, follow the guidelines below.    Eat and drink as usual until 8 hours before surgery. After that, no food or milk.    Drink clear liquids until 2 hours before surgery. These are liquids you can see through, like water, Gatorade and Propel Water. You may also have black coffee and tea (no cream or milk).    Nothing by mouth within 2 hours of surgery. This includes gum, candy and breath mints.    If you drink alcohol: Stop drinking it the night before surgery.    If your care team tells you to take medicine on the morning of surgery, it's okay to take it with a sip of water.  Preventing infection    Shower or bathe the night before and morning of your surgery. Follow the instructions your clinic gave you. (If no instructions, use regular soap.)    Don't shave or clip hair near your surgery site. We'll remove the hair if needed.    Don't smoke or vape the morning of surgery. You may chew nicotine gum up to 2 hours before surgery. A nicotine patch is okay.  ? Note: Some surgeries require you to completely quit smoking and nicotine. Check with your surgeon.    Your care team will make every effort to keep you safe from infection. We will:  ? Clean our hands often with soap and water (or an alcohol-based hand rub).  ? Clean the skin at your surgery site with a special soap that kills germs.  ? Give you a special gown to keep you warm. (Cold raises the risk of infection.)  ? Wear special hair covers, masks, gowns and gloves during surgery.  ? Give antibiotic medicine, if prescribed. Not all surgeries need antibiotics.  What to bring on the day of surgery    Photo ID and insurance card    Copy of your health care directive, if you have one    Glasses and hearing aides (bring cases)  ? You can't wear contacts during surgery    Inhaler and eye drops, if you use them (tell us about these when  you arrive)    CPAP machine or breathing device, if you use them    A few personal items, if spending the night    If you have . . .  ? A pacemaker, ICD (cardiac defibrillator) or other implant: Bring the ID card.  ? An implanted stimulator: Bring the remote control.  ? A legal guardian: Bring a copy of the certified (court-stamped) guardianship papers.  Please remove any jewelry, including body piercings. Leave jewelry and other valuables at home.  If you're going home the day of surgery    You must have a responsible adult drive you home. They should stay with you overnight as well.    If you don't have someone to stay with you, and you aren't safe to go home alone, we may keep you overnight. Insurance often won't pay for this.  After surgery  If it's hard to control your pain or you need more pain medicine, please call your surgeon's office.  Questions?   If you have any questions for your care team, list them here: _________________________________________________________________________________________________________________________________________________________________________ ____________________________________ ____________________________________ ____________________________________  For informational purposes only. Not to replace the advice of your health care provider. Copyright   2003, 2019 [facility]. All rights reserved. Clinically reviewed by Michell Agrawal MD. [employer] [id number] - REV 07/21.

## 2022-01-02 ENCOUNTER — E-VISIT (OUTPATIENT)
Dept: URGENT CARE | Facility: CLINIC | Age: 62
End: 2022-01-02
Payer: OTHER GOVERNMENT

## 2022-01-02 DIAGNOSIS — Z20.822 ENCOUNTER FOR LABORATORY TESTING FOR COVID-19 VIRUS: Primary | ICD-10-CM

## 2022-01-02 PROCEDURE — 99207 PR NO BILLABLE SERVICE THIS VISIT: CPT | Performed by: PHYSICIAN ASSISTANT

## 2022-01-02 NOTE — PATIENT INSTRUCTIONS
Dear Teresa Fernandez,    Based on your responses, we have ordered COVID-19 (coronavirus) testing for you.   How to schedule:  Go to your [employer] home page and scroll down to the section that says  You have an appointment that needs to be scheduled  and click the large green button that says  Schedule Now  and follow the steps to find the next available opening.     If you are unable to complete these [employer] scheduling steps, please call [phone] to schedule your testing.      Know the test result takes usually 1-2 days to return but occasionally takes longer (over 95% are done within 72 hours).The results are available on [employer] right when the lab is completed. We will also call all people who have positive results.    What are the symptoms of COVID-19?  The most common symptoms are cough, fever and trouble breathing. Less common symptoms include headache, body aches, fatigue (feeling very tired), chills, sore throat, stuffy or runny nose, diarrhea (loose poop), loss of taste or smell, belly pain, and nausea or vomiting (feeling sick to your stomach or throwing up).    If you develop symptoms of COVID-19, you should be re-evaluated to see if retesting would be recommended.     Where can I get more information?  [facility] - About COVID-19: [email or web address]  CDC - What to Do If You're Sick: www.cdc.gov/coronavirus/2019-ncov/about/steps-when-sick.html  CDC - Ending Home Isolation: www.cdc.gov/coronavirus/2019-ncov/hcp/disposition-in-home-patients.html  CDC - Caring for Someone: www.cdc.gov/coronavirus/2019-ncov/if-you-are-sick/care-for-someone.html  [facility] clinical trials (COVID-19 research studies): [email or web address]  Below are the COVID-19 hotlines at the [facility] ([facility]). Interpreters are available.  For health questions: Call 154-135-5475 or 1-118.769.5410 (7 a.m. to 7 p.m.)  For questions about schools and childcare:  Call [phone] or 1-313.439.7401 (7 a.m. to 7 p.m.)

## 2022-01-03 ENCOUNTER — LAB (OUTPATIENT)
Dept: LAB | Facility: CLINIC | Age: 62
End: 2022-01-03
Attending: PHYSICIAN ASSISTANT
Payer: OTHER GOVERNMENT

## 2022-01-03 DIAGNOSIS — Z20.822 ENCOUNTER FOR LABORATORY TESTING FOR COVID-19 VIRUS: ICD-10-CM

## 2022-01-03 PROCEDURE — U0003 INFECTIOUS AGENT DETECTION BY NUCLEIC ACID (DNA OR RNA); SEVERE ACUTE RESPIRATORY SYNDROME CORONAVIRUS 2 (SARS-COV-2) (CORONAVIRUS DISEASE [COVID-19]), AMPLIFIED PROBE TECHNIQUE, MAKING USE OF HIGH THROUGHPUT TECHNOLOGIES AS DESCRIBED BY CMS-2020-01-R: HCPCS

## 2022-01-03 PROCEDURE — U0005 INFEC AGEN DETEC AMPLI PROBE: HCPCS

## 2022-01-04 LAB — SARS-COV-2 RNA RESP QL NAA+PROBE: NEGATIVE

## 2022-01-19 ENCOUNTER — ANCILLARY PROCEDURE (OUTPATIENT)
Dept: MAMMOGRAPHY | Facility: CLINIC | Age: 62
End: 2022-01-19
Attending: NURSE PRACTITIONER
Payer: OTHER GOVERNMENT

## 2022-01-19 DIAGNOSIS — Z12.31 VISIT FOR SCREENING MAMMOGRAM: ICD-10-CM

## 2022-01-19 PROCEDURE — 77067 SCR MAMMO BI INCL CAD: CPT | Mod: TC | Performed by: RADIOLOGY

## 2022-01-19 PROCEDURE — 77063 BREAST TOMOSYNTHESIS BI: CPT | Mod: TC | Performed by: RADIOLOGY

## 2022-02-01 ENCOUNTER — LAB (OUTPATIENT)
Dept: LAB | Facility: CLINIC | Age: 62
End: 2022-02-01
Payer: OTHER GOVERNMENT

## 2022-02-01 DIAGNOSIS — M33.91 DERMATOMYOSITIS AFFECTING RESPIRATORY SYSTEM (H): ICD-10-CM

## 2022-02-01 DIAGNOSIS — R53.1 WEAKNESS: ICD-10-CM

## 2022-02-01 DIAGNOSIS — E21.3 HPTH (HYPERPARATHYROIDISM) (H): ICD-10-CM

## 2022-02-01 LAB
ALBUMIN SERPL-MCNC: 3.9 G/DL (ref 3.4–5)
CALCIUM SERPL-MCNC: 10 MG/DL (ref 8.5–10.1)
CREAT SERPL-MCNC: 0.76 MG/DL (ref 0.52–1.04)
GFR SERPL CREATININE-BSD FRML MDRD: 89 ML/MIN/1.73M2
MAGNESIUM SERPL-MCNC: 2.2 MG/DL (ref 1.6–2.3)
PHOSPHATE SERPL-MCNC: 2.9 MG/DL (ref 2.5–4.5)
PTH-INTACT SERPL-MCNC: 82 PG/ML (ref 18–80)

## 2022-02-01 PROCEDURE — 86431 RHEUMATOID FACTOR QUANT: CPT

## 2022-02-01 PROCEDURE — 83516 IMMUNOASSAY NONANTIBODY: CPT | Mod: 59

## 2022-02-01 PROCEDURE — 86039 ANTINUCLEAR ANTIBODIES (ANA): CPT

## 2022-02-01 PROCEDURE — 84100 ASSAY OF PHOSPHORUS: CPT

## 2022-02-01 PROCEDURE — 86038 ANTINUCLEAR ANTIBODIES: CPT

## 2022-02-01 PROCEDURE — 86255 FLUORESCENT ANTIBODY SCREEN: CPT | Mod: 90

## 2022-02-01 PROCEDURE — 86200 CCP ANTIBODY: CPT

## 2022-02-01 PROCEDURE — 83519 RIA NONANTIBODY: CPT | Mod: 90

## 2022-02-01 PROCEDURE — 83735 ASSAY OF MAGNESIUM: CPT

## 2022-02-01 PROCEDURE — 82565 ASSAY OF CREATININE: CPT

## 2022-02-01 PROCEDURE — 82306 VITAMIN D 25 HYDROXY: CPT

## 2022-02-01 PROCEDURE — 36415 COLL VENOUS BLD VENIPUNCTURE: CPT

## 2022-02-01 PROCEDURE — 82310 ASSAY OF CALCIUM: CPT

## 2022-02-01 PROCEDURE — 83516 IMMUNOASSAY NONANTIBODY: CPT | Mod: 90

## 2022-02-01 PROCEDURE — 83970 ASSAY OF PARATHORMONE: CPT

## 2022-02-01 PROCEDURE — 82040 ASSAY OF SERUM ALBUMIN: CPT

## 2022-02-01 PROCEDURE — 99000 SPECIMEN HANDLING OFFICE-LAB: CPT

## 2022-02-02 LAB
ANA PAT SER IF-IMP: ABNORMAL
ANA SER QL IF: POSITIVE
ANA TITR SER IF: ABNORMAL {TITER}
CCP AB SER IA-ACNC: 1.2 U/ML
DEPRECATED CALCIDIOL+CALCIFEROL SERPL-MC: 39 UG/L (ref 20–75)
RHEUMATOID FACT SER NEPH-ACNC: 8 IU/ML

## 2022-02-04 LAB
ACHR BLOCK AB/ACHR TOTAL SFR SER: 24 %
ACHR MOD AB/ACHR TOTAL SFR SER: 0 %
STRIA MUS IGG SER QL IF: NORMAL

## 2022-02-05 LAB — ACHR BIND AB SER-SCNC: 0 NMOL/L

## 2022-02-07 ENCOUNTER — MYC MEDICAL ADVICE (OUTPATIENT)
Dept: DERMATOLOGY | Facility: CLINIC | Age: 62
End: 2022-02-07
Payer: OTHER GOVERNMENT

## 2022-02-07 DIAGNOSIS — E21.3 HPTH (HYPERPARATHYROIDISM) (H): Primary | ICD-10-CM

## 2022-02-14 ENCOUNTER — TRANSFERRED RECORDS (OUTPATIENT)
Dept: HEALTH INFORMATION MANAGEMENT | Facility: CLINIC | Age: 62
End: 2022-02-14

## 2022-02-14 ENCOUNTER — OFFICE VISIT (OUTPATIENT)
Dept: FAMILY MEDICINE | Facility: CLINIC | Age: 62
End: 2022-02-14
Payer: OTHER GOVERNMENT

## 2022-02-14 VITALS
DIASTOLIC BLOOD PRESSURE: 78 MMHG | RESPIRATION RATE: 18 BRPM | WEIGHT: 224.16 LBS | OXYGEN SATURATION: 98 % | HEART RATE: 92 BPM | BODY MASS INDEX: 34.08 KG/M2 | SYSTOLIC BLOOD PRESSURE: 124 MMHG | TEMPERATURE: 98.4 F

## 2022-02-14 DIAGNOSIS — F41.1 GAD (GENERALIZED ANXIETY DISORDER): ICD-10-CM

## 2022-02-14 DIAGNOSIS — J20.9 ACUTE BRONCHITIS WITH COEXISTING CONDITION REQUIRING PROPHYLACTIC TREATMENT: ICD-10-CM

## 2022-02-14 DIAGNOSIS — M79.10 MYALGIA: ICD-10-CM

## 2022-02-14 DIAGNOSIS — Z79.899 HIGH RISK MEDICATIONS (NOT ANTICOAGULANTS) LONG-TERM USE: Primary | ICD-10-CM

## 2022-02-14 DIAGNOSIS — L29.9 CHRONIC PRURITUS: ICD-10-CM

## 2022-02-14 DIAGNOSIS — E03.9 HYPOTHYROIDISM, UNSPECIFIED TYPE: ICD-10-CM

## 2022-02-14 PROCEDURE — 99214 OFFICE O/P EST MOD 30 MIN: CPT | Performed by: NURSE PRACTITIONER

## 2022-02-14 PROCEDURE — 93000 ELECTROCARDIOGRAM COMPLETE: CPT | Performed by: NURSE PRACTITIONER

## 2022-02-14 RX ORDER — DULOXETIN HYDROCHLORIDE 60 MG/1
60 CAPSULE, DELAYED RELEASE ORAL DAILY
Qty: 90 CAPSULE | Refills: 3 | Status: SHIPPED | OUTPATIENT
Start: 2022-02-14 | End: 2023-04-27

## 2022-02-14 RX ORDER — ALBUTEROL SULFATE 0.83 MG/ML
2.5 SOLUTION RESPIRATORY (INHALATION) EVERY 6 HOURS PRN
Qty: 90 ML | Refills: 11 | Status: SHIPPED | OUTPATIENT
Start: 2022-02-14

## 2022-02-14 RX ORDER — LEVOTHYROXINE SODIUM 88 UG/1
TABLET ORAL
Qty: 45 TABLET | Refills: 3 | Status: SHIPPED | OUTPATIENT
Start: 2022-02-14 | End: 2023-02-23

## 2022-02-14 RX ORDER — HYDROXYZINE HYDROCHLORIDE 25 MG/1
25 TABLET, FILM COATED ORAL 3 TIMES DAILY PRN
Qty: 90 TABLET | Refills: 1 | Status: SHIPPED | OUTPATIENT
Start: 2022-02-14 | End: 2023-03-19

## 2022-02-14 RX ORDER — AZITHROMYCIN 250 MG/1
TABLET, FILM COATED ORAL
Qty: 6 TABLET | Refills: 0 | Status: SHIPPED | OUTPATIENT
Start: 2022-02-14 | End: 2022-02-19

## 2022-02-14 ASSESSMENT — ASTHMA QUESTIONNAIRES
ACT_TOTALSCORE: 20
QUESTION_5 LAST FOUR WEEKS HOW WOULD YOU RATE YOUR ASTHMA CONTROL: SOMEWHAT CONTROLLED
QUESTION_4 LAST FOUR WEEKS HOW OFTEN HAVE YOU USED YOUR RESCUE INHALER OR NEBULIZER MEDICATION (SUCH AS ALBUTEROL): NOT AT ALL
QUESTION_2 LAST FOUR WEEKS HOW OFTEN HAVE YOU HAD SHORTNESS OF BREATH: ONCE OR TWICE A WEEK
ACT_TOTALSCORE: 20
QUESTION_3 LAST FOUR WEEKS HOW OFTEN DID YOUR ASTHMA SYMPTOMS (WHEEZING, COUGHING, SHORTNESS OF BREATH, CHEST TIGHTNESS OR PAIN) WAKE YOU UP AT NIGHT OR EARLIER THAN USUAL IN THE MORNING: ONCE OR TWICE
QUESTION_1 LAST FOUR WEEKS HOW MUCH OF THE TIME DID YOUR ASTHMA KEEP YOU FROM GETTING AS MUCH DONE AT WORK, SCHOOL OR AT HOME: A LITTLE OF THE TIME

## 2022-02-14 ASSESSMENT — PAIN SCALES - GENERAL: PAINLEVEL: NO PAIN (0)

## 2022-02-14 NOTE — RESULT ENCOUNTER NOTE
Mesfin Moore    Your lab results came back within normal limits. Please let us know if you have any questions.     Take care,    HERIBERTO Miller CNP

## 2022-02-14 NOTE — PATIENT INSTRUCTIONS
Patient Education     Viral or Bacterial Bronchitis with Wheezing (Adult)    Bronchitis is an infection of the air passages. It often occurs during a cold and is usually caused by a virus. Symptoms include cough with mucus (phlegm) and low-grade fever. This illness is contagious during the first few days and is spread through the air by coughing and sneezing, or by direct contact (touching the sick person and then touching your own eyes, nose, or mouth).  If there is a lot of inflammation, air flow is restricted. The air passages may also go into spasm, especially if you have asthma. This causes wheezing and difficulty breathing even in people who do not have asthma.  Bronchitis usually lasts 7 to 14 days. The wheezing should improve with treatment during the first week. An inhaler is often prescribed to relax the air passages and stop wheezing. Antibiotics will be prescribed if your doctor thinks there is also a secondary bacterial infection.  Home care    If symptoms are severe, rest at home for the first 2 to 3 days. When you go back to your usual activities, don't let yourself get too tired.    Dont s'moke. Also avoid being exposed to secondhand smoke.    You may use over-the-counter medicine to control fever or pain, unless another medicine was prescribed. Note: If you have chronic liver or kidney disease or have ever had a stomach ulcer or gastrointestinal bleeding, talk with your healthcare provider before using these medicines. Also talk to your provider if you are taking medicine to prevent blood clots.) Aspirin should never be given to anyone younger than 18 years of age who is ill with a viral infection or fever. It may cause severe liver or brain damage.    Your appetite may be poor, so a light diet is fine. Stay well hydrated by drinking 6 to 8 glasses of fluids per day (such as water, soft drinks, sports drinks, juices, tea, or soup). Extra fluids will help loosen secretions in the nose and  lungs.    Over-the-counter cough, cold, and sore-throat medicines will not shorten the length of the illness, but they may be helpful to reduce symptoms. (Note: Don't use decongestants if you have high blood pressure.)    If you were given an inhaler, use it exactly as directed. If you need to use it more often than prescribed, your condition may be worsening. If this happens, contact your healthcare provider.    If prescribed, finish all antibiotic medicine, even if you are feeling better after only a few days.  Follow-up care  Follow up with your healthcare provider, or as advised. If you had an X-ray or ECG (electrocardiogram), a specialist will review it. You will be notified of any new findings that may affect your care.  If you are age 65 or older, or if you have a chronic lung disease or condition that affects your immune system, or you smoke, ask your healthcare provider about getting a pneumococcal vaccine and a yearly flu shot (influenza vaccine).  When to seek medical advice  Call your healthcare provider right away if any of these occur:    Fever of 100.4 F (38 C) or higher, or as directed by your healthcare provider    Coughing up increasing amounts of colored sputum    Weakness, drowsiness, headache, facial pain, ear pain, or a stiff neck  Call 911  Call 911 if any of these occur.    Coughing up blood    Worsening weakness, drowsiness, headache, or stiff neck    Increased wheezing not helped with medication, shortness of breath, or pain with breathing  [employer] last reviewed this educational content on 6/1/2018 2000-2021 [employer]. All rights reserved. This information is not intended as a substitute for professional medical care. Always follow your healthcare professional's instructions.

## 2022-02-14 NOTE — PROGRESS NOTES
"  Assessment & Plan     High risk medications (not anticoagulants) long-term use    - EKG 12-lead complete w/read - Clinics    ERIC (generalized anxiety disorder)    - DULoxetine (CYMBALTA) 60 MG capsule; Take 1 capsule (60 mg) by mouth daily    Myalgia    - DULoxetine (CYMBALTA) 60 MG capsule; Take 1 capsule (60 mg) by mouth daily    Acute bronchitis with coexisting condition requiring prophylactic treatment    - azithromycin (ZITHROMAX) 250 MG tablet; Take 2 tablets (500 mg) by mouth daily for 1 day, THEN 1 tablet (250 mg) daily for 4 days.  - albuterol (PROVENTIL) (2.5 MG/3ML) 0.083% neb solution; Take 1 vial (2.5 mg) by nebulization every 6 hours as needed for shortness of breath / dyspnea or wheezing    Chronic pruritus    - hydrOXYzine (ATARAX) 25 MG tablet; Take 1 tablet (25 mg) by mouth 3 times daily as needed for itching    Hypothyroidism, unspecified type    - levothyroxine (SYNTHROID/LEVOTHROID) 88 MCG tablet; Take 1 tab by mouth Tuesdays and Thursdays             BMI:   Estimated body mass index is 34.08 kg/m  as calculated from the following:    Height as of 12/28/21: 1.727 m (5' 8\").    Weight as of this encounter: 101.7 kg (224 lb 2.6 oz).       FUTURE APPOINTMENTS:       - Follow up in 1 week for persistent symptoms, sooner for new or worsening symptoms.     See Patient Instructions    No follow-ups on file.    Time spent in chart review in preparation to see patient, time with patient for interview/exam, ordering medications/tests/and/or procedures, and time spent in charting and coordinating care: 30 minutes.     HERIBERTO Linton CNP  [facility]    Eric Moore is a 62 year old who presents for the following health issues     History of Present Illness       She eats 2-3 servings of fruits and vegetables daily.She consumes 1 sweetened beverage(s) daily.She exercises with enough effort to increase her heart rate 10 to 19 minutes per day.    She is taking " medications regularly.       Rash  Onset/Duration: Couple year  Description  Location: Bottom area  Character: Unable to see what it looks like  Itching: moderate with flare ups  Intensity:  moderate  Progression of Symptoms:  same  Accompanying signs and symptoms:   Fever: no  Body aches or joint pain: YES  Sore throat symptoms: no  Recent cold symptoms: YES  History:           Previous episodes of similar rash: Common rash  New exposures:  None  Recent travel: no  Exposure to similar rash: no  Precipitating or alleviating factors: Ointments help it  Therapies tried and outcome: Ointment for itching unknown name    Concern - Follow up stopping medication   Stopped the medication 2 months ago and can't remember the name of the medication.    Becky Avalos [facility]      Acute Illness  Acute illness concerns: cough  Onset/Duration: 4-5 days  Symptoms:  Fever: no  Chills/Sweats: no  Headache (location?): only in the beginning  Sinus Pressure: now resolved  Conjunctivitis:  no  Ear Pain: no  Rhinorrhea: no  Congestion: YES- chest  Sore Throat: no  Cough: YES-productive of yellow sputum  Wheeze: YES  Decreased Appetite: no  Nausea: no  Vomiting: no  Diarrhea: no  Dysuria/Freq.: no  Dysuria or Hematuria: no  Fatigue/Achiness: YES  Sick/Strep Exposure: YES-   Therapies tried and outcome: nebs, 3 negative COVID tests    Review of Systems   Constitutional, HEENT, cardiovascular, pulmonary, gi and gu systems are negative, except as otherwise noted.      Objective    /78 (BP Location: Right arm, Patient Position: Sitting, Cuff Size: Adult Large)   Pulse 92   Temp 98.4  F (36.9  C) (Tympanic)   Resp 18   Wt 101.7 kg (224 lb 2.6 oz)   LMP  (LMP Unknown)   SpO2 98%   BMI 34.08 kg/m    Body mass index is 34.08 kg/m .  Physical Exam   GENERAL: alert and no distress  HENT: normal cephalic/atraumatic, oropharynx clear, oral mucous membranes moist and sinuses: not tender  NECK: no adenopathy, no asymmetry, masses,  or scars and thyroid normal to palpation  RESP: rales R lower posterior  CV: regular rates and rhythm, normal S1 S2, no S3 or S4 and no murmur, click or rub  MS: no gross musculoskeletal defects noted, no edema  SKIN: tan papule to left inner/posterior thigh- no surrounding edema, erythema or drainage.  NEURO: Normal strength and tone, mentation intact and speech normal  PSYCH: mentation appears normal, affect normal/bright

## 2022-02-28 ENCOUNTER — TELEPHONE (OUTPATIENT)
Dept: FAMILY MEDICINE | Facility: CLINIC | Age: 62
End: 2022-02-28
Payer: OTHER GOVERNMENT

## 2022-02-28 DIAGNOSIS — Z91.041 ALLERGY TO IMAGING CONTRAST MEDIA: Primary | ICD-10-CM

## 2022-02-28 RX ORDER — METHYLPREDNISOLONE 32 MG/1
TABLET ORAL
Qty: 2 TABLET | Refills: 0 | Status: SHIPPED | OUTPATIENT
Start: 2022-02-28 | End: 2022-03-31

## 2022-02-28 NOTE — TELEPHONE ENCOUNTER
Routing to [facility] and PCP.    Caryn calling from Radiology at [facility] and patients has a allergy to contrast. Patient needs a prescription placed as written below and sent to pharmacy before she can have her CT.     [facility]  32mg methoprednisolone 12 hr prior and  32mg methoprednisolone 2 hr prior     Jess Card RN BSN

## 2022-02-28 NOTE — TELEPHONE ENCOUNTER
Called and spoked to Teresa, and let her know that her medications have been sent to the pharmacy. Teresa will pick them up after work today.     Jess Card RN BSN

## 2022-03-02 ENCOUNTER — MYC MEDICAL ADVICE (OUTPATIENT)
Dept: DERMATOLOGY | Facility: CLINIC | Age: 62
End: 2022-03-02
Payer: OTHER GOVERNMENT

## 2022-03-03 ENCOUNTER — HOSPITAL ENCOUNTER (OUTPATIENT)
Dept: CT IMAGING | Facility: CLINIC | Age: 62
Discharge: HOME | End: 2022-03-03
Attending: DERMATOLOGY | Admitting: DERMATOLOGY
Payer: OTHER GOVERNMENT

## 2022-03-03 LAB
CREAT BLD-MCNC: 0.7 MG/DL (ref 0.5–1)
GFR SERPL CREATININE-BSD FRML MDRD: >60 ML/MIN/1.73M2

## 2022-03-03 PROCEDURE — 71260 CT THORAX DX C+: CPT

## 2022-03-03 PROCEDURE — 250N000009 HC RX 250: Performed by: RADIOLOGY

## 2022-03-03 PROCEDURE — 250N000011 HC RX IP 250 OP 636: Performed by: RADIOLOGY

## 2022-03-03 PROCEDURE — 82565 ASSAY OF CREATININE: CPT

## 2022-03-03 RX ORDER — IOPAMIDOL 755 MG/ML
100 INJECTION, SOLUTION INTRAVASCULAR ONCE
Status: COMPLETED | OUTPATIENT
Start: 2022-03-03 | End: 2022-03-03

## 2022-03-03 RX ORDER — IOPAMIDOL 755 MG/ML
100 INJECTION, SOLUTION INTRAVASCULAR ONCE
Status: DISCONTINUED | OUTPATIENT
Start: 2022-03-03 | End: 2022-03-03 | Stop reason: CLARIF

## 2022-03-03 RX ADMIN — SODIUM CHLORIDE 74 ML: 9 INJECTION, SOLUTION INTRAVENOUS at 08:05

## 2022-03-03 RX ADMIN — IOPAMIDOL 100 ML: 755 INJECTION, SOLUTION INTRAVENOUS at 08:04

## 2022-03-10 ENCOUNTER — OFFICE VISIT (OUTPATIENT)
Dept: DERMATOLOGY | Facility: CLINIC | Age: 62
End: 2022-03-10
Payer: OTHER GOVERNMENT

## 2022-03-10 DIAGNOSIS — G62.9 NEUROPATHY: ICD-10-CM

## 2022-03-10 DIAGNOSIS — M79.10 MYALGIA: ICD-10-CM

## 2022-03-10 DIAGNOSIS — R53.1 WEAKNESS: Primary | ICD-10-CM

## 2022-03-10 DIAGNOSIS — R74.8 ELEVATED CK: ICD-10-CM

## 2022-03-10 PROCEDURE — 99214 OFFICE O/P EST MOD 30 MIN: CPT | Performed by: DERMATOLOGY

## 2022-03-10 RX ORDER — GABAPENTIN 300 MG/1
300 CAPSULE ORAL 2 TIMES DAILY
Qty: 60 CAPSULE | Refills: 3 | Status: SHIPPED | OUTPATIENT
Start: 2022-03-10 | End: 2022-03-31 | Stop reason: SINTOL

## 2022-03-10 ASSESSMENT — PAIN SCALES - GENERAL: PAINLEVEL: NO PAIN (0)

## 2022-03-10 NOTE — PROGRESS NOTES
[facility] Dermatology Note      Dermatology Problem List:  1. Melanoma in situ, left chest  - s/p excision 7/27/2010 at [facility]  2.  Chronic burning rash of the upper extremities with biopsy showing a largely normal-appearing epidermis above mild perivascular lymphocytic inflammation without fungal elements or increased basement membrane noted on September 6, 2019  - Differential diagnosis includes (photo)allergic contact dermatitis, connective tissue disease (DM > NADIA as the former may lack significant epidermal changes)  - negative/normal: Aldolase, CK, PFTs, CT chest, myositis panel, urinalysis, SSA, and SSB  - DIEGO borderline positive 1:80,  (nml ; has been in 600s in the past)  - 7/2020 EMG: The EMG shows mild abnormalities. Insertional abnormality seen in the right medial gastrocnemius and a borderline to low tibial motor amplitude in the right leg could be consistent with an S1 radiculopathy although gluteus mirella did not show any changes and clinical correlation is advised. The low tibial motor amplitude on the right and peroneal amplitude on the left could also be related to body habitus and submaximal stimulation  - Did have drug eruption to hydroxychloroquine 6/2021  3. Subcutaneous nodule, left forearm - consistent with lipoma  4. Orogenital ulcers: suspect lichen planus   - patch testing: relevant sensitivities to Balsam of peru, Propolis, Wood tar mix, Amerchol L10 and lanolin alcohol, PVP Iodine, Butylhydroxyanisole (BHA), Palladium Chloride  - bx 6/4/21 nonspecific with negative DIF  - prior bx 2011 ulcer, left cheek  - prior: hydroxychloroquine (stopped due to rash), dexamethasone S&S, tacrolimus 1 mg dissolved in 250 mL water S&S TID-QID, augmented betamethasone ointment  5. Periorificial dermatitis: around nose - tacrolimus ointment  6. Workup for hypercalcemia/hypercalciuria ongoing per endocrine (?hyperPTH)        CC:   Chief Complaint   Patient  presents with     Derm Problem     Teresa is here today for a recheck on her face. She states it is breaking out.         Encounter Date: Mar 10, 2022    History of Present Illness:  Ms. Teresa Fernandez is a 62 year old female who presents for follow up    Still having some flares of weakness, pain - stabbing pains - can't tell if in muscle or bone  - when overuse muscles at work   - still itching - face   - around nose - some days is good, other days more active - red, itchy   - on scalp with hair loss; no rashes that she has noticed  - still feeling short of breath - feels like tightening in chest - no overt pain  - doing about 10 min on treadmill - when exert self too much, ends up falling  - weakness in ankles, worse in morning  - urinary incontinence - difficulty with defecation - don't feel like has muscle strength to help push  - irritation on posterior proximal thighs      Past Medical History:   Past Medical History:   Diagnosis Date     ALLERGIC RHINITIS NOS 4/12/2005     Anxiety      Arthritis     herniated disc     Bronchitis, not specified as acute or chronic      CHR MAXILLARY SINUSITIS 4/12/2005     Depressive disorder, not elsewhere classified      Eczema 10/17/2012     Environmental and seasonal allergies      GERD (gastroesophageal reflux disease)      Gluten intolerance      Malignant neoplasm of renal pelvis (H) 1995    Renal cell carcinoma     Melanoma (H) 06/2010     Other specified acquired hypothyroidism 4/12/2005     Renal cancer (H) 5/5/2011     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     FUSION LUMBAR ANTERIOR TWO LEVELS  4/13/2015     GYN SURGERY  04/08/1999    hysterectomy.  LA[facility]     HYSTERECTOMY, PAP NO LONGER INDICATED       SOFT TISSUE SURGERY       chest-melonoma     SURGICAL HISTORY OF -   3/1996    Left nephrectomy-renal cell CA       Social History:   reports that she quit smoking about 25 years ago. She has a 30.00 pack-year smoking history. She has never used smokeless tobacco. She reports that she does not drink alcohol and does not use drugs.    Family History:  Family History   Problem Relation Age of Onset     Diabetes Mother      Cardiovascular Mother      Lipids Mother      Depression Mother      Hypertension Father      Lipids Father      Obesity Father      Macular Degeneration Father      Sleep Apnea Father      Thyroid Disease Sister      Hypertension Sister      Depression Sister      Allergies Sister      Lipids Sister      Thyroid Disease Sister      Neurologic Disorder Sister      Depression Sister      Hypertension Brother      Colon Cancer Brother      Cancer Maternal Grandmother         kind unknown had sores on legs     Eczema Maternal Grandmother      Eczema Maternal Grandfather      Breast Cancer Paternal Grandmother      Cancer Paternal Grandmother         breast     Hypertension Paternal Grandfather      Cardiovascular Paternal Grandfather         heart attack     Allergies Son      Eczema Son      Respiratory Son      Sleep Apnea Son      Glaucoma No family hx of      Cerebrovascular Disease No family hx of        Medications:  Current Outpatient Medications   Medication Sig Dispense Refill     acetaminophen (TYLENOL) 325 MG tablet Take 325-650 mg by mouth every 6 hours as needed for mild pain       albuterol (PROAIR HFA/PROVENTIL HFA/VENTOLIN HFA) 108 (90 BASE) MCG/ACT Inhaler Inhale 2 puffs into the lungs every 6 hours as needed for shortness of breath / dyspnea 1 Inhaler 5     albuterol (PROVENTIL) (2.5 MG/3ML) 0.083% neb solution Take 1 vial (2.5 mg) by nebulization every 6 hours as needed for shortness of breath / dyspnea or wheezing 90 mL 11     APNO CREA ointment Apply to rash on nose twice daily as needed. 100 g 3      augmented betamethasone dipropionate (DIPROLENE-AF) 0.05 % external ointment Apply topically 2 times daily 50 g 3     Cholecalciferol (VITAMIN D3 PO) Take 2,000 Units by mouth 2 times daily        DULoxetine (CYMBALTA) 60 MG capsule Take 1 capsule (60 mg) by mouth daily 90 capsule 3     fexofenadine (ALLEGRA) 180 MG tablet Take 1 tablet (180 mg) by mouth daily 30 tablet 1     hydrOXYzine (ATARAX) 25 MG tablet Take 1 tablet (25 mg) by mouth 3 times daily as needed for itching 90 tablet 1     levothyroxine (SYNTHROID/LEVOTHROID) 100 MCG tablet TAKE ONE TABLET BY MOUTH ONCE DAILY ON MONDAY, WEDNESDAY, FRIDAY, SATURDAY AND SUNDAY. 90 tablet 2     levothyroxine (SYNTHROID/LEVOTHROID) 88 MCG tablet Take 1 tab by mouth Tuesdays and Thursdays 45 tablet 3     lidocaine (XYLOCAINE) 2 % solution        methylPREDNISolone (MEDROL) 32 MG tablet Take 1 tablet 12 hours prior to contrast administration and 1 tablet 2 hours prior to contrast administration 2 tablet 0     mometasone (ELOCON) 0.1 % external ointment Apply topically twice a week Use on eczematous lesions 45 g 3     mupirocin (BACTROBAN) 2 % external ointment Use 2 times a day to affected area. 30 g 0     omeprazole (PRILOSEC) 20 MG DR capsule Take 20 mg by mouth daily       order for DME Equipment being ordered: Nebulizer 1 Units 0     tacrolimus (PROTOPIC) 0.03 % external ointment Apply topically 2 times daily Use on lichen planus areas in mouth 30 g 1     tacrolimus (PROTOPIC) 0.1 % external ointment Apply topically 2 times daily 30 g 3     triamcinolone (ARISTOCORT HP) 0.5 % external cream Apply to vaginal area twice daily as needed for itching 15 g 3     triamcinolone (KENALOG) 0.1 % external ointment Use 1-2 times daily as needed for burning rash 453.6 g 3     fluticasone-salmeterol (ADVAIR DISKUS) 250-50 MCG/DOSE inhaler USE 1 INHALATION TWICE A DAY (FOLLOW UP FOR FURTHER REFILLS) (Patient not taking: Reported on 2/14/2022) 1 each 5     OVER-THE-COUNTER Place 1  drop into both eyes 3 times daily. Systane Ultra, Systane Balance, Blink Tears or Refresh Optive Artificial Tear (Patient not taking: Reported on 2/14/2022) 1 Bottle      Allergies   Allergen Reactions     Omnipaque [Iohexol] Swelling and Difficulty breathing     Immediate throat swelling following around 1-2cc of omnipaque 300 for spine procedure  --> skin prick and intradermal tests with Iohexol negatives. But I would propose anyway to premedicate patient before using iodinated contrast media (for safety reasons).   --> no reactions in skin tests to MRI contrast media Gadovist     Gluten          Review of Systems:  - As per HPI  - Constitutional: The patient endorses fatigue  - Skin: As above in HPI. No additional skin concerns.  - MSK: +weakness > myalgias  - Pulm: +SOB, no chest pain    Physical exam:  Vitals: LMP  (LMP Unknown)   GEN: This is a well developed, well-nourished female in no acute distress, in a pleasant mood.    SKIN: Oconnell phototype II  - Full skin, which includes the head/face, both arms, chest, back, abdomen,both legs, buttocks, digits and/or nails, was examined.  - faint erythema on the posterior proximal thighs  - diffuse thinning of scalp hair without erythema, scaling, or follicular dropout  - surgical site on the left upper chest without induration, erythema, or hyperpigmentation  - No other lesions of concern on areas examined.     Impression/Plan:  1. Myalgias, weakness, shooting pains in muscles, fatigue: in context of elevated CKs; EMG had some abnormalities in 2020 but symptoms have progressed since then. Also having some burning/itching in skin, but biopsy findings very subtle and CRP has been normal. Autoantibodies not diagnostically helpful with low-titer DIEGO 1:80 (occurs in ~20% of this demographic age/sex) and otherwise negative. Given these constellation of symptoms, while there was concern for dermatomyositis, we simply do not have enough objective findings to render  this diagnosis in confidence, and perhaps more importantly, the absence of signs of active inflammation makes me wary of starting immunosuppressive/antiinflammatory therapy. We did briefly discuss quinacrine, which generally does not cross-react with hydroxychloroquine (had drug eruption in the past to this), but is only available compounded and is quite expensive. Her sharp stabbing muscle pains are concerning for a neuropathic etiology, and given her prior abnormalities in her EMG, elevated CKs, lack of consistent skin findings, and persistence of symptoms, I do recommend she see one of our neuromuscular specialists for further workup. We discussed that they may want a repeat EMG, given progression of her symptoms since the last one in 2020. From an empiric treatment perspective, we discussed low-dose gabapentin on a trial basis while awaiting further workup and treatment recommendations.    Gabapentin 300 mg BID    Referral to neuromuscular    2. Irritant dermatitis on posterior proximal thigh: restart tacrolimus ointment BID to affected areas.    3. 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 No rash present on exam. Will start OTC minoxidil 5% foam.    Follow-up in 3 months, earlier for new or changing lesions.       Staff Involved:  Staff Only    Harrison Alcaraz MD   of Dermatology  Department of Dermatology  [facility]

## 2022-03-10 NOTE — LETTER
3/10/2022       RE: Teresa Fernandez  [address] [facility] [id number]     Dear Colleague,    Thank you for referring your patient, Teresa Fernandez, to the [facility] DERMATOLOG[facility] [place] at [facility]. Please see a copy of my visit note below.    [facility] Dermatology Note      Dermatology Problem List:  1. Melanoma in situ, left chest  - s/p excision 7/27/2010 at [facility]  2.  Chronic burning rash of the upper extremities with biopsy showing a largely normal-appearing epidermis above mild perivascular lymphocytic inflammation without fungal elements or increased basement membrane noted on September 6, 2019  - Differential diagnosis includes (photo)allergic contact dermatitis, connective tissue disease (DM > NADIA as the former may lack significant epidermal changes)  - negative/normal: Aldolase, CK, PFTs, CT chest, myositis panel, urinalysis, SSA, and SSB  - DIEGO borderline positive 1:80,  (nml ; has been in 600s in the past)  - 7/2020 EMG: The EMG shows mild abnormalities. Insertional abnormality seen in the right medial gastrocnemius and a borderline to low tibial motor amplitude in the right leg could be consistent with an S1 radiculopathy although gluteus mirella did not show any changes and clinical correlation is advised. The low tibial motor amplitude on the right and peroneal amplitude on the left could also be related to body habitus and submaximal stimulation  - Did have drug eruption to hydroxychloroquine 6/2021  3. Subcutaneous nodule, left forearm - consistent with lipoma  4. Orogenital ulcers: suspect lichen planus   - patch testing: relevant sensitivities to Balsam of peru, Propolis, Wood tar mix, Amerchol L10 and lanolin alcohol, PVP Iodine, Butylhydroxyanisole (BHA), Palladium Chloride  - bx 6/4/21 nonspecific with negative DIF  - prior bx 2011 ulcer, left cheek  -  prior: hydroxychloroquine (stopped due to rash), dexamethasone S&S, tacrolimus 1 mg dissolved in 250 mL water S&S TID-QID, augmented betamethasone ointment  5. Periorificial dermatitis: around nose - tacrolimus ointment  6. Workup for hypercalcemia/hypercalciuria ongoing per endocrine (?hyperPTH)        CC:   Chief Complaint   Patient presents with     Derm Problem     Teresa is here today for a recheck on her face. She states it is breaking out.         Encounter Date: Mar 10, 2022    History of Present Illness:  Ms. Teresa Fernandez is a 62 year old female who presents for follow up    Still having some flares of weakness, pain - stabbing pains - can't tell if in muscle or bone  - when overuse muscles at work   - still itching - face   - around nose - some days is good, other days more active - red, itchy   - on scalp with hair loss; no rashes that she has noticed  - still feeling short of breath - feels like tightening in chest - no overt pain  - doing about 10 min on treadmill - when exert self too much, ends up falling  - weakness in ankles, worse in morning  - urinary incontinence - difficulty with defecation - don't feel like has muscle strength to help push  - irritation on posterior proximal thighs      Past Medical History:   Past Medical History:   Diagnosis Date     ALLERGIC RHINITIS NOS 4/12/2005     Anxiety      Arthritis     herniated disc     Bronchitis, not specified as acute or chronic      CHR MAXILLARY SINUSITIS 4/12/2005     Depressive disorder, not elsewhere classified      Eczema 10/17/2012     Environmental and seasonal allergies      GERD (gastroesophageal reflux disease)      Gluten intolerance      Malignant neoplasm of renal pelvis (H) 1995    Renal cell carcinoma     Melanoma (H) 06/2010     Other specified acquired hypothyroidism 4/12/2005     Renal cancer (H) 5/5/2011     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     FUSION LUMBAR ANTERIOR TWO LEVELS  4/13/2015     GYN SURGERY  04/08/1999    hysterectomy.  [facility]     HYSTERECTOMY, PAP NO LONGER INDICATED       SOFT TISSUE SURGERY      chest-melonoma     SURGICAL HISTORY OF -   3/1996    Left nephrectomy-renal cell CA       Social History:   reports that she quit smoking about 25 years ago. She has a 30.00 pack-year smoking history. She has never used smokeless tobacco. She reports that she does not drink alcohol and does not use drugs.    Family History:  Family History   Problem Relation Age of Onset     Diabetes Mother      Cardiovascular Mother      Lipids Mother      Depression Mother      Hypertension Father      Lipids Father      Obesity Father      Macular Degeneration Father      Sleep Apnea Father      Thyroid Disease Sister      Hypertension Sister      Depression Sister      Allergies Sister      Lipids Sister      Thyroid Disease Sister      Neurologic Disorder Sister      Depression Sister      Hypertension Brother      Colon Cancer Brother      Cancer Maternal Grandmother         kind unknown had sores on legs     Eczema Maternal Grandmother      Eczema Maternal Grandfather      Breast Cancer Paternal Grandmother      Cancer Paternal Grandmother         breast     Hypertension Paternal Grandfather      Cardiovascular Paternal Grandfather         heart attack     Allergies Son      Eczema Son      Respiratory Son      Sleep Apnea Son      Glaucoma No family hx of      Cerebrovascular Disease No family hx of        Medications:  Current Outpatient Medications   Medication Sig Dispense Refill     acetaminophen (TYLENOL) 325 MG tablet Take 325-650 mg by mouth every 6 hours as needed for mild pain       albuterol (PROAIR HFA/PROVENTIL HFA/VENTOLIN HFA) 108 (90 BASE) MCG/ACT Inhaler Inhale 2 puffs into the  lungs every 6 hours as needed for shortness of breath / dyspnea 1 Inhaler 5     albuterol (PROVENTIL) (2.5 MG/3ML) 0.083% neb solution Take 1 vial (2.5 mg) by nebulization every 6 hours as needed for shortness of breath / dyspnea or wheezing 90 mL 11     APNO CREA ointment Apply to rash on nose twice daily as needed. 100 g 3     augmented betamethasone dipropionate (DIPROLENE-AF) 0.05 % external ointment Apply topically 2 times daily 50 g 3     Cholecalciferol (VITAMIN D3 PO) Take 2,000 Units by mouth 2 times daily        DULoxetine (CYMBALTA) 60 MG capsule Take 1 capsule (60 mg) by mouth daily 90 capsule 3     fexofenadine (ALLEGRA) 180 MG tablet Take 1 tablet (180 mg) by mouth daily 30 tablet 1     hydrOXYzine (ATARAX) 25 MG tablet Take 1 tablet (25 mg) by mouth 3 times daily as needed for itching 90 tablet 1     levothyroxine (SYNTHROID/LEVOTHROID) 100 MCG tablet TAKE ONE TABLET BY MOUTH ONCE DAILY ON MONDAY, WEDNESDAY, FRIDAY, SATURDAY AND SUNDAY. 90 tablet 2     levothyroxine (SYNTHROID/LEVOTHROID) 88 MCG tablet Take 1 tab by mouth Tuesdays and Thursdays 45 tablet 3     lidocaine (XYLOCAINE) 2 % solution        methylPREDNISolone (MEDROL) 32 MG tablet Take 1 tablet 12 hours prior to contrast administration and 1 tablet 2 hours prior to contrast administration 2 tablet 0     mometasone (ELOCON) 0.1 % external ointment Apply topically twice a week Use on eczematous lesions 45 g 3     mupirocin (BACTROBAN) 2 % external ointment Use 2 times a day to affected area. 30 g 0     omeprazole (PRILOSEC) 20 MG DR capsule Take 20 mg by mouth daily       order for DME Equipment being ordered: Nebulizer 1 Units 0     tacrolimus (PROTOPIC) 0.03 % external ointment Apply topically 2 times daily Use on lichen planus areas in mouth 30 g 1     tacrolimus (PROTOPIC) 0.1 % external ointment Apply topically 2 times daily 30 g 3     triamcinolone (ARISTOCORT HP) 0.5 % external cream Apply to vaginal area twice daily as needed for  itching 15 g 3     triamcinolone (KENALOG) 0.1 % external ointment Use 1-2 times daily as needed for burning rash 453.6 g 3     fluticasone-salmeterol (ADVAIR DISKUS) 250-50 MCG/DOSE inhaler USE 1 INHALATION TWICE A DAY (FOLLOW UP FOR FURTHER REFILLS) (Patient not taking: Reported on 2/14/2022) 1 each 5     OVER-THE-COUNTER Place 1 drop into both eyes 3 times daily. Systane Ultra, Systane Balance, Blink Tears or Refresh Optive Artificial Tear (Patient not taking: Reported on 2/14/2022) 1 Bottle      Allergies   Allergen Reactions     Omnipaque [Iohexol] Swelling and Difficulty breathing     Immediate throat swelling following around 1-2cc of omnipaque 300 for spine procedure  --> skin prick and intradermal tests with Iohexol negatives. But I would propose anyway to premedicate patient before using iodinated contrast media (for safety reasons).   --> no reactions in skin tests to MRI contrast media Gadovist     Gluten          Review of Systems:  - As per HPI  - Constitutional: The patient endorses fatigue  - Skin: As above in HPI. No additional skin concerns.  - MSK: +weakness > myalgias  - Pulm: +SOB, no chest pain    Physical exam:  Vitals: LMP  (LMP Unknown)   GEN: This is a well developed, well-nourished female in no acute distress, in a pleasant mood.    SKIN: Oconnell phototype II  - Full skin, which includes the head/face, both arms, chest, back, abdomen,both legs, buttocks, digits and/or nails, was examined.  - faint erythema on the posterior proximal thighs  - diffuse thinning of scalp hair without erythema, scaling, or follicular dropout  - surgical site on the left upper chest without induration, erythema, or hyperpigmentation  - No other lesions of concern on areas examined.     Impression/Plan:  1. Myalgias, weakness, shooting pains in muscles, fatigue: in context of elevated CKs; EMG had some abnormalities in 2020 but symptoms have progressed since then. Also having some burning/itching in skin, but  biopsy findings very subtle and CRP has been normal. Autoantibodies not diagnostically helpful with low-titer DIEGO 1:80 (occurs in ~20% of this demographic age/sex) and otherwise negative. Given these constellation of symptoms, while there was concern for dermatomyositis, we simply do not have enough objective findings to render this diagnosis in confidence, and perhaps more importantly, the absence of signs of active inflammation makes me wary of starting immunosuppressive/antiinflammatory therapy. We did briefly discuss quinacrine, which generally does not cross-react with hydroxychloroquine (had drug eruption in the past to this), but is only available compounded and is quite expensive. Her sharp stabbing muscle pains are concerning for a neuropathic etiology, and given her prior abnormalities in her EMG, elevated CKs, lack of consistent skin findings, and persistence of symptoms, I do recommend she see one of our neuromuscular specialists for further workup. We discussed that they may want a repeat EMG, given progression of her symptoms since the last one in 2020. From an empiric treatment perspective, we discussed low-dose gabapentin on a trial basis while awaiting further workup and treatment recommendations.    Gabapentin 300 mg BID    Referral to neuromuscular    2. Irritant dermatitis on posterior proximal thigh: restart tacrolimus ointment BID to affected areas.    3. 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 No rash present on exam. Will start OTC minoxidil 5% foam.    Follow-up in 3 months, earlier for new or changing lesions.       Staff Involved:  Staff Only    Harrison Alcaraz MD   of Dermatology  Department of Dermatology  [facility]

## 2022-03-10 NOTE — NURSING NOTE
Dermatology Rooming Note    Teresa Fernandez's goals for this visit include:   Chief Complaint   Patient presents with     Derm Problem     Teresa is here today for a recheck on her face. She states it is breaking out.     Sung Floyd, CMA

## 2022-03-21 ENCOUNTER — OFFICE VISIT (OUTPATIENT)
Dept: NEUROLOGY | Facility: CLINIC | Age: 62
End: 2022-03-21
Attending: DERMATOLOGY
Payer: OTHER GOVERNMENT

## 2022-03-21 VITALS
SYSTOLIC BLOOD PRESSURE: 124 MMHG | HEIGHT: 68 IN | BODY MASS INDEX: 34.56 KG/M2 | DIASTOLIC BLOOD PRESSURE: 81 MMHG | HEART RATE: 91 BPM | WEIGHT: 228 LBS

## 2022-03-21 DIAGNOSIS — R29.6 FALLS FREQUENTLY: Primary | ICD-10-CM

## 2022-03-21 DIAGNOSIS — R74.8 ELEVATED CK: ICD-10-CM

## 2022-03-21 DIAGNOSIS — R29.898 WEAKNESS OF RIGHT LOWER EXTREMITY: ICD-10-CM

## 2022-03-21 DIAGNOSIS — M79.10 MYALGIA: ICD-10-CM

## 2022-03-21 DIAGNOSIS — R53.1 WEAKNESS: ICD-10-CM

## 2022-03-21 PROCEDURE — 99205 OFFICE O/P NEW HI 60 MIN: CPT | Performed by: PSYCHIATRY & NEUROLOGY

## 2022-03-21 NOTE — NURSING NOTE
Chief Complaint   Patient presents with     New Patient     weakness/myalgia     Lesly Vega MA on 3/21/2022 at 11:20 AM

## 2022-03-21 NOTE — PROGRESS NOTES
NEUROLOGY OUTPATIENT CONSULT NOTE  Mar 21, 2022     CHIEF COMPLAINT/REASON FOR VISIT/REASON FOR CONSULT  Patient presents with:  New Patient: weakness/myalgia    REASON FOR CONSULTATION- Weakness    REFERRAL SOURCE  Dr. Harrison Alcaraz  CC Dr. Harrison Alcaraz    HISTORY OF PRESENT ILLNESS  Teresa Fernandez is a 62 year old female evaluation of muscle pain, leg and hip pain and associated weakness.  Patient reports her symptoms started about 4 years ago.  Initially she had some pain in the right arm muscle but then the pain spread all over.  She also developed weakness in the leg.  Reports more pain in the hip.  Right side is more involved than the left.  There is pain in the legs as well.  She is fallen 3 times in the last 4 years.  She feels unsteady and off balance.  At times feels very hot and overheated when she is outside.  Denies any lightheadedness associated with the spells.  No cognitive difficulty.  She can stand up for half an hour and then feel weak.  Sleeping well at night.  Does complain of some low back pain which has improved.  Does have liver tremor with her head shaking.  Does get her words mixed up as well.  She has had extensive testing done in the past and her [facility] and [facility].    Previous history is reviewed and this is unchanged.    PAST MEDICAL/SURGICAL HISTORY  Past Medical History:   Diagnosis Date     ALLERGIC RHINITIS NOS 4/12/2005     Anxiety      Arthritis     herniated disc     Bronchitis, not specified as acute or chronic      CHR MAXILLARY SINUSITIS 4/12/2005     Depressive disorder, not elsewhere classified      Eczema 10/17/2012     Environmental and seasonal allergies      GERD (gastroesophageal reflux disease)      Gluten intolerance      Malignant neoplasm of renal pelvis (H) 1995    Renal cell carcinoma     Melanoma (H) 06/2010     Other specified acquired hypothyroidism 4/12/2005     Renal cancer (H) 5/5/2011     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     Grave's disease     Unspecified asthma(493.90)      Patient Active Problem List   Diagnosis     Hypothyroidism, unspecified type     Allergic state     Chronic rhinitis     Allergic rhinitis due to pollen     Sinusitis, chronic     History of skin cancer     ERIC (generalized anxiety disorder)     Leukoplakia of oral mucosa, including tongue     GERD (gastroesophageal reflux disease)     Chronic low back pain     Dry eye syndrome     Chronic fatigue     Osteopenia     History of melanoma in situ     Eczema     Moderate persistent asthma without complication     DDD (degenerative disc disease), lumbar     Hyperlipidemia LDL goal <160     Chest tightness or pressure     Myalgia     GAYE (obstructive sleep apnea)     History of kidney cancer     Fibromyalgia     Family history of colon cancer     Chronic pruritus   Tremor mental illness arthritis vision loss depression cancer/leukemia/kidney cancer, history of back surgery    FAMILY HISTORY  Family History   Problem Relation Age of Onset     Diabetes Mother      Cardiovascular Mother      Lipids Mother      Depression Mother      Hypertension Father      Lipids Father      Obesity Father      Macular Degeneration Father      Sleep Apnea Father      Thyroid Disease Sister      Hypertension Sister      Depression Sister      Allergies Sister      Lipids Sister      Thyroid Disease Sister      Neurologic Disorder Sister      Depression Sister      Hypertension Brother      Colon Cancer Brother      Cancer Maternal Grandmother         kind unknown had sores on legs     Eczema Maternal Grandmother      Eczema Maternal Grandfather      Breast Cancer Paternal Grandmother      Cancer Paternal Grandmother         breast     Hypertension Paternal Grandfather      Cardiovascular Paternal Grandfather         heart attack     Allergies Son      Eczema Son      Respiratory Son      Sleep Apnea Son      Glaucoma No family hx of      Cerebrovascular  Disease No family hx of    Significant for high cholesterol, high blood pressure, diabetes, tremors, mental illness, arthritis, vision loss, depression, cancer/leukemia, sleep disorder    SOCIAL HISTORY  Social History     Tobacco Use     Smoking status: Former Smoker     Packs/day: 2.00     Years: 15.00     Pack years: 30.00     Quit date: 3/26/1996     Years since quittin.0     Smokeless tobacco: Never Used   Vaping Use     Vaping Use: Never used   Substance Use Topics     Alcohol use: No     Comment: None now.  Rare in past.      Drug use: No       SYSTEMS REVIEW  Twelve-system ROS was done and other than the HPI this was negative except for neck pain, back pain, arm and leg pain, joint pain, numbness and tingling, weakness process, difficulty walking, falling, balance coordination problems, movement disorder, bladder symptoms, dizziness, speech disturbance, difficulty swallowing, ringing in the ears, vision symptoms, sleeping problems during the day, headaches, memory loss, anxiety, depression, chest pain, palpitations, bloating, stomach pain, bowel problems, respiratory problems, skin changes, weight gain    MEDICATIONS  acetaminophen (TYLENOL) 325 MG tablet, Take 325-650 mg by mouth every 6 hours as needed for mild pain  albuterol (PROAIR HFA/PROVENTIL HFA/VENTOLIN HFA) 108 (90 BASE) MCG/ACT Inhaler, Inhale 2 puffs into the lungs every 6 hours as needed for shortness of breath / dyspnea  albuterol (PROVENTIL) (2.5 MG/3ML) 0.083% neb solution, Take 1 vial (2.5 mg) by nebulization every 6 hours as needed for shortness of breath / dyspnea or wheezing  APNO CREA ointment, Apply to rash on nose twice daily as needed.  augmented betamethasone dipropionate (DIPROLENE-AF) 0.05 % external ointment, Apply topically 2 times daily  Cholecalciferol (VITAMIN D3 PO), Take 2,000 Units by mouth 2 times daily   DULoxetine (CYMBALTA) 60 MG capsule, Take 1 capsule (60 mg) by mouth daily  fexofenadine (ALLEGRA) 180 MG tablet,  Take 1 tablet (180 mg) by mouth daily  hydrOXYzine (ATARAX) 25 MG tablet, Take 1 tablet (25 mg) by mouth 3 times daily as needed for itching  levothyroxine (SYNTHROID/LEVOTHROID) 100 MCG tablet, TAKE ONE TABLET BY MOUTH ONCE DAILY ON MONDAY, WEDNESDAY, FRIDAY, SATURDAY AND SUNDAY.  levothyroxine (SYNTHROID/LEVOTHROID) 88 MCG tablet, Take 1 tab by mouth Tuesdays and Thursdays  mometasone (ELOCON) 0.1 % external ointment, Apply topically twice a week Use on eczematous lesions  mupirocin (BACTROBAN) 2 % external ointment, Use 2 times a day to affected area.  order for DME, Equipment being ordered: Nebulizer  tacrolimus (PROTOPIC) 0.03 % external ointment, Apply topically 2 times daily Use on lichen planus areas in mouth  tacrolimus (PROTOPIC) 0.1 % external ointment, Apply topically 2 times daily  triamcinolone (ARISTOCORT HP) 0.5 % external cream, Apply to vaginal area twice daily as needed for itching  triamcinolone (KENALOG) 0.1 % external ointment, Use 1-2 times daily as needed for burning rash  fluticasone-salmeterol (ADVAIR DISKUS) 250-50 MCG/DOSE inhaler, USE 1 INHALATION TWICE A DAY (FOLLOW UP FOR FURTHER REFILLS) (Patient not taking: Reported on 2/14/2022)  gabapentin (NEURONTIN) 300 MG capsule, Take 1 capsule (300 mg) by mouth 2 times daily  lidocaine (XYLOCAINE) 2 % solution,   methylPREDNISolone (MEDROL) 32 MG tablet, Take 1 tablet 12 hours prior to contrast administration and 1 tablet 2 hours prior to contrast administration (Patient not taking: Reported on 3/21/2022)  omeprazole (PRILOSEC) 20 MG DR capsule, Take 20 mg by mouth daily  OVER-THE-COUNTER, Place 1 drop into both eyes 3 times daily. Systane Ultra, Systane Balance, Blink Tears or Refresh Optive Artificial Tear (Patient not taking: Reported on 2/14/2022)    ampicillin (OMNIPEN) 1 g vial INTRADERMAL  penicillin g potassium [id number] unit vial INTRADERMAL       PHYSICAL EXAMINATION  VITALS: /81 (BP Location: Left arm, Patient Position: Sitting)  "  Pulse 91   Ht 1.727 m (5' 8\")   Wt 103.4 kg (228 lb)   LMP  (LMP Unknown)   BMI 34.67 kg/m    GENERAL: Healthy appearing, alert, no acute distress, normal habitus.  CARDIOVASCULAR: Extremities warm and well perfused. Pulses present.   EYES: Funduscopic exam limited.  NEUROLOGICAL:  Patient is awake and oriented to self, place and time.  Attention span is normal.  Memory is grossly intact.  Language is fluent and follows commands appropriately.  Appropriate fund of knowledge. Cranial nerves 2-12 are intact. There is no pronator drift.  Motor exam shows 5/5 strength in all extremities with some subjective weakness in the legs.  Tone is symmetric bilaterally in upper and lower extremities.  Reflexes are symmetric and 1+ in upper extremities and lower extremities. Sensory exam is grossly intact to light touch, pin prick and vibration.  Finger to nose and heel to shin is without dysmetria.  Romberg is negative.  Gait is normal and the patient is able to do tandem walk and walk on toes and heels with some difficulty.      DIAGNOSTICS  MRI  Impression:   1. There is no mass lesion, acute infarction or intracranial  hemorrhage.  2. Leukoaraiosis      MRI cervical spine 11/2017       MRI lumbar spine 2/2015  IMPRESSION:  1. Advanced multilevel degenerative disc disease with discogenic  endplate reactive marrow edema noted at L5-S1, L4-L5, and L2-L3.  2. Advanced apophyseal joint degenerative arthrosis, greatest at L4-L5  where there is degenerative grade 1 spondylolisthesis which has  progressed since 7/9/2012.  3. Central stenosis which is greatest and severe at L4-L5. This also  has progressed since 2012.  4. Multilevel foraminal stenosis as above.     Procedures:  EMG/NCS 7/2020  Interpretation:  The EMG shows mild abnormalities.  Insertional abnormality seen in the right medial gastrocnemius and a borderline to low tibial motor amplitude in the right leg could be consistent with an S1 radiculopathy although " gluteus mirella did not show any changes and clinical correlation is advised.  The low tibial motor amplitude on the right and peroneal amplitude on the left could also be related to body habitus and submaximal stimulation.  Clinical correlation is strongly advised.    EMG 2018- [facility]      Laboratory:  B12 771 (3/2020)  RELEVANT LABS  Component      Latest Ref Rng & Units 9/30/2021 11/8/2021 2/1/2022   Sodium      133 - 144 mmol/L 138     Potassium      3.4 - 5.3 mmol/L 4.0     Chloride      94 - 109 mmol/L 105     Carbon Dioxide      20 - 32 mmol/L 29     Anion Gap      3 - 14 mmol/L 4     Urea Nitrogen      7 - 30 mg/dL 10     Creatinine      0.52 - 1.04 mg/dL 0.82     Calcium      8.5 - 10.1 mg/dL 9.4     Glucose      70 - 99 mg/dL 89     Alkaline Phosphatase      40 - 150 U/L 100     AST      0 - 45 U/L 27     ALT      0 - 50 U/L 36     Protein Total      6.8 - 8.8 g/dL 7.0     Albumin      3.4 - 5.0 g/dL 3.7     Bilirubin Total      0.2 - 1.3 mg/dL 0.3     GFR Estimate      >60 mL/min/1.73m2 78     DIEGO interpretation      Negative   Positive (A)   DIEGO pattern 1         Homogeneous   DIEGO titer 1         1:640   CRP Inflammation      0.0 - 8.0 mg/L <2.9     Sed Rate      0 - 30 mm/hr 9     CK Total      30 - 225 U/L 205     TSH      0.40 - 4.00 mU/L  2.68    Cyclic Citrullinated Peptide Antibody, IgG      <7.0 U/mL   1.2   AcetChol Block Yulia      0 - 26 %   24   AcetChol Modul Yulia      <=45 %   0   Striated Muscle Antibody IgG      <1:40   <1:40   AcetChol Binding Yulia      0.0 - 0.4 nmol/L   0.0   Magnesium      1.6 - 2.3 mg/dL   2.2       OUTSIDE RECORDS  Outside referral notes and chart notes were reviewed and pertinent information has been summarized (in addition to the HPI):-Patient referred from dermatology clinic.  EMG has shown some abnormalities in the right medial gastrocnemius muscles consistent with S1 radiculopathy.  Was referred to neurology for further evaluation.  Some basic labs were  checked through dermatology.  She does have a chronic burning rash in the upper extremities.  Notes from [facility] neurology were also reviewed.  Patient's gait difficulty was thought to be related to lumbar radiculopathy.  Was referred to physical therapy.  No other clear cause was identified    IMPRESSION/REPORT/PLAN  Falls frequently  Weakness  Elevated CK  Myalgia  Weakness of right lower extremity    This is a 62 year old female with generalized pain with more in the legs than arms and some subjective progressive leg weakness with history of multiple falls.  Right side is worse than the left.  Exam shows some subjective weakness in the legs.  Patient's had a elevated CK previously though at this point it does appear to have normalized.  We will repeat the CK to see if it still elevated and if there is evidence for any causes of myopathy/myalgia.  She does have a history of right medial gastrocnemius muscle denervation on the EMG suggestive of S1 radiculopathy.  Last MRI was almost 7 years ago.  We will repeat the MRI of the lumbar spine to see if that shows evidence of active degeneration that might be causing some of her symptoms.  MRI of the C-spine should also be repeated to look for other causes of leg weakness.  Also referred to physical therapy to see if that helps improve some of her symptoms.    I can see her back in 2 months.    -     Vitamin B12; Future  -     Methylmalonic Acid; Future  -     MR Cervical Spine w/o Contrast; Future  -     MR Lumbar Spine w/o Contrast; Future  -     CK total; Future  -     Hemoglobin A1c; Future  -     TSH with free T4 reflex; Future  -     Hepatic function panel; Future  -     Physical Therapy Referral; Future    Return in about 2 months (around 5/21/2022) for In-Clinic Visit (must), After testing.    Over 64 minutes were spent coordinating the care for the patient on the day of the encounter.  This includes previsit, during visit and post visit  activities as documented above.  Reviewed records from multiple sources.  Counseling patient.  Complex problem.  Reviewed outside records.  (Activities include but not inclusive of reviewing chart, reviewing outside records, reviewing labs and imaging study results as well as the images, patient visit time including getting history and exam,  use if applicable, review of test results with the patient and coming up with a plan in a shared model, counseling patient and family, education and answering patient questions, EMR , EMR diagnosis entry and problem list management, medication reconciliation and prescription management if applicable, paperwork if applicable, printing documents and documentation of the visit activities.)  Billing:-4 data points, 4 problem points      Donnie Munoz MD  Neurologist  [facility]  Tel:- [phone]    This note was dictated using voice recognition software.  Any grammatical or context distortions are unintentional and inherent to the software.

## 2022-03-21 NOTE — LETTER
3/21/2022         RE: Teresa Fernandez  [id number] [facility] [id number]        Dear Colleague,    Thank you for referring your patient, Teresa Fernandez, to the [facility] [place]. Please see a copy of my visit note below.    NEUROLOGY OUTPATIENT CONSULT NOTE  Mar 21, 2022     CHIEF COMPLAINT/REASON FOR VISIT/REASON FOR CONSULT  Patient presents with:  New Patient: weakness/myalgia    REASON FOR CONSULTATION- Weakness    REFERRAL SOURCE  Dr. Harrison Alcaraz  CC Dr. Harrison Alcaraz    HISTORY OF PRESENT ILLNESS  Teresa Fernandez is a 62 year old female evaluation of muscle pain, leg and hip pain and associated weakness.  Patient reports her symptoms started about 4 years ago.  Initially she had some pain in the right arm muscle but then the pain spread all over.  She also developed weakness in the leg.  Reports more pain in the hip.  Right side is more involved than the left.  There is pain in the legs as well.  She is fallen 3 times in the last 4 years.  She feels unsteady and off balance.  At times feels very hot and overheated when she is outside.  Denies any lightheadedness associated with the spells.  No cognitive difficulty.  She can stand up for half an hour and then feel weak.  Sleeping well at night.  Does complain of some low back pain which has improved.  Does have liver tremor with her head shaking.  Does get her words mixed up as well.  She has had extensive testing done in the past and her [facility].    Previous history is reviewed and this is unchanged.    PAST MEDICAL/SURGICAL HISTORY  Past Medical History:   Diagnosis Date     ALLERGIC RHINITIS NOS 4/12/2005     Anxiety      Arthritis     herniated disc     Bronchitis, not specified as acute or chronic      CHR MAXILLARY SINUSITIS 4/12/2005     Depressive disorder, not elsewhere classified      Eczema 10/17/2012     Environmental and seasonal allergies      GERD (gastroesophageal  reflux disease)      Gluten intolerance      Malignant neoplasm of renal pelvis (H) 1995    Renal cell carcinoma     Melanoma (H) 06/2010     Other specified acquired hypothyroidism 4/12/2005     Renal cancer (H) 5/5/2011     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     Grave's disease     Unspecified asthma(493.90)      Patient Active Problem List   Diagnosis     Hypothyroidism, unspecified type     Allergic state     Chronic rhinitis     Allergic rhinitis due to pollen     Sinusitis, chronic     History of skin cancer     ERIC (generalized anxiety disorder)     Leukoplakia of oral mucosa, including tongue     GERD (gastroesophageal reflux disease)     Chronic low back pain     Dry eye syndrome     Chronic fatigue     Osteopenia     History of melanoma in situ     Eczema     Moderate persistent asthma without complication     DDD (degenerative disc disease), lumbar     Hyperlipidemia LDL goal <160     Chest tightness or pressure     Myalgia     GAYE (obstructive sleep apnea)     History of kidney cancer     Fibromyalgia     Family history of colon cancer     Chronic pruritus   Tremor mental illness arthritis vision loss depression cancer/leukemia/kidney cancer, history of back surgery    FAMILY HISTORY  Family History   Problem Relation Age of Onset     Diabetes Mother      Cardiovascular Mother      Lipids Mother      Depression Mother      Hypertension Father      Lipids Father      Obesity Father      Macular Degeneration Father      Sleep Apnea Father      Thyroid Disease Sister      Hypertension Sister      Depression Sister      Allergies Sister      Lipids Sister      Thyroid Disease Sister      Neurologic Disorder Sister      Depression Sister      Hypertension Brother      Colon Cancer Brother      Cancer Maternal Grandmother         kind unknown had sores on legs     Eczema Maternal Grandmother      Eczema Maternal Grandfather      Breast Cancer Paternal Grandmother      Cancer  Paternal Grandmother         breast     Hypertension Paternal Grandfather      Cardiovascular Paternal Grandfather         heart attack     Allergies Son      Eczema Son      Respiratory Son      Sleep Apnea Son      Glaucoma No family hx of      Cerebrovascular Disease No family hx of    Significant for high cholesterol, high blood pressure, diabetes, tremors, mental illness, arthritis, vision loss, depression, cancer/leukemia, sleep disorder    SOCIAL HISTORY  Social History     Tobacco Use     Smoking status: Former Smoker     Packs/day: 2.00     Years: 15.00     Pack years: 30.00     Quit date: 3/26/1996     Years since quittin.0     Smokeless tobacco: Never Used   Vaping Use     Vaping Use: Never used   Substance Use Topics     Alcohol use: No     Comment: None now.  Rare in past.      Drug use: No       SYSTEMS REVIEW  Twelve-system ROS was done and other than the HPI this was negative except for neck pain, back pain, arm and leg pain, joint pain, numbness and tingling, weakness process, difficulty walking, falling, balance coordination problems, movement disorder, bladder symptoms, dizziness, speech disturbance, difficulty swallowing, ringing in the ears, vision symptoms, sleeping problems during the day, headaches, memory loss, anxiety, depression, chest pain, palpitations, bloating, stomach pain, bowel problems, respiratory problems, skin changes, weight gain    MEDICATIONS  acetaminophen (TYLENOL) 325 MG tablet, Take 325-650 mg by mouth every 6 hours as needed for mild pain  albuterol (PROAIR HFA/PROVENTIL HFA/VENTOLIN HFA) 108 (90 BASE) MCG/ACT Inhaler, Inhale 2 puffs into the lungs every 6 hours as needed for shortness of breath / dyspnea  albuterol (PROVENTIL) (2.5 MG/3ML) 0.083% neb solution, Take 1 vial (2.5 mg) by nebulization every 6 hours as needed for shortness of breath / dyspnea or wheezing  APNO CREA ointment, Apply to rash on nose twice daily as needed.  augmented betamethasone  dipropionate (DIPROLENE-AF) 0.05 % external ointment, Apply topically 2 times daily  Cholecalciferol (VITAMIN D3 PO), Take 2,000 Units by mouth 2 times daily   DULoxetine (CYMBALTA) 60 MG capsule, Take 1 capsule (60 mg) by mouth daily  fexofenadine (ALLEGRA) 180 MG tablet, Take 1 tablet (180 mg) by mouth daily  hydrOXYzine (ATARAX) 25 MG tablet, Take 1 tablet (25 mg) by mouth 3 times daily as needed for itching  levothyroxine (SYNTHROID/LEVOTHROID) 100 MCG tablet, TAKE ONE TABLET BY MOUTH ONCE DAILY ON MONDAY, WEDNESDAY, FRIDAY, SATURDAY AND SUNDAY.  levothyroxine (SYNTHROID/LEVOTHROID) 88 MCG tablet, Take 1 tab by mouth Tuesdays and Thursdays  mometasone (ELOCON) 0.1 % external ointment, Apply topically twice a week Use on eczematous lesions  mupirocin (BACTROBAN) 2 % external ointment, Use 2 times a day to affected area.  order for DME, Equipment being ordered: Nebulizer  tacrolimus (PROTOPIC) 0.03 % external ointment, Apply topically 2 times daily Use on lichen planus areas in mouth  tacrolimus (PROTOPIC) 0.1 % external ointment, Apply topically 2 times daily  triamcinolone (ARISTOCORT HP) 0.5 % external cream, Apply to vaginal area twice daily as needed for itching  triamcinolone (KENALOG) 0.1 % external ointment, Use 1-2 times daily as needed for burning rash  fluticasone-salmeterol (ADVAIR DISKUS) 250-50 MCG/DOSE inhaler, USE 1 INHALATION TWICE A DAY (FOLLOW UP FOR FURTHER REFILLS) (Patient not taking: Reported on 2/14/2022)  gabapentin (NEURONTIN) 300 MG capsule, Take 1 capsule (300 mg) by mouth 2 times daily  lidocaine (XYLOCAINE) 2 % solution,   methylPREDNISolone (MEDROL) 32 MG tablet, Take 1 tablet 12 hours prior to contrast administration and 1 tablet 2 hours prior to contrast administration (Patient not taking: Reported on 3/21/2022)  omeprazole (PRILOSEC) 20 MG DR capsule, Take 20 mg by mouth daily  OVER-THE-COUNTER, Place 1 drop into both eyes 3 times daily. Systane Ultra, Systane Balance, Blink Tears  "or Refresh Optive Artificial Tear (Patient not taking: Reported on 2/14/2022)    ampicillin (OMNIPEN) 1 g vial INTRADERMAL  penicillin g potassium [id number] unit vial INTRADERMAL       PHYSICAL EXAMINATION  VITALS: /81 (BP Location: Left arm, Patient Position: Sitting)   Pulse 91   Ht 1.727 m (5' 8\")   Wt 103.4 kg (228 lb)   LMP  (LMP Unknown)   BMI 34.67 kg/m    GENERAL: Healthy appearing, alert, no acute distress, normal habitus.  CARDIOVASCULAR: Extremities warm and well perfused. Pulses present.   EYES: Funduscopic exam limited.  NEUROLOGICAL:  Patient is awake and oriented to self, place and time.  Attention span is normal.  Memory is grossly intact.  Language is fluent and follows commands appropriately.  Appropriate fund of knowledge. Cranial nerves 2-12 are intact. There is no pronator drift.  Motor exam shows 5/5 strength in all extremities with some subjective weakness in the legs.  Tone is symmetric bilaterally in upper and lower extremities.  Reflexes are symmetric and 1+ in upper extremities and lower extremities. Sensory exam is grossly intact to light touch, pin prick and vibration.  Finger to nose and heel to shin is without dysmetria.  Romberg is negative.  Gait is normal and the patient is able to do tandem walk and walk on toes and heels with some difficulty.      DIAGNOSTICS  MRI  Impression:   1. There is no mass lesion, acute infarction or intracranial  hemorrhage.  2. Leukoaraiosis      MRI cervical spine 11/2017       MRI lumbar spine 2/2015  IMPRESSION:  1. Advanced multilevel degenerative disc disease with discogenic  endplate reactive marrow edema noted at L5-S1, L4-L5, and L2-L3.  2. Advanced apophyseal joint degenerative arthrosis, greatest at L4-L5  where there is degenerative grade 1 spondylolisthesis which has  progressed since 7/9/2012.  3. Central stenosis which is greatest and severe at L4-L5. This also  has progressed since 2012.  4. Multilevel foraminal stenosis as " above.     Procedures:  EMG/NCS 7/2020  Interpretation:  The EMG shows mild abnormalities.  Insertional abnormality seen in the right medial gastrocnemius and a borderline to low tibial motor amplitude in the right leg could be consistent with an S1 radiculopathy although gluteus mirella did not show any changes and clinical correlation is advised.  The low tibial motor amplitude on the right and peroneal amplitude on the left could also be related to body habitus and submaximal stimulation.  Clinical correlation is strongly advised.    EMG [address] [place] [address]      Laboratory:  B12 771 (3/2020)  RELEVANT LABS  Component      Latest Ref Rng & Units 9/30/2021 11/8/2021 2/1/2022   Sodium      133 - 144 mmol/L 138     Potassium      3.4 - 5.3 mmol/L 4.0     Chloride      94 - 109 mmol/L 105     Carbon Dioxide      20 - 32 mmol/L 29     Anion Gap      3 - 14 mmol/L 4     Urea Nitrogen      7 - 30 mg/dL 10     Creatinine      0.52 - 1.04 mg/dL 0.82     Calcium      8.5 - 10.1 mg/dL 9.4     Glucose      70 - 99 mg/dL 89     Alkaline Phosphatase      40 - 150 U/L 100     AST      0 - 45 U/L 27     ALT      0 - 50 U/L 36     Protein Total      6.8 - 8.8 g/dL 7.0     Albumin      3.4 - 5.0 g/dL 3.7     Bilirubin Total      0.2 - 1.3 mg/dL 0.3     GFR Estimate      >60 mL/min/1.73m2 78     DIEGO interpretation      Negative   Positive (A)   DIEGO pattern 1         Homogeneous   DIEGO titer 1         1:640   CRP Inflammation      0.0 - 8.0 mg/L <2.9     Sed Rate      0 - 30 mm/hr 9     CK Total      30 - 225 U/L 205     TSH      0.40 - 4.00 mU/L  2.68    Cyclic Citrullinated Peptide Antibody, IgG      <7.0 U/mL   1.2   AcetChol Block Yulia      0 - 26 %   24   AcetChol Modul Yulia      <=45 %   0   Striated Muscle Antibody IgG      <1:40   <1:40   AcetChol Binding Yulia      0.0 - 0.4 nmol/L   0.0   Magnesium      1.6 - 2.3 mg/dL   2.2       OUTSIDE RECORDS  Outside referral notes and chart notes were reviewed and pertinent information has  been summarized (in addition to the HPI):-Patient referred from dermatology clinic.  EMG has shown some abnormalities in the right medial gastrocnemius muscles consistent with S1 radiculopathy.  Was referred to neurology for further evaluation.  Some basic labs were checked through dermatology.  She does have a chronic burning rash in the upper extremities.  Notes from [facility] neurology were also reviewed.  Patient's gait difficulty was thought to be related to lumbar radiculopathy.  Was referred to physical therapy.  No other clear cause was identified    IMPRESSION/REPORT/PLAN  Falls frequently  Weakness  Elevated CK  Myalgia  Weakness of right lower extremity    This is a 62 year old female with generalized pain with more in the legs than arms and some subjective progressive leg weakness with history of multiple falls.  Right side is worse than the left.  Exam shows some subjective weakness in the legs.  Patient's had a elevated CK previously though at this point it does appear to have normalized.  We will repeat the CK to see if it still elevated and if there is evidence for any causes of myopathy/myalgia.  She does have a history of right medial gastrocnemius muscle denervation on the EMG suggestive of S1 radiculopathy.  Last MRI was almost 7 years ago.  We will repeat the MRI of the lumbar spine to see if that shows evidence of active degeneration that might be causing some of her symptoms.  MRI of the C-spine should also be repeated to look for other causes of leg weakness.  Also referred to physical therapy to see if that helps improve some of her symptoms.    I can see her back in 2 months.    -     Vitamin B12; Future  -     Methylmalonic Acid; Future  -     MR Cervical Spine w/o Contrast; Future  -     MR Lumbar Spine w/o Contrast; Future  -     CK total; Future  -     Hemoglobin A1c; Future  -     TSH with free T4 reflex; Future  -     Hepatic function panel; Future  -     Physical  Therapy Referral; Future    Return in about 2 months (around 5/21/2022) for In-Clinic Visit (must), After testing.    Over 64 minutes were spent coordinating the care for the patient on the day of the encounter.  This includes previsit, during visit and post visit activities as documented above.  Reviewed records from multiple sources.  Counseling patient.  Complex problem.  Reviewed outside records.  (Activities include but not inclusive of reviewing chart, reviewing outside records, reviewing labs and imaging study results as well as the images, patient visit time including getting history and exam,  use if applicable, review of test results with the patient and coming up with a plan in a shared model, counseling patient and family, education and answering patient questions, EMR , EMR diagnosis entry and problem list management, medication reconciliation and prescription management if applicable, paperwork if applicable, printing documents and documentation of the visit activities.)  Billing:-4 data points, 4 problem points      Donnie Munoz MD  Neurologist  [facility]  Tel:- [phone]    This note was dictated using voice recognition software.  Any grammatical or context distortions are unintentional and inherent to the software.        Again, thank you for allowing me to participate in the care of your patient.        Sincerely,        Donnie Munoz MD

## 2022-03-25 ENCOUNTER — LAB (OUTPATIENT)
Dept: LAB | Facility: CLINIC | Age: 62
End: 2022-03-25
Payer: OTHER GOVERNMENT

## 2022-03-25 DIAGNOSIS — R29.898 WEAKNESS OF RIGHT LOWER EXTREMITY: ICD-10-CM

## 2022-03-25 DIAGNOSIS — R29.6 FALLS FREQUENTLY: ICD-10-CM

## 2022-03-25 LAB
ALBUMIN SERPL-MCNC: 3.7 G/DL (ref 3.4–5)
ALP SERPL-CCNC: 99 U/L (ref 40–150)
ALT SERPL W P-5'-P-CCNC: 32 U/L (ref 0–50)
AST SERPL W P-5'-P-CCNC: 22 U/L (ref 0–45)
BILIRUB DIRECT SERPL-MCNC: <0.1 MG/DL (ref 0–0.2)
BILIRUB SERPL-MCNC: 0.3 MG/DL (ref 0.2–1.3)
CK SERPL-CCNC: 122 U/L (ref 30–225)
HBA1C MFR BLD: 5.1 % (ref 0–5.6)
PROT SERPL-MCNC: 6.7 G/DL (ref 6.8–8.8)
TSH SERPL DL<=0.005 MIU/L-ACNC: 2.7 MU/L (ref 0.4–4)
VIT B12 SERPL-MCNC: 351 PG/ML (ref 193–986)

## 2022-03-25 PROCEDURE — 80076 HEPATIC FUNCTION PANEL: CPT

## 2022-03-25 PROCEDURE — 83921 ORGANIC ACID SINGLE QUANT: CPT

## 2022-03-25 PROCEDURE — 84443 ASSAY THYROID STIM HORMONE: CPT

## 2022-03-25 PROCEDURE — 83036 HEMOGLOBIN GLYCOSYLATED A1C: CPT

## 2022-03-25 PROCEDURE — 82607 VITAMIN B-12: CPT

## 2022-03-25 PROCEDURE — 82550 ASSAY OF CK (CPK): CPT

## 2022-03-25 PROCEDURE — 36415 COLL VENOUS BLD VENIPUNCTURE: CPT

## 2022-03-28 ENCOUNTER — HOSPITAL ENCOUNTER (OUTPATIENT)
Dept: MRI IMAGING | Facility: CLINIC | Age: 62
Discharge: HOME | End: 2022-03-28
Attending: PSYCHIATRY & NEUROLOGY
Payer: OTHER GOVERNMENT

## 2022-03-28 DIAGNOSIS — R29.898 WEAKNESS OF RIGHT LOWER EXTREMITY: ICD-10-CM

## 2022-03-28 DIAGNOSIS — R29.6 FALLS FREQUENTLY: ICD-10-CM

## 2022-03-28 PROCEDURE — 72148 MRI LUMBAR SPINE W/O DYE: CPT

## 2022-03-28 PROCEDURE — 72141 MRI NECK SPINE W/O DYE: CPT

## 2022-03-31 ENCOUNTER — OFFICE VISIT (OUTPATIENT)
Dept: FAMILY MEDICINE | Facility: CLINIC | Age: 62
End: 2022-03-31
Payer: OTHER GOVERNMENT

## 2022-03-31 VITALS
HEART RATE: 87 BPM | HEIGHT: 68 IN | DIASTOLIC BLOOD PRESSURE: 82 MMHG | SYSTOLIC BLOOD PRESSURE: 130 MMHG | RESPIRATION RATE: 16 BRPM | TEMPERATURE: 97.5 F | OXYGEN SATURATION: 100 % | BODY MASS INDEX: 34.8 KG/M2 | WEIGHT: 229.6 LBS

## 2022-03-31 DIAGNOSIS — M54.16 LUMBAR RADICULOPATHY: Primary | ICD-10-CM

## 2022-03-31 DIAGNOSIS — F41.1 GAD (GENERALIZED ANXIETY DISORDER): ICD-10-CM

## 2022-03-31 DIAGNOSIS — M79.10 MYALGIA: ICD-10-CM

## 2022-03-31 LAB — METHYLMALONATE SERPL-SCNC: 0.3 UMOL/L (ref 0–0.4)

## 2022-03-31 PROCEDURE — 99213 OFFICE O/P EST LOW 20 MIN: CPT | Performed by: NURSE PRACTITIONER

## 2022-03-31 RX ORDER — DULOXETIN HYDROCHLORIDE 30 MG/1
CAPSULE, DELAYED RELEASE ORAL
Qty: 90 CAPSULE | Refills: 3 | Status: SHIPPED | OUTPATIENT
Start: 2022-03-31 | End: 2023-01-04

## 2022-03-31 NOTE — PROGRESS NOTES
"  Assessment & Plan     Lumbar radiculopathy  Increase cymbalta. Will need to see spine surgery ASAP- discussed red flag symptoms that require an ER visit  - Spine Referral; Future  - DULoxetine (CYMBALTA) 30 MG capsule; Take 1 by mouth daily in addition to the 60 mg for a total of 90 mg    ERIC (generalized anxiety disorder)    - DULoxetine (CYMBALTA) 30 MG capsule; Take 1 by mouth daily in addition to the 60 mg for a total of 90 mg    Myalgia    - DULoxetine (CYMBALTA) 30 MG capsule; Take 1 by mouth daily in addition to the 60 mg for a total of 90 mg             BMI:   Estimated body mass index is 34.91 kg/m  as calculated from the following:    Height as of this encounter: 1.727 m (5' 8\").    Weight as of this encounter: 104.1 kg (229 lb 9.6 oz).       CONSULTATION/REFERRAL to SPINE SURGERY    FUTURE APPOINTMENTS:       - Follow-up visit in case of new or worsening symptoms    See Patient Instructions    No follow-ups on file.    HERIBERTO [facility]    Eric Moore is a 62 year old who presents for the following health issues     History of Present Illness       Reason for visit:  Hip and right leg pain  Symptoms include:  Pain in hip area lower leg pain  Symptom progression:  Worsening  Had these symptoms before:  Yes  Has tried/received treatment for these symptoms:  Yes  Previous treatment was successful:  No  What makes it worse:  Not really walking  What makes it better:  Tylonol Advil    She eats 2-3 servings of fruits and vegetables daily.She consumes 0 sweetened beverage(s) daily.She exercises with enough effort to increase her heart rate 10 to 19 minutes per day.  She exercises with enough effort to increase her heart rate 3 or less days per week.   She is taking medications regularly.     Had a Neurology consult for recurrent intermittent falling and leg weakness. Has new loss of bowel and bladder control since last visit. Denies saddle anesthesia. " "Neurology ordered cervical and lumbar MRI. She is here to review scans- Neurology had no suggestions    Review of Systems   Constitutional, HEENT, cardiovascular, pulmonary, gi and gu systems are negative, except as otherwise noted.      Objective    /82   Pulse 87   Temp 97.5  F (36.4  C) (Tympanic)   Resp 16   Ht 1.727 m (5' 8\")   Wt 104.1 kg (229 lb 9.6 oz)   LMP  (LMP Unknown)   SpO2 100%   BMI 34.91 kg/m    Body mass index is 34.91 kg/m .  Physical Exam   GENERAL: alert and no distress  MS: BLE with 18 inch circumference- no calf tenderness, edema, erythema or warmth  NEURO: Normal strength and tone, mentation intact and speech normal                "

## 2022-03-31 NOTE — PATIENT INSTRUCTIONS
[phone]      Patient Education     Understanding Lumbar Radiculopathy    Lumbar radiculopathy is irritation or inflammation of a nerve root in the low back. It causes symptoms that spread out from the back down one or both legs. To understand this condition, it helps to understand the parts of the spine:    Vertebrae. These are bones that stack to form the spine. The lumbar spine contains 5 vertebrae near the bottom of your spine.    Disks. These are soft pads of tissue between the vertebrae. They act as shock absorbers for the spine.    Spinal canal. This is a tunnel formed within the stacked vertebrae. In the lumbar spine, nerves run through this canal.    Nerves. These branch off and leave the spinal canal, traveling out to parts of the body. As they leave the spinal canal, nerves pass through openings between the vertebrae. The nerve root is the part of the nerve that is closest to the spinal canal.    Sciatic nerve. This is a large nerve formed from several nerve roots in the low back. This nerve extends down the back of the leg to the foot.  With lumbar radiculopathy, nerve roots in the low back become irritated. This leads to pain and symptoms. The sciatic nerve is commonly involved, so the condition is often called sciatica.  What causes lumbar radiculopathy?  Aging, injury, poor posture, extra body weight, and other issues can lead to problems in the low back. These problems may then irritate nerve roots. They include:    Damage to a disk in the lumbar spine. The damaged disk may then press on nearby nerve roots.    Degeneration from wear and tear, and aging. This can lead to narrowing (stenosis) of the openings between the vertebrae. The narrowed openings press on nerve roots as they leave the spinal canal.    Unstable spine. This is when a vertebra slips forward. It can then press on a nerve root.  Other, less common things can put pressure on nerves in the low back. These include diabetes,  infection, or a tumor.  Symptoms of lumbar radiculopathy  These include:    Pain in the low back    Pain, numbness, tingling, or weakness that travels into the buttocks, hip, groin, or leg    Muscle spasms in the low back, or leg  Treatment for lumbar radiculopathy  In most cases, your healthcare provider will first try treatments that help relieve symptoms. These may include:    Prescription and over-the-counter pain medicines. These help relieve pain, swelling, and irritation.    Limits on positions and activities that increase pain. But lying in bed or avoiding all movement is only recommended for a short period of time.    Physical therapy, including exercises and stretches. This helps decrease pain and increase movement and function.    Steroid shots into the lower back. This may help relieve symptoms for a time.    Weight-loss program. If you are overweight, losing extra pounds (kilograms) may help relieve symptoms.  In some cases, you may need surgery to fix the underlying problem. This depends on the cause, the symptoms, and how long the pain has lasted.  Possible complications  Over time, an irritated and inflamed nerve may become damaged. This may lead to long-lasting (permanent) numbness or weakness in your legs and feet. If symptoms change suddenly or get worse, be sure to let your healthcare provider know.  When to call your healthcare provider  Call your healthcare provider right away if you have any of these:    New pain or pain that gets worse    New or increasing weakness, tingling, or numbness in your leg or foot    Problems controlling your bladder or bowel  Rene last reviewed this educational content on 2/1/2020 2000-2021 [employer]. All rights reserved. This information is not intended as a substitute for professional medical care. Always follow your healthcare professional's instructions.

## 2022-04-04 ENCOUNTER — TRANSFERRED RECORDS (OUTPATIENT)
Dept: HEALTH INFORMATION MANAGEMENT | Facility: CLINIC | Age: 62
End: 2022-04-04
Payer: OTHER GOVERNMENT

## 2022-04-05 ENCOUNTER — TELEPHONE (OUTPATIENT)
Dept: PALLIATIVE MEDICINE | Facility: CLINIC | Age: 62
End: 2022-04-05

## 2022-04-05 ENCOUNTER — HOSPITAL ENCOUNTER (OUTPATIENT)
Dept: PHYSICAL THERAPY | Facility: CLINIC | Age: 62
Setting detail: THERAPIES SERIES
Discharge: HOME | End: 2022-04-05
Attending: PSYCHIATRY & NEUROLOGY
Payer: OTHER GOVERNMENT

## 2022-04-05 DIAGNOSIS — R29.898 WEAKNESS OF RIGHT LOWER EXTREMITY: ICD-10-CM

## 2022-04-05 DIAGNOSIS — R29.6 FALLS FREQUENTLY: ICD-10-CM

## 2022-04-05 DIAGNOSIS — Z91.041 ALLERGY TO IODINATED CONTRAST MEDIA: Primary | ICD-10-CM

## 2022-04-05 PROCEDURE — 97162 PT EVAL MOD COMPLEX 30 MIN: CPT | Mod: GP | Performed by: PHYSICAL THERAPIST

## 2022-04-05 PROCEDURE — 97110 THERAPEUTIC EXERCISES: CPT | Mod: GP | Performed by: PHYSICAL THERAPIST

## 2022-04-05 NOTE — TELEPHONE ENCOUNTER
Faxed external injection order (L3-4 interlaminar JUAN) [facility] Tylor Marie[facility] requesting additional info:    Notes from your past 3 office visits (the most recent progress note in our records from [facility] is for dos 5/5/2020) along with any pertinent information that may help our pain specialist     Fax confirmation received:         Hannah Stockton  Patient Representative  [facility]

## 2022-04-06 ENCOUNTER — TRANSCRIBE ORDERS (OUTPATIENT)
Dept: OTHER | Age: 62
End: 2022-04-06
Payer: OTHER GOVERNMENT

## 2022-04-06 DIAGNOSIS — M48.00 SPINAL STENOSIS: Primary | ICD-10-CM

## 2022-04-06 NOTE — PROGRESS NOTES
Teresa Fernandez     PHYSICAL THERAPY EVALUATION    04/05/22 0700   General Information   Type of Visit Initial OP Ortho PT Evaluation   Start of Care Date 04/05/22   Referring Physician DR Munoz   Patient/Family Goals Statement decrease pain ,be able to walk better, not fall   Orders Evaluate and Treat   Date of Order 03/21/22   Certification Required? No   Medical Diagnosis R LE weakness, falls   Body Part(s)   Body Part(s) Lumbar Spine/SI   Presentation and Etiology   Pertinent history of current problem (include personal factors and/or comorbidities that impact the POC) R LE weakness, falls, feels she falls when she gets overheated. LAst summer was a problem, feeling dizzy and falling, then had lots of heart tests and cardiac is fine. Primary Pain R butt, posterior hip, when hip gets bad calf feels like its going to explode. Leg sx worst getting up from sitting, walking too long. Reporting one bad epsiode of incontince bladder 3 months ago. normal is just going a little on her way to bathroom.. L4-5 fusion 7-8 yrs ago. Issues off an on since with R hip, weak complaints   Onset date of current episode/exacerbation 03/21/22   Prior Level of Function   Functional Level Prior Comment walks TM, housework   Current Level of Function   Patient role/employment history A. Employed   Employment Comments , seated position   [facility] environment [facility]   Fall Risk Screen   Fall screen completed by PT   Have you fallen 2 or more times in the past year? Yes   Have you fallen and had an injury in the past year? No   Is patient a fall risk? Yes;Department fall risk interventions implemented   Abuse Screen (yes response referral indicated)   Physical Signs of Abuse Present no   Lumbar Spine/SI Objective Findings   Posture increased lordosis, ant tilt pelvis, R IC maybe slightly higher   Gait/Locomotion rotates pelvis due to lack of hip extension, anatalgic R   Flexion % no butt pain   Extension %  no increase pain   Right Side Bending ROM WNL no sx   Left Side Bending ROM WNL no sx   Pelvic Screen appears level   Hip Screen ROM WNL, except hip ext 10* approx   Hip Flexion (L2) Strength R 3+, L 4   Hip Abduction Strength R 3- to 3/5, L 4   Hip Adduction Strength weak isometric R worse than L   Hip Extension Strength 3/5 B   Knee Flexion Strength 4   Knee Extension (L3) Strength 5   Ankle Dorsiflexion (L4) Strength 4+ R, 5 L   Ankle Plantar Flexion (S1) Strength able to walk on tip toes   Hamstring Flexibility 90* B   Hip Flexor Flexibility tight B   Quadricep Flexibility WNL   Piriformis Flexibility normal   SLR neg   Palpation tender R gr troch, piriformis, glut med tendon insertion area   Planned Therapy Interventions   Planned Therapy Interventions strengthening;stretching;manual therapy;neuromuscular re-education   Clinical Impression   Criteria for Skilled Therapeutic Interventions Met yes, treatment indicated   PT Diagnosis R LE weakness, suspect radiculopathy   Functional limitations due to impairments walking sitting too long, getting up prom siting   Clinical Presentation Evolving/Changing   Clinical Presentation Rationale L4-5 fusion, long standing sx   Clinical Decision Making (Complexity) Moderate complexity   Therapy Frequency 1 time/week   Predicted Duration of Therapy Intervention (days/wks) 4wks   Risk & Benefits of therapy have been explained Yes   Patient, Family & other staff in agreement with plan of care Yes   Education Assessment   Preferred Learning Style Listening   Barriers to Learning No barriers   ORTHO GOALS   PT Ortho Eval Goals 1;2   Ortho Goal 1   Goal Identifier 1   Goal Description pt will be awble to get up from sitting without hip pain in 4wk   Target Date 05/03/22   Ortho Goal 2   Goal Identifier 2   Goal Description pt will be able to walk community distances, shopping w/o leg pain in 4wk   Target Date 05/03/22   Total Evaluation Time   PT Eval, Moderate Complexity Minutes  (20215) 25   [facility]  Kris Hoenk  PT  M [facility]  [address] [place] [address]  [email or web address]   Office: [phone]  Voicemail: [phone]

## 2022-04-06 NOTE — TELEPHONE ENCOUNTER
"Received 1 recent progress note - cover sheet comment \"the patient only saw Dr. Marie once.\"    Will proceed in transcribing injection order.    **Close encounter once everything (scheduling, prior authorization, etc) is done.**      Hannah Stockton  Patient Representative  [facility]  "

## 2022-04-07 NOTE — TELEPHONE ENCOUNTER
Screening Questions for Radiology Injections:    Injection to be done at which interventional clinic site? [facility]    Remind [place] Pt's that Covid test is no longer required except for sedation procedure Pt's.    Instruct patient to arrive as directed prior to the scheduled appointment time:    [facility] before      [place]: 30 minutes before; if IV needed 1 hour before     Procedure ordered by Frank    Procedure ordered? L3-4 interlaminar      Transforaminal Cervical JUAN -  [place] does NOT have providers that do these- [facility] [place] [facility] do have providers that do    As a reminder, receiving steroids can decrease your body's ability to fight infection.   Would you still like to move forward with scheduling the injection?  Yes    What insurance would patient like us to bill for this procedure? [employer]      Worker's comp or MVA (motor vehicle accident) -Any injection DO NOT SCHEDULE and route to Jose Daniel Lynch.      [employer] insurance - For SI joint injections, DO NOT SCHEDULE and route Madhuri Lowry.       ALL BCBS, [employer] and [employer]-Route to Madhuri for review DO NOT SCHEDULE      IF SCHEDULING IN [place] AND NEEDS A PA, IT IS OKAY TO SCHEDULE. [place] HANDLES THEIR OWN PA'S AFTER THE PATIENT IS SCHEDULED. PLEASE SCHEDULE AT LEAST 1 WEEK OUT SO A PA CAN BE OBTAINED.    Any chance of pregnancy? NO   If YES, do NOT schedule and route to RN pool    Is an  needed? No     Patient has a drive home? (mandatory) YES: ok    Is patient taking any blood thinners (That is: Coumadin, Warfarin, Jantoven, Pradaxa Xarelto, Eliquis, Edoxaban, Enoxaparin, Lovenox, Heparin, Arixtra, Fondaparinux, or Fragmin? OR Antiplatelet medication such as Plavix, Brilinta, or Effient? )? No   If hold needed, do NOT schedule, route to RN pool     Is patient taking any aspirin products (includes Excedrin and Fiorinal)? No     If more than 325mg/day, OK to schedule; Instruct pt to decrease to less  than 325 mg for 7 days AND route to RN pool    For CERVICAL procedures, hold all aspirin products for 6 days.     Tell pt that if aspirin product is not held for 6 days, the procedure WILL BE cancelled.      Does the patient have a bleeding or clotting disorder? No     If YES, okay to schedule AND route to RN nurse pool    For any patients with platelet count <100, must be forwarded to provider    Any allergies to contrast dye, iodine, shellfish, or numbing and steroid medications? Yes - Contrast Dye    If YES, add allergy information to appointment notes AND route to the RN pool     If JUAN and Contrast Dye / Iodine Allergy? DO NOT SCHEDULE, route to RN pool    Allergies: Omnipaque [iohexol] and Gluten     Is patient diabetic?  No  If YES, instruct them to bring their glucometer.    Does patient have an active infection or treated for one within the past week? No     Is patient currently taking any antibiotics?  No     For patients on chronic, preventative, or prophylactic antibiotics, procedures may be scheduled.     For patients on antibiotics for active or recent infection:antibiotic course must have been completed for 4 days    Is patient currently taking any steroid medications? (i.e. Prednisone, Medrol)  No     For patients on steroid medications, course must have been completed for 4 days    Is patient actively being treated for cancer or immunocompromised? No  If YES, do NOT schedule and route to RN pool     Are you able to get on and off an exam table with minimal or no assistance? Yes  If NO, do NOT schedule and route to RN pool    Are you able to roll over and lay on your stomach with minimal or no assistance? Yes  If NO, do NOT schedule and route to RN pool     Has the patient had a flu shot or any other vaccinations within 7 days before or after the procedure.  No     Have you recently had a COVID vaccine or have plans to get it in the near future? No    If yes, explain that for the vaccine to work best  they need to:       wait 1 week before and 1 week after getting Vaccine #1    wait 1 week before and 2 weeks after getting Vaccine #2    wait 1 week before and 2 weeks after getting Vaccine BOOSTER    If patient has concerns about the timing, send to RN pool     Does patient have an MRI/CT?  YES: MRI  Check Procedure Scheduling Grid to see if required.      Was the MRI done within the last 3 years?  Yes    If yes, where was the MRI done i.e.[facility], [facility], [place], [facility] etc? [facility]      If no, do not schedule and route to RN pool    If MRI was not done at [place], [facility] or [facility] do NOT schedule and route to RN pool.      If pt has an imaging disc, the injection MAY be scheduled but pt has to bring disc to appt.     If they show up without the disc the injection cannot be done    Procedure Specific Instructions:      If celiac plexus block, informed patient NPO for 6 hours and that it is okay to take medications with sips of water, especially blood pressure medications  Not Applicable         If this is for a cervical procedure, informed patient that aspirin needs to be held for 6 days.   Not Applicable      If IV needed:    Do not schedule procedures requiring IV placement in the first appointment of the day or first appointment after lunch. Do NOT schedule at 0745, 0815 or 1245. ok    Instructed pt to arrive 30 minutes early for IV start if required. (Check Procedure Scheduling Grid)  Not Applicable    Reminders:      If you are started on any steroids or antibiotics between now and your appointment, you must contact us because the procedure may need to be cancelled.  Yes      For all procedures except radiofrequency ablations (RFAs) and spinal cord stimulator (SCS) trials, informed patient:    IV sedation is not provided for this procedure.  If you feel that an oral anti-anxiety medication is needed, you can discuss this further with your referring provider or primary care  provider.  The Pain Clinic provider will discuss specifics of what the procedure includes at your appointment.  Most procedures last 10-20 minutes.  We use numbing medications to help with any discomfort during the procedure.  Not Applicable      For patients 85 or older we recommend having an adult stay w/ them for the remainder of the day.   ok    Does the patient have any questions?  NO  Tamika Lynch  [facility]

## 2022-04-11 ENCOUNTER — TRANSFERRED RECORDS (OUTPATIENT)
Dept: HEALTH INFORMATION MANAGEMENT | Facility: CLINIC | Age: 62
End: 2022-04-11
Payer: OTHER GOVERNMENT

## 2022-04-11 RX ORDER — DIPHENHYDRAMINE HCL 50 MG
50 CAPSULE ORAL ONCE
Qty: 1 CAPSULE | Refills: 0 | Status: SHIPPED | OUTPATIENT
Start: 2022-04-11 | End: 2022-04-11

## 2022-04-11 RX ORDER — METHYLPREDNISOLONE 32 MG/1
32 TABLET ORAL DAILY
Qty: 2 TABLET | Refills: 0 | Status: SHIPPED | OUTPATIENT
Start: 2022-04-11 | End: 2022-10-18

## 2022-04-11 NOTE — TELEPHONE ENCOUNTER
Contrast dye allergy reaction:  'Immediate throat swelling following around 1-2cc of omnipaque 300 for spine procedure'  Is premedicating needed? Yes, Required  [facility] Preferred     Injection scheduled for:  4/28/22 @ 315pm.    Pharm[facility]: [facility]     Contrast dye allergy pre-medication plan:    Take oral Medrol 32mg 12 hours before procedure time - 3:15 am (3:15 pm procedure)    Take oral Medrol 32mg again 2 hours before procedure time- 1:15 pm (3:15 pm procedure)     Take oral benadryl (diphenhydramine) 50mg 1 hour before procedure time.   -215 pm                Note:  Benadryl usually comes in 25mg tabs so it would be 2 tabs.    Was the above relayed to pt?: Yes Relayed    Injection intake questions reviewed?  YES  Infection/antibiotic information reviewed?  YES  Location confirmed: :Yes [place]  Is pt arriving early?:No  Has pt had COVID vaccine/booster?  :Yes 5/21  It is recommended that the?scheduling?of elective steroid injections (for joint pain, tendinopathies, and spine procedures) should be  by at least one week before or after receiving the 1st COVID vaccine and at least 2 weeks after receiving the 2nd COVID vaccine and booster in order to allow for one's body to fully respond to the vaccine.       Routed to provider to review and sign off on meds.    Valerie Mondragon RN, BSN, CMSRN  RN Care Coordinator  [facility]

## 2022-04-13 NOTE — TELEPHONE ENCOUNTER
Patient was scheduled in [place]. Called to reschedule to [place] on 5/3.    Routing to nursing as FYI.    Milena TEMPLETON    [facility]

## 2022-04-14 NOTE — TELEPHONE ENCOUNTER
Noted.  Appointment note added.       Harleen RN-BSN  [facility]

## 2022-04-22 ENCOUNTER — HOSPITAL ENCOUNTER (OUTPATIENT)
Dept: PHYSICAL THERAPY | Facility: CLINIC | Age: 62
Setting detail: THERAPIES SERIES
Discharge: HOME | End: 2022-04-22
Attending: PSYCHIATRY & NEUROLOGY
Payer: OTHER GOVERNMENT

## 2022-04-22 PROCEDURE — 97140 MANUAL THERAPY 1/> REGIONS: CPT | Mod: GP | Performed by: PHYSICAL THERAPIST

## 2022-04-22 PROCEDURE — 97110 THERAPEUTIC EXERCISES: CPT | Mod: GP | Performed by: PHYSICAL THERAPIST

## 2022-04-27 ENCOUNTER — TRANSFERRED RECORDS (OUTPATIENT)
Dept: HEALTH INFORMATION MANAGEMENT | Facility: CLINIC | Age: 62
End: 2022-04-27
Payer: OTHER GOVERNMENT

## 2022-05-03 ENCOUNTER — TRANSFERRED RECORDS (OUTPATIENT)
Dept: HEALTH INFORMATION MANAGEMENT | Facility: CLINIC | Age: 62
End: 2022-05-03

## 2022-05-03 ENCOUNTER — HOSPITAL ENCOUNTER (OUTPATIENT)
Dept: PALLIATIVE MEDICINE | Facility: CLINIC | Age: 62
Discharge: HOME | End: 2022-05-03
Attending: NURSE PRACTITIONER | Admitting: STUDENT IN AN ORGANIZED HEALTH CARE EDUCATION/TRAINING PROGRAM
Payer: OTHER GOVERNMENT

## 2022-05-03 VITALS
RESPIRATION RATE: 16 BRPM | TEMPERATURE: 98.6 F | OXYGEN SATURATION: 99 % | SYSTOLIC BLOOD PRESSURE: 143 MMHG | DIASTOLIC BLOOD PRESSURE: 85 MMHG | HEART RATE: 96 BPM

## 2022-05-03 DIAGNOSIS — M54.16 LUMBAR RADICULOPATHY: ICD-10-CM

## 2022-05-03 DIAGNOSIS — M48.00 SPINAL STENOSIS: ICD-10-CM

## 2022-05-03 PROCEDURE — 255N000002 HC RX 255 OP 636: Performed by: STUDENT IN AN ORGANIZED HEALTH CARE EDUCATION/TRAINING PROGRAM

## 2022-05-03 PROCEDURE — 250N000009 HC RX 250: Performed by: STUDENT IN AN ORGANIZED HEALTH CARE EDUCATION/TRAINING PROGRAM

## 2022-05-03 PROCEDURE — 250N000011 HC RX IP 250 OP 636: Performed by: STUDENT IN AN ORGANIZED HEALTH CARE EDUCATION/TRAINING PROGRAM

## 2022-05-03 PROCEDURE — 64483 NJX AA&/STRD TFRM EPI L/S 1: CPT | Mod: 50 | Performed by: STUDENT IN AN ORGANIZED HEALTH CARE EDUCATION/TRAINING PROGRAM

## 2022-05-03 RX ORDER — DEXAMETHASONE SODIUM PHOSPHATE 10 MG/ML
10 INJECTION, SOLUTION INTRAMUSCULAR; INTRAVENOUS ONCE
Status: COMPLETED | OUTPATIENT
Start: 2022-05-03 | End: 2022-05-03

## 2022-05-03 RX ORDER — LIDOCAINE HYDROCHLORIDE 10 MG/ML
5 INJECTION, SOLUTION EPIDURAL; INFILTRATION; INTRACAUDAL; PERINEURAL ONCE
Status: COMPLETED | OUTPATIENT
Start: 2022-05-03 | End: 2022-05-03

## 2022-05-03 RX ORDER — BUPIVACAINE HYDROCHLORIDE 2.5 MG/ML
10 INJECTION, SOLUTION EPIDURAL; INFILTRATION; INTRACAUDAL ONCE
Status: COMPLETED | OUTPATIENT
Start: 2022-05-03 | End: 2022-05-03

## 2022-05-03 RX ORDER — IOPAMIDOL 408 MG/ML
10 INJECTION, SOLUTION INTRATHECAL ONCE
Status: COMPLETED | OUTPATIENT
Start: 2022-05-03 | End: 2022-05-03

## 2022-05-03 RX ADMIN — SODIUM BICARBONATE 0.5 MEQ: 84 INJECTION, SOLUTION INTRAVENOUS at 15:04

## 2022-05-03 RX ADMIN — LIDOCAINE HYDROCHLORIDE 5 ML: 10 INJECTION, SOLUTION EPIDURAL; INFILTRATION; INTRACAUDAL; PERINEURAL at 15:06

## 2022-05-03 RX ADMIN — BUPIVACAINE HYDROCHLORIDE 25 MG: 2.5 INJECTION, SOLUTION EPIDURAL; INFILTRATION; INTRACAUDAL at 15:03

## 2022-05-03 RX ADMIN — LIDOCAINE HYDROCHLORIDE 2 ML: 10 INJECTION, SOLUTION EPIDURAL; INFILTRATION; INTRACAUDAL; PERINEURAL at 15:02

## 2022-05-03 RX ADMIN — IOPAMIDOL 3 ML: 408 INJECTION, SOLUTION INTRATHECAL at 15:04

## 2022-05-03 RX ADMIN — DEXAMETHASONE SODIUM PHOSPHATE 10 MG: 10 INJECTION, SOLUTION INTRAMUSCULAR; INTRAVENOUS at 15:04

## 2022-05-03 NOTE — PROGRESS NOTES
Patient feeling slightly dizzy upon getting up off of procedure table, after laying face-down.  BP WNL and patient having no symptoms after being wheeled to prep room.  Patient denies any breathing difficulty, itching or other symptoms.  Dizziness subsided quickly.  Patient drinking some soda.  Lungs clear.  No reaction to meds noted.

## 2022-05-03 NOTE — PROGRESS NOTES
Pre-procedure Intake  If YES to any questions or NO to having a   Please complete laminated checklist and leave on the computer keyboard for Provider, verbally inform provider if able.    Are you taking any any blood thinners such as Coumadin, Warfarin, Jantoven, Pradaxa Xarelto, Eliquis, Edoxaban, Enoxaparin, Lovenox, Heparin, Arixtra, Fondaparinux, or Fragmin? OR Antiplatelet medication such as Plavix, Brilinta, or Effient?   No     If yes, when did you take your last dose?     Do you take aspirin?  No    If cervical procedure, have you held aspirin for 6 days?   NA    Do you have any allergies to contrast dye, iodine, steroid and/or numbing medications?  YES: allergy to contrast, taking Methylprednisolone and Benadryl for procedure.  Had Benadryl at 2 pm, Methylprednisolone last at 1315 today.    Are you currently taking antibiotics or have an active infection?  NO    Have you had a fever/elevated temperature within the past week? NO    Are you currently taking oral steroids? YES: only for contrast allergy.    Do you have a ? Yes    Are you pregnant or breastfeeding?  Not Applicable    Have you received the COVID-19 vaccine? Yes    If yes, was it your 1st, 2nd or only dose needed? booster    Date of most recent vaccine: 11/29/2021    Notify provider and RNs if systolic BP >170, diastolic BP >100, P >100 or O2 sats < 90%

## 2022-05-03 NOTE — DISCHARGE INSTRUCTIONS
[facility]   Procedure Discharge Instructions    Today you saw:    Dr. Yu Mitchell    You had:   Bilateral L3/4 Transforaminal epidural steroid injection    Medications used:  Lidocaine   Bupivacaine   Isovue   Dexamethasone   Sodium bicarbonate    Be cautious when walking. Numbness and/or weakness in the lower extremities may occur for up to 6-8 hours after the procedure due to effect of the local anesthetic  Do not drive for 6 hours. The effect of the local anesthetic could slow your reflexes.   You may resume your regular activities after 24 hours  Avoid strenuous activity for the first 24 hours  You may shower, however avoid swimming, tub baths or hot tubs for 24 hours following your procedure  You may have a mild to moderate increase in pain for several days following the injection.  It may take up to 14 days for the steroid medication to start working although you may feel the effect as early as a few days after the procedure.     You may use ice packs for 10-15 minutes, 3 to 4 times a day at the injection site for comfort  Do not use heat to painful areas for 6 to 8 hours. This will give the local anesthetic time to wear off and prevent you from accidentally burning your skin.   Unless you have been directed to avoid the use of anti-inflammatory medications (NSAIDS), you may use medications such as ibuprofen, Aleve or Tylenol for pain control if needed.   If you were fasting, you may resume your normal diet and medications after the procedure  Possible side effects of steroids that you may experience include flushing, elevated blood pressure, increased appetite, mild headaches and restlessness.  All of these symptoms will get better with time.  If you experience any of the following, call the Pain Clinic during work hours (Mon-Friday 8-4:30 pm) at [phone] or the Provider Line after hours at [phone]:  -Fever over 100 degree F  -Swelling, bleeding, redness,  drainage, warmth at the injection site  -Progressive weakness or numbness in your legs   -Loss of bowel or bladder function  -Unusual headache that is not relieved by Tylenol or other pain reliever  -Unusual new onset of pain that is not improving

## 2022-05-03 NOTE — PROGRESS NOTES
"Pre procedure Diagnosis: lumbar spinal stenosis with neurogenic claudication   Post procedure Diagnosis: Same  Procedure performed: Bilateral L3/4 transforaminal epidural steroid injection   Anesthesia: none  Complications: none  Operators: Yu Mitchell MD     Needle: 22g 5\" spinal  Injectate: 3ml 0.25% bupivacaine, 10mg of dexamethasone    Indications:   Teresa Fernandez is a 62 year old female was [facility] (Dr. Tylor Marie) for L3/4 interlaminar epidural steroid injection. However, patient has a history of L4/5 posterior fusion, precluding L3/4 interlaminar epidural steroid injection. The decision was therefore made to perform bilateral L3/4 Transforaminal epidural steroid injections.  she has a history of midline low back pain and severe R buttock pain.  Exam shows slightly forward flexed gait and she has tried conservative treatment including medications, previous lumbar surgery.    MRI was done on 3/28/2022 which showed   - severe central stenosis at L3/4  - moderate to severe central narrowing at L2/3  - L4/5 fusion    Options/alternatives, benefits and risks were discussed with the patient including bleeding, infection, tissue trauma, numbness, weakness, paralysis, spinal cord injury, radiation exposure, headache and reaction to medications. Questions were answered to her satisfaction and she agrees to proceed. Voluntary informed consent was obtained and signed.     Vitals were reviewed: Yes  Allergies were reviewed:  Yes   Medications were reviewed:  Yes   Pre-procedure pain score: 8/10    Procedure:  After getting informed consent, patient was brought into the procedure suite and was placed in a prone position on the procedure table.   A Pause for the Cause was performed.  Patient was prepped and draped in sterile fashion.     After identifying the bilateral L3/4 neuroforamen, the C-arm was rotated to a left, then right lateral oblique angle.  A total of 3ml of " Lidocaine 1% was used to anesthetize the skin and the needle track at a skin entry site coaxial with the fluoroscopy beam, and overriding the superior aspect of the neuroforamen. Then a 22 gauge 5 inch spinal needle was placed coaxial with the fluoroscopy beam and overriding the superior aspect of the left, then right neuroforamen. The spinal needle was advanced under intermittent fluoroscopy until it entered the foramen superiorly.    The position was then inspected from anteroposterior and lateral views, and the needle adjusted appropriately.  A total of 1ml of Isovue 200 was injected, confirming appropriate position, with spread into the epidural space, with no intravascular uptake. 9ml was wasted    3ml of 0.25% bupivacaine and 10mg of dexamethasone were injected from separate syringes. Slightly greater than half the 10mg dexamethasone was injected into the right L3/4 neuroforamen, as that is the location of the majority of the patient's pain and the side on which the more optimal epidural dye spread was noted. The tubing was flushed with contrast dye between administering injectates.  The needle was removed.    During the procedure, there was not a paresthesia.   Hemostasis was achieved, the area was cleaned, and bandaids were placed when appropriate.  The patient tolerated the procedure well, and was taken to the recovery room.    Images were saved to PACS.    Post-procedure pain score: 2/10  Follow-up includes:   -f/u with referring provider    Yu Mitchell MD  [facility]

## 2022-05-12 DIAGNOSIS — E03.9 HYPOTHYROIDISM, UNSPECIFIED TYPE: ICD-10-CM

## 2022-05-12 RX ORDER — LEVOTHYROXINE SODIUM 100 UG/1
TABLET ORAL
Qty: 90 TABLET | Refills: 2 | Status: SHIPPED | OUTPATIENT
Start: 2022-05-12 | End: 2023-03-20

## 2022-05-13 ENCOUNTER — HOSPITAL ENCOUNTER (OUTPATIENT)
Dept: PHYSICAL THERAPY | Facility: CLINIC | Age: 62
Setting detail: THERAPIES SERIES
Discharge: HOME | End: 2022-05-13
Attending: PSYCHIATRY & NEUROLOGY
Payer: OTHER GOVERNMENT

## 2022-05-13 PROCEDURE — 97110 THERAPEUTIC EXERCISES: CPT | Mod: GP | Performed by: PHYSICAL THERAPIST

## 2022-05-13 PROCEDURE — 97140 MANUAL THERAPY 1/> REGIONS: CPT | Mod: GP | Performed by: PHYSICAL THERAPIST

## 2022-05-18 ENCOUNTER — HOSPITAL ENCOUNTER (OUTPATIENT)
Dept: CT IMAGING | Facility: CLINIC | Age: 62
Discharge: HOME | End: 2022-05-18
Attending: DERMATOLOGY | Admitting: DERMATOLOGY
Payer: OTHER GOVERNMENT

## 2022-05-18 DIAGNOSIS — J84.9 ILD (INTERSTITIAL LUNG DISEASE) (H): ICD-10-CM

## 2022-05-18 PROCEDURE — 71250 CT THORAX DX C-: CPT

## 2022-05-25 ENCOUNTER — LAB (OUTPATIENT)
Dept: LAB | Facility: CLINIC | Age: 62
End: 2022-05-25
Payer: OTHER GOVERNMENT

## 2022-05-25 DIAGNOSIS — E21.3 HPTH (HYPERPARATHYROIDISM) (H): ICD-10-CM

## 2022-05-25 DIAGNOSIS — Z11.4 SCREENING FOR HIV (HUMAN IMMUNODEFICIENCY VIRUS): Primary | ICD-10-CM

## 2022-05-25 LAB
ALBUMIN SERPL-MCNC: 3.7 G/DL (ref 3.4–5)
CALCIUM SERPL-MCNC: 10.5 MG/DL (ref 8.5–10.1)
CREAT SERPL-MCNC: 0.8 MG/DL (ref 0.52–1.04)
GFR SERPL CREATININE-BSD FRML MDRD: 83 ML/MIN/1.73M2
MAGNESIUM SERPL-MCNC: 2 MG/DL (ref 1.6–2.3)
PHOSPHATE SERPL-MCNC: 3.9 MG/DL (ref 2.5–4.5)
PTH-INTACT SERPL-MCNC: 80 PG/ML (ref 18–80)

## 2022-05-25 PROCEDURE — 83970 ASSAY OF PARATHORMONE: CPT

## 2022-05-25 PROCEDURE — 82310 ASSAY OF CALCIUM: CPT

## 2022-05-25 PROCEDURE — 82040 ASSAY OF SERUM ALBUMIN: CPT

## 2022-05-25 PROCEDURE — 82565 ASSAY OF CREATININE: CPT

## 2022-05-25 PROCEDURE — 84100 ASSAY OF PHOSPHORUS: CPT

## 2022-05-25 PROCEDURE — 36415 COLL VENOUS BLD VENIPUNCTURE: CPT

## 2022-05-25 PROCEDURE — 87389 HIV-1 AG W/HIV-1&-2 AB AG IA: CPT

## 2022-05-25 PROCEDURE — 83735 ASSAY OF MAGNESIUM: CPT

## 2022-05-25 PROCEDURE — 82306 VITAMIN D 25 HYDROXY: CPT

## 2022-05-26 LAB
DEPRECATED CALCIDIOL+CALCIFEROL SERPL-MC: 51 UG/L (ref 20–75)
HIV 1+2 AB+HIV1 P24 AG SERPL QL IA: NONREACTIVE

## 2022-06-01 ENCOUNTER — MYC MEDICAL ADVICE (OUTPATIENT)
Dept: FAMILY MEDICINE | Facility: CLINIC | Age: 62
End: 2022-06-01
Payer: OTHER GOVERNMENT

## 2022-06-01 DIAGNOSIS — E83.52 HYPERCALCEMIA: Primary | ICD-10-CM

## 2022-06-02 NOTE — TELEPHONE ENCOUNTER
Component      Latest Ref Rng & Units 5/25/2022   Calcium      8.5 - 10.1 mg/dL 10.5 (H)     Vin Patel,   I do not see a result note.  This was a [facility] patient.     Can you please address?    Juany Tripp RN on 6/2/2022 at 7:28 AM

## 2022-06-10 NOTE — RESULT ENCOUNTER NOTE
Results of labs forwarded to advice line for review.  Vitamin D optimal, parathyroid hormone normal (although upper limit of normal) and calcium slightly elevated.  Not on calcium supplement per last endocrinology progress note.  PCP has already reviewed results and plans to monitor calcium until patient establishes with endocrinology in 11/2022.  Am in agreement with this plan: Recheck planned in 1 month.  Will update PCP that she can reach out sooner if questions arise after follow-up labs are completed, especially if hypercalcemia is worsening.  Yunier Denson MD 6/9/2022 10:59 PM

## 2022-06-14 NOTE — TELEPHONE ENCOUNTER
Amanda Meléndez APRN CNP 13 hours ago (8:10 PM)        I put a order for a stress echo in and a cards referral. Can you please have her call and schedule the test. Patient is to contact Cardiac Services  at [phone], to schedule this appointment.     HERIBERTO Miller CNP  [facility]         Unsigned   Documentation      Pt given # to schedule Cardiology appt and Stress echo appt. [email or web address]   done

## 2022-06-16 ENCOUNTER — OFFICE VISIT (OUTPATIENT)
Dept: DERMATOLOGY | Facility: CLINIC | Age: 62
End: 2022-06-16
Payer: OTHER GOVERNMENT

## 2022-06-16 DIAGNOSIS — R06.02 SHORTNESS OF BREATH: ICD-10-CM

## 2022-06-16 DIAGNOSIS — L30.9 HAND DERMATITIS: Primary | ICD-10-CM

## 2022-06-16 DIAGNOSIS — R53.1 WEAKNESS: ICD-10-CM

## 2022-06-16 PROCEDURE — 99214 OFFICE O/P EST MOD 30 MIN: CPT | Performed by: DERMATOLOGY

## 2022-06-16 RX ORDER — TRIAMCINOLONE ACETONIDE 1 MG/G
CREAM TOPICAL 2 TIMES DAILY
Qty: 30 G | Refills: 3 | Status: SHIPPED | OUTPATIENT
Start: 2022-06-16

## 2022-06-16 ASSESSMENT — PAIN SCALES - GENERAL: PAINLEVEL: NO PAIN (0)

## 2022-06-16 NOTE — PROGRESS NOTES
[facility] Dermatology Note    Encounter Date: Jun 16, 2022    Dermatology Problem List:  1. Melanoma in situ, left chest  - s/p excision 7/27/2010 at [facility]  2.  Chronic burning rash of the upper extremities with biopsy showing a largely normal-appearing epidermis above mild perivascular lymphocytic inflammation without fungal elements or increased basement membrane noted on September 6, 2019  - Differential diagnosis includes (photo)allergic contact dermatitis, connective tissue disease (DM > NADIA as the former may lack significant epidermal changes)  - negative/normal: Aldolase, CK, PFTs, CT chest, myositis panel, urinalysis, SSA, and SSB  - DIEGO borderline positive 1:80,  (nml ; has been in 600s in the past)  - 7/2020 EMG: The EMG shows mild abnormalities. Insertional abnormality seen in the right medial gastrocnemius and a borderline to low tibial motor amplitude in the right leg could be consistent with an S1 radiculopathy although gluteus mirella did not show any changes and clinical correlation is advised. The low tibial motor amplitude on the right and peroneal amplitude on the left could also be related to body habitus and submaximal stimulation  - Did have drug eruption to hydroxychloroquine 6/2021  3. Subcutaneous nodule, left forearm - consistent with lipoma  4. Orogenital ulcers: suspect lichen planus   - patch testing: relevant sensitivities to Balsam of peru, Propolis, Wood tar mix, Amerchol L10 and lanolin alcohol, PVP Iodine, Butylhydroxyanisole (BHA), Palladium Chloride  - bx 6/4/21 nonspecific with negative DIF  - prior bx 2011 ulcer, left cheek  - prior: hydroxychloroquine (stopped due to rash), dexamethasone S&S, tacrolimus 1 mg dissolved in 250 mL water S&S TID-QID, augmented betamethasone ointment  5. Periorificial dermatitis: around nose - tacrolimus ointment  6. Workup for hypercalcemia/hypercalciuria ongoing per endocrine    ______________________________________    Impression/Plan:  1. Hand dermatitis, mild, with irritant dermatitis on the upper arms: not diagnostic for dermatomyositis or other connective tissue disease at this time. Recommend initiation of triamcinolone ointment BID to affected areas.  - triamcinolone 0.1% ointment    2. Raynaud's: on feet; discussed pathophysiology and behavioral interventions. Also briefly discussed pharmacotherapy with calcium channel blockers but defer for now.    3. Leg cramping/pain: had EMG recently but I'm not able to see the results of this in our system. Recommend patient contact performing provider for these results.     4. Hypercalcemia: workup ongoing with PCP and endocrine - has follow up with the latter in November.      Follow-up in 3 months.       Staff Involved:  Staff Only    Harrison Alcaraz MD   of Dermatology  Department of Dermatology  [facility] [employer]      CC:   Chief Complaint   Patient presents with     Derm Problem     Follow up on periorificial dermatitis, comes and goes        History of Present Illness:  Ms. Teresa Fernandez is a 62 year old female who presents as a return patient for follow up    Flaring this week - shoulders very achy - difficult to tolerate  - everything hurts - feels like hit by truck  - wrists, ankles, legs  - no fevers  - shortness of breath - infrequent but recurrent - occurs about 1-2x/week   - lasts ~7 minutes then resolves   - no palpitations or lightheadedness at that time    Some scaling and itching on palms - started in last few days  - has occurred in the past intermittently    Feet turn white in the bathtub - then blue - a little numb    Seeing endocrine for hypercalcemia - previous provider left but working on getting in with new provider  - PCP rechecking calcium next week    Neuromuscular - MRI spine showed spinal stenosis  - had another fall last week  - cortisone injection not helpful  -  had repeat EMG last month at [place] - hasn't heard results  - right leg feeling weak/numb     Labs:  5/25/22 Ca 10.5 (H), PTH normal, vitamin D normal, Phos 3.9    Past Medical History:   Past Medical History:   Diagnosis Date     ALLERGIC RHINITIS NOS 4/12/2005     Anxiety      Arthritis     herniated disc     Bronchitis, not specified as acute or chronic      CHR MAXILLARY SINUSITIS 4/12/2005     Depressive disorder, not elsewhere classified      Eczema 10/17/2012     Environmental and seasonal allergies      GERD (gastroesophageal reflux disease)      Gluten intolerance      Malignant neoplasm of renal pelvis (H) 1995    Renal cell carcinoma     Melanoma (H) 06/2010     Other specified acquired hypothyroidism 4/12/2005     Renal cancer (H) 5/5/2011     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     FUSION LUMBAR ANTERIOR TWO LEVELS  4/13/2015     GYN SURGERY  04/08/1999    hysterectomy.  [facility]     HYSTERECTOMY, PAP NO LONGER INDICATED       SOFT TISSUE SURGERY      chest-melonoma     SURGICAL HISTORY OF -   3/1996    Left nephrectomy-renal cell CA       Social History:   reports that she quit smoking about 26 years ago. She has a 30.00 pack-year smoking history. She has never used smokeless tobacco. She reports that she does not drink alcohol and does not use drugs.    Family History:  Family History   Problem Relation Age of Onset     Diabetes Mother      Cardiovascular Mother      Lipids Mother      Depression Mother      Hypertension Father      Lipids Father      Obesity Father      Macular Degeneration Father      Sleep Apnea Father      Thyroid Disease Sister      Hypertension Sister      Depression Sister      Allergies Sister      Lipids Sister      Thyroid Disease  Sister      Neurologic Disorder Sister      Depression Sister      Hypertension Brother      Colon Cancer Brother      Cancer Maternal Grandmother         kind unknown had sores on legs     Eczema Maternal Grandmother      Eczema Maternal Grandfather      Breast Cancer Paternal Grandmother      Cancer Paternal Grandmother         breast     Hypertension Paternal Grandfather      Cardiovascular Paternal Grandfather         heart attack     Allergies Son      Eczema Son      Respiratory Son      Sleep Apnea Son      Glaucoma No family hx of      Cerebrovascular Disease No family hx of        Medications:  Current Outpatient Medications   Medication Sig Dispense Refill     acetaminophen (TYLENOL) 325 MG tablet Take 325-650 mg by mouth every 6 hours as needed for mild pain       albuterol (PROAIR HFA/PROVENTIL HFA/VENTOLIN HFA) 108 (90 BASE) MCG/ACT Inhaler Inhale 2 puffs into the lungs every 6 hours as needed for shortness of breath / dyspnea 1 Inhaler 5     albuterol (PROVENTIL) (2.5 MG/3ML) 0.083% neb solution Take 1 vial (2.5 mg) by nebulization every 6 hours as needed for shortness of breath / dyspnea or wheezing 90 mL 11     APNO CREA ointment Apply to rash on nose twice daily as needed. 100 g 3     augmented betamethasone dipropionate (DIPROLENE-AF) 0.05 % external ointment Apply topically 2 times daily 50 g 3     Cholecalciferol (VITAMIN D3 PO) Take 2,000 Units by mouth 2 times daily        DULoxetine (CYMBALTA) 30 MG capsule Take 1 by mouth daily in addition to the 60 mg for a total of 90 mg 90 capsule 3     DULoxetine (CYMBALTA) 60 MG capsule Take 1 capsule (60 mg) by mouth daily 90 capsule 3     fexofenadine (ALLEGRA) 180 MG tablet Take 1 tablet (180 mg) by mouth daily 30 tablet 1     levothyroxine (SYNTHROID/LEVOTHROID) 100 MCG tablet TAKE ONE TABLET BY MOUTH ONCE DAILY ON MONDAY, WEDNESDAY, FRIDAY, SATURDAY AND SUNDAY. 90 tablet 2     levothyroxine (SYNTHROID/LEVOTHROID) 88 MCG tablet Take 1 tab by mouth  Tuesdays and Thursdays 45 tablet 3     methylPREDNISolone (MEDROL) 32 MG tablet Take 1 tablet (32 mg) by mouth in the morning. Take oral Medrol 32mg 12 hours before procedure time - 3:15 am (3:15 pm procedure) Take oral Medrol 32mg again 2 hours before procedure time- 1:15 pm (3:15 pm procedure) 2 tablet 0     mometasone (ELOCON) 0.1 % external ointment Apply topically twice a week Use on eczematous lesions 45 g 3     omeprazole (PRILOSEC) 20 MG DR capsule Take 20 mg by mouth daily       order for DME Equipment being ordered: Nebulizer 1 Units 0     OVER-THE-COUNTER Place 1 drop into both eyes 3 times daily. Systane Ultra, Systane Balance, Blink Tears or Refresh Optive Artificial Tear 1 Bottle      hydrOXYzine (ATARAX) 25 MG tablet Take 1 tablet (25 mg) by mouth 3 times daily as needed for itching (Patient not taking: No sig reported) 90 tablet 1     Allergies   Allergen Reactions     Omnipaque [Iohexol] Swelling and Difficulty breathing     Immediate throat swelling following around 1-2cc of omnipaque 300 for spine procedure  --> skin prick and intradermal tests with Iohexol negatives. But I would propose anyway to premedicate patient before using iodinated contrast media (for safety reasons).   --> no reactions in skin tests to MRI contrast media Gadovist     Gluten        Physical exam:  Vitals: LMP  (LMP Unknown)   GEN: This is a well developed, well-nourished female in no acute distress, in a pleasant mood.    SKIN: Oconnell phototype II  - Waist-up skin, which includes the head/face, neck, both arms, chest, upper back, digits and/or nails was examined.  - faint erythema on the upper arms  - faint erythema with scaling on the palms  - No other lesions of concern on areas examined.

## 2022-06-16 NOTE — LETTER
6/16/2022       RE: Teresa Fernandez  [id number] [address] [id number]     Dear Colleague,    Thank you for referring your patient, Teresa Fernandez, to the [facility] DERMATOLOGY [facility] [place] at [facility]. Please see a copy of my visit note below.    [facility] Dermatology Note    Encounter Date: Jun 16, 2022    Dermatology Problem List:  1. Melanoma in situ, left chest  - s/p excision 7/27/2010 at [facility]  2.  Chronic burning rash of the upper extremities with biopsy showing a largely normal-appearing epidermis above mild perivascular lymphocytic inflammation without fungal elements or increased basement membrane noted on September 6, 2019  - Differential diagnosis includes (photo)allergic contact dermatitis, connective tissue disease (DM > NADIA as the former may lack significant epidermal changes)  - negative/normal: Aldolase, CK, PFTs, CT chest, myositis panel, urinalysis, SSA, and SSB  - DIEGO borderline positive 1:80,  (nml ; has been in 600s in the past)  - 7/2020 EMG: The EMG shows mild abnormalities. Insertional abnormality seen in the right medial gastrocnemius and a borderline to low tibial motor amplitude in the right leg could be consistent with an S1 radiculopathy although gluteus mirella did not show any changes and clinical correlation is advised. The low tibial motor amplitude on the right and peroneal amplitude on the left could also be related to body habitus and submaximal stimulation  - Did have drug eruption to hydroxychloroquine 6/2021  3. Subcutaneous nodule, left forearm - consistent with lipoma  4. Orogenital ulcers: suspect lichen planus   - patch testing: relevant sensitivities to Balsam of peru, Propolis, Wood tar mix, Amerchol L10 and lanolin alcohol, PVP Iodine, Butylhydroxyanisole (BHA), Palladium Chloride  - bx 6/4/21 nonspecific with negative DIF  - prior bx 2011 ulcer,  left cheek  - prior: hydroxychloroquine (stopped due to rash), dexamethasone S&S, tacrolimus 1 mg dissolved in 250 mL water S&S TID-QID, augmented betamethasone ointment  5. Periorificial dermatitis: around nose - tacrolimus ointment  6. Workup for hypercalcemia/hypercalciuria ongoing per endocrine   ______________________________________    Impression/Plan:  1. Hand dermatitis, mild, with irritant dermatitis on the upper arms: not diagnostic for dermatomyositis or other connective tissue disease at this time. Recommend initiation of triamcinolone ointment BID to affected areas.  - triamcinolone 0.1% ointment    2. Raynaud's: on feet; discussed pathophysiology and behavioral interventions. Also briefly discussed pharmacotherapy with calcium channel blockers but defer for now.    3. Leg cramping/pain: had EMG recently but I'm not able to see the results of this in our system. Recommend patient contact performing provider for these results.     4. Hypercalcemia: workup ongoing with PCP and endocrine - has follow up with the latter in November.      Follow-up in 3 months.       Staff Involved:  Staff Only    Harrison Alcaraz MD   of Dermatology  Department of Dermatology  [facility] [employer]      CC:   Chief Complaint   Patient presents with     Derm Problem     Follow up on periorificial dermatitis, comes and goes        History of Present Illness:  Ms. Teresa Fernandez is a 62 year old female who presents as a return patient for follow up    Flaring this week - shoulders very achy - difficult to tolerate  - everything hurts - feels like hit by truck  - wrists, ankles, legs  - no fevers  - shortness of breath - infrequent but recurrent - occurs about 1-2x/week   - lasts ~7 minutes then resolves   - no palpitations or lightheadedness at that time    Some scaling and itching on palms - started in last few days  - has occurred in the past intermittently    Feet turn white in the  bathtub - then blue - a little numb    Seeing endocrine for hypercalcemia - previous provider left but working on getting in with new provider  - PCP rechecking calcium next week    Neuromuscular - MRI spine showed spinal stenosis  - had another fall last week  - cortisone injection not helpful  - had repeat EMG last month at [place] - hasn't heard results  - right leg feeling weak/numb     Labs:  5/25/22 Ca 10.5 (H), PTH normal, vitamin D normal, Phos 3.9    Past Medical History:   Past Medical History:   Diagnosis Date     ALLERGIC RHINITIS NOS 4/12/2005     Anxiety      Arthritis     herniated disc     Bronchitis, not specified as acute or chronic      CHR MAXILLARY SINUSITIS 4/12/2005     Depressive disorder, not elsewhere classified      Eczema 10/17/2012     Environmental and seasonal allergies      GERD (gastroesophageal reflux disease)      Gluten intolerance      Malignant neoplasm of renal pelvis (H) 1995    Renal cell carcinoma     Melanoma (H) 06/2010     Other specified acquired hypothyroidism 4/12/2005     Renal cancer (H) 5/5/2011     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     FUSION LUMBAR ANTERIOR TWO LEVELS  4/13/2015     GYN SURGERY  04/08/1999    hysterectomy.  [facility]     HYSTERECTOMY, PAP NO LONGER INDICATED       SOFT TISSUE SURGERY      chest-melonoma     SURGICAL HISTORY OF -   3/1996    Left nephrectomy-renal cell CA       Social History:   reports that she quit smoking about 26 years ago. She has a 30.00 pack-year smoking history. She has never used smokeless tobacco. She reports that she does not drink alcohol and does not use drugs.    Family History:  Family History   Problem Relation Age of Onset     Diabetes Mother      Cardiovascular Mother       Lipids Mother      Depression Mother      Hypertension Father      Lipids Father      Obesity Father      Macular Degeneration Father      Sleep Apnea Father      Thyroid Disease Sister      Hypertension Sister      Depression Sister      Allergies Sister      Lipids Sister      Thyroid Disease Sister      Neurologic Disorder Sister      Depression Sister      Hypertension Brother      Colon Cancer Brother      Cancer Maternal Grandmother         kind unknown had sores on legs     Eczema Maternal Grandmother      Eczema Maternal Grandfather      Breast Cancer Paternal Grandmother      Cancer Paternal Grandmother         breast     Hypertension Paternal Grandfather      Cardiovascular Paternal Grandfather         heart attack     Allergies Son      Eczema Son      Respiratory Son      Sleep Apnea Son      Glaucoma No family hx of      Cerebrovascular Disease No family hx of        Medications:  Current Outpatient Medications   Medication Sig Dispense Refill     acetaminophen (TYLENOL) 325 MG tablet Take 325-650 mg by mouth every 6 hours as needed for mild pain       albuterol (PROAIR HFA/PROVENTIL HFA/VENTOLIN HFA) 108 (90 BASE) MCG/ACT Inhaler Inhale 2 puffs into the lungs every 6 hours as needed for shortness of breath / dyspnea 1 Inhaler 5     albuterol (PROVENTIL) (2.5 MG/3ML) 0.083% neb solution Take 1 vial (2.5 mg) by nebulization every 6 hours as needed for shortness of breath / dyspnea or wheezing 90 mL 11     APNO CREA ointment Apply to rash on nose twice daily as needed. 100 g 3     augmented betamethasone dipropionate (DIPROLENE-AF) 0.05 % external ointment Apply topically 2 times daily 50 g 3     Cholecalciferol (VITAMIN D3 PO) Take 2,000 Units by mouth 2 times daily        DULoxetine (CYMBALTA) 30 MG capsule Take 1 by mouth daily in addition to the 60 mg for a total of 90 mg 90 capsule 3     DULoxetine (CYMBALTA) 60 MG capsule Take 1 capsule (60 mg) by mouth daily 90 capsule 3     fexofenadine (ALLEGRA)  180 MG tablet Take 1 tablet (180 mg) by mouth daily 30 tablet 1     levothyroxine (SYNTHROID/LEVOTHROID) 100 MCG tablet TAKE ONE TABLET BY MOUTH ONCE DAILY ON MONDAY, WEDNESDAY, FRIDAY, SATURDAY AND SUNDAY. 90 tablet 2     levothyroxine (SYNTHROID/LEVOTHROID) 88 MCG tablet Take 1 tab by mouth Tuesdays and Thursdays 45 tablet 3     methylPREDNISolone (MEDROL) 32 MG tablet Take 1 tablet (32 mg) by mouth in the morning. Take oral Medrol 32mg 12 hours before procedure time - 3:15 am (3:15 pm procedure) Take oral Medrol 32mg again 2 hours before procedure time- 1:15 pm (3:15 pm procedure) 2 tablet 0     mometasone (ELOCON) 0.1 % external ointment Apply topically twice a week Use on eczematous lesions 45 g 3     omeprazole (PRILOSEC) 20 MG DR capsule Take 20 mg by mouth daily       order for DME Equipment being ordered: Nebulizer 1 Units 0     OVER-THE-COUNTER Place 1 drop into both eyes 3 times daily. Systane Ultra, Systane Balance, Blink Tears or Refresh Optive Artificial Tear 1 Bottle      hydrOXYzine (ATARAX) 25 MG tablet Take 1 tablet (25 mg) by mouth 3 times daily as needed for itching (Patient not taking: No sig reported) 90 tablet 1     Allergies   Allergen Reactions     Omnipaque [Iohexol] Swelling and Difficulty breathing     Immediate throat swelling following around 1-2cc of omnipaque 300 for spine procedure  --> skin prick and intradermal tests with Iohexol negatives. But I would propose anyway to premedicate patient before using iodinated contrast media (for safety reasons).   --> no reactions in skin tests to MRI contrast media Gadovist     Gluten        Physical exam:  Vitals: LMP  (LMP Unknown)   GEN: This is a well developed, well-nourished female in no acute distress, in a pleasant mood.    SKIN: Oconnell phototype II  - Waist-up skin, which includes the head/face, neck, both arms, chest, upper back, digits and/or nails was examined.  - faint erythema on the upper arms  - faint erythema with scaling  on the palms  - No other lesions of concern on areas examined.

## 2022-06-16 NOTE — NURSING NOTE
Dermatology Rooming Note    Teresa Fernandez's goals for this visit include:   Chief Complaint   Patient presents with     Derm Problem     Follow up on periorificial dermatitis, comes and goes      Nataly Jara CMA on 6/16/2022 at 3:25 PM

## 2022-06-19 ENCOUNTER — MYC MEDICAL ADVICE (OUTPATIENT)
Dept: FAMILY MEDICINE | Facility: CLINIC | Age: 62
End: 2022-06-19
Payer: OTHER GOVERNMENT

## 2022-06-19 DIAGNOSIS — G62.9 PERIPHERAL POLYNEUROPATHY: Primary | ICD-10-CM

## 2022-06-20 NOTE — TELEPHONE ENCOUNTER
Routing to Provider to review and advise.     Refer to patients mychart message.     Jess Card RN BSN PHN

## 2022-06-25 ENCOUNTER — MYC MEDICAL ADVICE (OUTPATIENT)
Dept: FAMILY MEDICINE | Facility: CLINIC | Age: 62
End: 2022-06-25

## 2022-06-27 ENCOUNTER — VIRTUAL VISIT (OUTPATIENT)
Dept: FAMILY MEDICINE | Facility: CLINIC | Age: 62
End: 2022-06-27
Payer: OTHER GOVERNMENT

## 2022-06-27 DIAGNOSIS — U07.1 INFECTION DUE TO 2019 NOVEL CORONAVIRUS: Primary | ICD-10-CM

## 2022-06-27 PROCEDURE — 99441 PR PHYSICIAN TELEPHONE EVALUATION 5-10 MIN: CPT | Mod: CS | Performed by: NURSE PRACTITIONER

## 2022-06-27 NOTE — PATIENT INSTRUCTIONS
"  Instructions for Patients  Your COVID-19 test was positive. This means you have the virus. Please follow the \"How can I take care of myself\" and \"How do I self-isolate?\" guidelines in these instructions.    What treatments are available?  Over-the-counter medicines may help with your symptoms such as runny or stuffy nose, cough, chills, and fever. Talk to your care team about your options.     Some people are at high risk for severe illness (for example if you have a weak immune system, you're 65 or older, or you have certain medical problems). If your risk it high and your symptoms started in the last 5 to 7 days, we strongly recommend for you to get COVID treatment as soon as possible before you get really sick. Paxlovid, Molnupiravir and the monoclonal antibody treatments are proven safe and effective, make you feel better faster, and prevent hospitalization and death.       You can schedule an appointment to discuss COVID treatment by requesting an appointment on [employer] by selecting \"schedule COVID-19 Treatment\" or by calling [email or web address] ([phone]) and pressing 7.    What are the symptoms of COVID-19?  Symptoms can include fever, cough, shortness of breath, chills, headache, muscle pain sore throat, fatigue, runny or stuffy nose, and loss of taste and smell. Other less common symptoms include nausea, vomiting, or diarrhea (watery stools).    Know when to call 911. Emergency warning signs include:    Trouble breathing or shortness of breath    Pain or pressure in the chest that doesn't go away    Feeling confused like you haven't felt before, or not being able to wake up    Bluish-colored lips or face    How can I take care of myself?  1. Get lots of rest. Drink extra fluids (unless a doctor has told you not to).  2. Take Tylenol (acetaminophen) for fever or pain. If you have liver or kidney problems, ask your family doctor if it's okay to take Tylenol   Adults:   650 mg (two 325 mg pills or tablets) " every 4 to 6 hours, or...   1,000 mg (two 500 mg pills or tablets) every 8 hours as needed.  Note: Don't take more than 3,000 mg in one day. Acetaminophen is found in many medicines (both prescribed and over the counter). Read all labels to be sure you don't take too much.  For children, check the Tylenol bottle for the right dose. The dose is based on the child's age or weight.  3. Take over the counter medicines for your symptoms as needed. Talk to your pharmacist.  4. If you have other health problems (like cancer, heart failure, an organ transplant, or severe kidney disease): Call your specialty clinic if you don't feel better in the next 2 days.    These guidelines are for isolating and quarantining before returning to work, school or .     For employers, schools and day cares: This is an official notice for this person s medical guidelines for returning in-person.     For health care sites: The CDC gives different isolation and quarantine guidelines for healthcare sites, please check with these sites before arriving.     How do I self-isolate?  You isolate when you have symptoms of COVID or a test shows you have COVID, even if you don t have symptoms.     If you DO have symptoms:  o Stay home and away from others  - For at least 5 days after your symptoms started, AND   - You are fever free for 24 hours (with no medicine that reduces fever), AND  - Your other symptoms are better.  o Wear a mask for 10 full days any time you are around others.    If you DON T have symptoms:  o Stay at home and away from others for at least 5 days after your positive test.  o Wear a mask for 10 full days any time you are around others.    How and when do I quarantine?  You quarantine when you may have been exposed to the virus and DON T have symptoms.     Stay home and away from others.     You must quarantine for 5 days after your last contact with a person who has COVID.  o Note: If you are fully vaccinated, you don t  need to quarantine. You should still follow the steps below.     Wear a mask for 10 full days any time you re around others.    Get tested at least 5 days after you were exposed, even if you don t have symptoms.     If you start to have symptoms, isolate right away and get tested.    Where can I get more information?    [facility] COVID-19 Resource Hub: www.[facility]Hit.org/covid19/     CDC Quarantine & Isolation: https://www.cdc.gov/coronavirus/2019-ncov/your-health/quarantine-isolation.html     CDC - What to Do If You're Sick: https://www.cdc.gov/coronavirus/2019-ncov/if-you-are-sick/index.html    [facility] clinical trials (COVID-19 research studies): clinicalaffairs.[employer]/umn-clinical-trials    [place] Department of Health COVID-19 Public Hotline: 1-483.163.1123

## 2022-06-27 NOTE — PROGRESS NOTES
"Teresa is a 62 year old who is being evaluated via a billable telephone visit.      What phone number would you like to be contacted at? [phone]  How would you like to obtain your AVS? MyChart    Assessment & Plan     Infection due to 2019 novel coronavirus  Vaccinated, boosted. Discussed risk/benefits of treatment options, would like to proceed with Paxlovid. Symptomatic care, quarantine guidance discussed.  - nirmatrelvir and ritonavir (PAXLOVID) therapy pack; Take 3 tablets by mouth 2 times daily for 5 days Take 2 Nirmatrelvir tablets and 1 Ritonavir tablet twice daily for 5 days.         BMI:   Estimated body mass index is 34.91 kg/m  as calculated from the following:    Height as of 3/31/22: 1.727 m (5' 8\").    Weight as of 3/31/22: 104.1 kg (229 lb 9.6 oz).       COVID-19 positive patient.  Encounter for consideration of medication intervention. Patient does qualify for a prescription. Full discussion with patient including medication options, risks and benefits. Potential drug interactions reviewed with patient.     Treatment Planned Paxlovid, Rx sent to [facility]  [facility]   [phone] [address] [place] [address]    Hours:  Mon-Fri: 8:00a - 9:00p  Sat-Sun: 9:00a - 5:00p      Temporary change to home medications:  None     Estimated body mass index is 34.91 kg/m  as calculated from the following:    Height as of 3/31/22: 1.727 m (5' 8\").    Weight as of 3/31/22: 104.1 kg (229 lb 9.6 oz).  GFR Estimate   Date Value Ref Range Status   05/25/2022 83 >60 mL/min/1.73m2 Final     Comment:     Effective December 21, 2021 eGFRcr in adults is calculated using the 2021 CKD-EPI creatinine equation which includes age and gender (Pat thurston al., NEJM, DOI: 10.1056/ECTNsx1404087)   06/02/2021 78 >60 mL/min/[1.73_m2] Final     Comment:     Non  GFR Calc  Starting 12/18/2018, serum creatinine based estimated GFR (eGFR) will be   calculated using the Chronic Kidney Disease " Epidemiology Collaboration   (CKD-EPI) equation.       GFR, ESTIMATED POCT   Date Value Ref Range Status   03/03/2022 >60 >60 mL/min/1.73m2 Final     Lab Results   Component Value Date    [id number] Negative 01/03/2022       Return in about 1 week (around 7/4/2022) for worsening or continued symptoms.    HERIBERTO Baptiste CNP  [facility] is a 62 year old, presenting for the following health issues:  No chief complaint on file.      HPI     Tested positive for Covid Saturday morning with an at home test.   COVID-19 Symptom Review  How many days ago did these symptoms start? 4 days     Are any of the following symptoms significant for you?    New or worsening difficulty breathing? No    Worsening cough? Yes, I am coughing up mucus.    Fever or chills? Yes, I felt feverish or had chills.    Headache: YES    Sore throat: no    Chest pain: no    Diarrhea: no    Body aches? YES    What treatments has patient tried? Acetaminophen, nebulizer   Does patient live in a nursing home, group home, or shelter? No   Does patient have a way to get food/medications during quarantined? Yes, I have a friend or family member who can help me.                Review of Systems   Constitutional, HEENT, cardiovascular, pulmonary, gi and gu systems are negative, except as otherwise noted.      Objective           Vitals:  No vitals were obtained today due to virtual visit.    Physical Exam   healthy, alert and no distress  PSYCH: Alert and oriented times 3; coherent speech, normal   rate and volume, able to articulate logical thoughts, able   to abstract reason, no tangential thoughts, no hallucinations   or delusions  Her affect is normal  RESP: No cough, no audible wheezing, able to talk in full sentences  Remainder of exam unable to be completed due to telephone visits                Phone call duration: 7 minutes    .  ..

## 2022-06-29 ENCOUNTER — LAB (OUTPATIENT)
Dept: LAB | Facility: CLINIC | Age: 62
End: 2022-06-29
Payer: OTHER GOVERNMENT

## 2022-06-29 ENCOUNTER — TELEPHONE (OUTPATIENT)
Dept: FAMILY MEDICINE | Facility: CLINIC | Age: 62
End: 2022-06-29

## 2022-06-29 DIAGNOSIS — E83.52 HYPERCALCEMIA: ICD-10-CM

## 2022-06-29 LAB
CA-I BLD-MCNC: 5.2 MG/DL (ref 4.4–5.2)
CALCIUM SERPL-MCNC: 10.2 MG/DL (ref 8.5–10.1)
CREAT SERPL-MCNC: 0.84 MG/DL (ref 0.52–1.04)
GFR SERPL CREATININE-BSD FRML MDRD: 79 ML/MIN/1.73M2
MAGNESIUM SERPL-MCNC: 2.1 MG/DL (ref 1.6–2.3)
PHOSPHATE SERPL-MCNC: 3 MG/DL (ref 2.5–4.5)
PTH-INTACT SERPL-MCNC: 86 PG/ML (ref 15–65)

## 2022-06-29 PROCEDURE — 82565 ASSAY OF CREATININE: CPT

## 2022-06-29 PROCEDURE — 82310 ASSAY OF CALCIUM: CPT

## 2022-06-29 PROCEDURE — 82306 VITAMIN D 25 HYDROXY: CPT

## 2022-06-29 PROCEDURE — 83735 ASSAY OF MAGNESIUM: CPT

## 2022-06-29 PROCEDURE — 84100 ASSAY OF PHOSPHORUS: CPT

## 2022-06-29 PROCEDURE — 83970 ASSAY OF PARATHORMONE: CPT

## 2022-06-29 PROCEDURE — 82330 ASSAY OF CALCIUM: CPT

## 2022-06-29 PROCEDURE — 36415 COLL VENOUS BLD VENIPUNCTURE: CPT

## 2022-06-29 NOTE — TELEPHONE ENCOUNTER
Patient calling asking if she is still able to come in for a lab check even though she is covid psotive she is Day 6. RN advised that this is OKAY, she can still come in, wear a well fitted mask and do good hand hygiene.     Jess Card, BSN, RN, PHN

## 2022-06-30 LAB — DEPRECATED CALCIDIOL+CALCIFEROL SERPL-MC: 51 UG/L (ref 20–75)

## 2022-07-01 NOTE — RESULT ENCOUNTER NOTE
Mesfin Moore    Your labs are stable. See Endocrine in November as planned. Hope you are doing well. Please let us know if you have any questions.     Take care,    HERIBERTO Miller CNP

## 2022-07-08 NOTE — PROGRESS NOTES
PHYSICAL THERAPY DISCHARGE  05/13/22 1100   Signing Clinician's Name / Credentials   Signing clinician's name / credentials Kris Hoenk,PT   Session Number   Session Number 3 vistis /   Adult Goals   PT Ortho Eval Goals 1;2   Ortho Goal 1   Goal Identifier 1 still valid extend date 6/3   Goal Description pt will be able to get up from sitting without hip pain in 4wk   Goal Progress not met   Target Date 05/03/22   Ortho Goal 2   Goal Identifier 2 still valid extend date 6/3   Goal Description pt will be able to walk community distances, shopping w/o leg pain in 4wk   Goal Progress not met   Target Date 05/03/22   Subjective Report   Subjective Report R hip doing ok from getting both shins aching, wakes her at night   Treatment Interventions   Interventions Therapeutic Procedure/Exercise;Manual Therapy   Therapeutic Procedure/exercise   Therapeutic Procedures: strength, endurance, ROM, flexibillity minutes (09692) 10   Skilled Intervention stretches, and strength ex for HEP   Treatment Detail kike obliques 5sec x5B, TA set with march x5B, review SKC, TA set supine or seated   Manual Therapy   Manual Therapy: Mobilization, MFR, MLD, friction massage minutes (51170) 15   Skilled Intervention STM, triger points for mobility,pain   Treatment Detail prone STM B LB to sup glut L>R   Plan   Home program ex listed   Plan for next session not prescheduled, may not get in for 2 wks, cont PT, strength and MT  Patient has not been seen in over 30 days and will be discharged at this time.  Final status not known.     Total Session Time   Timed Code Treatment Minutes 25   Total Treatment Time (sum of timed and untimed services) 25   AMBULATORY CLINICS ONLY-MEDICAL AND TREATMENT DIAGNOSIS   Medical Diagnosis R LE weakness, falls   PT Diagnosis R LE weakness, suspect radiculopathy   [facility]  Kris Hoenk  PT  [facility]  [address] [place] [address]  [email or web address]   [email or web address]   Office: [phone]  Voicemail: [phone]

## 2022-07-11 ENCOUNTER — ANCILLARY PROCEDURE (OUTPATIENT)
Dept: GENERAL RADIOLOGY | Facility: CLINIC | Age: 62
End: 2022-07-11
Attending: NURSE PRACTITIONER
Payer: OTHER GOVERNMENT

## 2022-07-11 ENCOUNTER — OFFICE VISIT (OUTPATIENT)
Dept: FAMILY MEDICINE | Facility: CLINIC | Age: 62
End: 2022-07-11
Payer: OTHER GOVERNMENT

## 2022-07-11 VITALS
HEART RATE: 93 BPM | SYSTOLIC BLOOD PRESSURE: 122 MMHG | DIASTOLIC BLOOD PRESSURE: 78 MMHG | OXYGEN SATURATION: 97 % | TEMPERATURE: 98.7 F | RESPIRATION RATE: 20 BRPM | BODY MASS INDEX: 34.97 KG/M2 | WEIGHT: 230 LBS

## 2022-07-11 DIAGNOSIS — U07.1 INFECTION DUE TO 2019 NOVEL CORONAVIRUS: ICD-10-CM

## 2022-07-11 DIAGNOSIS — R06.2 WHEEZING: Primary | ICD-10-CM

## 2022-07-11 PROCEDURE — 71046 X-RAY EXAM CHEST 2 VIEWS: CPT | Mod: TC | Performed by: RADIOLOGY

## 2022-07-11 PROCEDURE — 99213 OFFICE O/P EST LOW 20 MIN: CPT | Performed by: NURSE PRACTITIONER

## 2022-07-11 RX ORDER — PREDNISONE 20 MG/1
40 TABLET ORAL DAILY
Qty: 10 TABLET | Refills: 0 | Status: SHIPPED | OUTPATIENT
Start: 2022-07-11 | End: 2022-07-16

## 2022-07-11 ASSESSMENT — PAIN SCALES - GENERAL: PAINLEVEL: NO PAIN (0)

## 2022-07-11 NOTE — PROGRESS NOTES
"  Assessment & Plan     Infection due to 2019 novel coronavirus  Suspect this is rebound COVID after Paxlovid as she had initial improvement in symptoms with return of cough, congestion. No clear evidence for retreatment with Paxlovid at this point. Symptomatic management discussed.    Wheezing  CXR appears c/w previous. No obvious infiltrate. Does have some wheezing on exam, likely exacerbated asthma. Start prednisone. Continue albuterol as needed. Follow up if not improving.  - XR Chest 2 Views  - predniSONE (DELTASONE) 20 MG tablet; Take 2 tablets (40 mg) by mouth daily for 5 days      BMI:   Estimated body mass index is 34.97 kg/m  as calculated from the following:    Height as of 3/31/22: 1.727 m (5' 8\").    Weight as of this encounter: 104.3 kg (230 lb).       Patient Instructions   Start prednisone.  Continue your inhalers/nebs.  Let me know if not improving.      No follow-ups on file.    HERIBERTO Baptiste CNP  [facility] [place] [facility]    Eric Moore is a 62 year old, presenting for the following health issues:  Cough      History of Present Illness       Reason for visit:  Cough  Symptoms include:  Head ack,cough, chest discomfort  Symptom intensity:  Moderate  Symptom progression:  Staying the same  Had these symptoms before:  No  What makes it worse:  No  What makes it better:  Tylenol    She eats 2-3 servings of fruits and vegetables daily.She consumes 1 sweetened beverage(s) daily.She exercises with enough effort to increase her heart rate 20 to 29 minutes per day.  She exercises with enough effort to increase her heart rate 4 days per week. She is missing 1 dose(s) of medications per week.  She is not taking prescribed medications regularly due to remembering to take.       Above HPI reviewed. Additionally, treated with Paxlovid 6/27/22. Had initial improvement in symptoms with return of cough, congestin. No fevers. No shortness of breath. Feels asthma is worse.        Review of " Systems   Constitutional, HEENT, cardiovascular, pulmonary, gi and gu systems are negative, except as otherwise noted.      Objective    /78 (BP Location: Left arm, Patient Position: Sitting, Cuff Size: Adult Large)   Pulse 93   Temp 98.7  F (37.1  C) (Tympanic)   Resp 20   Wt 104.3 kg (230 lb)   LMP  (LMP Unknown)   SpO2 97%   BMI 34.97 kg/m    Body mass index is 34.97 kg/m .  Physical Exam  Vitals and nursing note reviewed.   Constitutional:       Appearance: Normal appearance.   HENT:      Head: Normocephalic and atraumatic.      Right Ear: Tympanic membrane and ear canal normal.      Left Ear: Tympanic membrane and ear canal normal.      Nose: Nose normal. No rhinorrhea.      Right Sinus: No maxillary sinus tenderness or frontal sinus tenderness.      Left Sinus: No maxillary sinus tenderness or frontal sinus tenderness.      Mouth/Throat:      Lips: Pink.      Mouth: Mucous membranes are moist.      Pharynx: Oropharynx is clear.   Eyes:      General: Lids are normal.      Conjunctiva/sclera: Conjunctivae normal.      Comments: Non icteric   Cardiovascular:      Rate and Rhythm: Normal rate and regular rhythm.      Pulses: Normal pulses.      Heart sounds: Normal heart sounds, S1 normal and S2 normal.   Pulmonary:      Effort: Pulmonary effort is normal.      Breath sounds: Normal air entry. Wheezing (expiratory, bilateral) present.   Musculoskeletal:      Cervical back: Neck supple.   Lymphadenopathy:      Cervical: No cervical adenopathy.   Skin:     General: Skin is warm and dry.   Neurological:      General: No focal deficit present.      Mental Status: She is alert and oriented to person, place, and time.   Psychiatric:         Mood and Affect: Mood normal.         Behavior: Behavior normal.         Thought Content: Thought content normal.         Judgment: Judgment normal.            CXR - Reviewed and interpreted by me Normal- no infiltrates, effusions, pneumothoraces, cardiomegaly or  masses                .  ..

## 2022-08-01 ENCOUNTER — MYC MEDICAL ADVICE (OUTPATIENT)
Dept: FAMILY MEDICINE | Facility: CLINIC | Age: 62
End: 2022-08-01

## 2022-08-01 ENCOUNTER — TRANSFERRED RECORDS (OUTPATIENT)
Dept: HEALTH INFORMATION MANAGEMENT | Facility: CLINIC | Age: 62
End: 2022-08-01

## 2022-08-05 DIAGNOSIS — M35.00 SJOGREN SYNDROME, UNSPECIFIED (H): Primary | ICD-10-CM

## 2022-08-22 NOTE — TELEPHONE ENCOUNTER
RECORDS RECEIVED FROM:    REASON FOR VISIT: Peripheral polyneuropathy per pt referral and recs in epic per pt    Date of Appt: 11.28.22   NOTES (FOR ALL VISITS) STATUS DETAILS   OFFICE NOTE from referring provider Internal Amanda Meléndez NP @ [facility] Med:  10/18/22  6/19/22 mychart encounter   OFFICE NOTE from other specialist Internal Dr Tylor Marie @ [facility] Ortho:  4/4/22    Dr Donnie Munoz @ [facility]  Neurology:  3/21/22    Dr Naik @ [facility] Neurology:  5/27/21  1/15/21    Dr Callahan @ [facility] Neurology:  7/31/20  7/3/20  6/12/20  4/24/20  3/6/20    Dr Alvino Zavala @ [facility] Neurology:  7/17/18 1/9/18 11/7/17   DISCHARGE REPORT from the ER Internal [facility] [place]:  10/23/22   MEDICATION LIST Internal    IMAGING  (FOR ALL VISITS)     EMG Received/Internal [facility] Ortho:  5/3/22  2/6/19    [facility]:  7/22/20 11/7/17    [facility]:  3/1/18   MRI (HEAD, NECK, SPINE) Internal [facility]:  MRI Brain 10/28/22  [facility] Brain 10/28/22  3.28.22 lumbar spine  3.28.22 cervical spine  3.16.22 brain      Action 8.22.22 MJ   Action Taken Per [email or web address] message pt seen by Dr. Gayle and has EMG. Need to call pt to see where EMG was performed at.    10/27/22 MV 12.01pm  EMG request faxed to O    11/16/22 MV 2.49pm  EMG rec'd and sent to scanning

## 2022-08-24 ENCOUNTER — OFFICE VISIT (OUTPATIENT)
Dept: CARDIOLOGY | Facility: CLINIC | Age: 62
End: 2022-08-24
Attending: INTERNAL MEDICINE
Payer: OTHER GOVERNMENT

## 2022-08-24 VITALS
TEMPERATURE: 97.5 F | SYSTOLIC BLOOD PRESSURE: 129 MMHG | BODY MASS INDEX: 34.94 KG/M2 | DIASTOLIC BLOOD PRESSURE: 78 MMHG | OXYGEN SATURATION: 100 % | WEIGHT: 229.8 LBS | HEART RATE: 91 BPM

## 2022-08-24 DIAGNOSIS — R42 DIZZINESS: ICD-10-CM

## 2022-08-24 PROCEDURE — 99214 OFFICE O/P EST MOD 30 MIN: CPT | Performed by: INTERNAL MEDICINE

## 2022-08-24 NOTE — PROGRESS NOTES
HPI and Plan:   Today I had the pleasure of seeing Teresa Fernandez at [facility]. She is a pleasant 62 year old patient with a past medical history of obstructive sleep apnea, chronic fatigue, GERD, left nephrectomy for RCC in 1996, anxiety and hypothyroidism who presents to the clinic for follow-up visit.  I had initially seen her in initial consultation for multiple falls.    Her initial work-up for syncope was negative [Zio patch, EKG].  However, the echo reported ejection fraction of 45% with wall motion abnormality.  A stress test showed normal ejection fraction and no scars.  This was confirmed by cardiac MRI which also showed normal left ventricular systolic function and no reversible ischemia or LGE.  I reviewed these tests independently today.    Today, the patient tells me that she has not had any episodes of passing out since I saw her last.  She however does report few episodes of falling due to twisting her ankle.  She tells me that further work-up of falls led to the diagnosis of severe peripheral neuropathy which has now been believed to be the cause of her falls.  She says that she sometimes cannot feel her feet hitting the ground due to neuropathy and this leads to imbalance and fall.  She has not passed out during any of these episodes.    Assessment and plan:   1.  Multiple episodes of falls-noncardiac  2.  S/p left nephrectomy in 1996  3.  Hyperlipidemia- mg/dL  4.  Lumbar decompression surgery around 2015  5.  Peripheral neuropathy    I discussed with the patient that we have done a comprehensive cardiac work-up all of which was pretty much unremarkable.  I agree with her that her falls are most likely secondary to peripheral neuropathy.  From cardiac standpoint there is not much for me to offer at this time.  I think she can be followed with her primary care physician and can be referred back to our clinic in case need arises.  We did talk about lifestyle  modification to lower the cholesterol.    Thank you for allowing me to participate in the care of Teresa Fernandez    This note was completed in part using Dragon voice recognition software. Although reviewed after completion, some word and grammatical errors may occur.    Andrew Betancourt MD  Cardiology    No orders of the defined types were placed in this encounter.      No orders of the defined types were placed in this encounter.      There are no discontinued medications.    Encounter Diagnosis   Name Primary?     Dizziness        CURRENT MEDICATIONS:  Current Outpatient Medications   Medication Sig Dispense Refill     acetaminophen (TYLENOL) 325 MG tablet Take 325-650 mg by mouth every 6 hours as needed for mild pain       albuterol (PROAIR HFA/PROVENTIL HFA/VENTOLIN HFA) 108 (90 BASE) MCG/ACT Inhaler Inhale 2 puffs into the lungs every 6 hours as needed for shortness of breath / dyspnea 1 Inhaler 5     albuterol (PROVENTIL) (2.5 MG/3ML) 0.083% neb solution Take 1 vial (2.5 mg) by nebulization every 6 hours as needed for shortness of breath / dyspnea or wheezing 90 mL 11     APNO CREA ointment Apply to rash on nose twice daily as needed. 100 g 3     augmented betamethasone dipropionate (DIPROLENE-AF) 0.05 % external ointment Apply topically 2 times daily 50 g 3     Cholecalciferol (VITAMIN D3 PO) Take 2,000 Units by mouth 2 times daily        DULoxetine (CYMBALTA) 30 MG capsule Take 1 by mouth daily in addition to the 60 mg for a total of 90 mg 90 capsule 3     DULoxetine (CYMBALTA) 60 MG capsule Take 1 capsule (60 mg) by mouth daily 90 capsule 3     fexofenadine (ALLEGRA) 180 MG tablet Take 1 tablet (180 mg) by mouth daily 30 tablet 1     hydrOXYzine (ATARAX) 25 MG tablet Take 1 tablet (25 mg) by mouth 3 times daily as needed for itching 90 tablet 1     levothyroxine (SYNTHROID/LEVOTHROID) 100 MCG tablet TAKE ONE TABLET BY MOUTH ONCE DAILY ON MONDAY, WEDNESDAY, FRIDAY, SATURDAY AND SUNDAY. 90 tablet 2      levothyroxine (SYNTHROID/LEVOTHROID) 88 MCG tablet Take 1 tab by mouth Tuesdays and Thursdays 45 tablet 3     mometasone (ELOCON) 0.1 % external ointment Apply topically twice a week Use on eczematous lesions 45 g 3     omeprazole (PRILOSEC) 20 MG DR capsule Take 20 mg by mouth daily       OVER-THE-COUNTER Place 1 drop into both eyes 3 times daily. Systane Ultra, Systane Balance, Blink Tears or Refresh Optive Artificial Tear 1 Bottle      triamcinolone (KENALOG) 0.1 % external cream Apply topically 2 times daily 30 g 3     methylPREDNISolone (MEDROL) 32 MG tablet Take 1 tablet (32 mg) by mouth in the morning. Take oral Medrol 32mg 12 hours before procedure time - 3:15 am (3:15 pm procedure) Take oral Medrol 32mg again 2 hours before procedure time- 1:15 pm (3:15 pm procedure) (Patient not taking: Reported on 8/24/2022) 2 tablet 0     order for DME Equipment being ordered: Nebulizer 1 Units 0       ALLERGIES     Allergies   Allergen Reactions     Omnipaque [Iohexol] Swelling and Difficulty breathing     Immediate throat swelling following around 1-2cc of omnipaque 300 for spine procedure  --> skin prick and intradermal tests with Iohexol negatives. But I would propose anyway to premedicate patient before using iodinated contrast media (for safety reasons).   --> no reactions in skin tests to MRI contrast media Gadovist     Gluten        PAST MEDICAL HISTORY:  Past Medical History:   Diagnosis Date     ALLERGIC RHINITIS NOS 4/12/2005     Anxiety      Arthritis     herniated disc     Bronchitis, not specified as acute or chronic      CHR MAXILLARY SINUSITIS 4/12/2005     Depressive disorder, not elsewhere classified      Eczema 10/17/2012     Environmental and seasonal allergies      GERD (gastroesophageal reflux disease)      Gluten intolerance      Malignant neoplasm of renal pelvis (H) 1995    Renal cell carcinoma     Melanoma (H) 06/2010     Other specified acquired hypothyroidism 4/12/2005     Renal cancer (H)  5/5/2011     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     FUSION LUMBAR ANTERIOR TWO LEVELS  4/13/2015     GYN SURGERY  04/08/1999    hysterectomy.  [facility]     HYSTERECTOMY, PAP NO LONGER INDICATED       SOFT TISSUE SURGERY      chest-melonoma     SURGICAL HISTORY OF -   3/1996    Left nephrectomy-renal cell CA       FAMILY HISTORY:  Family History   Problem Relation Age of Onset     Diabetes Mother      Cardiovascular Mother      Lipids Mother      Depression Mother      Hypertension Father      Lipids Father      Obesity Father      Macular Degeneration Father      Sleep Apnea Father      Thyroid Disease Sister      Hypertension Sister      Depression Sister      Allergies Sister      Lipids Sister      Thyroid Disease Sister      Neurologic Disorder Sister      Depression Sister      Hypertension Brother      Colon Cancer Brother      Cancer Maternal Grandmother         kind unknown had sores on legs     Eczema Maternal Grandmother      Eczema Maternal Grandfather      Breast Cancer Paternal Grandmother      Cancer Paternal Grandmother         breast     Hypertension Paternal Grandfather      Cardiovascular Paternal Grandfather         heart attack     Allergies Son      Eczema Son      Respiratory Son      Sleep Apnea Son      Glaucoma No family hx of      Cerebrovascular Disease No family hx of        SOCIAL HISTORY:  Social History     Socioeconomic History     Marital status:      Spouse name: None     Number of children: None     Years of education: None     Highest education level: None   Occupational History     Employer: [employer]     Employer: SELF   Tobacco Use     Smoking status: Former Smoker     Packs/day: 2.00     Years: 15.00      Pack years: 30.00     Quit date: 3/26/1996     Years since quittin.4     Smokeless tobacco: Never Used   Vaping Use     Vaping Use: Never used   Substance and Sexual Activity     Alcohol use: Yes     Comment: rare     Drug use: No     Sexual activity: Yes     Partners: Male   Other Topics Concern     Parent/sibling w/ CABG, MI or angioplasty before 65F 55M? No   Social History Narrative    .  Lives with .  Makes medical supplies.     4 children - healthy    4 siblings - + FH depression, hypertension, chol, diabetes mellitus    No family of muscle problems.        Review of Systems:  Skin:          Eyes:         ENT:         Respiratory:  Positive for dyspnea on exertion;shortness of breath     Cardiovascular:    Positive for;edema;fatigue;heaviness    Gastroenterology:        Genitourinary:         Musculoskeletal:         Neurologic:         Psychiatric:         Heme/Lymph/Imm:         Endocrine:           Physical Exam:  Vitals: /78   Pulse 91   Temp 97.5  F (36.4  C) (Tympanic)   Wt 104.2 kg (229 lb 12.8 oz)   LMP  (LMP Unknown)   SpO2 100%   BMI 34.94 kg/m    Eyes: No icterus.  Pulmonary: Chest symmetric, lungs clear bilaterally and no crackles, wheezes or rales.  Cardiovascular: RRR with normal S1 and S2, no murmur, JVP normal.  Musculoskeletal: Edema of the lower extremities: None.  Neurologic: Oriented and appropriate without obvious focal deficits.   Psychiatric: Normal affect.     Recent Lab Results:  LIPID RESULTS:  Lab Results   Component Value Date    CHOL 232 (H) 08/10/2021    CHOL 237 (H) 2020    HDL 70 08/10/2021    HDL 62 2020     (H) 08/10/2021     (H) 2020    TRIG 87 08/10/2021    TRIG 123 2020    CHOLHDLRATIO 4.3 2015       LIVER ENZYME RESULTS:  Lab Results   Component Value Date    AST 22 2022    AST 24 2021    ALT 32 2022    ALT 31 2021       CBC RESULTS:  Lab Results   Component Value Date     WBC 5.6 09/30/2021    WBC 5.6 06/02/2021    RBC 4.59 09/30/2021    RBC 4.52 06/02/2021    HGB 13.5 09/30/2021    HGB 13.3 06/02/2021    HCT 40.1 09/30/2021    HCT 40.2 06/02/2021    MCV 87 09/30/2021    MCV 89 06/02/2021    MCH 29.4 09/30/2021    MCH 29.4 06/02/2021    MCHC 33.7 09/30/2021    MCHC 33.1 06/02/2021    RDW 12.7 09/30/2021    RDW 12.4 06/02/2021     09/30/2021     06/02/2021       BMP RESULTS:  Lab Results   Component Value Date     09/30/2021     06/02/2021    POTASSIUM 4.0 09/30/2021    POTASSIUM 4.1 06/02/2021    CHLORIDE 105 09/30/2021    CHLORIDE 102 06/02/2021    CO2 29 09/30/2021    CO2 27 06/02/2021    ANIONGAP 4 09/30/2021    ANIONGAP 6 06/02/2021    GLC 89 09/30/2021    GLC 97 06/02/2021    BUN 10 09/30/2021    BUN 9 06/02/2021    CR 0.84 06/29/2022    CR 0.81 06/02/2021    GFRESTIMATED 79 06/29/2022    GFRESTIMATED >60 03/03/2022    GFRESTIMATED 78 06/02/2021    GFRESTBLACK >90 06/02/2021    ANNIE 10.2 (H) 06/29/2022    ANNIE 9.7 06/02/2021        A1C RESULTS:  Lab Results   Component Value Date    A1C 5.1 03/25/2022    A1C 5.3 11/14/2014       INR RESULTS:  Lab Results   Component Value Date    INR 0.94 04/08/2015       HERIBERTO Foote CNP  [address] [place] [address]    All medical records were reviewed in detail and discussed with the patient. Greater than 30 mins were spent with the patient, 50% of this time was spent on counseling and coordination of care.  After visit summary was printed and given to the patient.

## 2022-08-24 NOTE — LETTER
8/24/2022    Amanda Meléndez, HERIBERTO CNP  [address]  [facility] [address]    RE: Teresa Fernandez       Dear Colleague,     I had the pleasure of seeing Teresa Fernandez in the [facility].  HPI and Plan:   Today I had the pleasure of seeing Teresa Fernandez at [facility]. She is a pleasant 62 year old patient with a past medical history of obstructive sleep apnea, chronic fatigue, GERD, left nephrectomy for RCC in 1996, anxiety and hypothyroidism who presents to the clinic for follow-up visit.  I had initially seen her in initial consultation for multiple falls.    Her initial work-up for syncope was negative [Zio patch, EKG].  However, the echo reported ejection fraction of 45% with wall motion abnormality.  A stress test showed normal ejection fraction and no scars.  This was confirmed by cardiac MRI which also showed normal left ventricular systolic function and no reversible ischemia or LGE.  I reviewed these tests independently today.    Today, the patient tells me that she has not had any episodes of passing out since I saw her last.  She however does report few episodes of falling due to twisting her ankle.  She tells me that further work-up of falls led to the diagnosis of severe peripheral neuropathy which has now been believed to be the cause of her falls.  She says that she sometimes cannot feel her feet hitting the ground due to neuropathy and this leads to imbalance and fall.  She has not passed out during any of these episodes.    Assessment and plan:   1.  Multiple episodes of falls-noncardiac  2.  S/p left nephrectomy in 1996  3.  Hyperlipidemia- mg/dL  4.  Lumbar decompression surgery around 2015  5.  Peripheral neuropathy    I discussed with the patient that we have done a comprehensive cardiac work-up all of which was pretty much unremarkable.  I agree with her that her falls are most likely secondary to peripheral neuropathy.  From cardiac  standpoint there is not much for me to offer at this time.  I think she can be followed with her primary care physician and can be referred back to our clinic in case need arises.  We did talk about lifestyle modification to lower the cholesterol.    Thank you for allowing me to participate in the care of Teresa Fernandez    This note was completed in part using Dragon voice recognition software. Although reviewed after completion, some word and grammatical errors may occur.    Andrew Betancourt MD  Cardiology    No orders of the defined types were placed in this encounter.      No orders of the defined types were placed in this encounter.      There are no discontinued medications.    Encounter Diagnosis   Name Primary?     Dizziness        CURRENT MEDICATIONS:  Current Outpatient Medications   Medication Sig Dispense Refill     acetaminophen (TYLENOL) 325 MG tablet Take 325-650 mg by mouth every 6 hours as needed for mild pain       albuterol (PROAIR HFA/PROVENTIL HFA/VENTOLIN HFA) 108 (90 BASE) MCG/ACT Inhaler Inhale 2 puffs into the lungs every 6 hours as needed for shortness of breath / dyspnea 1 Inhaler 5     albuterol (PROVENTIL) (2.5 MG/3ML) 0.083% neb solution Take 1 vial (2.5 mg) by nebulization every 6 hours as needed for shortness of breath / dyspnea or wheezing 90 mL 11     APNO CREA ointment Apply to rash on nose twice daily as needed. 100 g 3     augmented betamethasone dipropionate (DIPROLENE-AF) 0.05 % external ointment Apply topically 2 times daily 50 g 3     Cholecalciferol (VITAMIN D3 PO) Take 2,000 Units by mouth 2 times daily        DULoxetine (CYMBALTA) 30 MG capsule Take 1 by mouth daily in addition to the 60 mg for a total of 90 mg 90 capsule 3     DULoxetine (CYMBALTA) 60 MG capsule Take 1 capsule (60 mg) by mouth daily 90 capsule 3     fexofenadine (ALLEGRA) 180 MG tablet Take 1 tablet (180 mg) by mouth daily 30 tablet 1     hydrOXYzine (ATARAX) 25 MG tablet Take 1 tablet (25 mg) by mouth 3  times daily as needed for itching 90 tablet 1     levothyroxine (SYNTHROID/LEVOTHROID) 100 MCG tablet TAKE ONE TABLET BY MOUTH ONCE DAILY ON MONDAY, WEDNESDAY, FRIDAY, SATURDAY AND SUNDAY. 90 tablet 2     levothyroxine (SYNTHROID/LEVOTHROID) 88 MCG tablet Take 1 tab by mouth Tuesdays and Thursdays 45 tablet 3     mometasone (ELOCON) 0.1 % external ointment Apply topically twice a week Use on eczematous lesions 45 g 3     omeprazole (PRILOSEC) 20 MG DR capsule Take 20 mg by mouth daily       OVER-THE-COUNTER Place 1 drop into both eyes 3 times daily. Systane Ultra, Systane Balance, Blink Tears or Refresh Optive Artificial Tear 1 Bottle      triamcinolone (KENALOG) 0.1 % external cream Apply topically 2 times daily 30 g 3     methylPREDNISolone (MEDROL) 32 MG tablet Take 1 tablet (32 mg) by mouth in the morning. Take oral Medrol 32mg 12 hours before procedure time - 3:15 am (3:15 pm procedure) Take oral Medrol 32mg again 2 hours before procedure time- 1:15 pm (3:15 pm procedure) (Patient not taking: Reported on 8/24/2022) 2 tablet 0     order for DME Equipment being ordered: Nebulizer 1 Units 0       ALLERGIES     Allergies   Allergen Reactions     Omnipaque [Iohexol] Swelling and Difficulty breathing     Immediate throat swelling following around 1-2cc of omnipaque 300 for spine procedure  --> skin prick and intradermal tests with Iohexol negatives. But I would propose anyway to premedicate patient before using iodinated contrast media (for safety reasons).   --> no reactions in skin tests to MRI contrast media Gadovist     Gluten        PAST MEDICAL HISTORY:  Past Medical History:   Diagnosis Date     ALLERGIC RHINITIS NOS 4/12/2005     Anxiety      Arthritis     herniated disc     Bronchitis, not specified as acute or chronic      CHR MAXILLARY SINUSITIS 4/12/2005     Depressive disorder, not elsewhere classified      Eczema 10/17/2012     Environmental and seasonal allergies      GERD (gastroesophageal reflux  disease)      Gluten intolerance      Malignant neoplasm of renal pelvis (H) 1995    Renal cell carcinoma     Melanoma (H) 06/2010     Other specified acquired hypothyroidism 4/12/2005     Renal cancer (H) 5/5/2011     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     FUSION LUMBAR ANTERIOR TWO LEVELS  4/13/2015     GYN SURGERY  04/08/1999    hysterectomy.  [facility]     HYSTERECTOMY, PAP NO LONGER INDICATED       SOFT TISSUE SURGERY      chest-melonoma     SURGICAL HISTORY OF -   3/1996    Left nephrectomy-renal cell CA       FAMILY HISTORY:  Family History   Problem Relation Age of Onset     Diabetes Mother      Cardiovascular Mother      Lipids Mother      Depression Mother      Hypertension Father      Lipids Father      Obesity Father      Macular Degeneration Father      Sleep Apnea Father      Thyroid Disease Sister      Hypertension Sister      Depression Sister      Allergies Sister      Lipids Sister      Thyroid Disease Sister      Neurologic Disorder Sister      Depression Sister      Hypertension Brother      Colon Cancer Brother      Cancer Maternal Grandmother         kind unknown had sores on legs     Eczema Maternal Grandmother      Eczema Maternal Grandfather      Breast Cancer Paternal Grandmother      Cancer Paternal Grandmother         breast     Hypertension Paternal Grandfather      Cardiovascular Paternal Grandfather         heart attack     Allergies Son      Eczema Son      Respiratory Son      Sleep Apnea Son      Glaucoma No family hx of      Cerebrovascular Disease No family hx of        SOCIAL HISTORY:  Social History     Socioeconomic History     Marital status:      Spouse name: None     Number of children: None     Years of  education: None     Highest education level: None   Occupational History     Employer: [employer]     Employer: SELF   Tobacco Use     Smoking status: Former Smoker     Packs/day: 2.00     Years: 15.00     Pack years: 30.00     Quit date: 3/26/1996     Years since quittin.4     Smokeless tobacco: Never Used   Vaping Use     Vaping Use: Never used   Substance and Sexual Activity     Alcohol use: Yes     Comment: rare     Drug use: No     Sexual activity: Yes     Partners: Male   Other Topics Concern     Parent/sibling w/ CABG, MI or angioplasty before 65F 55M? No   Social History Narrative    .  Lives with .  Makes medical supplies.     4 children - healthy    4 siblings - + FH depression, hypertension, chol, diabetes mellitus    No family of muscle problems.        Review of Systems:  Skin:          Eyes:         ENT:         Respiratory:  Positive for dyspnea on exertion;shortness of breath     Cardiovascular:    Positive for;edema;fatigue;heaviness    Gastroenterology:        Genitourinary:         Musculoskeletal:         Neurologic:         Psychiatric:         Heme/Lymph/Imm:         Endocrine:           Physical Exam:  Vitals: /78   Pulse 91   Temp 97.5  F (36.4  C) (Tympanic)   Wt 104.2 kg (229 lb 12.8 oz)   LMP  (LMP Unknown)   SpO2 100%   BMI 34.94 kg/m    Eyes: No icterus.  Pulmonary: Chest symmetric, lungs clear bilaterally and no crackles, wheezes or rales.  Cardiovascular: RRR with normal S1 and S2, no murmur, JVP normal.  Musculoskeletal: Edema of the lower extremities: None.  Neurologic: Oriented and appropriate without obvious focal deficits.   Psychiatric: Normal affect.     Recent Lab Results:  LIPID RESULTS:  Lab Results   Component Value Date    CHOL 232 (H) 08/10/2021    CHOL 237 (H) 2020    HDL 70 08/10/2021    HDL 62 2020     (H) 08/10/2021     (H) 2020    TRIG 87 08/10/2021    TRIG 123 2020    CHOLHDLRATIO 4.3 2015        LIVER ENZYME RESULTS:  Lab Results   Component Value Date    AST 22 03/25/2022    AST 24 06/02/2021    ALT 32 03/25/2022    ALT 31 06/02/2021       CBC RESULTS:  Lab Results   Component Value Date    WBC 5.6 09/30/2021    WBC 5.6 06/02/2021    RBC 4.59 09/30/2021    RBC 4.52 06/02/2021    HGB 13.5 09/30/2021    HGB 13.3 06/02/2021    HCT 40.1 09/30/2021    HCT 40.2 06/02/2021    MCV 87 09/30/2021    MCV 89 06/02/2021    MCH 29.4 09/30/2021    MCH 29.4 06/02/2021    MCHC 33.7 09/30/2021    MCHC 33.1 06/02/2021    RDW 12.7 09/30/2021    RDW 12.4 06/02/2021     09/30/2021     06/02/2021       BMP RESULTS:  Lab Results   Component Value Date     09/30/2021     06/02/2021    POTASSIUM 4.0 09/30/2021    POTASSIUM 4.1 06/02/2021    CHLORIDE 105 09/30/2021    CHLORIDE 102 06/02/2021    CO2 29 09/30/2021    CO2 27 06/02/2021    ANIONGAP 4 09/30/2021    ANIONGAP 6 06/02/2021    GLC 89 09/30/2021    GLC 97 06/02/2021    BUN 10 09/30/2021    BUN 9 06/02/2021    CR 0.84 06/29/2022    CR 0.81 06/02/2021    GFRESTIMATED 79 06/29/2022    GFRESTIMATED >60 03/03/2022    GFRESTIMATED 78 06/02/2021    GFRESTBLACK >90 06/02/2021    ANNIE 10.2 (H) 06/29/2022    ANNIE 9.7 06/02/2021        A1C RESULTS:  Lab Results   Component Value Date    A1C 5.1 03/25/2022    A1C 5.3 11/14/2014       INR RESULTS:  Lab Results   Component Value Date    INR 0.94 04/08/2015       CC  Amanda Meléndez, APRN CNP  [address] [place] [address]    All medical records were reviewed in detail and discussed with the patient. Greater than 30 mins were spent with the patient, 50% of this time was spent on counseling and coordination of care.  After visit summary was printed and given to the patient.    Thank you for allowing me to participate in the care of your patient.      Sincerely,     Andrew Betancourt MD     [facility]  cc:   Andrew Betancourt MD  [address]  [address]  [place] [address]

## 2022-09-06 DIAGNOSIS — E03.9 HYPOTHYROIDISM, UNSPECIFIED TYPE: ICD-10-CM

## 2022-09-07 RX ORDER — LEVOTHYROXINE SODIUM 88 UG/1
TABLET ORAL
Qty: 45 TABLET | Refills: 3 | OUTPATIENT
Start: 2022-09-07

## 2022-09-08 ENCOUNTER — LAB (OUTPATIENT)
Dept: LAB | Facility: CLINIC | Age: 62
End: 2022-09-08
Payer: OTHER GOVERNMENT

## 2022-09-08 DIAGNOSIS — M35.00 SJOGREN SYNDROME, UNSPECIFIED (H): ICD-10-CM

## 2022-09-08 PROCEDURE — 86235 NUCLEAR ANTIGEN ANTIBODY: CPT

## 2022-09-08 PROCEDURE — 86038 ANTINUCLEAR ANTIBODIES: CPT

## 2022-09-08 PROCEDURE — 36415 COLL VENOUS BLD VENIPUNCTURE: CPT

## 2022-09-08 PROCEDURE — 86039 ANTINUCLEAR ANTIBODIES (ANA): CPT

## 2022-09-08 PROCEDURE — 86235 NUCLEAR ANTIGEN ANTIBODY: CPT | Mod: 59

## 2022-09-09 LAB
ENA SS-A AB SER IA-ACNC: <0.5 U/ML
ENA SS-A AB SER IA-ACNC: NEGATIVE
ENA SS-B IGG SER IA-ACNC: <0.6 U/ML
ENA SS-B IGG SER IA-ACNC: NEGATIVE
U1 SNRNP IGG SER IA-ACNC: 1.5 U/ML
U1 SNRNP IGG SER IA-ACNC: NEGATIVE

## 2022-09-13 LAB
ANA PAT SER IF-IMP: ABNORMAL
ANA SER QL IF: POSITIVE
ANA TITR SER IF: ABNORMAL {TITER}

## 2022-09-19 ASSESSMENT — ENCOUNTER SYMPTOMS
DECREASED CONCENTRATION: 1
HEARTBURN: 1
NECK PAIN: 1
PANIC: 0
BOWEL INCONTINENCE: 1
BLOATING: 1
SLEEP DISTURBANCES DUE TO BREATHING: 0
DEPRESSION: 1
VOMITING: 0
HYPOTENSION: 1
SHORTNESS OF BREATH: 1
LOSS OF CONSCIOUSNESS: 0
WEIGHT LOSS: 0
HEADACHES: 1
JAUNDICE: 0
EYE REDNESS: 0
HYPERTENSION: 0
INCREASED ENERGY: 1
HEMATURIA: 0
DOUBLE VISION: 1
PALPITATIONS: 0
SPEECH CHANGE: 1
NAUSEA: 1
EXERCISE INTOLERANCE: 1
RECTAL PAIN: 0
HALLUCINATIONS: 0
WHEEZING: 1
DYSPNEA ON EXERTION: 1
FATIGUE: 1
POLYDIPSIA: 0
NIGHT SWEATS: 0
CONSTIPATION: 1
DYSURIA: 0
NERVOUS/ANXIOUS: 1
COUGH DISTURBING SLEEP: 0
NAIL CHANGES: 1
CHILLS: 0
EYE PAIN: 1
LEG PAIN: 1
SEIZURES: 0
ALTERED TEMPERATURE REGULATION: 1
PARALYSIS: 0
DISTURBANCES IN COORDINATION: 1
POLYPHAGIA: 0
MEMORY LOSS: 1
DECREASED APPETITE: 0
ORTHOPNEA: 1
WEAKNESS: 1
MUSCLE WEAKNESS: 1
TREMORS: 1
WEIGHT GAIN: 1
EYE IRRITATION: 1
ABDOMINAL PAIN: 1
BLOOD IN STOOL: 0
NUMBNESS: 1
MYALGIAS: 1
SNORES LOUDLY: 0
FLANK PAIN: 0
SPUTUM PRODUCTION: 0
BACK PAIN: 1
EYE WATERING: 1
DIZZINESS: 1
POOR WOUND HEALING: 0
INSOMNIA: 1
COUGH: 1
JOINT SWELLING: 1
DIFFICULTY URINATING: 1
SKIN CHANGES: 0
POSTURAL DYSPNEA: 1
HEMOPTYSIS: 0
STIFFNESS: 1
FEVER: 0
LIGHT-HEADEDNESS: 1
ARTHRALGIAS: 1
SYNCOPE: 0
TINGLING: 1
MUSCLE CRAMPS: 1

## 2022-09-23 NOTE — PROGRESS NOTES
[facility]    Follow up for bone pain    Interval history:  Was seen by Dr Henry, Endocrinology one year ago re bone pain  Today coming in for hypercalcemia    Patient had repeated labs done, and in May and June a mildly elevated serum calcium was noted.  Patient denies taking extra calcium supplements, denies taking any Tums.  Has some GERD but consistently only takes omeprazole.  Denies having taken hydrochlorothiazide or lithium.    Patient is without fracture history, history of renal cell cancer however not kidney stones    Family history is positive for thyroid disease in her sister, however no parathyroid or abnormal calcium levels.    History of bone pain  Her bone pain was located at her right lower extremity, and photo evaluation revealed a neuropathy with an abnormal EMG.  There also had been repeated trauma from recurrent falls the patient has been struggling with.    Recent syncope, negative work up in Cardiology, multiple falls,   Recurrent reactive airways disease, thought to be sequela of prior Covid infection from 2019    Evaluation for metabolic bone diseaes in 11/2021 revealed elevated PTHi, otherwise unremarable at that point including CMP, CBC, CK, ESR, CRP and x-rays of pelvis, hips, right tib/fib unrevealing. Concern for osteoporosis prompted a DXA scan. This has not been done sofar.    Abdominal pain,bloated after eating    Medications  Methylprednisolone dose pack  Duloxetine 60 mg daily       History of fracture no, kidney stones no, one kidney    Hypothyroidism  This was diagnosed in 1996 after nephrectomy, patient notes that thyroid hormone levels have only been mildly abnormal.  However her symptoms improved significantly this levothyroxine started.  Takes medication as directed in the morning with water separates from food for 30 minutes or more  LT4 100 mcg takes in am , since 1996,       Symptoms of hypo- and hyperthyroidism:  Weight change unable to loose;  heat or cold intolerance still getting hot flashes, cannot stand heat, mostly at night; abnormal bowel movements variable IBSchange in hair or skin male pattern balding, FH positive of hair loss only in males; anxiety  or depression yes variable; fatigue tired; heart racing some; tremors right hand; menses age 46y    Exposure to radiation: no      Assessment:  Middle age woman with right lower extremity bone pain, hypothyroidism, OA, depression, allergies, gluten intolerance, history of renal cell Ca, melanoma more than 10 years ago, St post Left nephrectomy, hysterectomy, nicotine abuse (2 PPD?), obesity BMI 34, St post COVID in 2019, now with mild hypercalcemia with mildly elevated PTHi with normal renal function. Increase in Calcium over the last year, PTHi elevated 1 year ago. No treatment with diuretics, no lithium. DD Primary hyperparathyroidism, less likely FHH, as labs in the past seem normal.  Complications: will eval urine Calium, bone density. Unclear whether bone pain due to hyperparathyroidism, check bone specific alk phos.    Plan:   24h urine Calcium and creatinine  DXA  Labs including ionized Calcium, mag phos pthi 25ohVItD      40 minutes spent on the date of the encounter doing chart review, review of test results, interpretation of tests, patient visit and documentation     This note was generated using computer recognized voice recognition. This might result in some expected imperfection.    Leigh Ann Ellsworth MD  Endocrinology and Diabetes  Telephone contact:  [facility] [place] [phone]  [facility] [phone]       Medications:   Current Outpatient Medications   Medication Sig Dispense Refill     acetaminophen (TYLENOL) 325 MG tablet Take 325-650 mg by mouth every 6 hours as needed for mild pain       albuterol (PROAIR HFA/PROVENTIL HFA/VENTOLIN HFA) 108 (90 BASE) MCG/ACT Inhaler Inhale 2 puffs into the lungs every 6 hours as needed for  shortness of breath / dyspnea 1 Inhaler 5     albuterol (PROVENTIL) (2.5 MG/3ML) 0.083% neb solution Take 1 vial (2.5 mg) by nebulization every 6 hours as needed for shortness of breath / dyspnea or wheezing 90 mL 11     APNO CREA ointment Apply to rash on nose twice daily as needed. 100 g 3     augmented betamethasone dipropionate (DIPROLENE-AF) 0.05 % external ointment Apply topically 2 times daily 50 g 3     Cholecalciferol (VITAMIN D3 PO) Take 2,000 Units by mouth 2 times daily        DULoxetine (CYMBALTA) 30 MG capsule Take 1 by mouth daily in addition to the 60 mg for a total of 90 mg 90 capsule 3     DULoxetine (CYMBALTA) 60 MG capsule Take 1 capsule (60 mg) by mouth daily 90 capsule 3     fexofenadine (ALLEGRA) 180 MG tablet Take 1 tablet (180 mg) by mouth daily 30 tablet 1     hydrOXYzine (ATARAX) 25 MG tablet Take 1 tablet (25 mg) by mouth 3 times daily as needed for itching 90 tablet 1     levothyroxine (SYNTHROID/LEVOTHROID) 100 MCG tablet TAKE ONE TABLET BY MOUTH ONCE DAILY ON MONDAY, WEDNESDAY, FRIDAY, SATURDAY AND SUNDAY. 90 tablet 2     levothyroxine (SYNTHROID/LEVOTHROID) 88 MCG tablet Take 1 tab by mouth Tuesdays and Thursdays 45 tablet 3     methylPREDNISolone (MEDROL) 32 MG tablet Take 1 tablet (32 mg) by mouth in the morning. Take oral Medrol 32mg 12 hours before procedure time - 3:15 am (3:15 pm procedure) Take oral Medrol 32mg again 2 hours before procedure time- 1:15 pm (3:15 pm procedure) (Patient not taking: Reported on 8/24/2022) 2 tablet 0     mometasone (ELOCON) 0.1 % external ointment Apply topically twice a week Use on eczematous lesions 45 g 3     omeprazole (PRILOSEC) 20 MG DR capsule Take 20 mg by mouth daily       order for DME Equipment being ordered: Nebulizer 1 Units 0     OVER-THE-COUNTER Place 1 drop into both eyes 3 times daily. Systane Ultra, Systane Balance, Blink Tears or Refresh Optive Artificial Tear 1 Bottle      triamcinolone (KENALOG) 0.1 % external cream Apply  "topically 2 times daily 30 g 3       Social History:  Social History     Tobacco Use     Smoking status: Former Smoker     Packs/day: 2.00     Years: 15.00     Pack years: 30.00     Quit date: 3/26/1996     Years since quittin.5     Smokeless tobacco: Never Used   Substance Use Topics     Alcohol use: Yes     Comment: rare       Family History  FAMILY HISTORY      Physical Examination:  /81 (BP Location: Left arm, Patient Position: Sitting, Cuff Size: Adult Regular)   Pulse 81   Ht 1.708 m (5' 7.25\")   Wt 104.3 kg (230 lb)   LMP  (LMP Unknown)   SpO2 98%   BMI 35.76 kg/m      Wt Readings from Last 4 Encounters:   22 104.2 kg (229 lb 12.8 oz)   22 104.3 kg (230 lb)   22 104.1 kg (229 lb 9.6 oz)   22 103.4 kg (228 lb)       General: Well appearing obese woman in no distress, weight evenly distributed between trunk and extremities, no peripheral wasing.   Eyes: EOMI. Sclerae and conjunctivae are clear.   HENT: No thyromegaly or mass.  No moon facies  Lymphatic: No cervical or supraclavicular lymphadenopathy.  Cardiovascular: RRR, with normal S1+S2 and no murmurs.   Skin: No rash or lesions. No abdominal striae.    Extremities: trace bilateral peripheral edema.   Neurologic: No tremor with hands outstretched. 1+ patellar reflexes.      Labs and Studies:     Lab Results   Component Value Date    ANNIE 10.2 (H) 2022    ANNIE 10.5 (H) 2022    ANNIE 10.0 2022    ANNIE 9.8 2021    ANNIE 9.4 2021    ANNIE 9.7 2021    ANNIE 9.8 05/10/2021    ANNIE 9.7 2021    ANNIE 10.1 2019    ANNIE 9.6 2018    ANNIE 9.7 09/15/2017    ANNIE 9.4 2017    ANNIE 9.4 2016    ANNIE 9.0 2015    ANNIE 9.8 2015    ANNIE 9.7 2014    ANNIE 9.1 2013    ANNIE 9.8 2012    ANNIE 9.4 2011    ANNIE 9.1 2011    ANNIE 10.2 2009    ANNIE 9.8 2009    ANNIE 9.3 08/15/2005    ANNIE 9.1 2005    ANNIE 9.9 2005         Lab Results   Component " Value Date    ANNIE 10.2 (H) 06/29/2022    ANNIE 10.5 (H) 05/25/2022    ANNIE 10.0 02/01/2022    ANNIE 9.8 11/08/2021    ALBUMIN 3.7 05/25/2022    ALT 32 03/25/2022    PHOS 3.0 06/29/2022    PTHI 86 (H) 06/29/2022    PTHI 80 05/25/2022    PTHI 82 (H) 02/01/2022    PTHI 95 (H) 11/26/2021    PTHI 107 (H) 11/08/2021    PTHI 72 11/07/2017    AST 22 03/25/2022    BILITOTAL 0.3 03/25/2022    CR 0.84 06/29/2022    CR 0.80 05/25/2022    CR 0.7 03/03/2022     09/30/2021    TSH 2.70 03/25/2022    ALKPHOS 99 03/25/2022    HGB 13.5 09/30/2021             Lab Results   Component Value Date    CHLORIDE 105 09/30/2021    CO2 29 09/30/2021    CR 0.84 06/29/2022    ALKPHOS 99 03/25/2022    PTHI 86 (H) 06/29/2022    TSH 2.70 03/25/2022    T4 1.00 02/18/2021    HGB 13.5 09/30/2021       Lab Results   Component Value Date    ALT 32 03/25/2022    AST 22 03/25/2022    ALBUMIN 3.7 05/25/2022    BILITOTAL 0.3 03/25/2022       Results for orders placed during the hospital encounter of 05/14/16    US Thyroid    Narrative  US THYROID   5/14/2016 9:19 AM    HISTORY: Hypothyroidism.    COMPARISON: None.    FINDINGS:  The thyroid gland is of normal size. The right lobe of the  thyroid measures 5.4 x 1.2 x 0.8 cm. The left lobe of the thyroid  measures 5.5 x 0.6 x 0.9 cm. The isthmus measures 0.2 cm. Background  thyroid parenchymal echogenicity is mildly heterogeneous.    Individual thyroid nodules are measured as follows:  1. Small partially calcified nodule within the isthmus on the right  measure 0.7 x 0.4 x 0.5 cm.    Impression  IMPRESSION:  1. Subcentimeter partially calcified nodule in the isthmus measures  0.7 cm  2. Thyroid parenchymal echogenicity is mildly heterogeneous, however  no other discrete thyroid nodules are seen.    ANNABEL DOZIER MD      Results for orders placed in visit on 06/27/12    Dexa hip/pelvis/spine*

## 2022-09-26 ENCOUNTER — OFFICE VISIT (OUTPATIENT)
Dept: ENDOCRINOLOGY | Facility: CLINIC | Age: 62
End: 2022-09-26
Attending: NURSE PRACTITIONER
Payer: OTHER GOVERNMENT

## 2022-09-26 VITALS
OXYGEN SATURATION: 98 % | HEART RATE: 81 BPM | SYSTOLIC BLOOD PRESSURE: 131 MMHG | BODY MASS INDEX: 36.1 KG/M2 | HEIGHT: 67 IN | DIASTOLIC BLOOD PRESSURE: 81 MMHG | WEIGHT: 230 LBS

## 2022-09-26 DIAGNOSIS — E83.52 HYPERCALCEMIA: ICD-10-CM

## 2022-09-26 DIAGNOSIS — Z23 NEED FOR PROPHYLACTIC VACCINATION AND INOCULATION AGAINST INFLUENZA: Primary | ICD-10-CM

## 2022-09-26 LAB
ALBUMIN SERPL-MCNC: 4.1 G/DL (ref 3.4–5)
CA-I BLD-MCNC: 5.3 MG/DL (ref 4.4–5.2)
CALCIUM SERPL-MCNC: 10.7 MG/DL (ref 8.5–10.1)
MAGNESIUM SERPL-MCNC: 1.9 MG/DL (ref 1.6–2.3)
PTH-INTACT SERPL-MCNC: 70 PG/ML (ref 15–65)

## 2022-09-26 PROCEDURE — 83735 ASSAY OF MAGNESIUM: CPT | Performed by: INTERNAL MEDICINE

## 2022-09-26 PROCEDURE — 82306 VITAMIN D 25 HYDROXY: CPT | Performed by: INTERNAL MEDICINE

## 2022-09-26 PROCEDURE — 82040 ASSAY OF SERUM ALBUMIN: CPT | Performed by: INTERNAL MEDICINE

## 2022-09-26 PROCEDURE — 90682 RIV4 VACC RECOMBINANT DNA IM: CPT | Performed by: INTERNAL MEDICINE

## 2022-09-26 PROCEDURE — 83970 ASSAY OF PARATHORMONE: CPT | Performed by: INTERNAL MEDICINE

## 2022-09-26 PROCEDURE — 90471 IMMUNIZATION ADMIN: CPT | Performed by: INTERNAL MEDICINE

## 2022-09-26 PROCEDURE — 99000 SPECIMEN HANDLING OFFICE-LAB: CPT | Performed by: INTERNAL MEDICINE

## 2022-09-26 PROCEDURE — 82310 ASSAY OF CALCIUM: CPT | Performed by: INTERNAL MEDICINE

## 2022-09-26 PROCEDURE — 82330 ASSAY OF CALCIUM: CPT | Performed by: INTERNAL MEDICINE

## 2022-09-26 PROCEDURE — 84080 ASSAY ALKALINE PHOSPHATASES: CPT | Mod: 90 | Performed by: INTERNAL MEDICINE

## 2022-09-26 PROCEDURE — 36415 COLL VENOUS BLD VENIPUNCTURE: CPT | Performed by: INTERNAL MEDICINE

## 2022-09-26 PROCEDURE — 99215 OFFICE O/P EST HI 40 MIN: CPT | Mod: 25 | Performed by: INTERNAL MEDICINE

## 2022-09-26 PROCEDURE — 84075 ASSAY ALKALINE PHOSPHATASE: CPT | Mod: 90 | Performed by: INTERNAL MEDICINE

## 2022-09-26 NOTE — LETTER
9/26/2022         RE: Teresa Fernandez  [address] [address] [id number]        Dear Colleague,    Thank you for referring your patient, Teresa Fernandez, to the [facility]. Please see a copy of my visit note below.    [facility]in[facility]    Follow up for bone pain    Interval history:  Was seen by Dr Henry, Endocrinology one year ago re bone pain  Today coming in for hypercalcemia    Patient had repeated labs done, and in May and June a mildly elevated serum calcium was noted.  Patient denies taking extra calcium supplements, denies taking any Tums.  Has some GERD but consistently only takes omeprazole.  Denies having taken hydrochlorothiazide or lithium.    Patient is without fracture history, history of renal cell cancer however not kidney stones    Family history is positive for thyroid disease in her sister, however no parathyroid or abnormal calcium levels.    History of bone pain  Her bone pain was located at her right lower extremity, and photo evaluation revealed a neuropathy with an abnormal EMG.  There also had been repeated trauma from recurrent falls the patient has been struggling with.    Recent syncope, negative work up in Cardiology, multiple falls,   Recurrent reactive airways disease, thought to be sequela of prior Covid infection from 2019    Evaluation for metabolic bone diseaes in 11/2021 revealed elevated PTHi, otherwise unremarable at that point including CMP, CBC, CK, ESR, CRP and x-rays of pelvis, hips, right tib/fib unrevealing. Concern for osteoporosis prompted a DXA scan. This has not been done sofar.    Abdominal pain,bloated after eating    Medications  Methylprednisolone dose pack  Duloxetine 60 mg daily       History of fracture no, kidney stones no, one kidney    Hypothyroidism  This was diagnosed in 1996 after nephrectomy, patient notes that thyroid hormone levels have only been mildly abnormal.  However her symptoms improved  significantly this levothyroxine started.  Takes medication as directed in the morning with water separates from food for 30 minutes or more  LT4 100 mcg takes in am , since 1996,       Symptoms of hypo- and hyperthyroidism:  Weight change unable to loose; heat or cold intolerance still getting hot flashes, cannot stand heat, mostly at night; abnormal bowel movements variable IBSchange in hair or skin male pattern balding, FH positive of hair loss only in males; anxiety  or depression yes variable; fatigue tired; heart racing some; tremors right hand; menses age 46y    Exposure to radiation: no      Assessment:  Middle age woman with right lower extremity bone pain, hypothyroidism, OA, depression, allergies, gluten intolerance, history of renal cell Ca, melanoma more than 10 years ago, St post Left nephrectomy, hysterectomy, nicotine abuse (2 PPD?), obesity BMI 34, St post COVID in 2019, now with mild hypercalcemia with mildly elevated PTHi with normal renal function. Increase in Calcium over the last year, PTHi elevated 1 year ago. No treatment with diuretics, no lithium. DD Primary hyperparathyroidism, less likely FHH, as labs in the past seem normal.  Complications: will eval urine Calium, bone density. Unclear whether bone pain due to hyperparathyroidism, check bone specific alk phos.    Plan:   24h urine Calcium and creatinine  DXA  Labs including ionized Calcium, mag phos pthi 25ohVItD      40 minutes spent on the date of the encounter doing chart review, review of test results, interpretation of tests, patient visit and documentation     This note was generated using computer recognized voice recognition. This might result in some expected imperfection.    Leigh Ann Ellsworth MD  Endocrinology and Diabetes  Telephone contact:  [facility] [place] [phone]  [facility] [phone]       Medications:   Current Outpatient Medications   Medication Sig Dispense  Refill     acetaminophen (TYLENOL) 325 MG tablet Take 325-650 mg by mouth every 6 hours as needed for mild pain       albuterol (PROAIR HFA/PROVENTIL HFA/VENTOLIN HFA) 108 (90 BASE) MCG/ACT Inhaler Inhale 2 puffs into the lungs every 6 hours as needed for shortness of breath / dyspnea 1 Inhaler 5     albuterol (PROVENTIL) (2.5 MG/3ML) 0.083% neb solution Take 1 vial (2.5 mg) by nebulization every 6 hours as needed for shortness of breath / dyspnea or wheezing 90 mL 11     APNO CREA ointment Apply to rash on nose twice daily as needed. 100 g 3     augmented betamethasone dipropionate (DIPROLENE-AF) 0.05 % external ointment Apply topically 2 times daily 50 g 3     Cholecalciferol (VITAMIN D3 PO) Take 2,000 Units by mouth 2 times daily        DULoxetine (CYMBALTA) 30 MG capsule Take 1 by mouth daily in addition to the 60 mg for a total of 90 mg 90 capsule 3     DULoxetine (CYMBALTA) 60 MG capsule Take 1 capsule (60 mg) by mouth daily 90 capsule 3     fexofenadine (ALLEGRA) 180 MG tablet Take 1 tablet (180 mg) by mouth daily 30 tablet 1     hydrOXYzine (ATARAX) 25 MG tablet Take 1 tablet (25 mg) by mouth 3 times daily as needed for itching 90 tablet 1     levothyroxine (SYNTHROID/LEVOTHROID) 100 MCG tablet TAKE ONE TABLET BY MOUTH ONCE DAILY ON MONDAY, WEDNESDAY, FRIDAY, SATURDAY AND SUNDAY. 90 tablet 2     levothyroxine (SYNTHROID/LEVOTHROID) 88 MCG tablet Take 1 tab by mouth Tuesdays and Thursdays 45 tablet 3     methylPREDNISolone (MEDROL) 32 MG tablet Take 1 tablet (32 mg) by mouth in the morning. Take oral Medrol 32mg 12 hours before procedure time - 3:15 am (3:15 pm procedure) Take oral Medrol 32mg again 2 hours before procedure time- 1:15 pm (3:15 pm procedure) (Patient not taking: Reported on 8/24/2022) 2 tablet 0     mometasone (ELOCON) 0.1 % external ointment Apply topically twice a week Use on eczematous lesions 45 g 3     omeprazole (PRILOSEC) 20 MG DR capsule Take 20 mg by mouth daily       order for DME  "Equipment being ordered: Nebulizer 1 Units 0     OVER-THE-COUNTER Place 1 drop into both eyes 3 times daily. Systane Ultra, Systane Balance, Blink Tears or Refresh Optive Artificial Tear 1 Bottle      triamcinolone (KENALOG) 0.1 % external cream Apply topically 2 times daily 30 g 3       Social History:  Social History     Tobacco Use     Smoking status: Former Smoker     Packs/day: 2.00     Years: 15.00     Pack years: 30.00     Quit date: 3/26/1996     Years since quittin.5     Smokeless tobacco: Never Used   Substance Use Topics     Alcohol use: Yes     Comment: rare       Family History  FAMILY HISTORY      Physical Examination:  /81 (BP Location: Left arm, Patient Position: Sitting, Cuff Size: Adult Regular)   Pulse 81   Ht 1.708 m (5' 7.25\")   Wt 104.3 kg (230 lb)   LMP  (LMP Unknown)   SpO2 98%   BMI 35.76 kg/m      Wt Readings from Last 4 Encounters:   22 104.2 kg (229 lb 12.8 oz)   22 104.3 kg (230 lb)   22 104.1 kg (229 lb 9.6 oz)   22 103.4 kg (228 lb)       General: Well appearing obese woman in no distress, weight evenly distributed between trunk and extremities, no peripheral wasing.   Eyes: EOMI. Sclerae and conjunctivae are clear.   HENT: No thyromegaly or mass.  No moon facies  Lymphatic: No cervical or supraclavicular lymphadenopathy.  Cardiovascular: RRR, with normal S1+S2 and no murmurs.   Skin: No rash or lesions. No abdominal striae.    Extremities: trace bilateral peripheral edema.   Neurologic: No tremor with hands outstretched. 1+ patellar reflexes.      Labs and Studies:     Lab Results   Component Value Date    ANNIE 10.2 (H) 2022    ANNIE 10.5 (H) 2022    ANNIE 10.0 2022    ANNIE 9.8 2021    ANNIE 9.4 2021    ANNIE 9.7 2021    ANNIE 9.8 05/10/2021    ANNIE 9.7 2021    ANNIE 10.1 2019    ANNIE 9.6 2018    ANNIE 9.7 09/15/2017    ANNIE 9.4 2017    ANNIE 9.4 2016    ANNIE 9.0 2015    ANNIE 9.8 2015    " ANNIE 9.7 05/13/2014    ANNIE 9.1 07/08/2013    ANNIE 9.8 09/11/2012    ANNIE 9.4 05/05/2011    ANNIE 9.1 01/20/2011    ANNIE 10.2 09/08/2009    ANNIE 9.8 04/24/2009    ANNIE 9.3 08/15/2005    ANNIE 9.1 05/03/2005    ANNIE 9.9 03/28/2005         Lab Results   Component Value Date    ANNIE 10.2 (H) 06/29/2022    ANNIE 10.5 (H) 05/25/2022    ANNIE 10.0 02/01/2022    ANNIE 9.8 11/08/2021    ALBUMIN 3.7 05/25/2022    ALT 32 03/25/2022    PHOS 3.0 06/29/2022    PTHI 86 (H) 06/29/2022    PTHI 80 05/25/2022    PTHI 82 (H) 02/01/2022    PTHI 95 (H) 11/26/2021    PTHI 107 (H) 11/08/2021    PTHI 72 11/07/2017    AST 22 03/25/2022    BILITOTAL 0.3 03/25/2022    CR 0.84 06/29/2022    CR 0.80 05/25/2022    CR 0.7 03/03/2022     09/30/2021    TSH 2.70 03/25/2022    ALKPHOS 99 03/25/2022    HGB 13.5 09/30/2021             Lab Results   Component Value Date    CHLORIDE 105 09/30/2021    CO2 29 09/30/2021    CR 0.84 06/29/2022    ALKPHOS 99 03/25/2022    PTHI 86 (H) 06/29/2022    TSH 2.70 03/25/2022    T4 1.00 02/18/2021    HGB 13.5 09/30/2021       Lab Results   Component Value Date    ALT 32 03/25/2022    AST 22 03/25/2022    ALBUMIN 3.7 05/25/2022    BILITOTAL 0.3 03/25/2022       Results for orders placed during the hospital encounter of 05/14/16    US Thyroid    Narrative  US THYROID   5/14/2016 9:19 AM    HISTORY: Hypothyroidism.    COMPARISON: None.    FINDINGS:  The thyroid gland is of normal size. The right lobe of the  thyroid measures 5.4 x 1.2 x 0.8 cm. The left lobe of the thyroid  measures 5.5 x 0.6 x 0.9 cm. The isthmus measures 0.2 cm. Background  thyroid parenchymal echogenicity is mildly heterogeneous.    Individual thyroid nodules are measured as follows:  1. Small partially calcified nodule within the isthmus on the right  measure 0.7 x 0.4 x 0.5 cm.    Impression  IMPRESSION:  1. Subcentimeter partially calcified nodule in the isthmus measures  0.7 cm  2. Thyroid parenchymal echogenicity is mildly heterogeneous, however  no other  discrete thyroid nodules are seen.    ANNABEL DOZIER MD      Results for orders placed in visit on 06/27/12    Dexa hip/pelvis/spine*              Again, thank you for allowing me to participate in the care of your patient.        Sincerely,        Leigh Ann Ellsworth MD

## 2022-09-26 NOTE — PATIENT INSTRUCTIONS
"Blood work today    24h urine collection for calcium    DXA scan     Follow up      24-Hour Urine Collection Guidelines for Patients    It does not matter what time of day you start the clock.  Make the collection time when it will be the most convenient for you.  If you miss adding any urine during the 24-hour period or if the container spills, you will need to start over.    If you think you will need more than one container during the 24-hour period, call the Lab for an additional container or continue collecting the urine in a clean (non-bleached), plastic container you have at home.    Please verify that the container is correctly labeled with your name, and your medical record number or birthdate.    1. Empty your bladder completely.  Do not save any of that urine.  Note the time, as this will be the \"START\" time of the 24-hour collection period.  2. Save ALL urine passed in the next 24 hours, adding each specimen to the collection container.  Keep the container refrigerated during the collection.  3. At the end of the 24-hour period, empty your bladder (even if you don't feel as if you need to urinate) and add it to the container.  4. When complete, bring the container to the Laboratory as soon as possible.  Be sure to stop at the Registration Desk before dropping your specimen off in the Lab.  Note: If you are collecting urine for a Urosat (for stones), please try to bring the container to the Lab Monday-Friday.    "

## 2022-09-26 NOTE — NURSING NOTE
"Teresa Fernandez's goals for this visit include:   Chief Complaint   Patient presents with     New Patient     Consult     Hypercalcemia     Imm/Inj     Flu Shot     She requests these members of her care team be copied on today's visit information: Yes    PCP: Amanda Meléndez    Referring Provider:  HERIBERTO Linton CNP  [address] [place] [address]    /81 (BP Location: Left arm, Patient Position: Sitting, Cuff Size: Adult Regular)   Pulse 81   Ht 1.708 m (5' 7.25\")   Wt 104.3 kg (230 lb)   LMP  (LMP Unknown)   SpO2 98%   BMI 35.76 kg/m      Do you need any medication refills at today's visit? No    Maddi Rodriguez [facility]  Adult Endocrinology  [facility]    "

## 2022-09-27 PROCEDURE — 82570 ASSAY OF URINE CREATININE: CPT | Performed by: INTERNAL MEDICINE

## 2022-09-27 PROCEDURE — 82340 ASSAY OF CALCIUM IN URINE: CPT | Performed by: INTERNAL MEDICINE

## 2022-09-27 PROCEDURE — 81050 URINALYSIS VOLUME MEASURE: CPT | Performed by: INTERNAL MEDICINE

## 2022-09-28 LAB
ALP BONE SERPL-CCNC: 44 U/L
ALP LIVER SERPL-CCNC: 60 U/L
ALP OTHER SERPL-CCNC: 0 U/L
ALP SERPL-CCNC: 104 U/L
CALCIUM 24H UR-MRATE: 0.2 G/SPEC (ref 0.1–0.3)
CALCIUM UR-MCNC: 6.6 MG/DL
COLLECT DURATION TIME UR: 24 H
COLLECT DURATION TIME UR: 24 H
CREAT 24H UR-MRATE: 0.88 G/(24.H) (ref 0.72–1.51)
CREAT UR-MCNC: 29.4 MG/DL
SPECIMEN VOL UR: 3000 ML
SPECIMEN VOL UR: 3000 ML

## 2022-09-30 LAB
DEPRECATED CALCIDIOL+CALCIFEROL SERPL-MC: <52 UG/L (ref 20–75)
VITAMIN D2 SERPL-MCNC: <5 UG/L
VITAMIN D3 SERPL-MCNC: 47 UG/L

## 2022-10-15 ASSESSMENT — ASTHMA QUESTIONNAIRES
QUESTION_1 LAST FOUR WEEKS HOW MUCH OF THE TIME DID YOUR ASTHMA KEEP YOU FROM GETTING AS MUCH DONE AT WORK, SCHOOL OR AT HOME: A LITTLE OF THE TIME
QUESTION_4 LAST FOUR WEEKS HOW OFTEN HAVE YOU USED YOUR RESCUE INHALER OR NEBULIZER MEDICATION (SUCH AS ALBUTEROL): NOT AT ALL
ACT_TOTALSCORE: 18
QUESTION_5 LAST FOUR WEEKS HOW WOULD YOU RATE YOUR ASTHMA CONTROL: SOMEWHAT CONTROLLED
QUESTION_3 LAST FOUR WEEKS HOW OFTEN DID YOUR ASTHMA SYMPTOMS (WHEEZING, COUGHING, SHORTNESS OF BREATH, CHEST TIGHTNESS OR PAIN) WAKE YOU UP AT NIGHT OR EARLIER THAN USUAL IN THE MORNING: TWO OR THREE NIGHTS A WEEK
ACT_TOTALSCORE: 18
QUESTION_2 LAST FOUR WEEKS HOW OFTEN HAVE YOU HAD SHORTNESS OF BREATH: ONCE OR TWICE A WEEK

## 2022-10-18 ENCOUNTER — OFFICE VISIT (OUTPATIENT)
Dept: FAMILY MEDICINE | Facility: CLINIC | Age: 62
End: 2022-10-18
Payer: OTHER GOVERNMENT

## 2022-10-18 VITALS
HEIGHT: 67 IN | TEMPERATURE: 98.7 F | DIASTOLIC BLOOD PRESSURE: 86 MMHG | HEART RATE: 81 BPM | WEIGHT: 230.5 LBS | OXYGEN SATURATION: 98 % | SYSTOLIC BLOOD PRESSURE: 132 MMHG | BODY MASS INDEX: 36.18 KG/M2 | RESPIRATION RATE: 16 BRPM

## 2022-10-18 DIAGNOSIS — R29.6 FALLS FREQUENTLY: ICD-10-CM

## 2022-10-18 DIAGNOSIS — H57.13 PAIN OF BOTH EYES: ICD-10-CM

## 2022-10-18 DIAGNOSIS — M79.2 NEURALGIA: Primary | ICD-10-CM

## 2022-10-18 DIAGNOSIS — R06.09 DOE (DYSPNEA ON EXERTION): ICD-10-CM

## 2022-10-18 DIAGNOSIS — H53.9 VISION CHANGES: ICD-10-CM

## 2022-10-18 DIAGNOSIS — Z91.041 CONTRAST MEDIA ALLERGY: ICD-10-CM

## 2022-10-18 PROCEDURE — 99214 OFFICE O/P EST MOD 30 MIN: CPT | Performed by: NURSE PRACTITIONER

## 2022-10-18 RX ORDER — DIPHENHYDRAMINE HCL 25 MG
CAPSULE ORAL
Qty: 1 CAPSULE | Refills: 0 | Status: SHIPPED | OUTPATIENT
Start: 2022-10-18 | End: 2023-03-19

## 2022-10-18 RX ORDER — METHYLPREDNISOLONE 32 MG/1
TABLET ORAL
Qty: 1 TABLET | Refills: 0 | Status: SHIPPED | OUTPATIENT
Start: 2022-10-18 | End: 2023-03-19

## 2022-10-18 RX ORDER — PREGABALIN 25 MG/1
25 CAPSULE ORAL 2 TIMES DAILY
Qty: 60 CAPSULE | Refills: 1 | Status: SHIPPED | OUTPATIENT
Start: 2022-10-18 | End: 2023-08-01

## 2022-10-18 ASSESSMENT — PATIENT HEALTH QUESTIONNAIRE - PHQ9
10. IF YOU CHECKED OFF ANY PROBLEMS, HOW DIFFICULT HAVE THESE PROBLEMS MADE IT FOR YOU TO DO YOUR WORK, TAKE CARE OF THINGS AT HOME, OR GET ALONG WITH OTHER PEOPLE: SOMEWHAT DIFFICULT
SUM OF ALL RESPONSES TO PHQ QUESTIONS 1-9: 5
SUM OF ALL RESPONSES TO PHQ QUESTIONS 1-9: 5

## 2022-10-18 NOTE — PATIENT INSTRUCTIONS
Patient is to contact Cardiac Services  at [phone], to schedule this appointment.  Please call [facility] Imaging Department to schedule your imaging [phone].    Medrol- 1/2 tab 12 hours prior- 1/2 tab 2 hours prior

## 2022-10-18 NOTE — PROGRESS NOTES
"  Assessment & Plan     Neuralgia    - pregabalin (LYRICA) 25 MG capsule; Take 1 capsule (25 mg) by mouth 2 times daily    Vision changes    - MRA Brain ([facility]) wo & w Contrast; Future    Contrast media allergy    - diphenhydrAMINE (BENADRYL ALLERGY) 25 MG capsule; Administer 30 min - 2 hours pre - IV contrast injection  - methylPREDNISolone (MEDROL) 32 MG tablet; 12 hours prior to the procedure with IV contrast    Pain of both eyes    - MR Brain w/o & w Contrast; Future    Falls frequently    - MR Brain w/o & w Contrast; Future    BALL (dyspnea on exertion)    - NM Lexiscan stress test; Future             BMI:   Estimated body mass index is 35.83 kg/m  as calculated from the following:    Height as of this encounter: 1.708 m (5' 7.25\").    Weight as of this encounter: 104.6 kg (230 lb 8 oz).   Weight management plan: Discussed healthy diet and exercise guidelines      FURTHER TESTING:       - MRI - see orders    CONSULTATION/REFERRAL to NEURO- [facility] referral ordered    FUTURE APPOINTMENTS:       - Follow-up visit in case of new or worsening symptoms, or in 2-3 months after Neuro appt.    No follow-ups on file.    Time spent in chart review in preparation to see patient, time with patient for interview/exam, ordering medications/tests/and/or procedures, and time spent in charting and coordinating care: 30 minutes.     HERIBERTO Linton [facility]    Eric Moore is a 62 year old, presenting for the following health issues:  Autoimmune Disease      History of Present Illness       Reason for visit:  Autoimmune diseases    She eats 2-3 servings of fruits and vegetables daily.She consumes 2 sweetened beverage(s) daily. She exercises with enough effort to increase her heart rate 4 days per week.   She is taking medications regularly.    Today's PHQ-9         PHQ-9 Total Score: 5    PHQ-9 Q9 Thoughts of better off dead/self-harm past 2 weeks :   Not at all    How " "difficult have these problems made it for you to do your work, take care of things at home, or get along with other people: Somewhat difficult       Concern - Autoimmune Disease  Onset: ongoing   Description: Requesting MRI prior to neurologist appointment, would like to be checked for MS as she says her sx are getting worse.    Intensity: moderate  Progression of Symptoms:  Worsening 1-2 months,  Accompanying Signs & Symptoms: Neurophathy, vision has deminished in R eye, severe eye pain, stabbing pain all over her body-different areas depending on the day, feels drunk with each episode, no balance whatsoever. Has been exercising on her treadmill for 10-15 min on her good days. After exertion she feels drunk again and needs to sit down. Heat sensitivity. Tremors in left hand and head, and now left leg new this week. Feels short of breath with exertion, does not have chest pain. Normal PFT's last year. Unremarkable stress test last year.   Previous history of similar problem: ongoing   Precipitating factors:        Worsened by: exertion   Alleviating factors:        Improved by: walking on treadmill, if she is able   Therapies tried and outcome: advil, tylenol. Has been seeing eye doctor.         Review of Systems   Constitutional, HEENT, cardiovascular, pulmonary, gi and gu systems are negative, except as otherwise noted.      Objective    /86   Pulse 81   Temp 98.7  F (37.1  C) (Tympanic)   Resp 16   Ht 1.708 m (5' 7.25\")   Wt 104.6 kg (230 lb 8 oz)   LMP  (LMP Unknown)   SpO2 98%   BMI 35.83 kg/m    Body mass index is 35.83 kg/m .  Physical Exam   GENERAL: healthy, alert and no distress  EYES: Eyes grossly normal to inspection and conjunctivae and sclerae normal  MS: no gross musculoskeletal defects noted, no edema  SKIN: no suspicious lesions or rashes  NEURO: Normal strength and tone, mentation intact and normal gait  PSYCH: mentation appears normal, affect normal/bright                    "

## 2022-10-23 ENCOUNTER — APPOINTMENT (OUTPATIENT)
Dept: GENERAL RADIOLOGY | Facility: CLINIC | Age: 62
End: 2022-10-23
Attending: NURSE PRACTITIONER
Payer: OTHER GOVERNMENT

## 2022-10-23 ENCOUNTER — HOSPITAL ENCOUNTER (EMERGENCY)
Facility: CLINIC | Age: 62
Discharge: HOME | End: 2022-10-23
Attending: NURSE PRACTITIONER | Admitting: NURSE PRACTITIONER
Payer: OTHER GOVERNMENT

## 2022-10-23 VITALS
DIASTOLIC BLOOD PRESSURE: 70 MMHG | WEIGHT: 230 LBS | RESPIRATION RATE: 18 BRPM | SYSTOLIC BLOOD PRESSURE: 134 MMHG | HEART RATE: 87 BPM | BODY MASS INDEX: 35.76 KG/M2 | OXYGEN SATURATION: 97 % | TEMPERATURE: 98.5 F
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
## 2022-10-23 DIAGNOSIS — M79.602 PAIN OF LEFT UPPER EXTREMITY: ICD-10-CM

## 2022-10-23 DIAGNOSIS — R07.81 RIB PAIN ON LEFT SIDE: ICD-10-CM

## 2022-10-23 PROCEDURE — G0463 HOSPITAL OUTPT CLINIC VISIT: HCPCS | Performed by: NURSE PRACTITIONER

## 2022-10-23 PROCEDURE — 73090 X-RAY EXAM OF FOREARM: CPT | Mod: LT

## 2022-10-23 PROCEDURE — 71101 X-RAY EXAM UNILAT RIBS/CHEST: CPT | Mod: LT

## 2022-10-23 PROCEDURE — 99212 OFFICE O/P EST SF 10 MIN: CPT | Performed by: NURSE PRACTITIONER

## 2022-10-23 ASSESSMENT — ACTIVITIES OF DAILY LIVING (ADL): ADLS_ACUITY_SCORE: 35

## 2022-10-23 ASSESSMENT — ENCOUNTER SYMPTOMS
MYALGIAS: 1
ARTHRALGIAS: 1

## 2022-10-23 NOTE — ED PROVIDER NOTES
History     Chief Complaint   Patient presents with     Fall     Pt fell yesterday and is having left wrist pain, and left rib pain     HPI  Teresa Fernandez is a 62 year old female who presents to the urgent care for evaluation of left wrist and left rib pain. Yesterday patient miss stepped while outside doing hard work and fell onto left arm and left rib struck part of her porch step. No head injury or LOC. No neck or back pain. Tylenol for pain. Denies numbness and tingling, saddle anesthesia, and loss of bowel or bladder control.     Allergies:  Allergies   Allergen Reactions     Omnipaque [Iohexol] Swelling and Difficulty breathing     Immediate throat swelling following around 1-2cc of omnipaque 300 for spine procedure  --> skin prick and intradermal tests with Iohexol negatives. But I would propose anyway to premedicate patient before using iodinated contrast media (for safety reasons).   --> no reactions in skin tests to MRI contrast media Gadovist     Gabapentin Hives     Gluten        Problem List:    Patient Active Problem List    Diagnosis Date Noted     Chronic pruritus 02/14/2022     Priority: Medium     Family history of colon cancer 12/28/2021     Priority: Medium     Fibromyalgia 09/30/2021     Priority: Medium     History of kidney cancer 04/01/2021     Priority: Medium     1996- left nephrectomy       GAYE (obstructive sleep apnea) 09/17/2020     Priority: Medium     9/9/2020 [facility] Sleep Study (225.0 lbs) - AHI 19.8, RDI 26.4, Supine AHI 22.0, REM AHI 36.9, Low O2 79.5%, Time Spent ?88% 40.1 minutes / Time Spent ?89% 51.6 minutes.       Myalgia 10/20/2017     Priority: Medium     Increased CK       Chest tightness or pressure 10/03/2016     Priority: Medium     Hyperlipidemia LDL goal <160 01/29/2015     Priority: Medium     DDD (degenerative disc disease), lumbar 06/24/2014     Priority: Medium     [facility] Spine Following       Moderate persistent asthma without complication  07/08/2013     Priority: Medium     History of melanoma in situ 10/17/2012     Priority: Medium     Eczema 10/17/2012     Priority: Medium     Osteopenia 06/15/2012     Priority: Medium     Chronic fatigue 10/04/2011     Priority: Medium     Dry eye syndrome 08/24/2011     Priority: Medium     Chronic low back pain 07/07/2011     Priority: Medium     GERD (gastroesophageal reflux disease) 05/11/2011     Priority: Medium     Leukoplakia of oral mucosa, including tongue 04/18/2011     Priority: Medium     ERIC (generalized anxiety disorder)      Priority: Medium     History of skin cancer 11/09/2010     Priority: Medium     Chronic rhinitis 05/03/2005     Priority: Medium     Allergic rhinitis due to pollen 05/03/2005     Priority: Medium     Sinusitis, chronic 05/03/2005     Priority: Medium     Problem list name updated by automated process. Provider to review       Allergic state 04/19/2005     Priority: Medium     Problem list name updated by automated process. Provider to review       Hypothyroidism, unspecified type 04/12/2005     Priority: Medium     Problem list name updated by automated process. 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     FUSION LUMBAR ANTERIOR TWO LEVELS  2015     GYN SURGERY  1999    hysterectomy.  LAVH     HYSTERECTOMY, PAP NO LONGER INDICATED       SOFT TISSUE SURGERY      chest-melonoma     SURGICAL HISTORY OF -   3/1996    Left nephrectomy-renal cell CA       Family History:    Family History   Problem Relation Age of Onset     Diabetes Mother      Cardiovascular Mother      Lipids Mother      Depression Mother      Hypertension Father      Lipids Father      Obesity Father      Macular Degeneration Father      Sleep Apnea Father      Thyroid Disease Sister      Hypertension Sister      Depression Sister      Allergies Sister      Lipids Sister      Thyroid Disease Sister      Neurologic Disorder Sister      Depression Sister      Hypertension Brother      Colon Cancer Brother      Cancer Maternal Grandmother         kind unknown had sores on legs     Eczema Maternal Grandmother      Eczema Maternal Grandfather      Breast Cancer Paternal Grandmother      Cancer Paternal Grandmother         breast     Hypertension Paternal Grandfather      Cardiovascular Paternal Grandfather         heart attack     Allergies Son      Eczema Son      Respiratory Son      Sleep Apnea Son      Glaucoma No family hx of      Cerebrovascular Disease No family hx of        Social History:  Marital Status:   [2]  Social History     Tobacco Use     Smoking status: Former     Packs/day: 2.00     Years: 15.00     Pack years: 30.00     Types: Cigarettes     Quit date: 3/26/1996     Years since quittin.5     Smokeless tobacco: Never   Vaping Use     Vaping Use: Never used   Substance Use Topics     Alcohol use: Yes     Comment: rare     Drug use: No        Medications:    acetaminophen (TYLENOL) 325 MG tablet  albuterol (PROAIR HFA/PROVENTIL HFA/VENTOLIN HFA) 108 (90 BASE) MCG/ACT Inhaler  albuterol (PROVENTIL) (2.5 MG/3ML) 0.083% neb solution  APNO CREA ointment  augmented  betamethasone dipropionate (DIPROLENE-AF) 0.05 % external ointment  Cholecalciferol (VITAMIN D3 PO)  diphenhydrAMINE (BENADRYL ALLERGY) 25 MG capsule  DULoxetine (CYMBALTA) 30 MG capsule  DULoxetine (CYMBALTA) 60 MG capsule  fexofenadine (ALLEGRA) 180 MG tablet  hydrOXYzine (ATARAX) 25 MG tablet  levothyroxine (SYNTHROID/LEVOTHROID) 100 MCG tablet  levothyroxine (SYNTHROID/LEVOTHROID) 88 MCG tablet  methylPREDNISolone (MEDROL) 32 MG tablet  mometasone (ELOCON) 0.1 % external ointment  omeprazole (PRILOSEC) 20 MG DR capsule  order for DME  OVER-THE-COUNTER  pregabalin (LYRICA) 25 MG capsule  triamcinolone (KENALOG) 0.1 % external cream          Review of Systems   Musculoskeletal: Positive for arthralgias and myalgias.   All other systems reviewed and are negative.      Physical Exam   BP: 134/70  Pulse: 87  Temp: 98.5  F (36.9  C)  Resp: 18  Weight: 104.3 kg (230 lb)  SpO2: 97 %      Physical Exam  Constitutional:       General: She is not in acute distress.     Appearance: Normal appearance.   Eyes:      Conjunctiva/sclera: Conjunctivae normal.      Pupils: Pupils are equal, round, and reactive to light.   Cardiovascular:      Rate and Rhythm: Normal rate.   Pulmonary:      Effort: Pulmonary effort is normal.   Chest:       Musculoskeletal:      Left forearm: Tenderness and bony tenderness present. No swelling.      Cervical back: Normal range of motion.      Comments: Ecchymosis to the left distal/mid ulnar shaft.  Pulse and perfusion equal bilaterally   Skin:     General: Skin is warm.      Capillary Refill: Capillary refill takes less than 2 seconds.   Neurological:      General: No focal deficit present.      Mental Status: She is alert.         ED Course                 Procedures    Results for orders placed or performed during the hospital encounter of 10/23/22 (from the past 24 hour(s))   Ribs XR, unilat 3 views + PA chest,  left    Narrative    EXAM: XR RIBS AND CHEST LEFT 3 VIEWS  LOCATION: [facility]  DATE/TIME: 10/23/2022 12:44 PM    INDICATION: Fall, left lower anterior pain  COMPARISON: Chest x-ray 07/11/2022      Impression    IMPRESSION: No acute left rib fracture. There is likely an old healed fracture of the left 11th rib. No pleural effusion. No pneumothorax. No infiltrate.    Degenerative and postoperative changes in the lumbar spine.   Radius/Ulna XR,  PA &LAT, left    Narrative    EXAM: XR FOREARM LEFT 2 VIEWS  LOCATION: [facility]  DATE/TIME: 10/23/2022 12:45 PM    INDICATION: Fall, ulnar pain  COMPARISON: None.      Impression    IMPRESSION: Radius and ulna negative. No acute fracture. 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       Medications - No data to display    Assessments & Plan (with Medical Decision Making)   Teresa Fernandez is a 62 year old female who presents to the urgent care for evaluation of left wrist and left rib pain. Yesterday patient miss stepped while outside doing hard work and fell onto left arm and left rib struck part of her porch step. Vitals normal, exam as above. xrays negative. Discussed home symptom management. Return precautions reviewed, all questions answered. Patient is agreeable to plan of care and discharged in no acute distress.     I have reviewed the nursing notes.    I have reviewed the findings, diagnosis, plan and need for follow up with the patient.  Discharge Medication List as of 10/23/2022  1:02 PM          Final diagnoses:   Fall   Pain of left upper extremity   Rib pain on left side       10/23/2022   [facility] EMERGENCY DEPT     Eden Zheng, HERIBERTO CNP  10/23/22 1227       Eden Zheng, HERIBERTO CNP  10/23/22 [id number]

## 2022-10-27 ENCOUNTER — HOSPITAL ENCOUNTER (OUTPATIENT)
Dept: BONE DENSITY | Facility: CLINIC | Age: 62
Discharge: HOME | End: 2022-10-27
Attending: NURSE PRACTITIONER | Admitting: NURSE PRACTITIONER
Payer: OTHER GOVERNMENT

## 2022-10-27 DIAGNOSIS — E83.52 HYPERCALCEMIA: ICD-10-CM

## 2022-10-27 PROCEDURE — 77080 DXA BONE DENSITY AXIAL: CPT

## 2022-10-28 ENCOUNTER — HOSPITAL ENCOUNTER (OUTPATIENT)
Dept: MRI IMAGING | Facility: CLINIC | Age: 62
Discharge: HOME | End: 2022-10-28
Attending: NURSE PRACTITIONER
Payer: OTHER GOVERNMENT

## 2022-10-28 DIAGNOSIS — H57.13 PAIN OF BOTH EYES: ICD-10-CM

## 2022-10-28 DIAGNOSIS — R29.6 FALLS FREQUENTLY: ICD-10-CM

## 2022-10-28 DIAGNOSIS — H53.9 VISION CHANGES: ICD-10-CM

## 2022-10-28 PROCEDURE — 70544 MR ANGIOGRAPHY HEAD W/O DYE: CPT

## 2022-10-28 PROCEDURE — 255N000002 HC RX 255 OP 636: Performed by: NURSE PRACTITIONER

## 2022-10-28 PROCEDURE — 70553 MRI BRAIN STEM W/O & W/DYE: CPT

## 2022-10-28 PROCEDURE — A9585 GADOBUTROL INJECTION: HCPCS | Performed by: NURSE PRACTITIONER

## 2022-10-28 RX ORDER — GADOBUTROL 604.72 MG/ML
10 INJECTION INTRAVENOUS ONCE
Status: COMPLETED | OUTPATIENT
Start: 2022-10-28 | End: 2022-10-28

## 2022-10-28 RX ADMIN — GADOBUTROL 10 ML: 604.72 INJECTION INTRAVENOUS at 13:16

## 2022-10-29 ENCOUNTER — MYC MEDICAL ADVICE (OUTPATIENT)
Dept: FAMILY MEDICINE | Facility: CLINIC | Age: 62
End: 2022-10-29

## 2022-10-31 ENCOUNTER — TELEPHONE (OUTPATIENT)
Dept: CARDIOLOGY | Facility: CLINIC | Age: 62
End: 2022-10-31

## 2022-10-31 NOTE — TELEPHONE ENCOUNTER
"Writer received information from chart prep team, Sully, that Teresa has an appointment scheduled for tomorrow with Dr. Betancourt.  According to the last OV note on 8\24\22, Dr. Betancourt had told Teresa that she didn't need to come back unless something changed.    Writer spoke with Teresa, as I could not find what was new or different, other than a visit to ED this month.  Teresa tells writer that she is falling more frequently.  She just had MRI  On 10\28\22, which showed no changes, \"presumably chronic small vessel disease\".  Teresa states there is nothing new or different in her symptoms, she feels \"drunk\" after doing activity, and she is falling more often.     Writer notes that Teresa's PCP has ordered a NM Lexiscan stress test, which has not been scheduled yet.  I explained to her that having the test before seeing Dr. Betancourt would be much more helpful to both her and Dr. HERNANDEZ.    Writer has requested our  team to call Teresa today and get the stress test scheduled a.s.a.p., and then reschedule the appointment with Dr. Betancourt for after those results.  Teresa would like to have everything scheduled at the [place] location.    Radha Cash RN on 10/31/2022 at 9:56 AM    "

## 2022-10-31 NOTE — RESULT ENCOUNTER NOTE
Mesfin Moore    Your imaging results came back within normal limits. Plan to see Neurology the end of the month as planned. Please let us know if you have any questions.     Take care,    HERIBERTO Miller CNP

## 2022-11-02 ENCOUNTER — HOSPITAL ENCOUNTER (OUTPATIENT)
Dept: CARDIOLOGY | Facility: CLINIC | Age: 62
Discharge: HOME | End: 2022-11-02
Attending: NURSE PRACTITIONER
Payer: OTHER GOVERNMENT

## 2022-11-02 ENCOUNTER — HOSPITAL ENCOUNTER (OUTPATIENT)
Dept: NUCLEAR MEDICINE | Facility: CLINIC | Age: 62
Setting detail: NUCLEAR MEDICINE
Discharge: HOME | End: 2022-11-02
Attending: NURSE PRACTITIONER
Payer: OTHER GOVERNMENT

## 2022-11-02 DIAGNOSIS — R06.09 DOE (DYSPNEA ON EXERTION): ICD-10-CM

## 2022-11-02 LAB
CV BLOOD PRESSURE: 53 MMHG
CV STRESS MAX HR HE: 153
NUC STRESS EJECTION FRACTION: 59 %
RATE PRESSURE PRODUCT: NORMAL
STRESS ECHO BASELINE DIASTOLIC HE: 86
STRESS ECHO BASELINE HR: 75 BPM
STRESS ECHO BASELINE SYSTOLIC BP: 158
STRESS ECHO CALCULATED PERCENT HR: 96 %
STRESS ECHO LAST STRESS DIASTOLIC BP: 70
STRESS ECHO LAST STRESS SYSTOLIC BP: 164
STRESS ECHO POST ESTIMATED WORKLOAD: 6.5 METS
STRESS ECHO POST EXERCISE DUR MIN: 6 MIN
STRESS ECHO POST EXERCISE DUR SEC: 31 SEC
STRESS ECHO TARGET HR: 159

## 2022-11-02 PROCEDURE — 78452 HT MUSCLE IMAGE SPECT MULT: CPT | Mod: 26 | Performed by: INTERNAL MEDICINE

## 2022-11-02 PROCEDURE — 78452 HT MUSCLE IMAGE SPECT MULT: CPT

## 2022-11-02 PROCEDURE — 93016 CV STRESS TEST SUPVJ ONLY: CPT | Performed by: INTERNAL MEDICINE

## 2022-11-02 PROCEDURE — 93017 CV STRESS TEST TRACING ONLY: CPT

## 2022-11-02 PROCEDURE — 343N000001 HC RX 343: Performed by: NURSE PRACTITIONER

## 2022-11-02 PROCEDURE — 93018 CV STRESS TEST I&R ONLY: CPT | Performed by: INTERNAL MEDICINE

## 2022-11-02 PROCEDURE — A9502 TC99M TETROFOSMIN: HCPCS | Performed by: NURSE PRACTITIONER

## 2022-11-02 RX ORDER — REGADENOSON 0.08 MG/ML
0.4 INJECTION, SOLUTION INTRAVENOUS ONCE
Status: DISCONTINUED | OUTPATIENT
Start: 2022-11-02 | End: 2022-11-02 | Stop reason: CLARIF

## 2022-11-02 RX ADMIN — TETROFOSMIN 11 MCI.: 1.38 INJECTION, POWDER, LYOPHILIZED, FOR SOLUTION INTRAVENOUS at 08:50

## 2022-11-02 RX ADMIN — TETROFOSMIN 30.6 MCI.: 1.38 INJECTION, POWDER, LYOPHILIZED, FOR SOLUTION INTRAVENOUS at 11:10

## 2022-11-03 DIAGNOSIS — R94.39 ABNORMAL STRESS TEST: Primary | ICD-10-CM

## 2022-11-03 NOTE — RESULT ENCOUNTER NOTE
Mesfin Moore    I saw the results of your recent EMG. I'm not sure what the results mean exactly. It looks to me like some of the neuropathy stems from your SI joint on the right and some does not. Hopefully Dr. Marie reviewed (or will review) this with you, also this will be helpful for Neurology when you see them in a few weeks. Please let us know if you have any questions.     Take care,    HERIBERTO Miller CNP

## 2022-11-03 NOTE — RESULT ENCOUNTER NOTE
Mesfin Moore    Your stress test shows a potential blockage. I am going to have you see Cardiology again. I put a new referral in so you should be getting a call to schedule. Avoid heavy physical exertion- you are fine to do your normal daily living activities. If you have any chest pain etc go to the ER. Please let us know if you have any questions.     Take care,    HERIBERTO Miller CNP

## 2022-11-04 ENCOUNTER — OFFICE VISIT (OUTPATIENT)
Dept: CARDIOLOGY | Facility: CLINIC | Age: 62
End: 2022-11-04
Attending: NURSE PRACTITIONER
Payer: OTHER GOVERNMENT

## 2022-11-04 ENCOUNTER — HOSPITAL ENCOUNTER (OUTPATIENT)
Dept: CARDIOLOGY | Facility: CLINIC | Age: 62
Discharge: HOME | End: 2022-11-04
Attending: NURSE PRACTITIONER | Admitting: NURSE PRACTITIONER
Payer: OTHER GOVERNMENT

## 2022-11-04 VITALS
DIASTOLIC BLOOD PRESSURE: 77 MMHG | OXYGEN SATURATION: 98 % | BODY MASS INDEX: 35.29 KG/M2 | SYSTOLIC BLOOD PRESSURE: 131 MMHG | HEART RATE: 99 BPM | WEIGHT: 227 LBS

## 2022-11-04 DIAGNOSIS — R94.39 ABNORMAL STRESS TEST: ICD-10-CM

## 2022-11-04 DIAGNOSIS — E78.5 HYPERLIPIDEMIA LDL GOAL <70: Primary | ICD-10-CM

## 2022-11-04 DIAGNOSIS — R55 SYNCOPE, UNSPECIFIED SYNCOPE TYPE: ICD-10-CM

## 2022-11-04 DIAGNOSIS — R07.9 CHEST PAIN, UNSPECIFIED TYPE: ICD-10-CM

## 2022-11-04 PROCEDURE — 99215 OFFICE O/P EST HI 40 MIN: CPT | Performed by: NURSE PRACTITIONER

## 2022-11-04 PROCEDURE — 93270 REMOTE 30 DAY ECG REV/REPORT: CPT

## 2022-11-04 RX ORDER — ISOSORBIDE MONONITRATE 30 MG/1
15 TABLET, EXTENDED RELEASE ORAL DAILY
Qty: 15 TABLET | Refills: 11 | Status: SHIPPED | OUTPATIENT
Start: 2022-11-04 | End: 2022-12-27 | Stop reason: DRUGHIGH

## 2022-11-04 NOTE — LETTER
"11/4/2022    Amanda Meléndez, APRN CNP  [address] [facility] [address]    RE: Teresa Fernandez       Dear Colleague,     I had the pleasure of seeing Teresa Fernandez in the [facility].  Cardiology Clinic Progress Note  Teresa Fernandez MRN# [id number]   YOB: 1960 Age: 62 year old      Primary Cardiologist:   Dr. Betancourt          History of Presenting Illness:      Teresa Fernandez is a pleasant 62 year old patient with a past cardiac history significant for   1. Abnormal stress test    Stress test 2021 no ischemia or infarction    Stress test 11/2022 small area of mild infarction without ischemia  2. Syncope    No prodrome, no loss of bowel or bladder    Occurs with exertion  3. Hyperlipidemia    Past medical history significant for multiple falls d/t neuropathy, left nephrectomy 1986, lumbar decompression 2015, GAYE, chronic fatigue, GERD, anxiety, hypothyroidism.    She was seen in consultation in August 2021 due to multiple falls over the prior few years.  Initial work-up for syncope with Zio patch and EKG were negative.  Echocardiogram showed LVEF 45% with wall motion abnormality.  Stress MRI showed normal EF and no scar or ischemia.    Patient was seen by Dr. Betancourt in August 2022 for routine follow-up.  She denied any further episodes of syncope in the last year but had fallen due to twisting her ankle.  Apparently further work-up of her falls previously led to diagnosis of severe peripheral neuropathy. He recommended as needed cardiology follow-up.    Patient was seen by PCP on 10/18/2021 due to dyspnea on exertion, falls, and vision changes.  Brain MRI showed no acute findings with probable chronic small vessel ischemic disease.  They recommended neurology follow-up.  They also recommended stress test as she reported shortness of breath with exertion.  She was able to exercise in the treadmill for 10 to 15 minutes on a \"good day\".  Progress note and MRI results " reviewed today.    She was seen in the ED on 10/23/2022 due to pain after a fall.  She missed a step and fell injuring her arm and ribs.  ED note reviewed today.    Pt presents today, with her , for testing follow-up. Nuclear exercise stress test 11/2/2022 showed a small area of mild infarction in the LAD without ischemia, normal EF.  Compared to 2021 the infarction was new.  Results reviewed today.    Blood pressure today is controlled.  Heart rate is elevated at 99 and weights have been stable.  Since she was seen in August she has had 3 episodes of syncope.  All of these episodes occurred with exertion such as walking fast, doing yard work, or walking on the treadmill.  She denies any prodrome or loss of bowel or bladder and has not noted any seizure like activity. None of her episodes were witnessed.  Upon waking she has a few seconds of disorientation but no significant confusion.  If exerting and she does not have full syncope she gets significant weakness.  She denies any heart racing or palpitations prior to losing consciousness. She has not sustained any fractures. She has a history of shortness of breath but this has progressively been getting worse over the last year.  She has been having shortness of breath at rest and with exertion but denies any orthopnea or PND.  She gets short of breath just walking to the car or to do her laundry.  She also has noted shortness of breath while sitting doing a puzzle or sitting at work. However, she did have COVID this summer and may still be recovering from the SOB. 1 week ago she awoke with significant chest pain.  The next day she had back and shoulder pain at rest which has continued.  Last night she experienced chest pain again at rest. These began after her most recent fall and ED visit so may be r/t injury.  She has occasional palpitations.  She has chronic bilateral lower extremity edema worse by the end of the day and improved with compression  stockings. Patient reports no  PND, orthopnea, presyncope, heart racing, or palpitations.    I discussed her case with Dr. Betancourt today who recommends proceeding with a 30-day event monitor but would wait on  angiogram.  He reviewed CT which showed no significant coronary calcification and given her age, this would be less likely the cause of her syncope. He recommended trying antianginal.    Labs:  LIPID RESULTS:  Lab Results   Component Value Date    CHOL 232 (H) 08/10/2021    CHOL 237 (H) 11/06/2020    HDL 70 08/10/2021    HDL 62 11/06/2020     (H) 08/10/2021     (H) 11/06/2020    TRIG 87 08/10/2021    TRIG 123 11/06/2020    CHOLHDLRATIO 4.3 01/29/2015       LIVER ENZYME RESULTS:  Lab Results   Component Value Date    AST 22 03/25/2022    AST 24 06/02/2021    ALT 32 03/25/2022    ALT 31 06/02/2021       CBC RESULTS:  Lab Results   Component Value Date    WBC 5.6 09/30/2021    WBC 5.6 06/02/2021    RBC 4.59 09/30/2021    RBC 4.52 06/02/2021    HGB 13.5 09/30/2021    HGB 13.3 06/02/2021    HCT 40.1 09/30/2021    HCT 40.2 06/02/2021    MCV 87 09/30/2021    MCV 89 06/02/2021    MCH 29.4 09/30/2021    MCH 29.4 06/02/2021    MCHC 33.7 09/30/2021    MCHC 33.1 06/02/2021    RDW 12.7 09/30/2021    RDW 12.4 06/02/2021     09/30/2021     06/02/2021       BMP RESULTS:  Lab Results   Component Value Date     09/30/2021     06/02/2021    POTASSIUM 4.0 09/30/2021    POTASSIUM 4.1 06/02/2021    CHLORIDE 105 09/30/2021    CHLORIDE 102 06/02/2021    CO2 29 09/30/2021    CO2 27 06/02/2021    ANIONGAP 4 09/30/2021    ANIONGAP 6 06/02/2021    GLC 89 09/30/2021    GLC 97 06/02/2021    BUN 10 09/30/2021    BUN 9 06/02/2021    CR 0.84 06/29/2022    CR 0.81 06/02/2021    GFRESTIMATED 79 06/29/2022    GFRESTIMATED >60 03/03/2022    GFRESTIMATED 78 06/02/2021    GFRESTBLACK >90 06/02/2021    ANNIE 10.7 (H) 09/26/2022    ANNIE 9.7 06/02/2021        A1C RESULTS:  Lab Results   Component Value Date    A1C  5.1 03/25/2022    A1C 5.3 11/14/2014       INR RESULTS:  Lab Results   Component Value Date    INR 0.94 04/08/2015       Results reviewed today.       Current Cardiac Medications     None                    Assessment and Plan:       Plan    Patient Instructions   Medication Changes:  4. START imdur 15 mg daily     Recommendations:  1. Call if any more passing out.     Follow-up:  1. 30 day event monitor to assess cause of syncope   2. See Dr. Betancourt or Madina CARPENTER for cardiology follow up at [place]: 1 month.   Call 6 months prior, to schedule.     Cardiology Scheduling~[phone]  Cardiology Clinic RN~[phone] (Laine RN, Tarah RN, Mari RN)          1. Abnormal stress test    New infarction since stress test 8/2021- possibly artifact     No significant coronary calcification noted by Dr. Betancourt    Chest pain, worsening SOB - possibly d/t injury after fall      2. Syncope    3 episodes since August 2022, related to exertion    Abnormal stress test showing new infarction      3. Hyperlipidemia    Last  8/2021    Not on statin therapy       4. SOB    Progressive, at rest and exertion    Had COVID summer 2022     No HF symptoms     Infarction on stress but no coronary calcification or ischemia              Thank you for allowing me to participate in this delightful patient's care.      This note was completed in part using Dragon voice recognition software. Although reviewed after completion, some word and grammatical errors may occur.    Madina Uribe, HERIBERTO CNP, APRN, CNP           Data:   All laboratory data reviewed      Total time spent today was 50 mins, reviewing labs, testing, notes, documenting notes, and seeing patient.          Constitutional:  cooperative, alert and oriented, well developed, well nourished, in no acute distress  overweight     Skin:  warm and dry to the touch         Head:  normocephalic       Eyes:  pupils equal and round       ENT:  no pallor or  cyanosis       Neck:  no stiffness       Respiratory:  clear to auscultation; normal symmetry        Cardiac: regular rate and rhythm; normal S1 and S2                 pulses full and equal       GI:  abdomen soft, nondistended     Extremities and Muscular Skeletal:  no edema       Neurological:  affect appropriate; no gross motor deficits       Psych:  Alert and Oriented x 3 , appropriate affact      Thank you for allowing me to participate in the care of your patient.      Sincerely,     HERIBERTO Burns CNP     [facility]  cc:   HERIBERTO Linton CNP  [address] [place] [address]

## 2022-11-04 NOTE — NURSING NOTE
Chief Complaint   Patient presents with     Results     Dizziness     2 month follow up Dizziness and falls:Per patient after she falls she has memory fog,dizzy spills onset with excertion or at random times       Vitals:    11/04/22 1239   BP: 131/77   BP Location: Right arm   Patient Position: Sitting   Cuff Size: Adult Small   Pulse: 99   SpO2: 98%   Weight: 103 kg (227 lb)     Wt Readings from Last 1 Encounters:   11/04/22 103 kg (227 lb)       Mima Booker MA

## 2022-11-04 NOTE — PATIENT INSTRUCTIONS
Medication Changes:  START imdur 15 mg daily     Recommendations:  Call if any more passing out.     Follow-up:  30 day event monitor   See Dr. Betancourt or Madina CARPENTER for cardiology follow up at [place]: 1 month.   Call 6 months prior, to schedule.     Cardiology Scheduling~[phone]  Cardiology Clinic RN~[phone] (Laine RN, Tarah RN, Mari RN)

## 2022-11-04 NOTE — PROGRESS NOTES
"Cardiology Clinic Progress Note  Teresa Fernandez MRN# [id number]   YOB: 1960 Age: 62 year old      Primary Cardiologist:   Dr. Betancourt          History of Presenting Illness:      Teresa Fernandez is a pleasant 62 year old patient with a past cardiac history significant for   1. Abnormal stress test    Stress test 2021 no ischemia or infarction    Stress test 11/2022 small area of mild infarction without ischemia  2. Syncope    No prodrome, no loss of bowel or bladder    Occurs with exertion  3. Hyperlipidemia    Past medical history significant for multiple falls d/t neuropathy, left nephrectomy 1986, lumbar decompression 2015, GAYE, chronic fatigue, GERD, anxiety, hypothyroidism.    She was seen in consultation in August 2021 due to multiple falls over the prior few years.  Initial work-up for syncope with Zio patch and EKG were negative.  Echocardiogram showed LVEF 45% with wall motion abnormality.  Stress MRI showed normal EF and no scar or ischemia.    Patient was seen by Dr. Betancourt in August 2022 for routine follow-up.  She denied any further episodes of syncope in the last year but had fallen due to twisting her ankle.  Apparently further work-up of her falls previously led to diagnosis of severe peripheral neuropathy. He recommended as needed cardiology follow-up.    Patient was seen by PCP on 10/18/2021 due to dyspnea on exertion, falls, and vision changes.  Brain MRI showed no acute findings with probable chronic small vessel ischemic disease.  They recommended neurology follow-up.  They also recommended stress test as she reported shortness of breath with exertion.  She was able to exercise in the treadmill for 10 to 15 minutes on a \"good day\".  Progress note and MRI results reviewed today.    She was seen in the ED on 10/23/2022 due to pain after a fall.  She missed a step and fell injuring her arm and ribs.  ED note reviewed today.    Pt presents today, with her , for testing " follow-up. Nuclear exercise stress test 11/2/2022 showed a small area of mild infarction in the LAD without ischemia, normal EF.  Compared to 2021 the infarction was new.  Results reviewed today.    Blood pressure today is controlled.  Heart rate is elevated at 99 and weights have been stable.  Since she was seen in August she has had 3 episodes of syncope.  All of these episodes occurred with exertion such as walking fast, doing yard work, or walking on the treadmill.  She denies any prodrome or loss of bowel or bladder and has not noted any seizure like activity. None of her episodes were witnessed.  Upon waking she has a few seconds of disorientation but no significant confusion.  If exerting and she does not have full syncope she gets significant weakness.  She denies any heart racing or palpitations prior to losing consciousness. She has not sustained any fractures. She has a history of shortness of breath but this has progressively been getting worse over the last year.  She has been having shortness of breath at rest and with exertion but denies any orthopnea or PND.  She gets short of breath just walking to the car or to do her laundry.  She also has noted shortness of breath while sitting doing a puzzle or sitting at work. However, she did have COVID this summer and may still be recovering from the SOB. 1 week ago she awoke with significant chest pain.  The next day she had back and shoulder pain at rest which has continued.  Last night she experienced chest pain again at rest. These began after her most recent fall and ED visit so may be r/t injury.  She has occasional palpitations.  She has chronic bilateral lower extremity edema worse by the end of the day and improved with compression stockings. Patient reports no  PND, orthopnea, presyncope, heart racing, or palpitations.    I discussed her case with Dr. Betancourt today who recommends proceeding with a 30-day event monitor but would wait on  angiogram.   He reviewed CT which showed no significant coronary calcification and given her age, this would be less likely the cause of her syncope. He recommended trying antianginal.    Labs:  LIPID RESULTS:  Lab Results   Component Value Date    CHOL 232 (H) 08/10/2021    CHOL 237 (H) 11/06/2020    HDL 70 08/10/2021    HDL 62 11/06/2020     (H) 08/10/2021     (H) 11/06/2020    TRIG 87 08/10/2021    TRIG 123 11/06/2020    CHOLHDLRATIO 4.3 01/29/2015       LIVER ENZYME RESULTS:  Lab Results   Component Value Date    AST 22 03/25/2022    AST 24 06/02/2021    ALT 32 03/25/2022    ALT 31 06/02/2021       CBC RESULTS:  Lab Results   Component Value Date    WBC 5.6 09/30/2021    WBC 5.6 06/02/2021    RBC 4.59 09/30/2021    RBC 4.52 06/02/2021    HGB 13.5 09/30/2021    HGB 13.3 06/02/2021    HCT 40.1 09/30/2021    HCT 40.2 06/02/2021    MCV 87 09/30/2021    MCV 89 06/02/2021    MCH 29.4 09/30/2021    MCH 29.4 06/02/2021    MCHC 33.7 09/30/2021    MCHC 33.1 06/02/2021    RDW 12.7 09/30/2021    RDW 12.4 06/02/2021     09/30/2021     06/02/2021       BMP RESULTS:  Lab Results   Component Value Date     09/30/2021     06/02/2021    POTASSIUM 4.0 09/30/2021    POTASSIUM 4.1 06/02/2021    CHLORIDE 105 09/30/2021    CHLORIDE 102 06/02/2021    CO2 29 09/30/2021    CO2 27 06/02/2021    ANIONGAP 4 09/30/2021    ANIONGAP 6 06/02/2021    GLC 89 09/30/2021    GLC 97 06/02/2021    BUN 10 09/30/2021    BUN 9 06/02/2021    CR 0.84 06/29/2022    CR 0.81 06/02/2021    GFRESTIMATED 79 06/29/2022    GFRESTIMATED >60 03/03/2022    GFRESTIMATED 78 06/02/2021    GFRESTBLACK >90 06/02/2021    ANNIE 10.7 (H) 09/26/2022    ANNIE 9.7 06/02/2021        A1C RESULTS:  Lab Results   Component Value Date    A1C 5.1 03/25/2022    A1C 5.3 11/14/2014       INR RESULTS:  Lab Results   Component Value Date    INR 0.94 04/08/2015       Results reviewed today.       Current Cardiac Medications     None                    Assessment and  Plan:       Plan    Patient Instructions   Medication Changes:  4. START imdur 15 mg daily     Recommendations:  1. Call if any more passing out.     Follow-up:  1. 30 day event monitor to assess cause of syncope   2. See Dr. Betancourt or Madina CARPENTER for cardiology follow up at [facility]: 1 month.   Call 6 months prior, to schedule.     Cardiology Scheduling~[phone]  Cardiology Clinic RN~[phone] (Laine RN, Tarah RN, Mari RN)          1. Abnormal stress test    New infarction since stress test 8/2021- possibly artifact     No significant coronary calcification noted by Dr. Betancourt    Chest pain, worsening SOB - possibly d/t injury after fall      2. Syncope    3 episodes since August 2022, related to exertion    Abnormal stress test showing new infarction      3. Hyperlipidemia    Last  8/2021    Not on statin therapy       4. SOB    Progressive, at rest and exertion    Had COVID summer 2022     No HF symptoms     Infarction on stress but no coronary calcification or ischemia              Thank you for allowing me to participate in this delightful patient's care.      This note was completed in part using Dragon voice recognition software. Although reviewed after completion, some word and grammatical errors may occur.    Madina Uribe, APRN CNP, APRN, CNP           Data:   All laboratory data reviewed      Total time spent today was 50 mins, reviewing labs, testing, notes, documenting notes, and seeing patient.          Constitutional:  cooperative, alert and oriented, well developed, well nourished, in no acute distress  overweight     Skin:  warm and dry to the touch         Head:  normocephalic       Eyes:  pupils equal and round       ENT:  no pallor or cyanosis       Neck:  no stiffness       Respiratory:  clear to auscultation; normal symmetry        Cardiac: regular rate and rhythm; normal S1 and S2                 pulses full and equal       GI:  abdomen soft, nondistended      Extremities and Muscular Skeletal:  no edema       Neurological:  affect appropriate; no gross motor deficits       Psych:  Alert and Oriented x 3 , appropriate affact

## 2022-11-06 ENCOUNTER — MYC MEDICAL ADVICE (OUTPATIENT)
Dept: FAMILY MEDICINE | Facility: CLINIC | Age: 62
End: 2022-11-06

## 2022-11-06 NOTE — LETTER
November 7, 2022      Teresa Miguelell  [address] [facility] [address]        To Whom It May Concern:    Teresa Fernandez  was seen on 11/4/2022.  Please excuse her from missed work due to acute medical issue.         Sincerely,          HERIBERTO Linton CNP

## 2022-11-07 NOTE — TELEPHONE ENCOUNTER
See my chart message    Request for work note    I do not see nitroglycerin on her med list    Should work note come from Cardiology?    Juany Tripp RN on 11/7/2022 at 11:51 AM

## 2022-11-19 ENCOUNTER — HEALTH MAINTENANCE LETTER (OUTPATIENT)
Age: 62
End: 2022-11-19

## 2022-11-28 ENCOUNTER — PRE VISIT (OUTPATIENT)
Dept: NEUROLOGY | Facility: CLINIC | Age: 62
End: 2022-11-28

## 2022-11-28 ENCOUNTER — OFFICE VISIT (OUTPATIENT)
Dept: NEUROLOGY | Facility: CLINIC | Age: 62
End: 2022-11-28
Attending: NURSE PRACTITIONER
Payer: OTHER GOVERNMENT

## 2022-11-28 VITALS
DIASTOLIC BLOOD PRESSURE: 82 MMHG | SYSTOLIC BLOOD PRESSURE: 141 MMHG | OXYGEN SATURATION: 96 % | BODY MASS INDEX: 35.96 KG/M2 | WEIGHT: 231.3 LBS | HEART RATE: 106 BPM

## 2022-11-28 DIAGNOSIS — E66.01 MORBID OBESITY (H): ICD-10-CM

## 2022-11-28 DIAGNOSIS — R26.9 GAIT DISORDER: Primary | ICD-10-CM

## 2022-11-28 DIAGNOSIS — G62.9 PERIPHERAL POLYNEUROPATHY: ICD-10-CM

## 2022-11-28 PROCEDURE — 99215 OFFICE O/P EST HI 40 MIN: CPT | Performed by: PSYCHIATRY & NEUROLOGY

## 2022-11-28 ASSESSMENT — PAIN SCALES - GENERAL: PAINLEVEL: NO PAIN (0)

## 2022-11-28 NOTE — PROGRESS NOTES
Chief Complaint: multiple    History of Present Illness:    Teresa Fernandez is 62 year old woman who I am seeing at the request of Amanda Meléndez NP. The listed referral diagnosis is neuropathy. She has many complaints today and has been seen by many neurologist for these same complaints over the last several years. Her concerns include:  - Fatigue. She feels tired all the time. On the weekend she naps. She goes to bed at 7:30 pm and wakes at 3:30 am. She thinks thinks she sleeps 7-8 hours per night. She uses cpap. She follows with a sleep specialist.   - Hand tremor. The tremor comes and goes. It usually affects her left hand and foot. Sometimes she has head shaking. Tremor is worsened by anxiety. She has been seen by Dr. Beverly in the past. Shaking spells were found to be non epileptic.   - Pain. She has pain in her legs between her knee and ankle. The left side is more affected than the right. It can be present with rest or with activity. It is throbbing. She also reports stabbing pain in her left more than right wrist. She also reports pain behind her eyes. She also also achy pain in proximal arms and legs. I saw her for this problem many years ago and suggested fibromyalgia treatment. She has been on cymbalta and finds that it is helpful for this pain. She has low back pain. This is chronic and non radiating. She had low back surgery several years ago.   - Weakness. She feels weak in general all over. She has good days and bad days. Some days weakness does not affect her so much, other days she struggles to do things like climb stairs. No atrophy.   - Balance. Balance also fluctuates with good days and bad days. She feels lightheaded and dizzy when she walks. She is currently having this worked up and is wearing a heart monitor. About a month ago she was doing some yard work and passed out. She was out for seconds. This event lead to the cardiac work up. She has fallen in the past, usually proceeded by a  "lightheaded \"qoozy\" feeling, but this is the first time she passed out.  - Vision change. She feels like her vision is worsening. She follows regularly with the retina center in [facility].   - Raisedconcern about evolving or early dementing illnness, like Alzheimer's. No changes in behaviors or personality. No major memory changes, but wonders if this is slipping a bit.     Prior pertinent laboratory work-up:  Reviewed in EMR    Prior electrophysiologic work-up:  : NCS at [facility] showed a chronic/remote left-sided lumbosacral radiculopathy affecting the S1 and probably L5 nerve roots.  : NCS performed at [facility]. Sural and SP sensory responses normal. Left peroneal and right tibial motor small. A few fibs in right gastroc. The findings most likely represent her know LS spondylosis and stenosis.   : NCS/EMG performed at [facility]. Sural reports as absent. Right tibial and peroneal motor normal. The upper limbs were not studied. On EMG scattered chronic changes were reported.     Prior imagin: MRI LS spine showed mild central stenosis L1-L2 and L3-L4 and moderate central stenosis at L4-L5, right central L5-S1 disc protrusion/extrusion with impingement on right S1 nerve root,  multilevel mild foraminal narrowing  3/22: MRI LS spine showed progression of degenerative changes at L3-L4 where there is now relatively severe spinal canal stenosis and left foraminal narrowing with mild right foraminal narrowing.   Now moderate to severe central spinal canal narrowing at L2-L3 has  also progressed.  Interval changes of fusion at L4-L5 without significant residual stenosis.  At L1-L2 right-sided facet arthropathy distorts the exiting L1 nerve within the foramen. This appears progressed versus prior.   3/22: MRI C spine showed  multilevel degenerative changes are generally stable versus 2017.  Up to mild central spinal canal stenosis without spinal cord compression or spinal cord signal abnormality. " Degenerative disc osteophyte complexes cause multilevel neural  foraminal narrowing that is severe at C6-C7, moderate to severe on the left at C5-C6 and more mild to moderate elsewhere   10/22: MRI brain no acute intracranial process. Mild patchy nonspecific signal changes in the cerebral white matter, not significantly changed from the prior exam, presumably due to chronic small vessel ischemic disease.    Past Medical History:   Past Medical History:   Diagnosis Date     ALLERGIC RHINITIS NOS 4/12/2005     Anxiety      Arthritis     herniated disc     Bronchitis, not specified as acute or chronic      CHR MAXILLARY SINUSITIS 4/12/2005     Depressive disorder, not elsewhere classified      Eczema 10/17/2012     Environmental and seasonal allergies      GERD (gastroesophageal reflux disease)      Gluten intolerance      Malignant neoplasm of renal pelvis (H) 1995    Renal cell carcinoma     Melanoma (H) 06/2010     Other specified acquired hypothyroidism 4/12/2005     Renal cancer (H) 5/5/2011     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     FUSION LUMBAR ANTERIOR TWO LEVELS  4/13/2015     GYN SURGERY  04/08/1999    hysterectomy.  LAVH     HYSTERECTOMY, PAP NO LONGER INDICATED       SOFT TISSUE SURGERY      chest-melonoma     SURGICAL HISTORY OF -   3/1996    Left nephrectomy-renal cell CA     Family history:    There is no known family history of myopathy or other neuromuscular disorders.     Social History:    Rare alcohol. She denies current tobacco or illicit drug use.     Medical Allergies:     Allergies   Allergen Reactions     Omnipaque [Iohexol] Swelling and Difficulty breathing     Immediate throat swelling following around 1-2cc of omnipaque 300  for spine procedure  --> skin prick and intradermal tests with Iohexol negatives. But I would propose anyway to premedicate patient before using iodinated contrast media (for safety reasons).   --> no reactions in skin tests to MRI contrast media Gadovist     Gabapentin Hives     Gluten      Current Medications:   Current Outpatient Medications   Medication     acetaminophen (TYLENOL) 325 MG tablet     albuterol (PROAIR HFA/PROVENTIL HFA/VENTOLIN HFA) 108 (90 BASE) MCG/ACT Inhaler     albuterol (PROVENTIL) (2.5 MG/3ML) 0.083% neb solution     APNO CREA ointment     augmented betamethasone dipropionate (DIPROLENE-AF) 0.05 % external ointment     Cholecalciferol (VITAMIN D3 PO)     diphenhydrAMINE (BENADRYL ALLERGY) 25 MG capsule     DULoxetine (CYMBALTA) 30 MG capsule     DULoxetine (CYMBALTA) 60 MG capsule     fexofenadine (ALLEGRA) 180 MG tablet     hydrOXYzine (ATARAX) 25 MG tablet     isosorbide mononitrate (IMDUR) 30 MG 24 hr tablet     levothyroxine (SYNTHROID/LEVOTHROID) 100 MCG tablet     levothyroxine (SYNTHROID/LEVOTHROID) 88 MCG tablet     methylPREDNISolone (MEDROL) 32 MG tablet     mometasone (ELOCON) 0.1 % external ointment     omeprazole (PRILOSEC) 20 MG DR capsule     order for DME     OVER-THE-COUNTER     pregabalin (LYRICA) 25 MG capsule     triamcinolone (KENALOG) 0.1 % external cream     Current Facility-Administered Medications   Medication     ampicillin (OMNIPEN) 1 g vial INTRADERMAL     penicillin g potassium 6870552 unit vial INTRADERMAL     Review of Systems: A complete review of systems was obtained and was negative except for what was noted above.    Physical examination:    BP (!) 141/82 (BP Location: Right arm, Patient Position: Sitting, Cuff Size: Adult Large)   Pulse 106   Wt 104.9 kg (231 lb 4.8 oz)   LMP  (LMP Unknown)   SpO2 96%   BMI 35.96 kg/m      General Appearance: NAD    Skin: There are no rashes or other skin lesions.    Musculoskeletal:  Pes cavus present. There is no  scoliosis, lordosis, kyphosis.    Neurologic examination:    Mental status:  Patient is alert, attentive, and oriented x 3.  Language is coherent and fluent without dysarthria or aphasia.  Memory, comprehension and ability to follow commands were intact.       Cranial nerves:  Pupils were round and reacted to light.  Extraocular movements were full. There was no face, jaw, palate or tongue weakness or atrophy. Hearing was grossly intact.  Shoulder shrug was normal.       Motor exam: No atrophy or fasciculations.   Manual muscle testing revealed the following MRC grade muscle power:   Right Left   Neck flexion 5    Neck extension: 5    Shoulder ext rotation 5 5   Shoulder abduction:  5 5   Elbow extension: 5 5   Elbow flexion:  5 5   Wrist flexion:  5 5   Wrist extension:  5 5   FDI 5 5   Hip flexion 5 5   Knee flexion 5 5   Knee extension 5 5   Dorsiflexion 5 5   Plantar flexion 5 5     Complex motor skills: Mild postural tremor on left hand. No ataxia.     Sensory exam: Pin normal in hands and feet. Vibration mildly reduced in toes.     Gait: Narrow and stable but antalgic.     Deep tendon reflexes:   Right Left   Triceps 2 2   Biceps 2 2   Brachioradialis 2 2   Knee jerk 2 2   Ankle jerk 0 0   Plantar responses were flexor bilaterally.       Assessment and plan:    Teresa Fernandez is a 62 year old woman with multiple concerns today. She has a referral diagnosis of polyneuropathy. That diagnosis is questionable. I do see that she had an NCS performed at [facility] in May 2022 that reported a polyneuropathy. I am going to repeat that study. That study was quite a bit different than the NCS performed at [facility] in 7/20. That is a bit surprising. If repeat NCS confirms neuropathy then I will coordinate a neuropathy work up. If it is similar to the 7/20 study then that diagnosis can be dismissed. Either way I do not think a neuromuscular disorder explains most of her complaints. Certainly she has a number of  independent issues, including:     - Tremor: Essential  - Back pain from LS central stenosis sand foraminal stenosis  - Fatigue that is multifactorial  - Balance that is multifactorial  - Pain, some of which falls in the fibromyalgia spectrum  - Syncope, currently being evaluated by cardiology    I discussed each of these issues with her seperately. If her tremor worsens or becomes intrusive I would be happy to put her in touch with our movement disorders team. I encouraged her to follow up with her spine team at [facility] if back pain persists. She should see her PCP for multifactorial fatigue, and am I pleased that she has a cardiac work up ongoing for syncope. Regarding balance, contributions include LS stenosis, arthritis, chronic pain, age and lightheadedness/syncope. I doubt neuropathy contributes to her balance difficulty in a meaningful way, and there was nothing on her brain MRI that she cause gait instability. Once the cardiac work up is complete then a course of physical therapy may be helpful, but would want her to complete the cardiac work up first. She also raises concern that she is developing a dementing illness like Alzheimer's. I reassured her that I did not see anything clinically or on her MRI that makes me concerned for those conditions, but if she notices changes in her behavior or personality I would be happy to put her in touch with our cognitive specialist. I have not arranged follow up with Ms. Fernandez as she does not need to be followed in the neuromuscular clinic. I will be in touch with her EMG results when available, and comment further at that point.   ---

## 2022-11-28 NOTE — LETTER
11/28/2022       RE: Teresa Fernandez  [address] [facility] [id number]     Dear Colleague,    Thank you for referring your patient, Teresa Fernandez, to the [facility] [place] at [facility]. Please see a copy of my visit note below.    Chief Complaint: multiple    History of Present Illness:    Teresa Fernandez is 62 year old woman who I am seeing at the request of Amanda Meléndez NP. The listed referral diagnosis is neuropathy. She has many complaints today and has been seen by many neurologist for these same complaints over the last several years. Her concerns include:  - Fatigue. She feels tired all the time. On the weekend she naps. She goes to bed at 7:30 pm and wakes at 3:30 am. She thinks thinks she sleeps 7-8 hours per night. She uses cpap. She follows with a sleep specialist.   - Hand tremor. The tremor comes and goes. It usually affects her left hand and foot. Sometimes she has head shaking. Tremor is worsened by anxiety. She has been seen by Dr. Beverly in the past. Shaking spells were found to be non epileptic.   - Pain. She has pain in her legs between her knee and ankle. The left side is more affected than the right. It can be present with rest or with activity. It is throbbing. She also reports stabbing pain in her left more than right wrist. She also reports pain behind her eyes. She also also achy pain in proximal arms and legs. I saw her for this problem many years ago and suggested fibromyalgia treatment. She has been on cymbalta and finds that it is helpful for this pain. She has low back pain. This is chronic and non radiating. She had low back surgery several years ago.   - Weakness. She feels weak in general all over. She has good days and bad days. Some days weakness does not affect her so much, other days she struggles to do things like climb stairs. No atrophy.   - Balance. Balance also fluctuates with good days  "and bad days. She feels lightheaded and dizzy when she walks. She is currently having this worked up and is wearing a heart monitor. About a month ago she was doing some yard work and passed out. She was out for seconds. This event lead to the cardiac work up. She has fallen in the past, usually proceeded by a lightheaded \"qoozy\" feeling, but this is the first time she passed out.  - Vision change. She feels like her vision is worsening. She follows regularly with the retina center in [facility].   - Raisedconcern about evolving or early dementing illnness, like Alzheimer's. No changes in behaviors or personality. No major memory changes, but wonders if this is slipping a bit.     Prior pertinent laboratory work-up:  Reviewed in EMR    Prior electrophysiologic work-up:  : NCS at [facility] showed a chronic/remote left-sided lumbosacral radiculopathy affecting the S1 and probably L5 nerve roots.  : NCS performed at [facility]. Sural and SP sensory responses normal. Left peroneal and right tibial motor small. A few fibs in right gastroc. The findings most likely represent her know LS spondylosis and stenosis.   : NCS/EMG performed at [facility]. Sural reports as absent. Right tibial and peroneal motor normal. The upper limbs were not studied. On EMG scattered chronic changes were reported.     Prior imagin: MRI LS spine showed mild central stenosis L1-L2 and L3-L4 and moderate central stenosis at L4-L5, right central L5-S1 disc protrusion/extrusion with impingement on right S1 nerve root,  multilevel mild foraminal narrowing  3/22: MRI LS spine showed progression of degenerative changes at L3-L4 where there is now relatively severe spinal canal stenosis and left foraminal narrowing with mild right foraminal narrowing.   Now moderate to severe central spinal canal narrowing at L2-L3 has  also progressed.  Interval changes of fusion at L4-L5 without significant residual stenosis.  At L1-L2 right-sided " facet arthropathy distorts the exiting L1 nerve within the foramen. This appears progressed versus prior.   3/22: MRI C spine showed  multilevel degenerative changes are generally stable versus 2017.  Up to mild central spinal canal stenosis without spinal cord compression or spinal cord signal abnormality. Degenerative disc osteophyte complexes cause multilevel neural  foraminal narrowing that is severe at C6-C7, moderate to severe on the left at C5-C6 and more mild to moderate elsewhere   10/22: MRI brain no acute intracranial process. Mild patchy nonspecific signal changes in the cerebral white matter, not significantly changed from the prior exam, presumably due to chronic small vessel ischemic disease.    Past Medical History:   Past Medical History:   Diagnosis Date     ALLERGIC RHINITIS NOS 4/12/2005     Anxiety      Arthritis     herniated disc     Bronchitis, not specified as acute or chronic      CHR MAXILLARY SINUSITIS 4/12/2005     Depressive disorder, not elsewhere classified      Eczema 10/17/2012     Environmental and seasonal allergies      GERD (gastroesophageal reflux disease)      Gluten intolerance      Malignant neoplasm of renal pelvis (H) 1995    Renal cell carcinoma     Melanoma (H) 06/2010     Other specified acquired hypothyroidism 4/12/2005     Renal cancer (H) 5/5/2011     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     FUSION LUMBAR ANTERIOR TWO LEVELS  4/13/2015     GYN SURGERY  04/08/1999    hysterectomy.  LAVH     HYSTERECTOMY, PAP NO LONGER INDICATED       SOFT TISSUE SURGERY      chest-melonoma     SURGICAL HISTORY OF -   3/1996    Left nephrectomy-renal cell CA     Family history:    There is no known family history of  myopathy or other neuromuscular disorders.     Social History:    Rare alcohol. She denies current tobacco or illicit drug use.     Medical Allergies:     Allergies   Allergen Reactions     Omnipaque [Iohexol] Swelling and Difficulty breathing     Immediate throat swelling following around 1-2cc of omnipaque 300 for spine procedure  --> skin prick and intradermal tests with Iohexol negatives. But I would propose anyway to premedicate patient before using iodinated contrast media (for safety reasons).   --> no reactions in skin tests to MRI contrast media Gadovist     Gabapentin Hives     Gluten      Current Medications:   Current Outpatient Medications   Medication     acetaminophen (TYLENOL) 325 MG tablet     albuterol (PROAIR HFA/PROVENTIL HFA/VENTOLIN HFA) 108 (90 BASE) MCG/ACT Inhaler     albuterol (PROVENTIL) (2.5 MG/3ML) 0.083% neb solution     APNO CREA ointment     augmented betamethasone dipropionate (DIPROLENE-AF) 0.05 % external ointment     Cholecalciferol (VITAMIN D3 PO)     diphenhydrAMINE (BENADRYL ALLERGY) 25 MG capsule     DULoxetine (CYMBALTA) 30 MG capsule     DULoxetine (CYMBALTA) 60 MG capsule     fexofenadine (ALLEGRA) 180 MG tablet     hydrOXYzine (ATARAX) 25 MG tablet     isosorbide mononitrate (IMDUR) 30 MG 24 hr tablet     levothyroxine (SYNTHROID/LEVOTHROID) 100 MCG tablet     levothyroxine (SYNTHROID/LEVOTHROID) 88 MCG tablet     methylPREDNISolone (MEDROL) 32 MG tablet     mometasone (ELOCON) 0.1 % external ointment     omeprazole (PRILOSEC) 20 MG DR capsule     order for DME     OVER-THE-COUNTER     pregabalin (LYRICA) 25 MG capsule     triamcinolone (KENALOG) 0.1 % external cream     Current Facility-Administered Medications   Medication     ampicillin (OMNIPEN) 1 g vial INTRADERMAL     penicillin g potassium 3395577 unit vial INTRADERMAL     Review of Systems: A complete review of systems was obtained and was negative except for what was noted above.    Physical examination:    BP (!)  141/82 (BP Location: Right arm, Patient Position: Sitting, Cuff Size: Adult Large)   Pulse 106   Wt 104.9 kg (231 lb 4.8 oz)   LMP  (LMP Unknown)   SpO2 96%   BMI 35.96 kg/m      General Appearance: NAD    Skin: There are no rashes or other skin lesions.    Musculoskeletal:  Pes cavus present. There is no scoliosis, lordosis, kyphosis.    Neurologic examination:    Mental status:  Patient is alert, attentive, and oriented x 3.  Language is coherent and fluent without dysarthria or aphasia.  Memory, comprehension and ability to follow commands were intact.       Cranial nerves:  Pupils were round and reacted to light.  Extraocular movements were full. There was no face, jaw, palate or tongue weakness or atrophy. Hearing was grossly intact.  Shoulder shrug was normal.       Motor exam: No atrophy or fasciculations.   Manual muscle testing revealed the following MRC grade muscle power:   Right Left   Neck flexion 5    Neck extension: 5    Shoulder ext rotation 5 5   Shoulder abduction:  5 5   Elbow extension: 5 5   Elbow flexion:  5 5   Wrist flexion:  5 5   Wrist extension:  5 5   FDI 5 5   Hip flexion 5 5   Knee flexion 5 5   Knee extension 5 5   Dorsiflexion 5 5   Plantar flexion 5 5     Complex motor skills: Mild postural tremor on left hand. No ataxia.     Sensory exam: Pin normal in hands and feet. Vibration mildly reduced in toes.     Gait: Narrow and stable but antalgic.     Deep tendon reflexes:   Right Left   Triceps 2 2   Biceps 2 2   Brachioradialis 2 2   Knee jerk 2 2   Ankle jerk 0 0   Plantar responses were flexor bilaterally.       Assessment and plan:    Teresa Fernandez is a 62 year old woman with multiple concerns today. She has a referral diagnosis of polyneuropathy. That diagnosis is questionable. I do see that she had an NCS performed at [facility] in May 2022 that reported a polyneuropathy. I am going to repeat that study. That study was quite a bit different than the NCS performed  at [facility] in 7/20. That is a bit surprising. If repeat NCS confirms neuropathy then I will coordinate a neuropathy work up. If it is similar to the 7/20 study then that diagnosis can be dismissed. Either way I do not think a neuromuscular disorder explains most of her complaints. Certainly she has a number of independent issues, including:     - Tremor: Essential  - Back pain from LS central stenosis sand foraminal stenosis  - Fatigue that is multifactorial  - Balance that is multifactorial  - Pain, some of which falls in the fibromyalgia spectrum  - Syncope, currently being evaluated by cardiology    I discussed each of these issues with her seperately. If her tremor worsens or becomes intrusive I would be happy to put her in touch with our movement disorders team. I encouraged her to follow up with her spine team at [facility] if back pain persists. She should see her PCP for multifactorial fatigue, and am I pleased that she has a cardiac work up ongoing for syncope. Regarding balance, contributions include LS stenosis, arthritis, chronic pain, age and lightheadedness/syncope. I doubt neuropathy contributes to her balance difficulty in a meaningful way, and there was nothing on her brain MRI that she cause gait instability. Once the cardiac work up is complete then a course of physical therapy may be helpful, but would want her to complete the cardiac work up first. She also raises concern that she is developing a dementing illness like Alzheimer's. I reassured her that I did not see anything clinically or on her MRI that makes me concerned for those conditions, but if she notices changes in her behavior or personality I would be happy to put her in touch with our cognitive specialist. I have not arranged follow up with Ms. Fernandez as she does not need to be followed in the neuromuscular clinic. I will be in touch with her EMG results when available, and comment further at that point.    ---        Sincerely,    Demetris Zavala MD

## 2022-12-14 ENCOUNTER — OFFICE VISIT (OUTPATIENT)
Dept: CARDIOLOGY | Facility: CLINIC | Age: 62
End: 2022-12-14
Attending: NURSE PRACTITIONER
Payer: OTHER GOVERNMENT

## 2022-12-14 VITALS
SYSTOLIC BLOOD PRESSURE: 113 MMHG | OXYGEN SATURATION: 98 % | HEART RATE: 94 BPM | BODY MASS INDEX: 36.16 KG/M2 | WEIGHT: 232.6 LBS | DIASTOLIC BLOOD PRESSURE: 72 MMHG

## 2022-12-14 DIAGNOSIS — R94.39 ABNORMAL STRESS TEST: ICD-10-CM

## 2022-12-14 DIAGNOSIS — E78.5 HYPERLIPIDEMIA LDL GOAL <70: ICD-10-CM

## 2022-12-14 DIAGNOSIS — R07.9 CHEST PAIN, UNSPECIFIED TYPE: ICD-10-CM

## 2022-12-14 DIAGNOSIS — R55 SYNCOPE, UNSPECIFIED SYNCOPE TYPE: ICD-10-CM

## 2022-12-14 PROCEDURE — 99214 OFFICE O/P EST MOD 30 MIN: CPT | Performed by: INTERNAL MEDICINE

## 2022-12-14 NOTE — PROGRESS NOTES
HPI and Plan:   Today I had the pleasure of seeing Teresa Fernandez at [facility]. She is a pleasant 62 year old patient who presents to the clinic for a follow-up visit.    We have been following the patient for quite some time for complaints of chest pain.  She initially had a stress test in 2021 which showed no ischemia or infarction.  EF 55%.  Transthoracic echocardiogram performed after that showed ejection fraction of 40-45% with wall motion abnormality involving anterior wall.  Hence, we performed cardiac MRI due to discrepancy between echo and stress results.  This showed no evidence of ischemia or infarction and normal biventricular function.  Ejection fraction was 65%.  However due to ongoing symptoms the patient again had a stress test a month ago which she now reports small apical infarction without ischemia.     Today, the patient tells me that she continues to have chest pain with exertion which feels like heaviness in the center of the chest.   I had previously started her on Imdur which she says helps her symptoms.  She is not on a beta-blocker currently.    Assessment and plan  1.  Angina? 2/2 CAD vs Vasospasm  2.  H/o Syncope- no recent episodes  3.  Hyperlipidemia    It was a pleasure to see Ms. Sousa in clinic today.  She is understandably frustrated due to ongoing symptoms and conflicting results on testing.  She has received some benefit from Imdur which makes me believe that she either has microvascular disease or vasospasm induced chest pain.  Fortunately, cardiac MRI was completely negative and the nuclear stress test which although is now positive still is not high risk.  Hence I told her that the likelihood of her having life-threatening coronary artery disease is very low.  I suggested that we perform a limited transthoracic echocardiogram and if it continues to show low EF and wall motion abnormality go straight to coronary angiogram for definitive diagnosis.   If we consider doing a coronary angiogram I would request the vasoactive testing to rule out vasospasm as cause of her symptoms.  For this reason, I also discussed the risks and benefits of coronary angiogram, answered all her questions and obtained verbal consent in case we need to proceed with it.  I have also asked her to increase the dose of Imdur from 15 to 30 mg.  I will reach out to her with the results of the test and make arrangements for follow-up and further testing as needed.    Thank you for allowing me to participate in the care of Teresa Fernandez    This note was completed in part using Dragon voice recognition software. Although reviewed after completion, some word and grammatical errors may occur.    Andrew Betancourt MD  Cardiology    Orders Placed This Encounter   Procedures     Echocardiogram Limited       No orders of the defined types were placed in this encounter.      There are no discontinued medications.    Encounter Diagnoses   Name Primary?     Abnormal stress test      Hyperlipidemia LDL goal <70      Syncope, unspecified syncope type      Chest pain, unspecified type        CURRENT MEDICATIONS:  Current Outpatient Medications   Medication Sig Dispense Refill     acetaminophen (TYLENOL) 325 MG tablet Take 325-650 mg by mouth every 6 hours as needed for mild pain       albuterol (PROAIR HFA/PROVENTIL HFA/VENTOLIN HFA) 108 (90 BASE) MCG/ACT Inhaler Inhale 2 puffs into the lungs every 6 hours as needed for shortness of breath / dyspnea 1 Inhaler 5     albuterol (PROVENTIL) (2.5 MG/3ML) 0.083% neb solution Take 1 vial (2.5 mg) by nebulization every 6 hours as needed for shortness of breath / dyspnea or wheezing 90 mL 11     APNO CREA ointment Apply to rash on nose twice daily as needed. 100 g 3     augmented betamethasone dipropionate (DIPROLENE-AF) 0.05 % external ointment Apply topically 2 times daily 50 g 3     Cholecalciferol (VITAMIN D3 PO) Take 2,000 Units by mouth 2 times daily        diphenhydrAMINE (BENADRYL ALLERGY) 25 MG capsule Administer 30 min - 2 hours pre - IV contrast injection 1 capsule 0     DULoxetine (CYMBALTA) 30 MG capsule Take 1 by mouth daily in addition to the 60 mg for a total of 90 mg 90 capsule 3     DULoxetine (CYMBALTA) 60 MG capsule Take 1 capsule (60 mg) by mouth daily 90 capsule 3     fexofenadine (ALLEGRA) 180 MG tablet Take 1 tablet (180 mg) by mouth daily 30 tablet 1     hydrOXYzine (ATARAX) 25 MG tablet Take 1 tablet (25 mg) by mouth 3 times daily as needed for itching 90 tablet 1     isosorbide mononitrate (IMDUR) 30 MG 24 hr tablet Take 0.5 tablets (15 mg) by mouth daily 15 tablet 11     levothyroxine (SYNTHROID/LEVOTHROID) 100 MCG tablet TAKE ONE TABLET BY MOUTH ONCE DAILY ON MONDAY, WEDNESDAY, FRIDAY, SATURDAY AND SUNDAY. 90 tablet 2     levothyroxine (SYNTHROID/LEVOTHROID) 88 MCG tablet Take 1 tab by mouth Tuesdays and Thursdays 45 tablet 3     methylPREDNISolone (MEDROL) 32 MG tablet 12 hours prior to the procedure with IV contrast 1 tablet 0     mometasone (ELOCON) 0.1 % external ointment Apply topically twice a week Use on eczematous lesions 45 g 3     omeprazole (PRILOSEC) 20 MG DR capsule Take 20 mg by mouth daily       order for DME Equipment being ordered: Nebulizer 1 Units 0     OVER-THE-COUNTER Place 1 drop into both eyes 3 times daily. Systane Ultra, Systane Balance, Blink Tears or Refresh Optive Artificial Tear 1 Bottle      pregabalin (LYRICA) 25 MG capsule Take 1 capsule (25 mg) by mouth 2 times daily 60 capsule 1     triamcinolone (KENALOG) 0.1 % external cream Apply topically 2 times daily 30 g 3       ALLERGIES     Allergies   Allergen Reactions     Omnipaque [Iohexol] Swelling and Difficulty breathing     Immediate throat swelling following around 1-2cc of omnipaque 300 for spine procedure  --> skin prick and intradermal tests with Iohexol negatives. But I would propose anyway to premedicate patient before using iodinated contrast media (for  safety reasons).   --> no reactions in skin tests to MRI contrast media Gadovist     Gabapentin Hives     Gluten        PAST MEDICAL HISTORY:  Past Medical History:   Diagnosis Date     ALLERGIC RHINITIS NOS 4/12/2005     Anxiety      Arthritis     herniated disc     Bronchitis, not specified as acute or chronic      CHR MAXILLARY SINUSITIS 4/12/2005     Depressive disorder, not elsewhere classified      Eczema 10/17/2012     Environmental and seasonal allergies      GERD (gastroesophageal reflux disease)      Gluten intolerance      Malignant neoplasm of renal pelvis (H) 1995    Renal cell carcinoma     Melanoma (H) 06/2010     Other specified acquired hypothyroidism 4/12/2005     Renal cancer (H) 5/5/2011     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     FUSION LUMBAR ANTERIOR TWO LEVELS  4/13/2015     GYN SURGERY  04/08/1999    hysterectomy.  [facility]     HYSTERECTOMY, PAP NO LONGER INDICATED       SOFT TISSUE SURGERY      chest-melonoma     SURGICAL HISTORY OF -   3/1996    Left nephrectomy-renal cell CA       FAMILY HISTORY:  Family History   Problem Relation Age of Onset     Diabetes Mother      Cardiovascular Mother      Lipids Mother      Depression Mother      Hypertension Father      Lipids Father      Obesity Father      Macular Degeneration Father      Sleep Apnea Father      Thyroid Disease Sister      Hypertension Sister      Depression Sister      Allergies Sister      Lipids Sister      Thyroid Disease Sister      Neurologic Disorder Sister      Depression Sister      Hypertension Brother      Colon Cancer Brother      Cancer Maternal Grandmother         kind unknown had sores on legs     Eczema Maternal Grandmother      Eczema Maternal  Grandfather      Breast Cancer Paternal Grandmother      Cancer Paternal Grandmother         breast     Hypertension Paternal Grandfather      Cardiovascular Paternal Grandfather         heart attack     Allergies Son      Eczema Son      Respiratory Son      Sleep Apnea Son      Glaucoma No family hx of      Cerebrovascular Disease No family hx of        SOCIAL HISTORY:  Social History     Socioeconomic History     Marital status:      Spouse name: None     Number of children: None     Years of education: None     Highest education level: None   Occupational History     Employer: [employer]     Employer: SELF   Tobacco Use     Smoking status: Former     Packs/day: 2.00     Years: 15.00     Pack years: 30.00     Types: Cigarettes     Quit date: 3/26/1996     Years since quittin.7     Smokeless tobacco: Never   Vaping Use     Vaping Use: Never used   Substance and Sexual Activity     Alcohol use: Yes     Comment: rare     Drug use: No     Sexual activity: Yes     Partners: Male   Other Topics Concern     Parent/sibling w/ CABG, MI or angioplasty before 65F 55M? No   Social History Narrative    .  Lives with .  Makes medical supplies.     4 children - healthy    4 siblings - + FH depression, hypertension, chol, diabetes mellitus    No family of muscle problems.        Review of Systems:  Skin:          Eyes:         ENT:         Respiratory:  Positive for shortness of breath;dyspnea on exertion;dyspnea at rest     Cardiovascular:    Positive for;palpitations;chest pain;lower extremity symptoms;edema;fatigue;lightheadedness;dizziness    Gastroenterology:        Genitourinary:         Musculoskeletal:         Neurologic:         Psychiatric:         Heme/Lymph/Imm:         Endocrine:           Physical Exam:  Vitals: /72   Pulse 94   Wt 105.5 kg (232 lb 9.6 oz)   LMP  (LMP Unknown)   SpO2 98%   BMI 36.16 kg/m    Eyes: No icterus.  Pulmonary: Chest symmetric, lungs clear bilaterally  and no crackles, wheezes or rales.  Cardiovascular: RRR with normal S1 and S2, no murmur, JVP normal.  Musculoskeletal: Edema of the lower extremities: None.  Neurologic: Oriented and appropriate without obvious focal deficits.   Psychiatric: Normal affect.     Recent Lab Results:  LIPID RESULTS:  Lab Results   Component Value Date    CHOL 232 (H) 08/10/2021    CHOL 237 (H) 11/06/2020    HDL 70 08/10/2021    HDL 62 11/06/2020     (H) 08/10/2021     (H) 11/06/2020    TRIG 87 08/10/2021    TRIG 123 11/06/2020    CHOLHDLRATIO 4.3 01/29/2015       LIVER ENZYME RESULTS:  Lab Results   Component Value Date    AST 22 03/25/2022    AST 24 06/02/2021    ALT 32 03/25/2022    ALT 31 06/02/2021       CBC RESULTS:  Lab Results   Component Value Date    WBC 5.6 09/30/2021    WBC 5.6 06/02/2021    RBC 4.59 09/30/2021    RBC 4.52 06/02/2021    HGB 13.5 09/30/2021    HGB 13.3 06/02/2021    HCT 40.1 09/30/2021    HCT 40.2 06/02/2021    MCV 87 09/30/2021    MCV 89 06/02/2021    MCH 29.4 09/30/2021    MCH 29.4 06/02/2021    MCHC 33.7 09/30/2021    MCHC 33.1 06/02/2021    RDW 12.7 09/30/2021    RDW 12.4 06/02/2021     09/30/2021     06/02/2021       BMP RESULTS:  Lab Results   Component Value Date     09/30/2021     06/02/2021    POTASSIUM 4.0 09/30/2021    POTASSIUM 4.1 06/02/2021    CHLORIDE 105 09/30/2021    CHLORIDE 102 06/02/2021    CO2 29 09/30/2021    CO2 27 06/02/2021    ANIONGAP 4 09/30/2021    ANIONGAP 6 06/02/2021    GLC 89 09/30/2021    GLC 97 06/02/2021    BUN 10 09/30/2021    BUN 9 06/02/2021    CR 0.84 06/29/2022    CR 0.81 06/02/2021    GFRESTIMATED 79 06/29/2022    GFRESTIMATED >60 03/03/2022    GFRESTIMATED 78 06/02/2021    GFRESTBLACK >90 06/02/2021    ANNIE 10.7 (H) 09/26/2022    ANNIE 9.7 06/02/2021        A1C RESULTS:  Lab Results   Component Value Date    A1C 5.1 03/25/2022    A1C 5.3 11/14/2014       INR RESULTS:  Lab Results   Component Value Date    INR 0.94 04/08/2015        CC  Madina Azevedo, APRN CNP  [address]  [place] [address]    All medical records were reviewed in detail and discussed with the patient. Greater than 30 mins were spent with the patient, 50% of this time was spent on counseling and coordination of care.  After visit summary was printed and given to the patient.

## 2022-12-14 NOTE — LETTER
12/14/2022    HERIBERTO Linton CNP  [id number]  [facility] [address]    RE: Teresa Fernandez       Dear Colleague,     I had the pleasure of seeing Teresa Fernandez in the [facility].  HPI and Plan:   Today I had the pleasure of seeing Teresa Fernandez at [facility]. She is a pleasant 62 year old patient who presents to the clinic for a follow-up visit.    We have been following the patient for quite some time for complaints of chest pain.  She initially had a stress test in 2021 which showed no ischemia or infarction.  EF 55%.  Transthoracic echocardiogram performed after that showed ejection fraction of 40-45% with wall motion abnormality involving anterior wall.  Hence, we performed cardiac MRI due to discrepancy between echo and stress results.  This showed no evidence of ischemia or infarction and normal biventricular function.  Ejection fraction was 65%.  However due to ongoing symptoms the patient again had a stress test a month ago which she now reports small apical infarction without ischemia.     Today, the patient tells me that she continues to have chest pain with exertion which feels like heaviness in the center of the chest.   I had previously started her on Imdur which she says helps her symptoms.  She is not on a beta-blocker currently.    Assessment and plan  1.  Angina? 2/2 CAD vs Vasospasm  2.  H/o Syncope- no recent episodes  3.  Hyperlipidemia    It was a pleasure to see Ms. Sousa in clinic today.  She is understandably frustrated due to ongoing symptoms and conflicting results on testing.  She has received some benefit from Imdur which makes me believe that she either has microvascular disease or vasospasm induced chest pain.  Fortunately, cardiac MRI was completely negative and the nuclear stress test which although is now positive still is not high risk.  Hence I told her that the likelihood of her having life-threatening coronary  artery disease is very low.  I suggested that we perform a limited transthoracic echocardiogram and if it continues to show low EF and wall motion abnormality go straight to coronary angiogram for definitive diagnosis.  If we consider doing a coronary angiogram I would request the vasoactive testing to rule out vasospasm as cause of her symptoms.  For this reason, I also discussed the risks and benefits of coronary angiogram, answered all her questions and obtained verbal consent in case we need to proceed with it.  I have also asked her to increase the dose of Imdur from 15 to 30 mg.  I will reach out to her with the results of the test and make arrangements for follow-up and further testing as needed.    Thank you for allowing me to participate in the care of Teresa Fernandez    This note was completed in part using Dragon voice recognition software. Although reviewed after completion, some word and grammatical errors may occur.    Andrew Betancourt MD  Cardiology    Orders Placed This Encounter   Procedures     Echocardiogram Limited       No orders of the defined types were placed in this encounter.      There are no discontinued medications.    Encounter Diagnoses   Name Primary?     Abnormal stress test      Hyperlipidemia LDL goal <70      Syncope, unspecified syncope type      Chest pain, unspecified type        CURRENT MEDICATIONS:  Current Outpatient Medications   Medication Sig Dispense Refill     acetaminophen (TYLENOL) 325 MG tablet Take 325-650 mg by mouth every 6 hours as needed for mild pain       albuterol (PROAIR HFA/PROVENTIL HFA/VENTOLIN HFA) 108 (90 BASE) MCG/ACT Inhaler Inhale 2 puffs into the lungs every 6 hours as needed for shortness of breath / dyspnea 1 Inhaler 5     albuterol (PROVENTIL) (2.5 MG/3ML) 0.083% neb solution Take 1 vial (2.5 mg) by nebulization every 6 hours as needed for shortness of breath / dyspnea or wheezing 90 mL 11     APNO CREA ointment Apply to rash on nose twice daily as  needed. 100 g 3     augmented betamethasone dipropionate (DIPROLENE-AF) 0.05 % external ointment Apply topically 2 times daily 50 g 3     Cholecalciferol (VITAMIN D3 PO) Take 2,000 Units by mouth 2 times daily       diphenhydrAMINE (BENADRYL ALLERGY) 25 MG capsule Administer 30 min - 2 hours pre - IV contrast injection 1 capsule 0     DULoxetine (CYMBALTA) 30 MG capsule Take 1 by mouth daily in addition to the 60 mg for a total of 90 mg 90 capsule 3     DULoxetine (CYMBALTA) 60 MG capsule Take 1 capsule (60 mg) by mouth daily 90 capsule 3     fexofenadine (ALLEGRA) 180 MG tablet Take 1 tablet (180 mg) by mouth daily 30 tablet 1     hydrOXYzine (ATARAX) 25 MG tablet Take 1 tablet (25 mg) by mouth 3 times daily as needed for itching 90 tablet 1     isosorbide mononitrate (IMDUR) 30 MG 24 hr tablet Take 0.5 tablets (15 mg) by mouth daily 15 tablet 11     levothyroxine (SYNTHROID/LEVOTHROID) 100 MCG tablet TAKE ONE TABLET BY MOUTH ONCE DAILY ON MONDAY, WEDNESDAY, FRIDAY, SATURDAY AND SUNDAY. 90 tablet 2     levothyroxine (SYNTHROID/LEVOTHROID) 88 MCG tablet Take 1 tab by mouth Tuesdays and Thursdays 45 tablet 3     methylPREDNISolone (MEDROL) 32 MG tablet 12 hours prior to the procedure with IV contrast 1 tablet 0     mometasone (ELOCON) 0.1 % external ointment Apply topically twice a week Use on eczematous lesions 45 g 3     omeprazole (PRILOSEC) 20 MG DR capsule Take 20 mg by mouth daily       order for DME Equipment being ordered: Nebulizer 1 Units 0     OVER-THE-COUNTER Place 1 drop into both eyes 3 times daily. Systane Ultra, Systane Balance, Blink Tears or Refresh Optive Artificial Tear 1 Bottle      pregabalin (LYRICA) 25 MG capsule Take 1 capsule (25 mg) by mouth 2 times daily 60 capsule 1     triamcinolone (KENALOG) 0.1 % external cream Apply topically 2 times daily 30 g 3       ALLERGIES     Allergies   Allergen Reactions     Omnipaque [Iohexol] Swelling and Difficulty breathing     Immediate throat swelling  following around 1-2cc of omnipaque 300 for spine procedure  --> skin prick and intradermal tests with Iohexol negatives. But I would propose anyway to premedicate patient before using iodinated contrast media (for safety reasons).   --> no reactions in skin tests to MRI contrast media Gadovist     Gabapentin Hives     Gluten        PAST MEDICAL HISTORY:  Past Medical History:   Diagnosis Date     ALLERGIC RHINITIS NOS 4/12/2005     Anxiety      Arthritis     herniated disc     Bronchitis, not specified as acute or chronic      CHR MAXILLARY SINUSITIS 4/12/2005     Depressive disorder, not elsewhere classified      Eczema 10/17/2012     Environmental and seasonal allergies      GERD (gastroesophageal reflux disease)      Gluten intolerance      Malignant neoplasm of renal pelvis (H) 1995    Renal cell carcinoma     Melanoma (H) 06/2010     Other specified acquired hypothyroidism 4/12/2005     Renal cancer (H) 5/5/2011     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     FUSION LUMBAR ANTERIOR TWO LEVELS  4/13/2015     GYN SURGERY  04/08/1999    hysterectomy.  LA[facility]     HYSTERECTOMY, PAP NO LONGER INDICATED       SOFT TISSUE SURGERY      chest-melonoma     SURGICAL HISTORY OF -   3/1996    Left nephrectomy-renal cell CA       FAMILY HISTORY:  Family History   Problem Relation Age of Onset     Diabetes Mother      Cardiovascular Mother      Lipids Mother      Depression Mother      Hypertension Father      Lipids Father      Obesity Father      Macular Degeneration Father      Sleep Apnea Father      Thyroid Disease Sister      Hypertension Sister      Depression Sister      Allergies Sister      Lipids Sister      Thyroid Disease Sister      Neurologic  Disorder Sister      Depression Sister      Hypertension Brother      Colon Cancer Brother      Cancer Maternal Grandmother         kind unknown had sores on legs     Eczema Maternal Grandmother      Eczema Maternal Grandfather      Breast Cancer Paternal Grandmother      Cancer Paternal Grandmother         breast     Hypertension Paternal Grandfather      Cardiovascular Paternal Grandfather         heart attack     Allergies Son      Eczema Son      Respiratory Son      Sleep Apnea Son      Glaucoma No family hx of      Cerebrovascular Disease No family hx of        SOCIAL HISTORY:  Social History     Socioeconomic History     Marital status:      Spouse name: None     Number of children: None     Years of education: None     Highest education level: None   Occupational History     Employer: [employer]     Employer: SELF   Tobacco Use     Smoking status: Former     Packs/day: 2.00     Years: 15.00     Pack years: 30.00     Types: Cigarettes     Quit date: 3/26/1996     Years since quittin.7     Smokeless tobacco: Never   Vaping Use     Vaping Use: Never used   Substance and Sexual Activity     Alcohol use: Yes     Comment: rare     Drug use: No     Sexual activity: Yes     Partners: Male   Other Topics Concern     Parent/sibling w/ CABG, MI or angioplasty before 65F 55M? No   Social History Narrative    .  Lives with .  Makes medical supplies.     4 children - healthy    4 siblings - + FH depression, hypertension, chol, diabetes mellitus    No family of muscle problems.        Review of Systems:  Skin:          Eyes:         ENT:         Respiratory:  Positive for shortness of breath;dyspnea on exertion;dyspnea at rest     Cardiovascular:    Positive for;palpitations;chest pain;lower extremity symptoms;edema;fatigue;lightheadedness;dizziness    Gastroenterology:        Genitourinary:         Musculoskeletal:         Neurologic:         Psychiatric:         Heme/Lymph/Imm:          Endocrine:           Physical Exam:  Vitals: /72   Pulse 94   Wt 105.5 kg (232 lb 9.6 oz)   LMP  (LMP Unknown)   SpO2 98%   BMI 36.16 kg/m    Eyes: No icterus.  Pulmonary: Chest symmetric, lungs clear bilaterally and no crackles, wheezes or rales.  Cardiovascular: RRR with normal S1 and S2, no murmur, JVP normal.  Musculoskeletal: Edema of the lower extremities: None.  Neurologic: Oriented and appropriate without obvious focal deficits.   Psychiatric: Normal affect.     Recent Lab Results:  LIPID RESULTS:  Lab Results   Component Value Date    CHOL 232 (H) 08/10/2021    CHOL 237 (H) 11/06/2020    HDL 70 08/10/2021    HDL 62 11/06/2020     (H) 08/10/2021     (H) 11/06/2020    TRIG 87 08/10/2021    TRIG 123 11/06/2020    CHOLHDLRATIO 4.3 01/29/2015       LIVER ENZYME RESULTS:  Lab Results   Component Value Date    AST 22 03/25/2022    AST 24 06/02/2021    ALT 32 03/25/2022    ALT 31 06/02/2021       CBC RESULTS:  Lab Results   Component Value Date    WBC 5.6 09/30/2021    WBC 5.6 06/02/2021    RBC 4.59 09/30/2021    RBC 4.52 06/02/2021    HGB 13.5 09/30/2021    HGB 13.3 06/02/2021    HCT 40.1 09/30/2021    HCT 40.2 06/02/2021    MCV 87 09/30/2021    MCV 89 06/02/2021    MCH 29.4 09/30/2021    MCH 29.4 06/02/2021    MCHC 33.7 09/30/2021    MCHC 33.1 06/02/2021    RDW 12.7 09/30/2021    RDW 12.4 06/02/2021     09/30/2021     06/02/2021       BMP RESULTS:  Lab Results   Component Value Date     09/30/2021     06/02/2021    POTASSIUM 4.0 09/30/2021    POTASSIUM 4.1 06/02/2021    CHLORIDE 105 09/30/2021    CHLORIDE 102 06/02/2021    CO2 29 09/30/2021    CO2 27 06/02/2021    ANIONGAP 4 09/30/2021    ANIONGAP 6 06/02/2021    GLC 89 09/30/2021    GLC 97 06/02/2021    BUN 10 09/30/2021    BUN 9 06/02/2021    CR 0.84 06/29/2022    CR 0.81 06/02/2021    GFRESTIMATED 79 06/29/2022    GFRESTIMATED >60 03/03/2022    GFRESTIMATED 78 06/02/2021    GFRESTBLACK >90 06/02/2021    ANNIE  10.7 (H) 09/26/2022    ANNIE 9.7 06/02/2021        A1C RESULTS:  Lab Results   Component Value Date    A1C 5.1 03/25/2022    A1C 5.3 11/14/2014       INR RESULTS:  Lab Results   Component Value Date    INR 0.94 04/08/2015       CC  HERIBERTO Krueger CNP  [address]  [place] [address]    All medical records were reviewed in detail and discussed with the patient. Greater than 30 mins were spent with the patient, 50% of this time was spent on counseling and coordination of care.  After visit summary was printed and given to the patient.    Thank you for allowing me to participate in the care of your patient.      Sincerely,     Andrew Betancourt MD     [facility]  cc:   HERIBERTO Krueger CNP  [address]  [place] [address]

## 2023-01-04 DIAGNOSIS — F41.1 GAD (GENERALIZED ANXIETY DISORDER): ICD-10-CM

## 2023-01-04 DIAGNOSIS — M54.16 LUMBAR RADICULOPATHY: ICD-10-CM

## 2023-01-04 DIAGNOSIS — M79.10 MYALGIA: ICD-10-CM

## 2023-01-04 RX ORDER — DULOXETIN HYDROCHLORIDE 30 MG/1
CAPSULE, DELAYED RELEASE ORAL
Qty: 90 CAPSULE | Refills: 0 | Status: SHIPPED | OUTPATIENT
Start: 2023-01-04 | End: 2023-04-06

## 2023-01-13 ENCOUNTER — TELEPHONE (OUTPATIENT)
Dept: SLEEP MEDICINE | Facility: CLINIC | Age: 63
End: 2023-01-13

## 2023-01-13 NOTE — TELEPHONE ENCOUNTER
CALLED PT AND INFORMED THE PT THE CURRENT RX ON FILE HAS  AND TO REACH OUT TO THE SLEEP PROVIDER FOR A VISIT AND NEW PAP SUPPLY ORDER. IF QUESTIONS PLEASE CALL [facility]  [phone].

## 2023-01-15 ASSESSMENT — SLEEP AND FATIGUE QUESTIONNAIRES
HOW LIKELY ARE YOU TO NOD OFF OR FALL ASLEEP IN A CAR, WHILE STOPPED FOR A FEW MINUTES IN TRAFFIC: WOULD NEVER DOZE
HOW LIKELY ARE YOU TO NOD OFF OR FALL ASLEEP WHILE SITTING AND TALKING TO SOMEONE: WOULD NEVER DOZE
HOW LIKELY ARE YOU TO NOD OFF OR FALL ASLEEP WHILE SITTING QUIETLY AFTER LUNCH WITHOUT ALCOHOL: MODERATE CHANCE OF DOZING
HOW LIKELY ARE YOU TO NOD OFF OR FALL ASLEEP WHILE SITTING INACTIVE IN A PUBLIC PLACE: WOULD NEVER DOZE
HOW LIKELY ARE YOU TO NOD OFF OR FALL ASLEEP WHILE LYING DOWN TO REST IN THE AFTERNOON WHEN CIRCUMSTANCES PERMIT: SLIGHT CHANCE OF DOZING
HOW LIKELY ARE YOU TO NOD OFF OR FALL ASLEEP WHILE SITTING AND READING: MODERATE CHANCE OF DOZING
HOW LIKELY ARE YOU TO NOD OFF OR FALL ASLEEP WHEN YOU ARE A PASSENGER IN A CAR FOR AN HOUR WITHOUT A BREAK: SLIGHT CHANCE OF DOZING
HOW LIKELY ARE YOU TO NOD OFF OR FALL ASLEEP WHILE WATCHING TV: MODERATE CHANCE OF DOZING

## 2023-01-16 ENCOUNTER — HOSPITAL ENCOUNTER (OUTPATIENT)
Dept: CARDIOLOGY | Facility: CLINIC | Age: 63
Discharge: HOME | End: 2023-01-16
Attending: INTERNAL MEDICINE | Admitting: INTERNAL MEDICINE
Payer: OTHER GOVERNMENT

## 2023-01-16 DIAGNOSIS — R94.39 ABNORMAL STRESS TEST: ICD-10-CM

## 2023-01-16 DIAGNOSIS — R55 SYNCOPE, UNSPECIFIED SYNCOPE TYPE: ICD-10-CM

## 2023-01-16 DIAGNOSIS — E78.5 HYPERLIPIDEMIA LDL GOAL <70: ICD-10-CM

## 2023-01-16 DIAGNOSIS — R07.9 CHEST PAIN, UNSPECIFIED TYPE: ICD-10-CM

## 2023-01-16 LAB — LVEF ECHO: NORMAL

## 2023-01-16 PROCEDURE — 93306 TTE W/DOPPLER COMPLETE: CPT | Mod: 26 | Performed by: INTERNAL MEDICINE

## 2023-01-16 PROCEDURE — 93306 TTE W/DOPPLER COMPLETE: CPT

## 2023-01-19 ENCOUNTER — OFFICE VISIT (OUTPATIENT)
Dept: SLEEP MEDICINE | Facility: CLINIC | Age: 63
End: 2023-01-19
Payer: OTHER GOVERNMENT

## 2023-01-19 VITALS
HEIGHT: 68 IN | DIASTOLIC BLOOD PRESSURE: 73 MMHG | SYSTOLIC BLOOD PRESSURE: 123 MMHG | HEART RATE: 84 BPM | OXYGEN SATURATION: 98 % | RESPIRATION RATE: 18 BRPM | WEIGHT: 230 LBS | BODY MASS INDEX: 34.86 KG/M2

## 2023-01-19 DIAGNOSIS — G47.33 OSA (OBSTRUCTIVE SLEEP APNEA): Primary | ICD-10-CM

## 2023-01-19 PROCEDURE — 99213 OFFICE O/P EST LOW 20 MIN: CPT | Performed by: INTERNAL MEDICINE

## 2023-01-19 RX ORDER — LORATADINE 10 MG/1
1 TABLET ORAL DAILY
COMMUNITY
Start: 2022-04-27

## 2023-01-19 NOTE — PROGRESS NOTES
Chief complaint: Follow-up obstructive sleep apnea    History of Present Illness: 62-year-old female with history of kidney cancer, melanoma, hypothyroidism, fibromyalgia, chronic fatigue and obstructive sleep apnea.  She reports that she continues to get good clinical benefit with the use of CPAP.  CPAP helps her fall asleep.  She also has noticed a significant improvement in sleep quality using CPAP.  She uses it nightly.  She gets up around 3:45 AM for work.  She usually is able to fall asleep by 730 to 8 PM.  She is using a nasal pillow style mask.  She denies any problems with the pressure.  She is using the humidifier.  Occasionally she does get some redness under her nose.  But resolved spontaneously and does not seem to progress.    She denies problems with insomnia, sleepwalking, sleep talking or dream enactment behavior.  She is fatigued during the day.  She is also had some episodes of syncope.  However, she is not having drowsiness.    Since her last visit her  has been diagnosed with obstructive sleep apnea and is now using a CPAP.  This is also been helpful for her sleep quality.      Orick Sleepiness Scale  Total score - Orick: 8 (1/15/2023  8:02 AM)   (Less than 10 normal)    Insomnia Severity Scale  MARISOL Total Score: 0  (normal 0-7, mild 8-14, moderate 15-21, severe 22-28)    Past Medical History:   Diagnosis Date     ALLERGIC RHINITIS NOS 4/12/2005     Anxiety      Arthritis     herniated disc     Bronchitis, not specified as acute or chronic      CHR MAXILLARY SINUSITIS 4/12/2005     Depressive disorder, not elsewhere classified      Eczema 10/17/2012     Environmental and seasonal allergies      GERD (gastroesophageal reflux disease)      Gluten intolerance      Malignant neoplasm of renal pelvis (H) 1995    Renal cell carcinoma     Melanoma (H) 06/2010     Other specified acquired hypothyroidism 4/12/2005     Renal cancer (H) 5/5/2011     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     Grave's disease     Unspecified asthma(493.90)        Allergies   Allergen Reactions     Omnipaque [Iohexol] Swelling and Difficulty breathing     Immediate throat swelling following around 1-2cc of omnipaque 300 for spine procedure  --> skin prick and intradermal tests with Iohexol negatives. But I would propose anyway to premedicate patient before using iodinated contrast media (for safety reasons).   --> no reactions in skin tests to MRI contrast media Gadovist     Gabapentin Hives     Gluten      Penicillins Hives     Sulfa Drugs        Current Outpatient Medications   Medication     acetaminophen (TYLENOL) 325 MG tablet     albuterol (PROAIR HFA/PROVENTIL HFA/VENTOLIN HFA) 108 (90 BASE) MCG/ACT Inhaler     albuterol (PROVENTIL) (2.5 MG/3ML) 0.083% neb solution     APNO CREA ointment     augmented betamethasone dipropionate (DIPROLENE-AF) 0.05 % external ointment     Cholecalciferol (VITAMIN D3 PO)     diphenhydrAMINE (BENADRYL ALLERGY) 25 MG capsule     DULoxetine (CYMBALTA) 30 MG capsule     DULoxetine (CYMBALTA) 60 MG capsule     fexofenadine (ALLEGRA) 180 MG tablet     hydrOXYzine (ATARAX) 25 MG tablet     isosorbide mononitrate (IMDUR) 30 MG 24 hr tablet     levothyroxine (SYNTHROID/LEVOTHROID) 100 MCG tablet     levothyroxine (SYNTHROID/LEVOTHROID) 88 MCG tablet     loratadine (CLARITIN) 10 MG tablet     methylPREDNISolone (MEDROL) 32 MG tablet     mometasone (ELOCON) 0.1 % external ointment     omeprazole (PRILOSEC) 20 MG DR capsule     omeprazole (PRILOSEC) 20 MG DR capsule     order for DME     OVER-THE-COUNTER     pregabalin (LYRICA) 25 MG capsule     triamcinolone (KENALOG) 0.1 % external cream     Current Facility-Administered Medications   Medication     ampicillin (OMNIPEN) 1 g vial INTRADERMAL     penicillin g potassium [id number] unit vial INTRADERMAL       Social History     Socioeconomic History     Marital status:      Spouse name: Not on file     Number of  children: Not on file     Years of education: Not on file     Highest education level: Not on file   Occupational History     Employer: [employer]     Employer: SELF   Tobacco Use     Smoking status: Former     Packs/day: 2.00     Years: 15.00     Pack years: 30.00     Types: Cigarettes     Quit date: 3/26/1996     Years since quittin.8     Smokeless tobacco: Never   Vaping Use     Vaping Use: Never used   Substance and Sexual Activity     Alcohol use: Yes     Comment: rare     Drug use: No     Sexual activity: Yes     Partners: Male   Other Topics Concern     Parent/sibling w/ CABG, MI or angioplasty before 65F 55M? No   Social History Narrative    .  Lives with .  Makes medical supplies.     4 children - healthy    4 siblings - + FH depression, hypertension, chol, diabetes mellitus    No family of muscle problems.      Social Determinants of Health     Financial Resource Strain: Not on file   Food Insecurity: Not on file   Transportation Needs: Not on file   Physical Activity: Not on file   Stress: Not on file   Social Connections: Not on file   Intimate Partner Violence: Not on file   Housing Stability: Not on file       Family History   Problem Relation Age of Onset     Diabetes Mother      Cardiovascular Mother      Lipids Mother      Depression Mother      Hypertension Father      Lipids Father      Obesity Father      Macular Degeneration Father      Sleep Apnea Father      Thyroid Disease Sister      Hypertension Sister      Depression Sister      Allergies Sister      Lipids Sister      Thyroid Disease Sister      Neurologic Disorder Sister      Depression Sister      Hypertension Brother      Colon Cancer Brother      Cancer Maternal Grandmother         kind unknown had sores on legs     Eczema Maternal Grandmother      Eczema Maternal Grandfather      Breast Cancer Paternal Grandmother      Cancer Paternal Grandmother         breast     Hypertension Paternal Grandfather       "Cardiovascular Paternal Grandfather         heart attack     Allergies Son      Eczema Son      Respiratory Son      Sleep Apnea Son      Glaucoma No family hx of      Cerebrovascular Disease No family hx of            EXAM:  /73   Pulse 84   Resp 18   Ht 1.727 m (5' 8\")   Wt 104.3 kg (230 lb)   LMP  (LMP Unknown)   SpO2 98%   BMI 34.97 kg/m    GENERAL: Alert and no distress  EYES: Eyes grossly normal to inspection.  No discharge or erythema, or obvious scleral/conjunctival abnormalities.  RESP: No audible wheeze, cough, or visible cyanosis.  No visible retractions or increased work of breathing.    SKIN: Visible skin clear. No significant rash, abnormal pigmentation or lesions.  NEURO: Cranial nerves grossly intact.  Mentation and speech appropriate for age.  PSYCH: Mentation appears normal, affect normal judgement and insight intact, normal speech and appearance well-groomed.       PSG 9/9/2020  Weight 225 lbs BMI 34.5  AHI 19.8, RDI 26.4, Time below 88% 40 min (REM)  PLMAI 16.5  Sinus tach       ResMed air sense 10 auto PAP download from 12/17/2022 to 1/15/2023 reviewed:  Per cent of days used greater than 4 Hours 100% (minimum goal greater than 70%)  Average use on days used: 9 hours 14 min  Settings: Min EPAP 5 cmH2O    Max EPAP 12 cmH2O  Pressures delivered 90/95th percentile for pressure 8.9 cmH2O  Average AHI 3.6 events per hour (goal less than 5)  Leak acceptable    TSH   Date Value Ref Range Status   03/25/2022 2.70 0.40 - 4.00 mU/L Final   04/23/2021 3.76 0.40 - 4.00 mU/L Final         ASSESSMENT:  62-year-old female with history of fatigue, moderate obstructive sleep apnea.  She is getting excellent clinical benefit and meeting compliance goals with CPAP.  No pressure changes indicated.  Ongoing treatment of obstructive sleep apnea is clinically indicated    PLAN:  Orders generated keep her CPAP supplies up-to-date.  She could consider trying a different brand mask to see if it causes less " skin irritation.  Patient should contact me if any new sleep issues arise.  Avoid weight gain.  Follow-up in 2 years earlier if needed.    25 minutes spent on the date of the encounter doing chart review, history and exam, documentation and further activities per the note    Katerine Allan M.D.  Pulmonary/Critical Care/Sleep Medicine    [facility]   [address], [address]   [address] [place], [place] [address]. [place] [address]   Appointments: [phone]    The above note was dictated using voice recognition software and may include typographical errors. Please contact the author for any clarifications.

## 2023-01-19 NOTE — NURSING NOTE
2 year reminder sent to pt via [employer]. Marked to send 1 month before follow up due.    LORENZO Bowden

## 2023-01-19 NOTE — PATIENT INSTRUCTIONS
For general sleep health questions:   http://sleepeducation.org    For tips about PAP and COVID-19:  https://www.thoracic.org/patients/patient-resources/resources/covid-19-and-home-pap-therapy.pdf    For general info about COVID-19 including vaccines:  https://Sk[employer].org/covid19      Continue PAP therapy every night, for all hours that you are sleeping (including naps.)  As always, try to get at least 8 hours of sleep or more each day, keep a regular sleep schedule, and avoid sleep deprivation. Avoid alcohol.  Reasons that you might need a change to your pressure therapy would be weight gain or loss, waking having inadvertently removed your PAP overnight, having previously felt refreshed by sleep with CPAP use and now waking un-refreshed, and return of daytime sleepiness. Also, the development of new medical problems  (such as heart failure, stroke, medications such as narcotics) can sometimes affect breathing at night and change your PAP therapy needs.  Please bring PAP with you if you are hospitalized.  If anticipating surgery be sure to discuss with your surgeon that you have sleep apnea and use PAP therapy.    Maintain your equipment as recommended which includes routine cleaning and replacement of supplies.      Call DME for any questions regarding supplies or maintenance.    [facility], [facility] [phone]    Do not drive on engage in potentially dangerous activities if feeling sleepy.  Please follow up in sleep clinic again in 24 months.        Tips for your PAP use-    Mask fitting tips  Mask fitting exercise:    To improve your mask seal and your mobility at night, put mask on and secure in place.  Lie down in bed with full pressure and roll to one side, adjust headgear while in that position to eliminate any leaks. Repeat process rolling to other side.     The mask seal does not have to be perfect:   CPAP machines are designed to make up for small  leaks. However, you will not tolerate leaks blowing in your eyes so you will need to adjust.   Any leak should only be near or at the bottom of the mask.  We expect your mask to leak slightly at night.    Do not over-tighten the headgear straps, tighter IS NOT better, we expect minimal leak.    First try re-positioning the mask or headgear before tightening the headgear straps.  Mask leaks are expected due to changing sleeping positions. Try pulling the mask away from your skin allowing the cushion to re-inflate will minimize the leak.  If you struggle for a good fit, try turning the CPAP off and then readjust the mask by pulling it away from your face and then turning back on the CPAP.        Humidifier tips  Humidifiers can be adjusted to increase or decrease the amount of moisture according to your comfort level. You may need to adjust this frequently at first, but then might only change it with seasonal weather changes.     Try INCREASING the humidity if:  You experience a dry, irritated nasal passage or throat.  You have a runny, drippy nose or sneezing fits after using CPAP.  You experience nasal congestion during or after CPAP use.    Try DECREASING the humidity if:  You have excessive condensation or  rain out  in the tubing or mask.  Otherwise keep the tubing warm during the night by running it underneath the blankets or pillow.      Clinic visit after initial PAP set-up   Bring your equipment with you to your 5-8 week follow up clinic visit.  We will be extracting your data from the machine if not available from the cloud based modem.        Travel  Always take your equipment with you when you travel.  If you fly with your equipment bring it on with you as a carry on.  Medical equipment does not count as a carry on.    If you travel international the machines take 110-240v.  The only adapter needed is the adapter that will fit into the receptacle (outlet).    You may also want to bring an extension cord as  many hotel rooms have limited outlets at the bedside.  Do not travel with water in your humidifier chamber.     Cleaning and Maintenance Guidelines    Equipment Frequency Cleaning Method   Mask First Day    Daily      Weekly Soak mask in hot soapy water for 30 minutes, rinse and air dry.  Wipe nasal cushion with a hot soapy (Ivory, baby shampoo) cloth and rinse.  Baby wipes may also be used.  Do not use anti-bacterial soaps,Connie  liquid soap, rubbing alcohol, bleach or ammonia.  Wash frame in hot soapy water (Ivory, baby shampoo) rinse and let air dry   Headgear Biweekly Wash in hot soapy water, rinse and air dry   Reusable Gray Filter Weekly Wash in hot soapy water, rinse, put in towel squeeze moisture out, let air dry   Disposable White Filter Check Weekly Replace when brown or gray in color; at least every 2 to 3 months   Humidifier Chamber Daily    Weekly Empty distilled water from humidifier and let air dry    Hand wash in hot soapy water, rinse and air dry   Tubing Weekly Wash in hot soapy water, rinse and let air dry   Mask, Tubing and Humidifier Chamber As needed Disinfect: Soak in 1 part distilled white vinegar to 3 parts hot water for 30 minutes, rinse well and air dry  Not the material headgear        MASK AND SUPPLY REORDERING and EQUIPMENT NEEDS through your DME and per your insurance  Reminder: Most insurance companies will allow for a new mask, headgear, tubing, and reusable gray filter every six months.  Disposable white ultra-fine filters are covered monthly.      HOME AND SAFETY INSTRUCTIONS  Do not use frayed or cracked electrical cords, multi plug adaptors, or switched receptacles  Do not immerse electrical equipment into water  Assure that electrical cords do not become a tripping hazard   Your BMI is Body mass index is 34.97 kg/m .    What is BMI?  Body mass index (BMI) is one way to tell whether you are at a healthy weight, overweight, or obese. It measures your weight in relation to your  height.  A BMI of 18.5 to 24.9 is in the healthy range. A person with a BMI of 25 to 29.9 is considered overweight, and someone with a BMI of 30 or greater is considered obese.  Another way to find out if you are at risk for health problems caused by overweight and obesity is to measure your waist. If you are a woman and your waist is more than 35 inches, or if you are a man and your waist is more than 40 inches, your risk of disease may be higher.  More than two-thirds of American adults are considered overweight or obese. Being overweight or obese increases the risk for further weight gain.  Excess weight may lead to heart disease and diabetes. Creating and following plans for healthy eating and physical activity may help you improve your health.    Methods for maintaining or losing weight.  Weight control is part of healthy lifestyle and includes exercise, emotional health, and healthy eating habits.  Careful eating habits lifelong is the mainstay of weight control.  Though there are significant health benefits from weight loss, long-term weight loss with diet alone may be very difficult to achieve- studies show long-term success with dietary management in less than 10% of people. Attaining a healthy weight may be especially difficult to achieve in those with severe obesity. In some cases, medications, devices and surgical management might be considered.    What can you do?  If you are overweight or obese and are interested in methods for weight loss, you should discuss this with your provider. In addition, we recommend that you review healthy life styles and methods for weight loss available through the National Institutes of Health patient information sites:   http://win.niddk.nih.gov/publications/index.htm

## 2023-01-20 NOTE — RESULT ENCOUNTER NOTE
"Per Dr Betancourt: \"Normal. No new recs\". Will discuss with Dr Betancourt if he wants to see pt again or PRN follow up"

## 2023-01-23 DIAGNOSIS — R07.89 TIGHTNESS IN CHEST: Primary | ICD-10-CM

## 2023-01-23 NOTE — RESULT ENCOUNTER NOTE
"Per Dr Betancourt: \"Have the patient come back and see me in 6 months. Thanks\". Pt notified via [facility] and order placed. "

## 2023-02-01 ENCOUNTER — TRANSFERRED RECORDS (OUTPATIENT)
Dept: HEALTH INFORMATION MANAGEMENT | Facility: CLINIC | Age: 63
End: 2023-02-01

## 2023-02-23 DIAGNOSIS — E03.9 HYPOTHYROIDISM, UNSPECIFIED TYPE: ICD-10-CM

## 2023-02-23 RX ORDER — LEVOTHYROXINE SODIUM 88 UG/1
TABLET ORAL
Qty: 45 TABLET | Refills: 0 | Status: SHIPPED | OUTPATIENT
Start: 2023-02-23 | End: 2023-05-18

## 2023-03-10 ENCOUNTER — MYC MEDICAL ADVICE (OUTPATIENT)
Dept: FAMILY MEDICINE | Facility: CLINIC | Age: 63
End: 2023-03-10
Payer: OTHER GOVERNMENT

## 2023-03-14 ENCOUNTER — TRANSCRIBE ORDERS (OUTPATIENT)
Dept: OTHER | Age: 63
End: 2023-03-14

## 2023-03-14 DIAGNOSIS — Z91.041 ALLERGY TO IODINATED CONTRAST MEDIA: Primary | ICD-10-CM

## 2023-03-14 DIAGNOSIS — M48.02 CERVICAL STENOSIS OF SPINAL CANAL: ICD-10-CM

## 2023-03-14 DIAGNOSIS — M48.062 SPINAL STENOSIS OF LUMBAR REGION WITH NEUROGENIC CLAUDICATION: Primary | ICD-10-CM

## 2023-03-14 NOTE — TELEPHONE ENCOUNTER
Received a recent progress note from [facility].    Chart reflects [facility] provider's imaging order (CT lumbar spine w/o contrast) has already been scheduled for 3/15/2023 at [facility].    Please close encounter once everything is done (prior authorization, scheduling, etc).    Hannah [place]  Patient Representative  [facility]

## 2023-03-14 NOTE — TELEPHONE ENCOUNTER
"Faxed external injection order (Pocedure #1: L3-L4 transforaminal JUAN; Procedure #2 (complete 2 weeks after lumbar TFESI): C7-T1 transforaminal JUAN) to [facility] (Ramon Chappell PA-C) requesting additional info:    Notes from your past 3 office visits (our last progress note from [facility] is for dos 4/4/2022)    Included a note that we're currently are not scheduling procedures in [facility] and included a list of our other locations since injection order also included [facility]'s imaging order which noted \"Scheduling Special Instructions: Radiology Center: [facility].\"    Fax confirmation received:      Please close encounter once everything is done (prior authorization, scheduling, etc).      Hannah Greenwood  Patient Representative  [facility]  "

## 2023-03-15 ENCOUNTER — HOSPITAL ENCOUNTER (OUTPATIENT)
Dept: CT IMAGING | Facility: CLINIC | Age: 63
Discharge: HOME | End: 2023-03-15
Attending: PHYSICIAN ASSISTANT | Admitting: PHYSICIAN ASSISTANT
Payer: OTHER GOVERNMENT

## 2023-03-15 ENCOUNTER — HOSPITAL ENCOUNTER (EMERGENCY)
Facility: CLINIC | Age: 63
Discharge: HOME | End: 2023-03-15
Attending: EMERGENCY MEDICINE | Admitting: EMERGENCY MEDICINE
Payer: OTHER GOVERNMENT

## 2023-03-15 VITALS
OXYGEN SATURATION: 98 % | HEIGHT: 68 IN | DIASTOLIC BLOOD PRESSURE: 84 MMHG | SYSTOLIC BLOOD PRESSURE: 151 MMHG | HEART RATE: 79 BPM | RESPIRATION RATE: 18 BRPM | BODY MASS INDEX: 34.86 KG/M2 | TEMPERATURE: 97.9 F | WEIGHT: 230 LBS

## 2023-03-15 DIAGNOSIS — M48.02 CERVICAL SPINAL STENOSIS: ICD-10-CM

## 2023-03-15 DIAGNOSIS — N30.00 ACUTE CYSTITIS WITHOUT HEMATURIA: ICD-10-CM

## 2023-03-15 DIAGNOSIS — M48.062 SPINAL STENOSIS, LUMBAR REGION WITH NEUROGENIC CLAUDICATION: ICD-10-CM

## 2023-03-15 LAB
ALBUMIN UR-MCNC: NEGATIVE MG/DL
APPEARANCE UR: CLEAR
BACTERIA #/AREA URNS HPF: ABNORMAL /HPF
BILIRUB UR QL STRIP: NEGATIVE
COLOR UR AUTO: ABNORMAL
GLUCOSE UR STRIP-MCNC: NEGATIVE MG/DL
HGB UR QL STRIP: NEGATIVE
KETONES UR STRIP-MCNC: NEGATIVE MG/DL
LEUKOCYTE ESTERASE UR QL STRIP: ABNORMAL
NITRATE UR QL: NEGATIVE
PH UR STRIP: 7 [PH] (ref 5–7)
RBC URINE: <1 /HPF
SP GR UR STRIP: 1.01 (ref 1–1.03)
SQUAMOUS EPITHELIAL: <1 /HPF
UROBILINOGEN UR STRIP-MCNC: NORMAL MG/DL
WBC URINE: 18 /HPF

## 2023-03-15 PROCEDURE — 99284 EMERGENCY DEPT VISIT MOD MDM: CPT | Performed by: EMERGENCY MEDICINE

## 2023-03-15 PROCEDURE — 250N000013 HC RX MED GY IP 250 OP 250 PS 637: Performed by: EMERGENCY MEDICINE

## 2023-03-15 PROCEDURE — 72131 CT LUMBAR SPINE W/O DYE: CPT

## 2023-03-15 PROCEDURE — 81001 URINALYSIS AUTO W/SCOPE: CPT | Performed by: EMERGENCY MEDICINE

## 2023-03-15 PROCEDURE — 99283 EMERGENCY DEPT VISIT LOW MDM: CPT | Performed by: EMERGENCY MEDICINE

## 2023-03-15 PROCEDURE — 87086 URINE CULTURE/COLONY COUNT: CPT | Performed by: EMERGENCY MEDICINE

## 2023-03-15 RX ORDER — CEPHALEXIN 500 MG/1
500 CAPSULE ORAL 4 TIMES DAILY
Qty: 28 CAPSULE | Refills: 0 | Status: SHIPPED | OUTPATIENT
Start: 2023-03-15 | End: 2023-03-22

## 2023-03-15 RX ORDER — ONDANSETRON 4 MG/1
4 TABLET, ORALLY DISINTEGRATING ORAL EVERY 8 HOURS PRN
Qty: 10 TABLET | Refills: 0 | Status: SHIPPED | OUTPATIENT
Start: 2023-03-15 | End: 2023-03-27

## 2023-03-15 RX ORDER — CEPHALEXIN 500 MG/1
500 CAPSULE ORAL ONCE
Status: COMPLETED | OUTPATIENT
Start: 2023-03-15 | End: 2023-03-15

## 2023-03-15 RX ADMIN — CEPHALEXIN 500 MG: 500 CAPSULE ORAL at 22:30

## 2023-03-16 NOTE — ED PROVIDER NOTES
History     Chief Complaint   Patient presents with     UTI     HPI  Teresa Fernandez is a 63 year old female who presents with a week of urinary frequency noticed some blood in her shorts prompting evaluation this evening.  Denies fever describes some nausea.  She has had nephrectomy in the distant past secondary to RCC.    Allergies:  Allergies   Allergen Reactions     Omnipaque [Iohexol] Swelling and Difficulty breathing     Immediate throat swelling following around 1-2cc of omnipaque 300 for spine procedure  --> skin prick and intradermal tests with Iohexol negatives. But I would propose anyway to premedicate patient before using iodinated contrast media (for safety reasons).   --> no reactions in skin tests to MRI contrast media Gadovist     Gabapentin Hives     Gluten      Penicillins Hives     Sulfa Drugs        Problem List:    Patient Active Problem List    Diagnosis Date Noted     Morbid obesity (H) 11/28/2022     Priority: Medium     Primary hyperparathyroidism (H) 11/07/2022     Priority: Medium     Per Endocrine 10/2022:  She should be followed with serum calcium, creatinine and albumin every 6 months. Normal Dexa and urine calcium at this time, see Endocrine on as needed basis.         Chronic pruritus 02/14/2022     Priority: Medium     Family history of colon cancer 12/28/2021     Priority: Medium     Fibromyalgia 09/30/2021     Priority: Medium     History of kidney cancer 04/01/2021     Priority: Medium     1996- left nephrectomy       GAYE (obstructive sleep apnea) 09/17/2020     Priority: Medium     9/9/2020 [facility] Sleep Study (225.0 lbs) - AHI 19.8, RDI 26.4, Supine AHI 22.0, REM AHI 36.9, Low O2 79.5%, Time Spent ?88% 40.1 minutes / Time Spent ?89% 51.6 minutes.       Myalgia 10/20/2017     Priority: Medium     Increased CK       Chest tightness or pressure 10/03/2016     Priority: Medium     Hyperlipidemia LDL goal <160 01/29/2015     Priority: Medium     DDD (degenerative disc  disease), lumbar 06/24/2014     Priority: Medium     [facility]       Moderate persistent asthma without complication 07/08/2013     Priority: Medium     History of melanoma in situ 10/17/2012     Priority: Medium     Eczema 10/17/2012     Priority: Medium     Osteopenia 06/15/2012     Priority: Medium     Chronic fatigue 10/04/2011     Priority: Medium     Dry eye syndrome 08/24/2011     Priority: Medium     Chronic low back pain 07/07/2011     Priority: Medium     GERD (gastroesophageal reflux disease) 05/11/2011     Priority: Medium     Leukoplakia of oral mucosa, including tongue 04/18/2011     Priority: Medium     ERIC (generalized anxiety disorder)      Priority: Medium     History of skin cancer 11/09/2010     Priority: Medium     Chronic rhinitis 05/03/2005     Priority: Medium     Allergic rhinitis due to pollen 05/03/2005     Priority: Medium     Sinusitis, chronic 05/03/2005     Priority: Medium     Problem list name updated by automated process. Provider to review       Allergic state 04/19/2005     Priority: Medium     Problem list name updated by automated process. Provider to review       Hypothyroidism, unspecified type 04/12/2005     Priority: Medium     Problem list name updated by automated process. 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     FUSION LUMBAR ANTERIOR TWO LEVELS  2015     GYN SURGERY  1999    hysterectomy.  LAVH     HYSTERECTOMY, PAP NO LONGER INDICATED       SOFT TISSUE SURGERY      chest-melonoma     SURGICAL HISTORY OF -   3/1996    Left nephrectomy-renal cell CA       Family History:    Family History   Problem Relation Age of Onset     Diabetes Mother      Cardiovascular Mother      Lipids Mother      Depression Mother      Hypertension Father      Lipids Father      Obesity Father      Macular Degeneration Father      Sleep Apnea Father      Thyroid Disease Sister      Hypertension Sister      Depression Sister      Allergies Sister      Lipids Sister      Thyroid Disease Sister      Neurologic Disorder Sister      Depression Sister      Hypertension Brother      Colon Cancer Brother      Cancer Maternal Grandmother         kind unknown had sores on legs     Eczema Maternal Grandmother      Eczema Maternal Grandfather      Breast Cancer Paternal Grandmother      Cancer Paternal Grandmother         breast     Hypertension Paternal Grandfather      Cardiovascular Paternal Grandfather         heart attack     Allergies Son      Eczema Son      Respiratory Son      Sleep Apnea Son      Glaucoma No family hx of      Cerebrovascular Disease No family hx of        Social History:  Marital Status:   [2]  Social History     Tobacco Use     Smoking status: Former     Packs/day: 2.00     Years: 15.00     Pack years: 30.00     Types: Cigarettes     Quit date: 3/26/1996     Years since quittin.9     Smokeless tobacco: Never   Vaping Use     Vaping Use: Never used   Substance Use Topics     Alcohol use: Yes     Comment: rare     Drug use: No        Medications:    cephALEXin (KEFLEX) 500 MG capsule  levothyroxine  "(SYNTHROID/LEVOTHROID) 88 MCG tablet  ondansetron (ZOFRAN ODT) 4 MG ODT tab  acetaminophen (TYLENOL) 325 MG tablet  albuterol (PROAIR HFA/PROVENTIL HFA/VENTOLIN HFA) 108 (90 BASE) MCG/ACT Inhaler  albuterol (PROVENTIL) (2.5 MG/3ML) 0.083% neb solution  APNO CREA ointment  augmented betamethasone dipropionate (DIPROLENE-AF) 0.05 % external ointment  Cholecalciferol (VITAMIN D3 PO)  diphenhydrAMINE (BENADRYL ALLERGY) 25 MG capsule  DULoxetine (CYMBALTA) 30 MG capsule  DULoxetine (CYMBALTA) 60 MG capsule  fexofenadine (ALLEGRA) 180 MG tablet  hydrOXYzine (ATARAX) 25 MG tablet  isosorbide mononitrate (IMDUR) 30 MG 24 hr tablet  levothyroxine (SYNTHROID/LEVOTHROID) 100 MCG tablet  loratadine (CLARITIN) 10 MG tablet  methylPREDNISolone (MEDROL) 32 MG tablet  mometasone (ELOCON) 0.1 % external ointment  omeprazole (PRILOSEC) 20 MG DR capsule  omeprazole (PRILOSEC) 20 MG DR capsule  order for DME  OVER-THE-COUNTER  pregabalin (LYRICA) 25 MG capsule  triamcinolone (KENALOG) 0.1 % external cream          Review of Systems  Problem focused review of systems otherwise negative    Physical Exam   BP: (!) 148/72  Pulse: 100  Temp: 97.9  F (36.6  C)  Resp: 18  Height: 172.7 cm (5' 8\")  Weight: 104.3 kg (230 lb)  SpO2: 98 %      Physical Exam  Nontoxic-appearing no respiratory distress alert and oriented  Skin Pink warm dry  Abdomen soft mild suprapubic tenderness without guarding or rebound  ED Course                       Results for orders placed or performed during the hospital encounter of 03/15/23 (from the past 24 hour(s))   UA with Microscopic reflex to Culture    Specimen: Urine, Clean Catch   Result Value Ref Range    Color Urine Straw Colorless, Straw, Light Yellow, Yellow    Appearance Urine Clear Clear    Glucose Urine Negative Negative mg/dL    Bilirubin Urine Negative Negative    Ketones Urine Negative Negative mg/dL    Specific Gravity Urine 1.006 1.003 - 1.035    Blood Urine Negative Negative    pH Urine 7.0 5.0 - " 7.0    Protein Albumin Urine Negative Negative mg/dL    Urobilinogen Urine Normal Normal, 2.0 mg/dL    Nitrite Urine Negative Negative    Leukocyte Esterase Urine Large (A) Negative    Bacteria Urine Few (A) None Seen /HPF    RBC Urine <1 <=2 /HPF    WBC Urine 18 (H) <=5 /HPF    Squamous Epithelials Urine <1 <=1 /HPF    Narrative    Urine Culture ordered based on laboratory criteria     *Note: Due to a large number of results and/or encounters for the requested time period, some results have not been displayed. A complete set of results can be found in Results Review.       Medications   cephALEXin (KEFLEX) capsule 500 mg (500 mg Oral $Given 3/15/23 2230)       Assessments & Plan (with Medical Decision Making)  Dysuria suprapubic tenderness no fever no flank pain, urinalysis 18 white cells culture pending.  Cephalexin, push fluids, return criteria reviewed     I have reviewed the nursing notes.    I have reviewed the findings, diagnosis, plan and need for follow up with the patient.          Discharge Medication List as of 3/15/2023 10:11 PM      START taking these medications    Details   cephALEXin (KEFLEX) 500 MG capsule Take 1 capsule (500 mg) by mouth 4 times daily for 7 days, Disp-28 capsule, R-0, [facility]      ondansetron (ZOFRAN ODT) 4 MG ODT tab Take 1 tablet (4 mg) by mouth every 8 hours as needed for nausea, Disp-10 tablet, R-0, [facility]             Final diagnoses:   Acute cystitis without hematuria       3/15/2023   [facility] EMERGENCY DEPT     Miguel Angel Tripp MD  03/16/23 0012

## 2023-03-16 NOTE — DISCHARGE INSTRUCTIONS
Drink plenty of fluids, ondansetron for nausea    keflex as prescribed    Return for vomiting, fever, worsening abdominal pain or any other concern.

## 2023-03-17 LAB — BACTERIA UR CULT: NO GROWTH

## 2023-03-17 NOTE — TELEPHONE ENCOUNTER
Fax received from [facility] (duplicate injection orders) plus a note on the cover sheet:          Hannah Stockton  Patient Representative  [facility] [place]

## 2023-03-17 NOTE — RESULT ENCOUNTER NOTE
"Final urine culture report shows \"NO GROWTH\" and is NEGATIVE.  [facility] Emergency Dept discharge antibiotic: Cephalexin (Keflex) 500 mg capsule, 1 capsule (500 mg) by mouth 4 times daily for 7 days.  [facility] Emergency Dept discharge Rx antibiotic for UTI only (Yes/No): Yes  RN to confirm if Patient took antibiotic within 3 days prior to urine culture collection.  Recommendations in treatment per [facility] ED Lab result Urine culture protocol.  "

## 2023-03-18 ENCOUNTER — TELEPHONE (OUTPATIENT)
Dept: EMERGENCY MEDICINE | Facility: CLINIC | Age: 63
End: 2023-03-18
Payer: OTHER GOVERNMENT

## 2023-03-18 NOTE — TELEPHONE ENCOUNTER
""[employer]"[facility] ([place] Emergency Department/Urgent Care Lab result notification    [facility] ED lab result protocol used  Urine culture    Reason for call  Notify of lab results, assess symptoms, review ED providers recommendations/discharge instructions (if necessary) and advise per ED lab result f/u protocol    Lab Result (including Rx patient on, if applicable)  Final urine culture report shows \"NO GROWTH\" and is NEGATIVE.  [facility] Emergency Dept discharge antibiotic: Cephalexin (Keflex) 500 mg capsule, 1 capsule (500 mg) by mouth 4 times daily for 7 days.  [facility] Emergency Dept discharge Rx antibiotic for UTI only (Yes/No): Yes  RN to confirm if Patient took antibiotic within 3 days prior to urine culture collection.  Recommendations in treatment per [facility] ED Lab result Urine culture protocol.    RN Assessment (Patient s current Symptoms), include time called.   4:03 PM - patient reports antibiotic seems to be helping - drinking fluids, sometimes taking tylenol/heat for abdominal pain and that helps - left removed - still has right kidney  Antibiotics 3 days prior to urine sample:  None  Genitourinary symptoms:  Yes - low abdominal cramping - intermittent, vaginal itching (using monostat over the counter), frequency, urgency - no pain, burning  Vaginal discharge:  No & was spotting prior to antibiotic and now no spotting since taking antibiotic  Sexually active: No  Fever: None  Right sided flank pain:  None, no left kidney  Nausea/vomiting/diarrhea:  Nausea - improving with antibiotic - taking zofran (none taken today) - no vomiting/diarrhea  URI symptoms:  None    RN Recommendations/Instructions per [facility] ED lab result protocol  Patient notified of lab result and treatment recommendations. Please continue antibiotic and follow up for additional assessment and testing with UC/ED/PCP clinic - patient is agreeable to this plan - she says she will probably return to [facility] tomorrow morning - " we reviewed to ED for severe continuous worsening abdominal pain or severe pain, fevers, weakness - patient verbalized understanding and agrees with plan.    Please Contact your PCP clinic or return to the Emergency department if your:    Symptoms return.    Symptoms do not improve after 3 days on antibiotic.    Symptoms do not resolve after completing antibiotic.    Symptoms worsen or other concerning symptom's.    PCP follow-up Questions asked: YES       Donna Wong RN  [facility]  Emergency Dept Lab Result RN  # [phone]     Copy of Lab result   Urine Culture  Order: [id number] - Reflex for Order [id number]   Status: Final result      Visible to patient: Yes (seen)     Specimen Information: Urine, Clean Catch    1 Result Note  Culture No Growth            Resulting Agency: [facility]           Specimen Collected: 03/15/23  8:40 PM Last Resulted: 03/17/23  5:24 AM

## 2023-03-19 ENCOUNTER — HOSPITAL ENCOUNTER (EMERGENCY)
Facility: CLINIC | Age: 63
Discharge: HOME | End: 2023-03-19
Attending: EMERGENCY MEDICINE | Admitting: EMERGENCY MEDICINE
Payer: OTHER GOVERNMENT

## 2023-03-19 ENCOUNTER — APPOINTMENT (OUTPATIENT)
Dept: CT IMAGING | Facility: CLINIC | Age: 63
End: 2023-03-19
Attending: EMERGENCY MEDICINE
Payer: OTHER GOVERNMENT

## 2023-03-19 VITALS
HEART RATE: 78 BPM | OXYGEN SATURATION: 97 % | DIASTOLIC BLOOD PRESSURE: 72 MMHG | HEIGHT: 67 IN | SYSTOLIC BLOOD PRESSURE: 128 MMHG | RESPIRATION RATE: 16 BRPM | BODY MASS INDEX: 35.16 KG/M2 | WEIGHT: 224 LBS | TEMPERATURE: 97.7 F

## 2023-03-19 DIAGNOSIS — E03.9 HYPOTHYROIDISM, UNSPECIFIED TYPE: ICD-10-CM

## 2023-03-19 DIAGNOSIS — R10.30 LOWER ABDOMINAL PAIN: ICD-10-CM

## 2023-03-19 LAB
ALBUMIN SERPL BCG-MCNC: 4.1 G/DL (ref 3.5–5.2)
ALBUMIN UR-MCNC: NEGATIVE MG/DL
ALP SERPL-CCNC: 110 U/L (ref 35–104)
ALT SERPL W P-5'-P-CCNC: 20 U/L (ref 10–35)
ANION GAP SERPL CALCULATED.3IONS-SCNC: 11 MMOL/L (ref 7–15)
APPEARANCE UR: CLEAR
AST SERPL W P-5'-P-CCNC: 23 U/L (ref 10–35)
BASOPHILS # BLD AUTO: 0 10E3/UL (ref 0–0.2)
BASOPHILS NFR BLD AUTO: 1 %
BILIRUB SERPL-MCNC: 0.3 MG/DL
BILIRUB UR QL STRIP: NEGATIVE
BUN SERPL-MCNC: 8.3 MG/DL (ref 8–23)
CALCIUM SERPL-MCNC: 10.9 MG/DL (ref 8.8–10.2)
CHLORIDE SERPL-SCNC: 100 MMOL/L (ref 98–107)
CLUE CELLS: NORMAL
COLOR UR AUTO: NORMAL
CREAT SERPL-MCNC: 0.7 MG/DL (ref 0.51–0.95)
DEPRECATED HCO3 PLAS-SCNC: 24 MMOL/L (ref 22–29)
EOSINOPHIL # BLD AUTO: 0.1 10E3/UL (ref 0–0.7)
EOSINOPHIL NFR BLD AUTO: 3 %
ERYTHROCYTE [DISTWIDTH] IN BLOOD BY AUTOMATED COUNT: 12 % (ref 10–15)
GFR SERPL CREATININE-BSD FRML MDRD: >90 ML/MIN/1.73M2
GLUCOSE SERPL-MCNC: 97 MG/DL (ref 70–99)
GLUCOSE UR STRIP-MCNC: NEGATIVE MG/DL
HCT VFR BLD AUTO: 40.9 % (ref 35–47)
HGB BLD-MCNC: 13.5 G/DL (ref 11.7–15.7)
HGB UR QL STRIP: NEGATIVE
HOLD SPECIMEN: NORMAL
IMM GRANULOCYTES # BLD: 0 10E3/UL
IMM GRANULOCYTES NFR BLD: 0 %
KETONES UR STRIP-MCNC: NEGATIVE MG/DL
LEUKOCYTE ESTERASE UR QL STRIP: NEGATIVE
LIPASE SERPL-CCNC: 32 U/L (ref 13–60)
LYMPHOCYTES # BLD AUTO: 1.2 10E3/UL (ref 0.8–5.3)
LYMPHOCYTES NFR BLD AUTO: 27 %
MCH RBC QN AUTO: 29.4 PG (ref 26.5–33)
MCHC RBC AUTO-ENTMCNC: 33 G/DL (ref 31.5–36.5)
MCV RBC AUTO: 89 FL (ref 78–100)
MONOCYTES # BLD AUTO: 0.4 10E3/UL (ref 0–1.3)
MONOCYTES NFR BLD AUTO: 8 %
NEUTROPHILS # BLD AUTO: 2.7 10E3/UL (ref 1.6–8.3)
NEUTROPHILS NFR BLD AUTO: 61 %
NITRATE UR QL: NEGATIVE
NRBC # BLD AUTO: 0 10E3/UL
NRBC BLD AUTO-RTO: 0 /100
PH UR STRIP: 7 [PH] (ref 5–7)
PLATELET # BLD AUTO: 244 10E3/UL (ref 150–450)
POTASSIUM SERPL-SCNC: 4.3 MMOL/L (ref 3.4–5.3)
PROT SERPL-MCNC: 6.6 G/DL (ref 6.4–8.3)
RBC # BLD AUTO: 4.59 10E6/UL (ref 3.8–5.2)
RBC URINE: 0 /HPF
SODIUM SERPL-SCNC: 135 MMOL/L (ref 136–145)
SP GR UR STRIP: 1 (ref 1–1.03)
SQUAMOUS EPITHELIAL: <1 /HPF
TRICHOMONAS, WET PREP: NORMAL
UROBILINOGEN UR STRIP-MCNC: NORMAL MG/DL
WBC # BLD AUTO: 4.4 10E3/UL (ref 4–11)
WBC URINE: 0 /HPF
WBC'S/HIGH POWER FIELD, WET PREP: NORMAL
YEAST, WET PREP: NORMAL

## 2023-03-19 PROCEDURE — 81001 URINALYSIS AUTO W/SCOPE: CPT | Performed by: EMERGENCY MEDICINE

## 2023-03-19 PROCEDURE — 80053 COMPREHEN METABOLIC PANEL: CPT | Performed by: EMERGENCY MEDICINE

## 2023-03-19 PROCEDURE — 99284 EMERGENCY DEPT VISIT MOD MDM: CPT | Mod: 25 | Performed by: EMERGENCY MEDICINE

## 2023-03-19 PROCEDURE — 99284 EMERGENCY DEPT VISIT MOD MDM: CPT | Performed by: EMERGENCY MEDICINE

## 2023-03-19 PROCEDURE — 84443 ASSAY THYROID STIM HORMONE: CPT

## 2023-03-19 PROCEDURE — 85025 COMPLETE CBC W/AUTO DIFF WBC: CPT | Performed by: EMERGENCY MEDICINE

## 2023-03-19 PROCEDURE — 87210 SMEAR WET MOUNT SALINE/INK: CPT | Performed by: PHYSICIAN ASSISTANT

## 2023-03-19 PROCEDURE — 36415 COLL VENOUS BLD VENIPUNCTURE: CPT | Performed by: EMERGENCY MEDICINE

## 2023-03-19 PROCEDURE — 74176 CT ABD & PELVIS W/O CONTRAST: CPT

## 2023-03-19 PROCEDURE — 83690 ASSAY OF LIPASE: CPT | Performed by: EMERGENCY MEDICINE

## 2023-03-19 ASSESSMENT — ACTIVITIES OF DAILY LIVING (ADL)
ADLS_ACUITY_SCORE: 35
ADLS_ACUITY_SCORE: 35

## 2023-03-19 ASSESSMENT — ENCOUNTER SYMPTOMS
DIARRHEA: 0
FREQUENCY: 1
FATIGUE: 0
FLANK PAIN: 0
ACTIVITY CHANGE: 0
RESPIRATORY NEGATIVE: 1
PSYCHIATRIC NEGATIVE: 1
NAUSEA: 1
APPETITE CHANGE: 0
EYES NEGATIVE: 1
CONSTITUTIONAL NEGATIVE: 1
CARDIOVASCULAR NEGATIVE: 1
FEVER: 0
NEUROLOGICAL NEGATIVE: 1
VOMITING: 0
BACK PAIN: 1

## 2023-03-19 NOTE — ED PROVIDER NOTES
History     Chief Complaint   Patient presents with     Vaginal Itching     HPI  Teresa Fernandez is a 63 year old female with past medical history significant for renal carcinoma with nephrectomy and previous hysterectomy who presents emergency department complaining of pelvic pain.  Patient had recently been treated for UTI on 15 March after being seen in the ED.  That culture came back negative.  Patient stated she was still having some vaginal itching and some minor discharge.  She also is concerned of the pelvic pain she was having she was seen in urgent care today and had a pelvic exam done which did not show any obvious acute abnormality.  Please see providers note for further details wet prep was sent off and patient was sent over here for further evaluation of her lower abdominal/pelvic pain.  Patient denies any fevers but has been chilled denies any headache has not any chest pain or shortness of breath.  She has had some mild nausea which is improved with Zofran denies any vomiting has not had diarrhea.  She feels a bit bloated and is having bilateral lower abdominal pain.  She denies any urinary symptoms at this time.  Has not had any focal numbness weakness in extremity denies any bowel or bladder discharge.  She has not had a rash.    Allergies:  Allergies   Allergen Reactions     Omnipaque [Iohexol] Swelling and Difficulty breathing     Immediate throat swelling following around 1-2cc of omnipaque 300 for spine procedure  --> skin prick and intradermal tests with Iohexol negatives. But I would propose anyway to premedicate patient before using iodinated contrast media (for safety reasons).   --> no reactions in skin tests to MRI contrast media Gadovist     Gabapentin Hives     Gluten      Penicillins Hives     Sulfa Drugs        Problem List:    Patient Active Problem List    Diagnosis Date Noted     Morbid obesity (H) 11/28/2022     Priority: Medium     Primary hyperparathyroidism (H) 11/07/2022      Priority: Medium     Per Endocrine 10/2022:  She should be followed with serum calcium, creatinine and albumin every 6 months. Normal Dexa and urine calcium at this time, see Endocrine on as needed basis.         Chronic pruritus 02/14/2022     Priority: Medium     Family history of colon cancer 12/28/2021     Priority: Medium     Fibromyalgia 09/30/2021     Priority: Medium     History of kidney cancer 04/01/2021     Priority: Medium     1996- left nephrectomy       GAYE (obstructive sleep apnea) 09/17/2020     Priority: Medium     9/9/2020 [facility] (225.0 lbs) - AHI 19.8, RDI 26.4, Supine AHI 22.0, REM AHI 36.9, Low O2 79.5%, Time Spent ?88% 40.1 minutes / Time Spent ?89% 51.6 minutes.       Myalgia 10/20/2017     Priority: Medium     Increased CK       Chest tightness or pressure 10/03/2016     Priority: Medium     Hyperlipidemia LDL goal <160 01/29/2015     Priority: Medium     DDD (degenerative disc disease), lumbar 06/24/2014     Priority: Medium     [facility]       Moderate persistent asthma without complication 07/08/2013     Priority: Medium     History of melanoma in situ 10/17/2012     Priority: Medium     Eczema 10/17/2012     Priority: Medium     Osteopenia 06/15/2012     Priority: Medium     Chronic fatigue 10/04/2011     Priority: Medium     Dry eye syndrome 08/24/2011     Priority: Medium     Chronic low back pain 07/07/2011     Priority: Medium     GERD (gastroesophageal reflux disease) 05/11/2011     Priority: Medium     Leukoplakia of oral mucosa, including tongue 04/18/2011     Priority: Medium     ERIC (generalized anxiety disorder)      Priority: Medium     History of skin cancer 11/09/2010     Priority: Medium     Chronic rhinitis 05/03/2005     Priority: Medium     Allergic rhinitis due to pollen 05/03/2005     Priority: Medium     Sinusitis, chronic 05/03/2005     Priority: Medium     Problem list name updated by automated process. Provider to review        Allergic state 04/19/2005     Priority: Medium     Problem list name updated by automated process. Provider to review       Hypothyroidism, unspecified type 04/12/2005     Priority: Medium     Problem list name updated by automated process. 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     FUSION LUMBAR ANTERIOR TWO LEVELS  4/13/2015     GYN SURGERY  04/08/1999    hysterectomy.  [facility]     HYSTERECTOMY, PAP NO LONGER INDICATED       SOFT TISSUE SURGERY      chest-melonoma     SURGICAL HISTORY OF -   3/1996    Left nephrectomy-renal cell CA       Family History:    Family History   Problem Relation Age of Onset     Diabetes Mother      Cardiovascular Mother      Lipids Mother      Depression Mother      Hypertension Father      Lipids Father      Obesity Father      Macular Degeneration Father      Sleep Apnea Father      Thyroid Disease Sister      Hypertension Sister      Depression Sister      Allergies Sister      Lipids Sister      Thyroid Disease Sister      Neurologic Disorder Sister      Depression Sister      Hypertension Brother      Colon Cancer Brother      Cancer Maternal Grandmother          kind unknown had sores on legs     Eczema Maternal Grandmother      Eczema Maternal Grandfather      Breast Cancer Paternal Grandmother      Cancer Paternal Grandmother         breast     Hypertension Paternal Grandfather      Cardiovascular Paternal Grandfather         heart attack     Allergies Son      Eczema Son      Respiratory Son      Sleep Apnea Son      Glaucoma No family hx of      Cerebrovascular Disease No family hx of        Social History:  Marital Status:   [2]  Social History     Tobacco Use     Smoking status: Former     Packs/day: 2.00     Years: 15.00     Pack years: 30.00     Types: Cigarettes     Quit date: 3/26/1996     Years since quittin.9     Smokeless tobacco: Never   Vaping Use     Vaping Use: Never used   Substance Use Topics     Alcohol use: Yes     Comment: rare     Drug use: No        Medications:    albuterol (PROAIR HFA/PROVENTIL HFA/VENTOLIN HFA) 108 (90 BASE) MCG/ACT Inhaler  albuterol (PROVENTIL) (2.5 MG/3ML) 0.083% neb solution  DULoxetine (CYMBALTA) 30 MG capsule  DULoxetine (CYMBALTA) 60 MG capsule  fexofenadine (ALLEGRA) 180 MG tablet  isosorbide mononitrate (IMDUR) 30 MG 24 hr tablet  levothyroxine (SYNTHROID/LEVOTHROID) 100 MCG tablet  levothyroxine (SYNTHROID/LEVOTHROID) 88 MCG tablet  pregabalin (LYRICA) 25 MG capsule  acetaminophen (TYLENOL) 325 MG tablet  APNO CREA ointment  augmented betamethasone dipropionate (DIPROLENE-AF) 0.05 % external ointment  cephALEXin (KEFLEX) 500 MG capsule  Cholecalciferol (VITAMIN D3 PO)  diphenhydrAMINE (BENADRYL ALLERGY) 25 MG capsule  hydrOXYzine (ATARAX) 25 MG tablet  loratadine (CLARITIN) 10 MG tablet  methylPREDNISolone (MEDROL) 32 MG tablet  mometasone (ELOCON) 0.1 % external ointment  omeprazole (PRILOSEC) 20 MG DR capsule  omeprazole (PRILOSEC) 20 MG DR capsule  ondansetron (ZOFRAN ODT) 4 MG ODT tab  order for DME  OVER-THE-COUNTER  triamcinolone (KENALOG) 0.1 % external cream          Review of Systems  All  "systems reviewed and other than pertinent positives and negatives in HPI all other systems are negative.  Physical Exam   BP: 112/63  Pulse: 105  Temp: 98.1  F (36.7  C)  Resp: 16  Height: 170.2 cm (5' 7\")  Weight: 101.6 kg (224 lb)  SpO2: 98 %      Physical Exam  Vitals and nursing note reviewed.   Constitutional:       General: She is not in acute distress.     Appearance: Normal appearance. She is not ill-appearing, toxic-appearing or diaphoretic.   HENT:      Head: Normocephalic.      Nose: Nose normal.   Eyes:      Conjunctiva/sclera: Conjunctivae normal.   Cardiovascular:      Rate and Rhythm: Normal rate and regular rhythm.      Pulses: Normal pulses.      Heart sounds: Normal heart sounds. No murmur heard.  Pulmonary:      Effort: Pulmonary effort is normal.      Breath sounds: Normal breath sounds. No stridor. No wheezing or rhonchi.   Abdominal:      Comments: Soft, nondistended there is mild tenderness palpation of the lower abdomen.  No guarding or rebound is present bowel sounds are positive.  There is no flank pain present.   Musculoskeletal:         General: No swelling. Normal range of motion.      Cervical back: Normal range of motion and neck supple.      Right lower leg: No edema.      Left lower leg: No edema.   Skin:     General: Skin is warm and dry.      Capillary Refill: Capillary refill takes less than 2 seconds.      Findings: No rash.   Neurological:      General: No focal deficit present.      Mental Status: She is alert and oriented to person, place, and time.      Sensory: No sensory deficit.      Motor: No weakness.      Coordination: Coordination normal.   Psychiatric:         Mood and Affect: Mood normal.         ED Course                 Procedures              Critical Care time:  none               Labs Ordered and Resulted from Time of ED Arrival to Time of ED Departure   COMPREHENSIVE METABOLIC PANEL - Abnormal       Result Value    Sodium 135 (*)     Potassium 4.3      Chloride " 100      Carbon Dioxide (CO2) 24      Anion Gap 11      Urea Nitrogen 8.3      Creatinine 0.70      Calcium 10.9 (*)     Glucose 97      Alkaline Phosphatase 110 (*)     AST 23      ALT 20      Protein Total 6.6      Albumin 4.1      Bilirubin Total 0.3      GFR Estimate >90     LIPASE - Normal    Lipase 32     ROUTINE UA WITH MICROSCOPIC REFLEX TO CULTURE - Normal    Color Urine Straw      Appearance Urine Clear      Glucose Urine Negative      Bilirubin Urine Negative      Ketones Urine Negative      Specific Gravity Urine 1.003      Blood Urine Negative      pH Urine 7.0      Protein Albumin Urine Negative      Urobilinogen Urine Normal      Nitrite Urine Negative      Leukocyte Esterase Urine Negative      RBC Urine 0      WBC Urine 0      Squamous Epithelials Urine <1     WET PREPARATION - Normal    Trichomonas Absent      Yeast Absent      Clue Cells Absent      WBCs/high power field None     CBC WITH PLATELETS AND DIFFERENTIAL    WBC Count 4.4      RBC Count 4.59      Hemoglobin 13.5      Hematocrit 40.9      MCV 89      MCH 29.4      MCHC 33.0      RDW 12.0      Platelet Count 244      % Neutrophils 61      % Lymphocytes 27      % Monocytes 8      % Eosinophils 3      % Basophils 1      % Immature Granulocytes 0      NRBCs per 100 WBC 0      Absolute Neutrophils 2.7      Absolute Lymphocytes 1.2      Absolute Monocytes 0.4      Absolute Eosinophils 0.1      Absolute Basophils 0.0      Absolute Immature Granulocytes 0.0      Absolute NRBCs 0.0         Medications - No data to display    Assessments & Plan (with Medical Decision Making) records were reviewed including past medical history and medications.  2 days urgent care visit was reviewed as well as patient's recent visit for acute cystitis on 3/15/2023 was reviewed.  Labs were obtained.  Patient CBC was unremarkable.  Comprehensive metabolic panel without significant abnormality.  Lipase was within normal limits.  UA was unremarkable.  Wet prep obtained  in urgent care was unremarkable.  Due to patient's pelvic pain and presentation a CT scan of the abdomen and pelvis was obtained.  Images were reviewed.  Patient had a pelvic exam done in urgent care which was unremarkable.  I discussed this with Iliana TELLEZ.  This revealed a tiny fat-containing umbilical hernia with some mild fat stranding.  Patient's pain could be secondary to recent incarceration of fat which is now reduced.  No other acute abnormalities noted.  Findings discussed in detail with patient and .  I am going to have patient take ibuprofen or Tylenol for pain and return if symptoms worsen or new symptoms develop.  She is now aware of her hernia and if has pain in the periumbilical region will return for further evaluation and care.  Patient and  are in agreement this plan.     I have reviewed the nursing notes.    I have reviewed the findings, diagnosis, plan and need for follow up with the patient.     Results for orders placed or performed during the hospital encounter of 03/19/23   CT Abdomen Pelvis w/o Contrast    Narrative    EXAM: CT ABDOMEN PELVIS W/O CONTRAST  LOCATION: [facility]  DATE/TIME: 3/19/2023 11:22 AM    INDICATION: Pelvic pain history of contrast dye allergy  COMPARISON: None.  TECHNIQUE: CT scan of the abdomen and pelvis was performed without IV contrast. Multiplanar reformats were obtained. Dose reduction techniques were used.  CONTRAST: None.    FINDINGS:   LOWER CHEST: Normal.    HEPATOBILIARY: Cholecystectomy. Liver and bile ducts are unremarkable.    PANCREAS: Normal.    SPLEEN: Normal.    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 Normal right adrenal.    KIDNEYS/BLADDER: Left nephrectomy. Normal right kidney and bladder.    BOWEL: No obstruction or inflammatory change . Periampullary duodenal diverticulum. Retained stool throughout the colon.    LYMPH NODES: Normal.    VASCULATURE: Unremarkable.    PELVIC ORGANS:  Absent    MUSCULOSKELETAL: Small fat-containing umbilical hernia with mild fat stranding of the subjacent fat. Posterior fusion hardware in the lumbar spine.      Impression    IMPRESSION:   Tiny fat-containing umbilical hernia with some mild fat stranding, which could be secondary to recent incarceration of the fat, now reduced.       *Note: Due to a large number of results and/or encounters for the requested time period, some results have not been displayed. A complete set of results can be found in Results Review.           Discharge Medication List as of 3/19/2023 12:28 PM          Final diagnoses:   Lower abdominal pain       3/19/2023   [facility] EMERGENCY DEPT     Harrison Kumar MD  03/21/23 [id number]

## 2023-03-19 NOTE — ED PROVIDER NOTES
History     Chief Complaint   Patient presents with     Vaginal Itching     HPI  Teresa Fernandez is a 63 year old female with history of kidney cancer, hysterectomy with ovaries still present who presents today with pelvic pain, vaginal itching, back pain, and urinary frequency. Patient states she was seen in the Emergency Room on 3/15/23 and was treated for urinary tract infection with Keflex and Zofran.  She states the nausea has improved some with Zofran but comes back.  She is very concerned because the pelvic pain is persisting and she is concerned about her kidney function since she only has 1 kidney.  She states that she is bloated, she has noted bleeding when wiping and suspect that it initially was with urination.  She denies any bloody or black tarry stools, diarrhea, vomiting, flank pain. She is concerned and would like more of a work up to look at her kidney and ovaries.     Allergies:  Allergies   Allergen Reactions     Omnipaque [Iohexol] Swelling and Difficulty breathing     Immediate throat swelling following around 1-2cc of omnipaque 300 for spine procedure  --> skin prick and intradermal tests with Iohexol negatives. But I would propose anyway to premedicate patient before using iodinated contrast media (for safety reasons).   --> no reactions in skin tests to MRI contrast media Gadovist     Gabapentin Hives     Gluten      Penicillins Hives     Sulfa Drugs        Problem List:    Patient Active Problem List    Diagnosis Date Noted     Morbid obesity (H) 11/28/2022     Priority: Medium     Primary hyperparathyroidism (H) 11/07/2022     Priority: Medium     Per Endocrine 10/2022:  She should be followed with serum calcium, creatinine and albumin every 6 months. Normal Dexa and urine calcium at this time, see Endocrine on as needed basis.         Chronic pruritus 02/14/2022     Priority: Medium     Family history of colon cancer 12/28/2021     Priority: Medium     Fibromyalgia 09/30/2021      Priority: Medium     History of kidney cancer 04/01/2021     Priority: Medium     1996- left nephrectomy       GAYE (obstructive sleep apnea) 09/17/2020     Priority: Medium     9/9/2020 [facility] Study (225.0 lbs) - AHI 19.8, RDI 26.4, Supine AHI 22.0, REM AHI 36.9, Low O2 79.5%, Time Spent ?88% 40.1 minutes / Time Spent ?89% 51.6 minutes.       Myalgia 10/20/2017     Priority: Medium     Increased CK       Chest tightness or pressure 10/03/2016     Priority: Medium     Hyperlipidemia LDL goal <160 01/29/2015     Priority: Medium     DDD (degenerative disc disease), lumbar 06/24/2014     Priority: Medium     [facility]       Moderate persistent asthma without complication 07/08/2013     Priority: Medium     History of melanoma in situ 10/17/2012     Priority: Medium     Eczema 10/17/2012     Priority: Medium     Osteopenia 06/15/2012     Priority: Medium     Chronic fatigue 10/04/2011     Priority: Medium     Dry eye syndrome 08/24/2011     Priority: Medium     Chronic low back pain 07/07/2011     Priority: Medium     GERD (gastroesophageal reflux disease) 05/11/2011     Priority: Medium     Leukoplakia of oral mucosa, including tongue 04/18/2011     Priority: Medium     ERIC (generalized anxiety disorder)      Priority: Medium     History of skin cancer 11/09/2010     Priority: Medium     Chronic rhinitis 05/03/2005     Priority: Medium     Allergic rhinitis due to pollen 05/03/2005     Priority: Medium     Sinusitis, chronic 05/03/2005     Priority: Medium     Problem list name updated by automated process. Provider to review       Allergic state 04/19/2005     Priority: Medium     Problem list name updated by automated process. Provider to review       Hypothyroidism, unspecified type 04/12/2005     Priority: Medium     Problem list name updated by automated process. 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     FUSION LUMBAR ANTERIOR TWO LEVELS  4/13/2015     GYN SURGERY  04/08/1999    hysterectomy.  [facility]     HYSTERECTOMY, PAP NO LONGER INDICATED       SOFT TISSUE SURGERY      chest-melonoma     SURGICAL HISTORY OF -   3/1996    Left nephrectomy-renal cell CA       Family History:    Family History   Problem Relation Age of Onset     Diabetes Mother      Cardiovascular Mother      Lipids Mother      Depression Mother      Hypertension Father      Lipids Father      Obesity Father      Macular Degeneration Father      Sleep Apnea Father      Thyroid Disease Sister      Hypertension Sister      Depression Sister      Allergies Sister      Lipids Sister      Thyroid Disease Sister      Neurologic Disorder Sister      Depression Sister      Hypertension Brother      Colon Cancer Brother      Cancer Maternal Grandmother         kind unknown had sores on legs     Eczema Maternal Grandmother      Eczema Maternal Grandfather      Breast Cancer Paternal Grandmother      Cancer Paternal Grandmother         breast     Hypertension Paternal Grandfather      Cardiovascular Paternal Grandfather         heart attack     Allergies Son      Eczema Son      Respiratory Son      Sleep  Apnea Son      Glaucoma No family hx of      Cerebrovascular Disease No family hx of        Social History:  Marital Status:   [2]  Social History     Tobacco Use     Smoking status: Former     Packs/day: 2.00     Years: 15.00     Pack years: 30.00     Types: Cigarettes     Quit date: 3/26/1996     Years since quittin.9     Smokeless tobacco: Never   Vaping Use     Vaping Use: Never used   Substance Use Topics     Alcohol use: Yes     Comment: rare     Drug use: No        Medications:    levothyroxine (SYNTHROID/LEVOTHROID) 88 MCG tablet  acetaminophen (TYLENOL) 325 MG tablet  albuterol (PROAIR HFA/PROVENTIL HFA/VENTOLIN HFA) 108 (90 BASE) MCG/ACT Inhaler  albuterol (PROVENTIL) (2.5 MG/3ML) 0.083% neb solution  APNO CREA ointment  augmented betamethasone dipropionate (DIPROLENE-AF) 0.05 % external ointment  cephALEXin (KEFLEX) 500 MG capsule  Cholecalciferol (VITAMIN D3 PO)  diphenhydrAMINE (BENADRYL ALLERGY) 25 MG capsule  DULoxetine (CYMBALTA) 30 MG capsule  DULoxetine (CYMBALTA) 60 MG capsule  fexofenadine (ALLEGRA) 180 MG tablet  hydrOXYzine (ATARAX) 25 MG tablet  isosorbide mononitrate (IMDUR) 30 MG 24 hr tablet  levothyroxine (SYNTHROID/LEVOTHROID) 100 MCG tablet  loratadine (CLARITIN) 10 MG tablet  methylPREDNISolone (MEDROL) 32 MG tablet  mometasone (ELOCON) 0.1 % external ointment  omeprazole (PRILOSEC) 20 MG DR capsule  omeprazole (PRILOSEC) 20 MG DR capsule  ondansetron (ZOFRAN ODT) 4 MG ODT tab  order for DME  OVER-THE-COUNTER  pregabalin (LYRICA) 25 MG capsule  triamcinolone (KENALOG) 0.1 % external cream      Review of Systems   Constitutional: Negative.  Negative for activity change, appetite change, fatigue and fever.   HENT: Negative.    Eyes: Negative.    Respiratory: Negative.    Cardiovascular: Negative.    Gastrointestinal: Positive for nausea. Negative for diarrhea and vomiting.   Genitourinary: Positive for frequency, pelvic pain and urgency. Negative for flank pain.    Musculoskeletal: Positive for back pain.   Skin: Negative.    Neurological: Negative.    Psychiatric/Behavioral: Negative.    All other systems reviewed and are negative.      Physical Exam   BP: 112/63  Pulse: 105  Temp: 98.1  F (36.7  C)  Resp: 16  SpO2: 98 %      Physical Exam  Vitals and nursing note reviewed.   Constitutional:       General: She is not in acute distress.     Appearance: Normal appearance. She is normal weight. She is not ill-appearing or toxic-appearing.   Eyes:      General: No scleral icterus.     Pupils: Pupils are equal, round, and reactive to light.   Cardiovascular:      Rate and Rhythm: Normal rate and regular rhythm.      Heart sounds: Normal heart sounds.   Pulmonary:      Effort: Pulmonary effort is normal.      Breath sounds: Normal breath sounds.   Abdominal:      General: Bowel sounds are normal.      Palpations: Abdomen is soft.      Tenderness: There is abdominal tenderness (lower abdominal tenderness with palpation. ). There is no right CVA tenderness, left CVA tenderness, guarding or rebound.      Hernia: No hernia is present.   Genitourinary:     Exam position: Supine.      Pubic Area: No rash or pubic lice.       Labia:         Right: No rash, tenderness, lesion or injury.         Left: No rash, tenderness, lesion or injury.       Vagina: Normal. No signs of injury and foreign body. No vaginal discharge, erythema, tenderness, bleeding, lesions or prolapsed vaginal walls.      Adnexa: Right adnexa normal and left adnexa normal.      Comments: Cervix not present due to hysterectomy.  Unable to appreciate ovaries bilaterally on bimanual exam.  No adnexal tenderness or masses noted.  Skin:     General: Skin is warm.      Findings: No bruising, erythema or rash.   Neurological:      General: No focal deficit present.      Mental Status: She is alert and oriented to person, place, and time.   Psychiatric:         Mood and Affect: Mood normal.         Behavior: Behavior normal.          Thought Content: Thought content normal.         Judgment: Judgment normal.         ED Course                 Procedures             Critical Care time:  none               No results found. However, due to the size of the patient record, not all encounters were searched. Please check Results Review for a complete set of results.    Medications - No data to display    Assessments & Plan (with Medical Decision Making)     I have reviewed the nursing notes.    I have reviewed the findings, diagnosis, plan and need for follow up with the patient.    Teresa Fernandez is a 63 year old female with history of kidney cancer, hysterectomy with ovaries still present who presents today with pelvic pain, vaginal itching, back pain, and urinary frequency. Patient states she was seen in the Emergency Room on 3/15/23 and was treated for urinary tract infection with Keflex and Zofran.  She states the nausea has improved some with Zofran but comes back.  She is very concerned because the pelvic pain is persisting and she is concerned about her kidney function since she only has 1 kidney.  She states that she is bloated, she has noted bleeding when wiping and suspect that it initially was with urination.  She denies any bloody or black tarry stools, diarrhea, vomiting, flank pain. She is concerned and would like more of a work up to look at her kidney and ovaries.     Pelvic exam within normal limits today.  Wet prep obtained and is currently pending.  Discussed with patient that if she is concerned about kidney function and bloating with pelvic pain that she may need to be seen over the emergency department for further evaluation with labs and imaging.  Patient feels that this is appropriate and was sent to the emergency department for further evaluation and and management. Discussed patient case with Emergency Room provider and patient transferred to Emergency Room in stable condition.     New Prescriptions    No medications on  file       Final diagnoses:   None       3/19/2023   [facility] EMERGENCY DEPT     Shannon Ashford PA-C  03/19/23 0946

## 2023-03-19 NOTE — ED TRIAGE NOTES
Pt reports concerns for yeast infection. PT reports itching, was seen recently in ED on 3/15/23

## 2023-03-19 NOTE — DISCHARGE INSTRUCTIONS
Return if symptoms worsen or new symptoms develop.  Follow-up with primary care physician this week for recheck.  If increased abdominal pain decreased urine output vomiting blood in your stool or other symptoms present please return for recheck.  Your CT scan showed a small umbilical hernia with some fat stranding this may have been herniated early and could have caused her pain but does not show anything at this time.  Take ibuprofen and Tylenol for pain.

## 2023-03-19 NOTE — ED TRIAGE NOTES
Pt here for labs due to vaginal itching, abdominal pain, has only 1 kidney, states she has frequent urination.      Triage Assessment     Row Name 03/19/23 0944       Triage Assessment (Adult)    Airway WDL WDL       Respiratory WDL    Respiratory WDL WDL       Skin Circulation/Temperature WDL    Skin Circulation/Temperature WDL WDL       Cardiac WDL    Cardiac WDL WDL       Peripheral/Neurovascular WDL    Peripheral Neurovascular WDL WDL       Cognitive/Neuro/Behavioral WDL    Cognitive/Neuro/Behavioral WDL WDL

## 2023-03-20 DIAGNOSIS — E03.9 HYPOTHYROIDISM, UNSPECIFIED TYPE: ICD-10-CM

## 2023-03-20 LAB — TSH SERPL DL<=0.005 MIU/L-ACNC: 2.29 UIU/ML (ref 0.3–4.2)

## 2023-03-20 RX ORDER — LEVOTHYROXINE SODIUM 100 UG/1
TABLET ORAL
Qty: 63 TABLET | Refills: 0 | Status: SHIPPED | OUTPATIENT
Start: 2023-03-20 | End: 2023-07-17

## 2023-03-20 NOTE — TELEPHONE ENCOUNTER
Screening Questions for Radiology Injections:    Injection to be done at which interventional clinic site? [facility]    Procedure ordered by     Procedure ordered? lumbar TFESI    Transforaminal Cervical JUAN - Send to [facility] ([facility]) - No  Community Site providers perform this procedure    What insurance would patient like us to bill for this procedure?  for life    Worker's comp or MVA (motor vehicle accident) -Any injection DO NOT SCHEDULE and route to Jose Daniel Lynch.      [employer] insurance - For SI joint injections, DO NOT SCHEDULE and route to Madhuri Lowry.       ALL BCBS, [employer] and [employer] - DO NOT SCHEDULE and route to Madhuri Lowry    [employer]- facet joint injections, route to Madhuri Alen    IF SCHEDULING IN [place] PLEASE SCHEDULE AT LEAST 7-10 BUSINESS DAYS OUT SO A PA CAN BE OBTAINED    Is an  needed? No     Patient has a  home? (Review Grid) YES: ok    Any chance of pregnancy? NO   If YES, do NOT schedule and route to RN jesus  - Dr. Ko route to Becky Askew and PM&R Nurse  [[id number]]      Is patient actively being treated for cancer or immunocompromised? No  If YES, do NOT schedule and route to RN pool/ Dr. Ko's Team    Does the patient have a bleeding or clotting disorder? No     If YES, okay to schedule AND route to RN nurse jesus/ Dr. Ko's Team     (For any patients with platelet count <100, RN must forward to provider)    Is patient taking any Blood Thinners OR Antiplatelet medication?  No   If hold needed, do NOT schedule, route to RN pool/ Dr. Ko's Team    Examples:   o Blood Thinners: (Coumadin, Warfarin, Jantoven, Pradaxa, Xarelto, Eliquis, Edoxaban, Enoxaparin, Lovenox, Heparin, Arixtra, Fondaparinux or Fragmin)  o Antiplatelet Medications: (Plavix, Brilinta or Effient)     Is patient taking any aspirin products (includes Excedrin and Fiorinal)? No     If more than 325mg/day, OK to schedule; Instruct Pt to decrease to  less than 325 mg for 7 days AND route to RN pool/ Dr. Ko's Team     For CERVICAL procedures, hold all aspirin products for 6 days.     Tell Pt that if aspirin product is not held for 6 days, the procedure WILL BE cancelled.     Any allergies to contrast dye, iodine, shellfish, or numbing and steroid medications? No    If YES, schedule and add allergy information to appointment notes AND route to the RN pool/ Dr. Micheles Team    If JUAN and Contrast Dye / Iodine Allergy? DO NOT SCHEDULE, route to RN pool/ Dr. Ko's Team    Allergies: Omnipaque [iohexol], Gabapentin, Gluten, Penicillins, and Sulfa drugs     Does patient have an active infection or treated for one within the past week? No    Is patient currently taking any antibiotics or steroid medications?  No     For patients on chronic, preventative, or prophylactic antibiotics, procedures may be scheduled.     For patients on antibiotics for active or recent infection, schedule 4 days after completed.    For patients on steroid medications, schedule 4 days after completed.     Has the patient had a flu shot or any other vaccinations within the past 7 days? No  If yes, explain that for the vaccine to work best they need to:       wait 1 week before and 1 week after getting any Vaccine    wait 1 week before and 2 weeks after getting Covid Vaccine #2 or BOOSTER    If patient has concerns about the timing, send to RN pool/ Dr. Ko's Team    Does patient have an MRI/CT?  YES: CT Lumbar Spine Include Date and Check Procedure Scheduling Grid to see if required.    Was the MRI/CT done within the last 3 years?  Yes     If no route to RN Pool/ Dr. Micheles Team    If yes, where was the MRI/CT done? [facility]     Refer to PACS Transmissions list for approved external locations and route to RN Pool High Priority/ Dr. Micheles Team    If MRI was not done at approved external location do NOT schedule and route to RN pool/ Dr. Micheles Team      If patient has an  imaging disc, the injection MAY be scheduled but patient must bring disc to appt or appt will be cancelled.    Is patient able to transfer to a procedure table with minimal or no assistance? Yes     If no, do NOT schedule and route to RN Pool/ Dr. Ko's Team    Procedure Specific Instructions:    If celiac plexus block, informed patient NPO for 6 hours and that it is okay to take medications with sips of water, especially blood pressure medications Not Applicable         If this is for a cervical procedure, informed patient that aspirin needs to be held for 6 days.   Not Applicable      Sedation, If Sedation is ordered for any procedure, patient must be NPO for 6 hours prior to procedure Not Applicable      If IV needed:    Do not schedule procedures requiring IV placement in the first appointment of the day or first appointment after lunch. Do NOT schedule at 0745, 0815 or 1245. ok    Instructed patient to arrive 30 minutes early for IV start if required. (Check Procedure Scheduling Grid)  Not Applicable    Reminders:    If you are started on any steroids or antibiotics between now and your appointment, you must contact us because the procedure may need to be cancelled.  Yes      As a reminder, receiving steroids can decrease your body's ability to fight infection.   Would you still like to move forward with scheduling the injection?  Yes      IV Sedation is not provided for procedures. If oral anti-anxiety medication is needed, the patient should request this from their referring provider.      Instruct patient to arrive as directed prior to the scheduled appointment time:  If IV needed 30 minutes before appointment time       For patients 85 or older we recommend having an adult stay w/ them for the remainder of the day.       If the patient is Diabetic, remind them to bring their glucometer.      Does the patient have any questions?  NO  Tamika Lynch  [facility]

## 2023-03-21 NOTE — RESULT ENCOUNTER NOTE
Mesfin Moore    Your thyroid results came back within normal limits. Please let us know if you have any questions.     Take care,    HERIBERTO Miller CNP

## 2023-03-23 ASSESSMENT — ASTHMA QUESTIONNAIRES
QUESTION_2 LAST FOUR WEEKS HOW OFTEN HAVE YOU HAD SHORTNESS OF BREATH: ONCE OR TWICE A WEEK
ACT_TOTALSCORE: 23
ACT_TOTALSCORE: 23
QUESTION_4 LAST FOUR WEEKS HOW OFTEN HAVE YOU USED YOUR RESCUE INHALER OR NEBULIZER MEDICATION (SUCH AS ALBUTEROL): NOT AT ALL
QUESTION_5 LAST FOUR WEEKS HOW WOULD YOU RATE YOUR ASTHMA CONTROL: WELL CONTROLLED
QUESTION_3 LAST FOUR WEEKS HOW OFTEN DID YOUR ASTHMA SYMPTOMS (WHEEZING, COUGHING, SHORTNESS OF BREATH, CHEST TIGHTNESS OR PAIN) WAKE YOU UP AT NIGHT OR EARLIER THAN USUAL IN THE MORNING: NOT AT ALL
QUESTION_1 LAST FOUR WEEKS HOW MUCH OF THE TIME DID YOUR ASTHMA KEEP YOU FROM GETTING AS MUCH DONE AT WORK, SCHOOL OR AT HOME: NONE OF THE TIME

## 2023-03-27 ENCOUNTER — OFFICE VISIT (OUTPATIENT)
Dept: FAMILY MEDICINE | Facility: CLINIC | Age: 63
End: 2023-03-27
Payer: OTHER GOVERNMENT

## 2023-03-27 VITALS
SYSTOLIC BLOOD PRESSURE: 128 MMHG | RESPIRATION RATE: 16 BRPM | DIASTOLIC BLOOD PRESSURE: 76 MMHG | TEMPERATURE: 97.8 F | HEIGHT: 67 IN | HEART RATE: 93 BPM | WEIGHT: 225 LBS | OXYGEN SATURATION: 98 % | BODY MASS INDEX: 35.31 KG/M2

## 2023-03-27 DIAGNOSIS — E21.0 PRIMARY HYPERPARATHYROIDISM (H): Primary | ICD-10-CM

## 2023-03-27 DIAGNOSIS — N90.4 LICHEN SCLEROSUS OF FEMALE GENITALIA: ICD-10-CM

## 2023-03-27 DIAGNOSIS — N95.0 POST-MENOPAUSAL BLEEDING: ICD-10-CM

## 2023-03-27 DIAGNOSIS — N89.8 VAGINAL ITCHING: ICD-10-CM

## 2023-03-27 DIAGNOSIS — R11.0 NAUSEA: ICD-10-CM

## 2023-03-27 LAB
ALBUMIN SERPL BCG-MCNC: 4.5 G/DL (ref 3.5–5.2)
ALBUMIN UR-MCNC: NEGATIVE MG/DL
ALP SERPL-CCNC: 113 U/L (ref 35–104)
ALT SERPL W P-5'-P-CCNC: 26 U/L (ref 10–35)
ANION GAP SERPL CALCULATED.3IONS-SCNC: 12 MMOL/L (ref 7–15)
APPEARANCE UR: CLEAR
AST SERPL W P-5'-P-CCNC: 27 U/L (ref 10–35)
BASOPHILS # BLD AUTO: 0 10E3/UL (ref 0–0.2)
BASOPHILS NFR BLD AUTO: 0 %
BILIRUB SERPL-MCNC: 0.3 MG/DL
BILIRUB UR QL STRIP: NEGATIVE
BUN SERPL-MCNC: 8.4 MG/DL (ref 8–23)
CA-I BLD-MCNC: 5.4 MG/DL (ref 4.4–5.2)
CALCIUM SERPL-MCNC: 10.7 MG/DL (ref 8.8–10.2)
CALCIUM SERPL-MCNC: 10.7 MG/DL (ref 8.8–10.2)
CHLORIDE SERPL-SCNC: 101 MMOL/L (ref 98–107)
CK SERPL-CCNC: 135 U/L (ref 26–192)
CLUE CELLS: ABNORMAL
COLOR UR AUTO: YELLOW
CREAT SERPL-MCNC: 0.7 MG/DL (ref 0.51–0.95)
CRP SERPL-MCNC: <3 MG/L
DEPRECATED HCO3 PLAS-SCNC: 26 MMOL/L (ref 22–29)
EOSINOPHIL # BLD AUTO: 0.1 10E3/UL (ref 0–0.7)
EOSINOPHIL NFR BLD AUTO: 3 %
ERYTHROCYTE [DISTWIDTH] IN BLOOD BY AUTOMATED COUNT: 12.2 % (ref 10–15)
GFR SERPL CREATININE-BSD FRML MDRD: >90 ML/MIN/1.73M2
GLUCOSE SERPL-MCNC: 113 MG/DL (ref 70–99)
GLUCOSE UR STRIP-MCNC: NEGATIVE MG/DL
HCT VFR BLD AUTO: 41.8 % (ref 35–47)
HGB BLD-MCNC: 13.6 G/DL (ref 11.7–15.7)
HGB UR QL STRIP: NEGATIVE
IMM GRANULOCYTES # BLD: 0 10E3/UL
IMM GRANULOCYTES NFR BLD: 0 %
KETONES UR STRIP-MCNC: NEGATIVE MG/DL
LEUKOCYTE ESTERASE UR QL STRIP: NEGATIVE
LIPASE SERPL-CCNC: 29 U/L (ref 13–60)
LYMPHOCYTES # BLD AUTO: 1.7 10E3/UL (ref 0.8–5.3)
LYMPHOCYTES NFR BLD AUTO: 34 %
MAGNESIUM SERPL-MCNC: 2.1 MG/DL (ref 1.7–2.3)
MCH RBC QN AUTO: 29.5 PG (ref 26.5–33)
MCHC RBC AUTO-ENTMCNC: 32.5 G/DL (ref 31.5–36.5)
MCV RBC AUTO: 91 FL (ref 78–100)
MONOCYTES # BLD AUTO: 0.3 10E3/UL (ref 0–1.3)
MONOCYTES NFR BLD AUTO: 7 %
NEUTROPHILS # BLD AUTO: 2.7 10E3/UL (ref 1.6–8.3)
NEUTROPHILS NFR BLD AUTO: 56 %
NITRATE UR QL: NEGATIVE
PH UR STRIP: 6.5 [PH] (ref 5–7)
PLATELET # BLD AUTO: 245 10E3/UL (ref 150–450)
POTASSIUM SERPL-SCNC: 4 MMOL/L (ref 3.4–5.3)
PROT SERPL-MCNC: 7.2 G/DL (ref 6.4–8.3)
PTH-INTACT SERPL-MCNC: 88 PG/ML (ref 15–65)
RBC # BLD AUTO: 4.61 10E6/UL (ref 3.8–5.2)
SODIUM SERPL-SCNC: 139 MMOL/L (ref 136–145)
SP GR UR STRIP: 1.01 (ref 1–1.03)
TRICHOMONAS, WET PREP: ABNORMAL
TSH SERPL DL<=0.005 MIU/L-ACNC: 1.97 UIU/ML (ref 0.3–4.2)
UROBILINOGEN UR STRIP-ACNC: 0.2 E.U./DL
WBC # BLD AUTO: 4.9 10E3/UL (ref 4–11)
WBC'S/HIGH POWER FIELD, WET PREP: ABNORMAL
YEAST, WET PREP: ABNORMAL

## 2023-03-27 PROCEDURE — 87210 SMEAR WET MOUNT SALINE/INK: CPT | Performed by: NURSE PRACTITIONER

## 2023-03-27 PROCEDURE — 80050 GENERAL HEALTH PANEL: CPT | Performed by: NURSE PRACTITIONER

## 2023-03-27 PROCEDURE — 83735 ASSAY OF MAGNESIUM: CPT | Performed by: NURSE PRACTITIONER

## 2023-03-27 PROCEDURE — 82330 ASSAY OF CALCIUM: CPT | Performed by: NURSE PRACTITIONER

## 2023-03-27 PROCEDURE — 36415 COLL VENOUS BLD VENIPUNCTURE: CPT | Performed by: NURSE PRACTITIONER

## 2023-03-27 PROCEDURE — 82550 ASSAY OF CK (CPK): CPT | Performed by: NURSE PRACTITIONER

## 2023-03-27 PROCEDURE — 83970 ASSAY OF PARATHORMONE: CPT | Performed by: NURSE PRACTITIONER

## 2023-03-27 PROCEDURE — 99214 OFFICE O/P EST MOD 30 MIN: CPT | Performed by: NURSE PRACTITIONER

## 2023-03-27 PROCEDURE — 86140 C-REACTIVE PROTEIN: CPT | Performed by: NURSE PRACTITIONER

## 2023-03-27 PROCEDURE — 82306 VITAMIN D 25 HYDROXY: CPT | Performed by: NURSE PRACTITIONER

## 2023-03-27 PROCEDURE — 83690 ASSAY OF LIPASE: CPT | Performed by: NURSE PRACTITIONER

## 2023-03-27 PROCEDURE — 81003 URINALYSIS AUTO W/O SCOPE: CPT | Performed by: NURSE PRACTITIONER

## 2023-03-27 RX ORDER — ONDANSETRON 4 MG/1
4 TABLET, ORALLY DISINTEGRATING ORAL EVERY 8 HOURS PRN
Qty: 20 TABLET | Refills: 0 | Status: SHIPPED | OUTPATIENT
Start: 2023-03-27 | End: 2024-06-05

## 2023-03-27 RX ORDER — FLUCONAZOLE 150 MG/1
150 TABLET ORAL
Qty: 3 TABLET | Refills: 0 | Status: SHIPPED | OUTPATIENT
Start: 2023-03-27 | End: 2023-04-03

## 2023-03-27 RX ORDER — CLOBETASOL PROPIONATE 0.5 MG/G
CREAM TOPICAL 2 TIMES DAILY
Qty: 60 G | Refills: 3 | Status: SHIPPED | OUTPATIENT
Start: 2023-03-27

## 2023-03-27 NOTE — PROGRESS NOTES
Assessment & Plan       Primary Hyperparathyroidism   - Calcium; Future  - Ionized Calcium; Future  - Parathyroid Hormone Intact; Future  - TSH with free T4 reflex; Future  - Wet prep - Clinic Collect  - UA macro with reflex to Microscopic and Culture - Clinc Collect  - Magnesium; Future  - Comprehensive metabolic panel (BMP + Alb, Alk Phos, ALT, AST, Total. Bili, TP); Future  - CBC with platelets and differential; Future  - Lipase; Future  - US Parathyroid; Future  - Calcium  - Ionized Calcium  - Parathyroid Hormone Intact  - TSH with free T4 reflex  - Magnesium  - Comprehensive metabolic panel (BMP + Alb, Alk Phos, ALT, AST, Total. Bili, TP)  - CBC with platelets and differential  - Lipase      Post-menopausal bleeding    - US Pelvic Complete with Transvaginal; Future    Lichen sclerosus of female genitalia    - clobetasol (TEMOVATE) 0.05 % external cream; Apply topically 2 times daily To affected external vaginal areas    Vaginal itching    - fluconazole (DIFLUCAN) 150 MG tablet; Take 1 tablet (150 mg) by mouth every 3 days for 3 doses    Nausea    - Ondansetron        MED REC REQUIRED  Post Medication Reconciliation Status: patient was not discharged from an inpatient facility or TCU     FUTURE APPOINTMENTS:       - Follow-up visit in case of new or worsening symptoms, will reach out to Endocrine once labs are done.     See Patient Instructions    Time spent in chart review in preparation to see patient, time with patient for interview/exam, ordering medications/tests/and/or procedures, and time spent in charting and coordinating care: 30 minutes.     HERIBERTO Linton CNP  [facility]    Eric Moore is a 63 year old, presenting for the following health issues:  ER F/U    HPI     ED/UC Followup:    Facility:  [facility]   Date of visit: 3/15/23, 3/19/23  Reason for visit: acute cystitis without hematuria, lower abdominal pain  Current Status:Not feeling any better.   "Inflammation all over-all joints, bone pain, vaginal itching, extreme fatigue especially in the morning.   Tenderness on left side of neck.     Hx of hyperparathyroidism, currently monitoring with labs q 6 months. Was briefly on 1 week of Vitamin D and Magnesium about 1 month ago to help with constipation and fatigue. About 3/8 started noticing some blood in her underwear and lower abdomen bloating. Seen in ER and dx with UTI and started on antibiotics, then taken off due to negative urine culture. Then vaginal itching started after that as well as sore throat on left- hx of lichen plaques- oral and vaginal. Denies emesis, has had nausea. Lower abdomen bloating has resolved as well as the bleeding.       Review of Systems   Constitutional, HEENT, cardiovascular, pulmonary, gi and gu systems are negative, except as otherwise noted.      Objective    /76   Pulse 93   Temp 97.8  F (36.6  C) (Tympanic)   Resp 16   Ht 1.702 m (5' 7\")   Wt 102.1 kg (225 lb)   LMP  (LMP Unknown)   SpO2 98%   BMI 35.24 kg/m    Body mass index is 35.24 kg/m .  Physical Exam   GENERAL: alert and no distress  HENT: normal cephalic/atraumatic, both ears: clear effusion, oropharynx clear, oral mucous membranes moist and white plaque behind left tonsil  NECK: no adenopathy, no asymmetry, masses, or scars and thyroid normal to palpation  RESP: lungs clear to auscultation - no rales, rhonchi or wheezes  CV: regular rates and rhythm, normal S1 S2, no S3 or S4, no murmur, click or rub and non-pitting extremity pitting edema to lower legs    ABDOMEN: soft, nontender, without hepatosplenomegaly or masses and no palpable or pulsatile masses  MS: no gross musculoskeletal defects noted, no edema  NEURO: Normal strength and tone, mentation intact and speech normal  PSYCH: mentation appears normal, affect normal/bright                    "

## 2023-03-28 LAB — DEPRECATED CALCIDIOL+CALCIFEROL SERPL-MC: 46 UG/L (ref 20–75)

## 2023-03-28 NOTE — RESULT ENCOUNTER NOTE
Mesfin Moore    Your labs are a little up from last check (calcium, ALT, parathyroid). There is nothing for acute treatment for symptoms, just surgery. I will reach out the Endocrine and let you know what they say. No yeast on the swab- you can try the steroid cream first and if that doesn't work try the Diflucan pills. Please let us know if you have any questions.     Take care,    HERIBERTO Miller CNP

## 2023-03-29 ENCOUNTER — MYC MEDICAL ADVICE (OUTPATIENT)
Dept: FAMILY MEDICINE | Facility: CLINIC | Age: 63
End: 2023-03-29
Payer: OTHER GOVERNMENT

## 2023-03-29 ENCOUNTER — TELEPHONE (OUTPATIENT)
Dept: PALLIATIVE MEDICINE | Facility: CLINIC | Age: 63
End: 2023-03-29
Payer: OTHER GOVERNMENT

## 2023-03-29 DIAGNOSIS — Z91.041 CONTRAST MEDIA ALLERGY: Primary | ICD-10-CM

## 2023-03-29 RX ORDER — METHYLPREDNISOLONE 32 MG/1
TABLET ORAL
Qty: 2 TABLET | Refills: 0 | Status: SHIPPED | OUTPATIENT
Start: 2023-03-29 | End: 2023-05-31

## 2023-03-29 RX ORDER — DIPHENHYDRAMINE HCL 50 MG
CAPSULE ORAL
Qty: 1 CAPSULE | Refills: 0 | Status: SHIPPED | OUTPATIENT
Start: 2023-03-29 | End: 2023-07-17

## 2023-03-29 RX ORDER — DIPHENHYDRAMINE HCL 50 MG
CAPSULE ORAL
Qty: 1 CAPSULE | Refills: 0 | Status: SHIPPED | OUTPATIENT
Start: 2023-03-29 | End: 2023-09-11

## 2023-03-29 NOTE — TELEPHONE ENCOUNTER
"Pt has an She will need to be premedicated.    Contrast dye allergy reaction:  allergy to omnipaque-throat swelling.    Is premedicating needed? Yes     Injection scheduled for:  3/30 @ 0915.   Arrive 30 minutes early for IV.    Pharmacy: [facility].    Contrast dye allergy pre-medication plan:    Take oral Medrol 32mg 12 hours before procedure time     Take oral Medrol 32mg again 2 hours before procedure time     Take oral benadryl (diphenhydramine) 50mg 1 hour before procedure time.                 Note:  Benadryl usually comes in 25mg tabs so it would be 2 tabs.    Was the above relayed to pt?: Yes     Was pt informed to arrive 30\" early for IV: YES      Meds prepped for Dr. Kidd to review and sign.      Harleen RN-BSN  [facility]     "

## 2023-03-29 NOTE — TELEPHONE ENCOUNTER
Patient called the on call provider.  Her prescriptions for her omnipaque allergy were not sent to the pharmacy and her injection is tomorrow. I saw they were prepped but never routed or signed and I could not find the encounter they were prepped in.    Prepped and signed orders for benadryl and medrol    Script Eprescribed to pharmacy    Signed Prescriptions:                        Disp   Refills    diphenhydrAMINE (BENADRYL) 50 MG capsule   1 caps*0        Sig: Take one tab (50mg) one hour prior to procedure.  Authorizing Provider: HUSSEIN MCCOY    methylPREDNISolone (MEDROL) 32 MG tablet   2 tabl*0        Sig: Take one tab 12 hours prior to procedure and one tab           2 hours before the procedure time  Authorizing Provider: HUSSEIN MCCOY MD

## 2023-03-29 NOTE — RESULT ENCOUNTER NOTE
Mesfin Moore    Your vitamin D results came back within normal limits. Please let us know if you have any questions.     Take care,    HERIBERTO Miller CNP

## 2023-03-29 NOTE — TELEPHONE ENCOUNTER
Please see my chart message.     Question on Cortisone injection and surgery.     Juany Tripp RN on 3/29/2023 at 8:49 AM

## 2023-03-30 ENCOUNTER — RADIOLOGY INJECTION OFFICE VISIT (OUTPATIENT)
Dept: PALLIATIVE MEDICINE | Facility: CLINIC | Age: 63
End: 2023-03-30
Payer: OTHER GOVERNMENT

## 2023-03-30 ENCOUNTER — TELEPHONE (OUTPATIENT)
Dept: PALLIATIVE MEDICINE | Facility: CLINIC | Age: 63
End: 2023-03-30

## 2023-03-30 ENCOUNTER — MYC MEDICAL ADVICE (OUTPATIENT)
Dept: FAMILY MEDICINE | Facility: CLINIC | Age: 63
End: 2023-03-30

## 2023-03-30 VITALS — DIASTOLIC BLOOD PRESSURE: 93 MMHG | HEART RATE: 100 BPM | OXYGEN SATURATION: 98 % | SYSTOLIC BLOOD PRESSURE: 141 MMHG

## 2023-03-30 DIAGNOSIS — M48.062 SPINAL STENOSIS OF LUMBAR REGION WITH NEUROGENIC CLAUDICATION: ICD-10-CM

## 2023-03-30 DIAGNOSIS — M48.02 CERVICAL STENOSIS OF SPINAL CANAL: ICD-10-CM

## 2023-03-30 DIAGNOSIS — M54.16 LUMBAR RADICULOPATHY: ICD-10-CM

## 2023-03-30 PROCEDURE — 64483 NJX AA&/STRD TFRM EPI L/S 1: CPT | Mod: 50 | Performed by: PAIN MEDICINE

## 2023-03-30 RX ORDER — DEXAMETHASONE SODIUM PHOSPHATE 10 MG/ML
10 INJECTION, SOLUTION INTRAMUSCULAR; INTRAVENOUS ONCE
Status: COMPLETED | OUTPATIENT
Start: 2023-03-30 | End: 2023-03-30

## 2023-03-30 RX ADMIN — DEXAMETHASONE SODIUM PHOSPHATE 10 MG: 10 INJECTION, SOLUTION INTRAMUSCULAR; INTRAVENOUS at 14:14

## 2023-03-30 ASSESSMENT — PAIN SCALES - GENERAL
PAINLEVEL: MILD PAIN (3)
PAINLEVEL: MODERATE PAIN (5)

## 2023-03-30 NOTE — TELEPHONE ENCOUNTER
Patient LVM on 3/29/2023 at 5:54pm. She was suppose to have prescription a [facility] pharmacy for a Medication and its not there. She is suppose to have the medication because she is getting an injection on 3/30/23, and she is allergic to the contrast that used in the injection procedure. Please call her back at [phone].

## 2023-03-30 NOTE — NURSING NOTE
Discharge Information    IV Discontiued Time:  1040 Catheter intact     Amount of Fluid Infused:  Saline locked    Discharge Criteria = When patient returns to baseline or as per MD order    Consciousness:  Pt is fully awake    Circulation:  BP +/- 20% of pre-procedure level    Respiration:  Patient is able to breathe deeply    O2 Sat:  Patient is able to maintain O2 Sat >92% on room air    Activity:  Moves 4 extremities on command    Ambulation:  Patient is able to stand and walk or stand and pivot into wheelchair    Dressing:  Clean/dry or No Dressing    Notes:   Discharge instructions and AVS given to patient    Patient meets criteria for discharge?  YES Pt was premedicated for contrast dye allergy.  Pt monitored for 30 minutes post procedure.  No reaction.    Admitted to PCU?  No    Responsible adult present to accompany patient home?  Yes    Signature/Title:    anaya seo RN  RN Care Coordinator  [facility] [place]

## 2023-03-30 NOTE — NURSING NOTE
20 gauge Peripheral IV inserted into left anticubital - attempts: 1    Harleen RN-BSN  [facility]

## 2023-03-30 NOTE — PROGRESS NOTES
Pre procedure Diagnosis: lumbar radiculopathy, lumbar degenerative disc disease   Post procedure Diagnosis: Same  Procedure performed: lumbar transforaminal epidural steroid injection at Bilateral L3-4, fluoroscopically guided, contrast controlled  Anesthesia: none  Complications: none  Operators: Valente Kidd MD and      Indications:   Teresa Fernandez is a 63 year old female.  They have a history of bilateral lbp radiating to LE.  Exam shows neg slump and they have tried conservative treatment including meds/pt.    MR reviewed  Options/alternatives, benefits and risks were discussed with the patient including bleeding, infection, tissue trauma, numbness, weakness, paralysis, spinal cord injury, radiation exposure, headache and reaction to medications. Questions were answered to her satisfaction and she agrees to proceed. Voluntary informed consent was obtained and signed.     Vitals were reviewed: Yes  BP (!) 141/93   Pulse 100   LMP  (LMP Unknown)   SpO2 98%   Allergies were reviewed:  Yes   Medications were reviewed:  Yes   Pre-procedure pain score: 5/10    Procedure:  After getting informed consent, patient was brought into the procedure suite and was placed in a prone position on the procedure table.   A Pause for the Cause was performed.  Patient was prepped and draped in sterile fashion.     After identifying the bilateral L3-4 neuroforamen, the C-arm was rotated to a bilateral lateral oblique angle.  A total of 3ml of Lidocaine 1% was used to anesthetize the skin and the needle track at a skin entry site coaxial with the fluoroscopy beam, and overriding the superior aspect of the neuroforamen.  A 22 gauge 3.5 inch spinal needle was advanced under intermittent fluoroscopy until it entered the foramen superiorly.    The position was then inspected from anteroposterior and lateral views, and the needle adjusted appropriately.  A total of 0.5ml of Omnipaque-300 was injected, confirming  appropriate position, with spread into the nerve root sheath and the epidural space, with no intravascular uptake. 9.5ml was wasted    Then, after repeated negative aspiration, a combination of Decadron 10 mg, 0.25% bupivacaine 2 ml, diluted with 3ml of normal saline was injected.     Hemostasis was achieved, the area was cleaned, and bandaids were placed when appropriate.  The patient tolerated the procedure well, and was taken to the recovery room.    Images were saved to PACS.    Post-procedure pain score: 2/10  Follow-up includes:   -f/u phone call in one week  -f/u with referring provider    Valente Kidd MD  [facility] [place]

## 2023-03-30 NOTE — PATIENT INSTRUCTIONS
[facility]   Procedure Discharge Instructions    Today you saw: Dr. Valente Kidd    You had an:  Epidural steroid injection   -lumbar     You were pre-medicated today for your contrast allergy.  The medications may make you groggy/unsteady.  It is recommended to hold off on driving until you don't feel impaired.      Medications used:  Lidocaine   Bupivacaine   Dexamethasone Omnipaque          Be cautious when walking. Numbness and/or weakness in the lower extremities may occur for up to 6-8 hours after the procedure due to effect of the local anesthetic  Do not drive for 6 hours. The effect of the local anesthetic could slow your reflexes.   You may resume your regular activities after 24 hours  Avoid strenuous activity for the first 24 hours  You may shower, however avoid swimming, tub baths or hot tubs for 24 hours following your procedure  You may have a mild to moderate increase in pain for several days following the injection.  It may take up to 14 days for the steroid medication to start working although you may feel the effect as early as a few days after the procedure.     You may use ice packs for 10-15 minutes, 3 to 4 times a day at the injection site for comfort  Do not use heat to painful areas for 6 to 8 hours. This will give the local anesthetic time to wear off and prevent you from accidentally burning your skin.   Unless you have been directed to avoid the use of anti-inflammatory medications (NSAIDS), you may use medications such as ibuprofen, Aleve or Tylenol for pain control if needed.   If you were fasting, you may resume your normal diet and medications after the procedure  If you have diabetes, check your blood sugar more frequently than usual as your blood sugar may be higher than normal for 10-14 days following a steroid injection. Contact your doctor who manages your diabetes if your blood sugar is higher than usual  Possible side effects of steroids that  Refill denied, too soon to re-order, not before mid October 2021    buPROPion XL (WELLBUTRIN XL) 150 MG 24 hr tablet 30 tablet 3 7/22/2021     Sig - Route: Take 1 tablet by mouth daily. - Oral    Sent to pharmacy as: buPROPion HCl ER (XL) 150 MG Oral Tablet Extended Release 24 Hour (WELLBUTRIN XL)    Class: Eprescribe    E-Prescribing Status: Receipt confirmed by pharmacy (7/22/2021  6:20 PM CDT)           you may experience include flushing, elevated blood pressure, increased appetite, mild headaches and restlessness.  All of these symptoms will get better with time.  If you experience any of the following, call the Pain Clinic during work hours (Mon-Friday 8-4:30 pm) at [phone] or the Provider Line after hours at [phone]:  -Fever over 100 degree F  -Swelling, bleeding, redness, drainage, warmth at the injection site  -Progressive weakness or numbness in your legs or arms  -Loss of bowel or bladder function  -Unusual headache that is not relieved by Tylenol or other pain reliever  -Unusual new onset of pain that is not improving

## 2023-03-30 NOTE — NURSING NOTE
Pre-procedure Intake  If YES to any questions or NO to having a   Please complete laminated checklist and leave on the computer keyboard for Provider, verbally inform provider if able.    For SCS Trial, RFA's or any sedation procedure:  Have you been fasting? NA    If yes, for how long?     Are you taking any any blood thinners such as Coumadin, Warfarin, Jantoven, Pradaxa Xarelto, Eliquis, Edoxaban, Enoxaparin, Lovenox, Heparin, Arixtra, Fondaparinux, or Fragmin? OR Antiplatelet medication such as Plavix, Brilinta, or Effient?   No     If yes, when did you take your last dose?     Do you take aspirin?  No    If cervical procedure, have you held aspirin for 6 days?      Do you have any allergies to contrast dye, iodine, steroid and/or numbing medications?  YES: Iodine     Are you currently taking antibiotics or have an active infection?  NO    Have you had a fever/elevated temperature within the past week? NO    Are you currently taking oral steroids? NO    Do you have a ? Yes    Are you pregnant or breastfeeding?  NO    Have you received the COVID-19 vaccine? Yes    If yes, was it your 1st, 2nd or only dose needed?     Date of most recent vaccine: 11/29/21    Notify provider and RNs if systolic BP >170, diastolic BP >100, P >100 or O2 sats < 90%

## 2023-03-31 ENCOUNTER — HOSPITAL ENCOUNTER (OUTPATIENT)
Dept: ULTRASOUND IMAGING | Facility: CLINIC | Age: 63
Discharge: HOME | End: 2023-03-31
Attending: NURSE PRACTITIONER
Payer: OTHER GOVERNMENT

## 2023-03-31 ENCOUNTER — MYC MEDICAL ADVICE (OUTPATIENT)
Dept: FAMILY MEDICINE | Facility: CLINIC | Age: 63
End: 2023-03-31
Payer: OTHER GOVERNMENT

## 2023-03-31 DIAGNOSIS — N95.0 POST-MENOPAUSAL BLEEDING: ICD-10-CM

## 2023-03-31 DIAGNOSIS — E21.0 PRIMARY HYPERPARATHYROIDISM (H): ICD-10-CM

## 2023-03-31 PROCEDURE — 76856 US EXAM PELVIC COMPLETE: CPT

## 2023-03-31 PROCEDURE — 76536 US EXAM OF HEAD AND NECK: CPT

## 2023-04-03 PROBLEM — E04.1 THYROID NODULE: Status: ACTIVE | Noted: 2023-04-03

## 2023-04-03 NOTE — RESULT ENCOUNTER NOTE
Mesfin Moore    Your ultrasound shows 2 nodules on the thyroid- 1 small and 1 medium size- repeat the ultrasound in 1 year unless Endocrine has other recommendation this week. Please let us know if you have any questions.     Take care,    HERIBERTO Miller CNP Unknown

## 2023-04-03 NOTE — RESULT ENCOUNTER NOTE
Mesfin Moore    Your pelvic ultrasound is unremarkable. Please let us know if you have any questions.     Take care,    HERIBERTO Miller CNP

## 2023-04-06 DIAGNOSIS — F41.1 GAD (GENERALIZED ANXIETY DISORDER): ICD-10-CM

## 2023-04-06 DIAGNOSIS — M79.10 MYALGIA: ICD-10-CM

## 2023-04-06 DIAGNOSIS — M54.16 LUMBAR RADICULOPATHY: ICD-10-CM

## 2023-04-06 RX ORDER — DULOXETIN HYDROCHLORIDE 30 MG/1
CAPSULE, DELAYED RELEASE ORAL
Qty: 90 CAPSULE | Refills: 1 | Status: SHIPPED | OUTPATIENT
Start: 2023-04-06 | End: 2023-08-01

## 2023-04-06 NOTE — TELEPHONE ENCOUNTER
"Prescription approved per [facility] Refill Protocol.    Noted slight elevation in BP    Requested Prescriptions   Pending Prescriptions Disp Refills     DULoxetine (CYMBALTA) 30 MG capsule [Pharmacy Med Name: DULOXETINE HCL 30MG CPEP] 90 capsule 0     Sig: TAKE 1 CAPSULE (30 MG) BY MOUTH DAILY IN ADDITION TO THE 60 MG FOR A TOTAL OF 90 MG DAILY       Serotonin-Norepinephrine Reuptake Inhibitors  Failed - 4/6/2023  3:57 AM        Failed - Blood pressure under 140/90 in past 12 months     BP Readings from Last 3 Encounters:   03/30/23 (!) 141/93   03/27/23 128/76   03/19/23 128/72                 Passed - Recent (12 mo) or future (30 days) visit within the authorizing provider's specialty     Patient has had an office visit with the authorizing provider or a provider within the authorizing providers department within the previous 12 mos or has a future within next 30 days. See \"Patient Info\" tab in inbasket, or \"Choose Columns\" in Meds & Orders section of the refill encounter.              Passed - Medication is active on med list        Passed - Patient is age 18 or older        Passed - No active pregnancy on record        Passed - No positive pregnancy test in past 12 months           Pending Prescriptions:                       Disp   Refills    DULoxetine (CYMBALTA) 30 MG capsule [Phar*90 cap*0            Sig: TAKE 1 CAPSULE (30 MG) BY MOUTH DAILY IN ADDITION           TO THE 60 MG FOR A TOTAL OF 90 MG DAILY    Juany Tripp RN on 4/6/2023 at 1:08 PM    "

## 2023-04-11 ASSESSMENT — ENCOUNTER SYMPTOMS
BACK PAIN: 1
ARTHRALGIAS: 1
COUGH: 1
CONSTIPATION: 1
BLOATING: 1
INCREASED ENERGY: 1
SPEECH CHANGE: 1
NERVOUS/ANXIOUS: 1
DOUBLE VISION: 1
WEIGHT GAIN: 1
SPUTUM PRODUCTION: 1
MEMORY LOSS: 1
HYPOTENSION: 1
TREMORS: 1
RECTAL PAIN: 0
SWOLLEN GLANDS: 1
LOSS OF CONSCIOUSNESS: 1
HEMATURIA: 0
COUGH DISTURBING SLEEP: 1
BLOOD IN STOOL: 0
PALPITATIONS: 1
TINGLING: 1
LEG PAIN: 1
TASTE DISTURBANCE: 0
DECREASED LIBIDO: 0
WEAKNESS: 1
FLANK PAIN: 1
VOMITING: 1
BRUISES/BLEEDS EASILY: 1
SHORTNESS OF BREATH: 1
DIZZINESS: 1
SNORES LOUDLY: 0
CHILLS: 1
NIGHT SWEATS: 1
EYE WATERING: 1
INSOMNIA: 1
BOWEL INCONTINENCE: 1
HYPERTENSION: 1
HALLUCINATIONS: 0
DIFFICULTY URINATING: 1
DECREASED APPETITE: 1
SEIZURES: 0
HOARSE VOICE: 0
NAUSEA: 1
JAUNDICE: 0
EYE IRRITATION: 1
HEARTBURN: 1
MUSCLE WEAKNESS: 1
PARALYSIS: 0
NECK PAIN: 1
HOT FLASHES: 1
NUMBNESS: 1
NECK MASS: 1
SYNCOPE: 1
DYSURIA: 0
POSTURAL DYSPNEA: 1
TROUBLE SWALLOWING: 1
WHEEZING: 1
SINUS CONGESTION: 0
EYE PAIN: 1
SKIN CHANGES: 0
FATIGUE: 1
NAIL CHANGES: 1
EXERCISE INTOLERANCE: 1
SORE THROAT: 1
POLYPHAGIA: 1
JOINT SWELLING: 1
DYSPNEA ON EXERTION: 1
WEIGHT LOSS: 0
MUSCLE CRAMPS: 1
ORTHOPNEA: 1
SINUS PAIN: 0
SMELL DISTURBANCE: 1
HEADACHES: 1
STIFFNESS: 1
ALTERED TEMPERATURE REGULATION: 1
FEVER: 0
DECREASED CONCENTRATION: 1
DISTURBANCES IN COORDINATION: 1
DEPRESSION: 1
POLYDIPSIA: 1
LIGHT-HEADEDNESS: 1
DIARRHEA: 1
HEMOPTYSIS: 0
SLEEP DISTURBANCES DUE TO BREATHING: 0
PANIC: 0
ABDOMINAL PAIN: 1
POOR WOUND HEALING: 0
EYE REDNESS: 0

## 2023-04-13 NOTE — PROGRESS NOTES
[facility]      Follow up for bone pain      Assessment:  Middle age woman with right lower extremity bone pain, hypothyroidism, OA, depression, allergies, gluten intolerance, history of renal cell Ca, melanoma more than 10 years ago, St post Left nephrectomy and L adrenalectomy, hysterectomy, nicotine abuse (2 PPD?), obesity BMI 34, St post COVID in 2019, now with mild hypercalcemia with mildly elevated PTHi.  Although hypercalcemia can cause bone pain, if he appears the patient has a generalized pain syndrome and therefore not clear how much her bone pain could be driven by primary hyperparathyroidism.  Also the degree of hypercalcemia parathyroid hormone elevation is only mildly hence bone pain would not be expected to this extent.   Patient does meet qualification for parathyroid surgery due to hypercalciuria.   She certainly will need preop evaluation as she has not been able to be physically very active.   We will obtain parathyroid sestamibi scan review and consider referral to ENT for parathyroidectomy  Considering nausea and weakness and history of left adrenalectomy will obtain morning cortisol and ACTH, and follow-up    Plan:   24h urine Calcium and creatinine  DXA  Labs including ionized Calcium, mag phos pthi [id number]      40 minutes spent on the date of the encounter doing chart review, review of test results, interpretation of tests, patient visit and documentation     This note was generated using computer recognized voice recognition. This might result in some expected imperfection.    Leigh Ann Ellsworth MD  Endocrinology and Diabetes  Telephone contact:  [facility] [place] [phone]  [facility] [phone]          Interval history:  Patient is noted more sharp pain in her bones particularly new location left distal radius, and continued pain in her shin bones more on the right, she also notes pain in her chest which is felt  to be noncardiac origin, pain in her earlobes, the pain everywhere is sharp and somewh 24-hour urine collection for calcium in at fluctuated but cannot stand.  Pain also wakes her up at night, is 9 out of 10,   Continuous this weakness in her legs and falls which is longstanding for many years  Dull pain in tonsil area left more than right, ache occ hoarseness, nausea, no dysphagia  Fatigue    Coughing, SOB,   Can walk for 10 min on a treadmaill, has to hold on rails;   Hips limit walkingup stairs    24 urine collection from 9/20/2022 reveals a urine calcium of 600 mg in 24 hours in 3 L of urine    Was seen by Dr Henry, Endocrinology one year ago re bone pain in the apst  Today coming in for hypercalcemia    Patient had repeated labs done, and in May and June a mildly elevated serum calcium was noted.  Patient denies taking extra calcium supplements, denies taking any Tums.  Has some GERD but consistently only takes omeprazole.  Denies having taken hydrochlorothiazide or lithium.    Patient is without fracture history, history of renal cell cancer however not kidney stones    Family history is positive for thyroid disease in her sister, however no parathyroid or abnormal calcium levels.    History of bone pain  Her bone pain was located at her right lower extremity, and photo evaluation revealed a neuropathy with an abnormal EMG.  There also had been repeated trauma from recurrent falls the patient has been struggling with.    Recent syncope, negative work up in Cardiology, multiple falls,   Recurrent reactive airways disease, thought to be sequela of prior Covid infection from 2019    Evaluation for metabolic bone diseaes in 11/2021 revealed elevated PTHi, otherwise unremarable at that point including CMP, CBC, CK, ESR, CRP and x-rays of pelvis, hips, right tib/fib unrevealing. Concern for osteoporosis prompted a DXA scan. This has not been done sofar.    Abdominal pain,bloated after  eating    Medications  Methylprednisolone dose pack  Duloxetine 60 mg daily       History of fracture no, kidney stones no, one kidney    Hypothyroidism  This was diagnosed in 1996 after nephrectomy, patient notes that thyroid hormone levels have only been mildly abnormal.  However her symptoms improved significantly this levothyroxine started.  Takes medication as directed in the morning with water separates from food for 30 minutes or more  LT4 100 mcg takes in am , since 1996,       Symptoms of hypo- and hyperthyroidism:  Weight change unable to loose; heat or cold intolerance still getting hot flashes, cannot stand heat, mostly at night; abnormal bowel movements variable IBSchange in hair or skin male pattern balding, FH positive of hair loss only in males; anxiety  or depression yes variable; fatigue tired; heart racing some; tremors right hand; menses age 46y    Exposure to radiation: no           Medications:   Current Outpatient Medications   Medication Sig Dispense Refill     acetaminophen (TYLENOL) 325 MG tablet Take 325-650 mg by mouth every 6 hours as needed for mild pain       albuterol (PROAIR HFA/PROVENTIL HFA/VENTOLIN HFA) 108 (90 BASE) MCG/ACT Inhaler Inhale 2 puffs into the lungs every 6 hours as needed for shortness of breath / dyspnea 1 Inhaler 5     albuterol (PROVENTIL) (2.5 MG/3ML) 0.083% neb solution Take 1 vial (2.5 mg) by nebulization every 6 hours as needed for shortness of breath / dyspnea or wheezing 90 mL 11     APNO CREA ointment Apply to rash on nose twice daily as needed. 100 g 3     augmented betamethasone dipropionate (DIPROLENE-AF) 0.05 % external ointment Apply topically 2 times daily 50 g 3     Cholecalciferol (VITAMIN D3 PO) Take 2,000 Units by mouth 2 times daily       clobetasol (TEMOVATE) 0.05 % external cream Apply topically 2 times daily To affected external vaginal areas 60 g 3     diphenhydrAMINE (BENADRYL) 50 MG capsule Take 1 tab (50mg) one hour before your procedure.  1 capsule 0     diphenhydrAMINE (BENADRYL) 50 MG capsule Take one tab (50mg) one hour prior to procedure. 1 capsule 0     DULoxetine (CYMBALTA) 30 MG capsule TAKE 1 CAPSULE (30 MG) BY MOUTH DAILY IN ADDITION TO THE 60 MG FOR A TOTAL OF 90 MG DAILY 90 capsule 1     DULoxetine (CYMBALTA) 60 MG capsule Take 1 capsule (60 mg) by mouth daily 90 capsule 3     fexofenadine (ALLEGRA) 180 MG tablet Take 1 tablet (180 mg) by mouth daily 30 tablet 1     isosorbide mononitrate (IMDUR) 30 MG 24 hr tablet Take 1 tablet (30 mg) by mouth daily 90 tablet 3     levothyroxine (SYNTHROID/LEVOTHROID) 100 MCG tablet TAKE ONE TABLET BY MOUTH ONCE DAILY ON MONDAY, WEDNESDAY, FRIDAY, SATURDAY AND SUNDAY. 63 tablet 0     levothyroxine (SYNTHROID/LEVOTHROID) 88 MCG tablet TAKE 1 TABLET BY MOUTH TUESDAYS AND THURSDAYS 45 tablet 0     loratadine (CLARITIN) 10 MG tablet Take 1 tablet by mouth daily       methylPREDNISolone (MEDROL) 32 MG tablet Take 1 tab twelve hours before your procedure. Take a 2nd dose (1 tab)  2 hours before the procedure time 2 tablet 0     methylPREDNISolone (MEDROL) 32 MG tablet Take one tab 12 hours prior to procedure and one tab 2 hours before the procedure time 2 tablet 0     mometasone (ELOCON) 0.1 % external ointment Apply topically twice a week Use on eczematous lesions 45 g 3     omeprazole (PRILOSEC) 20 MG DR capsule Take 20 mg by mouth daily       ondansetron (ZOFRAN ODT) 4 MG ODT tab Take 1 tablet (4 mg) by mouth every 8 hours as needed for nausea 20 tablet 0     order for DME Equipment being ordered: Nebulizer 1 Units 0     OVER-THE-COUNTER Place 1 drop into both eyes 3 times daily. Systane Ultra, Systane Balance, Blink Tears or Refresh Optive Artificial Tear 1 Bottle      pregabalin (LYRICA) 25 MG capsule Take 1 capsule (25 mg) by mouth 2 times daily 60 capsule 1     triamcinolone (KENALOG) 0.1 % external cream Apply topically 2 times daily 30 g 3       Social History:  Social History     Tobacco Use      Smoking status: Former     Packs/day: 2.00     Years: 15.00     Pack years: 30.00     Types: Cigarettes     Quit date: 3/26/1996     Years since quittin.0     Smokeless tobacco: Never   Vaping Use     Vaping status: Never Used     Passive vaping exposure: Yes   Substance Use Topics     Alcohol use: Yes     Comment: rare       Family History  FAMILY HISTORY      Physical Examination:  LMP  (LMP Unknown)     Wt Readings from Last 4 Encounters:   23 102.1 kg (225 lb)   23 101.6 kg (224 lb)   03/15/23 104.3 kg (230 lb)   23 104.3 kg (230 lb)       General: Well appearing obese woman in no distress, weight evenly distributed between trunk and extremities, no peripheral wasing.   Eyes: EOMI. Sclerae and conjunctivae are clear.   HENT: No thyromegaly or mass.  No moon facies  Lymphatic: No cervical or supraclavicular lymphadenopathy.  Cardiovascular: RRR, with normal S1+S2 and no murmurs.   Skin: No rash or lesions. No abdominal striae.    Extremities: trace bilateral peripheral edema.   Neurologic: No tremor with hands outstretched. 1+ patellar reflexes.      Labs and Studies:         Lab Results   Component Value Date    ANNIE 10.7 (H) 2023    ANNIE 10.7 (H) 2023    ANNIE 10.9 (H) 2023    ANNIE 10.7 (H) 2022    ANNIE 10.2 (H) 2022    ANNIE 10.5 (H) 2022    ANNIE 10.0 2022    ANNIE 9.8 2021    ANNIE 9.4 2021    ANNIE 9.7 2021    ANNIE 9.8 05/10/2021    ANNIE 9.7 2021    ANNIE 10.1 2019    ANNIE 9.6 2018    ANNIE 9.7 09/15/2017    ANNIE 9.4 2017    ANNIE 9.4 2016    ANNIE 9.0 2015    ANNIE 9.8 2015    ANNIE 9.7 2014    ANNIE 9.1 2013    ANNIE 9.8 2012    ANNIE 9.4 2011    ANNIE 9.1 2011    ANNIE 10.2 2009    ANNIE 9.8 2009    ANNIE 9.3 08/15/2005    ANNIE 9.1 2005    ANNIE 9.9 2005         Lab Results   Component Value Date    ANNIE 10.7 (H) 2023    ANNIE 10.7 (H) 2023    ANNIE 10.9 (H) 2023     ANNIE 10.7 (H) 09/26/2022    ALBUMIN 4.5 03/27/2023    ALT 26 03/27/2023    PHOS 3.0 06/29/2022    PTHI 88 (H) 03/27/2023    PTHI 70 (H) 09/26/2022    PTHI 86 (H) 06/29/2022    PTHI 80 05/25/2022    PTHI 82 (H) 02/01/2022    PTHI 95 (H) 11/26/2021    PTHI 107 (H) 11/08/2021    AST 27 03/27/2023    BILITOTAL 0.3 03/27/2023    CR 0.70 03/27/2023    CR 0.70 03/19/2023    CR 0.84 06/29/2022     03/27/2023    TSH 1.97 03/27/2023    ALKPHOS 113 (H) 03/27/2023    HGB 13.6 03/27/2023             Lab Results   Component Value Date    CHLORIDE 101 03/27/2023    CO2 26 03/27/2023    CR 0.70 03/27/2023    ALKPHOS 113 (H) 03/27/2023    PTHI 88 (H) 03/27/2023    TSH 1.97 03/27/2023    T4 1.00 02/18/2021    HGB 13.6 03/27/2023       Lab Results   Component Value Date    ALT 26 03/27/2023    AST 27 03/27/2023    ALBUMIN 4.5 03/27/2023    BILITOTAL 0.3 03/27/2023     Lab Results   Component Value Date    ANNIE 10.7 (H) 03/27/2023    ANNIE 10.7 (H) 03/27/2023    ANNIE 10.9 (H) 03/19/2023    ANNIE 10.7 (H) 09/26/2022    ALBUMIN 4.5 03/27/2023    ALT 26 03/27/2023    PHOS 3.0 06/29/2022    PTHI 88 (H) 03/27/2023    PTHI 70 (H) 09/26/2022    PTHI 86 (H) 06/29/2022    PTHI 80 05/25/2022    PTHI 82 (H) 02/01/2022    PTHI 95 (H) 11/26/2021    PTHI 107 (H) 11/08/2021    AST 27 03/27/2023    BILITOTAL 0.3 03/27/2023    CR 0.70 03/27/2023    CR 0.70 03/19/2023    CR 0.84 06/29/2022     03/27/2023    TSH 1.97 03/27/2023    ALKPHOS 113 (H) 03/27/2023    HGB 13.6 03/27/2023         Results for orders placed during the hospital encounter of 05/14/16    US Thyroid    Narrative  US THYROID   5/14/2016 9:19 AM    HISTORY: Hypothyroidism.    COMPARISON: None.    FINDINGS:  The thyroid gland is of normal size. The right lobe of the  thyroid measures 5.4 x 1.2 x 0.8 cm. The left lobe of the thyroid  measures 5.5 x 0.6 x 0.9 cm. The isthmus measures 0.2 cm. Background  thyroid parenchymal echogenicity is mildly heterogeneous.    Individual thyroid  nodules are measured as follows:  1. Small partially calcified nodule within the isthmus on the right  measure 0.7 x 0.4 x 0.5 cm.    Impression  IMPRESSION:  1. Subcentimeter partially calcified nodule in the isthmus measures  0.7 cm  2. Thyroid parenchymal echogenicity is mildly heterogeneous, however  no other discrete thyroid nodules are seen.    ANNABEL DOZIER MD      Results for orders placed in visit on 06/27/12    Dexa hip/pelvis/spine*          Answers for HPI/ROS submitted by the patient on 4/11/2023  General Symptoms: Yes  Skin Symptoms: Yes  HENT Symptoms: Yes  EYE SYMPTOMS: Yes  HEART SYMPTOMS: Yes  LUNG SYMPTOMS: Yes  INTESTINAL SYMPTOMS: Yes  URINARY SYMPTOMS: Yes  GYNECOLOGIC SYMPTOMS: Yes  BREAST SYMPTOMS: No  SKELETAL SYMPTOMS: Yes  BLOOD SYMPTOMS: Yes  NERVOUS SYSTEM SYMPTOMS: Yes  MENTAL HEALTH SYMPTOMS: Yes  Ear pain: Yes  Ear discharge: No  Hearing loss: Yes  Tinnitus: Yes  Nosebleeds: No  Congestion: No  Sinus pain: No  Trouble swallowing: Yes   Voice hoarseness: No  Mouth sores: Yes  Sore throat: Yes  Tooth pain: No  Gum tenderness: Yes  Bleeding gums: No  Change in taste: No  Change in sense of smell: Yes  Dry mouth: Yes  Hearing aid used: No  Neck lump: Yes  Fever: No  Loss of appetite: Yes  Weight loss: No  Weight gain: Yes  Fatigue: Yes  Night sweats: Yes  Chills: Yes  Increased stress: Yes  Excessive hunger: Yes  Excessive thirst: Yes  Feeling hot or cold when others believe the temperature is normal: Yes  Loss of height: Yes  Post-operative complications: No  Surgical site pain: No  Hallucinations: No  Change in or Loss of Energy: Yes  Hyperactivity: No  Confusion: Yes  Changes in hair: Yes  Changes in moles/birth marks: No  Itching: Yes  Rashes: Yes  Changes in nails: Yes  Acne: No  Hair in places you don't want it: Yes  Change in facial hair: Yes  Warts: No  Non-healing sores: No  Scarring: No  Flaking of skin: No  Color changes of hands/feet in cold : Yes  Sun sensitivity:  Yes  Skin thickening: Yes  Eye pain: Yes  Vision loss: Yes  Dry eyes: Yes  Watery eyes: Yes  Eye bulging: Yes  Double vision: Yes  Flashing of lights: No  Spots: No  Floaters: Yes  Redness: No  Crossed eyes: No  Yellowing of eyes: No  Eye irritation: Yes  Chest pain or pressure: Yes  Fast or irregular heartbeat: Yes  Pain in legs with walking: Yes  Trouble breathing while lying down: Yes  Fingers or toes appear blue: Yes  High blood pressure: Yes  Low blood pressure: Yes  Fainting: Yes  Murmurs: No  Pacemaker: No  Varicose veins: Yes  Wake up at night with shortness of breath: No  Light-headedness: Yes  Exercise intolerance: Yes  Cough: Yes  Sputum or phlegm: Yes  Coughing up blood: No  Difficulty breating or shortness of breath: Yes  Snoring: No  Wheezing: Yes  Difficulty breathing on exertion: Yes  Nighttime Cough: Yes  Difficulty breathing when lying flat: Yes  Heart burn or indigestion: Yes  Nausea: Yes  Vomiting: Yes  Abdominal pain: Yes  Bloating: Yes  Constipation: Yes  Diarrhea: Yes  Blood in stool: No  Black stools: No  Rectal or Anal pain: No  Fecal incontinence: Yes  Yellowing of skin or eyes: No  Vomit with blood: No  Change in stools: No  Trouble holding urine or incontinence: Yes  Pain or burning: No  Trouble starting or stopping: Yes  Increased frequency of urination: Yes  Blood in urine: No  Decreased frequency of urination: No  Frequent nighttime urination: Yes  Flank pain: Yes  Difficulty emptying bladder: Yes  Bleeding or spotting between periods: Yes  Heavy or painful periods: No  Irregular periods: No  Vaginal discharge: Yes  Hot flashes: Yes  Vaginal dryness: Yes  Genital ulcers: Yes  Reduced libido: No  Painful intercourse: No  Difficulty with sexual arousal: No  Post-menopausal bleeding: No  Back pain: Yes  Neck pain: Yes  Swollen joints: Yes  Joint pain: Yes  Bone pain: Yes  Muscle cramps: Yes  Muscle weakness: Yes  Joint stiffness: Yes  Bone fracture: Yes  Edema or swelling: Yes  Anemia:  No  Swollen glands: Yes  Easy bleeding or bruising: Yes  Trouble with coordination: Yes  Dizziness or trouble with balance: Yes  Fainting or black-out spells: Yes  Memory loss: Yes  Headache: Yes  Seizures: No  Speech problems: Yes  Tingling: Yes  Tremor: Yes  Weakness: Yes  Difficulty walking: Yes  Paralysis: No  Numbness: Yes  Nervous or Anxious: Yes  Depression: Yes  Trouble sleeping: Yes  Trouble thinking or concentrating: Yes  Mood changes: Yes  Panic attacks: No

## 2023-04-14 ENCOUNTER — OFFICE VISIT (OUTPATIENT)
Dept: ENDOCRINOLOGY | Facility: CLINIC | Age: 63
End: 2023-04-14
Payer: OTHER GOVERNMENT

## 2023-04-14 VITALS
HEART RATE: 89 BPM | WEIGHT: 227 LBS | DIASTOLIC BLOOD PRESSURE: 79 MMHG | BODY MASS INDEX: 35.55 KG/M2 | OXYGEN SATURATION: 96 % | SYSTOLIC BLOOD PRESSURE: 126 MMHG

## 2023-04-14 DIAGNOSIS — M62.81 GENERALIZED MUSCLE WEAKNESS: ICD-10-CM

## 2023-04-14 DIAGNOSIS — R53.83 OTHER FATIGUE: ICD-10-CM

## 2023-04-14 DIAGNOSIS — E83.52 HYPERCALCEMIA: Primary | ICD-10-CM

## 2023-04-14 PROCEDURE — 99215 OFFICE O/P EST HI 40 MIN: CPT | Performed by: INTERNAL MEDICINE

## 2023-04-14 ASSESSMENT — PAIN SCALES - GENERAL: PAINLEVEL: MODERATE PAIN (5)

## 2023-04-14 NOTE — PATIENT INSTRUCTIONS
"Parathyroid scan soon at [place]    Lab in the morning before 8:30am      To schedule a lab appointment,  Either use [employer]  Or call as below    Lab    General [phone]   [facility] [phone]   [place] [phone]   [facility]  [phone]   [facility] [phone]   [place] [phone]   [facility]) [phone]   [facility] Walk-In Only   [place] [phone]   [place] [phone]   [place] [phone]   [place] [phone]       Please call the Infusion Sites and set up appointment for you injections/infusions.   This will trigger the prior authorization process for the bone medication if needed    [facility] [phone] (option 3 then option 2)  [place]: [phone]  [place] ([facility]): [phone] (oral glucose scheduled in lab)  [place]: [phone] (oral glucose scheduled in lab)  [place]: [phone]  [facility]): [phone]  [facility]): [phone]      Please reach out to the following centers to schedule your imaging appointment:   Imaging (DEXA, CT, MRI, XRAY)    [facility] ([facility] [facility], [place]) [phone]   [facility] ([place], [place]) [phone]   [facility] ([facility]) [phone]   [facility] ([facility]) [phone]       To get a \"[facility] Estimate\" for out of pocket costs: tel [phone]    "

## 2023-04-14 NOTE — NURSING NOTE
Teresa Fernandez's goals for this visit include:   Chief Complaint   Patient presents with     RECHECK     Bone pain, hypercalcemia     She requests these members of her care team be copied on today's visit information: yes    PCP: Amanda Meléndez    Referring Provider:  No referring provider defined for this encounter.    /79 (BP Location: Left arm, Patient Position: Sitting, Cuff Size: Adult Large)   Pulse 89   Wt 103 kg (227 lb)   LMP  (LMP Unknown)   SpO2 96%   BMI 35.55 kg/m      Do you need any medication refills at today's visit? no    ARABELLA Linda  Adult Endocrinology  [facility]

## 2023-04-14 NOTE — LETTER
4/14/2023         RE: Teresa Fernandez  [address] [facility] [id number]        Dear Colleague,    Thank you for referring your patient, Teresa Fernandez, to the [facility]. Please see a copy of my visit note below.    Endocrinology and Diabetes Clinic      Follow up for bone pain      Assessment:  Middle age woman with right lower extremity bone pain, hypothyroidism, OA, depression, allergies, gluten intolerance, history of renal cell Ca, melanoma more than 10 years ago, St post Left nephrectomy and L adrenalectomy, hysterectomy, nicotine abuse (2 PPD?), obesity BMI 34, St post COVID in 2019, now with mild hypercalcemia with mildly elevated PTHi.  Although hypercalcemia can cause bone pain, if he appears the patient has a generalized pain syndrome and therefore not clear how much her bone pain could be driven by primary hyperparathyroidism.  Also the degree of hypercalcemia parathyroid hormone elevation is only mildly hence bone pain would not be expected to this extent.   Patient does meet qualification for parathyroid surgery due to hypercalciuria.   She certainly will need preop evaluation as she has not been able to be physically very active.   We will obtain parathyroid sestamibi scan review and consider referral to ENT for parathyroidectomy  Considering nausea and weakness and history of left adrenalectomy will obtain morning cortisol and ACTH, and follow-up    Plan:   24h urine Calcium and creatinine  DXA  Labs including ionized Calcium, mag phos pthi [id number]      40 minutes spent on the date of the encounter doing chart review, review of test results, interpretation of tests, patient visit and documentation     This note was generated using computer recognized voice recognition. This might result in some expected imperfection.    Leigh Ann Ellsworth MD  Endocrinology and Diabetes  Telephone contact:  [facility] [place]  [phone]  [facility] [phone]          Interval history:  Patient is noted more sharp pain in her bones particularly new location left distal radius, and continued pain in her shin bones more on the right, she also notes pain in her chest which is felt to be noncardiac origin, pain in her earlobes, the pain everywhere is sharp and somewh 24-hour urine collection for calcium in at fluctuated but cannot stand.  Pain also wakes her up at night, is 9 out of 10,   Continuous this weakness in her legs and falls which is longstanding for many years  Dull pain in tonsil area left more than right, ache occ hoarseness, nausea, no dysphagia  Fatigue    Coughing, SOB,   Can walk for 10 min on a treadmaill, has to hold on rails;   Hips limit walkingup stairs    24 urine collection from 9/20/2022 reveals a urine calcium of 600 mg in 24 hours in 3 L of urine    Was seen by Dr Henry, Endocrinology one year ago re bone pain in the apst  Today coming in for hypercalcemia    Patient had repeated labs done, and in May and June a mildly elevated serum calcium was noted.  Patient denies taking extra calcium supplements, denies taking any Tums.  Has some GERD but consistently only takes omeprazole.  Denies having taken hydrochlorothiazide or lithium.    Patient is without fracture history, history of renal cell cancer however not kidney stones    Family history is positive for thyroid disease in her sister, however no parathyroid or abnormal calcium levels.    History of bone pain  Her bone pain was located at her right lower extremity, and photo evaluation revealed a neuropathy with an abnormal EMG.  There also had been repeated trauma from recurrent falls the patient has been struggling with.    Recent syncope, negative work up in Cardiology, multiple falls,   Recurrent reactive airways disease, thought to be sequela of prior Covid infection from 2019    Evaluation for metabolic bone diseaes in 11/2021  revealed elevated PTHi, otherwise unremarable at that point including CMP, CBC, CK, ESR, CRP and x-rays of pelvis, hips, right tib/fib unrevealing. Concern for osteoporosis prompted a DXA scan. This has not been done sofar.    Abdominal pain,bloated after eating    Medications  Methylprednisolone dose pack  Duloxetine 60 mg daily       History of fracture no, kidney stones no, one kidney    Hypothyroidism  This was diagnosed in 1996 after nephrectomy, patient notes that thyroid hormone levels have only been mildly abnormal.  However her symptoms improved significantly this levothyroxine started.  Takes medication as directed in the morning with water separates from food for 30 minutes or more  LT4 100 mcg takes in am , since 1996,       Symptoms of hypo- and hyperthyroidism:  Weight change unable to loose; heat or cold intolerance still getting hot flashes, cannot stand heat, mostly at night; abnormal bowel movements variable IBSchange in hair or skin male pattern balding, FH positive of hair loss only in males; anxiety  or depression yes variable; fatigue tired; heart racing some; tremors right hand; menses age 46y    Exposure to radiation: no           Medications:   Current Outpatient Medications   Medication Sig Dispense Refill     acetaminophen (TYLENOL) 325 MG tablet Take 325-650 mg by mouth every 6 hours as needed for mild pain       albuterol (PROAIR HFA/PROVENTIL HFA/VENTOLIN HFA) 108 (90 BASE) MCG/ACT Inhaler Inhale 2 puffs into the lungs every 6 hours as needed for shortness of breath / dyspnea 1 Inhaler 5     albuterol (PROVENTIL) (2.5 MG/3ML) 0.083% neb solution Take 1 vial (2.5 mg) by nebulization every 6 hours as needed for shortness of breath / dyspnea or wheezing 90 mL 11     APNO CREA ointment Apply to rash on nose twice daily as needed. 100 g 3     augmented betamethasone dipropionate (DIPROLENE-AF) 0.05 % external ointment Apply topically 2 times daily 50 g 3     Cholecalciferol (VITAMIN D3 PO)  Take 2,000 Units by mouth 2 times daily       clobetasol (TEMOVATE) 0.05 % external cream Apply topically 2 times daily To affected external vaginal areas 60 g 3     diphenhydrAMINE (BENADRYL) 50 MG capsule Take 1 tab (50mg) one hour before your procedure. 1 capsule 0     diphenhydrAMINE (BENADRYL) 50 MG capsule Take one tab (50mg) one hour prior to procedure. 1 capsule 0     DULoxetine (CYMBALTA) 30 MG capsule TAKE 1 CAPSULE (30 MG) BY MOUTH DAILY IN ADDITION TO THE 60 MG FOR A TOTAL OF 90 MG DAILY 90 capsule 1     DULoxetine (CYMBALTA) 60 MG capsule Take 1 capsule (60 mg) by mouth daily 90 capsule 3     fexofenadine (ALLEGRA) 180 MG tablet Take 1 tablet (180 mg) by mouth daily 30 tablet 1     isosorbide mononitrate (IMDUR) 30 MG 24 hr tablet Take 1 tablet (30 mg) by mouth daily 90 tablet 3     levothyroxine (SYNTHROID/LEVOTHROID) 100 MCG tablet TAKE ONE TABLET BY MOUTH ONCE DAILY ON MONDAY, WEDNESDAY, FRIDAY, SATURDAY AND SUNDAY. 63 tablet 0     levothyroxine (SYNTHROID/LEVOTHROID) 88 MCG tablet TAKE 1 TABLET BY MOUTH TUESDAYS AND THURSDAYS 45 tablet 0     loratadine (CLARITIN) 10 MG tablet Take 1 tablet by mouth daily       methylPREDNISolone (MEDROL) 32 MG tablet Take 1 tab twelve hours before your procedure. Take a 2nd dose (1 tab)  2 hours before the procedure time 2 tablet 0     methylPREDNISolone (MEDROL) 32 MG tablet Take one tab 12 hours prior to procedure and one tab 2 hours before the procedure time 2 tablet 0     mometasone (ELOCON) 0.1 % external ointment Apply topically twice a week Use on eczematous lesions 45 g 3     omeprazole (PRILOSEC) 20 MG DR capsule Take 20 mg by mouth daily       ondansetron (ZOFRAN ODT) 4 MG ODT tab Take 1 tablet (4 mg) by mouth every 8 hours as needed for nausea 20 tablet 0     order for DME Equipment being ordered: Nebulizer 1 Units 0     OVER-THE-COUNTER Place 1 drop into both eyes 3 times daily. Systane Ultra, Systane Balance, Blink Tears or Refresh Optive Artificial  Tear 1 Bottle      pregabalin (LYRICA) 25 MG capsule Take 1 capsule (25 mg) by mouth 2 times daily 60 capsule 1     triamcinolone (KENALOG) 0.1 % external cream Apply topically 2 times daily 30 g 3       Social History:  Social History     Tobacco Use     Smoking status: Former     Packs/day: 2.00     Years: 15.00     Pack years: 30.00     Types: Cigarettes     Quit date: 3/26/1996     Years since quittin.0     Smokeless tobacco: Never   Vaping Use     Vaping status: Never Used     Passive vaping exposure: Yes   Substance Use Topics     Alcohol use: Yes     Comment: rare       Family History  FAMILY HISTORY      Physical Examination:  LMP  (LMP Unknown)     Wt Readings from Last 4 Encounters:   23 102.1 kg (225 lb)   23 101.6 kg (224 lb)   03/15/23 104.3 kg (230 lb)   23 104.3 kg (230 lb)       General: Well appearing obese woman in no distress, weight evenly distributed between trunk and extremities, no peripheral wasing.   Eyes: EOMI. Sclerae and conjunctivae are clear.   HENT: No thyromegaly or mass.  No moon facies  Lymphatic: No cervical or supraclavicular lymphadenopathy.  Cardiovascular: RRR, with normal S1+S2 and no murmurs.   Skin: No rash or lesions. No abdominal striae.    Extremities: trace bilateral peripheral edema.   Neurologic: No tremor with hands outstretched. 1+ patellar reflexes.      Labs and Studies:         Lab Results   Component Value Date    ANNIE 10.7 (H) 2023    ANNIE 10.7 (H) 2023    ANNIE 10.9 (H) 2023    ANNIE 10.7 (H) 2022    ANNIE 10.2 (H) 2022    ANNIE 10.5 (H) 2022    ANNIE 10.0 2022    ANNIE 9.8 2021    ANNIE 9.4 2021    ANNIE 9.7 2021    ANNIE 9.8 05/10/2021    ANNIE 9.7 2021    ANNIE 10.1 2019    ANNIE 9.6 2018    ANNIE 9.7 09/15/2017    ANNIE 9.4 2017    ANNIE 9.4 2016    ANNIE 9.0 2015    ANNIE 9.8 2015    ANNIE 9.7 2014    ANNIE 9.1 2013    ANNIE 9.8 2012    ANNIE 9.4 2011     ANNIE 9.1 01/20/2011    ANNIE 10.2 09/08/2009    ANNIE 9.8 04/24/2009    ANNIE 9.3 08/15/2005    ANNIE 9.1 05/03/2005    ANNIE 9.9 03/28/2005         Lab Results   Component Value Date    ANNIE 10.7 (H) 03/27/2023    ANNIE 10.7 (H) 03/27/2023    ANNIE 10.9 (H) 03/19/2023    ANNIE 10.7 (H) 09/26/2022    ALBUMIN 4.5 03/27/2023    ALT 26 03/27/2023    PHOS 3.0 06/29/2022    PTHI 88 (H) 03/27/2023    PTHI 70 (H) 09/26/2022    PTHI 86 (H) 06/29/2022    PTHI 80 05/25/2022    PTHI 82 (H) 02/01/2022    PTHI 95 (H) 11/26/2021    PTHI 107 (H) 11/08/2021    AST 27 03/27/2023    BILITOTAL 0.3 03/27/2023    CR 0.70 03/27/2023    CR 0.70 03/19/2023    CR 0.84 06/29/2022     03/27/2023    TSH 1.97 03/27/2023    ALKPHOS 113 (H) 03/27/2023    HGB 13.6 03/27/2023             Lab Results   Component Value Date    CHLORIDE 101 03/27/2023    CO2 26 03/27/2023    CR 0.70 03/27/2023    ALKPHOS 113 (H) 03/27/2023    PTHI 88 (H) 03/27/2023    TSH 1.97 03/27/2023    T4 1.00 02/18/2021    HGB 13.6 03/27/2023       Lab Results   Component Value Date    ALT 26 03/27/2023    AST 27 03/27/2023    ALBUMIN 4.5 03/27/2023    BILITOTAL 0.3 03/27/2023     Lab Results   Component Value Date    ANNIE 10.7 (H) 03/27/2023    ANNIE 10.7 (H) 03/27/2023    ANNIE 10.9 (H) 03/19/2023    ANNIE 10.7 (H) 09/26/2022    ALBUMIN 4.5 03/27/2023    ALT 26 03/27/2023    PHOS 3.0 06/29/2022    PTHI 88 (H) 03/27/2023    PTHI 70 (H) 09/26/2022    PTHI 86 (H) 06/29/2022    PTHI 80 05/25/2022    PTHI 82 (H) 02/01/2022    PTHI 95 (H) 11/26/2021    PTHI 107 (H) 11/08/2021    AST 27 03/27/2023    BILITOTAL 0.3 03/27/2023    CR 0.70 03/27/2023    CR 0.70 03/19/2023    CR 0.84 06/29/2022     03/27/2023    TSH 1.97 03/27/2023    ALKPHOS 113 (H) 03/27/2023    HGB 13.6 03/27/2023         Results for orders placed during the hospital encounter of 05/14/16    US Thyroid    Narrative  US THYROID   5/14/2016 9:19 AM    HISTORY: Hypothyroidism.    COMPARISON: None.    FINDINGS:  The thyroid gland is  of normal size. The right lobe of the  thyroid measures 5.4 x 1.2 x 0.8 cm. The left lobe of the thyroid  measures 5.5 x 0.6 x 0.9 cm. The isthmus measures 0.2 cm. Background  thyroid parenchymal echogenicity is mildly heterogeneous.    Individual thyroid nodules are measured as follows:  1. Small partially calcified nodule within the isthmus on the right  measure 0.7 x 0.4 x 0.5 cm.    Impression  IMPRESSION:  1. Subcentimeter partially calcified nodule in the isthmus measures  0.7 cm  2. Thyroid parenchymal echogenicity is mildly heterogeneous, however  no other discrete thyroid nodules are seen.    ANNABEL DOZIER MD      Results for orders placed in visit on 06/27/12    Dexa hip/pelvis/spine*          Answers for HPI/ROS submitted by the patient on 4/11/2023  General Symptoms: Yes  Skin Symptoms: Yes  HENT Symptoms: Yes  EYE SYMPTOMS: Yes  HEART SYMPTOMS: Yes  LUNG SYMPTOMS: Yes  INTESTINAL SYMPTOMS: Yes  URINARY SYMPTOMS: Yes  GYNECOLOGIC SYMPTOMS: Yes  BREAST SYMPTOMS: No  SKELETAL SYMPTOMS: Yes  BLOOD SYMPTOMS: Yes  NERVOUS SYSTEM SYMPTOMS: Yes  MENTAL HEALTH SYMPTOMS: Yes  Ear pain: Yes  Ear discharge: No  Hearing loss: Yes  Tinnitus: Yes  Nosebleeds: No  Congestion: No  Sinus pain: No  Trouble swallowing: Yes   Voice hoarseness: No  Mouth sores: Yes  Sore throat: Yes  Tooth pain: No  Gum tenderness: Yes  Bleeding gums: No  Change in taste: No  Change in sense of smell: Yes  Dry mouth: Yes  Hearing aid used: No  Neck lump: Yes  Fever: No  Loss of appetite: Yes  Weight loss: No  Weight gain: Yes  Fatigue: Yes  Night sweats: Yes  Chills: Yes  Increased stress: Yes  Excessive hunger: Yes  Excessive thirst: Yes  Feeling hot or cold when others believe the temperature is normal: Yes  Loss of height: Yes  Post-operative complications: No  Surgical site pain: No  Hallucinations: No  Change in or Loss of Energy: Yes  Hyperactivity: No  Confusion: Yes  Changes in hair: Yes  Changes in moles/birth marks: No  Itching:  Yes  Rashes: Yes  Changes in nails: Yes  Acne: No  Hair in places you don't want it: Yes  Change in facial hair: Yes  Warts: No  Non-healing sores: No  Scarring: No  Flaking of skin: No  Color changes of hands/feet in cold : Yes  Sun sensitivity: Yes  Skin thickening: Yes  Eye pain: Yes  Vision loss: Yes  Dry eyes: Yes  Watery eyes: Yes  Eye bulging: Yes  Double vision: Yes  Flashing of lights: No  Spots: No  Floaters: Yes  Redness: No  Crossed eyes: No  Yellowing of eyes: No  Eye irritation: Yes  Chest pain or pressure: Yes  Fast or irregular heartbeat: Yes  Pain in legs with walking: Yes  Trouble breathing while lying down: Yes  Fingers or toes appear blue: Yes  High blood pressure: Yes  Low blood pressure: Yes  Fainting: Yes  Murmurs: No  Pacemaker: No  Varicose veins: Yes  Wake up at night with shortness of breath: No  Light-headedness: Yes  Exercise intolerance: Yes  Cough: Yes  Sputum or phlegm: Yes  Coughing up blood: No  Difficulty breating or shortness of breath: Yes  Snoring: No  Wheezing: Yes  Difficulty breathing on exertion: Yes  Nighttime Cough: Yes  Difficulty breathing when lying flat: Yes  Heart burn or indigestion: Yes  Nausea: Yes  Vomiting: Yes  Abdominal pain: Yes  Bloating: Yes  Constipation: Yes  Diarrhea: Yes  Blood in stool: No  Black stools: No  Rectal or Anal pain: No  Fecal incontinence: Yes  Yellowing of skin or eyes: No  Vomit with blood: No  Change in stools: No  Trouble holding urine or incontinence: Yes  Pain or burning: No  Trouble starting or stopping: Yes  Increased frequency of urination: Yes  Blood in urine: No  Decreased frequency of urination: No  Frequent nighttime urination: Yes  Flank pain: Yes  Difficulty emptying bladder: Yes  Bleeding or spotting between periods: Yes  Heavy or painful periods: No  Irregular periods: No  Vaginal discharge: Yes  Hot flashes: Yes  Vaginal dryness: Yes  Genital ulcers: Yes  Reduced libido: No  Painful intercourse: No  Difficulty with sexual  arousal: No  Post-menopausal bleeding: No  Back pain: Yes  Neck pain: Yes  Swollen joints: Yes  Joint pain: Yes  Bone pain: Yes  Muscle cramps: Yes  Muscle weakness: Yes  Joint stiffness: Yes  Bone fracture: Yes  Edema or swelling: Yes  Anemia: No  Swollen glands: Yes  Easy bleeding or bruising: Yes  Trouble with coordination: Yes  Dizziness or trouble with balance: Yes  Fainting or black-out spells: Yes  Memory loss: Yes  Headache: Yes  Seizures: No  Speech problems: Yes  Tingling: Yes  Tremor: Yes  Weakness: Yes  Difficulty walking: Yes  Paralysis: No  Numbness: Yes  Nervous or Anxious: Yes  Depression: Yes  Trouble sleeping: Yes  Trouble thinking or concentrating: Yes  Mood changes: Yes  Panic attacks: No        Again, thank you for allowing me to participate in the care of your patient.        Sincerely,        Leigh Ann Ellsworth MD

## 2023-04-18 ENCOUNTER — TELEPHONE (OUTPATIENT)
Dept: FAMILY MEDICINE | Facility: CLINIC | Age: 63
End: 2023-04-18
Payer: OTHER GOVERNMENT

## 2023-04-18 NOTE — TELEPHONE ENCOUNTER
Pt called asking if she needs to fast for cortisol levels, she was advised that she does not. She is asking if a 24 hour urine will be started tomorrow, no order seen in chart but it was mentioned in 4/14/2023 visit documentation. Message forwarded to Dr Ellsworth for clarification. Tanvi Villanueva RN  [facility].

## 2023-04-19 ENCOUNTER — LAB (OUTPATIENT)
Dept: LAB | Facility: CLINIC | Age: 63
End: 2023-04-19
Payer: OTHER GOVERNMENT

## 2023-04-19 DIAGNOSIS — R53.83 OTHER FATIGUE: ICD-10-CM

## 2023-04-19 DIAGNOSIS — M62.81 GENERALIZED MUSCLE WEAKNESS: ICD-10-CM

## 2023-04-19 DIAGNOSIS — E83.52 HYPERCALCEMIA: Primary | ICD-10-CM

## 2023-04-19 LAB — CORTIS SERPL-MCNC: 7.5 UG/DL

## 2023-04-19 PROCEDURE — 36415 COLL VENOUS BLD VENIPUNCTURE: CPT

## 2023-04-19 PROCEDURE — 82533 TOTAL CORTISOL: CPT

## 2023-04-19 PROCEDURE — 82024 ASSAY OF ACTH: CPT

## 2023-04-19 NOTE — TELEPHONE ENCOUNTER
Left message for patient about below.     Razia Gomezverson on 4/19/2023 at 1:17 PM    Leigh Ann Ellsworth MD  [facility] Endocrinology Adult [facility] 2 minutes ago (1:13 PM)     ST  Please let patient know she does not need to fast for the cortisol draw   I put an order for the 24 urine calcium       Thank you     Leigh Ann Ellsworth MD   Staff Physician   Division of Endocrinology   [facility]   Pager #[phone]

## 2023-04-20 LAB — ACTH PLAS-MCNC: 17 PG/ML

## 2023-04-24 ENCOUNTER — HOSPITAL ENCOUNTER (OUTPATIENT)
Dept: NUCLEAR MEDICINE | Facility: CLINIC | Age: 63
Setting detail: NUCLEAR MEDICINE
Discharge: HOME | End: 2023-04-24
Attending: INTERNAL MEDICINE
Payer: OTHER GOVERNMENT

## 2023-04-24 DIAGNOSIS — E83.52 HYPERCALCEMIA: ICD-10-CM

## 2023-04-24 PROCEDURE — 78072 PARATHYRD PLANAR W/SPECT&CT: CPT | Mod: 26

## 2023-04-24 PROCEDURE — 343N000001 HC RX 343: Performed by: INTERNAL MEDICINE

## 2023-04-24 PROCEDURE — 78070 PARATHYROID PLANAR IMAGING: CPT

## 2023-04-24 PROCEDURE — A9500 TC99M SESTAMIBI: HCPCS | Performed by: INTERNAL MEDICINE

## 2023-04-24 RX ADMIN — KIT FOR THE PREPARATION OF TECHNETIUM TC99M SESTAMIBI 27.1 MILLICURIE: 1 INJECTION, POWDER, LYOPHILIZED, FOR SOLUTION PARENTERAL at 08:49

## 2023-04-25 ENCOUNTER — LAB (OUTPATIENT)
Dept: LAB | Facility: CLINIC | Age: 63
End: 2023-04-25
Payer: OTHER GOVERNMENT

## 2023-04-25 DIAGNOSIS — E78.5 HYPERLIPIDEMIA LDL GOAL <160: Primary | ICD-10-CM

## 2023-04-25 DIAGNOSIS — E83.52 HYPERCALCEMIA: ICD-10-CM

## 2023-04-25 PROCEDURE — 84300 ASSAY OF URINE SODIUM: CPT

## 2023-04-25 PROCEDURE — 82570 ASSAY OF URINE CREATININE: CPT

## 2023-04-25 PROCEDURE — 81050 URINALYSIS VOLUME MEASURE: CPT | Mod: 59

## 2023-04-25 PROCEDURE — 82340 ASSAY OF CALCIUM IN URINE: CPT

## 2023-04-25 PROCEDURE — 81050 URINALYSIS VOLUME MEASURE: CPT

## 2023-04-25 NOTE — TELEPHONE ENCOUNTER
FUTURE VISIT INFORMATION      FUTURE VISIT INFORMATION:    Date: 5/15/23    Time: 1:20pm    Location: [facility]  REFERRAL INFORMATION:    Referring provider:  Leigh Ann Ellsworth MD    Referring providers clinic:  [facility]     Reason for visit/diagnosis  HPTH (hyperparathyroidism) (H), apt per Pt, [place] verified    RECORDS REQUESTED FROM:       Clinic name Comments Records Status Imaging Status   [facility]   4/25/23, 4/14/23- my chart and note with Leigh Ann Ellsworth MD [facility] 3/27/23, 3/19/23- note with Amanda Meléndez APRN CNP Epic     Imaging  4/24/23- NM Parathyroid   3/31/23- US Thyroid  10/28/22- MR Brain   10/28/22-  mra Brain  [facility]  PACs

## 2023-04-27 DIAGNOSIS — F41.1 GAD (GENERALIZED ANXIETY DISORDER): ICD-10-CM

## 2023-04-27 DIAGNOSIS — M79.10 MYALGIA: ICD-10-CM

## 2023-04-27 LAB
CALCIUM 24H UR-MRATE: 0.27 G/SPEC (ref 0.1–0.3)
CALCIUM UR-MCNC: 9.1 MG/DL
COLLECT DURATION TIME UR: 24 H
CREAT 24H UR-MRATE: 0.8 G/(24.H) (ref 0.72–1.51)
CREAT UR-MCNC: 26.5 MG/DL
SODIUM 24H UR-SRATE: 138 MMOL/SPEC (ref 40–220)
SODIUM UR-SCNC: 46 MMOL/L
SPECIMEN VOL UR: 3000 ML

## 2023-04-27 RX ORDER — DULOXETIN HYDROCHLORIDE 60 MG/1
60 CAPSULE, DELAYED RELEASE ORAL DAILY
Qty: 90 CAPSULE | Refills: 0 | Status: SHIPPED | OUTPATIENT
Start: 2023-04-27 | End: 2023-08-01

## 2023-04-30 ENCOUNTER — TELEPHONE (OUTPATIENT)
Dept: ENDOCRINOLOGY | Facility: CLINIC | Age: 63
End: 2023-04-30
Payer: OTHER GOVERNMENT

## 2023-04-30 DIAGNOSIS — E21.3 HPTH (HYPERPARATHYROIDISM) (H): Primary | ICD-10-CM

## 2023-04-30 NOTE — TELEPHONE ENCOUNTER
Urine Calcium 24h borderline elevated , increase Calcium, ionized Caliucm, and increase PTHi, renal funciotn and Mg nl, Phos no/low nl. Pt with severe musculoskeletal symptoms.  Referral parathyroidectomy eval to Dr Florentino Ellsworth MD  Staff Physician  Division of Endocrinology  [facility]  Pager #[phone]

## 2023-05-15 ENCOUNTER — OFFICE VISIT (OUTPATIENT)
Dept: OTOLARYNGOLOGY | Facility: CLINIC | Age: 63
End: 2023-05-15
Attending: INTERNAL MEDICINE
Payer: OTHER GOVERNMENT

## 2023-05-15 ENCOUNTER — PRE VISIT (OUTPATIENT)
Dept: OTOLARYNGOLOGY | Facility: CLINIC | Age: 63
End: 2023-05-15

## 2023-05-15 ENCOUNTER — PREP FOR PROCEDURE (OUTPATIENT)
Dept: OTOLARYNGOLOGY | Facility: CLINIC | Age: 63
End: 2023-05-15

## 2023-05-15 VITALS
HEIGHT: 67 IN | OXYGEN SATURATION: 99 % | BODY MASS INDEX: 35.94 KG/M2 | HEART RATE: 82 BPM | WEIGHT: 229 LBS | TEMPERATURE: 97.6 F | DIASTOLIC BLOOD PRESSURE: 80 MMHG | SYSTOLIC BLOOD PRESSURE: 138 MMHG

## 2023-05-15 DIAGNOSIS — E21.3 HPTH (HYPERPARATHYROIDISM) (H): ICD-10-CM

## 2023-05-15 DIAGNOSIS — E83.52 HYPERCALCEMIA: ICD-10-CM

## 2023-05-15 DIAGNOSIS — E21.3 HYPERPARATHYROIDISM (H): Primary | ICD-10-CM

## 2023-05-15 PROCEDURE — 99203 OFFICE O/P NEW LOW 30 MIN: CPT | Performed by: SURGERY

## 2023-05-15 RX ORDER — DEXAMETHASONE SODIUM PHOSPHATE 4 MG/ML
10 INJECTION, SOLUTION INTRA-ARTICULAR; INTRALESIONAL; INTRAMUSCULAR; INTRAVENOUS; SOFT TISSUE ONCE
Status: CANCELLED | OUTPATIENT
Start: 2023-05-15 | End: 2023-05-15

## 2023-05-15 ASSESSMENT — PAIN SCALES - GENERAL: PAINLEVEL: SEVERE PAIN (6)

## 2023-05-15 NOTE — PATIENT INSTRUCTIONS
"You were seen in the clinic today by Dr. Good. If you have any questions or concerns after your appointment, please call the clinic at [phone]. Press \"1\" for scheduling, press \"3\" for nurse advice.    2.   Our surgery team will reach out to you within 1-2 weeks to help schedule surgery.      Nita HAMMER RN  [facility]  Department of Otolaryngology  [phone]        Surgery Teaching    1.You must have a physical exam (called  history and physical ) within 30 days of surgery. You can do this at the PAC clinic or your family clinic. Bring the Pre-Op Surgery Form (found in the surgery packet that you received in the mail) with you to your primary doctor if you chose to have them complete this. If your doctor is in the [facility], they do not need this form.     2. Ask your doctor what medicines are safe before surgery.          * If you are on any blood-thinners, we will need to make a plan with your INR clinic or prescribing doctor of when you need to stop these medications before surgery    3. NO MOTRIN, IBUPROFEN, ASPIRIN, ALEVE, GARLIC SUPPLEMENTS or FISH OIL x 7 days prior to surgery ( to prevent excess bleeding and bruising at time of surgery).    4. For same-day surgery, you must arrange for an adult to take you home from the Center. An adult must stay with you for the first 24 hours after surgery. You cannot drive for 24 hours, or longer if you are still taking narcotic medications.      5. Stop drinking alcohol at least 24 hours before surgery.      6. Stop or at least cut down on smoking 24 hours before surgery.     7. Take a bath or shower the night before and the morning of surgery (refer to surgery packet for extra information). You should use the soap that we mailed to you or gave in clinic (2 small bottles). Use the entire bottle of soap for each shower, washing from the neck down, then rinsing off. Sleep in clean clothing and use clean bedding sheets. If your " doctor does not give you special soap, buy Hibiclens or Hilary-Stat at the drug store or ask the pharmacist to suggest a brand. Do not put on lotion, powder, perfume, deodorant or make-up after bathing.     8. You can eat a normal meal the night before surgery. Do not eat any solid foods or drink any milk products for 8 hours before surgery. A pre-op nurse will call you the week before surgery to confirm your eating cut-off time, but please listen to this 8-hour rule if you miss their call.     9. You may drink clear liquids until 2 hours before surgery. Clear liquids include water, Gatorade, apple juice, or other liquids you can read through.    10.  If you need FMLA paperwork filled out for your surgery, please send this to us before surgery if you are able (in-person, fax, or mail). DO NOT give this to the pre-op nurses on the day of surgery or it will get lost.    11. Generally, we recommend 1-2 weeks off of work for healing after surgery. If you have a labor-intensive job with heavy lifting, you may need a work-modification and we are able to give you a work letter for this so that you can continue to work.

## 2023-05-15 NOTE — NURSING NOTE
"Chief Complaint   Patient presents with     Consult     Hyperparathyroidism    Blood pressure 138/80, pulse 82, temperature 97.6  F (36.4  C), height 1.702 m (5' 7\"), weight 103.9 kg (229 lb), SpO2 99 %, not currently breastfeeding.      .  Madi Stewart LPN    "

## 2023-05-15 NOTE — LETTER
5/15/2023       RE: Teresa Fernandez  [address]  [facility] [address]     Dear Colleague,    Thank you for referring your patient, Teresa Fernandez, to the [facility] [place] [facility]. Please see a copy of my visit note below.    SURGERY CLINIC CONSULTATION    REASON FOR CONSULTATION:  Teresa Fernandez was referred by  for evaluation and discussion of treatment options for hyperparathyroidism     HISTORY OF PRESENT ILLNESS:  Teresa Fernandez is a 63 year old female who has a long history of hypercalcemia.  Most recent labs from March 2023 include calcium 10.7, parathyroid hormone level 88, vitamin D 52, elevated urine calcium at 24 hours greater than 300.  Localizing study included a single phase sestamibi SPECT-CT that identifies an adenoma in the left lateral trachea.  Patient is currently on levothyroxine 100 mcg daily.    Regarding symptoms associated with hyperparathyroidism she has bone pain fatigue osteoarthritis.  No history of nephrolithiasis.  No previous or family history of hyperparathyroidism.      REVIEW OF SYSTEMS:  ROS EXAM: 10 point review of systems is pertinent for that noted in the HPI  Patient Active Problem List   Diagnosis     Hypothyroidism, unspecified type     Allergic state     Chronic rhinitis     Allergic rhinitis due to pollen     Sinusitis, chronic     History of skin cancer     ERIC (generalized anxiety disorder)     Leukoplakia of oral mucosa, including tongue     GERD (gastroesophageal reflux disease)     Chronic low back pain     Dry eye syndrome     Chronic fatigue     Osteopenia     History of melanoma in situ     Eczema     Moderate persistent asthma without complication     DDD (degenerative disc disease), lumbar     Hyperlipidemia LDL goal <160     Chest tightness or pressure     Myalgia     GAYE (obstructive sleep apnea)     History of kidney cancer     Fibromyalgia      Family history of colon cancer     Chronic pruritus     Primary hyperparathyroidism (H)     Morbid obesity (H)     Lichen sclerosus of female genitalia     Thyroid nodule       Past Surgical History:   Procedure Laterality Date     CHOLECYSTECTOMY       COLONOSCOPY      2007-normal     ENT SURGERY  02/15/2011    Left cheek bx- Mucositis.     FUSION LUMBAR ANTERIOR TWO LEVELS  04/13/2015     GI SURGERY       GYN SURGERY  04/08/1999    hysterectomy.  [facility]H     HYSTERECTOMY, PAP NO LONGER INDICATED       SOFT TISSUE SURGERY      chest-melonoma     SURGICAL HISTORY OF -   03/1996    Left nephrectomy-renal cell CA       Allergies   Allergen Reactions     Iodinated Contrast Media [Iodinated Contrast Media] Swelling and Difficulty breathing     Immediate throat swelling following around 1-2cc of omnipaque 300 for spine procedure  skin prick and intradermal tests with Iohexol negatives but premedicate before using iodinated contrast.    no reactions in skin tests to MRI contrast media Gadovist     Omnipaque [Iohexol] Swelling and Difficulty breathing     Immediate throat swelling following around 1-2cc of omnipaque 300 for spine procedure  --> skin prick and intradermal tests with Iohexol negatives. But I would propose anyway to premedicate patient before using iodinated contrast media (for safety reasons).   --> no reactions in skin tests to MRI contrast media Gadovist     Gabapentin Hives     Gluten      Penicillins Hives     Sulfa Antibiotics        Medications reviewed in the EMR  Family History   Problem Relation Age of Onset     Cardiovascular Mother      Lipids Mother      Depression Mother      Heart Disease Mother      Substance Abuse Mother      Hypertension Father      Lipids Father      Obesity Father      Macular Degeneration Father      Sleep Apnea Father      Thyroid Disease Sister      Hypertension Sister      Depression Sister      Allergies Sister      Lipids Sister      Thyroid Disease Sister       "Neurologic Disorder Sister      Depression Sister      Hypertension Brother      Colon Cancer Brother      Cancer Maternal Grandmother         kind unknown had sores on legs     Eczema Maternal Grandmother      Eczema Maternal Grandfather      Breast Cancer Paternal Grandmother      Cancer Paternal Grandmother         breast     Hypertension Paternal Grandfather      Cardiovascular Paternal Grandfather         heart attack     Allergies Son      Eczema Son      Respiratory Son      Sleep Apnea Son      Glaucoma No family hx of      Cerebrovascular Disease No family hx of      Diabetes No family hx of           PHYSICAL EXAM:  /80   Pulse 82   Temp 97.6  F (36.4  C)   Ht 1.702 m (5' 7\")   Wt 103.9 kg (229 lb)   LMP  (LMP Unknown)   SpO2 99%   BMI 35.87 kg/m      Neck: Thyroid gland is small and atrophic without masses.  Trachea is midline.  There is no cervical lymphadenopathy    I personally reviewed the radiographic images laboratory data    ASSESSMENT:   1. Hypercalcemia    2. HPTH (hyperparathyroidism) (H)        PLAN:   Based on the above I recommend the patient undergo a neck exploration resection of parathyroid adenoma or adenomas.  The surgical procedure was discussed with the patient including but not limited to the risks of bleeding infection injury to the recurrent laryngeal nerve or nerves potential permanent hypocalcemia missed parathyroid adenoma.  Patient is aware of this and would like to proceed with surgery    Hannah Good                "

## 2023-05-18 DIAGNOSIS — E03.9 HYPOTHYROIDISM, UNSPECIFIED TYPE: ICD-10-CM

## 2023-05-18 RX ORDER — LEVOTHYROXINE SODIUM 88 UG/1
TABLET ORAL
Qty: 30 TABLET | Refills: 3 | Status: SHIPPED | OUTPATIENT
Start: 2023-05-18 | End: 2024-06-05

## 2023-05-31 ENCOUNTER — MYC MEDICAL ADVICE (OUTPATIENT)
Dept: FAMILY MEDICINE | Facility: CLINIC | Age: 63
End: 2023-05-31
Payer: OTHER GOVERNMENT

## 2023-05-31 DIAGNOSIS — E21.0 PRIMARY HYPERPARATHYROIDISM (H): ICD-10-CM

## 2023-05-31 DIAGNOSIS — M79.10 MYALGIA: Primary | ICD-10-CM

## 2023-05-31 RX ORDER — HYDROCODONE BITARTRATE AND ACETAMINOPHEN 5; 325 MG/1; MG/1
1 TABLET ORAL EVERY 6 HOURS PRN
Qty: 30 TABLET | Refills: 0 | Status: SHIPPED | OUTPATIENT
Start: 2023-05-31 | End: 2023-09-11

## 2023-05-31 NOTE — TELEPHONE ENCOUNTER
Medication requested for pain.     Please see my chart message.       Juany Tripp RN on 5/31/2023 at 10:01 AM

## 2023-06-01 ENCOUNTER — TELEPHONE (OUTPATIENT)
Dept: SURGERY | Facility: CLINIC | Age: 63
End: 2023-06-01
Payer: OTHER GOVERNMENT

## 2023-06-15 ENCOUNTER — HOSPITAL ENCOUNTER (EMERGENCY)
Facility: CLINIC | Age: 63
Discharge: HOME | End: 2023-06-15
Attending: EMERGENCY MEDICINE | Admitting: EMERGENCY MEDICINE
Payer: OTHER GOVERNMENT

## 2023-06-15 VITALS
HEART RATE: 94 BPM | TEMPERATURE: 97.9 F | DIASTOLIC BLOOD PRESSURE: 63 MMHG | WEIGHT: 223 LBS | SYSTOLIC BLOOD PRESSURE: 123 MMHG | BODY MASS INDEX: 34.93 KG/M2 | RESPIRATION RATE: 16 BRPM | OXYGEN SATURATION: 99 %

## 2023-06-15 DIAGNOSIS — R26.89 SHUFFLING GAIT: ICD-10-CM

## 2023-06-15 DIAGNOSIS — M79.604 PAIN IN BOTH LOWER EXTREMITIES: ICD-10-CM

## 2023-06-15 DIAGNOSIS — M79.605 PAIN IN BOTH LOWER EXTREMITIES: ICD-10-CM

## 2023-06-15 PROBLEM — E21.0 PRIMARY HYPERPARATHYROIDISM (H): Status: ACTIVE | Noted: 2022-11-07

## 2023-06-15 PROCEDURE — G0463 HOSPITAL OUTPT CLINIC VISIT: HCPCS | Performed by: EMERGENCY MEDICINE

## 2023-06-15 PROCEDURE — 99212 OFFICE O/P EST SF 10 MIN: CPT | Performed by: EMERGENCY MEDICINE

## 2023-06-15 ASSESSMENT — ACTIVITIES OF DAILY LIVING (ADL): ADLS_ACUITY_SCORE: 35

## 2023-06-15 NOTE — TELEPHONE ENCOUNTER
Patient called to schedule surgery with Dr. Good    Date of Surgery: 8/23    Location of surgery: [facility]    Pre-Op H&P: PCP on file    Pre/Post Imaging:  Not Applicable    Post-Op Appt Date: 2 weeks    Surgery Packet Mailed: yes    Additional comments: KRISS Davila on 6/15/2023 at 3:24 PM

## 2023-06-15 NOTE — ED PROVIDER NOTES
History   No chief complaint on file.  CC: Thigh pain with shuffling gait        HPI  Teresa Fernandez is a 63 year old female who presents to urgent care with concerns regarding bilateral lower extremity pain, hypothyroidism, sleep apnea, depression, allergies, history of renal cell carcinoma years ago, and melanoma years ago, and recent work-up for mild hypercalcemia with planned parathyroid surgery to occur in July, who presents to the urgent care with concerns regarding bilateral thigh pain, in addition to feelings as if she needs to stand for a few minutes, and subsequently catch her balance, and when she begins walking she feels as if she is walking with a shuffling gait secondary to the pain in the bilateral thighs.  There is no specific weakness of the lower extremities.  Denies any upper extremity symptoms, specifically no pain, or weakness of the upper extremities.  Patient denies any fever.  There is no numbness, tingling, however patient states that she has had EMG of the bilateral lower extremities before which did not show any obvious cause.    I have  reviewed prior endocrinology visits including the one copied below.  Patient has potential component of other chronic pains also contributing to her symptoms, and may not all be related to her hyperparathyroidism.  Does have planned surgery next month however.      I reviewed April 14 endocrinology clinic visit:    Middle age woman with right lower extremity bone pain, hypothyroidism, OA, depression, allergies, gluten intolerance, history of renal cell Ca, melanoma more than 10 years ago, St post Left nephrectomy and L adrenalectomy, hysterectomy, nicotine abuse (2 PPD?), obesity BMI 34, St post COVID in 2019, now with mild hypercalcemia with mildly elevated PTHi.  Although hypercalcemia can cause bone pain, if he appears the patient has a generalized pain syndrome and therefore not clear how much her bone pain could be driven by primary  hyperparathyroidism.  Also the degree of hypercalcemia parathyroid hormone elevation is only mildly hence bone pain would not be expected to this extent.   Patient does meet qualification for parathyroid surgery due to hypercalciuria.   She certainly will need preop evaluation as she has not been able to be physically very active.   We will obtain parathyroid sestamibi scan review and consider referral to ENT for parathyroidectomy  Considering nausea and weakness and history of left adrenalectomy will obtain morning cortisol and ACTH, and follow-up     Plan:   24h urine Calcium and creatinine  DXA  Labs including ionized Calcium, mag phos pthi 25ohVItD        40 minutes spent on the date of the encounter doing chart review, review of test results, interpretation of tests, patient visit and documentation      This note was generated using computer recognized voice recognition. This might result in some expected imperfection.     Leigh Ann Ellsworth MD  Endocrinology and Diabetes  Telephone contact:  [facility] [place] [phone]  [facility] [phone]    Allergies:  Allergies   Allergen Reactions     Iodinated Contrast Media [Iodinated Contrast Media] Swelling and Difficulty breathing     Immediate throat swelling following around 1-2cc of omnipaque 300 for spine procedure  skin prick and intradermal tests with Iohexol negatives but premedicate before using iodinated contrast.    no reactions in skin tests to MRI contrast media Gadovist     Omnipaque [Iohexol] Swelling and Difficulty breathing     Immediate throat swelling following around 1-2cc of omnipaque 300 for spine procedure  --> skin prick and intradermal tests with Iohexol negatives. But I would propose anyway to premedicate patient before using iodinated contrast media (for safety reasons).   --> no reactions in skin tests to MRI contrast media Gadovist     Gabapentin Hives     Gluten      Penicillins Hives      Sulfa Antibiotics        Problem List:    Patient Active Problem List    Diagnosis Date Noted     Thyroid nodule 04/03/2023     Priority: Medium     Ultrasound 4/2023, repeat in 1 year       Lichen sclerosus of female genitalia 03/27/2023     Priority: Medium     Morbid obesity (H) 11/28/2022     Priority: Medium     Primary hyperparathyroidism (H) 11/07/2022     Priority: Medium     Per Endocrine 10/2022:  She should be followed with serum calcium, creatinine and albumin every 6 months. Normal Dexa and urine calcium at this time, see Endocrine on as needed basis.         Chronic pruritus 02/14/2022     Priority: Medium     Family history of colon cancer 12/28/2021     Priority: Medium     Fibromyalgia 09/30/2021     Priority: Medium     History of kidney cancer 04/01/2021     Priority: Medium     1996- left nephrectomy       GAYE (obstructive sleep apnea) 09/17/2020     Priority: Medium     9/9/2020 [place] [facility] Sleep Study (225.0 lbs) - AHI 19.8, RDI 26.4, Supine AHI 22.0, REM AHI 36.9, Low O2 79.5%, Time Spent ?88% 40.1 minutes / Time Spent ?89% 51.6 minutes.       Myalgia 10/20/2017     Priority: Medium     Increased CK       Chest tightness or pressure 10/03/2016     Priority: Medium     Hyperlipidemia LDL goal <160 01/29/2015     Priority: Medium     DDD (degenerative disc disease), lumbar 06/24/2014     Priority: Medium     [place] [facility]       Moderate persistent asthma without complication 07/08/2013     Priority: Medium     History of melanoma in situ 10/17/2012     Priority: Medium     Eczema 10/17/2012     Priority: Medium     Osteopenia 06/15/2012     Priority: Medium     Chronic fatigue 10/04/2011     Priority: Medium     Dry eye syndrome 08/24/2011     Priority: Medium     Chronic low back pain 07/07/2011     Priority: Medium     GERD (gastroesophageal reflux disease) 05/11/2011     Priority: Medium     Leukoplakia of oral mucosa, including tongue 04/18/2011     Priority: Medium      ERIC (generalized anxiety disorder)      Priority: Medium     History of skin cancer 11/09/2010     Priority: Medium     Chronic rhinitis 05/03/2005     Priority: Medium     Allergic rhinitis due to pollen 05/03/2005     Priority: Medium     Sinusitis, chronic 05/03/2005     Priority: Medium     Problem list name updated by automated process. Provider to review       Allergic state 04/19/2005     Priority: Medium     Problem list name updated by automated process. Provider to review       Hypothyroidism, unspecified type 04/12/2005     Priority: Medium     Problem list name updated by automated process. Provider to review          Past Medical History:    Past Medical History:   Diagnosis Date     Allergic rhinitis      ALLERGIC RHINITIS NOS 04/12/2005     Anxiety      Arthritis      Autoimmune disease (H) 2014     Benign positional vertigo      Bronchitis, not specified as acute or chronic      CHR MAXILLARY SINUSITIS 04/12/2005     Coronary artery disease      Depressive disorder 1996     Depressive disorder, not elsewhere classified      Eczema 10/17/2012     Environmental and seasonal allergies      GERD (gastroesophageal reflux disease)      Gluten intolerance      Heart disease July 2021     Kidney problem 1996     Malignant neoplasm of renal pelvis (H) 1995     Melanoma (H) 06/2010     Other specified acquired hypothyroidism 04/12/2005     Renal cancer (H) 05/05/2011     Sleep apnea      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     FUSION LUMBAR ANTERIOR TWO LEVELS  04/13/2015     GI SURGERY       GYN SURGERY  04/08/1999    hysterectomy.  LAVH     HYSTERECTOMY, PAP NO LONGER INDICATED       SOFT TISSUE SURGERY      chest-melonoma      SURGICAL HISTORY OF -   1996    Left nephrectomy-renal cell CA       Family History:    Family History   Problem Relation Age of Onset     Cardiovascular Mother      Lipids Mother      Depression Mother      Heart Disease Mother      Substance Abuse Mother      Hypertension Father      Lipids Father      Obesity Father      Macular Degeneration Father      Sleep Apnea Father      Thyroid Disease Sister      Hypertension Sister      Depression Sister      Allergies Sister      Lipids Sister      Thyroid Disease Sister      Neurologic Disorder Sister      Depression Sister      Hypertension Brother      Colon Cancer Brother      Cancer Maternal Grandmother         kind unknown had sores on legs     Eczema Maternal Grandmother      Eczema Maternal Grandfather      Breast Cancer Paternal Grandmother      Cancer Paternal Grandmother         breast     Hypertension Paternal Grandfather      Cardiovascular Paternal Grandfather         heart attack     Allergies Son      Eczema Son      Respiratory Son      Sleep Apnea Son      Glaucoma No family hx of      Cerebrovascular Disease No family hx of      Diabetes No family hx of        Social History:  Marital Status:   [2]  Social History     Tobacco Use     Smoking status: Former     Packs/day: 2.00     Years: 15.00     Pack years: 30.00     Types: Cigarettes     Quit date: 3/26/1996     Years since quittin.2     Smokeless tobacco: Former   Vaping Use     Vaping status: Never Used     Passive vaping exposure: Yes   Substance Use Topics     Alcohol use: Not Currently     Comment: rare     Drug use: No        Medications:    acetaminophen (TYLENOL) 325 MG tablet  albuterol (PROAIR HFA/PROVENTIL HFA/VENTOLIN HFA) 108 (90 BASE) MCG/ACT Inhaler  albuterol (PROVENTIL) (2.5 MG/3ML) 0.083% neb solution  APNO CREA ointment  augmented betamethasone dipropionate (DIPROLENE-AF) 0.05 % external ointment  Cholecalciferol (VITAMIN D3 PO)  clobetasol (TEMOVATE) 0.05 %  external cream  diphenhydrAMINE (BENADRYL) 50 MG capsule  diphenhydrAMINE (BENADRYL) 50 MG capsule  DULoxetine (CYMBALTA) 30 MG capsule  DULoxetine (CYMBALTA) 60 MG capsule  fexofenadine (ALLEGRA) 180 MG tablet  HYDROcodone-acetaminophen (NORCO) 5-325 MG tablet  isosorbide mononitrate (IMDUR) 30 MG 24 hr tablet  levothyroxine (SYNTHROID/LEVOTHROID) 100 MCG tablet  levothyroxine (SYNTHROID/LEVOTHROID) 88 MCG tablet  loratadine (CLARITIN) 10 MG tablet  mometasone (ELOCON) 0.1 % external ointment  omeprazole (PRILOSEC) 20 MG DR capsule  ondansetron (ZOFRAN ODT) 4 MG ODT tab  order for DME  OVER-THE-COUNTER  pregabalin (LYRICA) 25 MG capsule  triamcinolone (KENALOG) 0.1 % external cream          Review of Systems  See HPI  Physical Exam   BP: 123/63  Pulse: 94  Temp: 97.9  F (36.6  C)  Resp: 16  Weight: 101.2 kg (223 lb)  SpO2: 99 %      Physical Exam  /63   Pulse 94   Temp 97.9  F (36.6  C) (Oral)   Resp 16   Wt 101.2 kg (223 lb)   LMP  (LMP Unknown)   SpO2 99%   BMI 34.93 kg/m    General: alert and in no acute distress  Head: atraumatic, normocephalic  Abd: nondistended  Musculoskel/Extremities: normal extremities, no apparent edema, and full AROM of major joints  Skin: no rashes, no diaphoresis and skin color normal  Neuro: Patient awake, alert, oriented, speech is fluent, gait is normal.  Strength is normal in bilateral upper and lower extremities.  Normal patellar reflexes.  Patient is able to stand up from the bed, and does take short steps initially, and ultimately takes normal steps with normal gait  Psychiatric: affect/mood normal, cooperative, normal judgement/insight and memory intact      ED Course                 Procedures              Critical Care time:  none               No results found. However, due to the size of the patient record, not all encounters were searched. Please check Results Review for a complete set of results.    Medications - No data to display    Assessments & Plan (with  Medical Decision Making)  63 year old female, presenting to urgent care with concerns as documented above.  Patient with multiple different concerns, primarily with regards to thigh pain, and by tightness, and feelings as if she needs to think about taking steps, and initially feels as if her legs are not moving adequately.  However, after a few steps, patient does have normal gait.  Denies any new sensory changes.  No new trauma.  Does have history of mild hypercalcemia previously, however upon lab checks has always been in the low 10 region.  Patient has no upper extremity symptoms, and I feel that this is less likely secondary to her hypercalcemia.  Patient with known lumbar back pain and lumbar disc issues.  Has had spinal injection previously.  However, I do not feel that this is nerve or lumbar related disc issue.    At this point, uncertain exact cause of patient's symptoms.  She will be referred back to primary care provider, in addition to her endocrinology team.  Is encouraged to be seen and may require ED work-up especially if ongoing or worsening symptoms develop.     I have reviewed the nursing notes.    I have reviewed the findings, diagnosis, plan and need for follow up with the patient.               New Prescriptions    No medications on file       Final diagnoses:   Pain in both lower extremities   Shuffling gait       6/15/2023   [facility] EMERGENCY DEPT     Harrison Blanco MD  06/15/23 [id number]

## 2023-06-19 ENCOUNTER — TRANSFERRED RECORDS (OUTPATIENT)
Dept: HEALTH INFORMATION MANAGEMENT | Facility: CLINIC | Age: 63
End: 2023-06-19
Payer: OTHER GOVERNMENT

## 2023-06-28 NOTE — PROGRESS NOTES
SURGERY CLINIC CONSULTATION    REASON FOR CONSULTATION:  Teresa Fernandez was referred by  for evaluation and discussion of treatment options for hyperparathyroidism     HISTORY OF PRESENT ILLNESS:  Teresa Fernandez is a 63 year old female who has a long history of hypercalcemia.  Most recent labs from March 2023 include calcium 10.7, parathyroid hormone level 88, vitamin D 52, elevated urine calcium at 24 hours greater than 300.  Localizing study included a single phase sestamibi SPECT-CT that identifies an adenoma in the left lateral trachea.  Patient is currently on levothyroxine 100 mcg daily.    Regarding symptoms associated with hyperparathyroidism she has bone pain fatigue osteoarthritis.  No history of nephrolithiasis.  No previous or family history of hyperparathyroidism.      REVIEW OF SYSTEMS:  ROS EXAM: 10 point review of systems is pertinent for that noted in the HPI  Patient Active Problem List   Diagnosis     Hypothyroidism, unspecified type     Allergic state     Chronic rhinitis     Allergic rhinitis due to pollen     Sinusitis, chronic     History of skin cancer     ERIC (generalized anxiety disorder)     Leukoplakia of oral mucosa, including tongue     GERD (gastroesophageal reflux disease)     Chronic low back pain     Dry eye syndrome     Chronic fatigue     Osteopenia     History of melanoma in situ     Eczema     Moderate persistent asthma without complication     DDD (degenerative disc disease), lumbar     Hyperlipidemia LDL goal <160     Chest tightness or pressure     Myalgia     GAYE (obstructive sleep apnea)     History of kidney cancer     Fibromyalgia     Family history of colon cancer     Chronic pruritus     Primary hyperparathyroidism (H)     Morbid obesity (H)     Lichen sclerosus of female genitalia     Thyroid nodule       Past Surgical History:   Procedure Laterality Date     CHOLECYSTECTOMY       COLONOSCOPY      2007-normal     ENT SURGERY  02/15/2011    Left cheek bx-  Mucositis.     FUSION LUMBAR ANTERIOR TWO LEVELS  04/13/2015     GI SURGERY       GYN SURGERY  04/08/1999    hysterectomy.  [facility]     HYSTERECTOMY, PAP NO LONGER INDICATED       SOFT TISSUE SURGERY      chest-melonoma     SURGICAL HISTORY OF -   03/1996    Left nephrectomy-renal cell CA       Allergies   Allergen Reactions     Iodinated Contrast Media [Iodinated Contrast Media] Swelling and Difficulty breathing     Immediate throat swelling following around 1-2cc of omnipaque 300 for spine procedure  skin prick and intradermal tests with Iohexol negatives but premedicate before using iodinated contrast.    no reactions in skin tests to MRI contrast media Gadovist     Omnipaque [Iohexol] Swelling and Difficulty breathing     Immediate throat swelling following around 1-2cc of omnipaque 300 for spine procedure  --> skin prick and intradermal tests with Iohexol negatives. But I would propose anyway to premedicate patient before using iodinated contrast media (for safety reasons).   --> no reactions in skin tests to MRI contrast media Gadovist     Gabapentin Hives     Gluten      Penicillins Hives     Sulfa Antibiotics        Medications reviewed in the EMR  Family History   Problem Relation Age of Onset     Cardiovascular Mother      Lipids Mother      Depression Mother      Heart Disease Mother      Substance Abuse Mother      Hypertension Father      Lipids Father      Obesity Father      Macular Degeneration Father      Sleep Apnea Father      Thyroid Disease Sister      Hypertension Sister      Depression Sister      Allergies Sister      Lipids Sister      Thyroid Disease Sister      Neurologic Disorder Sister      Depression Sister      Hypertension Brother      Colon Cancer Brother      Cancer Maternal Grandmother         kind unknown had sores on legs     Eczema Maternal Grandmother      Eczema Maternal Grandfather      Breast Cancer Paternal Grandmother      Cancer Paternal Grandmother         breast      "Hypertension Paternal Grandfather      Cardiovascular Paternal Grandfather         heart attack     Allergies Son      Eczema Son      Respiratory Son      Sleep Apnea Son      Glaucoma No family hx of      Cerebrovascular Disease No family hx of      Diabetes No family hx of           PHYSICAL EXAM:  /80   Pulse 82   Temp 97.6  F (36.4  C)   Ht 1.702 m (5' 7\")   Wt 103.9 kg (229 lb)   LMP  (LMP Unknown)   SpO2 99%   BMI 35.87 kg/m      Neck: Thyroid gland is small and atrophic without masses.  Trachea is midline.  There is no cervical lymphadenopathy    I personally reviewed the radiographic images laboratory data    ASSESSMENT:   1. Hypercalcemia    2. HPTH (hyperparathyroidism) (H)        PLAN:   Based on the above I recommend the patient undergo a neck exploration resection of parathyroid adenoma or adenomas.  The surgical procedure was discussed with the patient including but not limited to the risks of bleeding infection injury to the recurrent laryngeal nerve or nerves potential permanent hypocalcemia missed parathyroid adenoma.  Patient is aware of this and would like to proceed with surgery    Hannah Good"

## 2023-07-14 DIAGNOSIS — E03.9 HYPOTHYROIDISM, UNSPECIFIED TYPE: ICD-10-CM

## 2023-07-17 ENCOUNTER — OFFICE VISIT (OUTPATIENT)
Dept: CARDIOLOGY | Facility: CLINIC | Age: 63
End: 2023-07-17
Attending: INTERNAL MEDICINE
Payer: OTHER GOVERNMENT

## 2023-07-17 VITALS
WEIGHT: 228 LBS | HEIGHT: 68 IN | BODY MASS INDEX: 34.56 KG/M2 | SYSTOLIC BLOOD PRESSURE: 126 MMHG | OXYGEN SATURATION: 96 % | HEART RATE: 86 BPM | DIASTOLIC BLOOD PRESSURE: 79 MMHG

## 2023-07-17 DIAGNOSIS — R94.39 ABNORMAL STRESS TEST: ICD-10-CM

## 2023-07-17 DIAGNOSIS — R07.9 CHEST PAIN, UNSPECIFIED TYPE: ICD-10-CM

## 2023-07-17 DIAGNOSIS — R07.89 TIGHTNESS IN CHEST: ICD-10-CM

## 2023-07-17 PROCEDURE — 99214 OFFICE O/P EST MOD 30 MIN: CPT | Performed by: INTERNAL MEDICINE

## 2023-07-17 RX ORDER — LEVOTHYROXINE SODIUM 100 UG/1
TABLET ORAL
Qty: 63 TABLET | Refills: 1 | Status: SHIPPED | OUTPATIENT
Start: 2023-07-17 | End: 2024-01-15

## 2023-07-17 RX ORDER — ISOSORBIDE MONONITRATE 30 MG/1
45 TABLET, EXTENDED RELEASE ORAL DAILY
Qty: 90 TABLET | Refills: 3 | Status: SHIPPED | OUTPATIENT
Start: 2023-07-17 | End: 2023-08-03

## 2023-07-17 NOTE — PROGRESS NOTES
CARDIOLOGY CLINIC CONSULTATION    PRIMARY CARE PHYSICIAN:  Amanda Meléndez    Tests reviewed/interpreted independently in clinic today:   1. EKG: Sinus rhythm  2. Echocardiogram: Normal ejection fraction, no wall motion abnormality normal valves  3. Blood work: Normal CBC and CMP  4. Cardiac MRI: No scar or infiltrative disease, normal ejection fraction  5. Nuclear stress test: Small apical defect, normal ejection fraction     The level of medical decision making during this visit was of moderate complexity.     HISTORY OF PRESENT ILLNESS:  Today, I had the pleasure of connecting with Teresa Fernadnez.  She is a very pleasant 61-year-old female who presents to the clinic for a follow-up visit.  I have been following her for some time now for chest pain and shortness of breath.  She has had an extensive work-up all of which has more or less been negative.  This includes a cardiac MRI, echocardiogram and a nuclear stress test.  Initially, there was some discrepancy between her testing with ejection fraction being reported low on echocardiogram along with anterior wall motion abnormality but cardiac MRI and nuclear stress were completely normal [normal EF, no WMA]. An echocardiogram repeated later was completely normal.  We started her on Imdur which led to some symptomatic improvement.  Today she tells me that she has recently been diagnosed with parathyroidism and is awaiting surgery for the same.  Shortness of breath and chest pain have improved but not resolved.  She wonders if this could be secondary to parathyroidism.  She is also planning to undergo colonoscopy and cataract surgery on the right eye.  She is requesting preoperative cardiac clearance for these.  From our standpoint, she denies palpitations, orthopnea, PND, lower extremity edema, presyncope or syncope.  Last stress test shows a tiny apical fixed defect without reversibility.    ASSESSMENT: Pertinent issues addressed/ reviewed during this  cardiology visit    1. Occasional chest discomfort- vasospasm versus microvascular disease  2. Occasional palpitations-PACs/PVCs  3. Awaiting parathyroidectomy, cataract surgery and colonoscopy  4. Hyperlipidemia.       RECOMMENDATIONS:  1. It was a pleasure to see Ms. Sousa again in clinic today.  I am glad that her symptoms have improved after initiation of Imdur.  For this reason, I have asked her to increase the dose to 45 mg daily.    2. She wonders if her symptoms are secondary to hyperparathyroidism and I told her that I am not completely sure but lets wait and watch and see how she does after the surgery.  3. She occasionally feels palpitations but I think they are benign and I provided reassurance.  4. From preoperative cardiovascular risk stratification standpoint, I believe she is low risk for an intermediate risk for all the above procedure she is planning to undergo and does not need any further cardiac testing.  5. I will have her come back and see us in 1 year.    No orders of the defined types were placed in this encounter.      PAST MEDICAL HISTORY:  Past Medical History:   Diagnosis Date     Allergic rhinitis      ALLERGIC RHINITIS NOS 04/12/2005     Anxiety      Arthritis     herniated disc     Autoimmune disease (H) 2014     Benign positional vertigo      Bronchitis, not specified as acute or chronic      CHR MAXILLARY SINUSITIS 04/12/2005     Coronary artery disease      Depressive disorder 1996     Depressive disorder, not elsewhere classified      Eczema 10/17/2012     Environmental and seasonal allergies      GERD (gastroesophageal reflux disease)      Gluten intolerance      Heart disease July 2021     Kidney problem 1996     Malignant neoplasm of renal pelvis (H) 1995    Renal cell carcinoma     Melanoma (H) 06/2010     Other specified acquired hypothyroidism 04/12/2005     Renal cancer (H) 05/05/2011     Sleep apnea      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     Grave's disease     Uncomplicated asthma      Unspecified asthma(493.90)        MEDICATIONS:  Current Outpatient Medications   Medication     acetaminophen (TYLENOL) 325 MG tablet     albuterol (PROAIR HFA/PROVENTIL HFA/VENTOLIN HFA) 108 (90 BASE) MCG/ACT Inhaler     albuterol (PROVENTIL) (2.5 MG/3ML) 0.083% neb solution     APNO CREA ointment     augmented betamethasone dipropionate (DIPROLENE-AF) 0.05 % external ointment     Cholecalciferol (VITAMIN D3 PO)     clobetasol (TEMOVATE) 0.05 % external cream     diphenhydrAMINE (BENADRYL) 50 MG capsule     DULoxetine (CYMBALTA) 30 MG capsule     DULoxetine (CYMBALTA) 60 MG capsule     fexofenadine (ALLEGRA) 180 MG tablet     HYDROcodone-acetaminophen (NORCO) 5-325 MG tablet     isosorbide mononitrate (IMDUR) 30 MG 24 hr tablet     levothyroxine (SYNTHROID/LEVOTHROID) 88 MCG tablet     loratadine (CLARITIN) 10 MG tablet     mometasone (ELOCON) 0.1 % external ointment     omeprazole (PRILOSEC) 20 MG DR capsule     ondansetron (ZOFRAN ODT) 4 MG ODT tab     order for DME     OVER-THE-COUNTER     pregabalin (LYRICA) 25 MG capsule     triamcinolone (KENALOG) 0.1 % external cream     levothyroxine (SYNTHROID/LEVOTHROID) 100 MCG tablet     Current Facility-Administered Medications   Medication     ampicillin (OMNIPEN) 1 g vial INTRADERMAL     penicillin g potassium 6581532 unit vial INTRADERMAL       ALLERGIES:  Allergies   Allergen Reactions     Iodinated Contrast Media [Iodinated Contrast Media] Swelling and Difficulty breathing     Immediate throat swelling following around 1-2cc of omnipaque 300 for spine procedure  skin prick and intradermal tests with Iohexol negatives but premedicate before using iodinated contrast.    no reactions in skin tests to MRI contrast media Gadovist     Omnipaque [Iohexol] Swelling and Difficulty breathing     Immediate throat swelling following around 1-2cc of omnipaque 300 for spine procedure  --> skin prick and intradermal tests  with Iohexol negatives. But I would propose anyway to premedicate patient before using iodinated contrast media (for safety reasons).   --> no reactions in skin tests to MRI contrast media Gadovist     Gabapentin Hives     Gluten      Penicillins Hives     Sulfa Antibiotics        SOCIAL HISTORY:  I have reviewed this patient's social history and updated it with pertinent information if needed. Teresa Fernandez  reports that she quit smoking about 27 years ago. Her smoking use included cigarettes. She has a 30.00 pack-year smoking history. She has quit using smokeless tobacco. She reports that she does not currently use alcohol. She reports that she does not use drugs.    FAMILY HISTORY:  I have reviewed this patient's family history and updated it with pertinent information if needed.   Family History   Problem Relation Age of Onset     Cardiovascular Mother      Lipids Mother      Depression Mother      Heart Disease Mother      Substance Abuse Mother      Hypertension Father      Lipids Father      Obesity Father      Macular Degeneration Father      Sleep Apnea Father      Thyroid Disease Sister      Hypertension Sister      Depression Sister      Allergies Sister      Lipids Sister      Thyroid Disease Sister      Neurologic Disorder Sister      Depression Sister      Hypertension Brother      Colon Cancer Brother      Cancer Maternal Grandmother         kind unknown had sores on legs     Eczema Maternal Grandmother      Eczema Maternal Grandfather      Breast Cancer Paternal Grandmother      Cancer Paternal Grandmother         breast     Hypertension Paternal Grandfather      Cardiovascular Paternal Grandfather         heart attack     Allergies Son      Eczema Son      Respiratory Son      Sleep Apnea Son      Glaucoma No family hx of      Cerebrovascular Disease No family hx of      Diabetes No family hx of        REVIEW OF SYSTEMS:  Skin:        Eyes:       ENT:       Respiratory:  Positive for dyspnea on  exertion;shortness of breath  Cardiovascular:    Positive for;palpitations;chest pain;lower extremity symptoms;edema;fatigue;lightheadedness;dizziness  Gastroenterology:      Genitourinary:       Musculoskeletal:       Neurologic:       Psychiatric:       Heme/Lymph/Imm:       Endocrine:           PHYSICAL EXAM:      BP: 126/79 Pulse: 86     SpO2: 96 %      Vital Signs with Ranges  Pulse:  [86] 86  BP: (126)/(79) 126/79  SpO2:  [96 %] 96 %  228 lbs 0 oz    Constitutional: alert, no distress  Respiratory: Good bilateral air entry  Cardiovascular: S1, S2 normal, very soft systolic murmur  GI: nondistended  Neuropsychiatric: appropriate affact    It was a pleasure seeing this patient in clinic today. Please do not hesitate to contact me with any future questions.     Andrew NOGUERA, FACC, [facility]  Cardiology - [facility]  July 17, 2023    This note was completed in part using dictation via the Dragon voice recognition software. Some word and grammatical errors may occur and must be interpreted in the appropriate clinical context.  If there are any questions pertaining to this issue, please contact me for further clarification.

## 2023-07-17 NOTE — LETTER
7/17/2023     Karlos Meléndez, APRN CNP  [id number]  [facility] [id number]    RE: Teresa Fernandez       Dear Colleague,     I had the pleasure of seeing Teresa Fernandez in the [facility].  CARDIOLOGY CLINIC CONSULTATION    PRIMARY CARE PHYSICIAN:  Amanda Meléndez    Tests reviewed/interpreted independently in clinic today:   EKG: Sinus rhythm  Echocardiogram: Normal ejection fraction, no wall motion abnormality normal valves  Blood work: Normal CBC and CMP  Cardiac MRI: No scar or infiltrative disease, normal ejection fraction  Nuclear stress test: Small apical defect, normal ejection fraction     The level of medical decision making during this visit was of moderate complexity.     HISTORY OF PRESENT ILLNESS:  Today, I had the pleasure of connecting with Teresa Fernandez.  She is a very pleasant 61-year-old female who presents to the clinic for a follow-up visit.  I have been following her for some time now for chest pain and shortness of breath.  She has had an extensive work-up all of which has more or less been negative.  This includes a cardiac MRI, echocardiogram and a nuclear stress test.  Initially, there was some discrepancy between her testing with ejection fraction being reported low on echocardiogram along with anterior wall motion abnormality but cardiac MRI and nuclear stress were completely normal [normal EF, no WMA]. An echocardiogram repeated later was completely normal.  We started her on Imdur which led to some symptomatic improvement.  Today she tells me that she has recently been diagnosed with parathyroidism and is awaiting surgery for the same.  Shortness of breath and chest pain have improved but not resolved.  She wonders if this could be secondary to parathyroidism.  She is also planning to undergo colonoscopy and cataract surgery on the right eye.  She is requesting preoperative cardiac clearance for these.  From our standpoint, she denies palpitations,  orthopnea, PND, lower extremity edema, presyncope or syncope.  Last stress test shows a tiny apical fixed defect without reversibility.    ASSESSMENT: Pertinent issues addressed/ reviewed during this cardiology visit    Occasional chest discomfort- vasospasm versus microvascular disease  Occasional palpitations-PACs/PVCs  Awaiting parathyroidectomy, cataract surgery and colonoscopy  Hyperlipidemia.       RECOMMENDATIONS:  It was a pleasure to see Ms. Sousa again in clinic today.  I am glad that her symptoms have improved after initiation of Imdur.  For this reason, I have asked her to increase the dose to 45 mg daily.    She wonders if her symptoms are secondary to hyperparathyroidism and I told her that I am not completely sure but lets wait and watch and see how she does after the surgery.  She occasionally feels palpitations but I think they are benign and I provided reassurance.  From preoperative cardiovascular risk stratification standpoint, I believe she is low risk for an intermediate risk for all the above procedure she is planning to undergo and does not need any further cardiac testing.  I will have her come back and see us in 1 year.    No orders of the defined types were placed in this encounter.      PAST MEDICAL HISTORY:  Past Medical History:   Diagnosis Date    Allergic rhinitis     ALLERGIC RHINITIS NOS 04/12/2005    Anxiety     Arthritis     herniated disc    Autoimmune disease (H) 2014    Benign positional vertigo     Bronchitis, not specified as acute or chronic     CHR MAXILLARY SINUSITIS 04/12/2005    Coronary artery disease     Depressive disorder 1996    Depressive disorder, not elsewhere classified     Eczema 10/17/2012    Environmental and seasonal allergies     GERD (gastroesophageal reflux disease)     Gluten intolerance     Heart disease July 2021    Kidney problem 1996    Malignant neoplasm of renal pelvis (H) 1995    Renal cell carcinoma    Melanoma (H) 06/2010    Other specified  acquired hypothyroidism 04/12/2005    Renal cancer (H) 05/05/2011    Sleep apnea     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     Grave's disease    Uncomplicated asthma     Unspecified asthma(493.90)        MEDICATIONS:  Current Outpatient Medications   Medication    acetaminophen (TYLENOL) 325 MG tablet    albuterol (PROAIR HFA/PROVENTIL HFA/VENTOLIN HFA) 108 (90 BASE) MCG/ACT Inhaler    albuterol (PROVENTIL) (2.5 MG/3ML) 0.083% neb solution    APNO CREA ointment    augmented betamethasone dipropionate (DIPROLENE-AF) 0.05 % external ointment    Cholecalciferol (VITAMIN D3 PO)    clobetasol (TEMOVATE) 0.05 % external cream    diphenhydrAMINE (BENADRYL) 50 MG capsule    DULoxetine (CYMBALTA) 30 MG capsule    DULoxetine (CYMBALTA) 60 MG capsule    fexofenadine (ALLEGRA) 180 MG tablet    HYDROcodone-acetaminophen (NORCO) 5-325 MG tablet    isosorbide mononitrate (IMDUR) 30 MG 24 hr tablet    levothyroxine (SYNTHROID/LEVOTHROID) 88 MCG tablet    loratadine (CLARITIN) 10 MG tablet    mometasone (ELOCON) 0.1 % external ointment    omeprazole (PRILOSEC) 20 MG DR capsule    ondansetron (ZOFRAN ODT) 4 MG ODT tab    order for DME    OVER-THE-COUNTER    pregabalin (LYRICA) 25 MG capsule    triamcinolone (KENALOG) 0.1 % external cream    levothyroxine (SYNTHROID/LEVOTHROID) 100 MCG tablet     Current Facility-Administered Medications   Medication    ampicillin (OMNIPEN) 1 g vial INTRADERMAL    penicillin g potassium 3180989 unit vial INTRADERMAL       ALLERGIES:  Allergies   Allergen Reactions    Iodinated Contrast Media [Iodinated Contrast Media] Swelling and Difficulty breathing     Immediate throat swelling following around 1-2cc of omnipaque 300 for spine procedure  skin prick and intradermal tests with Iohexol negatives but premedicate before using iodinated contrast.    no reactions in skin tests to MRI contrast media Gadovist    Omnipaque [Iohexol] Swelling and Difficulty breathing      Immediate throat swelling following around 1-2cc of omnipaque 300 for spine procedure  --> skin prick and intradermal tests with Iohexol negatives. But I would propose anyway to premedicate patient before using iodinated contrast media (for safety reasons).   --> no reactions in skin tests to MRI contrast media Gadovist    Gabapentin Hives    Gluten     Penicillins Hives    Sulfa Antibiotics        SOCIAL HISTORY:  I have reviewed this patient's social history and updated it with pertinent information if needed. Teresa Fernandez  reports that she quit smoking about 27 years ago. Her smoking use included cigarettes. She has a 30.00 pack-year smoking history. She has quit using smokeless tobacco. She reports that she does not currently use alcohol. She reports that she does not use drugs.    FAMILY HISTORY:  I have reviewed this patient's family history and updated it with pertinent information if needed.   Family History   Problem Relation Age of Onset    Cardiovascular Mother     Lipids Mother     Depression Mother     Heart Disease Mother     Substance Abuse Mother     Hypertension Father     Lipids Father     Obesity Father     Macular Degeneration Father     Sleep Apnea Father     Thyroid Disease Sister     Hypertension Sister     Depression Sister     Allergies Sister     Lipids Sister     Thyroid Disease Sister     Neurologic Disorder Sister     Depression Sister     Hypertension Brother     Colon Cancer Brother     Cancer Maternal Grandmother         kind unknown had sores on legs    Eczema Maternal Grandmother     Eczema Maternal Grandfather     Breast Cancer Paternal Grandmother     Cancer Paternal Grandmother         breast    Hypertension Paternal Grandfather     Cardiovascular Paternal Grandfather         heart attack    Allergies Son     Eczema Son     Respiratory Son     Sleep Apnea Son     Glaucoma No family hx of     Cerebrovascular Disease No family hx of     Diabetes No family hx of        REVIEW OF  SYSTEMS:  Skin:        Eyes:       ENT:       Respiratory:  Positive for dyspnea on exertion;shortness of breath  Cardiovascular:    Positive for;palpitations;chest pain;lower extremity symptoms;edema;fatigue;lightheadedness;dizziness  Gastroenterology:      Genitourinary:       Musculoskeletal:       Neurologic:       Psychiatric:       Heme/Lymph/Imm:       Endocrine:           PHYSICAL EXAM:      BP: 126/79 Pulse: 86     SpO2: 96 %      Vital Signs with Ranges  Pulse:  [86] 86  BP: (126)/(79) 126/79  SpO2:  [96 %] 96 %  228 lbs 0 oz    Constitutional: alert, no distress  Respiratory: Good bilateral air entry  Cardiovascular: S1, S2 normal, very soft systolic murmur  GI: nondistended  Neuropsychiatric: appropriate affact    It was a pleasure seeing this patient in clinic today. Please do not hesitate to contact me with any future questions.     Andrew NOGUERA, FACC, [facility]  Cardiology - [facility]  July 17, 2023    This note was completed in part using dictation via the Dragon voice recognition software. Some word and grammatical errors may occur and must be interpreted in the appropriate clinical context.  If there are any questions pertaining to this issue, please contact me for further clarification.      Thank you for allowing me to participate in the care of your patient.      Sincerely,     Andrew Betancourt MD     [facility]  cc:   Andrew Betancourt MD  [address] [place] [id number]

## 2023-07-24 PROBLEM — E21.3 HYPERPARATHYROIDISM (H): Status: ACTIVE | Noted: 2023-05-15

## 2023-07-30 DIAGNOSIS — M79.10 MYALGIA: ICD-10-CM

## 2023-07-30 DIAGNOSIS — F41.1 GAD (GENERALIZED ANXIETY DISORDER): ICD-10-CM

## 2023-08-01 ENCOUNTER — OFFICE VISIT (OUTPATIENT)
Dept: FAMILY MEDICINE | Facility: CLINIC | Age: 63
End: 2023-08-01
Payer: OTHER GOVERNMENT

## 2023-08-01 VITALS
TEMPERATURE: 97.8 F | SYSTOLIC BLOOD PRESSURE: 126 MMHG | OXYGEN SATURATION: 99 % | BODY MASS INDEX: 34.59 KG/M2 | DIASTOLIC BLOOD PRESSURE: 86 MMHG | HEART RATE: 91 BPM | RESPIRATION RATE: 16 BRPM | HEIGHT: 68 IN | WEIGHT: 228.2 LBS

## 2023-08-01 DIAGNOSIS — E03.9 HYPOTHYROIDISM, UNSPECIFIED TYPE: ICD-10-CM

## 2023-08-01 DIAGNOSIS — M79.2 NEURALGIA: ICD-10-CM

## 2023-08-01 DIAGNOSIS — F41.1 GAD (GENERALIZED ANXIETY DISORDER): ICD-10-CM

## 2023-08-01 DIAGNOSIS — Z01.818 PREOP GENERAL PHYSICAL EXAM: Primary | ICD-10-CM

## 2023-08-01 DIAGNOSIS — E21.3 HYPERPARATHYROIDISM (H): ICD-10-CM

## 2023-08-01 DIAGNOSIS — M79.10 MYALGIA: ICD-10-CM

## 2023-08-01 DIAGNOSIS — M54.16 LUMBAR RADICULOPATHY: ICD-10-CM

## 2023-08-01 DIAGNOSIS — J45.40 MODERATE PERSISTENT ASTHMA WITHOUT COMPLICATION: ICD-10-CM

## 2023-08-01 PROBLEM — E66.01 MORBID OBESITY (H): Status: RESOLVED | Noted: 2022-11-28 | Resolved: 2023-08-01

## 2023-08-01 PROCEDURE — 99214 OFFICE O/P EST MOD 30 MIN: CPT | Performed by: NURSE PRACTITIONER

## 2023-08-01 RX ORDER — DULOXETIN HYDROCHLORIDE 60 MG/1
CAPSULE, DELAYED RELEASE ORAL
Qty: 90 CAPSULE | Refills: 3 | Status: SHIPPED | OUTPATIENT
Start: 2023-08-01 | End: 2024-06-26

## 2023-08-01 RX ORDER — DULOXETIN HYDROCHLORIDE 30 MG/1
CAPSULE, DELAYED RELEASE ORAL
Qty: 90 CAPSULE | Refills: 3 | Status: SHIPPED | OUTPATIENT
Start: 2023-08-01 | End: 2024-06-26

## 2023-08-01 RX ORDER — DULOXETIN HYDROCHLORIDE 60 MG/1
60 CAPSULE, DELAYED RELEASE ORAL DAILY
Qty: 90 CAPSULE | Refills: 0 | OUTPATIENT
Start: 2023-08-01

## 2023-08-01 RX ORDER — PREGABALIN 25 MG/1
25 CAPSULE ORAL 2 TIMES DAILY
Qty: 60 CAPSULE | Refills: 1 | Status: SHIPPED | OUTPATIENT
Start: 2023-08-01 | End: 2023-09-11

## 2023-08-01 RX ORDER — ALBUTEROL SULFATE 90 UG/1
2 AEROSOL, METERED RESPIRATORY (INHALATION) EVERY 4 HOURS PRN
Qty: 18 G | Refills: 4 | Status: SHIPPED | OUTPATIENT
Start: 2023-08-01

## 2023-08-01 NOTE — PROGRESS NOTES
[facility]  [address] [facility] [address]  Phone: [phone]  Primary Provider: Tashia Emerson  Pre-op Performing Provider: TASHIA EMERSON      PREOPERATIVE EVALUATION:  Today's date: 8/1/2023    Teresa Fernandez is a 63 year old female who presents for a preoperative evaluation.      8/1/2023     7:05 AM   Additional Questions   Roomed by Clare CRUZ       Surgical Information:  Surgery/Procedure: parathyroidectomy  [place] [facility] [place]  Surgeon: Dr. Good   Surgery Date: 8/23/23  Time of Surgery: 1255  Where patient plans to recover: At home with family  Fax number for surgical facility: Note does not need to be faxed, will be available electronically in Epic.    Assessment & Plan     The proposed surgical procedure is considered INTERMEDIATE risk.    Preop general physical exam      Hyperparathyroidism (H)      Hypothyroidism, unspecified type  Controlled    Moderate persistent asthma without complication  Controlled  - albuterol (PROAIR HFA/PROVENTIL HFA/VENTOLIN HFA) 108 (90 Base) MCG/ACT inhaler; Inhale 2 puffs into the lungs every 4 hours as needed for shortness of breath    Lumbar radiculopathy  Continue:  - DULoxetine (CYMBALTA) 30 MG capsule; TAKE 1 CAPSULE (30 MG) BY MOUTH DAILY IN ADDITION TO THE 60 MG FOR A TOTAL OF 90 MG DAILY    Myalgia  Continue:  - DULoxetine (CYMBALTA) 30 MG capsule; TAKE 1 CAPSULE (30 MG) BY MOUTH DAILY IN ADDITION TO THE 60 MG FOR A TOTAL OF 90 MG DAILY  - DULoxetine (CYMBALTA) 60 MG capsule; Take 1 cap by mouth daily in addition to the 30 mg for a total 90 mg/day    ERIC (generalized anxiety disorder)  Controlled, continue:  - DULoxetine (CYMBALTA) 30 MG capsule; TAKE 1 CAPSULE (30 MG) BY MOUTH DAILY IN ADDITION TO THE 60 MG FOR A TOTAL OF 90 MG DAILY  - DULoxetine (CYMBALTA) 60 MG capsule; Take 1 cap by mouth daily in addition to the 30 mg for a total 90 mg/day    Neuralgia  Uses PRN:  - pregabalin (LYRICA) 25 MG capsule;  Take 1 capsule (25 mg) by mouth 2 times daily           Risks and Recommendations:  The patient has the following additional risks and recommendations for perioperative complications:  Obstructive Sleep Apnea:   Uses a CPAP    Antiplatelet or Anticoagulation Medication Instructions:   - Patient is on no antiplatelet or anticoagulation medications.    Additional Medication Instructions:  Patient is to take all scheduled medications on the day of surgery    RECOMMENDATION:  APPROVAL GIVEN to proceed with proposed procedure pending review of diagnostic evaluation.    Prescription drug management        Subjective       HPI related to upcoming procedure: Symptomatic primary hyperparathyroidism        7/27/2023     5:49 AM   Preop Questions   1. Have you ever had a heart attack or stroke? No   2. Have you ever had surgery on your heart or blood vessels, such as a stent placement, a coronary artery bypass, or surgery on an artery in your head, neck, heart, or legs? No   3. Do you have chest pain with activity? YES - followed by Cardiology, cleared for surgery 7/17/23   4. Do you have a history of  heart failure? No   5. Do you currently have a cold, bronchitis or symptoms of other infection? No   6. Do you have a cough, shortness of breath, or wheezing? YES - cough and wheezing - hx of asthma   7. Do you or anyone in your family have previous history of blood clots? No   8. Do you or does anyone in your family have a serious bleeding problem such as prolonged bleeding following surgeries or cuts? No   9. Have you ever had problems with anemia or been told to take iron pills? No   10. Have you had any abnormal blood loss such as black, tarry or bloody stools, or abnormal vaginal bleeding? No   11. Have you ever had a blood transfusion? No   12. Are you willing to have a blood transfusion if it is medically needed before, during, or after your surgery? Yes   13. Have you or any of your relatives ever had problems with  anesthesia? No   14. Do you have sleep apnea, excessive snoring or daytime drowsiness? YES - sleep apnea   14a. Do you have a CPAP machine? Yes   15. Do you have any artifical heart valves or other implanted medical devices like a pacemaker, defibrillator, or continuous glucose monitor? No   16. Do you have artificial joints? No   17. Are you allergic to latex? No       Health Care Directive:  Patient does not have a Health Care Directive or Living Will: Discussed advance care planning with patient; however, patient declined at this time.    Preoperative Review of :   reviewed - controlled substances reflected in medication list.      Status of Chronic Conditions:  ASTHMA - Patient has a longstanding history of mild-moderate Asthma . Patient has been doing well overall noting WHEEZING and continues on medication regimen consisting of PRN Albuterol without adverse reactions or side effects.     HYPOTHYROIDISM - Patient has a longstanding history of chronic Hypothyroidism. Patient has been doing well, noting no tremor, insomnia, hair loss or changes in skin texture. Continues to take medications as directed, without adverse reactions or side effects. Last TSH   Lab Results   Component Value Date    TSH 1.97 03/27/2023   .      Review of Systems  CONSTITUTIONAL: NEGATIVE for fever, chills, change in weight  INTEGUMENTARY/SKIN: NEGATIVE for worrisome rashes, moles or lesions  EYES: NEGATIVE for vision changes or irritation  ENT/MOUTH: NEGATIVE for ear, mouth and throat problems  RESP: NEGATIVE for significant cough or SOB  CV: NEGATIVE for chest pain, palpitations or peripheral edema  GI: NEGATIVE for nausea, abdominal pain, heartburn, or change in bowel habits  : NEGATIVE for frequency, dysuria, or hematuria  MUSCULOSKELETAL:POSITIVE  for arthralgias and myalgias- generalized  NEURO: NEGATIVE for weakness, dizziness or paresthesias  ENDOCRINE: NEGATIVE for temperature intolerance, skin/hair changes  HEME:  NEGATIVE for bleeding problems  PSYCHIATRIC: NEGATIVE for changes in mood or affect    Patient Active Problem List    Diagnosis Date Noted    Hyperparathyroidism (H) 05/15/2023     Priority: Medium    Thyroid nodule 04/03/2023     Priority: Medium     Ultrasound 4/2023, repeat in 1 year      Lichen sclerosus of female genitalia 03/27/2023     Priority: Medium    Morbid obesity (H) 11/28/2022     Priority: Medium    Primary hyperparathyroidism (H) 11/07/2022     Priority: Medium     Per Endocrine 10/2022:  She should be followed with serum calcium, creatinine and albumin every 6 months. Normal Dexa and urine calcium at this time, see Endocrine on as needed basis.        Chronic pruritus 02/14/2022     Priority: Medium    Family history of colon cancer 12/28/2021     Priority: Medium    Fibromyalgia 09/30/2021     Priority: Medium    History of kidney cancer 04/01/2021     Priority: Medium     1996- left nephrectomy      GAYE (obstructive sleep apnea) 09/17/2020     Priority: Medium     9/9/2020 [facility] (225.0 lbs) - AHI 19.8, RDI 26.4, Supine AHI 22.0, REM AHI 36.9, Low O2 79.5%, Time Spent ?88% 40.1 minutes / Time Spent ?89% 51.6 minutes.      Myalgia 10/20/2017     Priority: Medium     Increased CK      Chest tightness or pressure 10/03/2016     Priority: Medium    Hyperlipidemia LDL goal <160 01/29/2015     Priority: Medium    DDD (degenerative disc disease), lumbar 06/24/2014     Priority: Medium     [facility]      Moderate persistent asthma without complication 07/08/2013     Priority: Medium    History of melanoma in situ 10/17/2012     Priority: Medium    Eczema 10/17/2012     Priority: Medium    Osteopenia 06/15/2012     Priority: Medium    Chronic fatigue 10/04/2011     Priority: Medium    Dry eye syndrome 08/24/2011     Priority: Medium    Chronic low back pain 07/07/2011     Priority: Medium    GERD (gastroesophageal reflux disease) 05/11/2011     Priority: Medium     Leukoplakia of oral mucosa, including tongue 04/18/2011     Priority: Medium    ERIC (generalized anxiety disorder)      Priority: Medium    History of skin cancer 11/09/2010     Priority: Medium    Chronic rhinitis 05/03/2005     Priority: Medium    Allergic rhinitis due to pollen 05/03/2005     Priority: Medium    Sinusitis, chronic 05/03/2005     Priority: Medium     Problem list name updated by automated process. Provider to review      Allergic state 04/19/2005     Priority: Medium     Problem list name updated by automated process. Provider to review      Hypothyroidism, unspecified type 04/12/2005     Priority: Medium     Problem list name updated by automated process. Provider to review        Past Medical History:   Diagnosis Date    Allergic rhinitis     ALLERGIC RHINITIS NOS 04/12/2005    Anxiety     Arthritis     herniated disc    Autoimmune disease (H) 2014    Benign positional vertigo     Bronchitis, not specified as acute or chronic     CHR MAXILLARY SINUSITIS 04/12/2005    Coronary artery disease     Depressive disorder 1996    Depressive disorder, not elsewhere classified     Eczema 10/17/2012    Environmental and seasonal allergies     GERD (gastroesophageal reflux disease)     Gluten intolerance     Heart disease July 2021    Kidney problem 1996    Malignant neoplasm of renal pelvis (H) 1995    Renal cell carcinoma    Melanoma (H) 06/2010    Other specified acquired hypothyroidism 04/12/2005    Renal cancer (H) 05/05/2011    Sleep apnea     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    FUSION LUMBAR ANTERIOR TWO LEVELS  04/13/2015    GI SURGERY      GYN SURGERY  04/08/1999    hysterectomy.  [facility]    HYSTERECTOMY, PAP NO LONGER INDICATED       SOFT TISSUE SURGERY      chest-melonoma    SURGICAL HISTORY OF -   03/1996    Left nephrectomy-renal cell CA     Current Outpatient Medications   Medication Sig Dispense Refill    acetaminophen (TYLENOL) 325 MG tablet Take 325-650 mg by mouth every 6 hours as needed for mild pain      albuterol (PROAIR HFA/PROVENTIL HFA/VENTOLIN HFA) 108 (90 BASE) MCG/ACT Inhaler Inhale 2 puffs into the lungs every 6 hours as needed for shortness of breath / dyspnea 1 Inhaler 5    albuterol (PROVENTIL) (2.5 MG/3ML) 0.083% neb solution Take 1 vial (2.5 mg) by nebulization every 6 hours as needed for shortness of breath / dyspnea or wheezing 90 mL 11    APNO CREA ointment Apply to rash on nose twice daily as needed. 100 g 3    augmented betamethasone dipropionate (DIPROLENE-AF) 0.05 % external ointment Apply topically 2 times daily 50 g 3    Cholecalciferol (VITAMIN D3 PO) Take 2,000 Units by mouth 2 times daily      clobetasol (TEMOVATE) 0.05 % external cream Apply topically 2 times daily To affected external vaginal areas 60 g 3    diphenhydrAMINE (BENADRYL) 50 MG capsule Take one tab (50mg) one hour prior to procedure. 1 capsule 0    DULoxetine (CYMBALTA) 30 MG capsule TAKE 1 CAPSULE (30 MG) BY MOUTH DAILY IN ADDITION TO THE 60 MG FOR A TOTAL OF 90 MG DAILY 90 capsule 1    DULoxetine (CYMBALTA) 60 MG capsule TAKE 1 CAPSULE (60 MG) BY MOUTH DAILY 90 capsule 0    fexofenadine (ALLEGRA) 180 MG tablet Take 1 tablet (180 mg) by mouth daily 30 tablet 1    HYDROcodone-acetaminophen (NORCO) 5-325 MG tablet Take 1 tablet by mouth every 6 hours as needed for severe pain 30 tablet 0    isosorbide mononitrate (IMDUR) 30 MG 24 hr tablet Take 1.5 tablets (45 mg) by mouth daily 90 tablet 3    levothyroxine (SYNTHROID/LEVOTHROID) 100 MCG tablet TAKE ONE TABLET BY MOUTH ONCE DAILY ON MONDAY, WEDNESDAY, FRIDAY, SATURDAY AND SUNDAY. 63 tablet 1    levothyroxine (SYNTHROID/LEVOTHROID) 88 MCG tablet TAKE ONE TABLET BY MOUTH ON TUESDAYS AND   30 tablet 3    loratadine (CLARITIN) 10 MG tablet Take 1 tablet by mouth daily      mometasone (ELOCON) 0.1 % external ointment Apply topically twice a week Use on eczematous lesions 45 g 3    omeprazole (PRILOSEC) 20 MG DR capsule Take 20 mg by mouth daily      ondansetron (ZOFRAN ODT) 4 MG ODT tab Take 1 tablet (4 mg) by mouth every 8 hours as needed for nausea 20 tablet 0    order for DME Equipment being ordered: Nebulizer 1 Units 0    OVER-THE-COUNTER Place 1 drop into both eyes 3 times daily. Systane Ultra, Systane Balance, Blink Tears or Refresh Optive Artificial Tear 1 Bottle     pregabalin (LYRICA) 25 MG capsule Take 1 capsule (25 mg) by mouth 2 times daily 60 capsule 1    triamcinolone (KENALOG) 0.1 % external cream Apply topically 2 times daily 30 g 3       Allergies   Allergen Reactions    Iodinated Contrast Media [Iodinated Contrast Media] Swelling and Difficulty breathing     Immediate throat swelling following around 1-2cc of omnipaque 300 for spine procedure  skin prick and intradermal tests with Iohexol negatives but premedicate before using iodinated contrast.    no reactions in skin tests to MRI contrast media Gadovist    Omnipaque [Iohexol] Swelling and Difficulty breathing     Immediate throat swelling following around 1-2cc of omnipaque 300 for spine procedure  --> skin prick and intradermal tests with Iohexol negatives. But I would propose anyway to premedicate patient before using iodinated contrast media (for safety reasons).   --> no reactions in skin tests to MRI contrast media Gadovist    Gabapentin Hives    Gluten     Penicillins Hives    Sulfa Antibiotics         Social History     Tobacco Use    Smoking status: Former     Packs/day: 2.00     Years: 15.00     Pack years: 30.00     Types: Cigarettes     Quit date: 3/26/1996     Years since quittin.3    Smokeless tobacco: Former   Substance Use Topics    Alcohol use: Not Currently     Comment: rare       History   Drug Use  "No         Objective     /86   Pulse 91   Temp 97.8  F (36.6  C) (Tympanic)   Resp 16   Ht 1.727 m (5' 8\")   Wt 103.5 kg (228 lb 3.2 oz)   LMP  (LMP Unknown)   SpO2 99%   BMI 34.70 kg/m      Physical Exam    GENERAL APPEARANCE: healthy, alert and no distress     EYES: EOMI, PERRL     HENT: ear canals and TM's normal and nose and mouth without ulcers or lesions     NECK: no adenopathy, no asymmetry, masses, or scars and thyroid normal to palpation     RESP: lungs clear to auscultation - no rales, rhonchi or wheezes     CV: regular rates and rhythm, normal S1 S2, no S3 or S4 and no murmur, click or rub     ABDOMEN:  soft, nontender, no HSM or masses and bowel sounds normal     MS: extremities normal- no gross deformities noted, no evidence of inflammation in joints, FROM in all extremities.     SKIN: no suspicious lesions or rashes     NEURO: Normal strength and tone, sensory exam grossly normal, mentation intact and speech normal     PSYCH: mentation appears normal. and affect normal/bright     LYMPHATICS: No cervical adenopathy    Recent Labs   Lab Test 03/27/23  1325 03/19/23  1008 05/25/22  1557 03/25/22  1357   HGB 13.6 13.5  --   --     244  --   --     135*  --   --    POTASSIUM 4.0 4.3  --   --    CR 0.70 0.70   < >  --    A1C  --   --   --  5.1    < > = values in this interval not displayed.        Diagnostics:  No labs were ordered during this visit.   No EKG required, no history of coronary heart disease, significant arrhythmia, peripheral arterial disease or other structural heart disease.    Revised Cardiac Risk Index (RCRI):  The patient has the following serious cardiovascular risks for perioperative complications:   - No serious cardiac risks = 0 points     RCRI Interpretation: 0 points: Class I (very low risk - 0.4% complication rate)         Signed Electronically by: HERIBERTO Linton CNP  Copy of this evaluation report is provided to requesting physician.      "

## 2023-08-01 NOTE — PATIENT INSTRUCTIONS
For informational purposes only. Not to replace the advice of your health care provider. Copyright   2003,  [employer] [facility]. All rights reserved. Clinically reviewed by Michell Agrawal MD. [employer] [id number] - REV .  Preparing for Your Surgery  Getting started  A nurse will call you to review your health history and instructions. They will give you an arrival time based on your scheduled surgery time. Please be ready to share:  Your doctor's clinic name and phone number  Your medical, surgical, and anesthesia history  A list of allergies and sensitivities  A list of medicines, including herbal treatments and over-the-counter drugs  Whether the patient has a legal guardian (ask how to send us the papers in advance)  Please tell us if you're pregnant--or if there's any chance you might be pregnant. Some surgeries may injure a fetus (unborn baby), so they require a pregnancy test. Surgeries that are safe for a fetus don't always need a test, and you can choose whether to have one.   If you have a child who's having surgery, please ask for a copy of Preparing for Your Child's Surgery.    Preparing for surgery  Within 10 to 30 days of surgery: Have a pre-op exam (sometimes called an H&P, or History and Physical). This can be done at a clinic or pre-operative center.  If you're having a , you may not need this exam. Talk to your care team.  At your pre-op exam, talk to your care team about all medicines you take. If you need to stop any medicines before surgery, ask when to start taking them again.  We do this for your safety. Many medicines can make you bleed too much during surgery. Some change how well surgery (anesthesia) drugs work.  Call your insurance company to let them know you're having surgery. (If you don't have insurance, call [phone].)  Call your clinic if there's any change in your health. This includes signs of a cold or flu (sore throat, runny nose, cough, rash, fever). It also  includes a scrape or scratch near the surgery site.  If you have questions on the day of surgery, call your hospital or surgery center.  Eating and drinking guidelines  For your safety: Unless your surgeon tells you otherwise, follow the guidelines below.  Eat and drink as usual until 8 hours before you arrive for surgery. After that, no food or milk.  Drink clear liquids until 2 hours before you arrive. These are liquids you can see through, like water, Gatorade, and Propel Water. They also include plain black coffee and tea (no cream or milk), candy, and breath mints. You can spit out gum when you arrive.  If you drink alcohol: Stop drinking it the night before surgery.  If your care team tells you to take medicine on the morning of surgery, it's okay to take it with a sip of water.  Preventing infection  Shower or bathe the night before and morning of your surgery. Follow the instructions your clinic gave you. (If no instructions, use regular soap.)  Don't shave or clip hair near your surgery site. We'll remove the hair if needed.  Don't smoke or vape the morning of surgery. You may chew nicotine gum up to 2 hours before surgery. A nicotine patch is okay.  Note: Some surgeries require you to completely quit smoking and nicotine. Check with your surgeon.  Your care team will make every effort to keep you safe from infection. We will:  Clean our hands often with soap and water (or an alcohol-based hand rub).  Clean the skin at your surgery site with a special soap that kills germs.  Give you a special gown to keep you warm. (Cold raises the risk of infection.)  Wear special hair covers, masks, gowns and gloves during surgery.  Give antibiotic medicine, if prescribed. Not all surgeries need antibiotics.  What to bring on the day of surgery  Photo ID and insurance card  Copy of your health care directive, if you have one  Glasses and hearing aids (bring cases)  You can't wear contacts during surgery  Inhaler and eye  drops, if you use them (tell us about these when you arrive)  CPAP machine or breathing device, if you use them  A few personal items, if spending the night  If you have . . .  A pacemaker, ICD (cardiac defibrillator) or other implant: Bring the ID card.  An implanted stimulator: Bring the remote control.  A legal guardian: Bring a copy of the certified (court-stamped) guardianship papers.  Please remove any jewelry, including body piercings. Leave jewelry and other valuables at home.  If you're going home the day of surgery  You must have a responsible adult drive you home. They should stay with you overnight as well.  If you don't have someone to stay with you, and you aren't safe to go home alone, we may keep you overnight. Insurance often won't pay for this.  After surgery  If it's hard to control your pain or you need more pain medicine, please call your surgeon's office.  Questions?   If you have any questions for your care team, list them here: _________________________________________________________________________________________________________________________________________________________________________ ____________________________________ ____________________________________ ____________________________________

## 2023-08-22 ENCOUNTER — ANESTHESIA EVENT (OUTPATIENT)
Dept: SURGERY | Facility: AMBULATORY SURGERY CENTER | Age: 63
End: 2023-08-22
Payer: OTHER GOVERNMENT

## 2023-08-22 NOTE — ANESTHESIA PREPROCEDURE EVALUATION
Anesthesia Pre-Procedure Evaluation    Patient: Teresa Fernandez   MRN: [id number] : 1960        Procedure : Procedure(s):  PARATHYROIDECTOMY          Past Medical History:   Diagnosis Date    Allergic rhinitis     ALLERGIC RHINITIS NOS 2005    Anxiety     Arthritis     herniated disc    Autoimmune disease (H)     Benign positional vertigo     Bronchitis, not specified as acute or chronic     CHR MAXILLARY SINUSITIS 2005    Coronary artery disease     Depressive disorder     Depressive disorder, not elsewhere classified     Eczema 10/17/2012    Environmental and seasonal allergies     GERD (gastroesophageal reflux disease)     Gluten intolerance     Heart disease 2021    Kidney problem     Malignant neoplasm of renal pelvis (H)     Renal cell carcinoma    Melanoma (H) 2010    Other specified acquired hypothyroidism 2005    Renal cancer (H) 2011    Sleep apnea     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    FUSION LUMBAR ANTERIOR TWO LEVELS  2015    GI SURGERY      GYN SURGERY  1999    hysterectomy.  [facility]    HYSTERECTOMY, PAP NO LONGER INDICATED      SOFT TISSUE SURGERY      chest-melonoma    SURGICAL HISTORY OF -   1996    Left nephrectomy-renal cell CA      Allergies   Allergen Reactions    Iodinated Contrast Media [Iodinated Contrast Media] Swelling and Difficulty breathing     Immediate throat swelling following around 1-2cc of omnipaque 300 for spine procedure  skin prick and intradermal tests with Iohexol negatives but premedicate before using iodinated contrast.    no reactions in skin tests to MRI contrast media Gadovist    Omnipaque [Iohexol] Swelling and Difficulty breathing      Immediate throat swelling following around 1-2cc of omnipaque 300 for spine procedure  --> skin prick and intradermal tests with Iohexol negatives. But I would propose anyway to premedicate patient before using iodinated contrast media (for safety reasons).   --> no reactions in skin tests to MRI contrast media Gadovist    Gabapentin Hives    Gluten     Penicillins Hives    Sulfa Antibiotics       Social History     Tobacco Use    Smoking status: Former     Packs/day: 2.00     Years: 15.00     Pack years: 30.00     Types: Cigarettes     Quit date: 3/26/1996     Years since quittin.4    Smokeless tobacco: Former   Substance Use Topics    Alcohol use: Not Currently     Comment: rare      Wt Readings from Last 1 Encounters:   23 103.5 kg (228 lb 3.2 oz)        Anesthesia Evaluation   Pt has had prior anesthetic.         ROS/MED HX  ENT/Pulmonary:     (+) sleep apnea,                   Moderate Persistent, asthma                  Neurologic:     (+)    peripheral neuropathy,                            Cardiovascular:       METS/Exercise Tolerance:     Hematologic:       Musculoskeletal: Comment: fibromyalgia      GI/Hepatic:     (+) GERD,                   Renal/Genitourinary:       Endo:     (+)          thyroid problem, hypothyroidism hyperparathyroid,    Obesity,       Psychiatric/Substance Use:     (+) psychiatric history depression and anxiety       Infectious Disease:       Malignancy:  - neg malignancy ROS     Other:  - neg other ROS          Physical Exam    Airway        Mallampati: I   TM distance: > 3 FB   Neck ROM: full     Respiratory Devices and Support         Dental       (+) Minor Abnormalities - some fillings, tiny chips      Cardiovascular   cardiovascular exam normal       Rhythm and rate: regular and normal     Pulmonary   pulmonary exam normal        breath sounds clear to auscultation           OUTSIDE LABS:  CBC:   Lab Results   Component Value Date    WBC 4.9 2023    WBC 4.4  03/19/2023    HGB 13.6 03/27/2023    HGB 13.5 03/19/2023    HCT 41.8 03/27/2023    HCT 40.9 03/19/2023     03/27/2023     03/19/2023     BMP:   Lab Results   Component Value Date     03/27/2023     (L) 03/19/2023    POTASSIUM 4.0 03/27/2023    POTASSIUM 4.3 03/19/2023    CHLORIDE 101 03/27/2023    CHLORIDE 100 03/19/2023    CO2 26 03/27/2023    CO2 24 03/19/2023    BUN 8.4 03/27/2023    BUN 8.3 03/19/2023    CR 0.70 03/27/2023    CR 0.70 03/19/2023     (H) 03/27/2023    GLC 97 03/19/2023     COAGS:   Lab Results   Component Value Date    INR 0.94 04/08/2015     POC:   Lab Results   Component Value Date    HCG Negative 05/05/2011     HEPATIC:   Lab Results   Component Value Date    ALBUMIN 4.5 03/27/2023    PROTTOTAL 7.2 03/27/2023    ALT 26 03/27/2023    AST 27 03/27/2023    ALKPHOS 113 (H) 03/27/2023    BILITOTAL 0.3 03/27/2023     OTHER:   Lab Results   Component Value Date    A1C 5.1 03/25/2022    ANNIE 10.7 (H) 03/27/2023    ANNIE 10.7 (H) 03/27/2023    PHOS 3.0 06/29/2022    MAG 2.1 03/27/2023    LIPASE 29 03/27/2023    TSH 1.97 03/27/2023    T4 1.00 02/18/2021    T3 118 12/07/2009    CRP <2.9 09/30/2021    SED 9 09/30/2021       Anesthesia Plan    ASA Status:  3    NPO Status:  NPO Appropriate    Anesthesia Type: General.     - Airway: ETT              Consents    Anesthesia Plan(s) and associated risks, benefits, and realistic alternatives discussed. Questions answered and patient/representative(s) expressed understanding.     - Discussed: Risks, Benefits and Alternatives for BOTH SEDATION and the PROCEDURE were discussed     - Discussed with:  Patient      - Extended Intubation/Ventilatory Support Discussed: No.      - Patient is DNR/DNI Status: No     Use of blood products discussed: No .     Postoperative Care    Pain management: IV analgesics, Oral pain medications.   PONV prophylaxis: Ondansetron (or other 5HT-3), Dexamethasone or Solumedrol     Comments:           H&P  reviewed: Unable to attach H&P to encounter due to EHR limitations. H&P Update: appropriate H&P reviewed, patient examined. No interval changes since H&P (within 30 days).         Angelica Jimenez MD

## 2023-08-23 ENCOUNTER — ANESTHESIA (OUTPATIENT)
Dept: SURGERY | Facility: AMBULATORY SURGERY CENTER | Age: 63
End: 2023-08-23
Payer: OTHER GOVERNMENT

## 2023-08-23 ENCOUNTER — HOSPITAL ENCOUNTER (OUTPATIENT)
Facility: AMBULATORY SURGERY CENTER | Age: 63
Discharge: HOME | End: 2023-08-23
Attending: SURGERY
Payer: OTHER GOVERNMENT

## 2023-08-23 VITALS
RESPIRATION RATE: 16 BRPM | OXYGEN SATURATION: 93 % | DIASTOLIC BLOOD PRESSURE: 69 MMHG | TEMPERATURE: 97.1 F | HEIGHT: 68 IN | BODY MASS INDEX: 33.49 KG/M2 | WEIGHT: 221 LBS | HEART RATE: 78 BPM | SYSTOLIC BLOOD PRESSURE: 129 MMHG

## 2023-08-23 DIAGNOSIS — E21.3 HYPERPARATHYROIDISM (H): Primary | ICD-10-CM

## 2023-08-23 LAB
PTH-INTACT SERPL-MCNC: 131 PG/ML (ref 15–65)
PTH-INTACT SERPL-MCNC: 36 PG/ML (ref 15–65)

## 2023-08-23 PROCEDURE — 60500 EXPLORE PARATHYROID GLANDS: CPT | Performed by: SURGERY

## 2023-08-23 PROCEDURE — 88331 PATH CONSLTJ SURG 1 BLK 1SPC: CPT | Mod: 26 | Performed by: STUDENT IN AN ORGANIZED HEALTH CARE EDUCATION/TRAINING PROGRAM

## 2023-08-23 PROCEDURE — 88305 TISSUE EXAM BY PATHOLOGIST: CPT | Mod: 26 | Performed by: STUDENT IN AN ORGANIZED HEALTH CARE EDUCATION/TRAINING PROGRAM

## 2023-08-23 PROCEDURE — 88305 TISSUE EXAM BY PATHOLOGIST: CPT | Mod: TC | Performed by: SURGERY

## 2023-08-23 PROCEDURE — 83970 ASSAY OF PARATHORMONE: CPT | Performed by: PATHOLOGY

## 2023-08-23 PROCEDURE — 88331 PATH CONSLTJ SURG 1 BLK 1SPC: CPT | Mod: TC | Performed by: SURGERY

## 2023-08-23 PROCEDURE — 60500 EXPLORE PARATHYROID GLANDS: CPT

## 2023-08-23 RX ORDER — SODIUM CHLORIDE, SODIUM LACTATE, POTASSIUM CHLORIDE, CALCIUM CHLORIDE 600; 310; 30; 20 MG/100ML; MG/100ML; MG/100ML; MG/100ML
INJECTION, SOLUTION INTRAVENOUS CONTINUOUS
Status: DISCONTINUED | OUTPATIENT
Start: 2023-08-23 | End: 2023-08-23 | Stop reason: HOSPADM

## 2023-08-23 RX ORDER — FENTANYL CITRATE 50 UG/ML
INJECTION, SOLUTION INTRAMUSCULAR; INTRAVENOUS PRN
Status: DISCONTINUED | OUTPATIENT
Start: 2023-08-23 | End: 2023-08-23

## 2023-08-23 RX ORDER — FENTANYL CITRATE 50 UG/ML
25 INJECTION, SOLUTION INTRAMUSCULAR; INTRAVENOUS EVERY 5 MIN PRN
Status: DISCONTINUED | OUTPATIENT
Start: 2023-08-23 | End: 2023-08-23 | Stop reason: HOSPADM

## 2023-08-23 RX ORDER — PROPOFOL 10 MG/ML
INJECTION, EMULSION INTRAVENOUS PRN
Status: DISCONTINUED | OUTPATIENT
Start: 2023-08-23 | End: 2023-08-23

## 2023-08-23 RX ORDER — AMOXICILLIN 250 MG
1-2 CAPSULE ORAL 2 TIMES DAILY
Qty: 30 TABLET | Refills: 0 | Status: SHIPPED | OUTPATIENT
Start: 2023-08-23 | End: 2023-09-11

## 2023-08-23 RX ORDER — OXYCODONE HYDROCHLORIDE 5 MG/1
5 TABLET ORAL EVERY 4 HOURS PRN
Status: DISCONTINUED | OUTPATIENT
Start: 2023-08-23 | End: 2023-08-24 | Stop reason: HOSPADM

## 2023-08-23 RX ORDER — ONDANSETRON 2 MG/ML
INJECTION INTRAMUSCULAR; INTRAVENOUS PRN
Status: DISCONTINUED | OUTPATIENT
Start: 2023-08-23 | End: 2023-08-23

## 2023-08-23 RX ORDER — KETOROLAC TROMETHAMINE 30 MG/ML
15 INJECTION, SOLUTION INTRAMUSCULAR; INTRAVENOUS
Status: DISCONTINUED | OUTPATIENT
Start: 2023-08-23 | End: 2023-08-23 | Stop reason: HOSPADM

## 2023-08-23 RX ORDER — GLYCOPYRROLATE 0.2 MG/ML
INJECTION, SOLUTION INTRAMUSCULAR; INTRAVENOUS PRN
Status: DISCONTINUED | OUTPATIENT
Start: 2023-08-23 | End: 2023-08-23

## 2023-08-23 RX ORDER — ONDANSETRON 2 MG/ML
4 INJECTION INTRAMUSCULAR; INTRAVENOUS EVERY 30 MIN PRN
Status: DISCONTINUED | OUTPATIENT
Start: 2023-08-23 | End: 2023-08-24 | Stop reason: HOSPADM

## 2023-08-23 RX ORDER — LIDOCAINE HYDROCHLORIDE 20 MG/ML
INJECTION, SOLUTION INFILTRATION; PERINEURAL PRN
Status: DISCONTINUED | OUTPATIENT
Start: 2023-08-23 | End: 2023-08-23

## 2023-08-23 RX ORDER — DEXAMETHASONE SODIUM PHOSPHATE 4 MG/ML
INJECTION, SOLUTION INTRA-ARTICULAR; INTRALESIONAL; INTRAMUSCULAR; INTRAVENOUS; SOFT TISSUE PRN
Status: DISCONTINUED | OUTPATIENT
Start: 2023-08-23 | End: 2023-08-23

## 2023-08-23 RX ORDER — ACETAMINOPHEN 325 MG/1
650 TABLET ORAL EVERY 4 HOURS PRN
Qty: 50 TABLET | Refills: 0 | Status: SHIPPED | OUTPATIENT
Start: 2023-08-23

## 2023-08-23 RX ORDER — FENTANYL CITRATE 50 UG/ML
50 INJECTION, SOLUTION INTRAMUSCULAR; INTRAVENOUS EVERY 5 MIN PRN
Status: DISCONTINUED | OUTPATIENT
Start: 2023-08-23 | End: 2023-08-23 | Stop reason: HOSPADM

## 2023-08-23 RX ORDER — ACETAMINOPHEN 325 MG/1
975 TABLET ORAL
Status: DISCONTINUED | OUTPATIENT
Start: 2023-08-23 | End: 2023-08-23 | Stop reason: HOSPADM

## 2023-08-23 RX ORDER — ONDANSETRON 4 MG/1
4 TABLET, ORALLY DISINTEGRATING ORAL EVERY 30 MIN PRN
Status: DISCONTINUED | OUTPATIENT
Start: 2023-08-23 | End: 2023-08-23 | Stop reason: HOSPADM

## 2023-08-23 RX ORDER — ALBUTEROL SULFATE 0.83 MG/ML
2.5 SOLUTION RESPIRATORY (INHALATION) EVERY 4 HOURS PRN
Status: DISCONTINUED | OUTPATIENT
Start: 2023-08-23 | End: 2023-08-23 | Stop reason: HOSPADM

## 2023-08-23 RX ORDER — DEXAMETHASONE SODIUM PHOSPHATE 10 MG/ML
10 INJECTION, SOLUTION INTRAMUSCULAR; INTRAVENOUS ONCE
Status: DISCONTINUED | OUTPATIENT
Start: 2023-08-23 | End: 2023-08-23 | Stop reason: HOSPADM

## 2023-08-23 RX ORDER — FENTANYL CITRATE 50 UG/ML
25 INJECTION, SOLUTION INTRAMUSCULAR; INTRAVENOUS
Status: DISCONTINUED | OUTPATIENT
Start: 2023-08-23 | End: 2023-08-24 | Stop reason: HOSPADM

## 2023-08-23 RX ORDER — ACETAMINOPHEN 325 MG/1
975 TABLET ORAL
Status: DISCONTINUED | OUTPATIENT
Start: 2023-08-23 | End: 2023-08-24 | Stop reason: HOSPADM

## 2023-08-23 RX ORDER — PROPOFOL 10 MG/ML
INJECTION, EMULSION INTRAVENOUS CONTINUOUS PRN
Status: DISCONTINUED | OUTPATIENT
Start: 2023-08-23 | End: 2023-08-23

## 2023-08-23 RX ORDER — OXYCODONE HYDROCHLORIDE 5 MG/1
5-10 TABLET ORAL EVERY 4 HOURS PRN
Qty: 6 TABLET | Refills: 0 | Status: SHIPPED | OUTPATIENT
Start: 2023-08-23 | End: 2023-09-11

## 2023-08-23 RX ORDER — ONDANSETRON 4 MG/1
4 TABLET, ORALLY DISINTEGRATING ORAL EVERY 30 MIN PRN
Status: DISCONTINUED | OUTPATIENT
Start: 2023-08-23 | End: 2023-08-24 | Stop reason: HOSPADM

## 2023-08-23 RX ORDER — ONDANSETRON 2 MG/ML
4 INJECTION INTRAMUSCULAR; INTRAVENOUS EVERY 30 MIN PRN
Status: DISCONTINUED | OUTPATIENT
Start: 2023-08-23 | End: 2023-08-23 | Stop reason: HOSPADM

## 2023-08-23 RX ORDER — HYDROMORPHONE HYDROCHLORIDE 1 MG/ML
0.2 INJECTION, SOLUTION INTRAMUSCULAR; INTRAVENOUS; SUBCUTANEOUS EVERY 5 MIN PRN
Status: DISCONTINUED | OUTPATIENT
Start: 2023-08-23 | End: 2023-08-23 | Stop reason: HOSPADM

## 2023-08-23 RX ORDER — SODIUM CHLORIDE, SODIUM LACTATE, POTASSIUM CHLORIDE, CALCIUM CHLORIDE 600; 310; 30; 20 MG/100ML; MG/100ML; MG/100ML; MG/100ML
INJECTION, SOLUTION INTRAVENOUS CONTINUOUS PRN
Status: DISCONTINUED | OUTPATIENT
Start: 2023-08-23 | End: 2023-08-23

## 2023-08-23 RX ORDER — ACETAMINOPHEN 325 MG/1
975 TABLET ORAL ONCE
Status: COMPLETED | OUTPATIENT
Start: 2023-08-23 | End: 2023-08-23

## 2023-08-23 RX ORDER — LABETALOL HYDROCHLORIDE 5 MG/ML
10 INJECTION, SOLUTION INTRAVENOUS
Status: DISCONTINUED | OUTPATIENT
Start: 2023-08-23 | End: 2023-08-23 | Stop reason: HOSPADM

## 2023-08-23 RX ORDER — HYDROMORPHONE HYDROCHLORIDE 1 MG/ML
0.4 INJECTION, SOLUTION INTRAMUSCULAR; INTRAVENOUS; SUBCUTANEOUS EVERY 5 MIN PRN
Status: DISCONTINUED | OUTPATIENT
Start: 2023-08-23 | End: 2023-08-23 | Stop reason: HOSPADM

## 2023-08-23 RX ADMIN — DEXAMETHASONE SODIUM PHOSPHATE 10 MG: 4 INJECTION, SOLUTION INTRA-ARTICULAR; INTRALESIONAL; INTRAMUSCULAR; INTRAVENOUS; SOFT TISSUE at 14:22

## 2023-08-23 RX ADMIN — SODIUM CHLORIDE, SODIUM LACTATE, POTASSIUM CHLORIDE, CALCIUM CHLORIDE: 600; 310; 30; 20 INJECTION, SOLUTION INTRAVENOUS at 14:06

## 2023-08-23 RX ADMIN — GLYCOPYRROLATE 0.2 MG: 0.2 INJECTION, SOLUTION INTRAMUSCULAR; INTRAVENOUS at 14:21

## 2023-08-23 RX ADMIN — PROPOFOL 150 MCG/KG/MIN: 10 INJECTION, EMULSION INTRAVENOUS at 14:12

## 2023-08-23 RX ADMIN — PROPOFOL 200 MG: 10 INJECTION, EMULSION INTRAVENOUS at 14:13

## 2023-08-23 RX ADMIN — FENTANYL CITRATE 100 MCG: 50 INJECTION, SOLUTION INTRAMUSCULAR; INTRAVENOUS at 14:23

## 2023-08-23 RX ADMIN — PROPOFOL 150 MCG/KG/MIN: 10 INJECTION, EMULSION INTRAVENOUS at 14:24

## 2023-08-23 RX ADMIN — HYDROMORPHONE HYDROCHLORIDE 0.4 MG: 1 INJECTION, SOLUTION INTRAMUSCULAR; INTRAVENOUS; SUBCUTANEOUS at 15:12

## 2023-08-23 RX ADMIN — ACETAMINOPHEN 975 MG: 325 TABLET ORAL at 12:29

## 2023-08-23 RX ADMIN — OXYCODONE HYDROCHLORIDE 5 MG: 5 TABLET ORAL at 15:08

## 2023-08-23 RX ADMIN — LIDOCAINE HYDROCHLORIDE 100 MG: 20 INJECTION, SOLUTION INFILTRATION; PERINEURAL at 14:13

## 2023-08-23 RX ADMIN — Medication 100 MCG: at 14:40

## 2023-08-23 RX ADMIN — Medication 100 MCG: at 14:32

## 2023-08-23 RX ADMIN — ONDANSETRON 4 MG: 2 INJECTION INTRAMUSCULAR; INTRAVENOUS at 14:47

## 2023-08-23 NOTE — ANESTHESIA PROCEDURE NOTES
Airway       Patient location during procedure: OR       Procedure Start/Stop Times: 8/23/2023 2:15 PM  Staff -        Performed By: CRNA  Consent for Airway        Urgency: elective  Indications and Patient Condition       Indications for airway management: shahana-procedural       Induction type:intravenous       Mask difficulty assessment: 1 - vent by mask    Final Airway Details       Final airway type: endotracheal airway       Successful airway: ETT - single  Endotracheal Airway Details        ETT size (mm): 6.0       Cuffed: yes       Cuff volume (mL): 8       Successful intubation technique: video laryngoscopy       VL Blade Size: MAC 3       Grade View of Cords: 1       Adjucts: stylet       Position: Right       Measured from: lips       Secured at (cm): 19       Bite block used: None    Post intubation assessment        Placement verified by: capnometry, equal breath sounds and chest rise        Number of attempts at approach: 1       Secured with: pink tape       Ease of procedure: easy       Dentition: Intact and Unchanged    Medication(s) Administered   Medication Administration Time: 8/23/2023 2:15 PM

## 2023-08-23 NOTE — ANESTHESIA CARE TRANSFER NOTE
Patient: Treesa Fernandez    Procedure: Procedure(s):  PARATHYROIDECTOMY       Diagnosis: Hyperparathyroidism (H) [E21.3]  Diagnosis Additional Information: No value filed.    Anesthesia Type:   General     Note:    Oropharynx: oropharynx clear of all foreign objects and spontaneously breathing  Level of Consciousness: awake  Oxygen Supplementation: face mask  Level of Supplemental Oxygen (L/min / FiO2): 6  Independent Airway: airway patency satisfactory and stable  Dentition: dentition unchanged  Vital Signs Stable: post-procedure vital signs reviewed and stable  Report to RN Given: handoff report given  Patient transferred to: PACU    Handoff Report: Identifed the Patient, Identified the Reponsible Provider, Reviewed the pertinent medical history, Discussed the surgical course, Reviewed Intra-OP anesthesia mangement and issues during anesthesia, Set expectations for post-procedure period and Allowed opportunity for questions and acknowledgement of understanding      Vitals:  Vitals Value Taken Time   /71 08/23/23 1458   Temp 36.1  C (96.9  F) 08/23/23 1458   Pulse 94 08/23/23 1500   Resp 8 08/23/23 1500   SpO2 98 % 08/23/23 1500   Vitals shown include unvalidated device data.    Electronically Signed By: HERIBERTO Guzman CRNA  August 23, 2023  3:01 PM

## 2023-08-23 NOTE — DISCHARGE INSTRUCTIONS
[facility]  Home Care Following Anesthesia  For 24 hours after surgery:  Get plenty of rest.  A responsible adult must stay with you for at least 24 hours after you leave the surgery center.  Do not drive or use heavy equipment.  If you have weakness or tingling, don't drive or use heavy equipment until this feeling goes away.   Do not drink alcohol.   Avoid strenuous or risky activities.  Ask for help when climbing stairs.  You may feel lightheaded.  IF so, sit for a few minutes before standing.  Have someone help you get up.   If you have nausea (feel sick to your stomach): Drink only clear liquids such as apple juice, ginger ale, broth or 7-Up.  Rest may also help.  Be sure to drink enough fluids.  Move to a regular diet as you feel able.   You may have a slight fever.  Call the doctor if your fever is over 100 F (37.7 C) (taken under the tongue) or lasts longer than 24 hours.  You may have a dry mouth, a sore throat, muscle aches or trouble sleeping. These should go away after 24 hours.  Do not make important or legal decisions.   It is recommended to avoid smoking.               Tips for taking pain medications  To get the best pain relief possible, remember these points:  Take pain medications as directed, before pain becomes severe.  Pain medication can upset your stomach: taking it with food may help.  Constipation is a common side effect of pain medication. Drink plenty of  fluids.  Eat foods high in fiber. Take a stool softener if recommended by your doctor or pharmacist.  Do not drink alcohol, drive or operate machinery while taking pain medications.  Ask about other ways to control pain, such as with heat, ice or relaxation.    Tylenol/Acetaminophen Consumption    If you feel your pain relief is insufficient, you may take Tylenol/Acetaminophen in addition to your narcotic pain medication.   Be careful not to exceed 4,000 mg of Tylenol/Acetaminophen in a 24 hour  period from all sources.  If you are taking extra strength Tylenol/acetaminophen (500 mg), the maximum dose is 8 tablets in 24 hours.  If you are taking regular strength acetaminophen (325 mg), the maximum dose is 12 tablets in 24 hours.  You had 975mg of tylenol at 1230pm, do not repeat before 630pm today.    Call a doctor for any of the following:  Signs of infection (fever, growing tenderness at the surgery site, a large amount of drainage or bleeding, severe pain, foul-smelling drainage, redness, swelling).  It has been over 8 to 10 hours since surgery and you are still not able to urinate (pass water).  Headache for over 24 hours.  Signs of Covid-19 infection (temperature over 100 degrees, shortness of breath, cough, loss of taste/smell, generalized body aches, persistent headache, chills, sore throat, nausea/vomiting/diarrhea)  Your doctor is:  Dr. Hannah Good, ENT Otolaryngology: [phone]                    Or dial [phone] and ask for the resident on call for:  ENT Otolaryngology  For emergency care, call the:  [place]:  [phone] (TTY for hearing impaired: [phone])

## 2023-08-23 NOTE — BRIEF OP NOTE
[facility] [place]    Brief Operative Note    Pre-operative diagnosis: Hyperparathyroidism (H) [E21.3]  Post-operative diagnosis Same as pre-operative diagnosis    Procedure: Procedure(s):  PARATHYROIDECTOMY  Surgeon: Surgeon(s) and Role:     * Hannah Good MD - Primary     * Javad Salazar MD - Resident - Assisting  Anesthesia: General   Estimated Blood Loss: Minimal    Drains: None  Specimens:   ID Type Source Tests Collected by Time Destination   1 : LEFT INFERIOR PARATHYROID Tissue Parathyroid, Inferior, Left SURGICAL PATHOLOGY EXAM Hannah Good MD 8/23/2023  2:35 PM    A : baseline PTH Blood Foot, Left PARATHYROID HORMONE INTACT INTRAOPERATIVE Sully Arias RN 8/23/2023  2:20 PM    B : POST PTH Blood Foot, Left PARATHYROID HORMONE INTACT INTRAOPERATIVE Sully Arias RN 8/23/2023  2:40 PM      Findings:   Left parathyroid identified and removed  .  Complications: None.  Implants: * No implants in log *

## 2023-08-23 NOTE — ANESTHESIA POSTPROCEDURE EVALUATION
Patient: Teresa Fernandez    Procedure: Procedure(s):  PARATHYROIDECTOMY       Anesthesia Type:  General    Note:  Disposition: Outpatient   Postop Pain Control: Uneventful            Sign Out: Well controlled pain   PONV: No   Neuro/Psych: Uneventful            Sign Out: Acceptable/Baseline neuro status   Airway/Respiratory: Uneventful            Sign Out: Acceptable/Baseline resp. status   CV/Hemodynamics: Uneventful            Sign Out: Acceptable CV status; No obvious hypovolemia; No obvious fluid overload   Other NRE: NONE   DID A NON-ROUTINE EVENT OCCUR? No           Last vitals:  Vitals Value Taken Time   /69 08/23/23 1530   Temp 36.2  C (97.1  F) 08/23/23 1515   Pulse 78 08/23/23 1530   Resp 16 08/23/23 1530   SpO2 96 % 08/23/23 1527   Vitals shown include unvalidated device data.    Electronically Signed By: Angelica Jimenez MD  August 23, 2023  5:23 PM

## 2023-08-24 ENCOUNTER — MYC MEDICAL ADVICE (OUTPATIENT)
Dept: FAMILY MEDICINE | Facility: CLINIC | Age: 63
End: 2023-08-24
Payer: OTHER GOVERNMENT

## 2023-08-28 LAB
PATH REPORT.COMMENTS IMP SPEC: NORMAL
PATH REPORT.COMMENTS IMP SPEC: NORMAL
PATH REPORT.FINAL DX SPEC: NORMAL
PATH REPORT.GROSS SPEC: NORMAL
PATH REPORT.INTRAOP OBS SPEC DOC: NORMAL
PATH REPORT.MICROSCOPIC SPEC OTHER STN: NORMAL
PATH REPORT.RELEVANT HX SPEC: NORMAL
PHOTO IMAGE: NORMAL

## 2023-09-07 NOTE — OP NOTE
Pre-operative diagnosis:         Hyperparathyroidism (H) [E21.3]  Post-operative diagnosis        Same as pre-operative diagnosis     Procedure:      Procedure(s):  PARATHYROIDECTOMY  Surgeon:         Surgeon(s) and Role:     * Hannah Good MD - Primary     * Javad Salazar MD - Resident - Assisting  Anesthesia:     General             Estimated Blood Loss: Minimal    Clinical indications for the procedure:  This is a 63-year-old female noted to be hypercalcemic.  A work-up ensued that confirmed primary hyperparathyroidism.  A localizing study localize the area of concern in the left inferior position.  Based on this was recommended that the patient undergo a neck exploration resection of a parathyroid adenoma or adenomas.  The surgical procedure was discussed with the patient including but not limited to the risks of bleeding infection injury to the recurrent laryngeal nerve or nerves, potential loss of airway, potential permanent hypocalcemia, potential missed parathyroid adenoma    Details of the procedure:  The patient was brought to the operating room in stable condition placed on the operating table supine position.  After appropriate general anesthesia was obtained a timeout was then performed.  A parathyroid hormone level was drawn.  A 3 cm incision incision was made over the anterior neck following a natural skin crease.  The platysma was then divided and subplatysmal planes were then created.  The strap muscles were divided at the midline and retracted laterally.  Based on the localizing study we evaluated the left neck first.  We rotated the left lobe of the thyroid gland medially and inferior and slightly lateral was an enlarged parathyroid gland in the inferior position.  This was dissected its blood supply was clipped or bipolar cauterized and sent for frozen section.  We also identified the left recurrent laryngeal nerve at this location we followed it from an inferior to superior manner  dissecting the thyroid gland off of it anteriorly.  As the left superior parathyroid gland was identified, it was of normal size and normal location.  We inspected the right inferior parathyroid gland as well as the right superior parathyroid gland both were of normal size and normal position.  We confirmed that both right and left recurrent laryngeal nerves were intact visibly as well as with nerve stimulation.  The patient had a 10-minute post excision parathyroid hormone level drawn.  The fibrillar was placed in the operative bed.  The strap muscles were approximated at the midline using a 3-0 chromic interrupted suture.  The platysma was approximated using a 3-0 chromic interrupted suture.  The skin incision was approximated using a 5-0 Monocryl running subcuticular suture.  The wound was dressed with Dermabond.  The patient was then extubated and returned to the recovery room in stable condition.  As present during the entire surgical procedure.    Hannah Martínez MD

## 2023-09-11 ENCOUNTER — OFFICE VISIT (OUTPATIENT)
Dept: OTOLARYNGOLOGY | Facility: CLINIC | Age: 63
End: 2023-09-11
Payer: OTHER GOVERNMENT

## 2023-09-11 ENCOUNTER — LAB (OUTPATIENT)
Dept: LAB | Facility: CLINIC | Age: 63
End: 2023-09-11
Payer: OTHER GOVERNMENT

## 2023-09-11 VITALS
SYSTOLIC BLOOD PRESSURE: 131 MMHG | OXYGEN SATURATION: 97 % | DIASTOLIC BLOOD PRESSURE: 74 MMHG | BODY MASS INDEX: 33.95 KG/M2 | TEMPERATURE: 97.5 F | WEIGHT: 224 LBS | HEIGHT: 68 IN | HEART RATE: 86 BPM

## 2023-09-11 DIAGNOSIS — Z98.890 S/P PARATHYROIDECTOMY: Primary | ICD-10-CM

## 2023-09-11 DIAGNOSIS — Z90.89 S/P PARATHYROIDECTOMY: ICD-10-CM

## 2023-09-11 DIAGNOSIS — E78.5 HYPERLIPIDEMIA LDL GOAL <160: ICD-10-CM

## 2023-09-11 DIAGNOSIS — Z98.890 S/P PARATHYROIDECTOMY: ICD-10-CM

## 2023-09-11 DIAGNOSIS — Z90.89 S/P PARATHYROIDECTOMY: Primary | ICD-10-CM

## 2023-09-11 LAB
CALCIUM SERPL-MCNC: 9.9 MG/DL (ref 8.8–10.2)
CHOLEST SERPL-MCNC: 233 MG/DL
HDLC SERPL-MCNC: 54 MG/DL
LDLC SERPL CALC-MCNC: 141 MG/DL
NONHDLC SERPL-MCNC: 179 MG/DL
PTH-INTACT SERPL-MCNC: 105 PG/ML (ref 15–65)
TRIGL SERPL-MCNC: 189 MG/DL

## 2023-09-11 PROCEDURE — 82306 VITAMIN D 25 HYDROXY: CPT | Performed by: SURGERY

## 2023-09-11 PROCEDURE — 36415 COLL VENOUS BLD VENIPUNCTURE: CPT | Performed by: PATHOLOGY

## 2023-09-11 PROCEDURE — 82310 ASSAY OF CALCIUM: CPT | Performed by: PATHOLOGY

## 2023-09-11 PROCEDURE — 99000 SPECIMEN HANDLING OFFICE-LAB: CPT | Performed by: PATHOLOGY

## 2023-09-11 PROCEDURE — 83970 ASSAY OF PARATHORMONE: CPT | Performed by: PATHOLOGY

## 2023-09-11 PROCEDURE — 99024 POSTOP FOLLOW-UP VISIT: CPT | Performed by: SURGERY

## 2023-09-11 PROCEDURE — 80061 LIPID PANEL: CPT | Performed by: PATHOLOGY

## 2023-09-11 ASSESSMENT — PAIN SCALES - GENERAL: PAINLEVEL: NO PAIN (0)

## 2023-09-11 NOTE — LETTER
9/11/2023       RE: Teresa Fernandez  [address] [address] [id number]     Dear Colleague,    Thank you for referring your patient, Teresa Fernandez, to the [facility] [place] at [facility]. Please see a copy of my visit note below.    2-week postop check status postresection left inferior parathyroid adenoma.  Post excision parathyroid hormone level is 36.    Since his surgery the patient denies any probs with voice quality inspiration or swallowing.  No symptoms of hypocalcemia    On physical exam the wound is healing well the Dermabond was removed and the sutures were clipped at the skin edges.  No evidence of hematoma seroma or infection.    Pathology was reviewed with the patient.    Plan:  I reviewed with the patient wound management wound massage  Today we will check calcium vitamin D and parathyroid hormone level  The patient will follow-up with me on an as-needed basis      Again, thank you for allowing me to participate in the care of your patient.      Sincerely,    Hannah Good MD

## 2023-09-11 NOTE — PATIENT INSTRUCTIONS
"You were seen in the clinic today by Dr. Good. If you have any questions or concerns after your appointment, please call the clinic at [phone]. Press \"1\" for scheduling, press \"3\" for nurse advice.    2.   The following has been recommended for you based upon your appointment today:   -Please schedule lab appointment       Nita HAMMER, RN  [facility]  Department of Otolaryngology  [phone]   "

## 2023-09-11 NOTE — NURSING NOTE
"Chief Complaint   Patient presents with    RECHECK     2 week post op      Blood pressure 131/74, pulse 86, temperature 97.5  F (36.4  C), height 1.727 m (5' 8\"), weight 101.6 kg (224 lb), SpO2 97 %, not currently breastfeeding.  Madi Stewart LPN    "

## 2023-09-12 ENCOUNTER — MYC MEDICAL ADVICE (OUTPATIENT)
Dept: FAMILY MEDICINE | Facility: CLINIC | Age: 63
End: 2023-09-12
Payer: OTHER GOVERNMENT

## 2023-09-12 LAB — DEPRECATED CALCIDIOL+CALCIFEROL SERPL-MC: 37 UG/L (ref 20–75)

## 2023-09-12 NOTE — RESULT ENCOUNTER NOTE
Mesfin Moore    Your lipids are above goal, I'm sure you were not fasting so test may be skewed. Cholesterol lowering medications are recommended at a 10 year cardiac risk score of 7.5% or higher. Your cardiac risk score is:    The 10-year ASCVD risk score (Gila COOK, et al., 2019) is: 4.8%    Values used to calculate the score:      Age: 63 years      Sex: Female      Is Non- : No      Diabetic: No      Tobacco smoker: No      Systolic Blood Pressure: 131 mmHg      Is BP treated: No      HDL Cholesterol: 54 mg/dL      Total Cholesterol: 233 mg/dL    Hope your doing well after surgery.     Please let us know if you have any questions.     Take care,    HERIBERTO Miller CNP

## 2023-09-19 ENCOUNTER — APPOINTMENT (OUTPATIENT)
Dept: GENERAL RADIOLOGY | Facility: CLINIC | Age: 63
End: 2023-09-19
Attending: EMERGENCY MEDICINE
Payer: OTHER GOVERNMENT

## 2023-09-19 ENCOUNTER — HOSPITAL ENCOUNTER (EMERGENCY)
Facility: CLINIC | Age: 63
Discharge: HOME | End: 2023-09-19
Attending: EMERGENCY MEDICINE | Admitting: EMERGENCY MEDICINE
Payer: OTHER GOVERNMENT

## 2023-09-19 VITALS
OXYGEN SATURATION: 97 % | HEART RATE: 72 BPM | TEMPERATURE: 98.6 F | SYSTOLIC BLOOD PRESSURE: 131 MMHG | WEIGHT: 221 LBS | BODY MASS INDEX: 33.6 KG/M2 | DIASTOLIC BLOOD PRESSURE: 58 MMHG | RESPIRATION RATE: 16 BRPM

## 2023-09-19 DIAGNOSIS — S93.401A SPRAIN OF RIGHT ANKLE, UNSPECIFIED LIGAMENT, INITIAL ENCOUNTER: ICD-10-CM

## 2023-09-19 DIAGNOSIS — S96.911A STRAIN OF RIGHT FOOT, INITIAL ENCOUNTER: ICD-10-CM

## 2023-09-19 PROCEDURE — 99284 EMERGENCY DEPT VISIT MOD MDM: CPT | Performed by: EMERGENCY MEDICINE

## 2023-09-19 PROCEDURE — 99283 EMERGENCY DEPT VISIT LOW MDM: CPT

## 2023-09-19 PROCEDURE — 73610 X-RAY EXAM OF ANKLE: CPT | Mod: RT

## 2023-09-19 PROCEDURE — 73630 X-RAY EXAM OF FOOT: CPT | Mod: RT

## 2023-09-19 PROCEDURE — 250N000013 HC RX MED GY IP 250 OP 250 PS 637: Performed by: FAMILY MEDICINE

## 2023-09-19 RX ORDER — ACETAMINOPHEN 500 MG
1000 TABLET ORAL ONCE
Status: COMPLETED | OUTPATIENT
Start: 2023-09-19 | End: 2023-09-19

## 2023-09-19 RX ADMIN — ACETAMINOPHEN 1000 MG: 500 TABLET, FILM COATED ORAL at 17:42

## 2023-09-19 ASSESSMENT — ACTIVITIES OF DAILY LIVING (ADL): ADLS_ACUITY_SCORE: 35

## 2023-09-20 ENCOUNTER — PATIENT OUTREACH (OUTPATIENT)
Dept: FAMILY MEDICINE | Facility: CLINIC | Age: 63
End: 2023-09-20
Payer: OTHER GOVERNMENT

## 2023-09-20 NOTE — TELEPHONE ENCOUNTER
Hospital/TCU/ED for chronic condition Discharge Protocol    Being followed by podiatry.    Cece Moscoso RN

## 2023-09-20 NOTE — ED PROVIDER NOTES
History     Chief Complaint   Patient presents with    Fall    Ankle Pain     Right       HPI  Teresa Fernandez is a 63 year old female with past medical history significant for toxic goiter asthma heart disease autoimmune disease who presents emergency department complaining of right foot and ankle pain status post trauma.  Patient was walking on the ground and her ankle and foot rolled significantly patient went to the ground but did not hit her head denies any back or neck pain scuffed up her left knee but has swelling of the lateral malleolus and lateral foot region.  States she has difficulty putting any weight on it denies any other injury her knee does not bother denies any hip pain.  Denies any focal numbness weakness in any extremity.    Allergies:  Allergies   Allergen Reactions    Iodinated Contrast Media [Iodinated Contrast Media] Swelling and Difficulty breathing     Immediate throat swelling following around 1-2cc of omnipaque 300 for spine procedure  skin prick and intradermal tests with Iohexol negatives but premedicate before using iodinated contrast.    no reactions in skin tests to MRI contrast media Gadovist    Omnipaque [Iohexol] Swelling and Difficulty breathing     Immediate throat swelling following around 1-2cc of omnipaque 300 for spine procedure  --> skin prick and intradermal tests with Iohexol negatives. But I would propose anyway to premedicate patient before using iodinated contrast media (for safety reasons).   --> no reactions in skin tests to MRI contrast media Gadovist    Gabapentin Hives    Gluten     Gluten Meal     Penicillins Hives    Sulfa Antibiotics        Problem List:    Patient Active Problem List    Diagnosis Date Noted    Hyperparathyroidism (H) 05/15/2023     Priority: Medium    Thyroid nodule 04/03/2023     Priority: Medium     Ultrasound 4/2023, repeat in 1 year      Lichen sclerosus of female genitalia 03/27/2023     Priority: Medium    Primary hyperparathyroidism  (H) 11/07/2022     Priority: Medium     Per Endocrine 10/2022:  She should be followed with serum calcium, creatinine and albumin every 6 months. Normal Dexa and urine calcium at this time, see Endocrine on as needed basis.        Chronic pruritus 02/14/2022     Priority: Medium    Family history of colon cancer 12/28/2021     Priority: Medium    Fibromyalgia 09/30/2021     Priority: Medium    History of kidney cancer 04/01/2021     Priority: Medium     1996- left nephrectomy      GAYE (obstructive sleep apnea) 09/17/2020     Priority: Medium     9/9/2020 [facility] Study (225.0 lbs) - AHI 19.8, RDI 26.4, Supine AHI 22.0, REM AHI 36.9, Low O2 79.5%, Time Spent ?88% 40.1 minutes / Time Spent ?89% 51.6 minutes.      Myalgia 10/20/2017     Priority: Medium     Increased CK      Chest tightness or pressure 10/03/2016     Priority: Medium    DDD (degenerative disc disease), lumbar 06/24/2014     Priority: Medium     [facility]      Moderate persistent asthma without complication 07/08/2013     Priority: Medium    History of melanoma in situ 10/17/2012     Priority: Medium    Eczema 10/17/2012     Priority: Medium    Osteopenia 06/15/2012     Priority: Medium    Chronic fatigue 10/04/2011     Priority: Medium    Dry eye syndrome 08/24/2011     Priority: Medium    Chronic low back pain 07/07/2011     Priority: Medium    GERD (gastroesophageal reflux disease) 05/11/2011     Priority: Medium    Leukoplakia of oral mucosa, including tongue 04/18/2011     Priority: Medium    ERIC (generalized anxiety disorder)      Priority: Medium    Chronic rhinitis 05/03/2005     Priority: Medium    Allergic rhinitis due to pollen 05/03/2005     Priority: Medium    Hypothyroidism, unspecified type 04/12/2005     Priority: Medium     Problem list name updated by automated process. Provider to review          Past Medical History:    Past Medical History:   Diagnosis Date    Allergic rhinitis     ALLERGIC  RHINITIS NOS 04/12/2005    Anxiety     Arthritis     Autoimmune disease (H) 2014    Benign positional vertigo     Bronchitis, not specified as acute or chronic     CHR MAXILLARY SINUSITIS 04/12/2005    Coronary artery disease     Depressive disorder 1996    Depressive disorder, not elsewhere classified     Eczema 10/17/2012    Environmental and seasonal allergies     GERD (gastroesophageal reflux disease)     Gluten intolerance     Heart disease July 2021    Kidney problem 1996    Malignant neoplasm of renal pelvis (H) 1995    Melanoma (H) 06/2010    Other specified acquired hypothyroidism 04/12/2005    Renal cancer (H) 05/05/2011    Sleep apnea     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     Uncomplicated asthma     Unspecified asthma(493.90)        Past Surgical History:    Past Surgical History:   Procedure Laterality Date    CHOLECYSTECTOMY      COLONOSCOPY      2007-normal    ENT SURGERY  02/15/2011    Left cheek bx- Mucositis.    FUSION LUMBAR ANTERIOR TWO LEVELS  04/13/2015    GI SURGERY      GYN SURGERY  04/08/1999    hysterectomy.  [facility]    HYSTERECTOMY, PAP NO LONGER INDICATED      PARATHYROIDECTOMY N/A 8/23/2023    Procedure: PARATHYROIDECTOMY;  Surgeon: Hannah Good MD;  Location: [facility] OR    SOFT TISSUE SURGERY      chest-melonoma    SURGICAL HISTORY OF -   03/1996    Left nephrectomy-renal cell CA       Family History:    Family History   Problem Relation Age of Onset    Cardiovascular Mother     Lipids Mother     Depression Mother     Heart Disease Mother     Substance Abuse Mother     Hypertension Father     Lipids Father     Obesity Father     Macular Degeneration Father     Sleep Apnea Father     Thyroid Disease Sister     Hypertension Sister     Depression Sister     Allergies Sister     Lipids Sister     Thyroid Disease Sister     Neurologic Disorder Sister     Depression Sister     Hypertension Brother     Colon Cancer Brother     Cancer Maternal Grandmother          kind unknown had sores on legs    Eczema Maternal Grandmother     Eczema Maternal Grandfather     Breast Cancer Paternal Grandmother     Cancer Paternal Grandmother         breast    Hypertension Paternal Grandfather     Cardiovascular Paternal Grandfather         heart attack    Allergies Son     Eczema Son     Respiratory Son     Sleep Apnea Son     Glaucoma No family hx of     Cerebrovascular Disease No family hx of     Diabetes No family hx of        Social History:  Marital Status:   [2]  Social History     Tobacco Use    Smoking status: Former     Packs/day: 2.00     Years: 15.00     Pack years: 30.00     Types: Cigarettes     Quit date: 3/26/1996     Years since quittin.5    Smokeless tobacco: Former   Vaping Use    Vaping Use: Never used   Substance Use Topics    Alcohol use: Not Currently     Comment: rare    Drug use: No        Medications:    acetaminophen (TYLENOL) 325 MG tablet  acetaminophen (TYLENOL) 325 MG tablet  albuterol (PROAIR HFA/PROVENTIL HFA/VENTOLIN HFA) 108 (90 Base) MCG/ACT inhaler  albuterol (PROVENTIL) (2.5 MG/3ML) 0.083% neb solution  APNO CREA ointment  augmented betamethasone dipropionate (DIPROLENE-AF) 0.05 % external ointment  clobetasol (TEMOVATE) 0.05 % external cream  DULoxetine (CYMBALTA) 30 MG capsule  DULoxetine (CYMBALTA) 60 MG capsule  fexofenadine (ALLEGRA) 180 MG tablet  isosorbide mononitrate (IMDUR) 30 MG 24 hr tablet  levothyroxine (SYNTHROID/LEVOTHROID) 100 MCG tablet  levothyroxine (SYNTHROID/LEVOTHROID) 88 MCG tablet  loratadine (CLARITIN) 10 MG tablet  mometasone (ELOCON) 0.1 % external ointment  omeprazole (PRILOSEC) 20 MG DR capsule  ondansetron (ZOFRAN ODT) 4 MG ODT tab  order for DME  OVER-THE-COUNTER  triamcinolone (KENALOG) 0.1 % external cream          Review of Systems  As per HPI.  Physical Exam   BP: 125/79  Pulse: 86  Temp: 98.6  F (37  C)  Resp: 16  Weight: 100.2 kg (221 lb)  SpO2: 99 %      Physical Exam  Vitals and nursing note  reviewed.   Constitutional:       General: She is not in acute distress.     Appearance: Normal appearance. She is not ill-appearing, toxic-appearing or diaphoretic.   HENT:      Head: Normocephalic and atraumatic.      Nose: Nose normal.      Mouth/Throat:      Mouth: Mucous membranes are moist.      Pharynx: Oropharynx is clear.   Eyes:      Conjunctiva/sclera: Conjunctivae normal.   Cardiovascular:      Rate and Rhythm: Normal rate and regular rhythm.      Pulses: Normal pulses.      Heart sounds: Normal heart sounds. No murmur heard.  Pulmonary:      Effort: Pulmonary effort is normal.   Musculoskeletal:      Cervical back: Normal range of motion and neck supple.      Comments: There is no midline back pain.  No hip tenderness or knee tenderness bilateral although the patient has abrasion on her left knee she is able to move it without difficulty has mild lateral malleoli are swelling and swelling of the lateral foot proximal foot region with a superficial abrasion present.  Good cap refill sensation intact no laxity.   Skin:     General: Skin is warm and dry.   Neurological:      General: No focal deficit present.      Mental Status: She is alert and oriented to person, place, and time.      Motor: No weakness.      Coordination: Coordination normal.   Psychiatric:         Mood and Affect: Mood normal.         ED Course                 Procedures              Critical Care time:  none               Results for orders placed or performed during the hospital encounter of 09/19/23 (from the past 24 hour(s))   Foot  XR, G/E 3 views, right    Narrative    EXAM: XR FOOT RIGHT G/E 3 VIEWS  LOCATION: [facility]  DATE: 9/19/2023    INDICATION: Lateral foot pain status post trauma  COMPARISON: None.      Impression    IMPRESSION: No fractures are evident. Bunion deformity of the great toe. Degenerative changes in the first MTP joint. Achilles calcaneal spur.   Ankle XR, G/E 3 views, right     Narrative    EXAM: XR ANKLE RIGHT G/E 3 VIEWS  LOCATION: [facility]  DATE: 9/19/2023    INDICATION: R lateral ankle pain  COMPARISON: None.      Impression    IMPRESSION: No fractures are evident. The ankle mortise is intact. The talar dome is unremarkable. Small Achilles calcaneal spur.     *Note: Due to a large number of results and/or encounters for the requested time period, some results have not been displayed. A complete set of results can be found in Results Review.       Medications   acetaminophen (TYLENOL) tablet 1,000 mg (1,000 mg Oral $Given 9/19/23 2558)       Assessments & Plan (with Medical Decision Making) records were reviewed.  Past medical history medications and allergies were reviewed.  X-rays of the right ankle and foot were obtained.  I independently reviewed and interpreted the images and agree with radiologist findings.  There is no evidence of fractures dislocation or other significant abnormality.  Findings discussed in detail with the patient.  Patient will be placed in a walking boot and should ice and elevate her ankle and foot.  She should weight-bear as tolerated.  She should take ibuprofen or Tylenol for pain.  If increased pain swelling numbness weakness patient should return for recheck.  She should follow-up with orthopedics in a week if symptoms have not improved for repeat x-rays.  Patient is agreement with this plan.     I have reviewed the nursing notes.    I have reviewed the findings, diagnosis, plan and need for follow up with the patient.           Discharge Medication List as of 9/19/2023  9:24 PM          Final diagnoses:   Sprain of right ankle, unspecified ligament, initial encounter   Strain of right foot, initial encounter       9/19/2023   [facility] EMERGENCY DEPT       Harrison Kumar MD  09/20/23 5120

## 2023-09-20 NOTE — DISCHARGE INSTRUCTIONS
Return if symptoms worsen or new symptoms develop.  Follow-up with primary care physician next available.  Drink plenty of fluids.  Walking boot as needed.  Use crutches as needed bear as tolerated.  If increased pain swelling numbness weakness occur please return for recheck.  Follow-up with orthopedics if symptoms still persisting.  Take ibuprofen or Tylenol for pain elevate and ice.

## 2023-09-27 ENCOUNTER — OFFICE VISIT (OUTPATIENT)
Dept: PODIATRY | Facility: CLINIC | Age: 63
End: 2023-09-27
Attending: EMERGENCY MEDICINE
Payer: OTHER GOVERNMENT

## 2023-09-27 VITALS — WEIGHT: 221 LBS | BODY MASS INDEX: 33.49 KG/M2 | HEIGHT: 68 IN | RESPIRATION RATE: 18 BRPM

## 2023-09-27 DIAGNOSIS — S82.64XA CLOSED NONDISPLACED FRACTURE OF LATERAL MALLEOLUS OF RIGHT FIBULA, INITIAL ENCOUNTER: Primary | ICD-10-CM

## 2023-09-27 DIAGNOSIS — S82.391A: ICD-10-CM

## 2023-09-27 PROCEDURE — 27786 TREATMENT OF ANKLE FRACTURE: CPT | Mod: RT | Performed by: PODIATRIST

## 2023-09-27 PROCEDURE — 99203 OFFICE O/P NEW LOW 30 MIN: CPT | Mod: 57 | Performed by: PODIATRIST

## 2023-09-27 ASSESSMENT — PAIN SCALES - GENERAL: PAINLEVEL: MODERATE PAIN (5)

## 2023-09-27 NOTE — PROGRESS NOTES
PATIENT HISTORY:  Teresa Fernandez is a 63 year old female who presents to clinic in consultation at the request of  Harrison Kumar C.N.P. with a chief complaint of right ankle pain.  The patient is seen with their .  The patient relates the pain is primarily located around the right lateral ankle.  Patient describes injury as she was walking on 9/19/23 and twisted the ankle in a hole in the road.   The patient relates that the symptoms have been going on for several day(s).  The patient has previously tried different shoes, walking boot, rest, ice, NSAIDs and Tylenol with little relief.  The patient is currently employed as an  of [employer] (she does seated work) .  Any previous notes and studies that pertain to the patient's condition were reviewed.    Pertinent medical, surgical and family history was reviewed in the Epic chart.    Past Medical History:   Past Medical History:   Diagnosis Date    Allergic rhinitis     ALLERGIC RHINITIS NOS 04/12/2005    Anxiety     Arthritis     herniated disc    Autoimmune disease (H) 2014    Benign positional vertigo     Bronchitis, not specified as acute or chronic     CHR MAXILLARY SINUSITIS 04/12/2005    Coronary artery disease     Depressive disorder 1996    Depressive disorder, not elsewhere classified     Eczema 10/17/2012    Environmental and seasonal allergies     GERD (gastroesophageal reflux disease)     Gluten intolerance     Heart disease July 2021    Kidney problem 1996    Malignant neoplasm of renal pelvis (H) 1995    Renal cell carcinoma    Melanoma (H) 06/2010    Other specified acquired hypothyroidism 04/12/2005    Renal cancer (H) 05/05/2011    Sleep apnea     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     Grave's disease    Uncomplicated asthma     Unspecified asthma(493.90)        Medications:   Current Outpatient Medications:     acetaminophen (TYLENOL) 325 MG tablet, Take 2 tablets (650 mg) by mouth every 4  hours as needed for mild pain, Disp: 50 tablet, Rfl: 0    acetaminophen (TYLENOL) 325 MG tablet, Take 325-650 mg by mouth every 6 hours as needed for mild pain, Disp: , Rfl:     albuterol (PROAIR HFA/PROVENTIL HFA/VENTOLIN HFA) 108 (90 Base) MCG/ACT inhaler, Inhale 2 puffs into the lungs every 4 hours as needed for shortness of breath, Disp: 18 g, Rfl: 4    albuterol (PROVENTIL) (2.5 MG/3ML) 0.083% neb solution, Take 1 vial (2.5 mg) by nebulization every 6 hours as needed for shortness of breath / dyspnea or wheezing, Disp: 90 mL, Rfl: 11    APNO CREA ointment, Apply to rash on nose twice daily as needed., Disp: 100 g, Rfl: 3    augmented betamethasone dipropionate (DIPROLENE-AF) 0.05 % external ointment, Apply topically 2 times daily, Disp: 50 g, Rfl: 3    clobetasol (TEMOVATE) 0.05 % external cream, Apply topically 2 times daily To affected external vaginal areas, Disp: 60 g, Rfl: 3    DULoxetine (CYMBALTA) 30 MG capsule, TAKE 1 CAPSULE (30 MG) BY MOUTH DAILY IN ADDITION TO THE 60 MG FOR A TOTAL OF 90 MG DAILY, Disp: 90 capsule, Rfl: 3    DULoxetine (CYMBALTA) 60 MG capsule, Take 1 cap by mouth daily in addition to the 30 mg for a total 90 mg/day, Disp: 90 capsule, Rfl: 3    fexofenadine (ALLEGRA) 180 MG tablet, Take 1 tablet (180 mg) by mouth daily, Disp: 30 tablet, Rfl: 1    isosorbide mononitrate (IMDUR) 30 MG 24 hr tablet, Take 1 tablet (30 mg) by mouth daily, Disp: 90 tablet, Rfl: 3    levothyroxine (SYNTHROID/LEVOTHROID) 100 MCG tablet, TAKE ONE TABLET BY MOUTH ONCE DAILY ON MONDAY, WEDNESDAY, FRIDAY, SATURDAY AND SUNDAY., Disp: 63 tablet, Rfl: 1    levothyroxine (SYNTHROID/LEVOTHROID) 88 MCG tablet, TAKE ONE TABLET BY MOUTH ON TUESDAYS AND THURSDAYS, Disp: 30 tablet, Rfl: 3    loratadine (CLARITIN) 10 MG tablet, Take 1 tablet by mouth daily, Disp: , Rfl:     mometasone (ELOCON) 0.1 % external ointment, Apply topically twice a week Use on eczematous lesions, Disp: 45 g, Rfl: 3    omeprazole (PRILOSEC) 20 MG  "DR capsule, Take 20 mg by mouth daily, Disp: , Rfl:     ondansetron (ZOFRAN ODT) 4 MG ODT tab, Take 1 tablet (4 mg) by mouth every 8 hours as needed for nausea, Disp: 20 tablet, Rfl: 0    order for DME, Equipment being ordered: Nebulizer, Disp: 1 Units, Rfl: 0    OVER-THE-COUNTER, Place 1 drop into both eyes 3 times daily. Systane Ultra, Systane Balance, Blink Tears or Refresh Optive Artificial Tear, Disp: 1 Bottle, Rfl:     triamcinolone (KENALOG) 0.1 % external cream, Apply topically 2 times daily, Disp: 30 g, Rfl: 3    Current Facility-Administered Medications:     ampicillin (OMNIPEN) 1 g vial INTRADERMAL, 1 g, Intradermal, Once, Dagoberto Jurado MD    penicillin g potassium [id number] unit vial INTRADERMAL, 5,000,000 Units, Intradermal, Once, Dagoberto Jurado MD     Allergies:    Allergies   Allergen Reactions    Iodinated Contrast Media [Iodinated Contrast Media] Swelling and Difficulty breathing     Immediate throat swelling following around 1-2cc of omnipaque 300 for spine procedure  skin prick and intradermal tests with Iohexol negatives but premedicate before using iodinated contrast.    no reactions in skin tests to MRI contrast media Gadovist    Omnipaque [Iohexol] Swelling and Difficulty breathing     Immediate throat swelling following around 1-2cc of omnipaque 300 for spine procedure  --> skin prick and intradermal tests with Iohexol negatives. But I would propose anyway to premedicate patient before using iodinated contrast media (for safety reasons).   --> no reactions in skin tests to MRI contrast media Gadovist    Gabapentin Hives    Gluten     Gluten Meal     Penicillins Hives    Sulfa Antibiotics        Vitals: Resp 18   Ht 1.727 m (5' 8\")   Wt 100.2 kg (221 lb)   LMP  (LMP Unknown)   BMI 33.60 kg/m    BMI= Body mass index is 33.6 kg/m .    LOWER EXTREMITY PHYSICAL EXAM    Dermatologic: Skin is intact to right lower extremity without significant lesions, rash or abrasion.        Vascular: " DP & PT pulses are intact & regular on the right.   CFT and skin temperature is normal to the right lower extremity.     Neurologic: Lower extremity sensation is intact to light touch.  No evidence of weakness in the right lower extremity.        Musculoskeletal: Patient is ambulatory without assistive device or brace.  No gross ankle deformity noted.  No foot or ankle joint effusion is noted.  Noted pain on palpation over the lateral malleolus and posterior aspect of the ankle joint on the right.  Moderate edema with decreased painful range of motion noted on the right.    Diagnostics:  Radiographs included three views of the right ankle demonstrating nondisplaced fracture of the lateral malleolus and posterior malleolus.  The ankle mortise appears stable and symmetrical.  No cortical erosions or periosteal elevation.  All joint margins appear stable.  There is no apparent tumor formation noted.  There is no evidence of foreign body.  The images were independently reviewed by myself along with the patient explaining the findings.      ASSESSMENT / PLAN:     ICD-10-CM    1. Closed nondisplaced fracture of lateral malleolus of right fibula, initial encounter  S82.64XA Rolling Knee Walker DME      2. Closed traumatic nondisplaced fracture of posterior malleolus of right tibia, initial encounter  S82.391A Rolling Knee Walker DME          I have explained to Teresa about the conditions.  We discussed the underlying contributing factors to the condition as well as both conservative and surgical treatment options along with expected length of recovery.  At this time, the patient will remain non weight bearing on the right foot with use of a knee walker.  The patient was prescribed a knee walker. The patient is unsteady utilizing crutches, quad walker or a cane.  The knee walker will allow the patient to ambulate safely without the use of the operative foot and reduce risk of falls.  The patient will return in one month for  reevaluation and repeat x-rays.      Teresa verbalized agreement with and understanding of the rational for the diagnosis and treatment plan.  All questions were answered to best of my ability and the patient's satisfaction. The patient was advised to contact the clinic with any questions that may arise after the clinic visit.      Disclaimer: This note consists of symbols derived from keyboarding, dictation and/or voice recognition software. As a result, there may be errors in the script that have gone undetected. Please consider this when interpreting information found in this chart.       ABDI Lisa D.P.M., [email or web address].[email or web address].

## 2023-09-27 NOTE — LETTER
9/27/2023         RE: Teresa Fernandez  [address] [facility] [address]        Dear Colleague,    Thank you for referring your patient, Teresa Fernandez, to the [facility] [place]. Please see a copy of my visit note below.    PATIENT HISTORY:  Teresa Fernandez is a 63 year old female who presents to clinic in consultation at the request of  Harrison Kumar C.N.P. with a chief complaint of right ankle pain.  The patient is seen with their .  The patient relates the pain is primarily located around the right lateral ankle.  Patient describes injury as she was walking on 9/19/23 and twisted the ankle in a hole in the road.   The patient relates that the symptoms have been going on for several day(s).  The patient has previously tried different shoes, walking boot, rest, ice, NSAIDs and Tylenol with little relief.  The patient is currently employed as an  of [employer] (she does seated work) .  Any previous notes and studies that pertain to the patient's condition were reviewed.    Pertinent medical, surgical and family history was reviewed in the [facility] chart.    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     Coronary artery disease      Depressive disorder 1996     Depressive disorder, not elsewhere classified      Eczema 10/17/2012     Environmental and seasonal allergies      GERD (gastroesophageal reflux disease)      Gluten intolerance      Heart disease July 2021     Kidney problem 1996     Malignant neoplasm of renal pelvis (H) 1995    Renal cell carcinoma     Melanoma (H) 06/2010     Other specified acquired hypothyroidism 04/12/2005     Renal cancer (H) 05/05/2011     Sleep  apnea      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     Grave's disease     Uncomplicated asthma      Unspecified asthma(493.90)        Medications:   Current Outpatient Medications:      acetaminophen (TYLENOL) 325 MG tablet, Take 2 tablets (650 mg) by mouth every 4 hours as needed for mild pain, Disp: 50 tablet, Rfl: 0     acetaminophen (TYLENOL) 325 MG tablet, Take 325-650 mg by mouth every 6 hours as needed for mild pain, Disp: , Rfl:      albuterol (PROAIR HFA/PROVENTIL HFA/VENTOLIN HFA) 108 (90 Base) MCG/ACT inhaler, Inhale 2 puffs into the lungs every 4 hours as needed for shortness of breath, Disp: 18 g, Rfl: 4     albuterol (PROVENTIL) (2.5 MG/3ML) 0.083% neb solution, Take 1 vial (2.5 mg) by nebulization every 6 hours as needed for shortness of breath / dyspnea or wheezing, Disp: 90 mL, Rfl: 11     APNO CREA ointment, Apply to rash on nose twice daily as needed., Disp: 100 g, Rfl: 3     augmented betamethasone dipropionate (DIPROLENE-AF) 0.05 % external ointment, Apply topically 2 times daily, Disp: 50 g, Rfl: 3     clobetasol (TEMOVATE) 0.05 % external cream, Apply topically 2 times daily To affected external vaginal areas, Disp: 60 g, Rfl: 3     DULoxetine (CYMBALTA) 30 MG capsule, TAKE 1 CAPSULE (30 MG) BY MOUTH DAILY IN ADDITION TO THE 60 MG FOR A TOTAL OF 90 MG DAILY, Disp: 90 capsule, Rfl: 3     DULoxetine (CYMBALTA) 60 MG capsule, Take 1 cap by mouth daily in addition to the 30 mg for a total 90 mg/day, Disp: 90 capsule, Rfl: 3     fexofenadine (ALLEGRA) 180 MG tablet, Take 1 tablet (180 mg) by mouth daily, Disp: 30 tablet, Rfl: 1     isosorbide mononitrate (IMDUR) 30 MG 24 hr tablet, Take 1 tablet (30 mg) by mouth daily, Disp: 90 tablet, Rfl: 3     levothyroxine (SYNTHROID/LEVOTHROID) 100 MCG tablet, TAKE ONE TABLET BY MOUTH ONCE DAILY ON MONDAY, WEDNESDAY, FRIDAY, SATURDAY AND SUNDAY., Disp: 63 tablet, Rfl: 1     levothyroxine (SYNTHROID/LEVOTHROID) 88 MCG tablet, TAKE  ONE TABLET BY MOUTH ON TUESDAYS AND THURSDAYS, Disp: 30 tablet, Rfl: 3     loratadine (CLARITIN) 10 MG tablet, Take 1 tablet by mouth daily, Disp: , Rfl:      mometasone (ELOCON) 0.1 % external ointment, Apply topically twice a week Use on eczematous lesions, Disp: 45 g, Rfl: 3     omeprazole (PRILOSEC) 20 MG DR capsule, Take 20 mg by mouth daily, Disp: , Rfl:      ondansetron (ZOFRAN ODT) 4 MG ODT tab, Take 1 tablet (4 mg) by mouth every 8 hours as needed for nausea, Disp: 20 tablet, Rfl: 0     order for DME, Equipment being ordered: Nebulizer, Disp: 1 Units, Rfl: 0     OVER-THE-COUNTER, Place 1 drop into both eyes 3 times daily. Systane Ultra, Systane Balance, Blink Tears or Refresh Optive Artificial Tear, Disp: 1 Bottle, Rfl:      triamcinolone (KENALOG) 0.1 % external cream, Apply topically 2 times daily, Disp: 30 g, Rfl: 3    Current Facility-Administered Medications:      ampicillin (OMNIPEN) 1 g vial INTRADERMAL, 1 g, Intradermal, Once, Dagoberto Jurado MD     penicillin g potassium [id number] unit vial INTRADERMAL, 5,000,000 Units, Intradermal, Once, Dagoberto Jurado MD     Allergies:    Allergies   Allergen Reactions     Iodinated Contrast Media [Iodinated Contrast Media] Swelling and Difficulty breathing     Immediate throat swelling following around 1-2cc of omnipaque 300 for spine procedure  skin prick and intradermal tests with Iohexol negatives but premedicate before using iodinated contrast.    no reactions in skin tests to MRI contrast media Gadovist     Omnipaque [Iohexol] Swelling and Difficulty breathing     Immediate throat swelling following around 1-2cc of omnipaque 300 for spine procedure  --> skin prick and intradermal tests with Iohexol negatives. But I would propose anyway to premedicate patient before using iodinated contrast media (for safety reasons).   --> no reactions in skin tests to MRI contrast media Gadovist     Gabapentin Hives     Gluten      Gluten Meal      Penicillins Hives  "    Sulfa Antibiotics        Vitals: Resp 18   Ht 1.727 m (5' 8\")   Wt 100.2 kg (221 lb)   LMP  (LMP Unknown)   BMI 33.60 kg/m    BMI= Body mass index is 33.6 kg/m .    LOWER EXTREMITY PHYSICAL EXAM    Dermatologic: Skin is intact to right lower extremity without significant lesions, rash or abrasion.        Vascular: DP & PT pulses are intact & regular on the right.   CFT and skin temperature is normal to the right lower extremity.     Neurologic: Lower extremity sensation is intact to light touch.  No evidence of weakness in the right lower extremity.        Musculoskeletal: Patient is ambulatory without assistive device or brace.  No gross ankle deformity noted.  No foot or ankle joint effusion is noted.  Noted pain on palpation over the lateral malleolus and posterior aspect of the ankle joint on the right.  Moderate edema with decreased painful range of motion noted on the right.    Diagnostics:  Radiographs included three views of the right ankle demonstrating nondisplaced fracture of the lateral malleolus and posterior malleolus.  The ankle mortise appears stable and symmetrical.  No cortical erosions or periosteal elevation.  All joint margins appear stable.  There is no apparent tumor formation noted.  There is no evidence of foreign body.  The images were independently reviewed by myself along with the patient explaining the findings.      ASSESSMENT / PLAN:     ICD-10-CM    1. Closed nondisplaced fracture of lateral malleolus of right fibula, initial encounter  S82.64XA Rolling Knee Walker DME      2. Closed traumatic nondisplaced fracture of posterior malleolus of right tibia, initial encounter  S82.391A Rolling Knee Walker DME          I have explained to Teresa about the conditions.  We discussed the underlying contributing factors to the condition as well as both conservative and surgical treatment options along with expected length of recovery.  At this time, the patient will remain non weight " bearing on the right foot with use of a knee walker.  The patient was prescribed a knee walker. The patient is unsteady utilizing crutches, quad walker or a cane.  The knee walker will allow the patient to ambulate safely without the use of the operative foot and reduce risk of falls.  The patient will return in one month for reevaluation and repeat x-rays.      Teresa verbalized agreement with and understanding of the rational for the diagnosis and treatment plan.  All questions were answered to best of my ability and the patient's satisfaction. The patient was advised to contact the clinic with any questions that may arise after the clinic visit.      Disclaimer: This note consists of symbols derived from keyboarding, dictation and/or voice recognition software. As a result, there may be errors in the script that have gone undetected. Please consider this when interpreting information found in this chart.       ABDI Lisa D.P.M., F.A.C.F.A.S.      Again, thank you for allowing me to participate in the care of your patient.        Sincerely,        Carter Lisa DPM

## 2023-10-23 ENCOUNTER — OFFICE VISIT (OUTPATIENT)
Dept: ENDOCRINOLOGY | Facility: CLINIC | Age: 63
End: 2023-10-23
Payer: OTHER GOVERNMENT

## 2023-10-23 ENCOUNTER — ANCILLARY PROCEDURE (OUTPATIENT)
Dept: GENERAL RADIOLOGY | Facility: CLINIC | Age: 63
End: 2023-10-23
Attending: PODIATRIST
Payer: OTHER GOVERNMENT

## 2023-10-23 ENCOUNTER — OFFICE VISIT (OUTPATIENT)
Dept: PODIATRY | Facility: CLINIC | Age: 63
End: 2023-10-23
Payer: OTHER GOVERNMENT

## 2023-10-23 VITALS
BODY MASS INDEX: 33.8 KG/M2 | HEIGHT: 68 IN | SYSTOLIC BLOOD PRESSURE: 117 MMHG | HEART RATE: 92 BPM | WEIGHT: 223 LBS | DIASTOLIC BLOOD PRESSURE: 75 MMHG

## 2023-10-23 VITALS
SYSTOLIC BLOOD PRESSURE: 111 MMHG | WEIGHT: 223 LBS | HEART RATE: 88 BPM | DIASTOLIC BLOOD PRESSURE: 73 MMHG | OXYGEN SATURATION: 97 % | BODY MASS INDEX: 33.91 KG/M2

## 2023-10-23 DIAGNOSIS — E03.9 ACQUIRED HYPOTHYROIDISM: Primary | ICD-10-CM

## 2023-10-23 DIAGNOSIS — S82.391A: ICD-10-CM

## 2023-10-23 DIAGNOSIS — S82.64XA CLOSED NONDISPLACED FRACTURE OF LATERAL MALLEOLUS OF RIGHT FIBULA, INITIAL ENCOUNTER: Primary | ICD-10-CM

## 2023-10-23 DIAGNOSIS — E83.52 HYPERCALCEMIA: ICD-10-CM

## 2023-10-23 LAB
PTH-INTACT SERPL-MCNC: 47 PG/ML (ref 15–65)
TSH SERPL DL<=0.005 MIU/L-ACNC: 2.17 UIU/ML (ref 0.3–4.2)

## 2023-10-23 PROCEDURE — 99214 OFFICE O/P EST MOD 30 MIN: CPT | Performed by: INTERNAL MEDICINE

## 2023-10-23 PROCEDURE — 36415 COLL VENOUS BLD VENIPUNCTURE: CPT | Performed by: INTERNAL MEDICINE

## 2023-10-23 PROCEDURE — 73610 X-RAY EXAM OF ANKLE: CPT | Mod: TC | Performed by: RADIOLOGY

## 2023-10-23 PROCEDURE — 99207 PR FRACTURE CARE IN GLOBAL PERIOD: CPT | Performed by: PODIATRIST

## 2023-10-23 PROCEDURE — 83970 ASSAY OF PARATHORMONE: CPT | Performed by: INTERNAL MEDICINE

## 2023-10-23 PROCEDURE — 84443 ASSAY THYROID STIM HORMONE: CPT | Performed by: INTERNAL MEDICINE

## 2023-10-23 NOTE — PATIENT INSTRUCTIONS
Next Steps:      Support:  Wear supportive shoes, sandals, boots and/or inserts that have a rigid supportive sole.    This will offload the majority of tension forces that travel through your feet every step you take.    Skechers Max Cushioning Elite/Premier   Skechers Relax Fit D'Lux Walker  Reebok Walk Ultra 7 DMX MAX   Hoka Bondi walking shoes  DigiwinSoft shoes/slippers (https://Book'n'Bloom.Camerama)  Gogiro sandals (https://www.Vets First Choice/us)  Superfeet inserts (www.superfeet.com)  It is important that you also wear supportive shoe wear in the house to continue providing support to your feet.    You may always use a cushioned liner for your shoes if that makes your feet feel better.  Stretching  Calf stretching is essential to offload the tension forces that travel through your feet every step you take  Preferred calf stretch is the Runner's Stretch  Place one foot behind the other foot, flat against the ground (it is important to keep the heel on the ground).  The back leg is the one that will be stretched.  Start with the knee straight and lean your hips into the wall, counter or whatever you are leaning into - count to ten.  Next, bend the knee.  You should feel the stretch lower in the calf muscle - count to ten.  Repeat this stretch once an hour to start off with.  When symptoms subside, I recommend performing the stretch 3 times daily to prevent any future problems.                Tissue Massage  It is important that you physically loosen the inflammation tissue to help your body heal the injured tissue.  I recommend soaking your foot in warm water to increase the microcirculation to the soft tissues.  You may add Epson salt to the water if you prefer.  You may apply an over-the-counter muscle rub, such as Voltaren gel, and deeply massage the injured tissue.  Reduce Inflammation  You can ice the injured tissue with an ice pack with a light cloth covering or soaking in ice water 20 minutes to reduce  any acute inflammation, typically at the end of the day.      It is important to understand that most problems that develop in the foot and ankle are caused by excessive tension that cause microinjury to the soft tissues and inflammation in the foot and ankle.  By addressing the underlying causes with support and stretching as well as treating the current inflammatory conditions with tissue massage and anti-inflammatory treatments, most foot and ankle musculoskeletal conditions will resolve.  This may take time to heal.  However, if symptoms persist past 4 weeks you should return to the office for reevaluation to determine further treatment options.    Flu vaccines are now available at all [facility] clinics and retail pharmacies across the [facility]. Appointments are required for clinic locations. To schedule an appointment online, please log into [facility] or create an account if you are a new user. You can also call [phone], or simply walk in at one of the [facility] retail pharmacy locations.      [facility] - Login Page

## 2023-10-23 NOTE — PROGRESS NOTES
"Teresa returns to the office for reevaluation of the right ankle.  The patient relates following the instructions given at the last visit with noted overall less pain and more improvement in function of the right ankle.   The patient relates no other problems.    Vitals: /75   Pulse 92   Ht 1.727 m (5' 8\")   Wt 101.2 kg (223 lb)   LMP  (LMP Unknown)   BMI 33.91 kg/m    BMI= Body mass index is 33.91 kg/m .    Lower Extremity Physical Exam:      Neurovascular status remains unchanged.  Muscular exam is within normal limits to major muscle groups.  Integument is intact.      Noted decreased pain on palpation of the lateral malleolus on the right ankle.  Decreased edema noted.  Improved with limited range of motion of the ankle joint noted.    Diagnostics:  Radiograph evaluation including three views of the right ankle reveals interval healing with increased trabeculation of the lateral malleolus fracture.  I personally evaluated the images as well as reviewed the images with the patient pointing out the findings.      Assessment:     ICD-10-CM    1. Closed nondisplaced fracture of lateral malleolus of right fibula, initial encounter  S82.64XA XR Ankle Right G/E 3 Views      2. Closed traumatic nondisplaced fracture of posterior malleolus of right tibia, initial encounter  S82.391A XR Ankle Right G/E 3 Views          Plan:    I have explained to Teresa about the progress of the conditions.  At this time, the patient was educated on the importance of offloading supportive shoes and other devices.  I demonstrated to the patient calf stretches to perform every hour daily until symptoms resolve.  After symptoms resolve, the patient was advised to perform the stretches 3 times daily to prevent future recurrence.  The patient was instructed to perform warm soaks with Epson salt after which to also apply over-the-counter Voltaren gel to deeply massage the injured tissue.  The patient was instructed to do this on a daily " basis until symptoms resolve.    The patient may return in four weeks for reevaluation to determine if any further treatment will be needed.      Teresa verbalized agreement with and understanding of the rational for the diagnosis and treatment plan.  All questions were answered to best of my ability and the patient's satisfaction. The patient was advised to contact the clinic with any questions that may arise after the clinic visit.      Disclaimer: This note consists of symbols derived from keyboarding, dictation and/or voice recognition software. As a result, there may be errors in the script that have gone undetected. Please consider this when interpreting information found in this chart.       ABDI Lisa D.P.M., [email or web address].[email or web address].

## 2023-10-23 NOTE — NURSING NOTE
Teresa Fernandez's goals for this visit include:   Chief Complaint   Patient presents with    Follow Up    Endocrine Problem     Hypercalcemia      She requests these members of her care team be copied on today's visit information: Yes     PCP: Amanda Meléndez    Referring Provider:  No referring provider defined for this encounter.    /73 (BP Location: Left arm, Patient Position: Sitting, Cuff Size: Adult Regular)   Pulse 88   Wt 101.2 kg (223 lb)   LMP  (LMP Unknown)   SpO2 97%   BMI 33.91 kg/m      Do you need any medication refills at today's visit? Not sure      Kizzy Diaz CMA  Adult Endocrinology   [facility]

## 2023-10-23 NOTE — Clinical Note
10/23/2023         RE: Teresa Fernandez  [address] [address]        Dear Colleague,    Thank you for referring your patient, Teresa Fernandez, to the [facility] [place]. Please see a copy of my visit note below.    Teresa returns to the office for reevaluation of the {RIGHT /LEFT:430688} foot.  The patient relates following the instructions given at the last visit with noted overall {LESS/MORE:956372} pain and {LESS/MORE:073757} improvement in function of the {RIGHT /LEFT:948326} foot.   The patient relates no other problems.    Vitals: LMP  (LMP Unknown)   BMI= There is no height or weight on file to calculate BMI.    Lower Extremity Physical Exam:      Neurovascular status remains unchanged.  Muscular exam is within normal limits to major muscle groups.  Integument is intact.      Noted ***    Diagnostics:  Radiograph evaluation including three views of the {RIGHT /LEFT:578733} foot reveals interval healing with increased trabeculation of the ***.  I personally evaluated the images as well as reviewed the images with the patient pointing out the findings.      Assessment:     ICD-10-CM    1. Closed nondisplaced fracture of lateral malleolus of right fibula, initial encounter  S82.64XA       2. Closed traumatic nondisplaced fracture of posterior malleolus of right tibia, initial encounter  S82.391A           Plan:    I have explained to Teresa about the progress of the conditions.  At this time, ***    Teresa verbalized agreement with and understanding of the rational for the diagnosis and treatment plan.  All questions were answered to best of my ability and the patient's satisfaction. The patient was advised to contact the clinic with any questions that may arise after the clinic visit.      Disclaimer: This note consists of symbols derived from keyboarding, dictation and/or voice recognition software. As a result, there may be errors in the script that have gone  undetected. Please consider this when interpreting information found in this chart.       ABDI Lisa D.P.M., F.JOHNNY.C.F.A.S.        Again, thank you for allowing me to participate in the care of your patient.        Sincerely,        Carter Lisa DPM

## 2023-10-23 NOTE — NURSING NOTE
"Chief Complaint   Patient presents with    RECHECK     Right ankle- no concerns       Initial /75   Pulse 92   Ht 1.727 m (5' 8\")   Wt 101.2 kg (223 lb)   LMP  (LMP Unknown)   BMI 33.91 kg/m   Estimated body mass index is 33.91 kg/m  as calculated from the following:    Height as of this encounter: 1.727 m (5' 8\").    Weight as of this encounter: 101.2 kg (223 lb).  Medications and allergies reviewed.      Madina VERA MA    "

## 2023-10-23 NOTE — PROGRESS NOTES
[facility]      Follow up for bone pain      Assessment:  Middle age woman with right lower extremity bone pain, hypothyroidism, OA, depression, allergies, gluten intolerance, history of renal cell Ca, melanoma more than 10 years ago, St post Left nephrectomy and L adrenalectomy, hysterectomy, nicotine abuse (2 PPD?), with primary hyperparathyroidism now status post parathyroidectomy in 8/2023.   Pathology revealed parathyroid hyperplasia of the left inferior parathyroid gland. This indicates pt is at risk to develop hyperplasia in other parathyroid glands and at risk for relapse of primary hyperparathyroidism  Calcium has normalized.  Pt has been feeling better, however still had two falls since an with one of them resulting in right ankle fracture.    Has been on Levothyroxine 88 and 100 mcg alternating.       Plan:   Lab check today for PTHi, TSH reflex  Continue Levothyroxine 100 mcg 5 days a week and 88 mcg on Tuesday and Thursday      This note was generated using computer recognized voice recognition. This might result in some expected imperfection.    Leigh Ann Ellsworth MD  Endocrinology and Diabetes  Telephone contact:  [facility] [place] [phone]  [facility] [phone]          Interval history:  Pt had parahtyroid scan revealing increase uptake in the left lower neck and was referred to ENT. She had parathyroidectomy in 8/2023 by Dr Good. Pt recovered quickly within less than a week. She has been feeling better with resolution of her bone pain.   She has had 2 falls one of which resulting in left ankle fracture. She notes some vision problems and had eye surgeries. This might have been helpful for avoiding falls.    Hypothyorid LT4 100 mcg 5 days a week and 88 mcg tues Thursday    Has history of syncope. Negative work up in Cardiology, multiple falls.    Bone denisty per DXA scan in 10/202 normal    No history of kidney stones      Hypothyroidism  This was diagnosed in 1996 after nephrectomy, patient notes that thyroid hormone levels have only been mildly abnormal.  However her symptoms improved significantly this levothyroxine started.  Takes medication as directed in the morning with water separates from food for 30 minutes or more  LT4 100 mcg takes in am , since 1996,       Medications:   Current Outpatient Medications   Medication Sig Dispense Refill    acetaminophen (TYLENOL) 325 MG tablet Take 2 tablets (650 mg) by mouth every 4 hours as needed for mild pain 50 tablet 0    acetaminophen (TYLENOL) 325 MG tablet Take 325-650 mg by mouth every 6 hours as needed for mild pain      albuterol (PROAIR HFA/PROVENTIL HFA/VENTOLIN HFA) 108 (90 Base) MCG/ACT inhaler Inhale 2 puffs into the lungs every 4 hours as needed for shortness of breath 18 g 4    albuterol (PROVENTIL) (2.5 MG/3ML) 0.083% neb solution Take 1 vial (2.5 mg) by nebulization every 6 hours as needed for shortness of breath / dyspnea or wheezing 90 mL 11    APNO CREA ointment Apply to rash on nose twice daily as needed. 100 g 3    augmented betamethasone dipropionate (DIPROLENE-AF) 0.05 % external ointment Apply topically 2 times daily 50 g 3    clobetasol (TEMOVATE) 0.05 % external cream Apply topically 2 times daily To affected external vaginal areas 60 g 3    DULoxetine (CYMBALTA) 30 MG capsule TAKE 1 CAPSULE (30 MG) BY MOUTH DAILY IN ADDITION TO THE 60 MG FOR A TOTAL OF 90 MG DAILY 90 capsule 3    DULoxetine (CYMBALTA) 60 MG capsule Take 1 cap by mouth daily in addition to the 30 mg for a total 90 mg/day 90 capsule 3    fexofenadine (ALLEGRA) 180 MG tablet Take 1 tablet (180 mg) by mouth daily 30 tablet 1    isosorbide mononitrate (IMDUR) 30 MG 24 hr tablet Take 1 tablet (30 mg) by mouth daily 90 tablet 3    levothyroxine (SYNTHROID/LEVOTHROID) 100 MCG tablet TAKE ONE TABLET BY MOUTH ONCE DAILY ON MONDAY, WEDNESDAY, FRIDAY, SATURDAY AND SUNDAY. 63 tablet 1    levothyroxine  (SYNTHROID/LEVOTHROID) 88 MCG tablet TAKE ONE TABLET BY MOUTH ON  AND  30 tablet 3    loratadine (CLARITIN) 10 MG tablet Take 1 tablet by mouth daily      mometasone (ELOCON) 0.1 % external ointment Apply topically twice a week Use on eczematous lesions 45 g 3    omeprazole (PRILOSEC) 20 MG DR capsule Take 20 mg by mouth daily      ondansetron (ZOFRAN ODT) 4 MG ODT tab Take 1 tablet (4 mg) by mouth every 8 hours as needed for nausea 20 tablet 0    order for DME Equipment being ordered: Nebulizer 1 Units 0    OVER-THE-COUNTER Place 1 drop into both eyes 3 times daily. Systane Ultra, Systane Balance, Blink Tears or Refresh Optive Artificial Tear 1 Bottle     triamcinolone (KENALOG) 0.1 % external cream Apply topically 2 times daily 30 g 3       Social History:  Social History     Tobacco Use    Smoking status: Former     Packs/day: 2.00     Years: 15.00     Additional pack years: 0.00     Total pack years: 30.00     Types: Cigarettes     Quit date: 3/26/1996     Years since quittin.5    Smokeless tobacco: Former   Substance Use Topics    Alcohol use: Not Currently     Comment: rare       Family History  FAMILY HISTORY      Physical Examination:  /73 (BP Location: Left arm, Patient Position: Sitting, Cuff Size: Adult Regular)   Pulse 88   Wt 101.2 kg (223 lb)   LMP  (LMP Unknown)   SpO2 97%   BMI 33.91 kg/m      Wt Readings from Last 4 Encounters:   10/23/23 101.2 kg (223 lb)   23 100.2 kg (221 lb)   23 100.2 kg (221 lb)   23 101.6 kg (224 lb)       General: Well appearing obese woman in no distress, weight evenly distributed between trunk and extremities, no peripheral wasing.   Eyes: EOMI. Sclerae and conjunctivae are clear.   HENT: No thyromegaly or mass.  No moon facies  Lymphatic: No cervical or supraclavicular lymphadenopathy.  Cardiovascular: RRR, with normal S1+S2 and no murmurs.   Skin: No rash or lesions. No abdominal striae.    Extremities: trace bilateral  peripheral edema.   Neurologic: No tremor with hands outstretched. 1+ patellar reflexes.      Labs and Studies:               Lab Results   Component Value Date    ANNIE 9.9 09/11/2023    ANNIE 10.7 (H) 03/27/2023    ANNIE 10.7 (H) 03/27/2023    ANNIE 10.9 (H) 03/19/2023    ALBUMIN 4.5 03/27/2023    ALT 26 03/27/2023    PHOS 3.0 06/29/2022    PTHI 105 (H) 09/11/2023    PTHI 88 (H) 03/27/2023    PTHI 70 (H) 09/26/2022    PTHI 86 (H) 06/29/2022    PTHI 80 05/25/2022    PTHI 82 (H) 02/01/2022    PTHI 95 (H) 11/26/2021    AST 27 03/27/2023    BILITOTAL 0.3 03/27/2023    CR 0.70 03/27/2023    CR 0.70 03/19/2023    CR 0.84 06/29/2022     03/27/2023    TSH 1.97 03/27/2023    ALKPHOS 113 (H) 03/27/2023    HGB 13.6 03/27/2023             Lab Results   Component Value Date    CHLORIDE 101 03/27/2023    CO2 26 03/27/2023    CR 0.70 03/27/2023    ALKPHOS 113 (H) 03/27/2023    PTHI 105 (H) 09/11/2023    TSH 1.97 03/27/2023    T4 1.00 02/18/2021    HGB 13.6 03/27/2023       Lab Results   Component Value Date    ALT 26 03/27/2023    AST 27 03/27/2023    ALBUMIN 4.5 03/27/2023    BILITOTAL 0.3 03/27/2023     Lab Results   Component Value Date    ANNIE 9.9 09/11/2023    ANNIE 10.7 (H) 03/27/2023    ANNIE 10.7 (H) 03/27/2023    ANNIE 10.9 (H) 03/19/2023    ALBUMIN 4.5 03/27/2023    ALT 26 03/27/2023    PHOS 3.0 06/29/2022    PTHI 105 (H) 09/11/2023    PTHI 88 (H) 03/27/2023    PTHI 70 (H) 09/26/2022    PTHI 86 (H) 06/29/2022    PTHI 80 05/25/2022    PTHI 82 (H) 02/01/2022    PTHI 95 (H) 11/26/2021    AST 27 03/27/2023    BILITOTAL 0.3 03/27/2023    CR 0.70 03/27/2023    CR 0.70 03/19/2023    CR 0.84 06/29/2022     03/27/2023    TSH 1.97 03/27/2023    ALKPHOS 113 (H) 03/27/2023    HGB 13.6 03/27/2023       Results for orders placed during the hospital encounter of 04/24/23    NM Parathyroid Planar Imaging Single    Narrative  Examination: NM PARATHYROID PLANAR IMAGING SINGLE ISOTOPE  Nuclear medicine parathyroid examination with SPECT CT  and High  Resolution CT images of the Neck.    Date:  4/24/2023 1:29 PM    Indication: Primary hyerparathyrodism, increased PTHi and Caliucm, nl  renal fuction; Hypercalcemia    Additional Information: Middle age woman with right lower extremity  bone pain, hypothyroidism, OA, depression, allergies, gluten  intolerance, history of renal cell Ca, melanoma more than 10 years  ago, St post Left nephrectomy?and L adrenalectomy, hysterectomy,  nicotine abuse (2 PPD?), obesity BMI 34, St post COVID in 2019, now  with mild hypercalcemia with mildly elevated PTHi.??Although  hypercalcemia can cause bone pain, if he appears the patient has a  generalized pain syndrome and therefore not clear how much her bone  pain could be driven by primary hyperparathyroidism.    Technique:    The patient received 27.1 mCi of Tc-99 Cardiolite.    1.  SPECT CT images were also obtained of the thyroid using a dual  energy technique for both I-123 and for Tc-99m followed by 3 mm high  resolution CT images of the neck from the base of the skull to the  base of the heart.  After normalization of the I-123 images to the  Tc-99m- Cardiolite images the reconstructed axial slices were  subtracted, yielding neck images highlighting abnormally perfused  tissues.    Findings:    The SPECT CT images subtraction images demonstrates subcentimeter soft  tissue and associated focal uptake left lateral to the trachea (series  4 image 97), concerning for parathyroid adenoma. Thyroid gland is not  visualized.    Diffuse gastric uptake, likely inflammatory.    If an abnormality is present, images were appended to the PACS system  demonstrating the tumor/lesion on the CT images for localization.    Impression  Impression: Subcentimeter soft tissue and associated focal uptake left  lateral to the trachea, concerning for parathyroid adenoma.    I have personally reviewed the examination and initial interpretation  and I agree with the findings.    JONATHAN SHELTON,  MD      SYSTEM ID:  [id number]    Results for orders placed during the hospital encounter of 10/27/22    Dexa hip/pelvis/spine    Narrative  EXAM: DX HIP/PELVIS/SPINE  LOCATION: [facility]  DATE/TIME: 10/27/2022 3:06 PM    INDICATION:  Hypercalcemia  COMPARISON: None.  TECHNIQUE: Dual-energy x-ray absorptiometry performed with routine technique.    FINDINGS:    RIGHT Hip Total: BMD: 0.943 g/cm2. T-score: -0.5. Z-score: -0.3  RIGHT Hip Femoral neck: BMD: 0.952 g/cm2. T-score: -0.6. Z-score: -0.1  LEFT Hip Total: BMD: 0.931 g/cm2. T-score: -0.6. Z-score: -0.4  LEFT Hip Femoral neck: BMD: 0.935 g/cm2. T-score: -0.7. Z-score: -0.2  LEFT Radius 33%: BMD: 0.762 g/cm2. T-score: 0.8. Z-score: 1.9    WHO Criteria:  Normal: T score at or above -1 SD  Osteopenia: T score between -1 and -2.5 SD  Osteoporosis: T score at or below -2.5 SD    FRAX Results: Not applicable due to normal bone mineral density.    Impression  IMPRESSION: NORMAL. Bone mineral density measurements are within normal limits using T score.

## 2023-10-23 NOTE — PATIENT INSTRUCTIONS
[facility]-Department of Endocrinology  Diabetes Educators:   Yelena Parnell, RN and Doris Grace RN  Clinic Nurse: DERRICK Rascon  CMA's: Kizzy Baum and Chacho   EMT: Walter  Scheduling/Clinic phone number : [phone]   Clinic Fax: [phone]  On-Call Endocrine at the [place] (after hours/weekends): [phone] option 4    Please call the number below to schedule your labs.  [facility] [phone]   [facility] [phone]   [place] [phone]   [facility]  [phone]   [facility] [phone]   [place] [phone]   [facility]) [phone]   [facility] Walk-In Only   [place] [phone]   [place] [phone]   [place] [phone]   [place] [phone]     Please reach out to the following centers to schedule your imaging appointment:  Imaging (DEXA, CT, MRI, XRAY)    [facility] ([facility], [facility], [place]) [phone]   [facility] ([place], [place]) [phone]   [facility] ([facility]) [phone]   [facility] ([facility]) [phone]         Continue Levothryoxine 100 mcg 5 days a week, 88 mcg on Tuesday and Thursday    Check labs to day    Please follow up with your primary care provider for calcium and thyroid hormone recheck

## 2023-11-06 ENCOUNTER — NURSE TRIAGE (OUTPATIENT)
Dept: FAMILY MEDICINE | Facility: CLINIC | Age: 63
End: 2023-11-06
Payer: OTHER GOVERNMENT

## 2023-11-06 ASSESSMENT — ASTHMA QUESTIONNAIRES: ACT_TOTALSCORE: 21

## 2023-11-06 NOTE — TELEPHONE ENCOUNTER
Provider Response to 2nd Level Triage Request    I have reviewed the RN documentation. My recommendation is:  Face To Face Visit. Next Day: to be seen by another provider in same service line

## 2023-11-06 NOTE — TELEPHONE ENCOUNTER
Reason for Disposition   New headache and age > 50    Protocols used: Headache-A-OH    Nurse Triage SBAR    Is this a 2nd Level Triage? NO    Situation: Patient reporting an intermittent headache that started yesterday and has persisted since onset     Background: Patient has history of fibromyalgia  Had a fall 2 weeks ago   States she was walking outside and lost her balance - this is common with her fibromyalgia diagnosis   Patient fell forward hitting her forehead on the siding of her house  Denies LOC  Denies being on a blood thinner  Denies symptoms post fall     Assessment: Patient reporting an intermittent headache that started yesterday   Pain is to anterior head  Denies pain radiating  Rates the pain a 5-6/10 on the pain scale  Describes the pain as pressure   Patient has chronic sensitivity to light and sound due to fibromyalgia - this is unchanged     Denies vision changes  Reporting feeling pressure behind her eyes bilaterally     Denies sinus pressure - reports chronic nasal drainage - this is unchanged     Patient states the following symptoms are unchanged as she experiences these with her fibromyalgia   Denies increase in numbness/tingling to extremities  Denies increased weakness to extremities     Reviewed red flag symptoms that would warrant ED evaluation  Patient verbalized understanding  No further questions/concerns     Protocol Recommended Disposition:   See in Office Today or Tomorrow    Recommendation: Scheduled patient an OV with Binu tomorrow at 1120    Does the patient meet one of the following criteria for ADS visit consideration? No     Basilio Kuhn RN

## 2023-11-07 ENCOUNTER — OFFICE VISIT (OUTPATIENT)
Dept: FAMILY MEDICINE | Facility: CLINIC | Age: 63
End: 2023-11-07
Payer: OTHER GOVERNMENT

## 2023-11-07 VITALS
WEIGHT: 220 LBS | DIASTOLIC BLOOD PRESSURE: 74 MMHG | HEIGHT: 68 IN | OXYGEN SATURATION: 96 % | BODY MASS INDEX: 33.34 KG/M2 | HEART RATE: 97 BPM | TEMPERATURE: 97.7 F | RESPIRATION RATE: 16 BRPM | SYSTOLIC BLOOD PRESSURE: 106 MMHG

## 2023-11-07 DIAGNOSIS — S06.0X0A CONCUSSION WITHOUT LOSS OF CONSCIOUSNESS, INITIAL ENCOUNTER: ICD-10-CM

## 2023-11-07 DIAGNOSIS — G44.209 TENSION HEADACHE: Primary | ICD-10-CM
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
## 2023-11-07 DIAGNOSIS — S09.90XA HEAD INJURY, INITIAL ENCOUNTER: ICD-10-CM

## 2023-11-07 PROCEDURE — 99214 OFFICE O/P EST MOD 30 MIN: CPT | Performed by: NURSE PRACTITIONER

## 2023-11-07 ASSESSMENT — ENCOUNTER SYMPTOMS: HEADACHES: 1

## 2023-11-07 ASSESSMENT — PAIN SCALES - GENERAL: PAINLEVEL: NO PAIN (0)

## 2023-11-07 NOTE — PROGRESS NOTES
"  Assessment & Plan   Problem List Items Addressed This Visit    None  Visit Diagnoses       Tension headache    -  Primary    Relevant Orders    CT Head w/o Contrast    Fall, initial encounter        Relevant Orders    CT Head w/o Contrast    Head injury, initial encounter        Relevant Orders    CT Head w/o Contrast    Concussion without loss of consciousness, initial encounter               Concussion present. With the progressive, chronic headaches that prompt concern for possible subacute head bleed. No neurological deficits noted on exam. Order for head CT placed. Patient given education about concussion symptoms and encouraged to treat pain symptomatically, engage in brain rest. Patient advised to go to the emergency department if symptoms significantly worsen.         BMI:   Estimated body mass index is 33.45 kg/m  as calculated from the following:    Height as of this encounter: 1.727 m (5' 8\").    Weight as of this encounter: 99.8 kg (220 lb).   Weight management plan: Discussed healthy diet and exercise guidelines    HERIBERTO Olguin CNP  [facility]    Eric Moore is a 63 year old, presenting for the following health issues:  Headache        11/7/2023    11:04 AM   Additional Questions   Roomed by Chel MARTINEZ MA   Accompanied by Self         **Patient states that she fell about 2 weeks ago. Did not go in after fall. She hit her head on the side of her house and had a lump at the time, has since resolved.   After the fall, it seems like she's been having more headaches and some pressure in head.   Intermittent headaches-has been taking tylenol.    History of Present Illness       Headaches:   Since the patient's last clinic visit, headaches are: worsened  The patient is getting headaches:  Couple times a day  She is not able to do normal daily activities when she has a migraine.  The patient is taking the following rescue/relief medications:  Tylenol   Patient states \"I " "get some relief\" from the rescue/relief medications.   The patient is taking the following medications to prevent migraines:  No medications to prevent migraines  In the past 4 weeks, the patient has gone to an Urgent Care or Emergency Room 0 times times due to headaches.    She eats 2-3 servings of fruits and vegetables daily.She consumes 0 sweetened beverage(s) daily.She exercises with enough effort to increase her heart rate 10 to 19 minutes per day.  She exercises with enough effort to increase her heart rate 5 days per week.      Patient is a 64yo woman who presents today with concern for headaches since a fall about two weeks ago. She has PMH of fibromyalgia with recent surgical repair of a broken ankle.     Patient reports that she went to grab her dog's leash when she got off balance and fell, hitting her head on the side of her house. She denies LOC. She had a hematoma at the time, which has since resolved.     Her first headache was a week after her fall, with the worst of it starting on 11/4. At first the headaches were short, but they've become progressively longer. Yesterday her headache lasted for a few hours, 6/10 pain. She took tylenol, and that relieved her symptoms until they started up again in the afternoon.    The headaches are behind her eyes and on her forehead. They're bilateral. She notes some blurriness of vision with the headaches. She reports photophobia and phonophobia. Denies nausea. Denies disorientation/confusion. The headaches are not aggravated with activity and do not get worse with valsalva maneuver. She has chronic dizziness, which has not worsened. She has chronic numbness and tingling in her fingers and toes, which has not worsened since the fall.    Patient is very conscious about what she eats in order to avoid fibromyalgia flairs, avoiding sugar and processed carbs. No recent changes to her diet.      Review of Systems   Neurological:  Positive for headaches.    " "  CONSTITUTIONAL:NEGATIVE for fever, chills, change in weight  RESP:NEGATIVE for significant cough or SOB  CV: NEGATIVE for chest pain, palpitations or peripheral edema  MUSCULOSKELETAL: NEGATIVE for significant arthralgias or myalgia  NEURO: NEGATIVE for behavior changes, involuntary movements, gait disturbance, loss of consciousness, and memory problems, Reports chronic dizziness/lightheadedness, and numbness or tingling in her fingers and toes      Objective    /74   Pulse 97   Temp 97.7  F (36.5  C) (Tympanic)   Resp 16   Ht 1.727 m (5' 8\")   Wt 99.8 kg (220 lb)   LMP  (LMP Unknown)   SpO2 96%   BMI 33.45 kg/m    Body mass index is 33.45 kg/m .  Physical Exam   GENERAL: healthy, alert and no distress  EYES: Eyes grossly normal to inspection, PERRL and conjunctivae and sclerae normal  HENT: ear canals and TM's normal, nose and mouth without ulcers or lesions  NECK: no adenopathy, no asymmetry, masses, or scars and thyroid normal to palpation  RESP: lungs clear to auscultation - no rales, rhonchi or wheezes  CV: regular rate and rhythm, normal S1 S2, no S3 or S4, no murmur, click or rub, no peripheral edema and peripheral pulses strong  ABDOMEN: soft, nontender, no hepatosplenomegaly, no masses and bowel sounds normal  MS: no gross musculoskeletal defects noted, no edema  NEURO: Normal strength and tone, mentation intact and speech normal  PSYCH: mentation appears normal, affect normal/bright                "

## 2023-11-10 ENCOUNTER — HOSPITAL ENCOUNTER (OUTPATIENT)
Dept: CT IMAGING | Facility: CLINIC | Age: 63
Discharge: HOME | End: 2023-11-10
Attending: NURSE PRACTITIONER | Admitting: NURSE PRACTITIONER
Payer: OTHER GOVERNMENT

## 2023-11-10 DIAGNOSIS — G44.209 TENSION HEADACHE: ICD-10-CM
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
## 2023-11-10 DIAGNOSIS — S09.90XA HEAD INJURY, INITIAL ENCOUNTER: ICD-10-CM

## 2023-11-10 PROCEDURE — 70450 CT HEAD/BRAIN W/O DYE: CPT

## 2023-12-07 ENCOUNTER — OFFICE VISIT (OUTPATIENT)
Dept: FAMILY MEDICINE | Facility: CLINIC | Age: 63
End: 2023-12-07
Payer: OTHER GOVERNMENT

## 2023-12-07 VITALS
HEART RATE: 96 BPM | RESPIRATION RATE: 16 BRPM | WEIGHT: 219.2 LBS | HEIGHT: 68 IN | BODY MASS INDEX: 33.22 KG/M2 | DIASTOLIC BLOOD PRESSURE: 86 MMHG | SYSTOLIC BLOOD PRESSURE: 132 MMHG | OXYGEN SATURATION: 96 % | TEMPERATURE: 98.1 F

## 2023-12-07 DIAGNOSIS — M79.7 FIBROMYALGIA: Primary | ICD-10-CM

## 2023-12-07 DIAGNOSIS — M54.41 CHRONIC BILATERAL LOW BACK PAIN WITH RIGHT-SIDED SCIATICA: ICD-10-CM

## 2023-12-07 DIAGNOSIS — L43.8 EROSIVE ORAL LICHEN PLANUS: ICD-10-CM

## 2023-12-07 DIAGNOSIS — L71.0 PERIORIFICIAL DERMATITIS: ICD-10-CM

## 2023-12-07 DIAGNOSIS — G89.29 CHRONIC BILATERAL LOW BACK PAIN WITH RIGHT-SIDED SCIATICA: ICD-10-CM

## 2023-12-07 DIAGNOSIS — R29.6 FALLS FREQUENTLY: ICD-10-CM

## 2023-12-07 DIAGNOSIS — R94.131 ABNORMAL EMG: ICD-10-CM

## 2023-12-07 PROCEDURE — 90715 TDAP VACCINE 7 YRS/> IM: CPT | Performed by: NURSE PRACTITIONER

## 2023-12-07 PROCEDURE — 90471 IMMUNIZATION ADMIN: CPT | Performed by: NURSE PRACTITIONER

## 2023-12-07 PROCEDURE — 99214 OFFICE O/P EST MOD 30 MIN: CPT | Mod: 25 | Performed by: NURSE PRACTITIONER

## 2023-12-07 RX ORDER — TACROLIMUS 1 MG/G
OINTMENT TOPICAL 2 TIMES DAILY
Qty: 30 G | Refills: 3 | Status: SHIPPED | OUTPATIENT
Start: 2023-12-07

## 2023-12-07 RX ORDER — BETAMETHASONE DIPROPIONATE 0.5 MG/G
OINTMENT, AUGMENTED TOPICAL 2 TIMES DAILY
Qty: 50 G | Refills: 11 | Status: SHIPPED | OUTPATIENT
Start: 2023-12-07

## 2023-12-07 NOTE — PROGRESS NOTES
Assessment & Plan     Fibromyalgia    - Physical Therapy Referral; Future    Periorificial dermatitis    - tacrolimus (PROTOPIC) 0.1 % external ointment; Apply topically 2 times daily    Erosive oral lichen planus    - augmented betamethasone dipropionate (DIPROLENE-AF) 0.05 % external ointment; Apply topically 2 times daily    Chronic bilateral low back pain with right-sided sciatica    - Physical Therapy Referral; Future  - Spine  Referral; Future    Falls frequently    - Physical Therapy Referral; Future  - Spine  Referral; Future    Abnormal EMG    - Spine  Referral; Future           MED REC REQUIRED  Post Medication Reconciliation Status: patient was not discharged from an inpatient facility or TCU    CONSULTATION/REFERRAL to SPINE, REHAB    FUTURE APPOINTMENTS:       - Follow-up for annual visit or as needed    Time spent in chart review in preparation to see patient, time with patient for interview/exam, ordering medications/tests/and/or procedures, and time spent in charting and coordinating care: 30 minutes.     HERIBERTO Linton CNP [facility]    Eric Moore is a 63 year old, presenting for the following health issues:  ER F/U and Results (Go over EMG results )        12/7/2023     7:31 AM   Additional Questions   Roomed by Clare CRUZ       History of Present Illness       Vascular Disease:  She presents for follow up of vascular disease.     She never takes nitroglycerin. She is not taking daily aspirin.    She eats 4 or more servings of fruits and vegetables daily.She consumes 0 sweetened beverage(s) daily.She exercises with enough effort to increase her heart rate 20 to 29 minutes per day.  She exercises with enough effort to increase her heart rate 3 or less days per week.   She is taking medications regularly.         ED/UC Followup:    Facility:  [facility]   Date of visit: 9/19/23   Reason for visit: fall, right ankle fracture  "  Current Status: still feeling off balance. Ankle feels like it has healed fine. Headaches have gotten better. Has not had a fall since she fell and hit her head 2 weeks after she was in ED.     Medication Followup of tacrolimus and betamethasone ointments  Taking Medication as prescribed: yes  Side Effects:  None  Medication Helping Symptoms:  yes      Review of Systems   Constitutional, HEENT, cardiovascular, pulmonary, gi and gu systems are negative, except as otherwise noted.      Objective    /86   Pulse 96   Temp 98.1  F (36.7  C) (Tympanic)   Resp 16   Ht 1.727 m (5' 8\")   Wt 99.4 kg (219 lb 3.2 oz)   LMP  (LMP Unknown)   SpO2 96%   BMI 33.33 kg/m    Body mass index is 33.33 kg/m .  Physical Exam   GENERAL: alert and no distress  MS: no gross musculoskeletal defects noted, no edema  NEURO: Normal strength and tone, mentation intact and speech normal  PSYCH: mentation appears normal, affect normal/bright                      "

## 2023-12-27 ENCOUNTER — HOSPITAL ENCOUNTER (OUTPATIENT)
Dept: MRI IMAGING | Facility: CLINIC | Age: 63
Discharge: HOME | End: 2023-12-27
Attending: NEUROLOGICAL SURGERY | Admitting: NEUROLOGICAL SURGERY
Payer: OTHER GOVERNMENT

## 2023-12-27 ENCOUNTER — ANCILLARY PROCEDURE (OUTPATIENT)
Dept: GENERAL RADIOLOGY | Facility: CLINIC | Age: 63
End: 2023-12-27
Attending: NEUROLOGICAL SURGERY
Payer: OTHER GOVERNMENT

## 2023-12-27 DIAGNOSIS — M54.50 LUMBAR PAIN: ICD-10-CM

## 2023-12-27 DIAGNOSIS — R29.898 COMPLAINTS OF WEAKNESS OF LOWER EXTREMITY: ICD-10-CM

## 2023-12-27 DIAGNOSIS — R94.39 ABNORMAL STRESS TEST: ICD-10-CM

## 2023-12-27 DIAGNOSIS — R07.9 CHEST PAIN, UNSPECIFIED TYPE: ICD-10-CM

## 2023-12-27 DIAGNOSIS — R07.89 TIGHTNESS IN CHEST: ICD-10-CM

## 2023-12-27 PROCEDURE — 72148 MRI LUMBAR SPINE W/O DYE: CPT

## 2023-12-27 PROCEDURE — 72082 X-RAY EXAM ENTIRE SPI 2/3 VW: CPT | Mod: TC | Performed by: RADIOLOGY

## 2023-12-27 PROCEDURE — 72100 X-RAY EXAM L-S SPINE 2/3 VWS: CPT | Mod: TC | Performed by: RADIOLOGY

## 2023-12-27 RX ORDER — ISOSORBIDE MONONITRATE 30 MG/1
30 TABLET, EXTENDED RELEASE ORAL DAILY
Qty: 90 TABLET | Refills: 2 | Status: SHIPPED | OUTPATIENT
Start: 2023-12-27 | End: 2024-06-04

## 2023-12-27 NOTE — TELEPHONE ENCOUNTER
Last Office Visit: 07/17/23 Dr. Betancourt  Next Office Visit: TBD  Last Fill Date: 09/22/23  Enma Clark MA Cardiology   12/27/2023 8:57 AM

## 2023-12-27 NOTE — TELEPHONE ENCOUNTER
[facility] Cardiology Refill Guideline reviewed.  Medication meets criteria for refill. Laine Lilly RN Cardiology December 27, 2023, 9:18 AM

## 2024-01-15 DIAGNOSIS — E03.9 HYPOTHYROIDISM, UNSPECIFIED TYPE: ICD-10-CM

## 2024-01-15 RX ORDER — LEVOTHYROXINE SODIUM 100 UG/1
TABLET ORAL
Qty: 63 TABLET | Refills: 1 | Status: SHIPPED | OUTPATIENT
Start: 2024-01-15 | End: 2024-06-05

## 2024-01-28 ENCOUNTER — HEALTH MAINTENANCE LETTER (OUTPATIENT)
Age: 64
End: 2024-01-28

## 2024-02-01 ENCOUNTER — LAB (OUTPATIENT)
Dept: LAB | Facility: CLINIC | Age: 64
End: 2024-02-01
Payer: OTHER GOVERNMENT

## 2024-02-01 ENCOUNTER — OFFICE VISIT (OUTPATIENT)
Dept: FAMILY MEDICINE | Facility: CLINIC | Age: 64
End: 2024-02-01
Payer: OTHER GOVERNMENT

## 2024-02-01 VITALS
BODY MASS INDEX: 33.31 KG/M2 | TEMPERATURE: 97.7 F | SYSTOLIC BLOOD PRESSURE: 120 MMHG | OXYGEN SATURATION: 98 % | HEIGHT: 68 IN | WEIGHT: 219.8 LBS | RESPIRATION RATE: 16 BRPM | HEART RATE: 95 BPM | DIASTOLIC BLOOD PRESSURE: 72 MMHG

## 2024-02-01 DIAGNOSIS — Z12.31 SCREENING MAMMOGRAM, ENCOUNTER FOR: ICD-10-CM

## 2024-02-01 DIAGNOSIS — M54.41 CHRONIC BILATERAL LOW BACK PAIN WITH RIGHT-SIDED SCIATICA: ICD-10-CM

## 2024-02-01 DIAGNOSIS — R29.898 RIGHT LEG WEAKNESS: ICD-10-CM

## 2024-02-01 DIAGNOSIS — E21.3 HYPERPARATHYROIDISM (H): ICD-10-CM

## 2024-02-01 DIAGNOSIS — G89.29 CHRONIC BILATERAL LOW BACK PAIN WITH RIGHT-SIDED SCIATICA: ICD-10-CM

## 2024-02-01 DIAGNOSIS — G89.29 CHRONIC CHEST PAIN: ICD-10-CM

## 2024-02-01 DIAGNOSIS — Z01.818 PREOP GENERAL PHYSICAL EXAM: ICD-10-CM

## 2024-02-01 DIAGNOSIS — E03.9 HYPOTHYROIDISM, UNSPECIFIED TYPE: ICD-10-CM

## 2024-02-01 DIAGNOSIS — E04.1 THYROID NODULE: ICD-10-CM

## 2024-02-01 DIAGNOSIS — Z01.818 PREOP GENERAL PHYSICAL EXAM: Primary | ICD-10-CM

## 2024-02-01 DIAGNOSIS — R07.9 CHRONIC CHEST PAIN: ICD-10-CM

## 2024-02-01 LAB
ANION GAP SERPL CALCULATED.3IONS-SCNC: 11 MMOL/L (ref 7–15)
BUN SERPL-MCNC: 9.1 MG/DL (ref 8–23)
CALCIUM SERPL-MCNC: 9.8 MG/DL (ref 8.8–10.2)
CHLORIDE SERPL-SCNC: 101 MMOL/L (ref 98–107)
CREAT SERPL-MCNC: 0.74 MG/DL (ref 0.51–0.95)
DEPRECATED HCO3 PLAS-SCNC: 28 MMOL/L (ref 22–29)
EGFRCR SERPLBLD CKD-EPI 2021: >90 ML/MIN/1.73M2
ERYTHROCYTE [DISTWIDTH] IN BLOOD BY AUTOMATED COUNT: 12.5 % (ref 10–15)
GLUCOSE SERPL-MCNC: 101 MG/DL (ref 70–99)
HCT VFR BLD AUTO: 40.1 % (ref 35–47)
HGB BLD-MCNC: 13.3 G/DL (ref 11.7–15.7)
MCH RBC QN AUTO: 30.1 PG (ref 26.5–33)
MCHC RBC AUTO-ENTMCNC: 33.2 G/DL (ref 31.5–36.5)
MCV RBC AUTO: 91 FL (ref 78–100)
PLATELET # BLD AUTO: 251 10E3/UL (ref 150–450)
POTASSIUM SERPL-SCNC: 4.2 MMOL/L (ref 3.4–5.3)
RBC # BLD AUTO: 4.42 10E6/UL (ref 3.8–5.2)
SODIUM SERPL-SCNC: 140 MMOL/L (ref 135–145)
WBC # BLD AUTO: 5.4 10E3/UL (ref 4–11)

## 2024-02-01 PROCEDURE — 80048 BASIC METABOLIC PNL TOTAL CA: CPT

## 2024-02-01 PROCEDURE — 93000 ELECTROCARDIOGRAM COMPLETE: CPT | Performed by: NURSE PRACTITIONER

## 2024-02-01 PROCEDURE — 36415 COLL VENOUS BLD VENIPUNCTURE: CPT

## 2024-02-01 PROCEDURE — 99214 OFFICE O/P EST MOD 30 MIN: CPT | Mod: 25 | Performed by: NURSE PRACTITIONER

## 2024-02-01 PROCEDURE — 85027 COMPLETE CBC AUTOMATED: CPT

## 2024-02-01 NOTE — PROGRESS NOTES
Preoperative Evaluation  [facility]  [address]  [facility] [address]  Phone: [phone]  Primary Provider: Tashia Meléndez  Pre-op Performing Provider: TASHIA MELÉNDEZ  Feb 1, 2024       Teresa is a 63 year old, presenting for the following:  Pre-Op Exam        2/1/2024     1:23 PM   Additional Questions   Roomed by Clare CRUZ     Surgical Information  Surgery/Procedure: Lumbar spinal fusion surgery   Surgery Location: [facility] [place]  Surgeon: Dr. Gimenez  Surgery Date: 2/8/24  Time of Surgery: 0700  Where patient plans to recover: At home with family  Fax number for surgical facility: [phone]    Assessment & Plan     The proposed surgical procedure is considered INTERMEDIATE risk.    Problem List Items Addressed This Visit       Hypothyroidism, unspecified type    Chronic low back pain    Thyroid nodule    Relevant Orders    US Thyroid    Hyperparathyroidism (H24)    Relevant Orders    Basic metabolic panel  (Ca, Cl, CO2, Creat, Gluc, K, Na, BUN)     Other Visit Diagnoses       Preop general physical exam    -  Primary    Relevant Orders    CBC with platelets    Right leg weakness        Chronic chest pain        Relevant Orders    EKG 12-lead complete w/read - Clinics (Completed)                    - No identified additional risk factors other than previously addressed    Antiplatelet or Anticoagulation Medication Instructions   - Patient is on no antiplatelet or anticoagulation medications.    Additional Medication Instructions   - Take imdur as usual   - SSRIs, SNRIs, TCAs, Antipsychotics: Continue without modification.     Recommendation  APPROVAL GIVEN to proceed with proposed procedure pending review of diagnostic evaluation.      Subjective       HPI related to upcoming procedure: Experiencing falls, reduced sensation in right lower extremity        1/29/2024     9:10 AM   Preop Questions   1. Have you ever had a heart attack or stroke? No- on  Imdur for chronic chest pain, has had Cardiology work up   2. Have you ever had surgery on your heart or blood vessels, such as a stent placement, a coronary artery bypass, or surgery on an artery in your head, neck, heart, or legs? No   3. Do you have chest pain with activity? No   4. Do you have a history of  heart failure? No   5. Do you currently have a cold, bronchitis or symptoms of other infection? No   6. Do you have a cough, shortness of breath, or wheezing? No   7. Do you or anyone in your family have previous history of blood clots? No   8. Do you or does anyone in your family have a serious bleeding problem such as prolonged bleeding following surgeries or cuts? No   9. Have you ever had problems with anemia or been told to take iron pills? No   10. Have you had any abnormal blood loss such as black, tarry or bloody stools, or abnormal vaginal bleeding? No   11. Have you ever had a blood transfusion? No   12. Are you willing to have a blood transfusion if it is medically needed before, during, or after your surgery? Yes   13. Have you or any of your relatives ever had problems with anesthesia? No   14. Do you have sleep apnea, excessive snoring or daytime drowsiness? YES - sleep apnea    14a. Do you have a CPAP machine? Yes   15. Do you have any artifical heart valves or other implanted medical devices like a pacemaker, defibrillator, or continuous glucose monitor? No   16. Do you have artificial joints? No   17. Are you allergic to latex? No       Health Care Directive  Patient does not have a Health Care Directive or Living Will: Discussed advance care planning with patient; however, patient declined at this time.    Preoperative Review of    reviewed - no record of controlled substances prescribed.      Status of Chronic Conditions:  SLEEP PROBLEM - Patient has a longstanding history of snoring and excessive daytime somnolence.. Patient has tried OTC medications with limited success. CPAP at  home.     Patient Active Problem List    Diagnosis Date Noted    Falls frequently 12/07/2023     Priority: Medium    Periorificial dermatitis 12/07/2023     Priority: Medium    Erosive oral lichen planus 12/07/2023     Priority: Medium    Abnormal EMG 12/07/2023     Priority: Medium    Hyperparathyroidism (H24) 05/15/2023     Priority: Medium    Thyroid nodule 04/03/2023     Priority: Medium     Ultrasound 4/2023, repeat in 1 year      Lichen sclerosus of female genitalia 03/27/2023     Priority: Medium    Primary hyperparathyroidism (H24) 11/07/2022     Priority: Medium     Per Endocrine 10/2022:  She should be followed with serum calcium, creatinine and albumin every 6 months. Normal Dexa and urine calcium at this time, see Endocrine on as needed basis.        Chronic pruritus 02/14/2022     Priority: Medium    Family history of colon cancer 12/28/2021     Priority: Medium    Fibromyalgia 09/30/2021     Priority: Medium    History of kidney cancer 04/01/2021     Priority: Medium     1996- left nephrectomy      GAYE (obstructive sleep apnea) 09/17/2020     Priority: Medium     9/9/2020 [facility] Study (225.0 lbs) - AHI 19.8, RDI 26.4, Supine AHI 22.0, REM AHI 36.9, Low O2 79.5%, Time Spent ?88% 40.1 minutes / Time Spent ?89% 51.6 minutes.      Myalgia 10/20/2017     Priority: Medium     Increased CK      Chest tightness or pressure 10/03/2016     Priority: Medium    DDD (degenerative disc disease), lumbar 06/24/2014     Priority: Medium     [facility]      Moderate persistent asthma without complication 07/08/2013     Priority: Medium    History of melanoma in situ 10/17/2012     Priority: Medium    Eczema 10/17/2012     Priority: Medium    Osteopenia 06/15/2012     Priority: Medium    Chronic fatigue 10/04/2011     Priority: Medium    Dry eye syndrome 08/24/2011     Priority: Medium    Chronic low back pain 07/07/2011     Priority: Medium    GERD (gastroesophageal reflux disease)  05/11/2011     Priority: Medium    Leukoplakia of oral mucosa, including tongue 04/18/2011     Priority: Medium    ERIC (generalized anxiety disorder)      Priority: Medium    Chronic rhinitis 05/03/2005     Priority: Medium    Allergic rhinitis due to pollen 05/03/2005     Priority: Medium    Hypothyroidism, unspecified type 04/12/2005     Priority: Medium     Problem list name updated by automated process. Provider to review        Past Medical History:   Diagnosis Date    Allergic rhinitis     ALLERGIC RHINITIS NOS 04/12/2005    Anxiety     Arthritis     herniated disc    Autoimmune disease (H24) 2014    Benign positional vertigo     Bronchitis, not specified as acute or chronic     CHR MAXILLARY SINUSITIS 04/12/2005    Coronary artery disease     Depressive disorder 1996    Depressive disorder, not elsewhere classified     Eczema 10/17/2012    Environmental and seasonal allergies     GERD (gastroesophageal reflux disease)     Gluten intolerance     Heart disease July 2021    Kidney problem 1996    Malignant neoplasm of renal pelvis (H) 1995    Renal cell carcinoma    Melanoma (H) 06/2010    Other specified acquired hypothyroidism 04/12/2005    Renal cancer (H) 05/05/2011    Sleep apnea     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    FUSION LUMBAR ANTERIOR TWO LEVELS  04/13/2015    GI SURGERY      GYN SURGERY  04/08/1999    hysterectomy.  [facility]    HERNIA REPAIR  Not sure    HYSTERECTOMY, PAP NO LONGER INDICATED      PARATHYROIDECTOMY N/A 08/23/2023    Procedure: PARATHYROIDECTOMY;  Surgeon: Hannah Good MD;  Location: [facility] OR    SOFT TISSUE SURGERY      chest-melonoma    SURGICAL  HISTORY OF -   03/1996    Left nephrectomy-renal cell CA     Current Outpatient Medications   Medication Sig Dispense Refill    acetaminophen (TYLENOL) 325 MG tablet Take 2 tablets (650 mg) by mouth every 4 hours as needed for mild pain 50 tablet 0    albuterol (PROAIR HFA/PROVENTIL HFA/VENTOLIN HFA) 108 (90 Base) MCG/ACT inhaler Inhale 2 puffs into the lungs every 4 hours as needed for shortness of breath 18 g 4    albuterol (PROVENTIL) (2.5 MG/3ML) 0.083% neb solution Take 1 vial (2.5 mg) by nebulization every 6 hours as needed for shortness of breath / dyspnea or wheezing 90 mL 11    APNO CREA ointment Apply to rash on nose twice daily as needed. 100 g 3    augmented betamethasone dipropionate (DIPROLENE-AF) 0.05 % external ointment Apply topically 2 times daily 50 g 11    clobetasol (TEMOVATE) 0.05 % external cream Apply topically 2 times daily To affected external vaginal areas 60 g 3    DULoxetine (CYMBALTA) 30 MG capsule TAKE 1 CAPSULE (30 MG) BY MOUTH DAILY IN ADDITION TO THE 60 MG FOR A TOTAL OF 90 MG DAILY 90 capsule 3    DULoxetine (CYMBALTA) 60 MG capsule Take 1 cap by mouth daily in addition to the 30 mg for a total 90 mg/day 90 capsule 3    fexofenadine (ALLEGRA) 180 MG tablet Take 1 tablet (180 mg) by mouth daily 30 tablet 1    isosorbide mononitrate (IMDUR) 30 MG 24 hr tablet Take 1 tablet (30 mg) by mouth daily 90 tablet 2    levothyroxine (SYNTHROID/LEVOTHROID) 100 MCG tablet TAKE ONE TABLET BY MOUTH ONCE DAILY ON MONDAY, WEDNESDAY, FRIDAY, SATURDAY AND SUNDAY. 63 tablet 1    levothyroxine (SYNTHROID/LEVOTHROID) 88 MCG tablet TAKE ONE TABLET BY MOUTH ON TUESDAYS AND THURSDAYS 30 tablet 3    loratadine (CLARITIN) 10 MG tablet Take 1 tablet by mouth daily      mometasone (ELOCON) 0.1 % external ointment Apply topically twice a week Use on eczematous lesions 45 g 3    omeprazole (PRILOSEC) 20 MG DR capsule Take 20 mg by mouth daily      ondansetron (ZOFRAN ODT) 4 MG ODT tab Take 1 tablet (4 mg) by  "mouth every 8 hours as needed for nausea 20 tablet 0    order for DME Equipment being ordered: Nebulizer 1 Units 0    OVER-THE-COUNTER Place 1 drop into both eyes 3 times daily. Systane Ultra, Systane Balance, Blink Tears or Refresh Optive Artificial Tear 1 Bottle     tacrolimus (PROTOPIC) 0.1 % external ointment Apply topically 2 times daily 30 g 3    triamcinolone (KENALOG) 0.1 % external cream Apply topically 2 times daily 30 g 3       Allergies   Allergen Reactions    Iodinated Contrast Media [Iodinated Contrast Media] Swelling and Difficulty breathing     Immediate throat swelling following around 1-2cc of omnipaque 300 for spine procedure  skin prick and intradermal tests with Iohexol negatives but premedicate before using iodinated contrast.    no reactions in skin tests to MRI contrast media Gadovist    Omnipaque [Iohexol] Swelling and Difficulty breathing     Immediate throat swelling following around 1-2cc of omnipaque 300 for spine procedure  --> skin prick and intradermal tests with Iohexol negatives. But I would propose anyway to premedicate patient before using iodinated contrast media (for safety reasons).   --> no reactions in skin tests to MRI contrast media Gadovist    Gabapentin Hives    Gluten     Gluten Meal     Penicillins Hives    Sulfa Antibiotics         Social History     Tobacco Use    Smoking status: Former     Packs/day: 2.00     Years: 15.00     Additional pack years: 0.00     Total pack years: 30.00     Types: Cigarettes     Quit date: 3/26/1996     Years since quittin.8    Smokeless tobacco: Former   Substance Use Topics    Alcohol use: Not Currently     Comment: rare       History   Drug Use No         Review of Systems    Objective    LMP  (LMP Unknown)    Estimated body mass index is 33.33 kg/m  as calculated from the following:    Height as of 23: 1.727 m (5' 8\").    Weight as of 23: 99.4 kg (219 lb 3.2 oz).  Physical Exam  GENERAL: alert and no distress  EYES: Eyes " grossly normal to inspection, PERRL and conjunctivae and sclerae normal  HENT: ear canals and TM's normal, nose and mouth without ulcers or lesions  NECK: no adenopathy, no asymmetry, masses, or scars  RESP: lungs clear to auscultation - no rales, rhonchi or wheezes  CV: regular rate and rhythm, normal S1 S2, no S3 or S4, no murmur, click or rub, no peripheral edema  ABDOMEN: soft, nontender, no hepatosplenomegaly, no masses and bowel sounds normal  MS: no gross musculoskeletal defects noted, no edema  SKIN: no suspicious lesions or rashes  NEURO: Normal strength and tone, mentation intact and speech normal  PSYCH: mentation appears normal, affect normal/bright  LYMPH: no cervical, supraclavicular, axillary, or inguinal adenopathy    Recent Labs   Lab Test 03/27/23  1325 03/19/23  1008 05/25/22  1557 03/25/22  1357   HGB 13.6 13.5  --   --     244  --   --     135*  --   --    POTASSIUM 4.0 4.3  --   --    CR 0.70 0.70   < >  --    A1C  --   --   --  5.1    < > = values in this interval not displayed.        Diagnostics  Labs pending at this time.  Results will be reviewed when available.   EKG required for age > 50 and not completed in the last 90 days.     Revised Cardiac Risk Index (RCRI)  The patient has the following serious cardiovascular risks for perioperative complications:   - Coronary Artery Disease (MI, positive stress test, angina, Qs on EKG) = 1 point - presumed mild per Cardiology, on Imdur. EKG today NSR    RCRI Interpretation: 1 point: Class II (low risk - 0.9% complication rate)       Kimberly Kwan, BASIL Student, [facility]  Signed Electronically by: HERIBERTO Linton CNP  Copy of this evaluation report is provided to requesting physician.    Provider Addendum  I performed the history and physical examination of the patient and discussed the management with the student. I have reviewed the patients past medical history, past surgical history, current medications, current allergies,  family history and social history. I reviewed the available lab and imaging studies. I reviewed the student's note and agree with the documented findings and plan of care. I have reviewed all diagnostics noted and performed the medical decision making. Changes were made in the body of the note to achieve one comprehensive document.    HERIBERTO Miller [facility]

## 2024-02-01 NOTE — PATIENT INSTRUCTIONS
Preparing for Your Surgery  Getting started  A nurse will call you to review your health history and instructions. They will give you an arrival time based on your scheduled surgery time. Please be ready to share:  Your doctor's clinic name and phone number  Your medical, surgical, and anesthesia history  A list of allergies and sensitivities  A list of medicines, including herbal treatments and over-the-counter drugs  Whether the patient has a legal guardian (ask how to send us the papers in advance)  Please tell us if you're pregnant--or if there's any chance you might be pregnant. Some surgeries may injure a fetus (unborn baby), so they require a pregnancy test. Surgeries that are safe for a fetus don't always need a test, and you can choose whether to have one.   If you have a child who's having surgery, please ask for a copy of Preparing for Your Child's Surgery.    Preparing for surgery  Within 10 to 30 days of surgery: Have a pre-op exam (sometimes called an H&P, or History and Physical). This can be done at a clinic or pre-operative center.  If you're having a , you may not need this exam. Talk to your care team.  At your pre-op exam, talk to your care team about all medicines you take. If you need to stop any medicines before surgery, ask when to start taking them again.  We do this for your safety. Many medicines can make you bleed too much during surgery. Some change how well surgery (anesthesia) drugs work.  Call your insurance company to let them know you're having surgery. (If you don't have insurance, call [phone].)  Call your clinic if there's any change in your health. This includes signs of a cold or flu (sore throat, runny nose, cough, rash, fever). It also includes a scrape or scratch near the surgery site.  If you have questions on the day of surgery, call your hospital or surgery center.  Eating and drinking guidelines  For your safety: Unless your surgeon tells you otherwise,  follow the guidelines below.  Eat and drink as usual until 8 hours before you arrive for surgery. After that, no food or milk.  Drink clear liquids until 2 hours before you arrive. These are liquids you can see through, like water, Gatorade, and Propel Water. They also include plain black coffee and tea (no cream or milk), candy, and breath mints. You can spit out gum when you arrive.  If you drink alcohol: Stop drinking it the night before surgery.  If your care team tells you to take medicine on the morning of surgery, it's okay to take it with a sip of water.  Preventing infection  Shower or bathe the night before and morning of your surgery. Follow the instructions your clinic gave you. (If no instructions, use regular soap.)  Don't shave or clip hair near your surgery site. We'll remove the hair if needed.  Don't smoke or vape the morning of surgery. You may chew nicotine gum up to 2 hours before surgery. A nicotine patch is okay.  Note: Some surgeries require you to completely quit smoking and nicotine. Check with your surgeon.  Your care team will make every effort to keep you safe from infection. We will:  Clean our hands often with soap and water (or an alcohol-based hand rub).  Clean the skin at your surgery site with a special soap that kills germs.  Give you a special gown to keep you warm. (Cold raises the risk of infection.)  Wear special hair covers, masks, gowns and gloves during surgery.  Give antibiotic medicine, if prescribed. Not all surgeries need antibiotics.  What to bring on the day of surgery  Photo ID and insurance card  Copy of your health care directive, if you have one  Glasses and hearing aids (bring cases)  You can't wear contacts during surgery  Inhaler and eye drops, if you use them (tell us about these when you arrive)  CPAP machine or breathing device, if you use them  A few personal items, if spending the night  If you have . . .  A pacemaker, ICD (cardiac defibrillator) or other  implant: Bring the ID card.  An implanted stimulator: Bring the remote control.  A legal guardian: Bring a copy of the certified (court-stamped) guardianship papers.  Please remove any jewelry, including body piercings. Leave jewelry and other valuables at home.  If you're going home the day of surgery  You must have a responsible adult drive you home. They should stay with you overnight as well.  If you don't have someone to stay with you, and you aren't safe to go home alone, we may keep you overnight. Insurance often won't pay for this.  After surgery  If it's hard to control your pain or you need more pain medicine, please call your surgeon's office.  Questions?   If you have any questions for your care team, list them here: _________________________________________________________________________________________________________________________________________________________________________ ____________________________________ ____________________________________ ____________________________________  For informational purposes only. Not to replace the advice of your health care provider. Copyright   2003, 2019 [employer]. All rights reserved. Clinically reviewed by Michell Agrawal MD. [employer] 137215 - REV 12/22.    How to Take Your Medication Before Surgery  - Take isosorbide mononitrate and levothyroxine the day of surgery.   -DO not take IBUPROFEN for 7 days prior to surgery.

## 2024-02-01 NOTE — RESULT ENCOUNTER NOTE
Mesfin Moore    Your EKG results came back within normal limits. Please let us know if you have any questions.     Take care,    HERIBERTO Miller CNP

## 2024-02-06 ENCOUNTER — HOSPITAL ENCOUNTER (OUTPATIENT)
Dept: ULTRASOUND IMAGING | Facility: CLINIC | Age: 64
Discharge: HOME | End: 2024-02-06
Attending: NURSE PRACTITIONER | Admitting: NURSE PRACTITIONER
Payer: OTHER GOVERNMENT

## 2024-02-06 DIAGNOSIS — E04.1 THYROID NODULE: ICD-10-CM

## 2024-02-06 PROCEDURE — 76536 US EXAM OF HEAD AND NECK: CPT

## 2024-02-07 NOTE — RESULT ENCOUNTER NOTE
Mesfin Moore    Your right sided thyroid nodule is unchanged x 8 years. The left sided nodules are no longer seen. No repeat imaging needed unless you notice changes in the neck. Please let us know if you have any questions.     Take care,    HERIBERTO Miller CNP

## 2024-02-12 ENCOUNTER — MYC MEDICAL ADVICE (OUTPATIENT)
Dept: FAMILY MEDICINE | Facility: CLINIC | Age: 64
End: 2024-02-12
Payer: OTHER GOVERNMENT

## 2024-02-12 DIAGNOSIS — D62 ANEMIA DUE TO BLOOD LOSS, ACUTE: Primary | ICD-10-CM

## 2024-02-13 NOTE — PROGRESS NOTES
2-week postop check status postresection left inferior parathyroid adenoma.  Post excision parathyroid hormone level is 36.    Since his surgery the patient denies any probs with voice quality inspiration or swallowing.  No symptoms of hypocalcemia    On physical exam the wound is healing well the Dermabond was removed and the sutures were clipped at the skin edges.  No evidence of hematoma seroma or infection.    Pathology was reviewed with the patient.    Plan:  I reviewed with the patient wound management wound massage  Today we will check calcium vitamin D and parathyroid hormone level  The patient will follow-up with me on an as-needed basis

## 2024-02-19 ENCOUNTER — LAB (OUTPATIENT)
Dept: LAB | Facility: CLINIC | Age: 64
End: 2024-02-19
Payer: OTHER GOVERNMENT

## 2024-02-19 DIAGNOSIS — D62 ANEMIA DUE TO BLOOD LOSS, ACUTE: Primary | ICD-10-CM

## 2024-02-19 DIAGNOSIS — D62 ANEMIA DUE TO BLOOD LOSS, ACUTE: ICD-10-CM

## 2024-02-19 LAB
BASOPHILS # BLD AUTO: 0 10E3/UL (ref 0–0.2)
BASOPHILS NFR BLD AUTO: 0 %
EOSINOPHIL # BLD AUTO: 0.7 10E3/UL (ref 0–0.7)
EOSINOPHIL NFR BLD AUTO: 7 %
ERYTHROCYTE [DISTWIDTH] IN BLOOD BY AUTOMATED COUNT: 13.8 % (ref 10–15)
HCT VFR BLD AUTO: 27.9 % (ref 35–47)
HGB BLD-MCNC: 8.9 G/DL (ref 11.7–15.7)
IMM GRANULOCYTES # BLD: 0.5 10E3/UL
IMM GRANULOCYTES NFR BLD: 5 %
LYMPHOCYTES # BLD AUTO: 1.6 10E3/UL (ref 0.8–5.3)
LYMPHOCYTES NFR BLD AUTO: 17 %
MCH RBC QN AUTO: 30.5 PG (ref 26.5–33)
MCHC RBC AUTO-ENTMCNC: 31.9 G/DL (ref 31.5–36.5)
MCV RBC AUTO: 96 FL (ref 78–100)
MONOCYTES # BLD AUTO: 0.8 10E3/UL (ref 0–1.3)
MONOCYTES NFR BLD AUTO: 8 %
NEUTROPHILS # BLD AUTO: 6.2 10E3/UL (ref 1.6–8.3)
NEUTROPHILS NFR BLD AUTO: 63 %
PLATELET # BLD AUTO: 429 10E3/UL (ref 150–450)
RBC # BLD AUTO: 2.92 10E6/UL (ref 3.8–5.2)
WBC # BLD AUTO: 9.9 10E3/UL (ref 4–11)

## 2024-02-19 PROCEDURE — 85025 COMPLETE CBC W/AUTO DIFF WBC: CPT

## 2024-02-19 PROCEDURE — 36415 COLL VENOUS BLD VENIPUNCTURE: CPT

## 2024-02-19 NOTE — RESULT ENCOUNTER NOTE
Mesfin Moore    Now I can see your previous hemoglobin was 8.6- now up to 8.9. Recommend to start an over the counter iron supplement if you are not taking one. I will put a order in for a recheck in a few weeks. Please let us know if you have any questions.     Take care,    HERIBERTO Miller CNP

## 2024-02-22 DIAGNOSIS — G47.33 OSA (OBSTRUCTIVE SLEEP APNEA): Primary | ICD-10-CM

## 2024-03-04 ENCOUNTER — OFFICE VISIT (OUTPATIENT)
Dept: PEDIATRICS | Facility: CLINIC | Age: 64
End: 2024-03-04
Payer: OTHER GOVERNMENT

## 2024-03-04 ENCOUNTER — ANCILLARY PROCEDURE (OUTPATIENT)
Dept: GENERAL RADIOLOGY | Facility: CLINIC | Age: 64
End: 2024-03-04
Attending: PREVENTIVE MEDICINE
Payer: OTHER GOVERNMENT

## 2024-03-04 ENCOUNTER — APPOINTMENT (OUTPATIENT)
Dept: CT IMAGING | Facility: CLINIC | Age: 64
End: 2024-03-04
Attending: FAMILY MEDICINE
Payer: OTHER GOVERNMENT

## 2024-03-04 ENCOUNTER — MYC MEDICAL ADVICE (OUTPATIENT)
Dept: FAMILY MEDICINE | Facility: CLINIC | Age: 64
End: 2024-03-04

## 2024-03-04 ENCOUNTER — HOSPITAL ENCOUNTER (EMERGENCY)
Facility: CLINIC | Age: 64
Discharge: HOME | End: 2024-03-04
Attending: FAMILY MEDICINE | Admitting: FAMILY MEDICINE
Payer: OTHER GOVERNMENT

## 2024-03-04 ENCOUNTER — LAB (OUTPATIENT)
Dept: LAB | Facility: CLINIC | Age: 64
End: 2024-03-04
Payer: OTHER GOVERNMENT

## 2024-03-04 VITALS
BODY MASS INDEX: 33.65 KG/M2 | WEIGHT: 222 LBS | RESPIRATION RATE: 20 BRPM | TEMPERATURE: 97.7 F | DIASTOLIC BLOOD PRESSURE: 64 MMHG | SYSTOLIC BLOOD PRESSURE: 128 MMHG | HEART RATE: 88 BPM | OXYGEN SATURATION: 97 % | HEIGHT: 68 IN

## 2024-03-04 VITALS
WEIGHT: 222 LBS | HEART RATE: 84 BPM | OXYGEN SATURATION: 98 % | BODY MASS INDEX: 33.65 KG/M2 | TEMPERATURE: 98.7 F | SYSTOLIC BLOOD PRESSURE: 137 MMHG | RESPIRATION RATE: 18 BRPM | HEIGHT: 68 IN | DIASTOLIC BLOOD PRESSURE: 82 MMHG

## 2024-03-04 DIAGNOSIS — D62 ANEMIA DUE TO BLOOD LOSS, ACUTE: ICD-10-CM

## 2024-03-04 DIAGNOSIS — R06.09 DYSPNEA ON EXERTION: ICD-10-CM

## 2024-03-04 DIAGNOSIS — R07.9 CHEST PAIN, UNSPECIFIED TYPE: ICD-10-CM

## 2024-03-04 DIAGNOSIS — R06.02 SOB (SHORTNESS OF BREATH): Primary | ICD-10-CM

## 2024-03-04 DIAGNOSIS — R79.89 ELEVATED TROPONIN: ICD-10-CM

## 2024-03-04 DIAGNOSIS — R73.9 HYPERGLYCEMIA: ICD-10-CM

## 2024-03-04 DIAGNOSIS — D64.9 ANEMIA, UNSPECIFIED TYPE: ICD-10-CM

## 2024-03-04 DIAGNOSIS — R79.89 ELEVATED D-DIMER: ICD-10-CM

## 2024-03-04 DIAGNOSIS — R06.02 SOB (SHORTNESS OF BREATH): ICD-10-CM

## 2024-03-04 LAB
ALBUMIN SERPL BCG-MCNC: 4.2 G/DL (ref 3.5–5.2)
ALP SERPL-CCNC: 177 U/L (ref 40–150)
ALT SERPL W P-5'-P-CCNC: 31 U/L (ref 0–50)
ANION GAP SERPL CALCULATED.3IONS-SCNC: 10 MMOL/L (ref 7–15)
APTT PPP: 28 SECONDS (ref 22–38)
AST SERPL W P-5'-P-CCNC: 27 U/L (ref 0–45)
BILIRUB SERPL-MCNC: 0.2 MG/DL
BUN SERPL-MCNC: 11.2 MG/DL (ref 8–23)
CALCIUM SERPL-MCNC: 9.4 MG/DL (ref 8.8–10.2)
CHLORIDE SERPL-SCNC: 104 MMOL/L (ref 98–107)
CREAT SERPL-MCNC: 0.72 MG/DL (ref 0.51–0.95)
D DIMER PPP FEU-MCNC: 1.69 UG/ML FEU (ref 0–0.5)
DEPRECATED HCO3 PLAS-SCNC: 27 MMOL/L (ref 22–29)
EGFRCR SERPLBLD CKD-EPI 2021: >90 ML/MIN/1.73M2
ERYTHROCYTE [DISTWIDTH] IN BLOOD BY AUTOMATED COUNT: 13.3 % (ref 10–15)
FERRITIN SERPL-MCNC: 36 NG/ML (ref 11–328)
GLUCOSE SERPL-MCNC: 95 MG/DL (ref 70–99)
HBA1C MFR BLD: 4.7 %
HCT VFR BLD AUTO: 32.3 % (ref 35–47)
HGB BLD-MCNC: 10.2 G/DL (ref 11.7–15.7)
HOLD SPECIMEN: NORMAL
HOLD SPECIMEN: NORMAL
INR PPP: 0.91 (ref 0.85–1.15)
IRON BINDING CAPACITY (ROCHE): 335 UG/DL (ref 240–430)
IRON BINDING CAPACITY (ROCHE): 346 UG/DL (ref 240–430)
IRON SATN MFR SERPL: 19 % (ref 15–46)
IRON SATN MFR SERPL: 28 % (ref 15–46)
IRON SERPL-MCNC: 65 UG/DL (ref 37–145)
IRON SERPL-MCNC: 96 UG/DL (ref 37–145)
MCH RBC QN AUTO: 30 PG (ref 26.5–33)
MCHC RBC AUTO-ENTMCNC: 31.6 G/DL (ref 31.5–36.5)
MCV RBC AUTO: 95 FL (ref 78–100)
NT-PROBNP SERPL-MCNC: 63 PG/ML (ref 0–900)
PLATELET # BLD AUTO: 374 10E3/UL (ref 150–450)
POTASSIUM SERPL-SCNC: 4.2 MMOL/L (ref 3.4–5.3)
PROT SERPL-MCNC: 6.7 G/DL (ref 6.4–8.3)
RBC # BLD AUTO: 3.4 10E6/UL (ref 3.8–5.2)
SARS-COV-2 RNA RESP QL NAA+PROBE: NEGATIVE
SODIUM SERPL-SCNC: 141 MMOL/L (ref 135–145)
TROPONIN T SERPL HS-MCNC: 17 NG/L
TROPONIN T SERPL HS-MCNC: 18 NG/L
WBC # BLD AUTO: 5 10E3/UL (ref 4–11)

## 2024-03-04 PROCEDURE — 85730 THROMBOPLASTIN TIME PARTIAL: CPT | Performed by: PREVENTIVE MEDICINE

## 2024-03-04 PROCEDURE — 85027 COMPLETE CBC AUTOMATED: CPT

## 2024-03-04 PROCEDURE — 96375 TX/PRO/DX INJ NEW DRUG ADDON: CPT | Mod: 59

## 2024-03-04 PROCEDURE — 82607 VITAMIN B-12: CPT | Performed by: FAMILY MEDICINE

## 2024-03-04 PROCEDURE — 84484 ASSAY OF TROPONIN QUANT: CPT | Performed by: FAMILY MEDICINE

## 2024-03-04 PROCEDURE — 71046 X-RAY EXAM CHEST 2 VIEWS: CPT | Mod: TC | Performed by: RADIOLOGY

## 2024-03-04 PROCEDURE — 96374 THER/PROPH/DIAG INJ IV PUSH: CPT | Mod: 59

## 2024-03-04 PROCEDURE — 83550 IRON BINDING TEST: CPT | Performed by: FAMILY MEDICINE

## 2024-03-04 PROCEDURE — 36415 COLL VENOUS BLD VENIPUNCTURE: CPT

## 2024-03-04 PROCEDURE — 83540 ASSAY OF IRON: CPT | Performed by: FAMILY MEDICINE

## 2024-03-04 PROCEDURE — 93000 ELECTROCARDIOGRAM COMPLETE: CPT | Performed by: PREVENTIVE MEDICINE

## 2024-03-04 PROCEDURE — 71275 CT ANGIOGRAPHY CHEST: CPT

## 2024-03-04 PROCEDURE — 85610 PROTHROMBIN TIME: CPT | Performed by: PREVENTIVE MEDICINE

## 2024-03-04 PROCEDURE — 93005 ELECTROCARDIOGRAM TRACING: CPT

## 2024-03-04 PROCEDURE — 83880 ASSAY OF NATRIURETIC PEPTIDE: CPT | Performed by: PREVENTIVE MEDICINE

## 2024-03-04 PROCEDURE — 84484 ASSAY OF TROPONIN QUANT: CPT | Performed by: PREVENTIVE MEDICINE

## 2024-03-04 PROCEDURE — 99284 EMERGENCY DEPT VISIT MOD MDM: CPT | Mod: 25 | Performed by: FAMILY MEDICINE

## 2024-03-04 PROCEDURE — 99417 PROLNG OP E/M EACH 15 MIN: CPT | Performed by: PREVENTIVE MEDICINE

## 2024-03-04 PROCEDURE — 85379 FIBRIN DEGRADATION QUANT: CPT | Performed by: PREVENTIVE MEDICINE

## 2024-03-04 PROCEDURE — 250N000011 HC RX IP 250 OP 636: Performed by: FAMILY MEDICINE

## 2024-03-04 PROCEDURE — 83540 ASSAY OF IRON: CPT | Performed by: PREVENTIVE MEDICINE

## 2024-03-04 PROCEDURE — 82728 ASSAY OF FERRITIN: CPT | Performed by: PREVENTIVE MEDICINE

## 2024-03-04 PROCEDURE — 250N000009 HC RX 250: Performed by: FAMILY MEDICINE

## 2024-03-04 PROCEDURE — 80053 COMPREHEN METABOLIC PANEL: CPT | Performed by: PREVENTIVE MEDICINE

## 2024-03-04 PROCEDURE — 99285 EMERGENCY DEPT VISIT HI MDM: CPT | Mod: 25

## 2024-03-04 PROCEDURE — 83550 IRON BINDING TEST: CPT | Performed by: PREVENTIVE MEDICINE

## 2024-03-04 PROCEDURE — 99215 OFFICE O/P EST HI 40 MIN: CPT | Mod: 25 | Performed by: PREVENTIVE MEDICINE

## 2024-03-04 PROCEDURE — 93010 ELECTROCARDIOGRAM REPORT: CPT | Mod: 77 | Performed by: FAMILY MEDICINE

## 2024-03-04 PROCEDURE — 36415 COLL VENOUS BLD VENIPUNCTURE: CPT | Performed by: FAMILY MEDICINE

## 2024-03-04 PROCEDURE — 83036 HEMOGLOBIN GLYCOSYLATED A1C: CPT | Performed by: FAMILY MEDICINE

## 2024-03-04 PROCEDURE — 87635 SARS-COV-2 COVID-19 AMP PRB: CPT | Performed by: PREVENTIVE MEDICINE

## 2024-03-04 RX ORDER — FERROUS SULFATE 325(65) MG
325 TABLET ORAL
COMMUNITY
End: 2024-06-05

## 2024-03-04 RX ORDER — OFLOXACIN 3 MG/ML
SOLUTION/ DROPS OPHTHALMIC
COMMUNITY
Start: 2023-07-17 | End: 2024-06-04

## 2024-03-04 RX ORDER — METHYLPREDNISOLONE SODIUM SUCCINATE 40 MG/ML
40 INJECTION, POWDER, LYOPHILIZED, FOR SOLUTION INTRAMUSCULAR; INTRAVENOUS EVERY 4 HOURS
Status: DISCONTINUED | OUTPATIENT
Start: 2024-03-04 | End: 2024-03-04 | Stop reason: HOSPADM

## 2024-03-04 RX ORDER — KETOROLAC TROMETHAMINE 5 MG/ML
SOLUTION OPHTHALMIC
COMMUNITY
Start: 2023-08-14

## 2024-03-04 RX ORDER — DIPHENHYDRAMINE HYDROCHLORIDE 50 MG/ML
50 INJECTION INTRAMUSCULAR; INTRAVENOUS ONCE
Status: DISCONTINUED | OUTPATIENT
Start: 2024-03-04 | End: 2024-03-04

## 2024-03-04 RX ORDER — METHYLPREDNISOLONE 32 MG/1
TABLET ORAL
Qty: 2 TABLET | Refills: 0 | Status: SHIPPED | OUTPATIENT
Start: 2024-03-04 | End: 2024-03-04

## 2024-03-04 RX ORDER — CALCIUM CARBONATE 750 MG/1
500 TABLET, CHEWABLE ORAL
COMMUNITY
Start: 2024-01-26

## 2024-03-04 RX ORDER — IOPAMIDOL 755 MG/ML
89 INJECTION, SOLUTION INTRAVASCULAR ONCE
Status: COMPLETED | OUTPATIENT
Start: 2024-03-04 | End: 2024-03-04

## 2024-03-04 RX ORDER — ERGOCALCIFEROL 1.25 MG/1
CAPSULE, LIQUID FILLED ORAL
COMMUNITY
Start: 2024-01-26

## 2024-03-04 RX ORDER — DIPHENHYDRAMINE HYDROCHLORIDE 50 MG/ML
50 INJECTION INTRAMUSCULAR; INTRAVENOUS ONCE
Status: COMPLETED | OUTPATIENT
Start: 2024-03-04 | End: 2024-03-04

## 2024-03-04 RX ADMIN — IOPAMIDOL 89 ML: 755 INJECTION, SOLUTION INTRAVENOUS at 19:34

## 2024-03-04 RX ADMIN — METHYLPREDNISOLONE SODIUM SUCCINATE 40 MG: 40 INJECTION INTRAMUSCULAR; INTRAVENOUS at 18:14

## 2024-03-04 RX ADMIN — SODIUM CHLORIDE 100 ML: 9 INJECTION, SOLUTION INTRAVENOUS at 19:34

## 2024-03-04 RX ADMIN — DIPHENHYDRAMINE HYDROCHLORIDE 50 MG: 50 INJECTION, SOLUTION INTRAMUSCULAR; INTRAVENOUS at 18:16

## 2024-03-04 ASSESSMENT — ENCOUNTER SYMPTOMS
VOMITING: 0
FATIGUE: 1
DYSURIA: 0
NAUSEA: 0
ABDOMINAL PAIN: 0
SORE THROAT: 0
CONSTIPATION: 0
SHORTNESS OF BREATH: 1
HEADACHES: 0
WHEEZING: 0
COUGH: 0
CHILLS: 0
DIARRHEA: 0
FREQUENCY: 0
DIAPHORESIS: 0
SINUS PRESSURE: 0
FEVER: 0
BLOOD IN STOOL: 0
PALPITATIONS: 0

## 2024-03-04 ASSESSMENT — ACTIVITIES OF DAILY LIVING (ADL)
ADLS_ACUITY_SCORE: 35
ADLS_ACUITY_SCORE: 33
ADLS_ACUITY_SCORE: 35
ADLS_ACUITY_SCORE: 35

## 2024-03-04 ASSESSMENT — COLUMBIA-SUICIDE SEVERITY RATING SCALE - C-SSRS
1. IN THE PAST MONTH, HAVE YOU WISHED YOU WERE DEAD OR WISHED YOU COULD GO TO SLEEP AND NOT WAKE UP?: NO
6. HAVE YOU EVER DONE ANYTHING, STARTED TO DO ANYTHING, OR PREPARED TO DO ANYTHING TO END YOUR LIFE?: NO
2. HAVE YOU ACTUALLY HAD ANY THOUGHTS OF KILLING YOURSELF IN THE PAST MONTH?: NO

## 2024-03-04 ASSESSMENT — PAIN SCALES - GENERAL: PAINLEVEL: MODERATE PAIN (5)

## 2024-03-04 NOTE — PROGRESS NOTES
Assessment & Plan     (R06.02) SOB (shortness of breath)  (primary encounter diagnosis)  Plan: Comprehensive metabolic panel (BMP + Alb, Alk         Phos, ALT, AST, Total. Bili, TP), Troponin T,         High Sensitivity, EKG 12-lead complete w/read -        Clinics, D dimer, quantitative, INR, Partial         thromboplastin time, BNP-N terminal pro,         Symptomatic COVID-19 Virus (Coronavirus) by PCR        Nose, Iron and iron binding capacity, Ferritin,        XR Chest 2 Views, DISCONTINUED:         methylPREDNISolone (MEDROL) 32 MG tablet,         CANCELED: CBC with platelets and differential    (R79.89) Elevated d-dimer  (R07.9) Chest pain, unspecified type  (R79.89) Elevated troponin    Referred to ED now for further evaluation, serial troponins, further evaluation of possible PE (patient has contrast allergy) but normal cxr.  May benefit from VQ scan.  Did ok with steroid premedication in 3/2022 before CT chest with contrast.          89 minutes spent by me on the date of the encounter doing chart review, history and exam, documentation and further activities per the note    Sean Bundydy is a 64 year old, presenting for the following health issues:  Shortness of Breath and Palpitations    HPI     Shortness of Breath/Breathing Problem  Onset/Duration: started after surgery  Progression of symptoms: same  Accompanying signs and symptoms:       SOB at rest: YES       SOB with activity: YES       Pain with inspiration: No       Cough: YES       Pink tinged sputum: No       Sweating: No       Nausea/vomiting: No       Lightheadedness: YES       Palpitations: YES       Fever/chills: YES       Heartburn: YES  History   Family history of coagulation disorders: No  Tobacco use: No  Previous similar symptoms: no   Precipitating factors:  Related to eating: No  Better with burping: No  Therapies tried and outcome: anti acid     This is a 63 yo female who had low back surgery one month  "ago.  Now has had chest pressure and sob over the past 2 days on and off worsening today.  No pleurisy, fever, cough.  No hx of vte or mi.   No leg swelling.  More symptomatic with exertion.        Review of Systems  Constitutional, HEENT, cardiovascular, pulmonary, GI, , musculoskeletal, neuro, skin, endocrine and psych systems are negative, except as otherwise noted.      Objective    /82   Pulse 84   Temp 98.7  F (37.1  C) (Tympanic)   Resp 18   Ht 1.727 m (5' 8\")   Wt 100.7 kg (222 lb)   LMP  (LMP Unknown)   SpO2 98%   BMI 33.75 kg/m    Body mass index is 33.75 kg/m .  Physical Exam   GENERAL: alert and no distress  EYES: Eyes grossly normal to inspection, PERRL and conjunctivae and sclerae normal  HENT: ear canals and TM's normal, nose and mouth without ulcers or lesions  NECK: no adenopathy, no asymmetry, masses, or scars  RESP: lungs clear to auscultation - no rales, rhonchi or wheezes  CV: regular rate and rhythm, normal S1 S2, no S3 or S4, no murmur, click or rub, no peripheral edema  ABDOMEN: soft, nontender, no hepatosplenomegaly, no masses and bowel sounds normal  MS: no gross musculoskeletal defects noted, no edema  SKIN: no suspicious lesions or rashes  NEURO: Normal strength and tone, mentation intact and speech normal  PSYCH: mentation appears normal, affect normal/bright    EKG - nsr, no st or t wave changes concerning for ischemia, no q waves  Results for orders placed or performed in visit on 03/04/24   XR Chest 2 Views     Status: None    Narrative    XR CHEST 2 VIEWS 3/4/2024 4:03 PM    HISTORY: sob; SOB (shortness of breath)    COMPARISON: 10/23/2022.    FINDINGS/    Impression    IMPRESSION: No airspace consolidation, pneumothorax, or pleural fluid.  There is minimal elevation of the right hemidiaphragm, which is  similar to comparison. Heart size and pulmonary vasculature are normal  appearing. Partially imaged lumbar spinal fusion and multiple surgical  clips in the abdomen " along the midline. No acute osseous abnormality.    AMPARO DEWITT MD         SYSTEM ID:  [id number]   Results for orders placed or performed in visit on 03/04/24   Comprehensive metabolic panel (BMP + Alb, Alk Phos, ALT, AST, Total. Bili, TP)     Status: Abnormal   Result Value Ref Range    Sodium 141 135 - 145 mmol/L    Potassium 4.2 3.4 - 5.3 mmol/L    Carbon Dioxide (CO2) 27 22 - 29 mmol/L    Anion Gap 10 7 - 15 mmol/L    Urea Nitrogen 11.2 8.0 - 23.0 mg/dL    Creatinine 0.72 0.51 - 0.95 mg/dL    GFR Estimate >90 >60 mL/min/1.73m2    Calcium 9.4 8.8 - 10.2 mg/dL    Chloride 104 98 - 107 mmol/L    Glucose 95 70 - 99 mg/dL    Alkaline Phosphatase 177 (H) 40 - 150 U/L    AST 27 0 - 45 U/L    ALT 31 0 - 50 U/L    Protein Total 6.7 6.4 - 8.3 g/dL    Albumin 4.2 3.5 - 5.2 g/dL    Bilirubin Total 0.2 <=1.2 mg/dL   Troponin T, High Sensitivity     Status: Abnormal   Result Value Ref Range    Troponin T, High Sensitivity 17 (H) <=14 ng/L   D dimer, quantitative     Status: Abnormal   Result Value Ref Range    D-Dimer Quantitative 1.69 (H) 0.00 - 0.50 ug/mL FEU    Narrative    This D-dimer assay is intended for use in conjunction with a clinical pretest probability assessment model to exclude pulmonary embolism (PE) and deep venous thrombosis (DVT) in outpatients suspected of PE or DVT. The cut-off value is 0.50 ug/mL FEU.    For patients 50 years of age or older, the application of age-adjusted cut-off values for D-Dimer may increase the specificity without significant effect on sensitivity. The literature suggested calculation age adjusted cut-off in ug/L = age in years x 10 ug/L. The results in this laboratory are reported as ug/mL rather than ug/L. The calculation for age adjusted cut off in ug/mL= age in years x 0.01 ug/mL. For example, the cut off for a 76 year old male is 77 x 0.01 ug/mL = 0.76 ug/mL (760 ug/L).    ANTHONY Gonzalez et al. Age adjusted D-dimer cut-off levels to rule out pulmonary embolism: The ADJUST-PE  Study. GARDENIA 2014;311:8147-6959.; HJ Gama et al. Diagnostic accuracy of conventional or age adjusted D-dimer cutoff values in older patients with suspected venous thromboembolism. Systemic review and meta-analysis. BMJ 2013:346:f2492.   INR     Status: Normal   Result Value Ref Range    INR 0.91 0.85 - 1.15   Partial thromboplastin time     Status: Normal   Result Value Ref Range    aPTT 28 22 - 38 Seconds   Symptomatic COVID-19 Virus (Coronavirus) by PCR Nose     Status: Normal    Specimen: Nose; Swab   Result Value Ref Range    SARS CoV2 PCR Negative Negative    Narrative    Testing was performed using the Xpert Xpress SARS-CoV-2 Assay on the Cepheid Gene-Xpert Instrument Systems. Additional information about this Emergency Use Authorization (EUA) assay can be found via the Lab Guide. This test should be ordered for the detection of SARS-CoV-2 in individuals who meet SARS-CoV-2 clinical and/or epidemiological criteria as well as from individuals without symptoms or other reasons to suspect COVID-19. Test performance for asymptomatic patients has only been established in anterior nasal swab specimens. This test is for in vitro diagnostic use under the FDA EUA for laboratories certified under CLIA to perform high complexity testing. This test has not been FDA cleared or approved. A negative result does not rule out the presence of PCR inhibitors in the specimen or target RNA concentration below the limit of detection for the assay. The possibility of a false negative should be considered if the patient's recent exposure or clinical presentation suggests COVID-19. This test was validated by the [facility]. This laboratory is certified under the Clinical Laboratory Improvement Amendments (CLIA) as qualified to perform high complexity laboratory testing.     Iron and iron binding capacity     Status: Normal   Result Value Ref Range    Iron 65 37 - 145 ug/dL    Iron Binding Capacity  335 240 - 430 ug/dL    Iron Sat Index 19 15 - 46 %   Results for orders placed or performed in visit on 03/04/24   CBC with platelets     Status: Abnormal   Result Value Ref Range    WBC Count 5.0 4.0 - 11.0 10e3/uL    RBC Count 3.40 (L) 3.80 - 5.20 10e6/uL    Hemoglobin 10.2 (L) 11.7 - 15.7 g/dL    Hematocrit 32.3 (L) 35.0 - 47.0 %    MCV 95 78 - 100 fL    MCH 30.0 26.5 - 33.0 pg    MCHC 31.6 31.5 - 36.5 g/dL    RDW 13.3 10.0 - 15.0 %    Platelet Count 374 150 - 450 10e3/uL             Signed Electronically by: DONNIE LUCERO PROVIDER

## 2024-03-04 NOTE — ED TRIAGE NOTES
Reports having gone into clinic this am had labs drawn was called and told to go to ED for eval of elevated  Ddimer    Triage Assessment (Adult)       Row Name 03/04/24 1724          Triage Assessment    Airway WDL WDL        Respiratory WDL    Respiratory WDL WDL        Skin Circulation/Temperature WDL    Skin Circulation/Temperature WDL WDL        Cardiac WDL    Cardiac WDL WDL        Peripheral/Neurovascular WDL    Peripheral Neurovascular WDL WDL     Capillary Refill, General less than/equal to 3 secs        [place] Coma Scale    Best Eye Response 4-->(E4) spontaneous     Best Motor Response 6-->(M6) obeys commands     Best Verbal Response 5-->(V5) oriented     [place] Coma Scale Score 15

## 2024-03-04 NOTE — TELEPHONE ENCOUNTER
Referral to Acute and Diagnostic Services    [phone] ([place]) [place] [address] [place] [id number]    Transition to [facility] Clinic has been discussed with patient, and she agrees with next level of care.   Patient understands that evaluation/treatment at [facility] typically takes significantly longer than in clinic/urgent care (>2 hours).  The [facility] has been contacted by provider/staff to confirm patient acceptance.         Special issues:      None        Contacted [facility] and they will accept the patient  Contacted patient and directed her where to go  Documentation in my chart message    Juany Tripp RN on 3/4/2024 at 2:41 PM

## 2024-03-05 ENCOUNTER — PATIENT OUTREACH (OUTPATIENT)
Dept: FAMILY MEDICINE | Facility: CLINIC | Age: 64
End: 2024-03-05
Payer: OTHER GOVERNMENT

## 2024-03-05 LAB — VIT B12 SERPL-MCNC: 879 PG/ML (ref 232–1245)

## 2024-03-05 NOTE — DISCHARGE INSTRUCTIONS
ICD-10-CM    1. Dyspnea on exertion  R06.09 General PFT Lab (Please always keep checked)     Pulmonary Function Test     NM Lexiscan stress test     Adult Cardiology Eval  Referral    unclear cause.  obtain stress testing outpatient and pulmonary function tests. avoid exertion. follow-up cardiology and primary provider.  make sure primary is aware of the testing so they can review findings.  do not hesitate to return here for worsening.,       2. Elevated d-dimer  R79.89       3. Anemia, unspecified type  D64.9 Iron & Iron Binding Capacity     Iron & Iron Binding Capacity     Iron & Iron Binding Capacity    b12, tibc/iron were added as labs.  discuss further with primary provider.      4. Hyperglycemia  R73.9     await the hgb a1c

## 2024-03-05 NOTE — TELEPHONE ENCOUNTER
"  ED for acute condition Discharge Protocol    \"Hi, my name is Clare Reyna RN, a registered nurse, and I am calling from [facility].  I am calling to follow up and see how things are going for you after your recent emergency visit.\"    Tell me how you are doing now that you are home?\" I'm doing well, I am having a stress test tomorrow      Discharge Instructions    \"Let's review your discharge instructions.  What is/are the follow-up recommendations?  Pt. Response: f/u    \"Has an appointment with your primary care provider been scheduled?\"  Appt made    Medications    \"Tell me what changed about your medicines when you discharged?\"        \"What questions do you have about your medications?\"   None        Call Summary    \"What questions or concerns do you have about your recent visit and your follow-up care?\"    Just wanting to get things figured out    \"If you have questions or things don't continue to improve, we encourage you contact us through the main clinic number (give number).  Even if the clinic is not open, triage nurses are available 24/7 to help you.     We would like you to know that our clinic has extended hours (provide information).  We also have urgent care (provide details on closest location and hours/contact info)\"    \"Thank you for your time and take care!\"         "

## 2024-03-05 NOTE — TELEPHONE ENCOUNTER
RECORDS RECEIVED FROM:    DATE RECEIVED:    NOTES STATUS DETAILS   OFFICE NOTE from referring provider  Internal ED f/u   OFFICE NOTE from other cardiologists  Internal Dr. Betancourt 7-17-23   RECORDS from hospital/ED Internal 3-4-24   MEDICATION LIST Internal    GENERAL CARDIO RECORDS   (ALL APPOINTMENT TYPES)     LABS (CBC,BMP,CMP, TSH) Internal    EKG (STRIPS & REPORTS) Internal 3-4-24   MONITORS (STRIPS & REPORTS) N/A    ECHOS (IMAGES AND REPORTS) Internal 1-16-23   STRESS TESTS (IMAGES AND REPORTS) Internal 3-6-24 Scheduled   cMRI (IMAGES AND REPORTS) N/A    Cardiac cath (IMAGES AND REPORTS) N/A    CT/CTA (IMAGES AND REPORTS) Internal 3-4-24

## 2024-03-05 NOTE — ED PROVIDER NOTES
History     Chief Complaint   Patient presents with    Abnormal Labs     HPI  Teresa Fernandez is a 64 year old female who seen in clinic earlier today.  She has past status post low back surgery.  This was a month ago.  She been experiencing chest pressure and shortness of breath over the prior 2 days worsening today.  No associated fever.  No prior history of venous thromboembolism.  No acute leg swelling.  No recent prolonged travel car rides plane flights.  She is not on estrogen products.  She has noted dyspnea on exertion.  The chest pressure can be worse with exertion.  No significant chest pressure at rest.  This is a heaviness in the center of her chest.  She underwent testing in clinic and this demonstrated a D-dimer that was elevated as well as a minimally elevated troponin.  No history of coronary disease.  No fever cough congestion.  He has had prior reactions to iodinated contrast.  She has tolerated CT with contrast after premedication acutely with steroids and antihistamines.    Allergies:  Allergies   Allergen Reactions    Iodinated Contrast Media [Iodinated Contrast Media] Swelling and Difficulty breathing     Immediate throat swelling following around 1-2cc of omnipaque 300 for spine procedure  skin prick and intradermal tests with Iohexol negatives but premedicate before using iodinated contrast.    no reactions in skin tests to MRI contrast media Gadovist    Omnipaque [Iohexol] Swelling and Difficulty breathing     Immediate throat swelling following around 1-2cc of omnipaque 300 for spine procedure  --> skin prick and intradermal tests with Iohexol negatives. But I would propose anyway to premedicate patient before using iodinated contrast media (for safety reasons).   --> no reactions in skin tests to MRI contrast media Gadovist    Gabapentin Hives    Gluten     Gluten Meal     Penicillins Hives    Sulfa Antibiotics        Problem List:    Patient Active Problem List    Diagnosis Date Noted     Falls frequently 12/07/2023     Priority: Medium    Periorificial dermatitis 12/07/2023     Priority: Medium    Erosive oral lichen planus 12/07/2023     Priority: Medium    Abnormal EMG 12/07/2023     Priority: Medium    Hyperparathyroidism (H24) 05/15/2023     Priority: Medium    Thyroid nodule 04/03/2023     Priority: Medium     Ultrasound 4/2023, repeat in 1 year      Lichen sclerosus of female genitalia 03/27/2023     Priority: Medium    Primary hyperparathyroidism (H24) 11/07/2022     Priority: Medium     Per Endocrine 10/2022:  She should be followed with serum calcium, creatinine and albumin every 6 months. Normal Dexa and urine calcium at this time, see Endocrine on as needed basis.        Chronic pruritus 02/14/2022     Priority: Medium    Family history of colon cancer 12/28/2021     Priority: Medium    Fibromyalgia 09/30/2021     Priority: Medium    History of kidney cancer 04/01/2021     Priority: Medium     1996- left nephrectomy      GAYE (obstructive sleep apnea) 09/17/2020     Priority: Medium     9/9/2020 [facility] Sleep Study (225.0 lbs) - AHI 19.8, RDI 26.4, Supine AHI 22.0, REM AHI 36.9, Low O2 79.5%, Time Spent ?88% 40.1 minutes / Time Spent ?89% 51.6 minutes.      Myalgia 10/20/2017     Priority: Medium     Increased CK      Chest tightness or pressure 10/03/2016     Priority: Medium    DDD (degenerative disc disease), lumbar 06/24/2014     Priority: Medium     [facility]      Moderate persistent asthma without complication 07/08/2013     Priority: Medium    History of melanoma in situ 10/17/2012     Priority: Medium    Eczema 10/17/2012     Priority: Medium    Osteopenia 06/15/2012     Priority: Medium    Chronic fatigue 10/04/2011     Priority: Medium    Dry eye syndrome 08/24/2011     Priority: Medium    Chronic low back pain 07/07/2011     Priority: Medium    GERD (gastroesophageal reflux disease) 05/11/2011     Priority: Medium    Leukoplakia of oral mucosa,  including tongue 04/18/2011     Priority: Medium    ERIC (generalized anxiety disorder)      Priority: Medium    Chronic rhinitis 05/03/2005     Priority: Medium    Allergic rhinitis due to pollen 05/03/2005     Priority: Medium    Hypothyroidism, unspecified type 04/12/2005     Priority: Medium     Problem list name updated by automated process. Provider to review          Past Medical History:    Past Medical History:   Diagnosis Date    Allergic rhinitis     ALLERGIC RHINITIS NOS 04/12/2005    Anxiety     Arthritis     Autoimmune disease (H24) 2014    Benign positional vertigo     Bronchitis, not specified as acute or chronic     CHR MAXILLARY SINUSITIS 04/12/2005    Coronary artery disease     Depressive disorder 1996    Depressive disorder, not elsewhere classified     Eczema 10/17/2012    Environmental and seasonal allergies     GERD (gastroesophageal reflux disease)     Gluten intolerance     Heart disease July 2021    Kidney problem 1996    Malignant neoplasm of renal pelvis (H) 1995    Melanoma (H) 06/2010    Other specified acquired hypothyroidism 04/12/2005    Renal cancer (H) 05/05/2011    Sleep apnea     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    FUSION LUMBAR ANTERIOR TWO LEVELS  04/13/2015    GI SURGERY      GYN SURGERY  04/08/1999    hysterectomy.  [facility]    HERNIA REPAIR  Not sure    HYSTERECTOMY, PAP NO LONGER INDICATED      PARATHYROIDECTOMY N/A 08/23/2023    Procedure: PARATHYROIDECTOMY;  Surgeon: Hannah Good MD;  Location: [facility] OR    SOFT TISSUE SURGERY      chest-melonoma    SURGICAL HISTORY OF -   03/1996    Left nephrectomy-renal cell CA       Family  History:    Family History   Problem Relation Age of Onset    Cardiovascular Mother     Lipids Mother     Depression Mother     Heart Disease Mother     Substance Abuse Mother     Hypertension Father     Lipids Father     Obesity Father     Macular Degeneration Father     Sleep Apnea Father     Thyroid Disease Sister     Hypertension Sister     Depression Sister     Allergies Sister     Lipids Sister     Thyroid Disease Sister     Neurologic Disorder Sister     Depression Sister     Hypertension Brother     Colon Cancer Brother     Cancer Maternal Grandmother         kind unknown had sores on legs    Eczema Maternal Grandmother     Eczema Maternal Grandfather     Breast Cancer Paternal Grandmother     Cancer Paternal Grandmother         breast    Hypertension Paternal Grandfather     Cardiovascular Paternal Grandfather         heart attack    Allergies Son     Eczema Son     Respiratory Son     Sleep Apnea Son     Glaucoma No family hx of     Cerebrovascular Disease No family hx of     Diabetes No family hx of        Social History:  Marital Status:   [2]  Social History     Tobacco Use    Smoking status: Former     Packs/day: 2.00     Years: 15.00     Additional pack years: 0.00     Total pack years: 30.00     Types: Cigarettes     Quit date: 3/26/1996     Years since quittin.9    Smokeless tobacco: Former   Vaping Use    Vaping Use: Never used   Substance Use Topics    Alcohol use: Not Currently     Comment: rare    Drug use: No        Medications:    acetaminophen (TYLENOL) 325 MG tablet  albuterol (PROAIR HFA/PROVENTIL HFA/VENTOLIN HFA) 108 (90 Base) MCG/ACT inhaler  albuterol (PROVENTIL) (2.5 MG/3ML) 0.083% neb solution  APNO CREA ointment  augmented betamethasone dipropionate (DIPROLENE-AF) 0.05 % external ointment  calcium carbonate (TUMS EXTRA STRENGTH 750) 750 MG CHEW  clobetasol (TEMOVATE) 0.05 % external cream  DULoxetine (CYMBALTA) 30 MG capsule  DULoxetine (CYMBALTA) 60 MG capsule  ferrous  "sulfate (FEROSUL) 325 (65 Fe) MG tablet  fexofenadine (ALLEGRA) 180 MG tablet  isosorbide mononitrate (IMDUR) 30 MG 24 hr tablet  ketorolac (ACULAR) 0.5 % ophthalmic solution  levothyroxine (SYNTHROID/LEVOTHROID) 100 MCG tablet  levothyroxine (SYNTHROID/LEVOTHROID) 88 MCG tablet  loratadine (CLARITIN) 10 MG tablet  mometasone (ELOCON) 0.1 % external ointment  ofloxacin (OCUFLOX) 0.3 % ophthalmic solution  omeprazole (PRILOSEC) 20 MG DR capsule  ondansetron (ZOFRAN ODT) 4 MG ODT tab  order for DME  OVER-THE-COUNTER  tacrolimus (PROTOPIC) 0.1 % external ointment  triamcinolone (KENALOG) 0.1 % external cream  UNABLE TO FIND  vitamin D2 (ERGOCALCIFEROL) 03052 units (1250 mcg) capsule          Review of Systems   Constitutional:  Positive for fatigue. Negative for chills, diaphoresis and fever.   HENT:  Negative for ear pain, sinus pressure and sore throat.    Eyes:  Negative for visual disturbance.   Respiratory:  Positive for shortness of breath. Negative for cough and wheezing.    Cardiovascular:  Positive for chest pain. Negative for palpitations.   Gastrointestinal:  Negative for abdominal pain, blood in stool, constipation, diarrhea, nausea and vomiting.   Genitourinary:  Negative for dysuria, frequency and urgency.   Skin:  Negative for rash.   Neurological:  Negative for headaches.   All other systems reviewed and are negative.      Physical Exam   BP: 127/81  Pulse: 92  Temp: 97.7  F (36.5  C)  Resp: 20  Height: 172.7 cm (5' 8\")  Weight: 100.7 kg (222 lb)  SpO2: 99 %      Physical Exam  Constitutional:       General: She is in acute distress.      Appearance: She is not diaphoretic.   Eyes:      Conjunctiva/sclera: Conjunctivae normal.   Cardiovascular:      Rate and Rhythm: Normal rate and regular rhythm.      Heart sounds: No murmur heard.  Pulmonary:      Effort: Pulmonary effort is normal. No respiratory distress.      Breath sounds: Normal breath sounds. No stridor. No wheezing or rhonchi.   Abdominal:      " General: Abdomen is flat. There is no distension.      Palpations: Abdomen is soft. There is no mass.      Tenderness: There is no abdominal tenderness. There is no guarding.   Musculoskeletal:      Cervical back: Neck supple.      Right lower leg: No edema.      Left lower leg: No edema.   Skin:     Coloration: Skin is not pale.      Findings: No rash.   Neurological:      General: No focal deficit present.      Mental Status: She is alert.      Motor: No weakness.         ED Course        Procedures                EKG Interpretation:      Interpreted by Miguel Angel Valdovinos MD     EKG done at 1814 hrs. demonstrates a sinus rhythm at 83 bpm with normal axis.  There is no ST change.  No T wave changes.  Normal R progression and no Q waves.  Normal intervals.  Normal conduction.  No ectopy.  Impression sinus rhythm 83 bpm without significant acute changes    Critical Care time:  none               Results for orders placed or performed during the hospital encounter of 03/04/24 (from the past 24 hour(s))   Troponin T, High Sensitivity   Result Value Ref Range    Troponin T, High Sensitivity 18 (H) <=14 ng/L   [facility] Draw    Narrative    The following orders were created for panel order [facility] Draw.  Procedure                               Abnormality         Status                     ---------                               -----------         ------                     Extra Blue Top Tube[[id number]]                              Final result               Extra Purple Top Tube[[id number]]                            Final result                 Please view results for these tests on the individual orders.   Extra Blue Top Tube   Result Value Ref Range    Hold Specimen JIC    Extra Purple Top Tube   Result Value Ref Range    Hold Specimen JIC    Hemoglobin A1c   Result Value Ref Range    Hemoglobin A1C 4.7 <5.7 %   Iron & Iron Binding Capacity   Result Value Ref Range    Iron 96 37 - 145 ug/dL    Iron Binding Capacity 346  240 - 430 ug/dL    Iron Sat Index 28 15 - 46 %   CT Chest Pulmonary Embolism w Contrast    Narrative    EXAM: CT CHEST PULMONARY EMBOLISM W CONTRAST  LOCATION: [facility]  DATE: 3/4/2024    INDICATION: dyspnea, d dimer positive senty from clinic. previously tolerated CT PE after short course (immed prior to imaging) pre med steroids  COMPARISON: None.  TECHNIQUE: CT chest pulmonary angiogram during arterial phase injection of IV contrast. Multiplanar reformats and MIP reconstructions were performed. Dose reduction techniques were used.   CONTRAST: 89 mL Isovue 370    FINDINGS:  ANGIOGRAM CHEST: Pulmonary arteries are normal caliber and negative for pulmonary emboli. Thoracic aorta is negative for dissection. No CT evidence of right heart strain.    LUNGS AND PLEURA: Patent central airways. Mild bibasilar atelectasis and/or scarring. No focal consolidation or pleural effusion. Few scattered tiny pulmonary nodules measuring up to 0.3 cm in the right upper lobe (series 6, image 66).    MEDIASTINUM/AXILLAE: Heart size is normal. No thoracic lymphadenopathy.    CORONARY ARTERY CALCIFICATION: None.    UPPER ABDOMEN: Prior cholecystectomy and left nephrectomy. Duodenal diverticulum.    MUSCULOSKELETAL: Thoracic spondylosis. Partially visualized lumbar posterior fusion hardware. No destructive osseous lesion.      Impression    IMPRESSION:  1.  No acute pulmonary embolism, focal consolidation, or pleural effusion.  2.  Few tiny pulmonary nodules. Correlation with prior examinations would be helpful, if available. If no prior examinations are available, consider optional follow-up CT chest in 12 months if the patient is at high risk for pulmonary malignancy.     *Note: Due to a large number of results and/or encounters for the requested time period, some results have not been displayed. A complete set of results can be found in Results Review.       Medications   methylPREDNISolone sodium  succinate (SOLU-MEDROL) injection 40 mg (40 mg Intravenous $Given 3/4/24 1814)   sodium chloride 0.9 % bag 500mL for CT scan flush use (has no administration in time range)   iopamidol (ISOVUE-370) solution 89 mL (has no administration in time range)   sodium chloride 0.9 % bag 500mL for CT scan flush use (has no administration in time range)   diphenhydrAMINE (BENADRYL) injection 50 mg (50 mg Intravenous $Given 3/4/24 1816)       Assessments & Plan (with Medical Decision Making)     MDMALKA: Teresa Fernandez is a 64 year old female presenting with a couple of days of dyspnea and chest pain that is been increased but has been ongoing for the last month.  Exertional symptoms.  Prior back surgery 4 weeks ago.  No known VTE history.  No known coronary disease.  Has had contrast reactions before.  Plan for premedication then CT.  She understands that this is a more modified protocol to perform be performed in the emergency department for imaging.  She agrees to this testing she is done well with prior emergent premedication before.  We discussed that should this be a negative eval with serial troponins without significant change she had 1 earlier today at 17 today's in the night is 18 with EKG nonischemic she will likely discharge home with follow-up stress testing.    The CT demonstrates no PE.  No other findings on chest imaging to explain her dyspnea on exertion.  I have ordered an outpatient stress test and let her know that this needs to be seen by her primary provider and to let both the stress testing group know that they need to put the primary in for forwarding the results as well as calling her own primary to let her know after she has had the stress test that he can look for results.  I have also ordered a consult with cardiology given her dyspnea on exertion to review the stress testing after is completed.        I have reviewed the nursing notes.    I have reviewed the findings, diagnosis, plan and need for  follow up with the patient.           Medical Decision Making  The patient's presentation was of high complexity (an acute health issue posing potential threat to life or bodily function).    The patient's evaluation involved:  ordering and/or review of 3+ test(s) in this encounter (see separate area of note for details)    The patient's management necessitated moderate risk (prescription drug management including medications given in the ED).        New Prescriptions    No medications on file       Final diagnoses:   Dyspnea on exertion - unclear cause.  obtain stress testing outpatient and pulmonary function tests. avoid exertion. follow-up cardiology and primary provider.  make sure primary is aware of the testing so they can review findings.  do not hesitate to return here for worsening.,    Elevated d-dimer   Anemia, unspecified type - b12, tibc/iron were added as labs.  discuss further with primary provider.   Hyperglycemia - await the hgb a1c       3/4/2024   [facility] EMERGENCY DEPT       Miguel Angel Valdovinos MD  03/04/24 [id number]

## 2024-03-05 NOTE — TELEPHONE ENCOUNTER
What type of discharge? Emergency Department  Risk of Hospital admission or ED visit: 88%  Is a TCM episode required? No  When should the patient follow up with PCP? 7 days of discharge.

## 2024-03-06 ENCOUNTER — HOSPITAL ENCOUNTER (OUTPATIENT)
Dept: NUCLEAR MEDICINE | Facility: CLINIC | Age: 64
Setting detail: NUCLEAR MEDICINE
Discharge: HOME | End: 2024-03-06
Attending: FAMILY MEDICINE
Payer: OTHER GOVERNMENT

## 2024-03-06 ENCOUNTER — HOSPITAL ENCOUNTER (OUTPATIENT)
Dept: CARDIOLOGY | Facility: CLINIC | Age: 64
Discharge: HOME | End: 2024-03-06
Attending: FAMILY MEDICINE
Payer: OTHER GOVERNMENT

## 2024-03-06 DIAGNOSIS — R06.09 DYSPNEA ON EXERTION: ICD-10-CM

## 2024-03-06 LAB
CV STRESS MAX HR HE: 127
NUC STRESS EJECTION FRACTION: 55 %
RATE PRESSURE PRODUCT: NORMAL
STRESS ECHO BASELINE DIASTOLIC HE: 70
STRESS ECHO BASELINE HR: 86 BPM
STRESS ECHO BASELINE SYSTOLIC BP: 120
STRESS ECHO CALCULATED PERCENT HR: 81 %
STRESS ECHO LAST STRESS DIASTOLIC BP: 74
STRESS ECHO LAST STRESS SYSTOLIC BP: 134
STRESS ECHO TARGET HR: 157

## 2024-03-06 PROCEDURE — A9502 TC99M TETROFOSMIN: HCPCS | Performed by: FAMILY MEDICINE

## 2024-03-06 PROCEDURE — 343N000001 HC RX 343: Performed by: FAMILY MEDICINE

## 2024-03-06 PROCEDURE — 93016 CV STRESS TEST SUPVJ ONLY: CPT | Performed by: INTERNAL MEDICINE

## 2024-03-06 PROCEDURE — 78452 HT MUSCLE IMAGE SPECT MULT: CPT | Mod: 26 | Performed by: INTERNAL MEDICINE

## 2024-03-06 PROCEDURE — 78452 HT MUSCLE IMAGE SPECT MULT: CPT

## 2024-03-06 PROCEDURE — 93017 CV STRESS TEST TRACING ONLY: CPT

## 2024-03-06 PROCEDURE — 93018 CV STRESS TEST I&R ONLY: CPT | Performed by: INTERNAL MEDICINE

## 2024-03-06 PROCEDURE — 250N000011 HC RX IP 250 OP 636: Performed by: FAMILY MEDICINE

## 2024-03-06 RX ORDER — REGADENOSON 0.08 MG/ML
0.4 INJECTION, SOLUTION INTRAVENOUS ONCE
Status: COMPLETED | OUTPATIENT
Start: 2024-03-06 | End: 2024-03-06

## 2024-03-06 RX ADMIN — TETROFOSMIN 12.8 MILLICURIE: 1.38 INJECTION, POWDER, LYOPHILIZED, FOR SOLUTION INTRAVENOUS at 09:28

## 2024-03-06 RX ADMIN — TETROFOSMIN 36.9 MILLICURIE: 1.38 INJECTION, POWDER, LYOPHILIZED, FOR SOLUTION INTRAVENOUS at 10:30

## 2024-03-06 RX ADMIN — REGADENOSON 0.4 MG: 0.08 INJECTION, SOLUTION INTRAVENOUS at 10:30

## 2024-03-11 ENCOUNTER — LAB (OUTPATIENT)
Dept: LAB | Facility: CLINIC | Age: 64
End: 2024-03-11
Payer: OTHER GOVERNMENT

## 2024-03-11 ENCOUNTER — OFFICE VISIT (OUTPATIENT)
Dept: FAMILY MEDICINE | Facility: CLINIC | Age: 64
End: 2024-03-11
Payer: OTHER GOVERNMENT

## 2024-03-11 VITALS
WEIGHT: 225 LBS | BODY MASS INDEX: 34.1 KG/M2 | RESPIRATION RATE: 16 BRPM | HEIGHT: 68 IN | OXYGEN SATURATION: 98 % | HEART RATE: 102 BPM | DIASTOLIC BLOOD PRESSURE: 72 MMHG | SYSTOLIC BLOOD PRESSURE: 114 MMHG

## 2024-03-11 DIAGNOSIS — R79.89 ELEVATED TROPONIN: ICD-10-CM

## 2024-03-11 DIAGNOSIS — R06.00 DYSPNEA, UNSPECIFIED TYPE: Primary | ICD-10-CM

## 2024-03-11 DIAGNOSIS — R79.89 ELEVATED D-DIMER: ICD-10-CM

## 2024-03-11 DIAGNOSIS — R06.00 DYSPNEA, UNSPECIFIED TYPE: ICD-10-CM

## 2024-03-11 LAB — TSH SERPL DL<=0.005 MIU/L-ACNC: 2.57 UIU/ML (ref 0.3–4.2)

## 2024-03-11 PROCEDURE — 84443 ASSAY THYROID STIM HORMONE: CPT

## 2024-03-11 PROCEDURE — 93242 EXT ECG>48HR<7D RECORDING: CPT | Performed by: FAMILY MEDICINE

## 2024-03-11 PROCEDURE — 99214 OFFICE O/P EST MOD 30 MIN: CPT | Mod: 25 | Performed by: FAMILY MEDICINE

## 2024-03-11 PROCEDURE — 36415 COLL VENOUS BLD VENIPUNCTURE: CPT

## 2024-03-11 NOTE — PROGRESS NOTES
"  Assessment & Plan     Dyspnea, unspecified type  Unclear cause, reviewed stress test which was normal.  She has PFTs scheduled for next week.  Plan to undergo Zio patch in the interim to monitor for these episodes of tachycardia.  Keep appointment with cardiology next week.  If symptoms are worsening recommend reevaluation acutely in ER.   - ZIO PATCH 3-7 DAYS (additional cost to patient)  - ZIO PATCH 3-7 DAYS APPLICATION  - TSH with free T4 reflex    Elevated troponin  In the ER, not repeated today    Elevated d-dimer  In the ER, not repeated today        MED REC REQUIRED{  Post Medication Reconciliation Status: discharge medications reconciled, continue medications without change        Subjective   Teresa is a 64 year old, presenting for the following health issues:  ER F/U        3/11/2024    10:13 AM   Additional Questions   Roomed by Amy VERA MA   Accompanied by      HPI       ED/UC Followup:    Facility:  [facility]  Date of visit: 3/4/2024  Reason for visit: Dyspnea on exertion, elevated d-dimer  Current Status: Still having symptoms.  Does have PFT scheduled and cardiology appointment scheduled    \"MDFM: Teresa Fernandez is a 64 year old female presenting with a couple of days of dyspnea and chest pain that is been increased but has been ongoing for the last month.  Exertional symptoms.  Prior back surgery 4 weeks ago.  No known VTE history.  No known coronary disease.  Has had contrast reactions before.  Plan for premedication then CT.  She understands that this is a more modified protocol to perform be performed in the emergency department for imaging.  She agrees to this testing she is done well with prior emergent premedication before.  We discussed that should this be a negative eval with serial troponins without significant change she had 1 earlier today at 17 today's in the night is 18 with EKG nonischemic she will likely discharge home with follow-up stress testing.     The CT " "demonstrates no PE.  No other findings on chest imaging to explain her dyspnea on exertion.  I have ordered an outpatient stress test and let her know that this needs to be seen by her primary provider and to let both the stress testing group know that they need to put the primary in for forwarding the results as well as calling her own primary to let her know after she has had the stress test that he can look for results.  I have also ordered a consult with cardiology given her dyspnea on exertion to review the stress testing after is completed.  \"     -Breathing has not worsened.  Has been monitoring her heart rate with On her phone which showed tachycardia alerts.  Completed stress test in the interim.  She has her PFT scheduled for next week.     Review of Systems  Constitutional, HEENT, cardiovascular, pulmonary, gi and gu systems are negative, except as otherwise noted.      Objective    /72 (BP Location: Right arm, Patient Position: Chair, Cuff Size: Adult Large)   Pulse 102   Resp 16   Ht 1.727 m (5' 8\")   Wt 102.1 kg (225 lb)   LMP  (LMP Unknown)   SpO2 98%   BMI 34.21 kg/m    Body mass index is 34.21 kg/m .  Physical Exam  Constitutional:       Appearance: Normal appearance.   Eyes:      Conjunctiva/sclera: Conjunctivae normal.   Cardiovascular:      Rate and Rhythm: Normal rate and regular rhythm.   Pulmonary:      Effort: Pulmonary effort is normal.      Breath sounds: Normal breath sounds.   Musculoskeletal:      Comments: Wearing back brace    Skin:     General: Skin is warm and dry.   Neurological:      Mental Status: She is alert.   Psychiatric:         Thought Content: Thought content normal.         Judgment: Judgment normal.        Signed Electronically by: INA ALVAREZ DO    "

## 2024-03-11 NOTE — NURSING NOTE
Teresa Fernandez arrived here on 3/11/2024 11:09 AM for 3-7 Days  Zio monitor placement per ordering provider Emily Walker DO for the diagnosis Dyspnea, unspecified type.  Patient s skin was prepped per protocol.  Zio monitor was placed.  Instructions were reviewed with and given to the patient.  Patient verbalized understanding of wear, troubleshooting and monitor return instructions.  Amy PARKER MA

## 2024-03-15 ENCOUNTER — HOSPITAL ENCOUNTER (OUTPATIENT)
Dept: RESPIRATORY THERAPY | Facility: CLINIC | Age: 64
Discharge: HOME | End: 2024-03-15
Attending: FAMILY MEDICINE | Admitting: FAMILY MEDICINE
Payer: OTHER GOVERNMENT

## 2024-03-15 DIAGNOSIS — R06.09 DYSPNEA ON EXERTION: ICD-10-CM

## 2024-03-15 PROCEDURE — 94375 RESPIRATORY FLOW VOLUME LOOP: CPT

## 2024-03-15 PROCEDURE — 94729 DIFFUSING CAPACITY: CPT

## 2024-03-15 PROCEDURE — 94726 PLETHYSMOGRAPHY LUNG VOLUMES: CPT

## 2024-03-17 NOTE — PROGRESS NOTES
General Cardiology Clinic-[facility] [place]      Referring provider:Miguel Angel Valdovinos MD     HPI: Ms. Teresa Fernandez is a 64 year old  female with PMH significant for  -Obstructive sleep apnea  -Obesity  Patient has been following with cardiology for shortness of breath and chest pain for the last few years.  So far all the workup including cardiac MRI, echocardiogram and recent Lexiscan stress test is unremarkable.  She presented to emergency room on 3/4/2024 for dyspnea on exertion and elevated D-dimer.  CT did not show any PE.    Patient recently underwent back surgery in February 8.  On March 3 she got up feeling skipped beats and did not feel right.  Went to ER.  CT PE was negative. She tells me that shortness of breath and  tiredness has been gradually improving.  Does not smoke.  No alcohol abuse.  Patient completed PFT on 3/15/2024 which showed normal findings.    Recently underwent Lexiscan stress test on 3/6/2024 which showed no ischemia.  3-day Zio patch monitor 3/11/2024 showed 1 run of SVT lasting for 13 beats which was not associated with symptoms.  There were no sustained arrhythmias.  Patient triggered events correlated with sinus rhythm and sinus tachycardia.    Medications, personal, family, and social history reviewed with patient and revised.    PAST MEDICAL HISTORY:  Past Medical History:   Diagnosis Date    Allergic rhinitis     ALLERGIC RHINITIS NOS 04/12/2005    Anxiety     Arthritis     herniated disc    Autoimmune disease (H24) 2014    Benign positional vertigo     Bronchitis, not specified as acute or chronic     CHR MAXILLARY SINUSITIS 04/12/2005    Coronary artery disease     Depressive disorder 1996    Depressive disorder, not elsewhere classified     Eczema 10/17/2012    Environmental and seasonal allergies     GERD (gastroesophageal reflux disease)     Gluten intolerance     Heart disease July 2021    Kidney problem 1996    Malignant neoplasm of renal pelvis (H) 1995    Renal cell carcinoma     Melanoma (H) 06/2010    Other specified acquired hypothyroidism 04/12/2005    Renal cancer (H) 05/05/2011    Sleep apnea     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     Grave's disease    Uncomplicated asthma     Unspecified asthma(493.90)        CURRENT MEDICATIONS:  Current Outpatient Medications   Medication Sig Dispense Refill    acetaminophen (TYLENOL) 325 MG tablet Take 2 tablets (650 mg) by mouth every 4 hours as needed for mild pain 50 tablet 0    albuterol (PROAIR HFA/PROVENTIL HFA/VENTOLIN HFA) 108 (90 Base) MCG/ACT inhaler Inhale 2 puffs into the lungs every 4 hours as needed for shortness of breath (Patient not taking: Reported on 3/4/2024) 18 g 4    albuterol (PROVENTIL) (2.5 MG/3ML) 0.083% neb solution Take 1 vial (2.5 mg) by nebulization every 6 hours as needed for shortness of breath / dyspnea or wheezing (Patient not taking: Reported on 3/4/2024) 90 mL 11    APNO CREA ointment Apply to rash on nose twice daily as needed. 100 g 3    augmented betamethasone dipropionate (DIPROLENE-AF) 0.05 % external ointment Apply topically 2 times daily 50 g 11    calcium carbonate (TUMS EXTRA STRENGTH 750) 750 MG CHEW Take 500 mg by mouth      clobetasol (TEMOVATE) 0.05 % external cream Apply topically 2 times daily To affected external vaginal areas 60 g 3    DULoxetine (CYMBALTA) 30 MG capsule TAKE 1 CAPSULE (30 MG) BY MOUTH DAILY IN ADDITION TO THE 60 MG FOR A TOTAL OF 90 MG DAILY 90 capsule 3    DULoxetine (CYMBALTA) 60 MG capsule Take 1 cap by mouth daily in addition to the 30 mg for a total 90 mg/day 90 capsule 3    ferrous sulfate (FEROSUL) 325 (65 Fe) MG tablet Take 325 mg by mouth daily (with breakfast)      fexofenadine (ALLEGRA) 180 MG tablet Take 1 tablet (180 mg) by mouth daily 30 tablet 1    isosorbide mononitrate (IMDUR) 30 MG 24 hr tablet Take 1 tablet (30 mg) by mouth daily 90 tablet 2    ketorolac (ACULAR) 0.5 % ophthalmic solution       levothyroxine  (SYNTHROID/LEVOTHROID) 100 MCG tablet TAKE ONE TABLET BY MOUTH ONCE DAILY ON MONDAY, WEDNESDAY, FRIDAY, SATURDAY AND SUNDAY. 63 tablet 1    levothyroxine (SYNTHROID/LEVOTHROID) 88 MCG tablet TAKE ONE TABLET BY MOUTH ON TUESDAYS AND THURSDAYS 30 tablet 3    loratadine (CLARITIN) 10 MG tablet Take 1 tablet by mouth daily      mometasone (ELOCON) 0.1 % external ointment Apply topically twice a week Use on eczematous lesions 45 g 3    ofloxacin (OCUFLOX) 0.3 % ophthalmic solution       omeprazole (PRILOSEC) 20 MG DR capsule Take 20 mg by mouth daily      ondansetron (ZOFRAN ODT) 4 MG ODT tab Take 1 tablet (4 mg) by mouth every 8 hours as needed for nausea (Patient not taking: Reported on 3/4/2024) 20 tablet 0    order for DME Equipment being ordered: Nebulizer (Patient not taking: Reported on 3/11/2024) 1 Units 0    OVER-THE-COUNTER Place 1 drop into both eyes 3 times daily. Systane Ultra, Systane Balance, Blink Tears or Refresh Optive Artificial Tear 1 Bottle     tacrolimus (PROTOPIC) 0.1 % external ointment Apply topically 2 times daily 30 g 3    triamcinolone (KENALOG) 0.1 % external cream Apply topically 2 times daily 30 g 3    UNABLE TO FIND MEDICATION NAME: bone growth stimulator      vitamin D2 (ERGOCALCIFEROL) 47063 units (1250 mcg) capsule          PAST SURGICAL HISTORY:  Past Surgical History:   Procedure Laterality Date    ABDOMEN SURGERY  Not sure    Hernia    APPENDECTOMY  1995    CHOLECYSTECTOMY      COLONOSCOPY      2007-normal    ENT SURGERY  02/15/2011    Left cheek bx- Mucositis.    FUSION LUMBAR ANTERIOR TWO LEVELS  04/13/2015    GI SURGERY      GYN SURGERY  04/08/1999    hysterectomy.  [facility]    HERNIA REPAIR  Not sure    HYSTERECTOMY, PAP NO LONGER INDICATED      PARATHYROIDECTOMY N/A 08/23/2023    Procedure: PARATHYROIDECTOMY;  Surgeon: aHnnah Good MD;  Location: [facility] OR    SOFT TISSUE SURGERY      chest-melonoma    SURGICAL HISTORY OF -   03/1996    Left nephrectomy-renal cell CA        ALLERGIES:     Allergies   Allergen Reactions    Iodinated Contrast Media [Iodinated Contrast Media] Swelling and Difficulty breathing     Immediate throat swelling following around 1-2cc of omnipaque 300 for spine procedure  skin prick and intradermal tests with Iohexol negatives but premedicate before using iodinated contrast.    no reactions in skin tests to MRI contrast media Gadovist    Omnipaque [Iohexol] Swelling and Difficulty breathing     Immediate throat swelling following around 1-2cc of omnipaque 300 for spine procedure  --> skin prick and intradermal tests with Iohexol negatives. But I would propose anyway to premedicate patient before using iodinated contrast media (for safety reasons).   --> no reactions in skin tests to MRI contrast media Gadovist    Gabapentin Hives    Gluten     Gluten Meal     Penicillins Hives    Sulfa Antibiotics        FAMILY HISTORY:  Family History   Problem Relation Age of Onset    Cardiovascular Mother     Lipids Mother     Depression Mother     Heart Disease Mother     Substance Abuse Mother     Hypertension Father     Lipids Father     Obesity Father     Macular Degeneration Father     Sleep Apnea Father     Thyroid Disease Sister     Hypertension Sister     Depression Sister     Allergies Sister     Lipids Sister     Thyroid Disease Sister     Neurologic Disorder Sister     Depression Sister     Hypertension Brother     Colon Cancer Brother     Cancer Maternal Grandmother         kind unknown had sores on legs    Eczema Maternal Grandmother     Eczema Maternal Grandfather     Breast Cancer Paternal Grandmother     Cancer Paternal Grandmother         breast    Hypertension Paternal Grandfather     Cardiovascular Paternal Grandfather         heart attack    Allergies Son     Eczema Son     Respiratory Son     Sleep Apnea Son     Glaucoma No family hx of     Cerebrovascular Disease No family hx of     Diabetes No family hx of          SOCIAL HISTORY:  Social History      Tobacco Use    Smoking status: Former     Packs/day: 2.00     Years: 15.00     Additional pack years: 0.00     Total pack years: 30.00     Types: Cigarettes     Quit date: 3/26/1996     Years since quittin.9     Passive exposure: Past    Smokeless tobacco: Former   Vaping Use    Vaping Use: Never used   Substance Use Topics    Alcohol use: Not Currently     Comment: rare    Drug use: No       ROS:   Constitutional: No fever, chills, or sweats. Weight stable.   Cardiovascular: As per HPI.       Exam:  /77 (BP Location: Right arm, Patient Position: Chair, Cuff Size: Adult Large)   Pulse 94   Wt 104.8 kg (231 lb)   LMP  (LMP Unknown)   SpO2 98%   BMI 35.12 kg/m    GENERAL APPEARANCE: alert and no distress  HEENT: no icterus, no central cyanosis  LYMPH/NECK: no adenopathy, no asymmetry, JVP not elevated  RESPIRATORY: lungs clear to auscultation - no rales, rhonchi or wheezes, no use of accessory muscles, no retractions, respirations are unlabored, normal respiratory rate  CARDIOVASCULAR: regular rhythm, normal S1, S2, no S3 or S4 and no murmur, click or rub, precordium quiet with normal PMI.  GI: soft, non tender  EXTREMITIES: no edema  NEURO: alert, normal speech,and affect  SKIN: no ecchymoses, no rashes     I have reviewed the labs and personally reviewed the imaging below and made my comment in the assessment and plan.    Labs:  CBC RESULTS:   Lab Results   Component Value Date    WBC 5.0 2024    WBC 5.6 2021    RBC 3.40 (L) 2024    RBC 4.52 2021    HGB 10.2 (L) 2024    HGB 13.3 2021    HCT 32.3 (L) 2024    HCT 40.2 2021    MCV 95 2024    MCV 89 2021    MCH 30.0 2024    MCH 29.4 2021    MCHC 31.6 2024    MCHC 33.1 2021    RDW 13.3 2024    RDW 12.4 2021     2024     2021       BMP RESULTS:  Lab Results   Component Value Date     2024     2021     POTASSIUM 4.2 03/04/2024    POTASSIUM 4.0 09/30/2021    POTASSIUM 4.1 06/02/2021    CHLORIDE 104 03/04/2024    CHLORIDE 105 09/30/2021    CHLORIDE 102 06/02/2021    CO2 27 03/04/2024    CO2 29 09/30/2021    CO2 27 06/02/2021    ANIONGAP 10 03/04/2024    ANIONGAP 4 09/30/2021    ANIONGAP 6 06/02/2021    GLC 95 03/04/2024    GLC 89 09/30/2021    GLC 97 06/02/2021    BUN 11.2 03/04/2024    BUN 10 09/30/2021    BUN 9 06/02/2021    CR 0.72 03/04/2024    CR 0.81 06/02/2021    GFRESTIMATED >90 03/04/2024    GFRESTIMATED >60 03/03/2022    GFRESTIMATED 78 06/02/2021    GFRESTBLACK >90 06/02/2021    ANNIE 9.4 03/04/2024    ANNIE 9.7 06/02/2021     Zio patch 3/11/2024  Indication: Dyspnea  3 day Ziopatch monitor  Baseline sinus rhythm with heart rates ranging , average 93 bpm.  1 run of SVT lasting 13 beats, which was not associated with symptoms.  No sustained arrhythmias.  Patient triggered events correlated with sinus rhythm and sinus tachycardia.  Cardiac MRI 8/17/2021:  CONCLUSIONS:  Normal bi-ventricular size and systolic function. No late enhancement to suggest prior  infarct, inflammation or infiltration.     Transthoracic echocardiogram 1/16/2023  1. Left ventricular systolic function is normal. The visual ejection fraction  is 55-60%.  2. No regional wall motion abnormalities noted.  3. The right ventricle is normal in structure, function and size.  4. No evidence for significant valvular pathology.  5. In comparison with the prior study, the previously reported wall motion  abnormalities are not appreciated on the current study.    Lexiscan stress test 3/6/2024:    The nuclear stress test is negative for inducible myocardial ischemia or infarction.    Left ventricular function is normal.    The left ventricular ejection fraction at stress is 55%.    A prior study was conducted on 11/2/2022.  This study has changes noted when compared with the prior study. Apical infarction is no longer seen.    Assessment and  Plan:     # Palpitations and shortness of breath with exertion; noncardiac  # Postoperative anemia improving  -Patient has long history of shortness of breath with activity but this appears to be more likely to physical deconditioning as she had back problems and she was not very physically active.  Recently had back surgery.  Postop hemoglobin was 8.9.  Since then she has been feeling more shortness of breath and palpitations.  Cardiac workup including stress test, echocardiogram and Zio patch showed no concerning cardiac abnormality.  Patient already reports improving symptoms which is correlating with improving hemoglobin since surgery.  At this point I do not recommend any further cardiac testing as I do not think her symptoms are cardiac.  Previously for shortness of breath she was started on Imdur which she tells me that did not change her symptoms much.  I think she can safely come off this medication.  She is already on iron.  Recommend to continue and follow-up with PCP.    Medication change today:   Stop Imdur.      Return to clinic as needed.    Total time spent today for this visit is 20 minutes including precharting, face-to-face clinic visit, review of labs/imaging and medical documentation.    Dian BIRD MD  [facility] Division of Cardiology  Pager [phone]

## 2024-03-18 ENCOUNTER — OFFICE VISIT (OUTPATIENT)
Dept: CARDIOLOGY | Facility: CLINIC | Age: 64
End: 2024-03-18
Attending: FAMILY MEDICINE
Payer: OTHER GOVERNMENT

## 2024-03-18 ENCOUNTER — TRANSFERRED RECORDS (OUTPATIENT)
Dept: HEALTH INFORMATION MANAGEMENT | Facility: CLINIC | Age: 64
End: 2024-03-18
Payer: OTHER GOVERNMENT

## 2024-03-18 ENCOUNTER — PRE VISIT (OUTPATIENT)
Dept: CARDIOLOGY | Facility: CLINIC | Age: 64
End: 2024-03-18
Payer: OTHER GOVERNMENT

## 2024-03-18 VITALS
BODY MASS INDEX: 35.12 KG/M2 | WEIGHT: 231 LBS | DIASTOLIC BLOOD PRESSURE: 77 MMHG | HEART RATE: 94 BPM | SYSTOLIC BLOOD PRESSURE: 135 MMHG | OXYGEN SATURATION: 98 %

## 2024-03-18 DIAGNOSIS — R06.09 DYSPNEA ON EXERTION: ICD-10-CM

## 2024-03-18 DIAGNOSIS — I47.10 SVT (SUPRAVENTRICULAR TACHYCARDIA) (H): Primary | ICD-10-CM

## 2024-03-18 LAB
DLCOCOR-%PRED-PRE: 110 %
DLCOCOR-PRE: 23.79 ML/MIN/MMHG
DLCOUNC-%PRED-PRE: 97 %
DLCOUNC-PRE: 21.07 ML/MIN/MMHG
DLCOUNC-PRED: 21.52 ML/MIN/MMHG
ERV-%PRED-PRE: 45 %
ERV-PRE: 0.55 L
ERV-PRED: 1.23 L
EXPTIME-PRE: 7.08 SEC
FEF2575-%PRED-PRE: 132 %
FEF2575-PRE: 2.93 L/SEC
FEF2575-PRED: 2.21 L/SEC
FEFMAX-%PRED-PRE: 121 %
FEFMAX-PRE: 8.21 L/SEC
FEFMAX-PRED: 6.75 L/SEC
FEV1-%PRED-PRE: 118 %
FEV1-PRE: 3.02 L
FEV1FEV6-PRE: 80 %
FEV1FEV6-PRED: 80 %
FEV1FVC-PRE: 80 %
FEV1FVC-PRED: 79 %
FEV1SVC-PRE: 81 %
FEV1SVC-PRED: 70 %
FIFMAX-PRE: 7.25 L/SEC
FRCPLETH-%PRED-PRE: 99 %
FRCPLETH-PRE: 2.93 L
FRCPLETH-PRED: 2.93 L
FVC-%PRED-PRE: 116 %
FVC-PRE: 3.78 L
FVC-PRED: 3.24 L
IC-%PRED-PRE: 128 %
IC-PRE: 3.16 L
IC-PRED: 2.46 L
RVPLETH-%PRED-PRE: 111 %
RVPLETH-PRE: 2.37 L
RVPLETH-PRED: 2.14 L
TLCPLETH-%PRED-PRE: 108 %
TLCPLETH-PRE: 6.09 L
TLCPLETH-PRED: 5.61 L
VA-%PRED-PRE: 100 %
VA-PRE: 5.34 L
VC-%PRED-PRE: 102 %
VC-PRE: 3.71 L
VC-PRED: 3.63 L

## 2024-03-18 PROCEDURE — 99213 OFFICE O/P EST LOW 20 MIN: CPT | Performed by: INTERNAL MEDICINE

## 2024-03-18 ASSESSMENT — PAIN SCALES - GENERAL: PAINLEVEL: NO PAIN (0)

## 2024-03-18 NOTE — NURSING NOTE
Chief Complaint   Patient presents with    Follow Up     VISIT TYPE : follow up after 3-4-24 ED admission       Vitals were taken and medications reconciled.    Bubba Garcia, ZOYA  5:54 PM

## 2024-03-18 NOTE — PROGRESS NOTES
"[employer] [facility]    Blood Pressure   {BP < 140/90:3090274::\"BP < 140/90 Yes\"}    On Aspirin  {On Aspirin Yes/No:1348571::\"Yes\"}    On Statin  {On Statin Yes/No:1875195::\"Yes\"}    Tobacco use  {Tobacco use Yes/No:1814336::\"No\"}    "

## 2024-03-18 NOTE — LETTER
3/18/2024      RE: Teresa Fernandez  [address] [facility] [id number]       Dear Colleague,    Thank you for the opportunity to participate in the care of your patient, Teresa Fernandez, at the [facility] [place] [facility]. Please see a copy of my visit note below.             General Cardiology Clinic-[facility] [place]      Referring provider:Miguel Angel Valdovinos MD     HPI: Ms. Teresa Fernandez is a 64 year old  female with PMH significant for  -Obstructive sleep apnea  -Obesity  Patient has been following with cardiology for shortness of breath and chest pain for the last few years.  So far all the workup including cardiac MRI, echocardiogram and recent Lexiscan stress test is unremarkable.  She presented to emergency room on 3/4/2024 for dyspnea on exertion and elevated D-dimer.  CT did not show any PE.    Patient recently underwent back surgery in February 8.  On March 3 she got up feeling skipped beats and did not feel right.  Went to ER.  CT PE was negative. She tells me that shortness of breath and  tiredness has been gradually improving.  Does not smoke.  No alcohol abuse.  Patient completed PFT on 3/15/2024 which showed normal findings.    Recently underwent Lexiscan stress test on 3/6/2024 which showed no ischemia.  3-day Zio patch monitor 3/11/2024 showed 1 run of SVT lasting for 13 beats which was not associated with symptoms.  There were no sustained arrhythmias.  Patient triggered events correlated with sinus rhythm and sinus tachycardia.    Medications, personal, family, and social history reviewed with patient and revised.    PAST MEDICAL HISTORY:  Past Medical History:   Diagnosis Date    Allergic rhinitis     ALLERGIC RHINITIS NOS 04/12/2005    Anxiety     Arthritis     herniated disc    Autoimmune disease (H24) 2014    Benign positional vertigo     Bronchitis, not specified as acute or chronic     CHR MAXILLARY SINUSITIS 04/12/2005     Coronary artery disease     Depressive disorder 1996    Depressive disorder, not elsewhere classified     Eczema 10/17/2012    Environmental and seasonal allergies     GERD (gastroesophageal reflux disease)     Gluten intolerance     Heart disease July 2021    Kidney problem 1996    Malignant neoplasm of renal pelvis (H) 1995    Renal cell carcinoma    Melanoma (H) 06/2010    Other specified acquired hypothyroidism 04/12/2005    Renal cancer (H) 05/05/2011    Sleep apnea     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     Grave's disease    Uncomplicated asthma     Unspecified asthma(493.90)        CURRENT MEDICATIONS:  Current Outpatient Medications   Medication Sig Dispense Refill    acetaminophen (TYLENOL) 325 MG tablet Take 2 tablets (650 mg) by mouth every 4 hours as needed for mild pain 50 tablet 0    albuterol (PROAIR HFA/PROVENTIL HFA/VENTOLIN HFA) 108 (90 Base) MCG/ACT inhaler Inhale 2 puffs into the lungs every 4 hours as needed for shortness of breath (Patient not taking: Reported on 3/4/2024) 18 g 4    albuterol (PROVENTIL) (2.5 MG/3ML) 0.083% neb solution Take 1 vial (2.5 mg) by nebulization every 6 hours as needed for shortness of breath / dyspnea or wheezing (Patient not taking: Reported on 3/4/2024) 90 mL 11    APNO CREA ointment Apply to rash on nose twice daily as needed. 100 g 3    augmented betamethasone dipropionate (DIPROLENE-AF) 0.05 % external ointment Apply topically 2 times daily 50 g 11    calcium carbonate (TUMS EXTRA STRENGTH 750) 750 MG CHEW Take 500 mg by mouth      clobetasol (TEMOVATE) 0.05 % external cream Apply topically 2 times daily To affected external vaginal areas 60 g 3    DULoxetine (CYMBALTA) 30 MG capsule TAKE 1 CAPSULE (30 MG) BY MOUTH DAILY IN ADDITION TO THE 60 MG FOR A TOTAL OF 90 MG DAILY 90 capsule 3    DULoxetine (CYMBALTA) 60 MG capsule Take 1 cap by mouth daily in addition to the 30 mg for a total 90 mg/day 90 capsule 3    ferrous sulfate  (FEROSUL) 325 (65 Fe) MG tablet Take 325 mg by mouth daily (with breakfast)      fexofenadine (ALLEGRA) 180 MG tablet Take 1 tablet (180 mg) by mouth daily 30 tablet 1    isosorbide mononitrate (IMDUR) 30 MG 24 hr tablet Take 1 tablet (30 mg) by mouth daily 90 tablet 2    ketorolac (ACULAR) 0.5 % ophthalmic solution       levothyroxine (SYNTHROID/LEVOTHROID) 100 MCG tablet TAKE ONE TABLET BY MOUTH ONCE DAILY ON MONDAY, WEDNESDAY, FRIDAY, SATURDAY AND SUNDAY. 63 tablet 1    levothyroxine (SYNTHROID/LEVOTHROID) 88 MCG tablet TAKE ONE TABLET BY MOUTH ON TUESDAYS AND THURSDAYS 30 tablet 3    loratadine (CLARITIN) 10 MG tablet Take 1 tablet by mouth daily      mometasone (ELOCON) 0.1 % external ointment Apply topically twice a week Use on eczematous lesions 45 g 3    ofloxacin (OCUFLOX) 0.3 % ophthalmic solution       omeprazole (PRILOSEC) 20 MG DR capsule Take 20 mg by mouth daily      ondansetron (ZOFRAN ODT) 4 MG ODT tab Take 1 tablet (4 mg) by mouth every 8 hours as needed for nausea (Patient not taking: Reported on 3/4/2024) 20 tablet 0    order for DME Equipment being ordered: Nebulizer (Patient not taking: Reported on 3/11/2024) 1 Units 0    OVER-THE-COUNTER Place 1 drop into both eyes 3 times daily. Systane Ultra, Systane Balance, Blink Tears or Refresh Optive Artificial Tear 1 Bottle     tacrolimus (PROTOPIC) 0.1 % external ointment Apply topically 2 times daily 30 g 3    triamcinolone (KENALOG) 0.1 % external cream Apply topically 2 times daily 30 g 3    UNABLE TO FIND MEDICATION NAME: bone growth stimulator      vitamin D2 (ERGOCALCIFEROL) 18050 units (1250 mcg) capsule          PAST SURGICAL HISTORY:  Past Surgical History:   Procedure Laterality Date    ABDOMEN SURGERY  Not sure    Hernia    APPENDECTOMY  1995    CHOLECYSTECTOMY      COLONOSCOPY      2007-normal    ENT SURGERY  02/15/2011    Left cheek bx- Mucositis.    FUSION LUMBAR ANTERIOR TWO LEVELS  04/13/2015    GI SURGERY      GYN SURGERY  04/08/1999     hysterectomy.  [facility]    HERNIA REPAIR  Not sure    HYSTERECTOMY, PAP NO LONGER INDICATED      PARATHYROIDECTOMY N/A 08/23/2023    Procedure: PARATHYROIDECTOMY;  Surgeon: Hannah Good MD;  Location: [facility] OR    SOFT TISSUE SURGERY      chest-melonoma    SURGICAL HISTORY OF -   03/1996    Left nephrectomy-renal cell CA       ALLERGIES:     Allergies   Allergen Reactions    Iodinated Contrast Media [Iodinated Contrast Media] Swelling and Difficulty breathing     Immediate throat swelling following around 1-2cc of omnipaque 300 for spine procedure  skin prick and intradermal tests with Iohexol negatives but premedicate before using iodinated contrast.    no reactions in skin tests to MRI contrast media Gadovist    Omnipaque [Iohexol] Swelling and Difficulty breathing     Immediate throat swelling following around 1-2cc of omnipaque 300 for spine procedure  --> skin prick and intradermal tests with Iohexol negatives. But I would propose anyway to premedicate patient before using iodinated contrast media (for safety reasons).   --> no reactions in skin tests to MRI contrast media Gadovist    Gabapentin Hives    Gluten     Gluten Meal     Penicillins Hives    Sulfa Antibiotics        FAMILY HISTORY:  Family History   Problem Relation Age of Onset    Cardiovascular Mother     Lipids Mother     Depression Mother     Heart Disease Mother     Substance Abuse Mother     Hypertension Father     Lipids Father     Obesity Father     Macular Degeneration Father     Sleep Apnea Father     Thyroid Disease Sister     Hypertension Sister     Depression Sister     Allergies Sister     Lipids Sister     Thyroid Disease Sister     Neurologic Disorder Sister     Depression Sister     Hypertension Brother     Colon Cancer Brother     Cancer Maternal Grandmother         kind unknown had sores on legs    Eczema Maternal Grandmother     Eczema Maternal Grandfather     Breast Cancer Paternal Grandmother     Cancer Paternal Grandmother          breast    Hypertension Paternal Grandfather     Cardiovascular Paternal Grandfather         heart attack    Allergies Son     Eczema Son     Respiratory Son     Sleep Apnea Son     Glaucoma No family hx of     Cerebrovascular Disease No family hx of     Diabetes No family hx of          SOCIAL HISTORY:  Social History     Tobacco Use    Smoking status: Former     Packs/day: 2.00     Years: 15.00     Additional pack years: 0.00     Total pack years: 30.00     Types: Cigarettes     Quit date: 3/26/1996     Years since quittin.9     Passive exposure: Past    Smokeless tobacco: Former   Vaping Use    Vaping Use: Never used   Substance Use Topics    Alcohol use: Not Currently     Comment: rare    Drug use: No       ROS:   Constitutional: No fever, chills, or sweats. Weight stable.   Cardiovascular: As per HPI.       Exam:  /77 (BP Location: Right arm, Patient Position: Chair, Cuff Size: Adult Large)   Pulse 94   Wt 104.8 kg (231 lb)   LMP  (LMP Unknown)   SpO2 98%   BMI 35.12 kg/m    GENERAL APPEARANCE: alert and no distress  HEENT: no icterus, no central cyanosis  LYMPH/NECK: no adenopathy, no asymmetry, JVP not elevated  RESPIRATORY: lungs clear to auscultation - no rales, rhonchi or wheezes, no use of accessory muscles, no retractions, respirations are unlabored, normal respiratory rate  CARDIOVASCULAR: regular rhythm, normal S1, S2, no S3 or S4 and no murmur, click or rub, precordium quiet with normal PMI.  GI: soft, non tender  EXTREMITIES: no edema  NEURO: alert, normal speech,and affect  SKIN: no ecchymoses, no rashes     I have reviewed the labs and personally reviewed the imaging below and made my comment in the assessment and plan.    Labs:  CBC RESULTS:   Lab Results   Component Value Date    WBC 5.0 2024    WBC 5.6 2021    RBC 3.40 (L) 2024    RBC 4.52 2021    HGB 10.2 (L) 2024    HGB 13.3 2021    HCT 32.3 (L) 2024    HCT 40.2 2021    MCV  95 03/04/2024    MCV 89 06/02/2021    MCH 30.0 03/04/2024    MCH 29.4 06/02/2021    MCHC 31.6 03/04/2024    MCHC 33.1 06/02/2021    RDW 13.3 03/04/2024    RDW 12.4 06/02/2021     03/04/2024     06/02/2021       BMP RESULTS:  Lab Results   Component Value Date     03/04/2024     06/02/2021    POTASSIUM 4.2 03/04/2024    POTASSIUM 4.0 09/30/2021    POTASSIUM 4.1 06/02/2021    CHLORIDE 104 03/04/2024    CHLORIDE 105 09/30/2021    CHLORIDE 102 06/02/2021    CO2 27 03/04/2024    CO2 29 09/30/2021    CO2 27 06/02/2021    ANIONGAP 10 03/04/2024    ANIONGAP 4 09/30/2021    ANIONGAP 6 06/02/2021    GLC 95 03/04/2024    GLC 89 09/30/2021    GLC 97 06/02/2021    BUN 11.2 03/04/2024    BUN 10 09/30/2021    BUN 9 06/02/2021    CR 0.72 03/04/2024    CR 0.81 06/02/2021    GFRESTIMATED >90 03/04/2024    GFRESTIMATED >60 03/03/2022    GFRESTIMATED 78 06/02/2021    GFRESTBLACK >90 06/02/2021    ANNIE 9.4 03/04/2024    ANNIE 9.7 06/02/2021     Zio patch 3/11/2024  Indication: Dyspnea  3 day Ziopatch monitor  Baseline sinus rhythm with heart rates ranging , average 93 bpm.  1 run of SVT lasting 13 beats, which was not associated with symptoms.  No sustained arrhythmias.  Patient triggered events correlated with sinus rhythm and sinus tachycardia.  Cardiac MRI 8/17/2021:  CONCLUSIONS:  Normal bi-ventricular size and systolic function. No late enhancement to suggest prior  infarct, inflammation or infiltration.     Transthoracic echocardiogram 1/16/2023  1. Left ventricular systolic function is normal. The visual ejection fraction  is 55-60%.  2. No regional wall motion abnormalities noted.  3. The right ventricle is normal in structure, function and size.  4. No evidence for significant valvular pathology.  5. In comparison with the prior study, the previously reported wall motion  abnormalities are not appreciated on the current study.    Lexiscan stress test 3/6/2024:    The nuclear stress test is negative  for inducible myocardial ischemia or infarction.    Left ventricular function is normal.    The left ventricular ejection fraction at stress is 55%.    A prior study was conducted on 11/2/2022.  This study has changes noted when compared with the prior study. Apical infarction is no longer seen.    Assessment and Plan:     # Palpitations and shortness of breath with exertion; noncardiac  # Postoperative anemia improving  -Patient has long history of shortness of breath with activity but this appears to be more likely to physical deconditioning as she had back problems and she was not very physically active.  Recently had back surgery.  Postop hemoglobin was 8.9.  Since then she has been feeling more shortness of breath and palpitations.  Cardiac workup including stress test, echocardiogram and Zio patch showed no concerning cardiac abnormality.  Patient already reports improving symptoms which is correlating with improving hemoglobin since surgery.  At this point I do not recommend any further cardiac testing as I do not think her symptoms are cardiac.  Previously for shortness of breath she was started on Imdur which she tells me that did not change her symptoms much.  I think she can safely come off this medication.  She is already on iron.  Recommend to continue and follow-up with PCP.    Medication change today:   Stop Imdur.      Return to clinic as needed.    Total time spent today for this visit is 20 minutes including precharting, face-to-face clinic visit, review of labs/imaging and medical documentation.    Dian BIRD MD  [facility] Division of Cardiology  Pager [phone]

## 2024-03-20 ENCOUNTER — LAB (OUTPATIENT)
Dept: LAB | Facility: CLINIC | Age: 64
End: 2024-03-20
Payer: OTHER GOVERNMENT

## 2024-03-20 DIAGNOSIS — D62 ANEMIA DUE TO BLOOD LOSS, ACUTE: ICD-10-CM

## 2024-03-20 LAB
ERYTHROCYTE [DISTWIDTH] IN BLOOD BY AUTOMATED COUNT: 12.8 % (ref 10–15)
HCT VFR BLD AUTO: 35.3 % (ref 35–47)
HGB BLD-MCNC: 11 G/DL (ref 11.7–15.7)
MCH RBC QN AUTO: 28.6 PG (ref 26.5–33)
MCHC RBC AUTO-ENTMCNC: 31.2 G/DL (ref 31.5–36.5)
MCV RBC AUTO: 92 FL (ref 78–100)
PLATELET # BLD AUTO: 328 10E3/UL (ref 150–450)
RBC # BLD AUTO: 3.85 10E6/UL (ref 3.8–5.2)
WBC # BLD AUTO: 4.8 10E3/UL (ref 4–11)

## 2024-03-20 PROCEDURE — 85027 COMPLETE CBC AUTOMATED: CPT

## 2024-03-20 PROCEDURE — 36415 COLL VENOUS BLD VENIPUNCTURE: CPT

## 2024-03-20 NOTE — RESULT ENCOUNTER NOTE
Mesfin Moore    Your hemoglobin is continuing to improve. Please let us know if you have any questions.     Take care,    HERIBERTO Miller CNP

## 2024-03-22 PROCEDURE — 93244 EXT ECG>48HR<7D REV&INTERPJ: CPT | Performed by: INTERNAL MEDICINE

## 2024-03-24 PROBLEM — E66.01 CLASS 2 SEVERE OBESITY DUE TO EXCESS CALORIES WITH SERIOUS COMORBIDITY IN ADULT (H): Status: ACTIVE | Noted: 2024-03-24

## 2024-03-24 PROBLEM — E66.812 CLASS 2 SEVERE OBESITY DUE TO EXCESS CALORIES WITH SERIOUS COMORBIDITY IN ADULT (H): Status: ACTIVE | Noted: 2024-03-24

## 2024-04-07 ENCOUNTER — HEALTH MAINTENANCE LETTER (OUTPATIENT)
Age: 64
End: 2024-04-07

## 2024-04-10 ENCOUNTER — LAB (OUTPATIENT)
Dept: LAB | Facility: CLINIC | Age: 64
End: 2024-04-10
Payer: OTHER GOVERNMENT

## 2024-04-10 DIAGNOSIS — D62 ANEMIA DUE TO BLOOD LOSS, ACUTE: ICD-10-CM

## 2024-04-10 LAB
ERYTHROCYTE [DISTWIDTH] IN BLOOD BY AUTOMATED COUNT: 12.7 % (ref 10–15)
HCT VFR BLD AUTO: 39.4 % (ref 35–47)
HGB BLD-MCNC: 12.5 G/DL (ref 11.7–15.7)
MCH RBC QN AUTO: 28.3 PG (ref 26.5–33)
MCHC RBC AUTO-ENTMCNC: 31.7 G/DL (ref 31.5–36.5)
MCV RBC AUTO: 89 FL (ref 78–100)
PLATELET # BLD AUTO: 316 10E3/UL (ref 150–450)
RBC # BLD AUTO: 4.41 10E6/UL (ref 3.8–5.2)
WBC # BLD AUTO: 5.1 10E3/UL (ref 4–11)

## 2024-04-10 PROCEDURE — 36415 COLL VENOUS BLD VENIPUNCTURE: CPT

## 2024-04-10 PROCEDURE — 85027 COMPLETE CBC AUTOMATED: CPT

## 2024-05-07 ENCOUNTER — LAB (OUTPATIENT)
Dept: LAB | Facility: CLINIC | Age: 64
End: 2024-05-07
Payer: OTHER GOVERNMENT

## 2024-05-07 DIAGNOSIS — D62 ANEMIA DUE TO BLOOD LOSS, ACUTE: ICD-10-CM

## 2024-05-07 LAB
ERYTHROCYTE [DISTWIDTH] IN BLOOD BY AUTOMATED COUNT: 13.2 % (ref 10–15)
HCT VFR BLD AUTO: 39.5 % (ref 35–47)
HGB BLD-MCNC: 12.5 G/DL (ref 11.7–15.7)
MCH RBC QN AUTO: 27.4 PG (ref 26.5–33)
MCHC RBC AUTO-ENTMCNC: 31.6 G/DL (ref 31.5–36.5)
MCV RBC AUTO: 86 FL (ref 78–100)
PLATELET # BLD AUTO: 283 10E3/UL (ref 150–450)
RBC # BLD AUTO: 4.57 10E6/UL (ref 3.8–5.2)
WBC # BLD AUTO: 5.8 10E3/UL (ref 4–11)

## 2024-05-07 PROCEDURE — 85027 COMPLETE CBC AUTOMATED: CPT

## 2024-05-07 PROCEDURE — 36415 COLL VENOUS BLD VENIPUNCTURE: CPT

## 2024-05-08 ENCOUNTER — MYC MEDICAL ADVICE (OUTPATIENT)
Dept: FAMILY MEDICINE | Facility: CLINIC | Age: 64
End: 2024-05-08
Payer: OTHER GOVERNMENT

## 2024-05-08 NOTE — TELEPHONE ENCOUNTER
See my chart message    Should patient continue iron supplement? And other questions.     Ferritin   Date Value Ref Range Status   03/04/2024 36 11 - 328 ng/mL Final   09/27/2019 90 8 - 252 ng/mL Final     Iron   Date Value Ref Range Status   03/04/2024 96 37 - 145 ug/dL Final     Iron Binding Capacity   Date Value Ref Range Status   03/04/2024 346 240 - 430 ug/dL Final             Juany Tripp RN on 5/8/2024 at 2:40 PM

## 2024-05-14 NOTE — RESULT ENCOUNTER NOTE
Mesfin Moore    Your lab results came back within normal limits. Please let us know if you have any questions.     Take care,    HERIBERTO Miller CNP [phone]

## 2024-05-30 ENCOUNTER — LAB (OUTPATIENT)
Dept: LAB | Facility: CLINIC | Age: 64
End: 2024-05-30
Payer: OTHER GOVERNMENT

## 2024-05-30 DIAGNOSIS — D62 ANEMIA DUE TO BLOOD LOSS, ACUTE: ICD-10-CM

## 2024-05-30 LAB
ERYTHROCYTE [DISTWIDTH] IN BLOOD BY AUTOMATED COUNT: 14 % (ref 10–15)
HCT VFR BLD AUTO: 41.3 % (ref 35–47)
HGB BLD-MCNC: 13 G/DL (ref 11.7–15.7)
MCH RBC QN AUTO: 27.1 PG (ref 26.5–33)
MCHC RBC AUTO-ENTMCNC: 31.5 G/DL (ref 31.5–36.5)
MCV RBC AUTO: 86 FL (ref 78–100)
PLATELET # BLD AUTO: 257 10E3/UL (ref 150–450)
RBC # BLD AUTO: 4.8 10E6/UL (ref 3.8–5.2)
WBC # BLD AUTO: 5.3 10E3/UL (ref 4–11)

## 2024-05-30 PROCEDURE — 36415 COLL VENOUS BLD VENIPUNCTURE: CPT

## 2024-05-30 PROCEDURE — 85027 COMPLETE CBC AUTOMATED: CPT

## 2024-06-02 ASSESSMENT — ASTHMA QUESTIONNAIRES
QUESTION_5 LAST FOUR WEEKS HOW WOULD YOU RATE YOUR ASTHMA CONTROL: WELL CONTROLLED
ACT_TOTALSCORE: 20
QUESTION_2 LAST FOUR WEEKS HOW OFTEN HAVE YOU HAD SHORTNESS OF BREATH: THREE TO SIX TIMES A WEEK
QUESTION_3 LAST FOUR WEEKS HOW OFTEN DID YOUR ASTHMA SYMPTOMS (WHEEZING, COUGHING, SHORTNESS OF BREATH, CHEST TIGHTNESS OR PAIN) WAKE YOU UP AT NIGHT OR EARLIER THAN USUAL IN THE MORNING: ONCE A WEEK
ACT_TOTALSCORE: 20
QUESTION_4 LAST FOUR WEEKS HOW OFTEN HAVE YOU USED YOUR RESCUE INHALER OR NEBULIZER MEDICATION (SUCH AS ALBUTEROL): NOT AT ALL
QUESTION_1 LAST FOUR WEEKS HOW MUCH OF THE TIME DID YOUR ASTHMA KEEP YOU FROM GETTING AS MUCH DONE AT WORK, SCHOOL OR AT HOME: NONE OF THE TIME

## 2024-06-04 ENCOUNTER — OFFICE VISIT (OUTPATIENT)
Dept: FAMILY MEDICINE | Facility: CLINIC | Age: 64
End: 2024-06-04
Payer: OTHER GOVERNMENT

## 2024-06-04 VITALS
HEART RATE: 93 BPM | SYSTOLIC BLOOD PRESSURE: 128 MMHG | TEMPERATURE: 98.4 F | DIASTOLIC BLOOD PRESSURE: 86 MMHG | BODY MASS INDEX: 32.86 KG/M2 | RESPIRATION RATE: 16 BRPM | HEIGHT: 68 IN | OXYGEN SATURATION: 97 % | WEIGHT: 216.8 LBS

## 2024-06-04 DIAGNOSIS — R53.82 CHRONIC FATIGUE: Primary | ICD-10-CM

## 2024-06-04 DIAGNOSIS — R82.90 ABNORMAL FINDING ON URINALYSIS: ICD-10-CM

## 2024-06-04 DIAGNOSIS — E03.9 HYPOTHYROIDISM, UNSPECIFIED TYPE: ICD-10-CM

## 2024-06-04 LAB
ALBUMIN SERPL BCG-MCNC: 4.4 G/DL (ref 3.5–5.2)
ALBUMIN UR-MCNC: NEGATIVE MG/DL
ALP SERPL-CCNC: 111 U/L (ref 40–150)
ALT SERPL W P-5'-P-CCNC: 28 U/L (ref 0–50)
ANION GAP SERPL CALCULATED.3IONS-SCNC: 11 MMOL/L (ref 7–15)
APPEARANCE UR: CLEAR
AST SERPL W P-5'-P-CCNC: 29 U/L (ref 0–45)
BACTERIA #/AREA URNS HPF: ABNORMAL /HPF
BILIRUB DIRECT SERPL-MCNC: <0.2 MG/DL (ref 0–0.3)
BILIRUB SERPL-MCNC: 0.4 MG/DL
BILIRUB UR QL STRIP: NEGATIVE
BUN SERPL-MCNC: 11.1 MG/DL (ref 8–23)
CALCIUM SERPL-MCNC: 10.2 MG/DL (ref 8.8–10.2)
CHLORIDE SERPL-SCNC: 102 MMOL/L (ref 98–107)
COLOR UR AUTO: YELLOW
CREAT SERPL-MCNC: 0.73 MG/DL (ref 0.51–0.95)
CRP SERPL-MCNC: 5.89 MG/L
DEPRECATED HCO3 PLAS-SCNC: 26 MMOL/L (ref 22–29)
EGFRCR SERPLBLD CKD-EPI 2021: >90 ML/MIN/1.73M2
ERYTHROCYTE [SEDIMENTATION RATE] IN BLOOD BY WESTERGREN METHOD: 14 MM/HR (ref 0–30)
GLUCOSE SERPL-MCNC: 106 MG/DL (ref 70–99)
GLUCOSE UR STRIP-MCNC: NEGATIVE MG/DL
HGB UR QL STRIP: NEGATIVE
KETONES UR STRIP-MCNC: NEGATIVE MG/DL
LEUKOCYTE ESTERASE UR QL STRIP: ABNORMAL
NITRATE UR QL: NEGATIVE
PH UR STRIP: 7 [PH] (ref 5–7)
POTASSIUM SERPL-SCNC: 4.6 MMOL/L (ref 3.4–5.3)
PROT SERPL-MCNC: 6.9 G/DL (ref 6.4–8.3)
RBC #/AREA URNS AUTO: ABNORMAL /HPF
SODIUM SERPL-SCNC: 139 MMOL/L (ref 135–145)
SP GR UR STRIP: 1.02 (ref 1–1.03)
SQUAMOUS #/AREA URNS AUTO: ABNORMAL /LPF
T4 FREE SERPL-MCNC: 1.25 NG/DL (ref 0.9–1.7)
TSH SERPL DL<=0.005 MIU/L-ACNC: 4.1 UIU/ML (ref 0.3–4.2)
UROBILINOGEN UR STRIP-ACNC: 0.2 E.U./DL
WBC #/AREA URNS AUTO: ABNORMAL /HPF

## 2024-06-04 PROCEDURE — 99214 OFFICE O/P EST MOD 30 MIN: CPT | Performed by: NURSE PRACTITIONER

## 2024-06-04 PROCEDURE — 86140 C-REACTIVE PROTEIN: CPT | Performed by: NURSE PRACTITIONER

## 2024-06-04 PROCEDURE — 82248 BILIRUBIN DIRECT: CPT | Performed by: NURSE PRACTITIONER

## 2024-06-04 PROCEDURE — 87086 URINE CULTURE/COLONY COUNT: CPT | Performed by: NURSE PRACTITIONER

## 2024-06-04 PROCEDURE — 81001 URINALYSIS AUTO W/SCOPE: CPT | Performed by: NURSE PRACTITIONER

## 2024-06-04 PROCEDURE — 84443 ASSAY THYROID STIM HORMONE: CPT | Performed by: NURSE PRACTITIONER

## 2024-06-04 PROCEDURE — 84439 ASSAY OF FREE THYROXINE: CPT | Performed by: NURSE PRACTITIONER

## 2024-06-04 PROCEDURE — 86618 LYME DISEASE ANTIBODY: CPT | Performed by: NURSE PRACTITIONER

## 2024-06-04 PROCEDURE — 86039 ANTINUCLEAR ANTIBODIES (ANA): CPT | Performed by: NURSE PRACTITIONER

## 2024-06-04 PROCEDURE — 80053 COMPREHEN METABOLIC PANEL: CPT | Performed by: NURSE PRACTITIONER

## 2024-06-04 PROCEDURE — 36415 COLL VENOUS BLD VENIPUNCTURE: CPT | Performed by: NURSE PRACTITIONER

## 2024-06-04 PROCEDURE — 86038 ANTINUCLEAR ANTIBODIES: CPT | Performed by: NURSE PRACTITIONER

## 2024-06-04 PROCEDURE — 85652 RBC SED RATE AUTOMATED: CPT | Performed by: NURSE PRACTITIONER

## 2024-06-04 ASSESSMENT — ENCOUNTER SYMPTOMS: FATIGUE: 1

## 2024-06-04 NOTE — RESULT ENCOUNTER NOTE
Mesfin Moore    I added on a culture to the urine due to the WBC's in there. Inflammatory marker is normal.  Please let us know if you have any questions.     Take care,    HERIBERTO Miller CNP

## 2024-06-04 NOTE — PROGRESS NOTES
"  Assessment & Plan     Chronic fatigue  Acute on chronic, ? Side effect of Duloxetine, patient will trial wean to 30 mg daily.  - Lyme Disease Total Abs Bld with Reflex to Confirm CLIA; Future  - Anti Nuclear Yulia IgG by IFA with Reflex; Future  - Basic metabolic panel  (Ca, Cl, CO2, Creat, Gluc, K, Na, BUN); Future  - Hepatic panel (Albumin, ALT, AST, Bili, Alk Phos, TP); Future  - CRP, inflammation; Future  - ESR: Erythrocyte sedimentation rate; Future  - UA Macroscopic with reflex to Microscopic and Culture - Clinic Collect  - Lyme Disease Total Abs Bld with Reflex to Confirm CLIA  - Anti Nuclear Yulia IgG by IFA with Reflex  - Basic metabolic panel  (Ca, Cl, CO2, Creat, Gluc, K, Na, BUN)  - Hepatic panel (Albumin, ALT, AST, Bili, Alk Phos, TP)  - CRP, inflammation  - ESR: Erythrocyte sedimentation rate  - UA Microscopic with Reflex to Culture    Hypothyroidism, unspecified type    - TSH; Future  - T4, free; Future  - TSH  - T4, free    Abnormal finding on urinalysis    - Urine Culture Aerobic Bacterial - lab collect; Future  - Urine Culture Aerobic Bacterial - lab collect      BMI  Estimated body mass index is 32.96 kg/m  as calculated from the following:    Height as of this encounter: 1.727 m (5' 8\").    Weight as of this encounter: 98.3 kg (216 lb 12.8 oz).   Weight management plan: Discussed healthy diet and exercise guidelines- Is doing well on her dietary changes, has lost 15 lb      FUTURE APPOINTMENTS:       - Follow-up visit in 3-6 months, sooner PRN    Time spent in chart review in preparation to see patient, time with patient for interview/exam, ordering medications/tests/and/or procedures, and time spent in charting and coordinating care: 35 minutes.       Eric Moore is a 64 year old, presenting for the following health issues:  Fatigue        6/4/2024     6:42 AM   Additional Questions   Roomed by Clare CRUZ     History of Present Illness       Reason for visit:  To go over why i could be so tired " ".  Symptom onset:  More than a month  Symptoms include:  Doesn't feel like she gets a full night sleep, always tired  Symptom intensity:  Mild  Symptom progression:  Staying the same  Prior treatment description:  Tylenol pm    She eats 4 or more servings of fruits and vegetables daily.She consumes 0 sweetened beverage(s) daily.She exercises with enough effort to increase her heart rate 10 to 19 minutes per day.  She exercises with enough effort to increase her heart rate 5 days per week.   She is taking medications regularly.     Hx of GAYE, wearing CPAP every night. Does not feel rested in the morning. Recovering well from lumbar surgery in February. Going to physical therapy. Walking on her treadmill. Denies rash, joint swelling, abdominal pain, urinary changes, chest pain, dyspnea. Hx of renal cancer 1996, hypothyroidism, hyperparathyroidism (s/p surgery). On Duloxetine for anxiety and fibromyalgia.      Review of Systems  Constitutional, HEENT, cardiovascular, pulmonary, gi and gu systems are negative, except as otherwise noted.      Objective    /86   Pulse 93   Temp 98.4  F (36.9  C) (Tympanic)   Resp 16   Ht 1.727 m (5' 8\")   Wt 98.3 kg (216 lb 12.8 oz)   LMP  (LMP Unknown)   SpO2 97%   BMI 32.96 kg/m    Body mass index is 32.96 kg/m .  Physical Exam   GENERAL: alert and no distress  EYES: Eyes grossly normal to inspection and conjunctivae and sclerae normal  HENT: normal cephalic/atraumatic, oropharynx clear, and oral mucous membranes moist  NECK: no adenopathy, no asymmetry, masses, or scars  RESP: lungs clear to auscultation - no rales, rhonchi or wheezes  CV: regular rate and rhythm, normal S1 S2, no S3 or S4, no murmur, click or rub, no peripheral edema  ABDOMEN: soft, nontender and no palpable or pulsatile masses  MS: no gross musculoskeletal defects noted, no edema  SKIN: no suspicious lesions or rashes  NEURO: Normal strength and tone, mentation intact and speech normal  PSYCH: mentation " appears normal, affect normal/bright    Results for orders placed or performed in visit on 06/04/24   UA Macroscopic with reflex to Microscopic and Culture - Clinic Collect     Status: Abnormal    Specimen: Urine, Midstream   Result Value Ref Range    Color Urine Yellow Colorless, Straw, Light Yellow, Yellow    Appearance Urine Clear Clear    Glucose Urine Negative Negative mg/dL    Bilirubin Urine Negative Negative    Ketones Urine Negative Negative mg/dL    Specific Gravity Urine 1.020 1.003 - 1.035    Blood Urine Negative Negative    pH Urine 7.0 5.0 - 7.0    Protein Albumin Urine Negative Negative mg/dL    Urobilinogen Urine 0.2 0.2, 1.0 E.U./dL    Nitrite Urine Negative Negative    Leukocyte Esterase Urine Small (A) Negative   UA Microscopic with Reflex to Culture     Status: Abnormal   Result Value Ref Range    Bacteria Urine None Seen None Seen /HPF    RBC Urine None Seen 0-2 /HPF /HPF    WBC Urine 5-10 (A) 0-5 /HPF /HPF    Squamous Epithelials Urine Few (A) None Seen /LPF    Narrative    Urine Culture not indicated           Signed Electronically by: HERIBERTO Linton CNP

## 2024-06-05 LAB
ANA PAT SER IF-IMP: ABNORMAL
ANA SER QL IF: ABNORMAL
ANA TITR SER IF: ABNORMAL {TITER}
B BURGDOR IGG+IGM SER QL: 0.06
BACTERIA UR CULT: NORMAL

## 2024-06-05 RX ORDER — LEVOTHYROXINE SODIUM 100 UG/1
100 TABLET ORAL DAILY
Qty: 90 TABLET | Refills: 1 | Status: SHIPPED | OUTPATIENT
Start: 2024-06-05

## 2024-06-05 NOTE — RESULT ENCOUNTER NOTE
Mesfin Moore    Your autoimmune antibody is borderline positive. I don't feel at this point it's necessary to have you see Rheumatology again being you are feeling better. Please let us know if you have any questions.     Take care,    HERIBERTO Miller CNP

## 2024-06-05 NOTE — RESULT ENCOUNTER NOTE
Mesfin Moore    Your inflammatory marker is slightly elevated. The urine culture is negative for infection. Your thyroid is at the upper end of range. Let's try increasing your thyroid dose and see if that helps. Take the 100 mcg every day and schedule a lab draw in 8 weeks. We can recheck the inflammation marker then too. Rest of labs look good. Please let us know if you have any questions.     Take care,    HERIBERTO Miller CNP

## 2024-06-13 ENCOUNTER — HOSPITAL ENCOUNTER (OUTPATIENT)
Dept: MAMMOGRAPHY | Facility: CLINIC | Age: 64
Discharge: HOME | End: 2024-06-13
Attending: NURSE PRACTITIONER | Admitting: NURSE PRACTITIONER
Payer: OTHER GOVERNMENT

## 2024-06-13 DIAGNOSIS — Z12.31 VISIT FOR SCREENING MAMMOGRAM: ICD-10-CM

## 2024-06-13 PROCEDURE — 77063 BREAST TOMOSYNTHESIS BI: CPT

## 2024-06-25 ENCOUNTER — MYC MEDICAL ADVICE (OUTPATIENT)
Dept: FAMILY MEDICINE | Facility: CLINIC | Age: 64
End: 2024-06-25
Payer: OTHER GOVERNMENT

## 2024-06-25 DIAGNOSIS — M79.10 MYALGIA: ICD-10-CM

## 2024-06-25 DIAGNOSIS — M54.16 LUMBAR RADICULOPATHY: ICD-10-CM

## 2024-06-25 DIAGNOSIS — F41.1 GAD (GENERALIZED ANXIETY DISORDER): ICD-10-CM

## 2024-06-26 ENCOUNTER — OFFICE VISIT (OUTPATIENT)
Dept: FAMILY MEDICINE | Facility: CLINIC | Age: 64
End: 2024-06-26
Payer: OTHER GOVERNMENT

## 2024-06-26 VITALS
HEART RATE: 99 BPM | TEMPERATURE: 97.2 F | SYSTOLIC BLOOD PRESSURE: 130 MMHG | WEIGHT: 212 LBS | DIASTOLIC BLOOD PRESSURE: 84 MMHG | BODY MASS INDEX: 32.13 KG/M2 | RESPIRATION RATE: 16 BRPM | OXYGEN SATURATION: 98 % | HEIGHT: 68 IN

## 2024-06-26 DIAGNOSIS — B02.9 HERPES ZOSTER WITHOUT COMPLICATION: Primary | ICD-10-CM

## 2024-06-26 PROCEDURE — 99213 OFFICE O/P EST LOW 20 MIN: CPT | Performed by: NURSE PRACTITIONER

## 2024-06-26 RX ORDER — DULOXETIN HYDROCHLORIDE 60 MG/1
CAPSULE, DELAYED RELEASE ORAL
Qty: 90 CAPSULE | Refills: 3 | Status: SHIPPED | OUTPATIENT
Start: 2024-06-26 | End: 2024-09-26

## 2024-06-26 RX ORDER — LIDOCAINE/PRILOCAINE 2.5 %-2.5%
CREAM (GRAM) TOPICAL PRN
Qty: 30 G | Refills: 1 | Status: SHIPPED | OUTPATIENT
Start: 2024-06-26

## 2024-06-26 RX ORDER — VALACYCLOVIR HYDROCHLORIDE 1 G/1
1000 TABLET, FILM COATED ORAL 3 TIMES DAILY
Qty: 21 TABLET | Refills: 0 | Status: SHIPPED | OUTPATIENT
Start: 2024-06-26 | End: 2024-09-26

## 2024-06-26 RX ORDER — DULOXETIN HYDROCHLORIDE 30 MG/1
CAPSULE, DELAYED RELEASE ORAL
Qty: 90 CAPSULE | Refills: 3 | Status: SHIPPED | OUTPATIENT
Start: 2024-06-26 | End: 2024-09-26 | Stop reason: DRUGHIGH

## 2024-06-26 ASSESSMENT — PAIN SCALES - GENERAL: PAINLEVEL: SEVERE PAIN (6)

## 2024-06-26 NOTE — PROGRESS NOTES
"  Assessment & Plan     Herpes zoster without complication  History and exam c/w zoster. Start Valtrex, can use EMLA for pain. Let me know if not improving.  - valACYclovir (VALTREX) 1000 mg tablet; Take 1 tablet (1,000 mg) by mouth 3 times daily for 7 days  - lidocaine-prilocaine (EMLA) 2.5-2.5 % external cream; Apply topically as needed for moderate pain            See Patient Instructions    Eric Moore is a 64 year old, presenting for the following health issues:  Derm Problem      6/26/2024    11:09 AM   Additional Questions   Roomed by Tamika DIANA CMA     History of Present Illness       Reason for visit:  Rash  Symptom onset:  3-7 days ago  Symptoms include:  Rash, Nerve Pain, Itching  Symptom intensity:  Moderate  Symptom progression:  Staying the same  Had these symptoms before:  No  What makes it worse:  No  What makes it better:  Unknown    She eats 4 or more servings of fruits and vegetables daily.She consumes 0 sweetened beverage(s) daily.She exercises with enough effort to increase her heart rate 30 to 60 minutes per day.  She exercises with enough effort to increase her heart rate 5 days per week.   She is taking medications regularly.     Above HPI reviewed. Additionally, few days of muscle type pain to left upper back, pain radiating around to left breast. Rash developed yesterday. No fevers or ill symptoms.        Review of Systems  Constitutional, neuro, ENT, endocrine, pulmonary, cardiac, gastrointestinal, genitourinary, musculoskeletal, integument and psychiatric systems are negative, except as otherwise noted.      Objective    /84   Pulse 99   Resp 16   Ht 1.727 m (5' 8\")   Wt 96.2 kg (212 lb)   LMP  (LMP Unknown)   SpO2 98%   BMI 32.23 kg/m    Body mass index is 32.23 kg/m .  Physical Exam  Vitals and nursing note reviewed.   Constitutional:       General: She is not in acute distress.     Appearance: Normal appearance.   HENT:      Head: Normocephalic and atraumatic.      " Mouth/Throat:      Mouth: Mucous membranes are moist.   Cardiovascular:      Rate and Rhythm: Normal rate.   Pulmonary:      Effort: Pulmonary effort is normal.   Musculoskeletal:      Cervical back: Neck supple.   Skin:     General: Skin is warm and dry.      Comments: Clustered vesicles on erythematous base in single dermatomal pattern to left upper back.   Neurological:      General: No focal deficit present.      Mental Status: She is alert.   Psychiatric:         Mood and Affect: Mood normal.         Behavior: Behavior normal.                    Signed Electronically by: HERIBERTO Baptiste CNP

## 2024-06-26 NOTE — NURSING NOTE
"Initial /84   Pulse 99   Temp 97.2  F (36.2  C) (Tympanic)   Resp 16   Ht 1.727 m (5' 8\")   Wt 96.2 kg (212 lb)   LMP  (LMP Unknown)   SpO2 98%   BMI 32.23 kg/m   Estimated body mass index is 32.23 kg/m  as calculated from the following:    Height as of this encounter: 1.727 m (5' 8\").    Weight as of this encounter: 96.2 kg (212 lb). .    "

## 2024-07-16 ENCOUNTER — MYC MEDICAL ADVICE (OUTPATIENT)
Dept: FAMILY MEDICINE | Facility: CLINIC | Age: 64
End: 2024-07-16
Payer: OTHER GOVERNMENT

## 2024-07-16 DIAGNOSIS — M25.50 MULTIPLE JOINT PAIN: Primary | ICD-10-CM

## 2024-08-05 ENCOUNTER — LAB (OUTPATIENT)
Dept: LAB | Facility: CLINIC | Age: 64
End: 2024-08-05
Payer: OTHER GOVERNMENT

## 2024-08-05 DIAGNOSIS — E03.9 HYPOTHYROIDISM, UNSPECIFIED TYPE: ICD-10-CM

## 2024-08-05 DIAGNOSIS — R53.82 CHRONIC FATIGUE: ICD-10-CM

## 2024-08-05 LAB
CRP SERPL-MCNC: <3 MG/L
TSH SERPL DL<=0.005 MIU/L-ACNC: 3.63 UIU/ML (ref 0.3–4.2)

## 2024-08-05 PROCEDURE — 86140 C-REACTIVE PROTEIN: CPT

## 2024-08-05 PROCEDURE — 36415 COLL VENOUS BLD VENIPUNCTURE: CPT

## 2024-08-05 PROCEDURE — 84443 ASSAY THYROID STIM HORMONE: CPT

## 2024-09-08 ENCOUNTER — MYC MEDICAL ADVICE (OUTPATIENT)
Dept: FAMILY MEDICINE | Facility: CLINIC | Age: 64
End: 2024-09-08
Payer: OTHER GOVERNMENT

## 2024-09-16 ENCOUNTER — TRANSFERRED RECORDS (OUTPATIENT)
Dept: HEALTH INFORMATION MANAGEMENT | Facility: CLINIC | Age: 64
End: 2024-09-16
Payer: OTHER GOVERNMENT

## 2024-09-26 ENCOUNTER — OFFICE VISIT (OUTPATIENT)
Dept: FAMILY MEDICINE | Facility: CLINIC | Age: 64
End: 2024-09-26
Payer: OTHER GOVERNMENT

## 2024-09-26 VITALS
RESPIRATION RATE: 16 BRPM | BODY MASS INDEX: 32.3 KG/M2 | WEIGHT: 205.8 LBS | DIASTOLIC BLOOD PRESSURE: 88 MMHG | TEMPERATURE: 97.3 F | OXYGEN SATURATION: 98 % | HEART RATE: 79 BPM | SYSTOLIC BLOOD PRESSURE: 114 MMHG | HEIGHT: 67 IN

## 2024-09-26 DIAGNOSIS — M45.0 ANKYLOSING SPONDYLITIS OF MULTIPLE SITES IN SPINE (H): ICD-10-CM

## 2024-09-26 DIAGNOSIS — Z13.6 CARDIOVASCULAR SCREENING; LDL GOAL LESS THAN 130: ICD-10-CM

## 2024-09-26 DIAGNOSIS — R35.0 URINARY FREQUENCY: ICD-10-CM

## 2024-09-26 DIAGNOSIS — Z90.710 S/P HYSTERECTOMY: ICD-10-CM

## 2024-09-26 DIAGNOSIS — Z00.00 ROUTINE PHYSICAL EXAMINATION: Primary | ICD-10-CM

## 2024-09-26 DIAGNOSIS — M25.50 MULTIPLE JOINT PAIN: ICD-10-CM

## 2024-09-26 DIAGNOSIS — Z12.11 SCREENING FOR MALIGNANT NEOPLASM OF COLON: ICD-10-CM

## 2024-09-26 DIAGNOSIS — M79.10 MYALGIA: ICD-10-CM

## 2024-09-26 DIAGNOSIS — F41.1 GAD (GENERALIZED ANXIETY DISORDER): ICD-10-CM

## 2024-09-26 PROBLEM — E66.812 CLASS 2 SEVERE OBESITY DUE TO EXCESS CALORIES WITH SERIOUS COMORBIDITY IN ADULT (H): Status: RESOLVED | Noted: 2024-03-24 | Resolved: 2024-09-26

## 2024-09-26 PROBLEM — E66.01 CLASS 2 SEVERE OBESITY DUE TO EXCESS CALORIES WITH SERIOUS COMORBIDITY IN ADULT (H): Status: RESOLVED | Noted: 2024-03-24 | Resolved: 2024-09-26

## 2024-09-26 LAB
CHOLEST SERPL-MCNC: 235 MG/DL
FASTING STATUS PATIENT QL REPORTED: NO
HDLC SERPL-MCNC: 56 MG/DL
LDLC SERPL CALC-MCNC: 152 MG/DL
NONHDLC SERPL-MCNC: 179 MG/DL
TRIGL SERPL-MCNC: 135 MG/DL

## 2024-09-26 PROCEDURE — 99396 PREV VISIT EST AGE 40-64: CPT | Performed by: NURSE PRACTITIONER

## 2024-09-26 PROCEDURE — 86618 LYME DISEASE ANTIBODY: CPT | Performed by: NURSE PRACTITIONER

## 2024-09-26 PROCEDURE — 99213 OFFICE O/P EST LOW 20 MIN: CPT | Mod: 25 | Performed by: NURSE PRACTITIONER

## 2024-09-26 PROCEDURE — 80061 LIPID PANEL: CPT | Performed by: NURSE PRACTITIONER

## 2024-09-26 PROCEDURE — 36415 COLL VENOUS BLD VENIPUNCTURE: CPT | Performed by: NURSE PRACTITIONER

## 2024-09-26 RX ORDER — PREDNISONE 20 MG/1
TABLET ORAL
Qty: 20 TABLET | Refills: 0 | Status: SHIPPED | OUTPATIENT
Start: 2024-09-26

## 2024-09-26 RX ORDER — DULOXETIN HYDROCHLORIDE 60 MG/1
60 CAPSULE, DELAYED RELEASE ORAL 2 TIMES DAILY
Qty: 180 CAPSULE | Refills: 3 | Status: SHIPPED | OUTPATIENT
Start: 2024-09-26

## 2024-09-26 NOTE — PROGRESS NOTES
Preventive Care Visit  [facility]  HERIBERTO Linton CNP, Family Medicine  Sep 26, 2024      Assessment & Plan     Routine physical examination      Multiple joint pain    - predniSONE (DELTASONE) 20 MG tablet; Take 3 tabs by mouth daily x 3 days, then 2 tabs daily x 3 days, then 1 tab daily x 3 days, then 1/2 tab daily x 3 days.  - LYME DISEASE TOTAL ANTIBODIES WITH REFLEX TO CONFIRMATION; Future  - LYME DISEASE TOTAL ANTIBODIES WITH REFLEX TO CONFIRMATION    Myalgia    - DULoxetine (CYMBALTA) 60 MG capsule; Take 1 capsule (60 mg) by mouth 2 times daily.    ERIC (generalized anxiety disorder)  Not well controlled, trial increase in dose;  - DULoxetine (CYMBALTA) 60 MG capsule; Take 1 capsule (60 mg) by mouth 2 times daily.    Urinary frequency    - Adult Urology  Referral; Future    CARDIOVASCULAR SCREENING; LDL GOAL LESS THAN 130    - Lipid panel reflex to direct LDL Non-fasting; Future  - Lipid panel reflex to direct LDL Non-fasting    Screening for malignant neoplasm of colon    - Colonoscopy Screening  Referral; Future    Ankylosing spondylitis of multiple sites in spine (H)  Found by Rheumatology, seeing [facility]    S/P hysterectomy        Counseling  Appropriate preventive services were addressed with this patient via screening, questionnaire, or discussion as appropriate for fall prevention, nutrition, physical activity, Tobacco-use cessation, social engagement, weight loss and cognition.  Checklist reviewing preventive services available has been given to the patient.  Reviewed patient's diet, addressing concerns and/or questions.       FUTURE APPOINTMENTS:       - Follow-up for annual visit or as needed    Eric Moore is a 64 year old, presenting for the following:  Physical        9/26/2024     7:37 AM   Additional Questions   Roomed by Clare CRUZ     Health Care Directive  Patient does not have a Health Care Directive or Living Will: Discussed  advance care planning with patient; however, patient declined at this time.    HPI    Depression and Anxiety   How are you doing with your depression since your last visit? Worsened   How are you doing with your anxiety since your last visit?  Worsened   Are you having other symptoms that might be associated with depression or anxiety? No  Have you had a significant life event? Health Concerns   Do you have any concerns with your use of alcohol or other drugs? No    Social History     Tobacco Use    Smoking status: Former     Current packs/day: 0.00     Average packs/day: 2.0 packs/day for 15.0 years (30.0 ttl pk-yrs)     Types: Cigarettes     Start date: 3/26/1981     Quit date: 3/26/1996     Years since quittin.5     Passive exposure: Past    Smokeless tobacco: Former   Vaping Use    Vaping status: Never Used   Substance Use Topics    Alcohol use: Not Currently     Comment: rare    Drug use: No         2019    12:00 PM 2020     4:11 PM 10/18/2022     7:27 AM   PHQ   PHQ-9 Total Score 4 7 5   Q9: Thoughts of better off dead/self-harm past 2 weeks Not at all Not at all Not at all         10/14/2019     4:50 PM 2019    12:00 PM 2020     4:11 PM   ERIC-7 SCORE   Total Score 6 4 0         Suicide Assessment Five-step Evaluation and Treatment (SAFE-T)      Genitourinary - Female  Onset/Duration: months  Description:   Painful urination (Dysuria): No           Frequency: YES  Blood in urine (Hematuria): No  Delay in urine (Hesitency): No  Intensity: moderate  Progression of Symptoms:  same  Accompanying Signs & Symptoms:  Fever/chills: No  Flank pain: No  Nausea and vomiting: No  Vaginal symptoms: none  Abdominal/Pelvic Pain: No  History:   History of frequent UTI s: No  History of kidney stones: No  Sexually Active: No  Possibility of pregnancy: No  Precipitating or alleviating factors: None  Therapies tried and outcome:  na         2024   General Health   How would you rate your overall  physical health? (!) FAIR   Feel stress (tense, anxious, or unable to sleep) Very much      (!) STRESS CONCERN      2024   Nutrition   Three or more servings of calcium each day? Yes   Diet: Vegetarian/vegan    Gluten-free/reduced   How many servings of fruit and vegetables per day? 4 or more   How many sweetened beverages each day? 0-1       Multiple values from one day are sorted in reverse-chronological order         2024   Exercise   Days per week of moderate/strenous exercise 4 days   Average minutes spent exercising at this level 20 min            2024   Social Factors   Frequency of gathering with friends or relatives Twice a week   Worry food won't last until get money to buy more No   Food not last or not have enough money for food? No   Do you have housing? (Housing is defined as stable permanent housing and does not include staying ouside in a car, in a tent, in an abandoned building, in an overnight shelter, or couch-surfing.) Yes   Are you worried about losing your housing? No   Lack of transportation? No   Unable to get utilities (heat,electricity)? No            2024   Fall Risk   Gait Speed Test (Document in seconds) 3.5   Gait Speed Test Interpretation Less than or equal to 5.00 seconds - PASS             2024   Dental   Dentist two times every year? Yes            2024   TB Screening   Were you born outside of the [place]? No                  2024   Substance Use   Alcohol more than 3/day or more than 7/wk No   Do you use any other substances recreationally? (!) BATH SALTS        Social History     Tobacco Use    Smoking status: Former     Current packs/day: 0.00     Average packs/day: 2.0 packs/day for 15.0 years (30.0 ttl pk-yrs)     Types: Cigarettes     Start date: 3/26/1981     Quit date: 3/26/1996     Years since quittin.5     Passive exposure: Past    Smokeless tobacco: Former   Vaping Use    Vaping status: Never Used   Substance Use Topics    Alcohol use:  "Not Currently     Comment: rare    Drug use: No           6/13/2024   LAST FHS-7 RESULTS   1st degree relative breast or ovarian cancer No   Any relative bilateral breast cancer No   Any male have breast cancer No   Any ONE woman have BOTH breast AND ovarian cancer No   Any woman with breast cancer before 50yrs No   2 or more relatives with breast AND/OR ovarian cancer No   2 or more relatives with breast AND/OR bowel cancer No           Mammogram Screening - Mammogram every 1-2 years updated in Health Maintenance based on mutual decision making        9/23/2024   STI Screening   New sexual partner(s) since last STI/HIV test? No        History of abnormal Pap smear: Status post hysterectomy with removal of cervix and no history of CIN2 or greater or cervical cancer. Health Maintenance and Surgical History updated.        12/2/2010    12:00 AM   PAP / HPV   PAP (Historical) ASC-US      ASCVD Risk   The 10-year ASCVD risk score (Gila COOK, et al., 2019) is: 4%    Values used to calculate the score:      Age: 64 years      Sex: Female      Is Non- : No      Diabetic: No      Tobacco smoker: No      Systolic Blood Pressure: 114 mmHg      Is BP treated: No      HDL Cholesterol: 54 mg/dL      Total Cholesterol: 233 mg/dL           Reviewed and updated as needed this visit by Provider                    Labs reviewed in EPIC  BP Readings from Last 3 Encounters:   09/26/24 114/88   06/26/24 130/84   06/04/24 128/86    Wt Readings from Last 3 Encounters:   09/26/24 93.4 kg (205 lb 12.8 oz)   06/26/24 96.2 kg (212 lb)   06/04/24 98.3 kg (216 lb 12.8 oz)                      Review of Systems  Constitutional, HEENT, cardiovascular, pulmonary, gi and gu systems are negative, except as otherwise noted.     Objective    Exam  /88   Pulse 79   Temp 97.3  F (36.3  C) (Tympanic)   Resp 16   Ht 1.708 m (5' 7.25\")   Wt 93.4 kg (205 lb 12.8 oz)   LMP  (LMP Unknown)   SpO2 98%   BMI 31.99 " "kg/m     Estimated body mass index is 31.99 kg/m  as calculated from the following:    Height as of this encounter: 1.708 m (5' 7.25\").    Weight as of this encounter: 93.4 kg (205 lb 12.8 oz).    Physical Exam  GENERAL: alert and no distress  EYES: Eyes grossly normal to inspection and conjunctivae and sclerae normal  HENT: normal cephalic/atraumatic, ear canals and TM's normal, oropharynx clear, and oral mucous membranes moist  NECK: no adenopathy, no asymmetry, masses, or scars  RESP: lungs clear to auscultation - no rales, rhonchi or wheezes  CV: regular rate and rhythm, normal S1 S2, no S3 or S4, no murmur, click or rub, no peripheral edema  ABDOMEN: soft, nontender and no palpable or pulsatile masses  MS: no gross musculoskeletal defects noted, no edema  SKIN: no suspicious lesions or rashes  NEURO: Normal strength and tone, mentation intact and speech normal        Signed Electronically by: HERIBERTO Linton CNP    "

## 2024-09-26 NOTE — LETTER
September 26, 2024      Teresa Fernandez  [address] [address] [facility] [id number]        To Whom It May Concern:    Teresa Fernandez was seen in our clinic. She may return to work without restrictions.      Sincerely,        HERIBERTO Linton CNP

## 2024-09-27 LAB — B BURGDOR IGG+IGM SER QL: 0.04

## 2024-09-27 NOTE — RESULT ENCOUNTER NOTE
Mesfin Moore    Your lipids are above goal, this was a non-fasting test, fasting is more accurate. Cholesterol lowering medications are recommended at a 10 year cardiac risk score of 7.5% or higher. Your cardiac risk score is:    The 10-year ASCVD risk score (Gila COOK, et al., 2019) is: 4%    Values used to calculate the score:      Age: 64 years      Sex: Female      Is Non- : No      Diabetic: No      Tobacco smoker: No      Systolic Blood Pressure: 114 mmHg      Is BP treated: No      HDL Cholesterol: 56 mg/dL      Total Cholesterol: 235 mg/dL    Recommend we check fasting next time. Continue healthy diet. Lyme screen still pending.    Please let us know if you have any questions.     Take care,    HERIBERTO Miller CNP

## 2024-09-27 NOTE — RESULT ENCOUNTER NOTE
Mesfin Moore    Your lyme screen is negative. Lipids are a little elevated but at acceptable ranges.  Please let us know if you have any questions.     Take care,    HERIBERTO Miller CNP

## 2024-10-01 NOTE — CONFIDENTIAL NOTE
Chief Complaint:   LUTS         History of Present Illness:   Teresa Fernandez is a 64 year old female with a history of GAYE, hyperparathyroidism, hypothyroidism, and melanoma who presents for evaluation of LUTS.     The patient reports a several year history of urinary frequency and urgency. She reports urge incontinence. She wears pads occasionally. She denies dysuria, incomplete bladder emptying, and gross hematuria.     She alternates between diarrhea and constipation.     She had a left radical nephrectomy in 1996 for kidney cancer.    She drinks water, 2 cups of coffee, and occasional tea.     UA on 6/04/2024 was unremarkable.          Past Medical History:     Past Medical History:   Diagnosis Date    Allergic rhinitis     ALLERGIC RHINITIS NOS 04/12/2005    Anxiety     Arthritis     herniated disc    Autoimmune disease (H) 2014    Benign positional vertigo     Bronchitis, not specified as acute or chronic     CHR MAXILLARY SINUSITIS 04/12/2005    Coronary artery disease     Depressive disorder 1996    Depressive disorder, not elsewhere classified     Eczema 10/17/2012    Environmental and seasonal allergies     GERD (gastroesophageal reflux disease)     Gluten intolerance     Heart disease July 2021    Kidney problem 1996    Malignant neoplasm of renal pelvis (H) 1995    Renal cell carcinoma    Melanoma (H) 06/2010    Other specified acquired hypothyroidism 04/12/2005    Renal cancer (H) 05/05/2011    Sleep apnea     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    FUSION LUMBAR ANTERIOR TWO LEVELS  04/13/2015    GI SURGERY      GYN SURGERY  04/08/1999     hysterectomy.  [facility]    HERNIA REPAIR  Not sure    HYSTERECTOMY, PAP NO LONGER INDICATED      PARATHYROIDECTOMY N/A 08/23/2023    Procedure: PARATHYROIDECTOMY;  Surgeon: Hannah Good MD;  Location: [facility] OR    SOFT TISSUE SURGERY      chest-melonoma    SURGICAL HISTORY OF -   03/1996    Left nephrectomy-renal cell CA            Medications     Current Outpatient Medications   Medication Sig Dispense Refill    acetaminophen (TYLENOL) 325 MG tablet Take 2 tablets (650 mg) by mouth every 4 hours as needed for mild pain 50 tablet 0    albuterol (PROAIR HFA/PROVENTIL HFA/VENTOLIN HFA) 108 (90 Base) MCG/ACT inhaler Inhale 2 puffs into the lungs every 4 hours as needed for shortness of breath (Patient not taking: Reported on 6/4/2024) 18 g 4    albuterol (PROVENTIL) (2.5 MG/3ML) 0.083% neb solution Take 1 vial (2.5 mg) by nebulization every 6 hours as needed for shortness of breath / dyspnea or wheezing (Patient not taking: Reported on 3/4/2024) 90 mL 11    APNO CREA ointment Apply to rash on nose twice daily as needed. 100 g 3    augmented betamethasone dipropionate (DIPROLENE-AF) 0.05 % external ointment Apply topically 2 times daily 50 g 11    calcium carbonate (TUMS EXTRA STRENGTH 750) 750 MG CHEW Take 500 mg by mouth      clobetasol (TEMOVATE) 0.05 % external cream Apply topically 2 times daily To affected external vaginal areas 60 g 3    DULoxetine (CYMBALTA) 60 MG capsule Take 1 capsule (60 mg) by mouth 2 times daily. 180 capsule 3    fexofenadine (ALLEGRA) 180 MG tablet Take 1 tablet (180 mg) by mouth daily 30 tablet 1    ketorolac (ACULAR) 0.5 % ophthalmic solution  (Patient not taking: Reported on 9/26/2024)      levothyroxine (SYNTHROID/LEVOTHROID) 100 MCG tablet Take 1 tablet (100 mcg) by mouth daily 90 tablet 1    lidocaine-prilocaine (EMLA) 2.5-2.5 % external cream Apply topically as needed for moderate pain 30 g 1    loratadine (CLARITIN) 10 MG tablet Take 1 tablet by mouth daily      mometasone  (ELOCON) 0.1 % external ointment Apply topically twice a week Use on eczematous lesions 45 g 3    order for DME Equipment being ordered: Nebulizer (Patient not taking: Reported on 3/11/2024) 1 Units 0    OVER-THE-COUNTER Place 1 drop into both eyes 3 times daily. Systane Ultra, Systane Balance, Blink Tears or Refresh Optive Artificial Tear 1 Bottle     predniSONE (DELTASONE) 20 MG tablet Take 3 tabs by mouth daily x 3 days, then 2 tabs daily x 3 days, then 1 tab daily x 3 days, then 1/2 tab daily x 3 days. 20 tablet 0    tacrolimus (PROTOPIC) 0.1 % external ointment Apply topically 2 times daily 30 g 3    triamcinolone (KENALOG) 0.1 % external cream Apply topically 2 times daily 30 g 3    UNABLE TO FIND MEDICATION NAME: bone growth stimulator      vitamin D2 (ERGOCALCIFEROL) 49339 units (1250 mcg) capsule        Current Facility-Administered Medications   Medication Dose Route Frequency Provider Last Rate Last Admin    ampicillin (OMNIPEN) 1 g vial INTRADERMAL  1 g Intradermal Once Dagoberto Jurado MD        penicillin g potassium 5197975 unit vial INTRADERMAL  5,000,000 Units Intradermal Once Dagoberto Jurado MD                Allergies:   Iodinated contrast media [iodinated contrast media], Omnipaque [iohexol], Gabapentin, Gluten, Gluten meal, Penicillins, and Sulfa antibiotics         Review of Systems:  From intake questionnaire   Negative 14 system review except as noted on HPI, nurse's note.         Physical Exam:   Patient is a 64 year old female evaluated via video visit.       Labs and Pathology:    I personally reviewed all applicable laboratory data and went over findings with patient  Significant for:    CBC RESULTS:  Recent Labs   Lab Test 05/30/24  1539 05/07/24  1509 04/10/24  0856 03/20/24  1036   WBC 5.3 5.8 5.1 4.8   HGB 13.0 12.5 12.5 11.0*    283 316 328        BMP RESULTS:  Recent Labs   Lab Test 06/04/24  0735 03/04/24  1541 02/01/24  1440 09/11/23  1343 03/27/23  1325 09/30/21  1524  06/02/21  0915 05/10/21  0738 02/18/21  1725 08/07/19  0805    141 140  --  139   < > 135 135 139 139   POTASSIUM 4.6 4.2 4.2  --  4.0   < > 4.1 4.0 3.8 4.2   CHLORIDE 102 104 101  --  101   < > 102 102 106 103   CO2 26 27 28  --  26   < > 27 29 29 30   ANIONGAP 11 10 11  --  12   < > 6 4 4 6   * 95 101*  --  113*   < > 97 114* 90 95   BUN 11.1 11.2 9.1  --  8.4   < > 9 13 9 11   CR 0.73 0.72 0.74  --  0.70   < > 0.81 0.80 0.75 0.79   GFRESTIMATED >90 >90 >90  --  >90   < > 78 80 87 82   GFRESTBLACK  --   --   --   --   --   --  >90 >90 >90 >90   ANNIE 10.2 9.4 9.8 9.9 10.7*  10.7*   < > 9.7 9.8 9.7 10.1    < > = values in this interval not displayed.       UA RESULTS:   Recent Labs   Lab Test 06/04/24  0730 03/27/23  1320 03/19/23  1014 03/15/23  2040   SG 1.020 1.010 1.003 1.006   URINEPH 7.0 6.5 7.0 7.0   NITRITE Negative Negative Negative Negative   RBCU None Seen  --  0 <1   WBCU 5-10*  --  0 18*            Assessment and Plan:     Assessment: 64 year old female seen in evaluation for urinary frequency, urgency, and urge incontinence.     We discussed the etiology of urge incontinence. We then discussed that urge incontinence treatments include observation,  medications most commonly anticholinergics, physical therapy, biofeedback, intravesical botulinum toxin, and sacral neuromodulation. She would like to try an anticholinergic.     Plan:  Start tolterodine ER 2 mg daily. Side effects reviewed.   Follow up in 3-6 months, sooner if concerns.     BRITNEY ISAACS PA-C  Department of Urology

## 2024-10-02 ENCOUNTER — VIRTUAL VISIT (OUTPATIENT)
Dept: UROLOGY | Facility: CLINIC | Age: 64
End: 2024-10-02
Attending: NURSE PRACTITIONER
Payer: OTHER GOVERNMENT

## 2024-10-02 DIAGNOSIS — R35.0 URINARY FREQUENCY: ICD-10-CM

## 2024-10-02 PROCEDURE — 99204 OFFICE O/P NEW MOD 45 MIN: CPT | Mod: 95 | Performed by: STUDENT IN AN ORGANIZED HEALTH CARE EDUCATION/TRAINING PROGRAM

## 2024-10-02 RX ORDER — TOLTERODINE 2 MG/1
2 CAPSULE, EXTENDED RELEASE ORAL DAILY
Qty: 90 CAPSULE | Refills: 1 | Status: SHIPPED | OUTPATIENT
Start: 2024-10-02

## 2024-10-04 ENCOUNTER — TELEPHONE (OUTPATIENT)
Dept: GASTROENTEROLOGY | Facility: CLINIC | Age: 64
End: 2024-10-04
Payer: OTHER GOVERNMENT

## 2024-10-04 NOTE — TELEPHONE ENCOUNTER
"Endoscopy Scheduling Screen      What insurance is in the chart?  Other:  [employer]    Ordering/Referring Provider: Amanda Meléndez APRN CNP    (If ordering provider performs procedure, schedule with ordering provider unless otherwise instructed. )    BMI: There is no height or weight on file to calculate BMI.     Sedation Ordered  general anesthesia.   BMI<= 45 45 < BMI <= 48 48 < BMI < = 50  BMI > 50   No Restrictions No [facility]  No [facility]  [facility] with exceptions Hospital Only OR Only       Do you have a history of malignant hyperthermia?  No    (Females) Are you currently pregnant?   No     Have you been diagnosed or told you have pulmonary hypertension?   No    Do you have any heart conditions?  No     Do you have an LVAD?  No    Have you been told you have moderate to severe sleep apnea?  Yes. (RN Review required for scheduling unless scheduling in Hospital.)  Does use a CPAP  Have you been told you have COPD, asthma, or any other lung disease?  No    Have you ever had or are you waiting for an organ transplant?  No. Continue scheduling, no site restrictions.    Have you had a stroke or transient ischemic attack (TIA aka \"mini stroke\" in the last 6 months?   No    Have you been diagnosed with or been told you have cirrhosis of the liver?   No.    Are you currently on dialysis?   No    Do you need assistance transferring?   No    BMI: There is no height or weight on file to calculate BMI.     Is patients BMI > 50?  No    BMI > 40?  No    Do you have a diagnosis of diabetes?  No    Do you take an injectable medication for weight loss or diabetes (excluding insulin)?  No    Do you take the medication Naltrexone?  No    Do you take blood thinners?  No     Prep   Are you currently on dialysis or do you have chronic kidney disease?  No    Do you have a diagnosis of cystic fibrosis (CF)?  No    On a regular basis do you go 3 -5 days between bowel movements?  Yes (Extended Prep)    Preferred " Pharmacy:  [facility]     Final Scheduling Details     Procedure scheduled  Colonoscopy    Surgeon:  Lico      Date of procedure:  12/3/24     Location  [place] - Per order.    What is your communication preference for Instructions and/or Bowel Prep?   [place]    Patient Reminders:    You will receive a call from a Nurse to review instructions and health history.  This assessment must be completed prior to your procedure.  Failure to complete the Nurse assessment may result in the procedure being cancelled.       On the day of your procedure, please designate an adult(s) who can drive you home stay with you for the next 24 hours. The medicines used in the exam will make you sleepy. You will not be able to drive.       You cannot take public transportation, ride share services, or non-medical taxi service without a responsible caregiver.  Medical transport services are allowed with the requirement that a responsible caregiver will receive you at your destination.  We require that drivers and caregivers are confirmed prior to your procedure.

## 2024-10-23 ENCOUNTER — MYC MEDICAL ADVICE (OUTPATIENT)
Dept: FAMILY MEDICINE | Facility: CLINIC | Age: 64
End: 2024-10-23
Payer: OTHER GOVERNMENT

## 2024-10-23 ENCOUNTER — PATIENT OUTREACH (OUTPATIENT)
Dept: ONCOLOGY | Facility: CLINIC | Age: 64
End: 2024-10-23
Payer: OTHER GOVERNMENT

## 2024-10-23 ENCOUNTER — TRANSCRIBE ORDERS (OUTPATIENT)
Dept: OTHER | Age: 64
End: 2024-10-23

## 2024-10-23 DIAGNOSIS — D80.2 SELECTIVE DEFICIENCY OF IMMUNOGLOBULIN A (IGA) (H): Primary | ICD-10-CM

## 2024-10-23 NOTE — PROGRESS NOTES
Hematology referral reviewed for [facility], see below.    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

## 2024-11-07 NOTE — TELEPHONE ENCOUNTER
RECORDS STATUS - ALL OTHER DIAGNOSIS      RECORDS RECEIVED FROM: [facility]   NOTES STATUS DETAILS   OFFICE NOTE from referring provider MARIA L 10/22/2024 - Dr. Valencia Alonso   OFFICE NOTE from medical oncologist     DISCHARGE SUMMARY from hospital     DISCHARGE REPORT from the ER     OPERATIVE REPORT     MEDICATION LIST MARIA L    LABS     PATHOLOGY REPORTS     ANYTHING RELATED TO DIAGNOSIS Epic 9/26/2024

## 2024-11-26 RX ORDER — BISACODYL 5 MG/1
TABLET, DELAYED RELEASE ORAL
Qty: 4 TABLET | Refills: 0 | Status: SHIPPED | OUTPATIENT
Start: 2024-11-26

## 2024-12-02 ENCOUNTER — ONCOLOGY VISIT (OUTPATIENT)
Dept: ONCOLOGY | Facility: CLINIC | Age: 64
End: 2024-12-02
Attending: INTERNAL MEDICINE
Payer: OTHER GOVERNMENT

## 2024-12-02 ENCOUNTER — LAB (OUTPATIENT)
Dept: LAB | Facility: CLINIC | Age: 64
End: 2024-12-02
Payer: OTHER GOVERNMENT

## 2024-12-02 ENCOUNTER — PRE VISIT (OUTPATIENT)
Dept: ONCOLOGY | Facility: CLINIC | Age: 64
End: 2024-12-02
Payer: OTHER GOVERNMENT

## 2024-12-02 ENCOUNTER — ANESTHESIA EVENT (OUTPATIENT)
Dept: GASTROENTEROLOGY | Facility: CLINIC | Age: 64
End: 2024-12-02
Payer: COMMERCIAL

## 2024-12-02 VITALS
DIASTOLIC BLOOD PRESSURE: 54 MMHG | HEIGHT: 67 IN | BODY MASS INDEX: 33.6 KG/M2 | WEIGHT: 214.1 LBS | TEMPERATURE: 98.7 F | HEART RATE: 93 BPM | RESPIRATION RATE: 14 BRPM | OXYGEN SATURATION: 97 % | SYSTOLIC BLOOD PRESSURE: 119 MMHG

## 2024-12-02 DIAGNOSIS — D80.2 IGA DEFICIENCY (H): ICD-10-CM

## 2024-12-02 DIAGNOSIS — D80.2 IGA DEFICIENCY (H): Primary | ICD-10-CM

## 2024-12-02 LAB
ALBUMIN SERPL BCG-MCNC: 4.4 G/DL (ref 3.5–5.2)
ALP SERPL-CCNC: 106 U/L (ref 40–150)
ALT SERPL W P-5'-P-CCNC: 38 U/L (ref 0–50)
ANION GAP SERPL CALCULATED.3IONS-SCNC: 13 MMOL/L (ref 7–15)
AST SERPL W P-5'-P-CCNC: 35 U/L (ref 0–45)
BASOPHILS # BLD AUTO: 0 10E3/UL (ref 0–0.2)
BASOPHILS NFR BLD AUTO: 1 %
BILIRUB SERPL-MCNC: 0.3 MG/DL
BUN SERPL-MCNC: 14.5 MG/DL (ref 8–23)
CALCIUM SERPL-MCNC: 9.9 MG/DL (ref 8.8–10.4)
CHLORIDE SERPL-SCNC: 100 MMOL/L (ref 98–107)
CREAT SERPL-MCNC: 0.76 MG/DL (ref 0.51–0.95)
EGFRCR SERPLBLD CKD-EPI 2021: 88 ML/MIN/1.73M2
EOSINOPHIL # BLD AUTO: 0.2 10E3/UL (ref 0–0.7)
EOSINOPHIL NFR BLD AUTO: 2 %
ERYTHROCYTE [DISTWIDTH] IN BLOOD BY AUTOMATED COUNT: 12.7 % (ref 10–15)
GLUCOSE SERPL-MCNC: 92 MG/DL (ref 70–99)
HCO3 SERPL-SCNC: 24 MMOL/L (ref 22–29)
HCT VFR BLD AUTO: 42.2 % (ref 35–47)
HGB BLD-MCNC: 14.2 G/DL (ref 11.7–15.7)
IMM GRANULOCYTES # BLD: 0 10E3/UL
IMM GRANULOCYTES NFR BLD: 0 %
LYMPHOCYTES # BLD AUTO: 1.8 10E3/UL (ref 0.8–5.3)
LYMPHOCYTES NFR BLD AUTO: 29 %
MCH RBC QN AUTO: 30.4 PG (ref 26.5–33)
MCHC RBC AUTO-ENTMCNC: 33.6 G/DL (ref 31.5–36.5)
MCV RBC AUTO: 90 FL (ref 78–100)
MONOCYTES # BLD AUTO: 0.5 10E3/UL (ref 0–1.3)
MONOCYTES NFR BLD AUTO: 7 %
NEUTROPHILS # BLD AUTO: 3.7 10E3/UL (ref 1.6–8.3)
NEUTROPHILS NFR BLD AUTO: 61 %
NRBC # BLD AUTO: 0 10E3/UL
NRBC BLD AUTO-RTO: 0 /100
PLATELET # BLD AUTO: 311 10E3/UL (ref 150–450)
POTASSIUM SERPL-SCNC: 4.4 MMOL/L (ref 3.4–5.3)
PROT SERPL-MCNC: 6.9 G/DL (ref 6.4–8.3)
RBC # BLD AUTO: 4.67 10E6/UL (ref 3.8–5.2)
RETICS # AUTO: 0.07 10E6/UL (ref 0.03–0.1)
RETICS/RBC NFR AUTO: 1.5 % (ref 0.5–2)
SODIUM SERPL-SCNC: 137 MMOL/L (ref 135–145)
TOTAL PROTEIN SERUM FOR ELP: 6.6 G/DL (ref 6.4–8.3)
WBC # BLD AUTO: 6.2 10E3/UL (ref 4–11)

## 2024-12-02 PROCEDURE — 82040 ASSAY OF SERUM ALBUMIN: CPT

## 2024-12-02 PROCEDURE — 82435 ASSAY OF BLOOD CHLORIDE: CPT

## 2024-12-02 PROCEDURE — 82784 ASSAY IGA/IGD/IGG/IGM EACH: CPT | Performed by: STUDENT IN AN ORGANIZED HEALTH CARE EDUCATION/TRAINING PROGRAM

## 2024-12-02 PROCEDURE — 80053 COMPREHEN METABOLIC PANEL: CPT

## 2024-12-02 PROCEDURE — 84165 PROTEIN E-PHORESIS SERUM: CPT | Mod: 26 | Performed by: STUDENT IN AN ORGANIZED HEALTH CARE EDUCATION/TRAINING PROGRAM

## 2024-12-02 PROCEDURE — 99205 OFFICE O/P NEW HI 60 MIN: CPT | Performed by: INTERNAL MEDICINE

## 2024-12-02 PROCEDURE — 84155 ASSAY OF PROTEIN SERUM: CPT

## 2024-12-02 PROCEDURE — 85060 BLOOD SMEAR INTERPRETATION: CPT | Performed by: PATHOLOGY

## 2024-12-02 PROCEDURE — 36415 COLL VENOUS BLD VENIPUNCTURE: CPT

## 2024-12-02 PROCEDURE — 99213 OFFICE O/P EST LOW 20 MIN: CPT | Performed by: INTERNAL MEDICINE

## 2024-12-02 PROCEDURE — 84165 PROTEIN E-PHORESIS SERUM: CPT | Mod: TC | Performed by: STUDENT IN AN ORGANIZED HEALTH CARE EDUCATION/TRAINING PROGRAM

## 2024-12-02 PROCEDURE — 85025 COMPLETE CBC W/AUTO DIFF WBC: CPT

## 2024-12-02 PROCEDURE — 85045 AUTOMATED RETICULOCYTE COUNT: CPT

## 2024-12-02 ASSESSMENT — PAIN SCALES - GENERAL: PAINLEVEL_OUTOF10: MODERATE PAIN (4)

## 2024-12-02 NOTE — PROGRESS NOTES
[facility] Hematology and Oncology Consult Note    Patient: Teresa Fernandez  MRN: [id number]  Date of Service: Dec 2, 2024       Reason for Visit    I was consulted by Valencia Nguyen MD for evaluation of decreased IgA.      Encounter Diagnoses Assessment and Plan:    Problem List Items Addressed This Visit       IgA deficiency (H) - Primary    Relevant Orders    Immunoglobulins A G and M (Completed)    Protein electrophoresis (Completed)    Comprehensive metabolic panel (Completed)    Lab Blood Morphology Pathologist Review (Completed)   Patient with HLA-B27 antigen positive is noted to have a decreased IgA on 7/22/2024 and 58 mg per DL.  Patient may have been on steroids at the time of measurement.  Will repeat immunoglobulin studies along with protein electrophoresis blood smear review and CMP..  Follow-up to be arranged when results are available.    Update 12/4/2024  Shows IgA stable at 58 with slight decreased IgG of 600 and normal IgM.  Her blood count and blood chemistry are entirely normal.  Her decreased IgA is a benign condition and not related to any hematologic abnormality.  No further testing is warranted.    I relayed this information to Teresa by telephone.        ______________________________________________________________________________      MsNereyda Fernandez is a 64 year old woman with a history of chronic back pain and lumbar spine surgery and February 2024.  She has had persistent symptoms of fatigue shortness of breath elevated inflammatory markers and anemia.  However since her surgery her laboratory abnormalities have mostly resolved.  She did have a decreased IgA.  She was also found to be HLA-B27 antigen positive.  She is also noted weight loss of around 20 pounds.  And has frequent hot flashes.  She also notes constipation with episodes of occasional diarrhea she notices numbness in her fingers and toes and the EMG showed neuropathy in an S1 distribution.      She has a  "history of kidney cancer in 1996 with left nephrectomy.  She does factory assembly work.  Is a former smoker and does not use alcohol to excess.  There is no family history of blood disorders.    Review of systems.  Pertinent Findings are included in the History of Present Illness    Physical Exam    /54 (BP Location: Right arm, Patient Position: Sitting, Cuff Size: Adult Large)   Pulse 93   Temp 98.7  F (37.1  C) (Tympanic)   Resp 14   Ht 1.708 m (5' 7.25\")   Wt 97.1 kg (214 lb 1.6 oz)   LMP  (LMP Unknown)   SpO2 97%   BMI 33.28 kg/m       GENERAL APPEARANCE: 64-year-old woman in no apparent distress.  HEAD: Atraumatic; normocephalic; without lesions.  EYES: Conjunctiva, corneas and eyelids normal; pupils equal, round, reactive to light; No Icterus.  MOUTH/OROPHARYNX: Oral mucosa intact  NECK: Supple with no nodes.  LUNGS:  Clear to auscultation and percussion with no extra sounds.  HEART: Regular rhythm and rate; S1 and S2 normal; no murmurs noted.  ABDOMEN: Soft; no masses or tenderness, no hepatosplenomegaly.  NEUROLOGIC: Alert and oriented.  No obvious focal findings.  EXTREMITIES: No cyanosis, or edema.  SKIN: No abnormal bruising or bleeding. No suspicious lesions noted on exposed skin.  PSYCHIATRIC: Mental status normal; no apparent psychiatric issues    Medications:    Current Outpatient Medications   Medication Sig Dispense Refill    acetaminophen (TYLENOL) 325 MG tablet Take 2 tablets (650 mg) by mouth every 4 hours as needed for mild pain 50 tablet 0    albuterol (PROAIR HFA/PROVENTIL HFA/VENTOLIN HFA) 108 (90 Base) MCG/ACT inhaler Inhale 2 puffs into the lungs every 4 hours as needed for shortness of breath 18 g 4    albuterol (PROVENTIL) (2.5 MG/3ML) 0.083% neb solution Take 1 vial (2.5 mg) by nebulization every 6 hours as needed for shortness of breath / dyspnea or wheezing 90 mL 11    APNO CREA ointment Apply to rash on nose twice daily as needed. 100 g 3    augmented betamethasone " dipropionate (DIPROLENE-AF) 0.05 % external ointment Apply topically 2 times daily 50 g 11    bisacodyl (DULCOLAX) 5 MG EC tablet 2 days prior to procedure, take 2 tablets at 4 pm. 1 day prior to procedure, take 2 tablets at 4 pm. For additional instructions refer to your colonoscopy prep instructions. 4 tablet 0    calcium carbonate (TUMS EXTRA STRENGTH 750) 750 MG CHEW Take 500 mg by mouth      clobetasol (TEMOVATE) 0.05 % external cream Apply topically 2 times daily To affected external vaginal areas 60 g 3    DULoxetine (CYMBALTA) 60 MG capsule Take 1 capsule (60 mg) by mouth 2 times daily. 180 capsule 3    fexofenadine (ALLEGRA) 180 MG tablet Take 1 tablet (180 mg) by mouth daily 30 tablet 1    ketorolac (ACULAR) 0.5 % ophthalmic solution       levothyroxine (SYNTHROID/LEVOTHROID) 100 MCG tablet Take 1 tablet (100 mcg) by mouth daily 90 tablet 1    lidocaine-prilocaine (EMLA) 2.5-2.5 % external cream Apply topically as needed for moderate pain 30 g 1    loratadine (CLARITIN) 10 MG tablet Take 1 tablet by mouth daily      mometasone (ELOCON) 0.1 % external ointment Apply topically twice a week Use on eczematous lesions 45 g 3    order for DME Equipment being ordered: Nebulizer 1 Units 0    OVER-THE-COUNTER Place 1 drop into both eyes 3 times daily. Systane Ultra, Systane Balance, Blink Tears or Refresh Optive Artificial Tear 1 Bottle     polyethylene glycol (GOLYTELY) 236 g suspension 2 days prior at 5pm, mix and drink half of a jug of Golytely. Drink an 8 oz. glass of Golytely every 15 minutes until half of the jug is gone. Place remainder of Golytely in the refrigerator. 1 day prior at 5 pm, drink the 2nd half of a jug of Golytely bowel prep. 6 hours before your check-in time, drink an 8 oz. glass of Golytely every 15 minutes until half of the 2nd jug of Golytely is gone. Discard remainder of second jug. 8000 mL 0    predniSONE (DELTASONE) 20 MG tablet Take 3 tabs by mouth daily x 3 days, then 2 tabs daily x 3  days, then 1 tab daily x 3 days, then 1/2 tab daily x 3 days. 20 tablet 0    tacrolimus (PROTOPIC) 0.1 % external ointment Apply topically 2 times daily 30 g 3    tolterodine ER (DETROL LA) 2 MG 24 hr capsule Take 1 capsule (2 mg) by mouth daily. 90 capsule 1    triamcinolone (KENALOG) 0.1 % external cream Apply topically 2 times daily 30 g 3    UNABLE TO FIND MEDICATION NAME: bone growth stimulator      vitamin D2 (ERGOCALCIFEROL) 40719 units (1250 mcg) capsule        Current Facility-Administered Medications   Medication Dose Route Frequency Provider Last Rate Last Admin    ampicillin (OMNIPEN) 1 g vial INTRADERMAL  1 g Intradermal Once Dagoberto Jurado MD        penicillin g potassium 8240156 unit vial INTRADERMAL  5,000,000 Units Intradermal Once Dagoberto Jurado MD               Past History    Past Medical History:   Diagnosis Date    Allergic rhinitis     ALLERGIC RHINITIS NOS 04/12/2005    Anxiety     Arthritis     herniated disc    Autoimmune disease (H) 2014    Benign positional vertigo     Bronchitis, not specified as acute or chronic     CHR MAXILLARY SINUSITIS 04/12/2005    Coronary artery disease     Depressive disorder 1996    Depressive disorder, not elsewhere classified     Eczema 10/17/2012    Environmental and seasonal allergies     GERD (gastroesophageal reflux disease)     Gluten intolerance     Heart disease July 2021    Kidney problem 1996    Malignant neoplasm of renal pelvis (H) 1995    Renal cell carcinoma    Melanoma (H) 06/2010    Other specified acquired hypothyroidism 04/12/2005    Renal cancer (H) 05/05/2011    Sleep apnea     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    FUSION LUMBAR ANTERIOR TWO LEVELS  04/13/2015    GI SURGERY      GYN SURGERY  04/08/1999    hysterectomy.  [facility]    HERNIA REPAIR  Not sure    HYSTERECTOMY, PAP NO LONGER INDICATED      PARATHYROIDECTOMY N/A 08/23/2023    Procedure: PARATHYROIDECTOMY;  Surgeon: Hannah Good MD;  Location: [facility] OR    SOFT TISSUE SURGERY      chest-melonoma    SURGICAL HISTORY OF -   03/1996    Left nephrectomy-renal cell CA     Allergies   Allergen Reactions    Iodinated Contrast Media [Iodinated Contrast Media] Swelling and Difficulty breathing     Immediate throat swelling following around 1-2cc of omnipaque 300 for spine procedure  skin prick and intradermal tests with Iohexol negatives but premedicate before using iodinated contrast.    no reactions in skin tests to MRI contrast media Gadovist    Omnipaque [Iohexol] Swelling and Difficulty breathing     Immediate throat swelling following around 1-2cc of omnipaque 300 for spine procedure  --> skin prick and intradermal tests with Iohexol negatives. But I would propose anyway to premedicate patient before using iodinated contrast media (for safety reasons).   --> no reactions in skin tests to MRI contrast media Gadovist    Gabapentin Hives    Gluten     Gluten Meal     Penicillins Hives    Sulfa Antibiotics      Family History   Problem Relation Age of Onset    Cardiovascular Mother     Lipids Mother     Depression Mother     Heart Disease Mother     Substance Abuse Mother     Hypertension Father     Lipids Father     Obesity Father     Macular Degeneration Father     Sleep Apnea Father     Thyroid Disease Sister     Hypertension Sister     Depression Sister     Allergies Sister     Lipids Sister     Thyroid Disease Sister     Neurologic Disorder Sister     Depression Sister     Hypertension Brother     Colon Cancer Brother     Cancer Maternal Grandmother         kind unknown had sores on legs    Eczema Maternal Grandmother     Eczema Maternal Grandfather      Breast Cancer Paternal Grandmother     Cancer Paternal Grandmother         breast    Hypertension Paternal Grandfather     Cardiovascular Paternal Grandfather         heart attack    Allergies Son     Eczema Son     Respiratory Son     Sleep Apnea Son     Glaucoma No family hx of     Cerebrovascular Disease No family hx of     Diabetes No family hx of      Social History     Socioeconomic History    Marital status:      Spouse name: None    Number of children: None    Years of education: None    Highest education level: None   Occupational History     Employer: [employer]     Employer: SELF   Tobacco Use    Smoking status: Former     Current packs/day: 0.00     Average packs/day: 2.0 packs/day for 15.0 years (30.0 ttl pk-yrs)     Types: Cigarettes     Start date: 3/26/1981     Quit date: 3/26/1996     Years since quittin.7     Passive exposure: Past    Smokeless tobacco: Former   Vaping Use    Vaping status: Never Used   Substance and Sexual Activity    Alcohol use: Not Currently     Comment: rare    Drug use: No    Sexual activity: Not Currently     Partners: Male     Birth control/protection: None   Other Topics Concern    Parent/sibling w/ CABG, MI or angioplasty before 65F 55M? No   Social History Narrative    .  Lives with .  Makes medical supplies.     4 children - healthy    4 siblings - + FH depression, hypertension, chol, diabetes mellitus    No family of muscle problems.      Social Drivers of Health     Financial Resource Strain: Low Risk  (2024)    Financial Resource Strain     Within the past 12 months, have you or your family members you live with been unable to get utilities (heat, electricity) when it was really needed?: No   Food Insecurity: Low Risk  (2024)    Food Insecurity     Within the past 12 months, did you worry that your food would run out before you got money to buy more?: No     Within the past 12 months, did the food you bought just not last and you  didn t have money to get more?: No   Transportation Needs: Low Risk  (9/23/2024)    Transportation Needs     Within the past 12 months, has lack of transportation kept you from medical appointments, getting your medicines, non-medical meetings or appointments, work, or from getting things that you need?: No   Physical Activity: Insufficiently Active (9/23/2024)    Exercise Vital Sign     Days of Exercise per Week: 4 days     Minutes of Exercise per Session: 20 min   Stress: Stress Concern Present (9/23/2024)    [employer] - Occupational Stress Questionnaire     Feeling of Stress : Very much   Social Connections: Unknown (9/23/2024)    Social Connection and Isolation Panel [NHANES]     Frequency of Social Gatherings with Friends and Family: Twice a week   Interpersonal Safety: Low Risk  (6/26/2024)    Interpersonal Safety     Do you feel physically and emotionally safe where you currently live?: Yes     Within the past 12 months, have you been hit, slapped, kicked or otherwise physically hurt by someone?: No     Within the past 12 months, have you been humiliated or emotionally abused in other ways by your partner or ex-partner?: No   Housing Stability: Low Risk  (9/23/2024)    Housing Stability     Do you have housing? : Yes     Are you worried about losing your housing?: No           Lab Results    Recent Results (from the past 240 hours)   Immunoglobulins A G and M    Collection Time: 12/02/24  3:58 PM   Result Value Ref Range    Immunoglobulin G 600 (L) 610 - 1,616 mg/dL    Immunoglobulin A 58 (L) 84 - 499 mg/dL    Immunoglobulin M 68 35 - 242 mg/dL   Comprehensive metabolic panel    Collection Time: 12/02/24  3:58 PM   Result Value Ref Range    Sodium 137 135 - 145 mmol/L    Potassium 4.4 3.4 - 5.3 mmol/L    Carbon Dioxide (CO2) 24 22 - 29 mmol/L    Anion Gap 13 7 - 15 mmol/L    Urea Nitrogen 14.5 8.0 - 23.0 mg/dL    Creatinine 0.76 0.51 - 0.95 mg/dL    GFR Estimate 88 >60  mL/min/1.73m2    Calcium 9.9 8.8 - 10.4 mg/dL    Chloride 100 98 - 107 mmol/L    Glucose 92 70 - 99 mg/dL    Alkaline Phosphatase 106 40 - 150 U/L    AST 35 0 - 45 U/L    ALT 38 0 - 50 U/L    Protein Total 6.9 6.4 - 8.3 g/dL    Albumin 4.4 3.5 - 5.2 g/dL    Bilirubin Total 0.3 <=1.2 mg/dL   Total Protein, Serum for ELP    Collection Time: 12/02/24  3:58 PM   Result Value Ref Range    Total Protein Serum for ELP 6.6 6.4 - 8.3 g/dL   Protein Electrophoresis, Serum    Collection Time: 12/02/24  3:58 PM   Result Value Ref Range    Albumin 4.4 3.7 - 5.1 g/dL    Alpha 1 0.2 0.2 - 0.4 g/dL    Alpha 2 0.7 0.5 - 0.9 g/dL    Beta Globulin 0.7 0.6 - 1.0 g/dL    Gamma Globulin 0.6 (L) 0.7 - 1.6 g/dL    Monoclonal Peak 0.0 <=0.0 g/dL    ELP Interpretation       Essentially normal electrophoretic pattern. No obvious monoclonal proteins seen. Pathologic significance requires clinical correlation. Hiral Asencio M.D., Ph.D.    Signout Location if Remote [facility]    Bld morphology pathology review    Collection Time: 12/02/24  3:58 PM   Result Value Ref Range    Final Diagnosis       Peripheral blood without morphologic abnormality      Peripheral Smear       Microscopic performed      Peripheral Hematologic Data        Latest Reference Range & Units 12/02/24 15:58   WBC 4.0 - 11.0 10e3/uL 6.2   Hemoglobin 11.7 - 15.7 g/dL 14.2   Hematocrit 35.0 - 47.0 % 42.2   Platelet Count 150 - 450 10e3/uL 311   RBC Count 3.80 - 5.20 10e6/uL 4.67   MCV 78 - 100 fL 90   MCH 26.5 - 33.0 pg 30.4   MCHC 31.5 - 36.5 g/dL 33.6   RDW 10.0 - 15.0 % 12.7   % Neutrophils % 61   % Lymphocytes % 29   % Monocytes % 7   % Eosinophils % 2   % Basophils % 1   Absolute Basophils 0.0 - 0.2 10e3/uL 0.0   Absolute Eosinophils 0.0 - 0.7 10e3/uL 0.2   Absolute Immature Granulocytes <=0.4 10e3/uL 0.0   Absolute Lymphocytes 0.8 - 5.3 10e3/uL 1.8   Absolute Monocytes 0.0 - 1.3 10e3/uL 0.5   % Immature Granulocytes % 0   Absolute Neutrophils 1.6 - 8.3 10e3/uL 3.7   Absolute  NRBCs 10e3/uL 0.0   NRBCs per 100 WBC <1 /100 0   % Retic 0.5 - 2.0 % 1.5   Absolute Retic 0.025 - 0.095 10e6/uL 0.071         Performing Labs       The technical component of this testing was completed at [facility], [facility] and [facility]     CBC with platelets and differential    Collection Time: 12/02/24  3:58 PM   Result Value Ref Range    WBC Count 6.2 4.0 - 11.0 10e3/uL    RBC Count 4.67 3.80 - 5.20 10e6/uL    Hemoglobin 14.2 11.7 - 15.7 g/dL    Hematocrit 42.2 35.0 - 47.0 %    MCV 90 78 - 100 fL    MCH 30.4 26.5 - 33.0 pg    MCHC 33.6 31.5 - 36.5 g/dL    RDW 12.7 10.0 - 15.0 %    Platelet Count 311 150 - 450 10e3/uL    % Neutrophils 61 %    % Lymphocytes 29 %    % Monocytes 7 %    % Eosinophils 2 %    % Basophils 1 %    % Immature Granulocytes 0 %    NRBCs per 100 WBC 0 <1 /100    Absolute Neutrophils 3.7 1.6 - 8.3 10e3/uL    Absolute Lymphocytes 1.8 0.8 - 5.3 10e3/uL    Absolute Monocytes 0.5 0.0 - 1.3 10e3/uL    Absolute Eosinophils 0.2 0.0 - 0.7 10e3/uL    Absolute Basophils 0.0 0.0 - 0.2 10e3/uL    Absolute Immature Granulocytes 0.0 <=0.4 10e3/uL    Absolute NRBCs 0.0 10e3/uL   Reticulocyte count    Collection Time: 12/02/24  3:58 PM   Result Value Ref Range    % Reticulocyte 1.5 0.5 - 2.0 %    Absolute Reticulocyte 0.071 0.025 - 0.095 10e6/uL       Imaging Results    No results found.      I spent 70 minutes on the patient's visit today.  This included preparation for the visit, face-to-face time with the patient and documentation following the visit.  It did not include teaching or procedure time.    Signed by: Peter E. Friedell, MD

## 2024-12-02 NOTE — PROGRESS NOTES
"Oncology Rooming Note    December 2, 2024 2:25 PM   Teresa Fernandez is a 64 year old female who presents for:    Chief Complaint   Patient presents with    Hematology     New consult, Selective deficiency of immunoglobulin  with recurrent infections - provider visit only     Initial Vitals: /54 (BP Location: Right arm, Patient Position: Sitting, Cuff Size: Adult Large)   Pulse 93   Temp 98.7  F (37.1  C) (Tympanic)   Resp 14   Ht 1.708 m (5' 7.25\")   Wt 97.1 kg (214 lb 1.6 oz)   LMP  (LMP Unknown)   SpO2 97%   BMI 33.28 kg/m   Estimated body mass index is 33.28 kg/m  as calculated from the following:    Height as of this encounter: 1.708 m (5' 7.25\").    Weight as of this encounter: 97.1 kg (214 lb 1.6 oz). Body surface area is 2.15 meters squared.  Moderate Pain (4) Comment: Data Unavailable   No LMP recorded (lmp unknown). Patient has had a hysterectomy.  Allergies reviewed: Yes  Medications reviewed: Yes    Medications: Medication refills not needed today.  Pharmacy name entered into Live Mobile:    [facility] - [place], [place] - [address]  [place] [facility]  [facility] - [address]  [facility] - [address] AND CLINICS    Frailty Screening:   Is the patient here for a new oncology consult visit in cancer care? 2. No, hematology consult.      Clinical concerns: New hematology consult.        Tana Vazquez MA            "

## 2024-12-02 NOTE — LETTER
"12/2/2024      Teresa Fernandez  [address] [facility] [address]      Dear Colleague,    Thank you for referring your patient, Teresa Fernandez, to the [facility]. Please see a copy of my visit note below.    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    Weight as of this encounter: 97.1 kg (214 lb 1.6 oz). Body surface area is 2.15 meters squared.  Moderate Pain (4) Comment: Data Unavailable   No LMP recorded (lmp unknown). Patient has had a hysterectomy.  Allergies reviewed: Yes  Medications reviewed: Yes    Medications: Medication refills not needed today.  Pharmacy name entered into [email or web address]:    [facility] - [place], [place] - [address] [facility]  [facility] - [address]  [facility] - [address] AND [facility]    Frailty Screening:   Is the patient here for a new oncology consult visit in cancer care? 2. No, hematology consult.      Clinical concerns: New hematology consult.        Tana Vazquez MA              [facility] Hematology and Oncology Consult Note    Patient: Teresa Fernandez  MRN: [id number]  Date of Service: Dec 2, 2024       Reason for Visit    I was consulted by Valencia Nguyen MD for evaluation of " "decreased IgA.      Encounter Diagnoses Assessment and Plan:    Problem List Items Addressed This Visit    None  Visit Diagnoses       IgA deficiency (H)    -  Primary    Relevant Orders    Immunoglobulins A G and M    Protein electrophoresis    Comprehensive metabolic panel    Lab Blood Morphology Pathologist Review        Patient with HLA-B27 antigen positive is noted to have a decreased IgA on 7/22/2024 and 58 mg per DL.  Patient may have been on steroids at the time of measurement.  Will repeat immunoglobulin studies along with protein electrophoresis blood smear review and CMP..  Follow-up to be arranged when results are available.        ______________________________________________________________________________      Ms. Teresa Fernandez is a 64 year old woman with a history of chronic back pain and lumbar spine surgery and February 2024.  She has had persistent symptoms of fatigue shortness of breath elevated inflammatory markers and anemia.  However since her surgery her laboratory abnormalities have mostly resolved.  She did have a decreased IgA.  She was also found to be HLA-B27 antigen positive.  She is also noted weight loss of around 20 pounds.  And has frequent hot flashes.  She also notes constipation with episodes of occasional diarrhea she notices numbness in her fingers and toes and the EMG showed neuropathy in an S1 distribution.      She has a history of kidney cancer in 1996 with left nephrectomy.  She does factory assembly work.  Is a former smoker and does not use alcohol to excess.  There is no family history of blood disorders.    Review of systems.  Pertinent Findings are included in the History of Present Illness    Physical Exam    /54 (BP Location: Right arm, Patient Position: Sitting, Cuff Size: Adult Large)   Pulse 93   Temp 98.7  F (37.1  C) (Tympanic)   Resp 14   Ht 1.708 m (5' 7.25\")   Wt 97.1 kg (214 lb 1.6 oz)   LMP  (LMP Unknown)   SpO2 97%   BMI 33.28 kg/m   "     GENERAL APPEARANCE: 64-year-old woman in no apparent distress.  HEAD: Atraumatic; normocephalic; without lesions.  EYES: Conjunctiva, corneas and eyelids normal; pupils equal, round, reactive to light; No Icterus.  MOUTH/OROPHARYNX: Oral mucosa intact  NECK: Supple with no nodes.  LUNGS:  Clear to auscultation and percussion with no extra sounds.  HEART: Regular rhythm and rate; S1 and S2 normal; no murmurs noted.  ABDOMEN: Soft; no masses or tenderness, no hepatosplenomegaly.  NEUROLOGIC: Alert and oriented.  No obvious focal findings.  EXTREMITIES: No cyanosis, or edema.  SKIN: No abnormal bruising or bleeding. No suspicious lesions noted on exposed skin.  PSYCHIATRIC: Mental status normal; no apparent psychiatric issues    Medications:    Current Outpatient Medications   Medication Sig Dispense Refill     acetaminophen (TYLENOL) 325 MG tablet Take 2 tablets (650 mg) by mouth every 4 hours as needed for mild pain 50 tablet 0     albuterol (PROAIR HFA/PROVENTIL HFA/VENTOLIN HFA) 108 (90 Base) MCG/ACT inhaler Inhale 2 puffs into the lungs every 4 hours as needed for shortness of breath 18 g 4     albuterol (PROVENTIL) (2.5 MG/3ML) 0.083% neb solution Take 1 vial (2.5 mg) by nebulization every 6 hours as needed for shortness of breath / dyspnea or wheezing 90 mL 11     APNO CREA ointment Apply to rash on nose twice daily as needed. 100 g 3     augmented betamethasone dipropionate (DIPROLENE-AF) 0.05 % external ointment Apply topically 2 times daily 50 g 11     bisacodyl (DULCOLAX) 5 MG EC tablet 2 days prior to procedure, take 2 tablets at 4 pm. 1 day prior to procedure, take 2 tablets at 4 pm. For additional instructions refer to your colonoscopy prep instructions. 4 tablet 0     calcium carbonate (TUMS EXTRA STRENGTH 750) 750 MG CHEW Take 500 mg by mouth       clobetasol (TEMOVATE) 0.05 % external cream Apply topically 2 times daily To affected external vaginal areas 60 g 3     DULoxetine (CYMBALTA) 60 MG  capsule Take 1 capsule (60 mg) by mouth 2 times daily. 180 capsule 3     fexofenadine (ALLEGRA) 180 MG tablet Take 1 tablet (180 mg) by mouth daily 30 tablet 1     ketorolac (ACULAR) 0.5 % ophthalmic solution        levothyroxine (SYNTHROID/LEVOTHROID) 100 MCG tablet Take 1 tablet (100 mcg) by mouth daily 90 tablet 1     lidocaine-prilocaine (EMLA) 2.5-2.5 % external cream Apply topically as needed for moderate pain 30 g 1     loratadine (CLARITIN) 10 MG tablet Take 1 tablet by mouth daily       mometasone (ELOCON) 0.1 % external ointment Apply topically twice a week Use on eczematous lesions 45 g 3     order for DME Equipment being ordered: Nebulizer 1 Units 0     OVER-THE-COUNTER Place 1 drop into both eyes 3 times daily. Systane Ultra, Systane Balance, Blink Tears or Refresh Optive Artificial Tear 1 Bottle      polyethylene glycol (GOLYTELY) 236 g suspension 2 days prior at 5pm, mix and drink half of a jug of Golytely. Drink an 8 oz. glass of Golytely every 15 minutes until half of the jug is gone. Place remainder of Golytely in the refrigerator. 1 day prior at 5 pm, drink the 2nd half of a jug of Golytely bowel prep. 6 hours before your check-in time, drink an 8 oz. glass of Golytely every 15 minutes until half of the 2nd jug of Golytely is gone. Discard remainder of second jug. 8000 mL 0     predniSONE (DELTASONE) 20 MG tablet Take 3 tabs by mouth daily x 3 days, then 2 tabs daily x 3 days, then 1 tab daily x 3 days, then 1/2 tab daily x 3 days. 20 tablet 0     tacrolimus (PROTOPIC) 0.1 % external ointment Apply topically 2 times daily 30 g 3     tolterodine ER (DETROL LA) 2 MG 24 hr capsule Take 1 capsule (2 mg) by mouth daily. 90 capsule 1     triamcinolone (KENALOG) 0.1 % external cream Apply topically 2 times daily 30 g 3     UNABLE TO FIND MEDICATION NAME: bone growth stimulator       vitamin D2 (ERGOCALCIFEROL) 85578 units (1250 mcg) capsule        Current Facility-Administered Medications   Medication  Dose Route Frequency Provider Last Rate Last Admin     ampicillin (OMNIPEN) 1 g vial INTRADERMAL  1 g Intradermal Once Dagoberto Jurado MD         penicillin g potassium 7023316 unit vial INTRADERMAL  5,000,000 Units Intradermal Once Dagoberto Jurado MD               Past History    Past Medical History:   Diagnosis Date     Allergic rhinitis      ALLERGIC RHINITIS NOS 04/12/2005     Anxiety      Arthritis     herniated disc     Autoimmune disease (H) 2014     Benign positional vertigo      Bronchitis, not specified as acute or chronic      CHR MAXILLARY SINUSITIS 04/12/2005     Coronary artery disease      Depressive disorder 1996     Depressive disorder, not elsewhere classified      Eczema 10/17/2012     Environmental and seasonal allergies      GERD (gastroesophageal reflux disease)      Gluten intolerance      Heart disease July 2021     Kidney problem 1996     Malignant neoplasm of renal pelvis (H) 1995    Renal cell carcinoma     Melanoma (H) 06/2010     Other specified acquired hypothyroidism 04/12/2005     Renal cancer (H) 05/05/2011     Sleep apnea      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     FUSION LUMBAR ANTERIOR TWO LEVELS  04/13/2015     GI SURGERY       GYN SURGERY  04/08/1999    hysterectomy.  [facility]     HERNIA REPAIR  Not sure     HYSTERECTOMY, PAP NO LONGER INDICATED       PARATHYROIDECTOMY N/A 08/23/2023    Procedure: PARATHYROIDECTOMY;  Surgeon: Hannah Good MD;  Location: [facility] OR     SOFT TISSUE SURGERY      chest-melonoma     SURGICAL HISTORY OF -   03/1996    Left nephrectomy-renal cell CA     Allergies   Allergen Reactions     Iodinated Contrast  Media [Iodinated Contrast Media] Swelling and Difficulty breathing     Immediate throat swelling following around 1-2cc of omnipaque 300 for spine procedure  skin prick and intradermal tests with Iohexol negatives but premedicate before using iodinated contrast.    no reactions in skin tests to MRI contrast media Gadovist     Omnipaque [Iohexol] Swelling and Difficulty breathing     Immediate throat swelling following around 1-2cc of omnipaque 300 for spine procedure  --> skin prick and intradermal tests with Iohexol negatives. But I would propose anyway to premedicate patient before using iodinated contrast media (for safety reasons).   --> no reactions in skin tests to MRI contrast media Gadovist     Gabapentin Hives     Gluten      Gluten Meal      Penicillins Hives     Sulfa Antibiotics      Family History   Problem Relation Age of Onset     Cardiovascular Mother      Lipids Mother      Depression Mother      Heart Disease Mother      Substance Abuse Mother      Hypertension Father      Lipids Father      Obesity Father      Macular Degeneration Father      Sleep Apnea Father      Thyroid Disease Sister      Hypertension Sister      Depression Sister      Allergies Sister      Lipids Sister      Thyroid Disease Sister      Neurologic Disorder Sister      Depression Sister      Hypertension Brother      Colon Cancer Brother      Cancer Maternal Grandmother         kind unknown had sores on legs     Eczema Maternal Grandmother      Eczema Maternal Grandfather      Breast Cancer Paternal Grandmother      Cancer Paternal Grandmother         breast     Hypertension Paternal Grandfather      Cardiovascular Paternal Grandfather         heart attack     Allergies Son      Eczema Son      Respiratory Son      Sleep Apnea Son      Glaucoma No family hx of      Cerebrovascular Disease No family hx of      Diabetes No family hx of      Social History     Socioeconomic History     Marital status:      Spouse name:  None     Number of children: None     Years of education: None     Highest education level: None   Occupational History     Employer: [employer]     Employer: [employer]   Tobacco Use     Smoking status: Former     Current packs/day: 0.00     Average packs/day: 2.0 packs/day for 15.0 years (30.0 ttl pk-yrs)     Types: Cigarettes     Start date: 3/26/1981     Quit date: 3/26/1996     Years since quittin.7     Passive exposure: Past     Smokeless tobacco: Former   Vaping Use     Vaping status: Never Used   Substance and Sexual Activity     Alcohol use: Not Currently     Comment: rare     Drug use: No     Sexual activity: Not Currently     Partners: Male     Birth control/protection: None   Other Topics Concern     Parent/sibling w/ CABG, MI or angioplasty before 65F 55M? No   Social History Narrative    .  Lives with .  Makes medical supplies.     4 children - healthy    4 siblings - + FH depression, hypertension, chol, diabetes mellitus    No family of muscle problems.      Social Drivers of Health     Financial Resource Strain: Low Risk  (2024)    Financial Resource Strain      Within the past 12 months, have you or your family members you live with been unable to get utilities (heat, electricity) when it was really needed?: No   Food Insecurity: Low Risk  (2024)    Food Insecurity      Within the past 12 months, did you worry that your food would run out before you got money to buy more?: No      Within the past 12 months, did the food you bought just not last and you didn t have money to get more?: No   Transportation Needs: Low Risk  (2024)    Transportation Needs      Within the past 12 months, has lack of transportation kept you from medical appointments, getting your medicines, non-medical meetings or appointments, work, or from getting things that you need?: No   Physical Activity: Insufficiently Active (2024)    Exercise Vital Sign      Days of Exercise per Week: 4 days       Minutes of Exercise per Session: 20 min   Stress: Stress Concern Present (9/23/2024)    [employer] - Occupational Stress Questionnaire      Feeling of Stress : Very much   Social Connections: Unknown (9/23/2024)    Social Connection and Isolation Panel [NHANES]      Frequency of Social Gatherings with Friends and Family: Twice a week   Interpersonal Safety: Low Risk  (6/26/2024)    Interpersonal Safety      Do you feel physically and emotionally safe where you currently live?: Yes      Within the past 12 months, have you been hit, slapped, kicked or otherwise physically hurt by someone?: No      Within the past 12 months, have you been humiliated or emotionally abused in other ways by your partner or ex-partner?: No   Housing Stability: Low Risk  (9/23/2024)    Housing Stability      Do you have housing? : Yes      Are you worried about losing your housing?: No           Lab Results    No results found for this or any previous visit (from the past 240 hours).    Imaging Results    No results found.      I spent 70 minutes on the patient's visit today.  This included preparation for the visit, face-to-face time with the patient and documentation following the visit.  It did not include teaching or procedure time.    Signed by: Peter E. Friedell, MD          Again, thank you for allowing me to participate in the care of your patient.        Sincerely,        Peter E. Friedell, MD

## 2024-12-02 NOTE — ANESTHESIA PREPROCEDURE EVALUATION
Anesthesia Pre-Procedure Evaluation    Patient: Teresa Fernandez   MRN: [id number] : 1960        Procedure : Procedure(s):  Colonoscopy          Past Medical History:   Diagnosis Date     Allergic rhinitis      ALLERGIC RHINITIS NOS 2005     Anxiety      Arthritis     herniated disc     Autoimmune disease (H)      Benign positional vertigo      Bronchitis, not specified as acute or chronic      CHR MAXILLARY SINUSITIS 2005     Coronary artery disease      Depressive disorder      Depressive disorder, not elsewhere classified      Eczema 10/17/2012     Environmental and seasonal allergies      GERD (gastroesophageal reflux disease)      Gluten intolerance      Heart disease 2021     Kidney problem      Malignant neoplasm of renal pelvis (H)     Renal cell carcinoma     Melanoma (H) 2010     Other specified acquired hypothyroidism 2005     Renal cancer (H) 2011     Sleep apnea      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     FUSION LUMBAR ANTERIOR TWO LEVELS  2015     GI SURGERY       GYN SURGERY  1999    hysterectomy.  [facility]     HERNIA REPAIR  Not sure     HYSTERECTOMY, PAP NO LONGER INDICATED       PARATHYROIDECTOMY N/A 2023    Procedure: PARATHYROIDECTOMY;  Surgeon: Hannah Good MD;  Location: [facility] OR     SOFT TISSUE SURGERY      chest-melonoma     SURGICAL HISTORY OF -   1996    Left nephrectomy-renal cell CA      Allergies   Allergen Reactions     Iodinated Contrast Media [Iodinated Contrast Media] Swelling and Difficulty breathing     Immediate throat swelling following around 1-2cc of omnipaque 300  for spine procedure  skin prick and intradermal tests with Iohexol negatives but premedicate before using iodinated contrast.    no reactions in skin tests to MRI contrast media Gadovist     Omnipaque [Iohexol] Swelling and Difficulty breathing     Immediate throat swelling following around 1-2cc of omnipaque 300 for spine procedure  --> skin prick and intradermal tests with Iohexol negatives. But I would propose anyway to premedicate patient before using iodinated contrast media (for safety reasons).   --> no reactions in skin tests to MRI contrast media Gadovist     Gabapentin Hives     Gluten      Gluten Meal      Penicillins Hives     Sulfa Antibiotics       Social History     Tobacco Use     Smoking status: Former     Current packs/day: 0.00     Average packs/day: 2.0 packs/day for 15.0 years (30.0 ttl pk-yrs)     Types: Cigarettes     Start date: 3/26/1981     Quit date: 3/26/1996     Years since quittin.7     Passive exposure: Past     Smokeless tobacco: Former   Substance Use Topics     Alcohol use: Not Currently     Comment: rare      Wt Readings from Last 1 Encounters:   24 93.4 kg (205 lb 12.8 oz)        Anesthesia Evaluation   Pt has had prior anesthetic. Type: General, MAC and Regional.        ROS/MED HX  ENT/Pulmonary: Comment: Vertigo      (+) sleep apnea,          allergic rhinitis,     tobacco use, Past use,    Moderate Persistent, asthma                  Neurologic:     (+)    peripheral neuropathy,                            Cardiovascular:     (+) Dyslipidemia - -  CAD -  - -                                      METS/Exercise Tolerance:     Hematologic:  - neg hematologic  ROS     Musculoskeletal: Comment: DDD  S/P lumbar fusion 2 levels  Fibromyalgia  (+)  arthritis,             GI/Hepatic:     (+) GERD,                   Renal/Genitourinary:     (+) renal disease,             Endo:     (+)          thyroid problem, hypothyroidism hyperparathyroid,    Obesity,        Psychiatric/Substance Use:     (+) psychiatric history depression and anxiety       Infectious Disease:  - neg infectious disease ROS     Malignancy:   (+) Malignancy, History of Other.Other CA Renal status post Surgery.    Other:  - neg other ROS          Physical Exam    Airway        Mallampati: II   TM distance: > 3 FB   Neck ROM: full   Mouth opening: > 3 cm    Respiratory Devices and Support         Dental           Cardiovascular   cardiovascular exam normal          Pulmonary   pulmonary exam normal            OUTSIDE LABS:  CBC:   Lab Results   Component Value Date    WBC 5.3 05/30/2024    WBC 5.8 05/07/2024    HGB 13.0 05/30/2024    HGB 12.5 05/07/2024    HCT 41.3 05/30/2024    HCT 39.5 05/07/2024     05/30/2024     05/07/2024     BMP:   Lab Results   Component Value Date     06/04/2024     03/04/2024    POTASSIUM 4.6 06/04/2024    POTASSIUM 4.2 03/04/2024    CHLORIDE 102 06/04/2024    CHLORIDE 104 03/04/2024    CO2 26 06/04/2024    CO2 27 03/04/2024    BUN 11.1 06/04/2024    BUN 11.2 03/04/2024    CR 0.73 06/04/2024    CR 0.72 03/04/2024     (H) 06/04/2024    GLC 95 03/04/2024     COAGS:   Lab Results   Component Value Date    PTT 28 03/04/2024    INR 0.91 03/04/2024     POC:   Lab Results   Component Value Date    HCG Negative 05/05/2011     HEPATIC:   Lab Results   Component Value Date    ALBUMIN 4.4 06/04/2024    PROTTOTAL 6.9 06/04/2024    ALT 28 06/04/2024    AST 29 06/04/2024    ALKPHOS 111 06/04/2024    BILITOTAL 0.4 06/04/2024     OTHER:   Lab Results   Component Value Date    A1C 4.7 03/04/2024    ANNIE 10.2 06/04/2024    PHOS 3.0 06/29/2022    MAG 2.1 03/27/2023    LIPASE 29 03/27/2023    TSH 3.63 08/05/2024    T4 1.25 06/04/2024    T3 118 12/07/2009    CRP <2.9 09/30/2021    SED 14 06/04/2024       Anesthesia Plan    ASA Status:  3       Anesthesia Type: General.   Induction: Propofol.   Maintenance: TIVA.        Consents    Anesthesia Plan(s) and associated  risks, benefits, and realistic alternatives discussed. Questions answered and patient/representative(s) expressed understanding.     - Discussed: Risks, Benefits and Alternatives for BOTH SEDATION and the PROCEDURE were discussed     - Discussed with:  Patient            Postoperative Care    Pain management: Oral pain medications, IV analgesics, Multi-modal analgesia.   PONV prophylaxis: Ondansetron (or other 5HT-3), Dexamethasone or Solumedrol     Comments:             HERIBERTO Luna CRNA    I have reviewed the pertinent notes and labs in the chart from the past 30 days and (re)examined the patient.  Any updates or changes from those notes are reflected in this note.                             # Asthma: noted on problem list

## 2024-12-03 ENCOUNTER — ANESTHESIA (OUTPATIENT)
Dept: GASTROENTEROLOGY | Facility: CLINIC | Age: 64
End: 2024-12-03
Payer: COMMERCIAL

## 2024-12-03 LAB
ALBUMIN SERPL ELPH-MCNC: 4.4 G/DL (ref 3.7–5.1)
ALPHA1 GLOB SERPL ELPH-MCNC: 0.2 G/DL (ref 0.2–0.4)
ALPHA2 GLOB SERPL ELPH-MCNC: 0.7 G/DL (ref 0.5–0.9)
B-GLOBULIN SERPL ELPH-MCNC: 0.7 G/DL (ref 0.6–1)
GAMMA GLOB SERPL ELPH-MCNC: 0.6 G/DL (ref 0.7–1.6)
IGA SERPL-MCNC: 58 MG/DL (ref 84–499)
IGG SERPL-MCNC: 600 MG/DL (ref 610–1616)
IGM SERPL-MCNC: 68 MG/DL (ref 35–242)
LOCATION OF TASK: ABNORMAL
M PROTEIN SERPL ELPH-MCNC: 0 G/DL
PATH REPORT.COMMENTS IMP SPEC: NORMAL
PATH REPORT.FINAL DX SPEC: NORMAL
PATH REPORT.MICROSCOPIC SPEC OTHER STN: NORMAL
PATH REPORT.MICROSCOPIC SPEC OTHER STN: NORMAL
PROT PATTERN SERPL ELPH-IMP: ABNORMAL

## 2024-12-24 DIAGNOSIS — E03.9 HYPOTHYROIDISM, UNSPECIFIED TYPE: ICD-10-CM

## 2024-12-24 RX ORDER — LEVOTHYROXINE SODIUM 100 UG/1
100 TABLET ORAL DAILY
Qty: 90 TABLET | Refills: 0 | Status: SHIPPED | OUTPATIENT
Start: 2024-12-24

## 2025-01-09 ENCOUNTER — HOSPITAL ENCOUNTER (OUTPATIENT)
Facility: CLINIC | Age: 65
Discharge: HOME | End: 2025-01-09
Attending: STUDENT IN AN ORGANIZED HEALTH CARE EDUCATION/TRAINING PROGRAM | Admitting: STUDENT IN AN ORGANIZED HEALTH CARE EDUCATION/TRAINING PROGRAM
Payer: COMMERCIAL

## 2025-01-09 VITALS — TEMPERATURE: 98.3 F | SYSTOLIC BLOOD PRESSURE: 117 MMHG | DIASTOLIC BLOOD PRESSURE: 70 MMHG | HEART RATE: 80 BPM

## 2025-01-09 DIAGNOSIS — Z12.11 SPECIAL SCREENING FOR MALIGNANT NEOPLASMS, COLON: Primary | ICD-10-CM

## 2025-01-09 LAB — COLONOSCOPY: NORMAL

## 2025-01-09 PROCEDURE — 88305 TISSUE EXAM BY PATHOLOGIST: CPT | Mod: 26 | Performed by: PATHOLOGY

## 2025-01-09 PROCEDURE — 370N000017 HC ANESTHESIA TECHNICAL FEE, PER MIN: Performed by: STUDENT IN AN ORGANIZED HEALTH CARE EDUCATION/TRAINING PROGRAM

## 2025-01-09 PROCEDURE — 45385 COLONOSCOPY W/LESION REMOVAL: CPT | Performed by: STUDENT IN AN ORGANIZED HEALTH CARE EDUCATION/TRAINING PROGRAM

## 2025-01-09 PROCEDURE — 250N000011 HC RX IP 250 OP 636: Performed by: NURSE ANESTHETIST, CERTIFIED REGISTERED

## 2025-01-09 PROCEDURE — 250N000009 HC RX 250: Performed by: NURSE ANESTHETIST, CERTIFIED REGISTERED

## 2025-01-09 PROCEDURE — 88305 TISSUE EXAM BY PATHOLOGIST: CPT | Mod: TC | Performed by: STUDENT IN AN ORGANIZED HEALTH CARE EDUCATION/TRAINING PROGRAM

## 2025-01-09 RX ORDER — NALOXONE HYDROCHLORIDE 0.4 MG/ML
0.2 INJECTION, SOLUTION INTRAMUSCULAR; INTRAVENOUS; SUBCUTANEOUS
Status: CANCELLED | OUTPATIENT
Start: 2025-01-09

## 2025-01-09 RX ORDER — LIDOCAINE HYDROCHLORIDE 20 MG/ML
INJECTION, SOLUTION INFILTRATION; PERINEURAL PRN
Status: DISCONTINUED | OUTPATIENT
Start: 2025-01-09 | End: 2025-01-09

## 2025-01-09 RX ORDER — NALOXONE HYDROCHLORIDE 0.4 MG/ML
0.4 INJECTION, SOLUTION INTRAMUSCULAR; INTRAVENOUS; SUBCUTANEOUS
Status: CANCELLED | OUTPATIENT
Start: 2025-01-09

## 2025-01-09 RX ORDER — PROPOFOL 10 MG/ML
INJECTION, EMULSION INTRAVENOUS PRN
Status: DISCONTINUED | OUTPATIENT
Start: 2025-01-09 | End: 2025-01-09

## 2025-01-09 RX ORDER — GLYCOPYRROLATE 0.2 MG/ML
INJECTION, SOLUTION INTRAMUSCULAR; INTRAVENOUS PRN
Status: DISCONTINUED | OUTPATIENT
Start: 2025-01-09 | End: 2025-01-09

## 2025-01-09 RX ORDER — FLUMAZENIL 0.1 MG/ML
0.2 INJECTION, SOLUTION INTRAVENOUS
Status: CANCELLED | OUTPATIENT
Start: 2025-01-09 | End: 2025-01-09

## 2025-01-09 RX ORDER — PROPOFOL 10 MG/ML
INJECTION, EMULSION INTRAVENOUS CONTINUOUS PRN
Status: DISCONTINUED | OUTPATIENT
Start: 2025-01-09 | End: 2025-01-09

## 2025-01-09 RX ORDER — LIDOCAINE 40 MG/G
CREAM TOPICAL
Status: DISCONTINUED | OUTPATIENT
Start: 2025-01-09 | End: 2025-01-09 | Stop reason: HOSPADM

## 2025-01-09 RX ADMIN — GLYCOPYRROLATE 0.1 MG: 0.2 INJECTION, SOLUTION INTRAMUSCULAR; INTRAVENOUS at 11:13

## 2025-01-09 RX ADMIN — PROPOFOL 100 MG: 10 INJECTION, EMULSION INTRAVENOUS at 11:13

## 2025-01-09 RX ADMIN — PROPOFOL 200 MCG/KG/MIN: 10 INJECTION, EMULSION INTRAVENOUS at 11:13

## 2025-01-09 RX ADMIN — LIDOCAINE HYDROCHLORIDE 100 MG: 20 INJECTION, SOLUTION INFILTRATION; PERINEURAL at 11:13

## 2025-01-09 ASSESSMENT — ACTIVITIES OF DAILY LIVING (ADL)
ADLS_ACUITY_SCORE: 41
ADLS_ACUITY_SCORE: 41

## 2025-01-09 ASSESSMENT — LIFESTYLE VARIABLES: TOBACCO_USE: 1

## 2025-01-09 NOTE — H&P
[facility]    Pre-Endoscopy History and Physical     Teresa Fernandez MRN# [id number]   YOB: 1960 Age: 64 year old     Date of Procedure: 1/9/2025  Primary care provider: Amanda Meléndez  Type of Endoscopy: Colonoscopy with possible biopsy, possible polypectomy  Reason for Procedure: surveillance, also brother with colon cancer  Type of Anesthesia Anticipated: Conscious Sedation    HPI:    Teresa is a 64 year old female who will be undergoing the above procedure.      A history and physical has been performed. The patient's medications and allergies have been reviewed. The risks and benefits of the procedure and the sedation options and risks were discussed with the patient.  All questions were answered and informed consent was obtained.      She denies a personal or family history of anesthesia complications or bleeding disorders.     Colonoscopy, surveillance. Last one 2022 with polyp in ascending colon, SSA. No AC. ASA II. Surgical hx of hysterectomy, appendectomy, cholecystectomy. Brother with colon cancer.     Patient Active Problem List   Diagnosis    Hypothyroidism, unspecified type    Chronic rhinitis    Allergic rhinitis due to pollen    ERIC (generalized anxiety disorder)    Leukoplakia of oral mucosa, including tongue    GERD (gastroesophageal reflux disease)    Chronic low back pain    Dry eye syndrome    Chronic fatigue    Osteopenia    History of melanoma in situ    Eczema    Moderate persistent asthma without complication    DDD (degenerative disc disease), lumbar    Myalgia    GAYE (obstructive sleep apnea)    History of kidney cancer    Fibromyalgia    Family history of colon cancer    Chronic pruritus    Primary hyperparathyroidism    Lichen sclerosus of female genitalia    Thyroid nodule    Falls frequently    Periorificial dermatitis    Erosive oral lichen planus    Abnormal EMG    Multiple joint pain    Ankylosing spondylitis of multiple sites in spine (H)     S/P hysterectomy    IgA deficiency (H)        Past Medical History:   Diagnosis Date    Allergic rhinitis     ALLERGIC RHINITIS NOS 2005    Anxiety     Arthritis     herniated disc    Autoimmune disease     Benign positional vertigo     Bronchitis, not specified as acute or chronic     CHR MAXILLARY SINUSITIS 2005    Coronary artery disease     Depressive disorder     Depressive disorder, not elsewhere classified     Eczema 10/17/2012    Environmental and seasonal allergies     GERD (gastroesophageal reflux disease)     Gluten intolerance     Heart disease 2021    Kidney problem     Malignant neoplasm of renal pelvis (H)     Renal cell carcinoma    Melanoma (H) 2010    Other specified acquired hypothyroidism 2005    Renal cancer (H) 2011    Sleep apnea     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    FUSION LUMBAR ANTERIOR TWO LEVELS  2015    GI SURGERY      GYN SURGERY  1999    hysterectomy.  [facility]    HERNIA REPAIR  Not sure    HYSTERECTOMY, PAP NO LONGER INDICATED      PARATHYROIDECTOMY N/A 2023    Procedure: PARATHYROIDECTOMY;  Surgeon: Hannah Good MD;  Location: [facility] OR    SOFT TISSUE SURGERY      chest-melonoma    SURGICAL HISTORY OF -   1996    Left nephrectomy-renal cell CA       Social History     Tobacco Use    Smoking status: Former     Current packs/day: 0.00     Average packs/day: 2.0 packs/day for 15.0 years (30.0 ttl pk-yrs)     Types: Cigarettes     Start date: 3/26/1981     Quit date: 3/26/1996     Years since quittin.8     Passive exposure: Past    Smokeless tobacco: Former   Substance Use  Topics    Alcohol use: Not Currently     Comment: rare       Family History   Problem Relation Age of Onset    Cardiovascular Mother     Lipids Mother     Depression Mother     Heart Disease Mother     Substance Abuse Mother     Hypertension Father     Lipids Father     Obesity Father     Macular Degeneration Father     Sleep Apnea Father     Thyroid Disease Sister     Hypertension Sister     Depression Sister     Allergies Sister     Lipids Sister     Thyroid Disease Sister     Neurologic Disorder Sister     Depression Sister     Hypertension Brother     Colon Cancer Brother     Cancer Maternal Grandmother         kind unknown had sores on legs    Eczema Maternal Grandmother     Eczema Maternal Grandfather     Breast Cancer Paternal Grandmother     Cancer Paternal Grandmother         breast    Hypertension Paternal Grandfather     Cardiovascular Paternal Grandfather         heart attack    Allergies Son     Eczema Son     Respiratory Son     Sleep Apnea Son     Glaucoma No family hx of     Cerebrovascular Disease No family hx of     Diabetes No family hx of        Prior to Admission medications    Medication Sig Start Date End Date Taking? Authorizing Provider   acetaminophen (TYLENOL) 325 MG tablet Take 2 tablets (650 mg) by mouth every 4 hours as needed for mild pain 8/23/23  Yes Javad Salazar MD   APNO CREA ointment Apply to rash on nose twice daily as needed. 2/18/21  Yes Amanda Meléndez APRN CNP   augmented betamethasone dipropionate (DIPROLENE-AF) 0.05 % external ointment Apply topically 2 times daily 12/7/23  Yes Amanda Meléndez APRN CNP   bisacodyl (DULCOLAX) 5 MG EC tablet 2 days prior to procedure, take 2 tablets at 4 pm. 1 day prior to procedure, take 2 tablets at 4 pm. For additional instructions refer to your colonoscopy prep instructions. 11/26/24  Yes Sukumar Barfield MD   calcium carbonate (TUMS EXTRA STRENGTH 750) 750 MG CHEW Take 500 mg by mouth 1/26/24  Yes Reported, Patient    clobetasol (TEMOVATE) 0.05 % external cream Apply topically 2 times daily To affected external vaginal areas 3/27/23  Yes Amanda Meléndez APRN CNP   DULoxetine (CYMBALTA) 60 MG capsule Take 1 capsule (60 mg) by mouth 2 times daily. 9/26/24  Yes Amanda Meléndez APRN CNP   fexofenadine (ALLEGRA) 180 MG tablet Take 1 tablet (180 mg) by mouth daily 3/19/14  Yes Jeanne Tripp APRN CNP   lidocaine-prilocaine (EMLA) 2.5-2.5 % external cream Apply topically as needed for moderate pain 6/26/24  Yes Tanya Novak APRN CNP   loratadine (CLARITIN) 10 MG tablet Take 1 tablet by mouth daily 4/27/22  Yes Reported, Patient   polyethylene glycol (GOLYTELY) 236 g suspension 2 days prior at 5pm, mix and drink half of a jug of Golytely. Drink an 8 oz. glass of Golytely every 15 minutes until half of the jug is gone. Place remainder of Golytely in the refrigerator. 1 day prior at 5 pm, drink the 2nd half of a jug of Golytely bowel prep. 6 hours before your check-in time, drink an 8 oz. glass of Golytely every 15 minutes until half of the 2nd jug of Golytely is gone. Discard remainder of second jug. 11/26/24  Yes Sukumar Barfield MD   predniSONE (DELTASONE) 20 MG tablet Take 3 tabs by mouth daily x 3 days, then 2 tabs daily x 3 days, then 1 tab daily x 3 days, then 1/2 tab daily x 3 days. 9/26/24  Yes Amanda Meléndez APRN CNP   vitamin D2 (ERGOCALCIFEROL) 56689 units (1250 mcg) capsule  1/26/24  Yes Reported, Patient   albuterol (PROAIR HFA/PROVENTIL HFA/VENTOLIN HFA) 108 (90 Base) MCG/ACT inhaler Inhale 2 puffs into the lungs every 4 hours as needed for shortness of breath 8/1/23   Amanda Meléndez APRN CNP   albuterol (PROVENTIL) (2.5 MG/3ML) 0.083% neb solution Take 1 vial (2.5 mg) by nebulization every 6 hours as needed for shortness of breath / dyspnea or wheezing 2/14/22   Amanda Meléndez APRN CNP   ketorolac (ACULAR) 0.5 % ophthalmic solution  8/14/23   Reported, Patient    levothyroxine (SYNTHROID/LEVOTHROID) 100 MCG tablet TAKE 1 TABLET (100 MCG) BY MOUTH DAILY 12/24/24   Amanda Meléndez APRN CNP   mometasone (ELOCON) 0.1 % external ointment Apply topically twice a week Use on eczematous lesions 11/8/21   Dagoberto Jurado MD   order for DME Equipment being ordered: Nebulizer 8/25/16   Yaron Argueta MD   OVER-THE-COUNTER Place 1 drop into both eyes 3 times daily. Systane Ultra, Systane Balance, Blink Tears or Refresh Optive Artificial Tear 8/24/11   Laine Canada, AMADOU   tacrolimus (PROTOPIC) 0.1 % external ointment Apply topically 2 times daily 12/7/23   Amanda Meléndez APRN CNP   tolterodine ER (DETROL LA) 2 MG 24 hr capsule Take 1 capsule (2 mg) by mouth daily. 10/2/24   Julia Alves PA-C   triamcinolone (KENALOG) 0.1 % external cream Apply topically 2 times daily 6/16/22   Harrison Alcaraz MD   UNABLE TO FIND MEDICATION NAME: bone growth stimulator    Reported, Patient       Allergies   Allergen Reactions    Iodinated Contrast Media [Iodinated Contrast Media] Swelling and Difficulty breathing     Immediate throat swelling following around 1-2cc of omnipaque 300 for spine procedure  skin prick and intradermal tests with Iohexol negatives but premedicate before using iodinated contrast.    no reactions in skin tests to MRI contrast media Gadovist    Omnipaque [Iohexol] Swelling and Difficulty breathing     Immediate throat swelling following around 1-2cc of omnipaque 300 for spine procedure  --> skin prick and intradermal tests with Iohexol negatives. But I would propose anyway to premedicate patient before using iodinated contrast media (for safety reasons).   --> no reactions in skin tests to MRI contrast media Gadovist    Gabapentin Hives    Gluten     Gluten Meal     Penicillins Hives    Sulfa Antibiotics         REVIEW OF SYSTEMS:   5 point ROS negative except as noted above in HPI, including Gen., Resp., CV, GI &  system review.    PHYSICAL  "EXAM:   LMP  (LMP Unknown)  Estimated body mass index is 33.28 kg/m  as calculated from the following:    Height as of 12/2/24: 1.708 m (5' 7.25\").    Weight as of 12/2/24: 97.1 kg (214 lb 1.6 oz).   Constitutional: Awake, alert, no acute distress.  Eyes: No scleral icterus.  Conjunctiva are without injection.  ENMT: Mucous membranes moist, dentition and gums are intact.   Neck: Soft, supple, trachea midline.    Endocrine: n/a   Lymphatic: There is no cervical, submandibularadenopathy.  Respiratory: normal efforts on room air  Cardiovascular: extremities warm and well perfused  Abdomen: Non-distended, non-tender,  No masses,  Musculoskeletal: Full range of motion in the upper and lower extremities.    Skin: No skin rashes or lesions to inspection.  No petechia.    Neurologic: alerted and oriented 3x  Psychiatric: The patient's affect is not blunted and mood is appropriate.  DIAGNOSTICS:    Not indicated    IMPRESSION   ASA Class 2 - Mild systemic disease    PLAN:   Plan for Colonoscopy with possible biopsy, possible polypectomy. We discussed the risks, benefits and alternatives and the patient wished to proceed.  Patient is cleared for the above procedure.    The above has been forwarded to the consulting provider.    Sukumar Barfield MD on 1/9/2025 at 10:01 AM  [facility]        "

## 2025-01-09 NOTE — ANESTHESIA POSTPROCEDURE EVALUATION
Patient: Teresa Fernandez    Procedure: Procedure(s):  COLONOSCOPY, FLEXIBLE, WITH LESION REMOVAL USING SNARE       Anesthesia Type:  General    Note:  Disposition: Outpatient   Postop Pain Control: Uneventful            Sign Out: Well controlled pain   PONV: No   Neuro/Psych: Uneventful            Sign Out: Acceptable/Baseline neuro status   Airway/Respiratory: Uneventful            Sign Out: Acceptable/Baseline resp. status   CV/Hemodynamics: Uneventful            Sign Out: Acceptable CV status; No obvious hypovolemia; No obvious fluid overload   Other NRE: NONE   DID A NON-ROUTINE EVENT OCCUR? No           Last vitals:  Vitals Value Taken Time   /65 01/09/25 1133   Temp     Pulse 82 01/09/25 1133   Resp     SpO2     Vitals shown include unfiled device data.    Electronically Signed By: HERIBERTO Bess CRNA  January 9, 2025  11:39 AM

## 2025-01-09 NOTE — LETTER
Teresa Fernandez  [address] [address] [facility] [address]      January 10, 2025    Dear Teresa,  This letter is written to inform you of the results of your recent colonoscopy.  Your examination showed polyp(s) in your sigmoid colon and rectum. All polyps were removed in their entirety and sent for review by a pathologist. As you will see on the pathology report below, the tissue(s) were tubular adenomatous polyps. Your examination was otherwise without abnormality.    Adenomatous polyps are entirely benign (non-cancerous); however, patients who have developed these polyps are at an increased risk for developing additional polyps in the future. If these are not eventually removed, there is a risk of developing colon cancer. We will advise more frequent examinations with you because of the risk associated with this type of polyp.    Given these findings, I recommend that you undergo a repeat colonoscopy in 5 year(s) for surveillance. We will enter you into a recall system so you receive a reminder closer to the time that you are due for repeat examination. Your physician also recommends that you adhere to a high fiber diet indefinitely to promote colon health.     Please remember that this recommendation is made with the understanding that you are not experiencing persistent changes in bowel function, bleeding per rectum, and/or significant abdominal pain. If you experience these symptoms, please contact your primary care provider for a further evaluation.     If you have any questions or concerns about the results of your colonoscopy or the appropriate follow-up, please contact my assistant at [phone]    Sincerely,      Sukumar Barfield MD   [facility]  ___                    Resulted Orders   Surgical Pathology Exam   Result Value Ref Range    Case Report       Surgical Pathology Report                         Case: [id number]                                  Authorizing Provider:  Lico  "Sukumar TEMPLETON MD       Collected:           01/09/2025 11:28 AM          Ordering Location:     [facility]   Received:            01/09/2025 11:43 AM                                 [place]                                                                      Pathologist:           Tenzin London MD                                                                           Specimens:   A) - Large Intestine, Colon, Sigmoid                                                                B) - Rectum                                                                                Final Diagnosis       A: Large intestine, sigmoid, polyp, biopsy/polypectomy:  - Tubular adenoma negative for high-grade dysplasia and malignancy.     B: Large intestine, rectum, polyp, biopsy/polypectomy:  - Tubular adenoma negative for high-grade dysplasia and malignancy.        Clinical Information       Procedure:  COLONOSCOPY, FLEXIBLE, WITH LESION REMOVAL USING SNARE      Gross Description       A(1). Large Intestine, Colon, Sigmoid, :  The specimen is received in formalin, labeled with the patient's name, medical record number and other identifying information designated \"sigmoid colon\". It consists of 2, 0.4 cm (inked blue) and 1.4 cm (inked black) pale-tan soft tissue fragments which are submitted entirely in 1 cassette.    A1-2 soft tissue fragments (1 inked blue-bisected/1 inked black-6 pieces)    B(2). Rectum, :  The specimen is received in formalin, labeled with the patient's name, medical record number and other identifying information and designated  rectum . It consists of a single tan soft tissue fragment measuring 0.2 cm. Entirely submitted in one cassette.   (GEOFF Ritter (ASCP) 1/9/2025 2:29 PM       Microscopic Description       A microscopic examination is performed.       Performing Labs       The technical component of this " testing was completed at [facility].    Stain controls for all stains resulted within this report have been reviewed and show appropriate reactivity.       Case Images

## 2025-01-09 NOTE — ANESTHESIA CARE TRANSFER NOTE
Patient: Teresa Fernandez    Procedure: Procedure(s):  COLONOSCOPY, FLEXIBLE, WITH LESION REMOVAL USING SNARE       Diagnosis: Screening for malignant neoplasm of colon [Z12.11]  Diagnosis Additional Information: No value filed.    Anesthesia Type:   General     Note:    Oropharynx: oropharynx clear of all foreign objects  Level of Consciousness: awake  Oxygen Supplementation: room air    Independent Airway: airway patency satisfactory and stable  Dentition: dentition unchanged  Vital Signs Stable: post-procedure vital signs reviewed and stable  Report to RN Given: handoff report given  Patient transferred to: Phase II    Handoff Report: Identifed the Patient, Identified the Reponsible Provider, Reviewed the pertinent medical history, Discussed the surgical course, Reviewed Intra-OP anesthesia mangement and issues during anesthesia, Set expectations for post-procedure period and Allowed opportunity for questions and acknowledgement of understanding      Vitals:  Vitals Value Taken Time   /65 01/09/25 1133   Temp     Pulse 82 01/09/25 1133   Resp     SpO2     Vitals shown include unfiled device data.    Electronically Signed By: HERIBERTO Bess CRNA  January 9, 2025  11:36 AM

## 2025-01-10 LAB
PATH REPORT.COMMENTS IMP SPEC: NORMAL
PATH REPORT.COMMENTS IMP SPEC: NORMAL
PATH REPORT.FINAL DX SPEC: NORMAL
PATH REPORT.GROSS SPEC: NORMAL
PATH REPORT.MICROSCOPIC SPEC OTHER STN: NORMAL
PATH REPORT.RELEVANT HX SPEC: NORMAL
PHOTO IMAGE: NORMAL

## 2025-01-23 PROBLEM — D12.6 COLON ADENOMAS: Status: ACTIVE | Noted: 2025-01-23

## 2025-02-18 ENCOUNTER — MEDICAL CORRESPONDENCE (OUTPATIENT)
Dept: HEALTH INFORMATION MANAGEMENT | Facility: CLINIC | Age: 65
End: 2025-02-18
Payer: COMMERCIAL

## 2025-02-18 ASSESSMENT — ASTHMA QUESTIONNAIRES
ACT_TOTALSCORE: 22
ACT_TOTALSCORE: 22
QUESTION_1 LAST FOUR WEEKS HOW MUCH OF THE TIME DID YOUR ASTHMA KEEP YOU FROM GETTING AS MUCH DONE AT WORK, SCHOOL OR AT HOME: A LITTLE OF THE TIME
QUESTION_4 LAST FOUR WEEKS HOW OFTEN HAVE YOU USED YOUR RESCUE INHALER OR NEBULIZER MEDICATION (SUCH AS ALBUTEROL): NOT AT ALL
QUESTION_2 LAST FOUR WEEKS HOW OFTEN HAVE YOU HAD SHORTNESS OF BREATH: ONCE OR TWICE A WEEK
QUESTION_3 LAST FOUR WEEKS HOW OFTEN DID YOUR ASTHMA SYMPTOMS (WHEEZING, COUGHING, SHORTNESS OF BREATH, CHEST TIGHTNESS OR PAIN) WAKE YOU UP AT NIGHT OR EARLIER THAN USUAL IN THE MORNING: NOT AT ALL
QUESTION_5 LAST FOUR WEEKS HOW WOULD YOU RATE YOUR ASTHMA CONTROL: WELL CONTROLLED

## 2025-02-19 ENCOUNTER — TRANSCRIBE ORDERS (OUTPATIENT)
Dept: OTHER | Age: 65
End: 2025-02-19

## 2025-02-19 DIAGNOSIS — Z87.19 HISTORY OF CELIAC DISEASE: Primary | ICD-10-CM

## 2025-02-20 ENCOUNTER — OFFICE VISIT (OUTPATIENT)
Dept: FAMILY MEDICINE | Facility: CLINIC | Age: 65
End: 2025-02-20
Payer: COMMERCIAL

## 2025-02-20 ENCOUNTER — DOCUMENTATION ONLY (OUTPATIENT)
Dept: GASTROENTEROLOGY | Facility: CLINIC | Age: 65
End: 2025-02-20

## 2025-02-20 VITALS
RESPIRATION RATE: 16 BRPM | SYSTOLIC BLOOD PRESSURE: 122 MMHG | BODY MASS INDEX: 32.8 KG/M2 | OXYGEN SATURATION: 98 % | DIASTOLIC BLOOD PRESSURE: 86 MMHG | HEIGHT: 67 IN | WEIGHT: 209 LBS | TEMPERATURE: 98 F | HEART RATE: 99 BPM

## 2025-02-20 DIAGNOSIS — D80.2 IGA DEFICIENCY (H): ICD-10-CM

## 2025-02-20 DIAGNOSIS — M45.0 ANKYLOSING SPONDYLITIS OF MULTIPLE SITES IN SPINE (H): Primary | ICD-10-CM

## 2025-02-20 DIAGNOSIS — M54.41 CHRONIC BILATERAL LOW BACK PAIN WITH RIGHT-SIDED SCIATICA: ICD-10-CM

## 2025-02-20 DIAGNOSIS — G89.29 CHRONIC BILATERAL LOW BACK PAIN WITH RIGHT-SIDED SCIATICA: ICD-10-CM

## 2025-02-20 DIAGNOSIS — I47.29 OTHER VENTRICULAR TACHYCARDIA (H): ICD-10-CM

## 2025-02-20 RX ORDER — PREGABALIN 50 MG/1
50 CAPSULE ORAL 2 TIMES DAILY
Qty: 60 CAPSULE | Refills: 3 | Status: SHIPPED | OUTPATIENT
Start: 2025-02-20

## 2025-02-20 RX ORDER — TIZANIDINE 2 MG/1
1-4 TABLET ORAL 3 TIMES DAILY PRN
Qty: 60 TABLET | Refills: 3 | Status: SHIPPED | OUTPATIENT
Start: 2025-02-20

## 2025-02-20 NOTE — PROGRESS NOTES
Medical records request has been sent to [facility] on02/19/2025 via Fax.    Fax:[phone]

## 2025-02-20 NOTE — PROGRESS NOTES
"  Assessment & Plan     Ankylosing spondylitis of multiple sites in spine (H)  Continue to work with Rheumatology. Trial;  - pregabalin (LYRICA) 50 MG capsule; Take 1 capsule (50 mg) by mouth 2 times daily.  - tiZANidine (ZANAFLEX) 2 MG tablet; Take 0.5-2 tablets (1-4 mg) by mouth 3 times daily as needed for muscle spasms.    IgA deficiency (H)    - Immunology Referral; Future    Chronic bilateral low back pain with right-sided sciatica  Continue to work with Neurosurgery. Trial;  - pregabalin (LYRICA) 50 MG capsule; Take 1 capsule (50 mg) by mouth 2 times daily.  - tiZANidine (ZANAFLEX) 2 MG tablet; Take 0.5-2 tablets (1-4 mg) by mouth 3 times daily as needed for muscle spasms.    Other ventricular tachycardia (H)  Known issue that I take into account for their medical decisions, no current exacerbations or new concerns      Time spent in chart review in preparation to see patient, time with patient for interview/exam, ordering medications/tests/and/or procedures, and time spent in charting and coordinating care: 30 minutes.         BMI  Estimated body mass index is 32.49 kg/m  as calculated from the following:    Height as of this encounter: 1.708 m (5' 7.25\").    Weight as of this encounter: 94.8 kg (209 lb).   Weight management plan: Discussed healthy diet and exercise guidelines      FUTURE APPOINTMENTS:       - Follow-up office or E-visit in 2-3 months. Sooner PRN    See Patient Instructions    Eric Moore is a 65 year old, presenting for the following health issues:  No chief complaint on file.        2/20/2025     7:34 AM   Additional Questions   Roomed by Clare CRUZ     History of Present Illness       Reason for visit:  Talk about AS and another Hadicap sticker ,  and shots .  Symptom onset:  More than a month  Symptoms include:  Legs are weak, swelling  Symptom intensity:  Moderate  Symptom progression:  Worsening  Had these symptoms before:  Yes  What makes it worse:  Wine, sugar    She eats 4 or more " "servings of fruits and vegetables daily.She consumes 1 sweetened beverage(s) daily.She exercises with enough effort to increase her heart rate 9 or less minutes per day.  She exercises with enough effort to increase her heart rate 3 or less days per week.   She is taking medications regularly.       Having RLE pain, Rheum wanted her to see the pain clinic but her insurance won't cover, at least the facility she was referred to. Needs a Immune Clinic referral for IgA deficiency.      Review of Systems  Constitutional, HEENT, cardiovascular, pulmonary, gi and gu systems are negative, except as otherwise noted.      Objective    /86   Pulse 99   Temp 98  F (36.7  C) (Tympanic)   Resp 16   Ht 1.708 m (5' 7.25\")   Wt 94.8 kg (209 lb)   LMP  (LMP Unknown)   SpO2 98%   BMI 32.49 kg/m    Body mass index is 32.49 kg/m .  Physical Exam   GENERAL: alert and no distress  RESP: lungs clear to auscultation - no rales, rhonchi or wheezes  CV: regular rate and rhythm, normal S1 S2, no S3 or S4, no murmur, click or rub, no peripheral edema  NEURO: Normal strength and tone, mentation intact and speech normal            Signed Electronically by: HERIBERTO Linton CNP    "

## 2025-03-07 ENCOUNTER — ANCILLARY PROCEDURE (OUTPATIENT)
Dept: GENERAL RADIOLOGY | Facility: CLINIC | Age: 65
End: 2025-03-07
Attending: STUDENT IN AN ORGANIZED HEALTH CARE EDUCATION/TRAINING PROGRAM
Payer: COMMERCIAL

## 2025-03-07 DIAGNOSIS — R05.1 ACUTE COUGH: ICD-10-CM

## 2025-03-07 PROCEDURE — 71046 X-RAY EXAM CHEST 2 VIEWS: CPT | Mod: TC | Performed by: RADIOLOGY

## 2025-03-11 ENCOUNTER — DOCUMENTATION ONLY (OUTPATIENT)
Dept: GASTROENTEROLOGY | Facility: CLINIC | Age: 65
End: 2025-03-11
Payer: COMMERCIAL

## 2025-03-17 ENCOUNTER — TRANSFERRED RECORDS (OUTPATIENT)
Dept: HEALTH INFORMATION MANAGEMENT | Facility: CLINIC | Age: 65
End: 2025-03-17
Payer: COMMERCIAL

## 2025-03-19 DIAGNOSIS — E03.9 HYPOTHYROIDISM, UNSPECIFIED TYPE: ICD-10-CM

## 2025-03-20 RX ORDER — LEVOTHYROXINE SODIUM 100 UG/1
100 TABLET ORAL DAILY
Qty: 90 TABLET | Refills: 0 | Status: SHIPPED | OUTPATIENT
Start: 2025-03-20

## 2025-04-19 ENCOUNTER — HEALTH MAINTENANCE LETTER (OUTPATIENT)
Age: 65
End: 2025-04-19

## 2025-05-12 SDOH — HEALTH STABILITY: PHYSICAL HEALTH: ON AVERAGE, HOW MANY DAYS PER WEEK DO YOU ENGAGE IN MODERATE TO STRENUOUS EXERCISE (LIKE A BRISK WALK)?: 4 DAYS

## 2025-05-12 SDOH — HEALTH STABILITY: PHYSICAL HEALTH: ON AVERAGE, HOW MANY MINUTES DO YOU ENGAGE IN EXERCISE AT THIS LEVEL?: 30 MIN

## 2025-05-12 ASSESSMENT — SOCIAL DETERMINANTS OF HEALTH (SDOH): HOW OFTEN DO YOU GET TOGETHER WITH FRIENDS OR RELATIVES?: TWICE A WEEK

## 2025-05-13 ENCOUNTER — OFFICE VISIT (OUTPATIENT)
Dept: FAMILY MEDICINE | Facility: CLINIC | Age: 65
End: 2025-05-13
Payer: COMMERCIAL

## 2025-05-13 ENCOUNTER — TELEPHONE (OUTPATIENT)
Dept: FAMILY MEDICINE | Facility: CLINIC | Age: 65
End: 2025-05-13

## 2025-05-13 VITALS
SYSTOLIC BLOOD PRESSURE: 102 MMHG | HEART RATE: 83 BPM | RESPIRATION RATE: 16 BRPM | BODY MASS INDEX: 33.12 KG/M2 | HEIGHT: 67 IN | WEIGHT: 211 LBS | TEMPERATURE: 97.9 F | OXYGEN SATURATION: 98 % | DIASTOLIC BLOOD PRESSURE: 84 MMHG

## 2025-05-13 DIAGNOSIS — E66.811 CLASS 1 OBESITY WITH SERIOUS COMORBIDITY AND BODY MASS INDEX (BMI) OF 32.0 TO 32.9 IN ADULT, UNSPECIFIED OBESITY TYPE: ICD-10-CM

## 2025-05-13 DIAGNOSIS — D80.2 IGA DEFICIENCY (H): ICD-10-CM

## 2025-05-13 DIAGNOSIS — M79.10 MYALGIA: ICD-10-CM

## 2025-05-13 DIAGNOSIS — E03.9 HYPOTHYROIDISM, UNSPECIFIED TYPE: ICD-10-CM

## 2025-05-13 DIAGNOSIS — S46.311A RUPTURE OF RIGHT TRICEPS TENDON, INITIAL ENCOUNTER: ICD-10-CM

## 2025-05-13 DIAGNOSIS — G47.33 OSA (OBSTRUCTIVE SLEEP APNEA): ICD-10-CM

## 2025-05-13 DIAGNOSIS — M25.50 MULTIPLE JOINT PAIN: Primary | ICD-10-CM

## 2025-05-13 LAB
T4 FREE SERPL-MCNC: 1.25 NG/DL (ref 0.9–1.7)
TSH SERPL DL<=0.005 MIU/L-ACNC: 2.68 UIU/ML (ref 0.3–4.2)

## 2025-05-13 PROCEDURE — 84443 ASSAY THYROID STIM HORMONE: CPT | Performed by: NURSE PRACTITIONER

## 2025-05-13 PROCEDURE — 84481 FREE ASSAY (FT-3): CPT | Performed by: NURSE PRACTITIONER

## 2025-05-13 PROCEDURE — 84439 ASSAY OF FREE THYROXINE: CPT | Performed by: NURSE PRACTITIONER

## 2025-05-13 PROCEDURE — 36415 COLL VENOUS BLD VENIPUNCTURE: CPT | Performed by: NURSE PRACTITIONER

## 2025-05-13 NOTE — PROGRESS NOTES
"  {PROVIDER CHARTING PREFERENCE:590572}    Eric Moore is a 65 year old, presenting for the following health issues:  Recheck Medication and Fatigue (Check TSH)        5/13/2025     3:31 PM   Additional Questions   Roomed by Clare TURNER        Medication Followup of pregabalin   Taking Medication as prescribed: NO-taking once a day   Side Effects:  None  Medication Helping Symptoms:  unsure  {additonal problems for provider to add (Optional):519741}    {ROS Picklists (Optional):197575}      Objective    /84   Pulse 83   Temp 97.9  F (36.6  C) (Tympanic)   Resp 16   Ht 1.708 m (5' 7.25\")   Wt 95.7 kg (211 lb)   LMP  (LMP Unknown)   SpO2 98%   BMI 32.80 kg/m    Body mass index is 32.8 kg/m .  Physical Exam   {Exam List (Optional):743878}    {Diagnostic Test Results (Optional):425500}        Signed Electronically by: HERIBERTO Linton CNP  {Email feedback regarding this note to [email or web address]   :[id number]}  "

## 2025-05-13 NOTE — LETTER
May 15, 2025      Teresa Fernandez  [address] [address] [address]        To Whom It May Concern,     I am writing for reconsideration of Zepbound approval for my patient Teresa Fernandez. Teresa is not a candidate for phentermine, benzphetamine, diethylpropion, or phendimetrazine due to hx of supraventricular tachycardia and risk for prolonged QT interval due to underlying hyperparathyroidism.           Sincerely,          HERIBERTO Linton CNP    Electronically signed

## 2025-05-13 NOTE — TELEPHONE ENCOUNTER
PA Initiation    Medication: ZEPBOUND 2.5 MG/0.5ML SC SOAJ  Insurance Company:  - Phone [phone] Fax [phone]  Pharmacy Filling the Rx: [facility] [place], [place] - [address]  Filling Pharmacy Phone: [phone]  Filling Pharmacy Fax: [phone]  Start Date: 5/13/2025

## 2025-05-14 ENCOUNTER — TELEPHONE (OUTPATIENT)
Dept: GASTROENTEROLOGY | Facility: CLINIC | Age: 65
End: 2025-05-14
Payer: COMMERCIAL

## 2025-05-14 ENCOUNTER — HOSPITAL ENCOUNTER (OUTPATIENT)
Facility: CLINIC | Age: 65
End: 2025-05-14
Attending: SURGERY | Admitting: SURGERY
Payer: COMMERCIAL

## 2025-05-14 ENCOUNTER — PATIENT OUTREACH (OUTPATIENT)
Dept: CARE COORDINATION | Facility: CLINIC | Age: 65
End: 2025-05-14
Payer: COMMERCIAL

## 2025-05-14 LAB
T3 SERPL-MCNC: 86 NG/DL (ref 85–202)
T3FREE SERPL-MCNC: 2.7 PG/ML (ref 2–4.4)

## 2025-05-14 NOTE — TELEPHONE ENCOUNTER
Endoscopy Scheduling Screen      What insurance is in the chart?  Other:  [facility]    Ordering/Referring Provider: Amanda Meléndez   (If ordering provider performs procedure, schedule with ordering provider unless otherwise instructed. )    BMI: There is no height or weight on file to calculate BMI.  32.8    Sedation Ordered  general anesthesia.   BMI<= 45 45 < BMI <= 48 48 < BMI < = 50  BMI > 50   No Restrictions No [facility]  No [facility]  [facility] with exceptions Hospital Only OR Only       Do you have a history of malignant hyperthermia?  NO    (Females) Are you currently pregnant?   NO     Are you currently on dialysis?   NO    Do you need assistance transferring?   NO    BMI: There is no height or weight on file to calculate BMI.     Is patients BMI > 50?  NO    BMI > 40?  NO    Do you have a diagnosis of diabetes?  NO    Do you take an Oral or Injectable medication for weight loss or diabetes (excluding insulin)?  NO    Do you take the medication Naltrexone?  NO    Do you take blood thinners?  NO    Prep   Are you currently have chronic kidney disease?  NO    Do you have a diagnosis of cystic fibrosis (CF)?  NO    On a regular basis do you go 3 -5 days between each bowel movements?  NO    Preferred Pharmacy:    [facility] [place] [address]  [address] [address]  Phone: [phone] Fax: [phone] Alternate Fax: [phone], [phone]    [facility] - [place], [place] - [address]  [address] [address]  Phone: [phone] Fax: [phone]    [facility]  [address] [address]        Final Scheduling Details     Procedure scheduled  Upper endoscopy (EGD)    Surgeon:  Puentes     Date of procedure:  6-13-25     Location  [place] - Patient preference.    What is your communication preference for Instructions and/or Bowel Prep?   MyChart    Patient Reminders:    You will  receive a call from a Nurse to review instructions and health history.  This assessment must be completed prior to your procedure.  Failure to complete the Nurse assessment may result in the procedure being cancelled.       On the day of your procedure, please designate an adult(s) who can drive you home stay with you for the next 24 hours. The medicines used in the exam will make you sleepy. You will not be able to drive.       You cannot take public transportation, ride share services, or non-medical taxi service without a responsible caregiver.  Medical transport services are allowed with the requirement that a responsible caregiver will receive you at your destination.  We require that drivers and caregivers are confirmed prior to your procedure.

## 2025-05-14 NOTE — TELEPHONE ENCOUNTER
PRIOR AUTHORIZATION DENIED    Medication: ZEPBOUND 2.5 MG/0.5ML SC SOAJ    Insurance Company: [employer] - Phone [phone] Fax [phone]    Denial Date: 5/14/2025    Denial Reason(s): Patient needs to try and fail 3 months of phentermine, benzphetamine, diethylpropion or phendimetrazine and fail to achieve a 5% reduction in baseline weight.     Appeal Information:

## 2025-05-15 ENCOUNTER — RESULTS FOLLOW-UP (OUTPATIENT)
Dept: FAMILY MEDICINE | Facility: CLINIC | Age: 65
End: 2025-05-15

## 2025-05-15 ENCOUNTER — TELEPHONE (OUTPATIENT)
Dept: UROLOGY | Facility: CLINIC | Age: 65
End: 2025-05-15
Payer: COMMERCIAL

## 2025-05-15 PROBLEM — I47.10 PAROXYSMAL SUPRAVENTRICULAR TACHYCARDIA: Status: ACTIVE | Noted: 2025-02-20

## 2025-05-15 NOTE — TELEPHONE ENCOUNTER
[facility] Call Center    Phone Message    May a detailed message be left on voicemail: yes     Reason for Call: Other: Patient called to know why she needs to book a appt. Please call patient to discuss     Action Taken: Message routed to:  Other: Wycassandra Uro    Travel Screening: Not Applicable     Date of Service:

## 2025-05-16 NOTE — TELEPHONE ENCOUNTER
Received fax from PA pool. Called patient to let her know it was at the  ready to be signed.    Patient will come in and sign the form early next week. When returned to care team by AFR please fax to PA team at .    Kate BOWDEN LPN

## 2025-05-16 NOTE — TELEPHONE ENCOUNTER
Patient would need to sign the consent form that the insurance plan requires.  This is contained in the denial letter which would have been mailed out to the patient by the insurance plan.  Or I can provide a copy to the clinic by email or by fax for the patient to sign.  It would then need to be faxed to the PA team to

## 2025-05-16 NOTE — TELEPHONE ENCOUNTER
Called patient. Would like to come to the clinic to sign this form. Will request from PA pool and let patient know when it is at the  to be signed.    Kate BOWDEN LPN

## 2025-06-09 NOTE — TELEPHONE ENCOUNTER
Pt is due for a colon cancer screening. Left a message for patient to return call.  Angelica Ellis, CMA       Yes

## 2025-06-23 DIAGNOSIS — E03.9 HYPOTHYROIDISM, UNSPECIFIED TYPE: ICD-10-CM

## 2025-06-24 RX ORDER — LEVOTHYROXINE SODIUM 100 UG/1
100 TABLET ORAL DAILY
Qty: 90 TABLET | Refills: 0 | Status: SHIPPED | OUTPATIENT
Start: 2025-06-24

## 2025-08-27 ENCOUNTER — PATIENT OUTREACH (OUTPATIENT)
Dept: CARE COORDINATION | Facility: CLINIC | Age: 65
End: 2025-08-27
Payer: COMMERCIAL

## (undated) DEVICE — ESU PENCIL SMOKE EVAC W/ROCKER SWITCH 0703-047-000

## (undated) DEVICE — PREP CHLORAPREP W/ORANGE TINT 10.5ML 260715

## (undated) DEVICE — NIM PROBE PRASS INCREMENTING TIP 8225825

## (undated) DEVICE — ESU GROUND PAD ADULT W/CORD E7507

## (undated) DEVICE — ENDO SNARE EXACTO COLD 9MM LOOP 2.4MMX230CM 00711115

## (undated) DEVICE — SPECIMEN CONTAINER URINE 90ML STERILE 75.1435.002

## (undated) DEVICE — SU CHROMIC 3-0 SH 27" G122H

## (undated) DEVICE — SOL WATER IRRIG 500ML BOTTLE 2F7113

## (undated) DEVICE — GLOVE BIOGEL PI MICRO SZ 6.5 48565

## (undated) DEVICE — SOL NACL 0.9% IRRIG 500ML BOTTLE 2F7123

## (undated) DEVICE — SU MONOCRYL 5-0 P-3 18" UND Y493G

## (undated) DEVICE — SURGICEL FIBRILLAR HEMOSTAT 2"X4" JJ1962

## (undated) DEVICE — SU DERMABOND ADVANCED .7ML DNX12

## (undated) DEVICE — SUCTION MANIFOLD NEPTUNE 2 SYS 1 PORT 702-025-000

## (undated) DEVICE — PACK ENT MINOR CUSTOM ASC

## (undated) DEVICE — NDL BUTTERFLY 21G .75" 367281

## (undated) DEVICE — CLIP HORIZON MED BLUE 002200

## (undated) DEVICE — CLIP HORIZON SM RED WIDE SLOT 001201

## (undated) DEVICE — ESU ELEC BLADE 2.75" COATED/INSULATED E1455

## (undated) DEVICE — LINEN TOWEL PACK X5 5464

## (undated) RX ORDER — FENTANYL CITRATE-0.9 % NACL/PF 10 MCG/ML
PLASTIC BAG, INJECTION (ML) INTRAVENOUS
Status: DISPENSED
Start: 2023-08-23

## (undated) RX ORDER — FENTANYL CITRATE 50 UG/ML
INJECTION, SOLUTION INTRAMUSCULAR; INTRAVENOUS
Status: DISPENSED
Start: 2023-08-23

## (undated) RX ORDER — ONDANSETRON 2 MG/ML
INJECTION INTRAMUSCULAR; INTRAVENOUS
Status: DISPENSED
Start: 2023-08-23

## (undated) RX ORDER — ACETAMINOPHEN 325 MG/1
TABLET ORAL
Status: DISPENSED
Start: 2023-08-23

## (undated) RX ORDER — HYDROMORPHONE HYDROCHLORIDE 1 MG/ML
INJECTION, SOLUTION INTRAMUSCULAR; INTRAVENOUS; SUBCUTANEOUS
Status: DISPENSED
Start: 2023-08-23

## (undated) RX ORDER — BUPIVACAINE HYDROCHLORIDE 2.5 MG/ML
INJECTION, SOLUTION EPIDURAL; INFILTRATION; INTRACAUDAL
Status: DISPENSED
Start: 2022-05-03

## (undated) RX ORDER — AMPICILLIN 250 MG/1
INJECTION, POWDER, FOR SOLUTION INTRAMUSCULAR; INTRAVENOUS
Status: DISPENSED
Start: 2021-11-01

## (undated) RX ORDER — REMIFENTANIL HYDROCHLORIDE 1 MG/ML
INJECTION, POWDER, LYOPHILIZED, FOR SOLUTION INTRAVENOUS
Status: DISPENSED
Start: 2023-08-23

## (undated) RX ORDER — REGADENOSON 0.08 MG/ML
INJECTION, SOLUTION INTRAVENOUS
Status: DISPENSED
Start: 2024-03-06

## (undated) RX ORDER — TRIAMCINOLONE ACETONIDE 40 MG/ML
INJECTION, SUSPENSION INTRA-ARTICULAR; INTRAMUSCULAR
Status: DISPENSED
Start: 2022-05-03

## (undated) RX ORDER — DEXAMETHASONE SODIUM PHOSPHATE 10 MG/ML
INJECTION, SOLUTION INTRAMUSCULAR; INTRAVENOUS
Status: DISPENSED
Start: 2023-08-23

## (undated) RX ORDER — OXYCODONE HYDROCHLORIDE 5 MG/1
TABLET ORAL
Status: DISPENSED
Start: 2023-08-23

## (undated) RX ORDER — LIDOCAINE HYDROCHLORIDE 10 MG/ML
INJECTION, SOLUTION EPIDURAL; INFILTRATION; INTRACAUDAL; PERINEURAL
Status: DISPENSED
Start: 2022-05-03

## (undated) RX ORDER — METHYLPREDNISOLONE ACETATE 40 MG/ML
INJECTION, SUSPENSION INTRA-ARTICULAR; INTRALESIONAL; INTRAMUSCULAR; SOFT TISSUE
Status: DISPENSED
Start: 2019-05-02

## (undated) RX ORDER — REGADENOSON 0.08 MG/ML
INJECTION, SOLUTION INTRAVENOUS
Status: DISPENSED
Start: 2022-11-02

## (undated) RX ORDER — LIDOCAINE HYDROCHLORIDE 20 MG/ML
INJECTION, SOLUTION EPIDURAL; INFILTRATION; INTRACAUDAL; PERINEURAL
Status: DISPENSED
Start: 2019-05-02

## (undated) RX ORDER — PENICILLIN G POTASSIUM 5000000 [IU]/1
INJECTION, POWDER, FOR SOLUTION INTRAMUSCULAR; INTRAVENOUS
Status: DISPENSED
Start: 2021-11-01

## (undated) RX ORDER — GLYCOPYRROLATE 0.2 MG/ML
INJECTION INTRAMUSCULAR; INTRAVENOUS
Status: DISPENSED
Start: 2023-08-23